# Patient Record
Sex: MALE | Race: WHITE | NOT HISPANIC OR LATINO | Employment: OTHER | URBAN - METROPOLITAN AREA
[De-identification: names, ages, dates, MRNs, and addresses within clinical notes are randomized per-mention and may not be internally consistent; named-entity substitution may affect disease eponyms.]

---

## 2017-05-01 ENCOUNTER — HOSPITAL ENCOUNTER (INPATIENT)
Facility: HOSPITAL | Age: 72
LOS: 5 days | Discharge: HOME | DRG: 193 | End: 2017-05-07
Attending: EMERGENCY MEDICINE | Admitting: INTERNAL MEDICINE
Payer: MEDICARE

## 2017-05-01 DIAGNOSIS — R09.02 HYPOXIA: ICD-10-CM

## 2017-05-01 DIAGNOSIS — J18.9 PNEUMONIA: Primary | ICD-10-CM

## 2017-05-01 DIAGNOSIS — I48.21 PERMANENT ATRIAL FIBRILLATION (HCC): ICD-10-CM

## 2017-05-01 RX ORDER — FOLIC ACID 1 MG/1
1 TABLET ORAL DAILY
COMMUNITY
End: 2021-01-01 | Stop reason: HOSPADM

## 2017-05-01 RX ORDER — DIGOXIN 0.05 MG/ML
125 SOLUTION ORAL DAILY
COMMUNITY
End: 2018-10-10 | Stop reason: ALTCHOICE

## 2017-05-01 RX ORDER — METOPROLOL TARTRATE 100 MG/1
100 TABLET ORAL DAILY
COMMUNITY
End: 2018-08-21 | Stop reason: SDUPTHER

## 2017-05-01 RX ORDER — DILTIAZEM HYDROCHLORIDE 360 MG/1
360 CAPSULE, EXTENDED RELEASE ORAL DAILY
COMMUNITY
End: 2018-08-20 | Stop reason: SDUPTHER

## 2017-05-02 ENCOUNTER — APPOINTMENT (INPATIENT)
Dept: RADIOLOGY | Facility: HOSPITAL | Age: 72
DRG: 193 | End: 2017-05-02
Payer: MEDICARE

## 2017-05-02 ENCOUNTER — APPOINTMENT (EMERGENCY)
Dept: RADIOLOGY | Facility: HOSPITAL | Age: 72
DRG: 193 | End: 2017-05-02
Payer: MEDICARE

## 2017-05-02 ENCOUNTER — GENERIC CONVERSION - ENCOUNTER (OUTPATIENT)
Dept: OTHER | Facility: OTHER | Age: 72
End: 2017-05-02

## 2017-05-02 ENCOUNTER — APPOINTMENT (INPATIENT)
Dept: NON INVASIVE DIAGNOSTICS | Facility: HOSPITAL | Age: 72
DRG: 193 | End: 2017-05-02
Payer: MEDICARE

## 2017-05-02 PROBLEM — J18.9 CAP (COMMUNITY ACQUIRED PNEUMONIA): Status: ACTIVE | Noted: 2017-05-02

## 2017-05-02 PROBLEM — A41.9 SEVERE SEPSIS (HCC): Status: ACTIVE | Noted: 2017-05-02

## 2017-05-02 PROBLEM — R65.20 SEVERE SEPSIS (HCC): Status: ACTIVE | Noted: 2017-05-02

## 2017-05-02 PROBLEM — I48.91 ATRIAL FIBRILLATION (HCC): Status: ACTIVE | Noted: 2017-05-02

## 2017-05-02 PROBLEM — J96.01 ACUTE RESPIRATORY FAILURE WITH HYPOXIA (HCC): Status: ACTIVE | Noted: 2017-05-02

## 2017-05-02 LAB
ALBUMIN SERPL BCP-MCNC: 3.6 G/DL (ref 3.5–5)
ALP SERPL-CCNC: 122 U/L (ref 46–116)
ALT SERPL W P-5'-P-CCNC: 40 U/L (ref 12–78)
ANION GAP SERPL CALCULATED.3IONS-SCNC: 10 MMOL/L (ref 4–13)
ANION GAP SERPL CALCULATED.3IONS-SCNC: 6 MMOL/L (ref 4–13)
ANION GAP SERPL CALCULATED.3IONS-SCNC: 9 MMOL/L (ref 4–13)
ANISOCYTOSIS BLD QL SMEAR: PRESENT
APTT PPP: 39 SECONDS (ref 23–35)
ARTERIAL PATENCY WRIST A: ABNORMAL
AST SERPL W P-5'-P-CCNC: 55 U/L (ref 5–45)
BASE EXCESS BLDA CALC-SCNC: -1 MMOL/L (ref -2–3)
BASOPHILS # BLD MANUAL: 0 THOUSAND/UL (ref 0–0.1)
BASOPHILS NFR MAR MANUAL: 0 % (ref 0–1)
BILIRUB SERPL-MCNC: 0.8 MG/DL (ref 0.2–1)
BUN SERPL-MCNC: 21 MG/DL (ref 5–25)
BUN SERPL-MCNC: 24 MG/DL (ref 5–25)
BUN SERPL-MCNC: 26 MG/DL (ref 5–25)
CALCIUM SERPL-MCNC: 8.1 MG/DL (ref 8.3–10.1)
CALCIUM SERPL-MCNC: 8.7 MG/DL (ref 8.3–10.1)
CALCIUM SERPL-MCNC: 8.7 MG/DL (ref 8.3–10.1)
CHLORIDE SERPL-SCNC: 100 MMOL/L (ref 100–108)
CHLORIDE SERPL-SCNC: 102 MMOL/L (ref 100–108)
CHLORIDE SERPL-SCNC: 103 MMOL/L (ref 100–108)
CO2 SERPL-SCNC: 24 MMOL/L (ref 21–32)
CO2 SERPL-SCNC: 27 MMOL/L (ref 21–32)
CO2 SERPL-SCNC: 28 MMOL/L (ref 21–32)
CREAT SERPL-MCNC: 1.33 MG/DL (ref 0.6–1.3)
CREAT SERPL-MCNC: 1.48 MG/DL (ref 0.6–1.3)
CREAT SERPL-MCNC: 1.52 MG/DL (ref 0.6–1.3)
DIGOXIN SERPL-MCNC: 1.3 NG/ML (ref 0.8–2)
DS:DELIVERY SYSTEM: ABNORMAL
EOSINOPHIL # BLD MANUAL: 0 THOUSAND/UL (ref 0–0.4)
EOSINOPHIL NFR BLD MANUAL: 0 % (ref 0–6)
ERYTHROCYTE [DISTWIDTH] IN BLOOD BY AUTOMATED COUNT: 15.2 % (ref 11.6–15.1)
ERYTHROCYTE [DISTWIDTH] IN BLOOD BY AUTOMATED COUNT: 15.5 % (ref 11.6–15.1)
FIO2 GAS DIL.REBREATH: 100 L
FLUAV AG SPEC QL: ABNORMAL
FLUBV AG SPEC QL: DETECTED
GFR SERPL CREATININE-BSD FRML MDRD: 45.7 ML/MIN/1.73SQ M
GFR SERPL CREATININE-BSD FRML MDRD: 47.1 ML/MIN/1.73SQ M
GFR SERPL CREATININE-BSD FRML MDRD: 53.3 ML/MIN/1.73SQ M
GLUCOSE SERPL-MCNC: 119 MG/DL (ref 65–140)
GLUCOSE SERPL-MCNC: 217 MG/DL (ref 65–140)
GLUCOSE SERPL-MCNC: 220 MG/DL (ref 65–140)
HCO3 BLDA-SCNC: 23.1 MMOL/L (ref 22–28)
HCT VFR BLD AUTO: 34.1 % (ref 36.5–49.3)
HCT VFR BLD AUTO: 36.6 % (ref 36.5–49.3)
HGB BLD-MCNC: 11.9 G/DL (ref 12–17)
HGB BLD-MCNC: 12.6 G/DL (ref 12–17)
INR PPP: 1.53 (ref 0.86–1.16)
L PNEUMO1 AG UR QL IA.RAPID: NEGATIVE
LACTATE SERPL-SCNC: 1.5 MMOL/L (ref 0.5–2)
LYMPHOCYTES # BLD AUTO: 0.47 THOUSAND/UL (ref 0.6–4.47)
LYMPHOCYTES # BLD AUTO: 19 % (ref 14–44)
MCH RBC QN AUTO: 31.6 PG (ref 26.8–34.3)
MCH RBC QN AUTO: 31.9 PG (ref 26.8–34.3)
MCHC RBC AUTO-ENTMCNC: 34.4 G/DL (ref 31.4–37.4)
MCHC RBC AUTO-ENTMCNC: 34.9 G/DL (ref 31.4–37.4)
MCV RBC AUTO: 91 FL (ref 82–98)
MCV RBC AUTO: 93 FL (ref 82–98)
MONOCYTES # BLD AUTO: 0.25 THOUSAND/UL (ref 0–1.22)
MONOCYTES NFR BLD: 10 % (ref 4–12)
NEUTROPHILS # BLD MANUAL: 1.72 THOUSAND/UL (ref 1.85–7.62)
NEUTS BAND NFR BLD MANUAL: 1 % (ref 0–8)
NEUTS SEG NFR BLD AUTO: 69 % (ref 43–75)
NRBC BLD AUTO-RTO: 1 /100 WBC (ref 0–2)
NT-PROBNP SERPL-MCNC: 1281 PG/ML
PCO2 BLD: 24 MMOL/L (ref 21–32)
PCO2 BLD: 36.6 MM HG (ref 36–44)
PH BLD: 7.41 [PH] (ref 7.35–7.45)
PLATELET # BLD AUTO: 41 THOUSANDS/UL (ref 149–390)
PLATELET # BLD AUTO: 59 THOUSANDS/UL (ref 149–390)
PLATELET BLD QL SMEAR: ABNORMAL
PMV BLD AUTO: 10.5 FL (ref 8.9–12.7)
PMV BLD AUTO: 12.4 FL (ref 8.9–12.7)
PO2 BLD: 438 MM HG (ref 75–129)
POLYCHROMASIA BLD QL SMEAR: PRESENT
POTASSIUM SERPL-SCNC: 3.9 MMOL/L (ref 3.5–5.3)
POTASSIUM SERPL-SCNC: 4.4 MMOL/L (ref 3.5–5.3)
POTASSIUM SERPL-SCNC: 4.5 MMOL/L (ref 3.5–5.3)
PROT SERPL-MCNC: 7.3 G/DL (ref 6.4–8.2)
PROTHROMBIN TIME: 18.2 SECONDS (ref 12.1–14.4)
RBC # BLD AUTO: 3.77 MILLION/UL (ref 3.88–5.62)
RBC # BLD AUTO: 3.95 MILLION/UL (ref 3.88–5.62)
RBC MORPH BLD: PRESENT
RSV B RNA SPEC QL NAA+PROBE: ABNORMAL
S PNEUM AG UR QL: NEGATIVE
SAMPLE SITE: ABNORMAL
SAO2 % BLD FROM PO2: 100 % (ref 95–98)
SODIUM SERPL-SCNC: 134 MMOL/L (ref 136–145)
SODIUM SERPL-SCNC: 137 MMOL/L (ref 136–145)
SODIUM SERPL-SCNC: 138 MMOL/L (ref 136–145)
SPECIMEN SOURCE: ABNORMAL
TOTAL CELLS COUNTED SPEC: 100
TROPONIN I SERPL-MCNC: 0.02 NG/ML
VARIANT LYMPHS # BLD AUTO: 1 %
VENTILATION VALUE: 126
WBC # BLD AUTO: 1.54 THOUSAND/UL (ref 4.31–10.16)
WBC # BLD AUTO: 2.45 THOUSAND/UL (ref 4.31–10.16)

## 2017-05-02 PROCEDURE — 82803 BLOOD GASES ANY COMBINATION: CPT

## 2017-05-02 PROCEDURE — 71020 HB CHEST X-RAY 2VW FRONTAL&LATL: CPT

## 2017-05-02 PROCEDURE — 96360 HYDRATION IV INFUSION INIT: CPT

## 2017-05-02 PROCEDURE — 85007 BL SMEAR W/DIFF WBC COUNT: CPT | Performed by: EMERGENCY MEDICINE

## 2017-05-02 PROCEDURE — 87798 DETECT AGENT NOS DNA AMP: CPT | Performed by: NURSE PRACTITIONER

## 2017-05-02 PROCEDURE — 94640 AIRWAY INHALATION TREATMENT: CPT

## 2017-05-02 PROCEDURE — 71010 HB CHEST X-RAY 1 VIEW FRONTAL (PORTABLE): CPT

## 2017-05-02 PROCEDURE — 85027 COMPLETE CBC AUTOMATED: CPT | Performed by: NURSE PRACTITIONER

## 2017-05-02 PROCEDURE — 94760 N-INVAS EAR/PLS OXIMETRY 1: CPT

## 2017-05-02 PROCEDURE — 87040 BLOOD CULTURE FOR BACTERIA: CPT | Performed by: EMERGENCY MEDICINE

## 2017-05-02 PROCEDURE — 94660 CPAP INITIATION&MGMT: CPT

## 2017-05-02 PROCEDURE — 83605 ASSAY OF LACTIC ACID: CPT | Performed by: EMERGENCY MEDICINE

## 2017-05-02 PROCEDURE — 83880 ASSAY OF NATRIURETIC PEPTIDE: CPT | Performed by: NURSE PRACTITIONER

## 2017-05-02 PROCEDURE — 80053 COMPREHEN METABOLIC PANEL: CPT | Performed by: EMERGENCY MEDICINE

## 2017-05-02 PROCEDURE — 85730 THROMBOPLASTIN TIME PARTIAL: CPT | Performed by: EMERGENCY MEDICINE

## 2017-05-02 PROCEDURE — 87449 NOS EACH ORGANISM AG IA: CPT | Performed by: NURSE PRACTITIONER

## 2017-05-02 PROCEDURE — 85610 PROTHROMBIN TIME: CPT | Performed by: EMERGENCY MEDICINE

## 2017-05-02 PROCEDURE — 80048 BASIC METABOLIC PNL TOTAL CA: CPT | Performed by: NURSE PRACTITIONER

## 2017-05-02 PROCEDURE — 80162 ASSAY OF DIGOXIN TOTAL: CPT | Performed by: NURSE PRACTITIONER

## 2017-05-02 PROCEDURE — 87081 CULTURE SCREEN ONLY: CPT | Performed by: NURSE PRACTITIONER

## 2017-05-02 PROCEDURE — 84484 ASSAY OF TROPONIN QUANT: CPT | Performed by: FAMILY MEDICINE

## 2017-05-02 PROCEDURE — 93005 ELECTROCARDIOGRAM TRACING: CPT

## 2017-05-02 PROCEDURE — 85027 COMPLETE CBC AUTOMATED: CPT | Performed by: EMERGENCY MEDICINE

## 2017-05-02 PROCEDURE — 84484 ASSAY OF TROPONIN QUANT: CPT | Performed by: NURSE PRACTITIONER

## 2017-05-02 PROCEDURE — 36415 COLL VENOUS BLD VENIPUNCTURE: CPT | Performed by: EMERGENCY MEDICINE

## 2017-05-02 PROCEDURE — 99285 EMERGENCY DEPT VISIT HI MDM: CPT

## 2017-05-02 PROCEDURE — 93306 TTE W/DOPPLER COMPLETE: CPT

## 2017-05-02 RX ORDER — DOCUSATE SODIUM 100 MG/1
100 CAPSULE, LIQUID FILLED ORAL 2 TIMES DAILY
Status: DISCONTINUED | OUTPATIENT
Start: 2017-05-02 | End: 2017-05-07 | Stop reason: HOSPADM

## 2017-05-02 RX ORDER — ALBUTEROL SULFATE 2.5 MG/3ML
2.5 SOLUTION RESPIRATORY (INHALATION) EVERY 4 HOURS PRN
Status: DISCONTINUED | OUTPATIENT
Start: 2017-05-02 | End: 2017-05-07 | Stop reason: HOSPADM

## 2017-05-02 RX ORDER — OSELTAMIVIR PHOSPHATE 30 MG/1
30 CAPSULE ORAL EVERY 12 HOURS SCHEDULED
Status: DISCONTINUED | OUTPATIENT
Start: 2017-05-02 | End: 2017-05-07 | Stop reason: HOSPADM

## 2017-05-02 RX ORDER — DIGOXIN 0.05 MG/ML
125 SOLUTION ORAL DAILY
Status: DISCONTINUED | OUTPATIENT
Start: 2017-05-02 | End: 2017-05-07 | Stop reason: HOSPADM

## 2017-05-02 RX ORDER — ALBUTEROL SULFATE 2.5 MG/3ML
10 SOLUTION RESPIRATORY (INHALATION) ONCE
Status: DISCONTINUED | OUTPATIENT
Start: 2017-05-02 | End: 2017-05-04

## 2017-05-02 RX ORDER — ALBUTEROL SULFATE 2.5 MG/3ML
10 SOLUTION RESPIRATORY (INHALATION) ONCE
Status: DISCONTINUED | OUTPATIENT
Start: 2017-05-02 | End: 2017-05-02

## 2017-05-02 RX ORDER — METOPROLOL TARTRATE 50 MG/1
100 TABLET, FILM COATED ORAL DAILY
Status: DISCONTINUED | OUTPATIENT
Start: 2017-05-02 | End: 2017-05-05

## 2017-05-02 RX ORDER — LEVOFLOXACIN 5 MG/ML
750 INJECTION, SOLUTION INTRAVENOUS ONCE
Status: COMPLETED | OUTPATIENT
Start: 2017-05-02 | End: 2017-05-02

## 2017-05-02 RX ORDER — IPRATROPIUM BROMIDE AND ALBUTEROL SULFATE 2.5; .5 MG/3ML; MG/3ML
3 SOLUTION RESPIRATORY (INHALATION) ONCE
Status: COMPLETED | OUTPATIENT
Start: 2017-05-02 | End: 2017-05-02

## 2017-05-02 RX ORDER — METHYLPREDNISOLONE SODIUM SUCCINATE 40 MG/ML
40 INJECTION, POWDER, LYOPHILIZED, FOR SOLUTION INTRAMUSCULAR; INTRAVENOUS EVERY 8 HOURS
Status: DISCONTINUED | OUTPATIENT
Start: 2017-05-02 | End: 2017-05-02

## 2017-05-02 RX ORDER — ACETAMINOPHEN 325 MG/1
650 TABLET ORAL EVERY 6 HOURS PRN
Status: DISCONTINUED | OUTPATIENT
Start: 2017-05-02 | End: 2017-05-07 | Stop reason: HOSPADM

## 2017-05-02 RX ORDER — METHYLPREDNISOLONE SODIUM SUCCINATE 125 MG/2ML
125 INJECTION, POWDER, LYOPHILIZED, FOR SOLUTION INTRAMUSCULAR; INTRAVENOUS ONCE
Status: COMPLETED | OUTPATIENT
Start: 2017-05-02 | End: 2017-05-02

## 2017-05-02 RX ORDER — VANCOMYCIN HYDROCHLORIDE 500 MG/10ML
INJECTION, POWDER, LYOPHILIZED, FOR SOLUTION INTRAVENOUS
Status: COMPLETED
Start: 2017-05-02 | End: 2017-05-02

## 2017-05-02 RX ORDER — FOLIC ACID 1 MG/1
1 TABLET ORAL DAILY
Status: DISCONTINUED | OUTPATIENT
Start: 2017-05-02 | End: 2017-05-07 | Stop reason: HOSPADM

## 2017-05-02 RX ORDER — SODIUM CHLORIDE 9 MG/ML
100 INJECTION, SOLUTION INTRAVENOUS CONTINUOUS
Status: DISCONTINUED | OUTPATIENT
Start: 2017-05-02 | End: 2017-05-02

## 2017-05-02 RX ORDER — MAGNESIUM HYDROXIDE/ALUMINUM HYDROXICE/SIMETHICONE 120; 1200; 1200 MG/30ML; MG/30ML; MG/30ML
30 SUSPENSION ORAL EVERY 6 HOURS PRN
Status: DISCONTINUED | OUTPATIENT
Start: 2017-05-02 | End: 2017-05-07 | Stop reason: HOSPADM

## 2017-05-02 RX ORDER — IPRATROPIUM BROMIDE AND ALBUTEROL SULFATE 2.5; .5 MG/3ML; MG/3ML
3 SOLUTION RESPIRATORY (INHALATION)
Status: DISCONTINUED | OUTPATIENT
Start: 2017-05-02 | End: 2017-05-03

## 2017-05-02 RX ORDER — ONDANSETRON 2 MG/ML
4 INJECTION INTRAMUSCULAR; INTRAVENOUS EVERY 6 HOURS PRN
Status: DISCONTINUED | OUTPATIENT
Start: 2017-05-02 | End: 2017-05-07 | Stop reason: HOSPADM

## 2017-05-02 RX ORDER — FUROSEMIDE 10 MG/ML
20 INJECTION INTRAMUSCULAR; INTRAVENOUS
Status: DISCONTINUED | OUTPATIENT
Start: 2017-05-02 | End: 2017-05-03

## 2017-05-02 RX ORDER — DILTIAZEM HYDROCHLORIDE 180 MG/1
360 CAPSULE, COATED, EXTENDED RELEASE ORAL DAILY
Status: DISCONTINUED | OUTPATIENT
Start: 2017-05-02 | End: 2017-05-07 | Stop reason: HOSPADM

## 2017-05-02 RX ADMIN — ALBUTEROL SULFATE 10 MG: 2.5 SOLUTION RESPIRATORY (INHALATION) at 03:16

## 2017-05-02 RX ADMIN — IPRATROPIUM BROMIDE AND ALBUTEROL SULFATE 3 ML: 2.5; .5 SOLUTION RESPIRATORY (INHALATION) at 08:52

## 2017-05-02 RX ADMIN — IPRATROPIUM BROMIDE AND ALBUTEROL SULFATE 3 ML: 2.5; .5 SOLUTION RESPIRATORY (INHALATION) at 13:35

## 2017-05-02 RX ADMIN — IPRATROPIUM BROMIDE AND ALBUTEROL SULFATE 3 ML: .5; 3 SOLUTION RESPIRATORY (INHALATION) at 00:45

## 2017-05-02 RX ADMIN — METOPROLOL TARTRATE 100 MG: 50 TABLET ORAL at 07:50

## 2017-05-02 RX ADMIN — SODIUM CHLORIDE 2200 ML: 0.9 INJECTION, SOLUTION INTRAVENOUS at 03:14

## 2017-05-02 RX ADMIN — LEVOFLOXACIN 750 MG: 5 INJECTION, SOLUTION INTRAVENOUS at 03:28

## 2017-05-02 RX ADMIN — METHYLPREDNISOLONE SODIUM SUCCINATE 40 MG: 40 INJECTION, POWDER, FOR SOLUTION INTRAMUSCULAR; INTRAVENOUS at 10:46

## 2017-05-02 RX ADMIN — METOPROLOL TARTRATE 5 MG: 5 INJECTION INTRAVENOUS at 21:43

## 2017-05-02 RX ADMIN — SODIUM CHLORIDE 100 ML/HR: 0.9 INJECTION, SOLUTION INTRAVENOUS at 06:43

## 2017-05-02 RX ADMIN — APIXABAN 5 MG: 5 TABLET, FILM COATED ORAL at 07:39

## 2017-05-02 RX ADMIN — IPRATROPIUM BROMIDE AND ALBUTEROL SULFATE 3 ML: 2.5; .5 SOLUTION RESPIRATORY (INHALATION) at 20:17

## 2017-05-02 RX ADMIN — OSELTAMIVIR PHOSPHATE 30 MG: 30 CAPSULE ORAL at 21:43

## 2017-05-02 RX ADMIN — CEFEPIME 2000 MG: 2 INJECTION, POWDER, FOR SOLUTION INTRAMUSCULAR; INTRAVENOUS at 13:44

## 2017-05-02 RX ADMIN — FOLIC ACID 1 MG: 1 TABLET ORAL at 07:39

## 2017-05-02 RX ADMIN — METHYLPREDNISOLONE SODIUM SUCCINATE 125 MG: 125 INJECTION, POWDER, FOR SOLUTION INTRAMUSCULAR; INTRAVENOUS at 03:18

## 2017-05-02 RX ADMIN — DOCUSATE SODIUM 100 MG: 100 CAPSULE, LIQUID FILLED ORAL at 16:42

## 2017-05-02 RX ADMIN — CEFTRIAXONE SODIUM 1000 MG: 1 INJECTION, POWDER, FOR SOLUTION INTRAMUSCULAR; INTRAVENOUS at 07:44

## 2017-05-02 RX ADMIN — DOCUSATE SODIUM 100 MG: 100 CAPSULE, LIQUID FILLED ORAL at 07:39

## 2017-05-02 RX ADMIN — APIXABAN 5 MG: 5 TABLET, FILM COATED ORAL at 16:42

## 2017-05-02 RX ADMIN — DIGOXIN 125 MCG: 0.05 SOLUTION ORAL at 07:51

## 2017-05-02 RX ADMIN — AZITHROMYCIN MONOHYDRATE 500 MG: 500 INJECTION, POWDER, LYOPHILIZED, FOR SOLUTION INTRAVENOUS at 07:46

## 2017-05-02 RX ADMIN — SODIUM CHLORIDE 1000 ML: 0.9 INJECTION, SOLUTION INTRAVENOUS at 01:30

## 2017-05-02 RX ADMIN — VANCOMYCIN HYDROCHLORIDE 1250 MG: 1 INJECTION, POWDER, LYOPHILIZED, FOR SOLUTION INTRAVENOUS at 04:26

## 2017-05-02 RX ADMIN — FUROSEMIDE 20 MG: 10 INJECTION, SOLUTION INTRAMUSCULAR; INTRAVENOUS at 16:42

## 2017-05-02 RX ADMIN — IPRATROPIUM BROMIDE 0.5 MG: 0.5 SOLUTION RESPIRATORY (INHALATION) at 03:16

## 2017-05-02 RX ADMIN — DILTIAZEM HYDROCHLORIDE 360 MG: 180 CAPSULE, EXTENDED RELEASE ORAL at 07:39

## 2017-05-03 ENCOUNTER — APPOINTMENT (INPATIENT)
Dept: ULTRASOUND IMAGING | Facility: HOSPITAL | Age: 72
DRG: 193 | End: 2017-05-03
Payer: MEDICARE

## 2017-05-03 ENCOUNTER — APPOINTMENT (INPATIENT)
Dept: RADIOLOGY | Facility: HOSPITAL | Age: 72
DRG: 193 | End: 2017-05-03
Payer: MEDICARE

## 2017-05-03 LAB
ANION GAP SERPL CALCULATED.3IONS-SCNC: 10 MMOL/L (ref 4–13)
BASOPHILS # BLD AUTO: 0.02 THOUSANDS/ΜL (ref 0–0.1)
BASOPHILS NFR BLD AUTO: 1 % (ref 0–1)
BUN SERPL-MCNC: 24 MG/DL (ref 5–25)
CALCIUM SERPL-MCNC: 9.2 MG/DL (ref 8.3–10.1)
CHLORIDE SERPL-SCNC: 103 MMOL/L (ref 100–108)
CO2 SERPL-SCNC: 27 MMOL/L (ref 21–32)
CREAT SERPL-MCNC: 1.2 MG/DL (ref 0.6–1.3)
DIGOXIN SERPL-MCNC: 1 NG/ML (ref 0.8–2)
EOSINOPHIL # BLD AUTO: 0.04 THOUSAND/ΜL (ref 0–0.61)
EOSINOPHIL NFR BLD AUTO: 1 % (ref 0–6)
ERYTHROCYTE [DISTWIDTH] IN BLOOD BY AUTOMATED COUNT: 15.1 % (ref 11.6–15.1)
GFR SERPL CREATININE-BSD FRML MDRD: 60 ML/MIN/1.73SQ M
GLUCOSE SERPL-MCNC: 173 MG/DL (ref 65–140)
HCT VFR BLD AUTO: 39.4 % (ref 36.5–49.3)
HGB BLD-MCNC: 13.7 G/DL (ref 12–17)
LYMPHOCYTES # BLD AUTO: 0.31 THOUSANDS/ΜL (ref 0.6–4.47)
LYMPHOCYTES NFR BLD AUTO: 8 % (ref 14–44)
MCH RBC QN AUTO: 30.9 PG (ref 26.8–34.3)
MCHC RBC AUTO-ENTMCNC: 34.8 G/DL (ref 31.4–37.4)
MCV RBC AUTO: 89 FL (ref 82–98)
MONOCYTES # BLD AUTO: 0.2 THOUSAND/ΜL (ref 0.17–1.22)
MONOCYTES NFR BLD AUTO: 5 % (ref 4–12)
MRSA NOSE QL CULT: NORMAL
NEUTROPHILS # BLD AUTO: 3.47 THOUSANDS/ΜL (ref 1.85–7.62)
NEUTS SEG NFR BLD AUTO: 85 % (ref 43–75)
PLATELET # BLD AUTO: 59 THOUSANDS/UL (ref 149–390)
POTASSIUM SERPL-SCNC: 4 MMOL/L (ref 3.5–5.3)
RBC # BLD AUTO: 4.43 MILLION/UL (ref 3.88–5.62)
SODIUM SERPL-SCNC: 140 MMOL/L (ref 136–145)
WBC # BLD AUTO: 4.04 THOUSAND/UL (ref 4.31–10.16)

## 2017-05-03 PROCEDURE — 94640 AIRWAY INHALATION TREATMENT: CPT

## 2017-05-03 PROCEDURE — 80048 BASIC METABOLIC PNL TOTAL CA: CPT | Performed by: FAMILY MEDICINE

## 2017-05-03 PROCEDURE — G8979 MOBILITY GOAL STATUS: HCPCS

## 2017-05-03 PROCEDURE — 94760 N-INVAS EAR/PLS OXIMETRY 1: CPT

## 2017-05-03 PROCEDURE — 80162 ASSAY OF DIGOXIN TOTAL: CPT | Performed by: FAMILY MEDICINE

## 2017-05-03 PROCEDURE — 97163 PT EVAL HIGH COMPLEX 45 MIN: CPT

## 2017-05-03 PROCEDURE — 76770 US EXAM ABDO BACK WALL COMP: CPT

## 2017-05-03 PROCEDURE — 71010 HB CHEST X-RAY 1 VIEW FRONTAL (PORTABLE): CPT

## 2017-05-03 PROCEDURE — G8978 MOBILITY CURRENT STATUS: HCPCS

## 2017-05-03 PROCEDURE — 85025 COMPLETE CBC W/AUTO DIFF WBC: CPT | Performed by: FAMILY MEDICINE

## 2017-05-03 PROCEDURE — G8980 MOBILITY D/C STATUS: HCPCS

## 2017-05-03 RX ORDER — DILTIAZEM HYDROCHLORIDE 5 MG/ML
15 INJECTION INTRAVENOUS ONCE
Status: COMPLETED | OUTPATIENT
Start: 2017-05-03 | End: 2017-05-03

## 2017-05-03 RX ORDER — METOPROLOL TARTRATE 50 MG/1
50 TABLET, FILM COATED ORAL ONCE
Status: COMPLETED | OUTPATIENT
Start: 2017-05-03 | End: 2017-05-03

## 2017-05-03 RX ORDER — DILTIAZEM HYDROCHLORIDE 5 MG/ML
INJECTION INTRAVENOUS
Status: COMPLETED
Start: 2017-05-03 | End: 2017-05-03

## 2017-05-03 RX ORDER — DILTIAZEM HYDROCHLORIDE 5 MG/ML
INJECTION INTRAVENOUS
Status: DISPENSED
Start: 2017-05-03 | End: 2017-05-03

## 2017-05-03 RX ORDER — DICYCLOMINE HCL 20 MG
20 TABLET ORAL EVERY 6 HOURS PRN
Status: DISCONTINUED | OUTPATIENT
Start: 2017-05-03 | End: 2017-05-07 | Stop reason: HOSPADM

## 2017-05-03 RX ADMIN — CEFEPIME 2000 MG: 2 INJECTION, POWDER, FOR SOLUTION INTRAMUSCULAR; INTRAVENOUS at 13:19

## 2017-05-03 RX ADMIN — DILTIAZEM HYDROCHLORIDE 15 MG: 5 INJECTION INTRAVENOUS at 00:27

## 2017-05-03 RX ADMIN — DILTIAZEM HYDROCHLORIDE 15 MG/HR: 5 INJECTION INTRAVENOUS at 08:54

## 2017-05-03 RX ADMIN — IPRATROPIUM BROMIDE AND ALBUTEROL SULFATE 3 ML: 2.5; .5 SOLUTION RESPIRATORY (INHALATION) at 07:35

## 2017-05-03 RX ADMIN — ALBUTEROL SULFATE 2.5 MG: 2.5 SOLUTION RESPIRATORY (INHALATION) at 20:18

## 2017-05-03 RX ADMIN — METOPROLOL TARTRATE 5 MG: 5 INJECTION INTRAVENOUS at 20:19

## 2017-05-03 RX ADMIN — DIGOXIN 125 MCG: 0.05 SOLUTION ORAL at 08:58

## 2017-05-03 RX ADMIN — APIXABAN 5 MG: 5 TABLET, FILM COATED ORAL at 08:08

## 2017-05-03 RX ADMIN — FUROSEMIDE 20 MG: 10 INJECTION, SOLUTION INTRAMUSCULAR; INTRAVENOUS at 08:05

## 2017-05-03 RX ADMIN — METOPROLOL TARTRATE 50 MG: 50 TABLET ORAL at 04:26

## 2017-05-03 RX ADMIN — OSELTAMIVIR PHOSPHATE 30 MG: 30 CAPSULE ORAL at 08:58

## 2017-05-03 RX ADMIN — OSELTAMIVIR PHOSPHATE 30 MG: 30 CAPSULE ORAL at 20:19

## 2017-05-03 RX ADMIN — DOCUSATE SODIUM 100 MG: 100 CAPSULE, LIQUID FILLED ORAL at 08:05

## 2017-05-03 RX ADMIN — DILTIAZEM HYDROCHLORIDE 360 MG: 180 CAPSULE, EXTENDED RELEASE ORAL at 08:05

## 2017-05-03 RX ADMIN — FOLIC ACID 1 MG: 1 TABLET ORAL at 08:08

## 2017-05-03 RX ADMIN — METOPROLOL TARTRATE 100 MG: 50 TABLET ORAL at 08:57

## 2017-05-03 RX ADMIN — VANCOMYCIN HYDROCHLORIDE 1250 MG: 1 INJECTION, POWDER, LYOPHILIZED, FOR SOLUTION INTRAVENOUS at 04:33

## 2017-05-03 RX ADMIN — DILTIAZEM HYDROCHLORIDE 5 MG/HR: 5 INJECTION INTRAVENOUS at 01:14

## 2017-05-03 RX ADMIN — DILTIAZEM HYDROCHLORIDE 9 MG/HR: 5 INJECTION INTRAVENOUS at 22:19

## 2017-05-03 RX ADMIN — APIXABAN 5 MG: 5 TABLET, FILM COATED ORAL at 17:49

## 2017-05-04 PROBLEM — D69.6 THROMBOCYTOPENIA (HCC): Status: ACTIVE | Noted: 2017-05-04

## 2017-05-04 PROBLEM — N17.9 AKI (ACUTE KIDNEY INJURY) (HCC): Status: ACTIVE | Noted: 2017-05-04

## 2017-05-04 PROBLEM — J10.1 INFLUENZA B: Status: ACTIVE | Noted: 2017-05-04

## 2017-05-04 PROBLEM — J81.1 PULMONARY EDEMA: Status: ACTIVE | Noted: 2017-05-04

## 2017-05-04 PROBLEM — G47.00 INSOMNIA: Status: ACTIVE | Noted: 2017-05-04

## 2017-05-04 LAB
ANION GAP SERPL CALCULATED.3IONS-SCNC: 9 MMOL/L (ref 4–13)
ATRIAL RATE: 131 BPM
BASOPHILS # BLD AUTO: 0.01 THOUSANDS/ΜL (ref 0–0.1)
BASOPHILS NFR BLD AUTO: 0 % (ref 0–1)
BUN SERPL-MCNC: 39 MG/DL (ref 5–25)
CALCIUM SERPL-MCNC: 8.9 MG/DL (ref 8.3–10.1)
CHLORIDE SERPL-SCNC: 102 MMOL/L (ref 100–108)
CO2 SERPL-SCNC: 27 MMOL/L (ref 21–32)
CREAT SERPL-MCNC: 1.34 MG/DL (ref 0.6–1.3)
EOSINOPHIL # BLD AUTO: 0 THOUSAND/ΜL (ref 0–0.61)
EOSINOPHIL NFR BLD AUTO: 0 % (ref 0–6)
ERYTHROCYTE [DISTWIDTH] IN BLOOD BY AUTOMATED COUNT: 15.4 % (ref 11.6–15.1)
GFR SERPL CREATININE-BSD FRML MDRD: 52.9 ML/MIN/1.73SQ M
GLUCOSE SERPL-MCNC: 130 MG/DL (ref 65–140)
HCT VFR BLD AUTO: 40.5 % (ref 36.5–49.3)
HGB BLD-MCNC: 13.9 G/DL (ref 12–17)
LYMPHOCYTES # BLD AUTO: 0.51 THOUSANDS/ΜL (ref 0.6–4.47)
LYMPHOCYTES NFR BLD AUTO: 8 % (ref 14–44)
MCH RBC QN AUTO: 30.8 PG (ref 26.8–34.3)
MCHC RBC AUTO-ENTMCNC: 34.3 G/DL (ref 31.4–37.4)
MCV RBC AUTO: 90 FL (ref 82–98)
MONOCYTES # BLD AUTO: 0.41 THOUSAND/ΜL (ref 0.17–1.22)
MONOCYTES NFR BLD AUTO: 7 % (ref 4–12)
NEUTROPHILS # BLD AUTO: 5.14 THOUSANDS/ΜL (ref 1.85–7.62)
NEUTS SEG NFR BLD AUTO: 85 % (ref 43–75)
PLATELET # BLD AUTO: 75 THOUSANDS/UL (ref 149–390)
POTASSIUM SERPL-SCNC: 4.8 MMOL/L (ref 3.5–5.3)
QRS AXIS: -34 DEGREES
QRSD INTERVAL: 76 MS
QT INTERVAL: 354 MS
QTC INTERVAL: 435 MS
RBC # BLD AUTO: 4.52 MILLION/UL (ref 3.88–5.62)
SODIUM SERPL-SCNC: 138 MMOL/L (ref 136–145)
T WAVE AXIS: 54 DEGREES
VENTRICULAR RATE: 91 BPM
WBC # BLD AUTO: 6.07 THOUSAND/UL (ref 4.31–10.16)

## 2017-05-04 PROCEDURE — 85025 COMPLETE CBC W/AUTO DIFF WBC: CPT | Performed by: FAMILY MEDICINE

## 2017-05-04 PROCEDURE — 80048 BASIC METABOLIC PNL TOTAL CA: CPT | Performed by: FAMILY MEDICINE

## 2017-05-04 RX ORDER — LORAZEPAM 0.5 MG/1
0.5 TABLET ORAL ONCE
Status: COMPLETED | OUTPATIENT
Start: 2017-05-04 | End: 2017-05-04

## 2017-05-04 RX ORDER — FUROSEMIDE 20 MG/1
20 TABLET ORAL DAILY
Status: DISCONTINUED | OUTPATIENT
Start: 2017-05-05 | End: 2017-05-07 | Stop reason: HOSPADM

## 2017-05-04 RX ORDER — DILTIAZEM HYDROCHLORIDE 5 MG/ML
INJECTION INTRAVENOUS
Status: DISPENSED
Start: 2017-05-04 | End: 2017-05-05

## 2017-05-04 RX ORDER — DILTIAZEM HYDROCHLORIDE 5 MG/ML
INJECTION INTRAVENOUS
Status: DISPENSED
Start: 2017-05-04 | End: 2017-05-04

## 2017-05-04 RX ORDER — DIAZEPAM 5 MG/1
5 TABLET ORAL
Status: DISCONTINUED | OUTPATIENT
Start: 2017-05-04 | End: 2017-05-07 | Stop reason: HOSPADM

## 2017-05-04 RX ORDER — LANOLIN ALCOHOL/MO/W.PET/CERES
3 CREAM (GRAM) TOPICAL
Status: DISCONTINUED | OUTPATIENT
Start: 2017-05-04 | End: 2017-05-05

## 2017-05-04 RX ADMIN — DILTIAZEM HYDROCHLORIDE 15 MG/HR: 5 INJECTION INTRAVENOUS at 06:01

## 2017-05-04 RX ADMIN — CEFEPIME 2000 MG: 2 INJECTION, POWDER, FOR SOLUTION INTRAMUSCULAR; INTRAVENOUS at 13:25

## 2017-05-04 RX ADMIN — APIXABAN 5 MG: 5 TABLET, FILM COATED ORAL at 08:53

## 2017-05-04 RX ADMIN — LORAZEPAM 0.5 MG: 0.5 TABLET ORAL at 03:34

## 2017-05-04 RX ADMIN — APIXABAN 5 MG: 5 TABLET, FILM COATED ORAL at 17:50

## 2017-05-04 RX ADMIN — METOPROLOL TARTRATE 100 MG: 50 TABLET ORAL at 08:55

## 2017-05-04 RX ADMIN — OSELTAMIVIR PHOSPHATE 30 MG: 30 CAPSULE ORAL at 08:56

## 2017-05-04 RX ADMIN — METOPROLOL TARTRATE 5 MG: 5 INJECTION INTRAVENOUS at 03:16

## 2017-05-04 RX ADMIN — DILTIAZEM HYDROCHLORIDE 10 MG/HR: 5 INJECTION INTRAVENOUS at 15:20

## 2017-05-04 RX ADMIN — MELATONIN TAB 3 MG 3 MG: 3 TAB at 23:15

## 2017-05-04 RX ADMIN — OSELTAMIVIR PHOSPHATE 30 MG: 30 CAPSULE ORAL at 20:59

## 2017-05-04 RX ADMIN — DOCUSATE SODIUM 100 MG: 100 CAPSULE, LIQUID FILLED ORAL at 08:54

## 2017-05-04 RX ADMIN — DIGOXIN 125 MCG: 0.05 SOLUTION ORAL at 08:55

## 2017-05-04 RX ADMIN — DIAZEPAM 5 MG: 5 TABLET ORAL at 23:15

## 2017-05-04 RX ADMIN — FOLIC ACID 1 MG: 1 TABLET ORAL at 08:54

## 2017-05-04 RX ADMIN — DILTIAZEM HYDROCHLORIDE 360 MG: 180 CAPSULE, EXTENDED RELEASE ORAL at 08:54

## 2017-05-04 RX ADMIN — METOPROLOL TARTRATE 5 MG: 5 INJECTION INTRAVENOUS at 18:46

## 2017-05-04 RX ADMIN — VANCOMYCIN HYDROCHLORIDE 1250 MG: 1 INJECTION, POWDER, LYOPHILIZED, FOR SOLUTION INTRAVENOUS at 04:36

## 2017-05-05 ENCOUNTER — APPOINTMENT (INPATIENT)
Dept: RADIOLOGY | Facility: HOSPITAL | Age: 72
DRG: 193 | End: 2017-05-05
Payer: MEDICARE

## 2017-05-05 LAB
ANION GAP SERPL CALCULATED.3IONS-SCNC: 9 MMOL/L (ref 4–13)
BUN SERPL-MCNC: 34 MG/DL (ref 5–25)
CALCIUM SERPL-MCNC: 8.4 MG/DL (ref 8.3–10.1)
CHLORIDE SERPL-SCNC: 103 MMOL/L (ref 100–108)
CO2 SERPL-SCNC: 26 MMOL/L (ref 21–32)
CREAT SERPL-MCNC: 1.39 MG/DL (ref 0.6–1.3)
ERYTHROCYTE [DISTWIDTH] IN BLOOD BY AUTOMATED COUNT: 15.4 % (ref 11.6–15.1)
GFR SERPL CREATININE-BSD FRML MDRD: 50.7 ML/MIN/1.73SQ M
GLUCOSE SERPL-MCNC: 106 MG/DL (ref 65–140)
HCT VFR BLD AUTO: 37.9 % (ref 36.5–49.3)
HGB BLD-MCNC: 13.3 G/DL (ref 12–17)
MCH RBC QN AUTO: 31.5 PG (ref 26.8–34.3)
MCHC RBC AUTO-ENTMCNC: 35.1 G/DL (ref 31.4–37.4)
MCV RBC AUTO: 90 FL (ref 82–98)
PLATELET # BLD AUTO: 79 THOUSANDS/UL (ref 149–390)
PMV BLD AUTO: 12.1 FL (ref 8.9–12.7)
POTASSIUM SERPL-SCNC: 4.1 MMOL/L (ref 3.5–5.3)
RBC # BLD AUTO: 4.22 MILLION/UL (ref 3.88–5.62)
SODIUM SERPL-SCNC: 138 MMOL/L (ref 136–145)
WBC # BLD AUTO: 5.33 THOUSAND/UL (ref 4.31–10.16)

## 2017-05-05 PROCEDURE — 71010 HB CHEST X-RAY 1 VIEW FRONTAL (PORTABLE): CPT

## 2017-05-05 PROCEDURE — 85027 COMPLETE CBC AUTOMATED: CPT | Performed by: INTERNAL MEDICINE

## 2017-05-05 PROCEDURE — 94760 N-INVAS EAR/PLS OXIMETRY 1: CPT

## 2017-05-05 PROCEDURE — 80048 BASIC METABOLIC PNL TOTAL CA: CPT | Performed by: INTERNAL MEDICINE

## 2017-05-05 RX ORDER — METOPROLOL TARTRATE 50 MG/1
50 TABLET, FILM COATED ORAL EVERY 8 HOURS
Status: DISCONTINUED | OUTPATIENT
Start: 2017-05-05 | End: 2017-05-05

## 2017-05-05 RX ORDER — METOPROLOL SUCCINATE 50 MG/1
50 TABLET, EXTENDED RELEASE ORAL 2 TIMES DAILY
Status: DISCONTINUED | OUTPATIENT
Start: 2017-05-05 | End: 2017-05-06

## 2017-05-05 RX ORDER — LANOLIN ALCOHOL/MO/W.PET/CERES
6 CREAM (GRAM) TOPICAL
Status: DISCONTINUED | OUTPATIENT
Start: 2017-05-06 | End: 2017-05-06

## 2017-05-05 RX ADMIN — METOPROLOL TARTRATE 50 MG: 50 TABLET ORAL at 13:42

## 2017-05-05 RX ADMIN — METOPROLOL TARTRATE 5 MG: 5 INJECTION INTRAVENOUS at 02:52

## 2017-05-05 RX ADMIN — DIAZEPAM 5 MG: 5 TABLET ORAL at 23:45

## 2017-05-05 RX ADMIN — OSELTAMIVIR PHOSPHATE 30 MG: 30 CAPSULE ORAL at 08:55

## 2017-05-05 RX ADMIN — APIXABAN 5 MG: 5 TABLET, FILM COATED ORAL at 17:47

## 2017-05-05 RX ADMIN — METOPROLOL TARTRATE 100 MG: 50 TABLET ORAL at 08:55

## 2017-05-05 RX ADMIN — APIXABAN 5 MG: 5 TABLET, FILM COATED ORAL at 08:55

## 2017-05-05 RX ADMIN — DIGOXIN 125 MCG: 0.05 SOLUTION ORAL at 08:56

## 2017-05-05 RX ADMIN — METOPROLOL SUCCINATE 50 MG: 50 TABLET, EXTENDED RELEASE ORAL at 18:43

## 2017-05-05 RX ADMIN — DILTIAZEM HYDROCHLORIDE 12 MG/HR: 5 INJECTION INTRAVENOUS at 01:42

## 2017-05-05 RX ADMIN — OSELTAMIVIR PHOSPHATE 30 MG: 30 CAPSULE ORAL at 20:50

## 2017-05-05 RX ADMIN — DILTIAZEM HYDROCHLORIDE 360 MG: 180 CAPSULE, EXTENDED RELEASE ORAL at 08:55

## 2017-05-05 RX ADMIN — FOLIC ACID 1 MG: 1 TABLET ORAL at 08:55

## 2017-05-06 LAB
ANION GAP SERPL CALCULATED.3IONS-SCNC: 10 MMOL/L (ref 4–13)
BUN SERPL-MCNC: 31 MG/DL (ref 5–25)
CALCIUM SERPL-MCNC: 8.6 MG/DL (ref 8.3–10.1)
CHLORIDE SERPL-SCNC: 103 MMOL/L (ref 100–108)
CO2 SERPL-SCNC: 26 MMOL/L (ref 21–32)
CREAT SERPL-MCNC: 1.41 MG/DL (ref 0.6–1.3)
ERYTHROCYTE [DISTWIDTH] IN BLOOD BY AUTOMATED COUNT: 15.4 % (ref 11.6–15.1)
GFR SERPL CREATININE-BSD FRML MDRD: 49.8 ML/MIN/1.73SQ M
GLUCOSE SERPL-MCNC: 109 MG/DL (ref 65–140)
HCT VFR BLD AUTO: 38.8 % (ref 36.5–49.3)
HGB BLD-MCNC: 13.6 G/DL (ref 12–17)
LACTATE SERPL-SCNC: 1.1 MMOL/L (ref 0.5–2)
MCH RBC QN AUTO: 31.2 PG (ref 26.8–34.3)
MCHC RBC AUTO-ENTMCNC: 35.1 G/DL (ref 31.4–37.4)
MCV RBC AUTO: 89 FL (ref 82–98)
PLATELET # BLD AUTO: 83 THOUSANDS/UL (ref 149–390)
PMV BLD AUTO: 11.9 FL (ref 8.9–12.7)
POTASSIUM SERPL-SCNC: 4.1 MMOL/L (ref 3.5–5.3)
RBC # BLD AUTO: 4.36 MILLION/UL (ref 3.88–5.62)
SODIUM SERPL-SCNC: 139 MMOL/L (ref 136–145)
WBC # BLD AUTO: 5.8 THOUSAND/UL (ref 4.31–10.16)

## 2017-05-06 PROCEDURE — 80048 BASIC METABOLIC PNL TOTAL CA: CPT | Performed by: INTERNAL MEDICINE

## 2017-05-06 PROCEDURE — 83605 ASSAY OF LACTIC ACID: CPT | Performed by: INTERNAL MEDICINE

## 2017-05-06 PROCEDURE — 85027 COMPLETE CBC AUTOMATED: CPT | Performed by: INTERNAL MEDICINE

## 2017-05-06 RX ORDER — NYSTATIN 100000 U/G
CREAM TOPICAL 3 TIMES DAILY
Status: DISCONTINUED | OUTPATIENT
Start: 2017-05-06 | End: 2017-05-07 | Stop reason: HOSPADM

## 2017-05-06 RX ORDER — LANOLIN ALCOHOL/MO/W.PET/CERES
6 CREAM (GRAM) TOPICAL
Status: DISCONTINUED | OUTPATIENT
Start: 2017-05-06 | End: 2017-05-07 | Stop reason: HOSPADM

## 2017-05-06 RX ORDER — DIAPER,BRIEF,INFANT-TODD,DISP
EACH MISCELLANEOUS 2 TIMES DAILY PRN
Status: DISCONTINUED | OUTPATIENT
Start: 2017-05-06 | End: 2017-05-07 | Stop reason: HOSPADM

## 2017-05-06 RX ORDER — METOPROLOL SUCCINATE 50 MG/1
100 TABLET, EXTENDED RELEASE ORAL 2 TIMES DAILY
Status: DISCONTINUED | OUTPATIENT
Start: 2017-05-06 | End: 2017-05-07

## 2017-05-06 RX ORDER — METOPROLOL SUCCINATE 50 MG/1
50 TABLET, EXTENDED RELEASE ORAL ONCE
Status: COMPLETED | OUTPATIENT
Start: 2017-05-06 | End: 2017-05-06

## 2017-05-06 RX ADMIN — FOLIC ACID 1 MG: 1 TABLET ORAL at 08:20

## 2017-05-06 RX ADMIN — ACETAMINOPHEN 650 MG: 325 TABLET, FILM COATED ORAL at 05:06

## 2017-05-06 RX ADMIN — METOPROLOL SUCCINATE 50 MG: 50 TABLET, EXTENDED RELEASE ORAL at 08:20

## 2017-05-06 RX ADMIN — OSELTAMIVIR PHOSPHATE 30 MG: 30 CAPSULE ORAL at 21:35

## 2017-05-06 RX ADMIN — METOPROLOL SUCCINATE 100 MG: 50 TABLET, EXTENDED RELEASE ORAL at 17:53

## 2017-05-06 RX ADMIN — DIGOXIN 125 MCG: 0.05 SOLUTION ORAL at 08:21

## 2017-05-06 RX ADMIN — HYDROCORTISONE 1 APPLICATION: 10 CREAM TOPICAL at 11:15

## 2017-05-06 RX ADMIN — APIXABAN 5 MG: 5 TABLET, FILM COATED ORAL at 17:53

## 2017-05-06 RX ADMIN — APIXABAN 5 MG: 5 TABLET, FILM COATED ORAL at 08:20

## 2017-05-06 RX ADMIN — OSELTAMIVIR PHOSPHATE 30 MG: 30 CAPSULE ORAL at 08:21

## 2017-05-06 RX ADMIN — METOPROLOL TARTRATE 5 MG: 5 INJECTION INTRAVENOUS at 04:59

## 2017-05-06 RX ADMIN — DILTIAZEM HYDROCHLORIDE 360 MG: 180 CAPSULE, EXTENDED RELEASE ORAL at 08:20

## 2017-05-06 RX ADMIN — DOCUSATE SODIUM 100 MG: 100 CAPSULE, LIQUID FILLED ORAL at 17:53

## 2017-05-06 RX ADMIN — MELATONIN TAB 3 MG 6 MG: 3 TAB at 00:06

## 2017-05-06 RX ADMIN — METOPROLOL SUCCINATE 50 MG: 50 TABLET, EXTENDED RELEASE ORAL at 15:21

## 2017-05-06 RX ADMIN — FUROSEMIDE 20 MG: 20 TABLET ORAL at 08:20

## 2017-05-07 VITALS
WEIGHT: 170.19 LBS | HEIGHT: 70 IN | RESPIRATION RATE: 18 BRPM | OXYGEN SATURATION: 96 % | TEMPERATURE: 96 F | HEART RATE: 109 BPM | SYSTOLIC BLOOD PRESSURE: 115 MMHG | DIASTOLIC BLOOD PRESSURE: 81 MMHG | BODY MASS INDEX: 24.37 KG/M2

## 2017-05-07 LAB
ANION GAP SERPL CALCULATED.3IONS-SCNC: 9 MMOL/L (ref 4–13)
BACTERIA BLD CULT: NORMAL
BACTERIA BLD CULT: NORMAL
BASOPHILS # BLD AUTO: 0.01 THOUSANDS/ΜL (ref 0–0.1)
BASOPHILS NFR BLD AUTO: 0 % (ref 0–1)
BUN SERPL-MCNC: 30 MG/DL (ref 5–25)
CALCIUM SERPL-MCNC: 8.7 MG/DL (ref 8.3–10.1)
CHLORIDE SERPL-SCNC: 104 MMOL/L (ref 100–108)
CO2 SERPL-SCNC: 26 MMOL/L (ref 21–32)
CREAT SERPL-MCNC: 1.26 MG/DL (ref 0.6–1.3)
EOSINOPHIL # BLD AUTO: 0.08 THOUSAND/ΜL (ref 0–0.61)
EOSINOPHIL NFR BLD AUTO: 1 % (ref 0–6)
ERYTHROCYTE [DISTWIDTH] IN BLOOD BY AUTOMATED COUNT: 15.3 % (ref 11.6–15.1)
GFR SERPL CREATININE-BSD FRML MDRD: 56.7 ML/MIN/1.73SQ M
GLUCOSE SERPL-MCNC: 111 MG/DL (ref 65–140)
HCT VFR BLD AUTO: 41 % (ref 36.5–49.3)
HGB BLD-MCNC: 14.3 G/DL (ref 12–17)
LYMPHOCYTES # BLD AUTO: 1.34 THOUSANDS/ΜL (ref 0.6–4.47)
LYMPHOCYTES NFR BLD AUTO: 22 % (ref 14–44)
MCH RBC QN AUTO: 31 PG (ref 26.8–34.3)
MCHC RBC AUTO-ENTMCNC: 34.9 G/DL (ref 31.4–37.4)
MCV RBC AUTO: 89 FL (ref 82–98)
MONOCYTES # BLD AUTO: 0.53 THOUSAND/ΜL (ref 0.17–1.22)
MONOCYTES NFR BLD AUTO: 9 % (ref 4–12)
NEUTROPHILS # BLD AUTO: 4.18 THOUSANDS/ΜL (ref 1.85–7.62)
NEUTS SEG NFR BLD AUTO: 68 % (ref 43–75)
NT-PROBNP SERPL-MCNC: 1147 PG/ML
PLATELET # BLD AUTO: 84 THOUSANDS/UL (ref 149–390)
POTASSIUM SERPL-SCNC: 4 MMOL/L (ref 3.5–5.3)
RBC # BLD AUTO: 4.62 MILLION/UL (ref 3.88–5.62)
SODIUM SERPL-SCNC: 139 MMOL/L (ref 136–145)
WBC # BLD AUTO: 6.14 THOUSAND/UL (ref 4.31–10.16)

## 2017-05-07 PROCEDURE — 83880 ASSAY OF NATRIURETIC PEPTIDE: CPT | Performed by: INTERNAL MEDICINE

## 2017-05-07 PROCEDURE — 80048 BASIC METABOLIC PNL TOTAL CA: CPT | Performed by: INTERNAL MEDICINE

## 2017-05-07 PROCEDURE — 85025 COMPLETE CBC W/AUTO DIFF WBC: CPT | Performed by: INTERNAL MEDICINE

## 2017-05-07 RX ORDER — FUROSEMIDE 20 MG/1
20 TABLET ORAL DAILY
Qty: 30 TABLET | Refills: 0 | Status: SHIPPED | OUTPATIENT
Start: 2017-05-07 | End: 2018-04-04 | Stop reason: SDUPTHER

## 2017-05-07 RX ORDER — METOPROLOL SUCCINATE 100 MG/1
150 TABLET, EXTENDED RELEASE ORAL 2 TIMES DAILY
Qty: 90 TABLET | Refills: 0 | Status: SHIPPED | OUTPATIENT
Start: 2017-05-07 | End: 2018-10-10 | Stop reason: ALTCHOICE

## 2017-05-07 RX ORDER — METOPROLOL SUCCINATE 100 MG/1
100 TABLET, EXTENDED RELEASE ORAL 2 TIMES DAILY
Qty: 60 TABLET | Refills: 0 | Status: SHIPPED | OUTPATIENT
Start: 2017-05-07 | End: 2017-05-07

## 2017-05-07 RX ORDER — METOPROLOL SUCCINATE 50 MG/1
50 TABLET, EXTENDED RELEASE ORAL ONCE
Status: COMPLETED | OUTPATIENT
Start: 2017-05-07 | End: 2017-05-07

## 2017-05-07 RX ORDER — METOPROLOL SUCCINATE 50 MG/1
150 TABLET, EXTENDED RELEASE ORAL 2 TIMES DAILY
Status: DISCONTINUED | OUTPATIENT
Start: 2017-05-07 | End: 2017-05-07 | Stop reason: HOSPADM

## 2017-05-07 RX ADMIN — OSELTAMIVIR PHOSPHATE 30 MG: 30 CAPSULE ORAL at 08:59

## 2017-05-07 RX ADMIN — DIAZEPAM 5 MG: 5 TABLET ORAL at 00:03

## 2017-05-07 RX ADMIN — ACETAMINOPHEN 650 MG: 325 TABLET, FILM COATED ORAL at 05:13

## 2017-05-07 RX ADMIN — DIGOXIN 125 MCG: 0.05 SOLUTION ORAL at 09:00

## 2017-05-07 RX ADMIN — METOPROLOL SUCCINATE 50 MG: 50 TABLET, EXTENDED RELEASE ORAL at 10:10

## 2017-05-07 RX ADMIN — MELATONIN TAB 3 MG 6 MG: 3 TAB at 00:03

## 2017-05-07 RX ADMIN — DOCUSATE SODIUM 100 MG: 100 CAPSULE, LIQUID FILLED ORAL at 08:56

## 2017-05-07 RX ADMIN — NYSTATIN: 100000 CREAM TOPICAL at 08:59

## 2017-05-07 RX ADMIN — METOPROLOL SUCCINATE 100 MG: 50 TABLET, EXTENDED RELEASE ORAL at 08:56

## 2017-05-07 RX ADMIN — FOLIC ACID 1 MG: 1 TABLET ORAL at 08:58

## 2017-05-07 RX ADMIN — DILTIAZEM HYDROCHLORIDE 360 MG: 180 CAPSULE, EXTENDED RELEASE ORAL at 08:57

## 2017-05-07 RX ADMIN — FUROSEMIDE 20 MG: 20 TABLET ORAL at 08:58

## 2017-05-07 RX ADMIN — APIXABAN 5 MG: 5 TABLET, FILM COATED ORAL at 08:58

## 2017-06-14 ENCOUNTER — ALLSCRIPTS OFFICE VISIT (OUTPATIENT)
Dept: OTHER | Facility: OTHER | Age: 72
End: 2017-06-14

## 2017-06-14 DIAGNOSIS — I48.91 ATRIAL FIBRILLATION (HCC): ICD-10-CM

## 2018-01-09 NOTE — PROGRESS NOTES
Assessment  Assessed    1  Chronic diastolic congestive heart failure (428 32,428 0) (I50 32)   2  Atrial fibrillation (427 31) (I48 91)   3  Chronic kidney disease, stage 3 (585 3) (N18 3)   4  Hyperlipidemia (272 4) (E78 5)   5  Hypertension (401 9) (I10)    Plan  Atrial fibrillation    · Digoxin 125 MCG Oral Tablet   Rx By: Deandre Cervantes; Dispense: 30 Days ; #:90 Tablet; Refill: 3; For: Atrial fibrillation; PIERRE = N; Sent To: [facility]   · Eliquis 5 MG Oral Tablet; Take 1 tablet twice daily   Rx By: Deandre Cervantes; Dispense: 90 Days ; #:180 Tablet; Refill: 3; For: Atrial fibrillation; PIERRE = N; Verified Transmission to [facility]; Last Updated By: System, [employer]; 8/24/2016 12:23:22 PM   · Metoprolol Succinate  MG Oral Tablet Extended Release 24 Hour; TAKE 1  TABLET ONCE DAILY   Rx By: Deandre Cervantes; Dispense: 90 Days ; #:90 Tablet Extended Release 24 Hour; Refill: 3; For: Atrial fibrillation; PIERRE = N; Verified Transmission to [facility]; Last Updated By: System, [employer]; 8/24/2016 12:24:46 PM   · Vevelyn Pa  MG Oral Capsule Extended Release 24 Hour; TAKE 1 CAPSULE  DAILY   Rx By: Deandre Cervantes; Dispense: 90 Days ; #:90 Capsule Extended Release 24 Hour; Refill: 3; For: Atrial fibrillation; PIERRE = N; Verified Transmission to [facility]; Last Updated By: System [employer]; 8/24/2016 12:24:53 PM  Atrial fibrillation, Chronic diastolic congestive heart failure    · EKG/ECG- POC; Status:Complete;   Done: 61RDE2168 11:48AM   Perform: In Office; Last Updated By:Micaela Connolly; 8/24/2016 11:46:19 AM;Ordered; For:Atrial fibrillation, Chronic diastolic congestive heart failure; Ordered By:Bret Gnozalez;   · Follow-up visit in 6 months Evaluation and Treatment  Follow-up  Status: Hold For -  Scheduling  Requested for: 32Rap3684   Ordered;  For: Atrial fibrillation, Chronic diastolic congestive heart failure; Ordered By: Deandre Cervantes Performed:  Due: 41YNX5602   · (1) CBC/ PLT (NO DIFF); Status:Active; Requested for:24Aug2016;    Perform:[facility]; VRT:[id number];HEHLTKF; For:Atrial fibrillation, Chronic diastolic congestive heart failure; Ordered By:Bret Gonzalez;   · (1) COMPREHENSIVE METABOLIC PANEL; Status:Active; Requested for:24Aug2016;    Perform:[facility]; IRR:[id number];VSANXFO; For:Atrial fibrillation, Chronic diastolic congestive heart failure; Ordered By:Bret Gonzalez;   · ECHO COMPLETE WITH CONTRAST IF INDICATED; Status:Hold For - Scheduling;  Requested for:24Aug2016;    Perform:[facility]; CSC:[id number];EPPWOCN; For:Atrial fibrillation, Chronic diastolic congestive heart failure; Ordered By:Bret Gonzalez;   · Restrict your sodium (salt) intake to 2 grams per day ; Status:Complete;   Done:  34MJQ4564   Ordered; For:Atrial fibrillation, Chronic diastolic congestive heart failure; Ordered By:Chris Gonzalez;   · Weigh yourself every day ; Status:Complete;   Done: 54EOB9243   Ordered; For:Atrial fibrillation, Chronic diastolic congestive heart failure; Ordered By:Chris Gonzalez;    Discussion/Summary  Cardiology Discussion Summary Free Text Note Form [facility]:   Diastolic CHF - Overall the patient is doing well  His weight has been stable  He also has not needed any diuretics in over a year  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, and it has come back to normal  However, he does have grade 3 diastolic dysfunction and restrictive physiology  I'm going to repeat an echocardiogram  He is also overdue for blood work which I've ordered  No changes were made to his medications  He should continue to watch his weight and sodium intake area we will have him back in 6 months  Atrial Fib - His rate is well controlled and and even a bit on the low side  I do not think he needs the digoxin I'm going to have his continue this   He will remain on both Cardizem and metoprolol, along with Eliquis for stroke prevention  We will see him back in 6 months  Counseling Documentation With Imm: The patient was counseled regarding diagnostic results, instructions for management, impressions  total time of encounter was 25 minutes  Chief Complaint  Chief Complaint Free Text Note Form: yearly ov      History of Present Illness  Cardiology HPI Free Text Note Form [facility]: Gaylan Shone is here for follow-up for his chronic diastolic CHF  He has a history of combined CHF in which his systolic function was about 35-40% at its lowest  This systolic dysfunction was likely associated with a tachycardic mediated cardiomyopathy as he was found in rapid atrial fibrillation at the time of his diagnosis  Also, his ejection fraction has improved with controlling his AFIB  HIs A-fib is chronic as he failed a rhythm control strategy  He has grade 3 diastolic dysfunction with evidence of restriction physiology  I last saw him about a year ago, and he is overdue for an appointment  He is one to not follow-up as regularly as he should  Around the time of our last appointment he had just suffered a subdural hematoma and was hospitalized at [facility]  He has recovered from this  He was temporarily off anticoagulation  He had been taking Coumadin, but when he was cleared to restart anticoagulation we started Eliquis  He has tolerated this nicely  Cardiac-wise things have not changed  He denies CP, SOB, Orthopnea or any PND  He denies palpitations, LH/DIzziness or any syncope  He denies any symptoms of angina or any anginal equivalents  He is not very active, but do his activities of daily living without any limitations or symptoms  He does soni some fatigue at times  He does have some intermittent lower extremity edema, but this has not changed and his weight has been stable  Atrial Fibrillation (Follow-Up): The patient presents with persistent atrial fibrillation  The treatment strategy for this patient is rate control  He states his atrial fibrillation has been well controlled since the last visit  He has no significant interval events  Symptoms: The patient is currently asymptomatic  Associated symptoms include no syncope and no focal neurologic deficit  Monitoring: The patient has regular PT/INR checks and anticoagulation control has been good  Risks: no increased risk for falling  Medications: the patient is adherent with his medication regimen  He denies medication side effects  Hypertension (Follow-Up): The patient states he has been doing well with his blood pressure control since the last visit  Comorbid Illnesses: nephropathy and cardiac failure  He has no comorbid illnesses  He has no significant interval events  Symptoms: The patient is currently asymptomatic  Associated symptoms include no headache and no memory loss  Home monitoring: The patient checks his blood pressure sporadically  Medications: the patient is adherent with his medication regimen  He denies medication side effects  The patient is due for a serum creatinine  Review of Systems  Cardiology Male ROS:     Cardiac: as noted in HPI  Skin: No complaints of nonhealing sores or skin rash  Genitourinary: No complaints of recurrent urinary tract infections, frequent urination at night, difficult urination, blood in urine, kidney stones, loss of bladder control, no kidney or prostate problems, no erectile dysfunction  Psychological: No complaints of feeling depressed, anxiety, panic attacks, or difficulty concentrating  General: lack of energy/fatigue  Respiratory: No complaints of shortness of breath, cough with sputum, or wheezing  and as noted in HPI  HEENT: No complaints of serious problems, hearing problems, nose problems, throat problems, or snoring  Gastrointestinal: No complaints of liver problems, nausea, vomiting, heartburn, constipation, bloody stools, diarrhea, problems swallowing, adbominal pain, or rectal bleeding  Hematologic: No complaints of bleeding disorders, anemia, blood clots, or excessive brusing  Neurological: No complaints of numbness, tingling, dizziness, weakness, seizures, headaches, syncope or fainting, AM fatigue, daytime sleepiness, no witnessed apnea episodes  Musculoskeletal: No complaints of arthritis, back pain, or painfull swelling  ROS Reviewed:   ROS reviewed  Active Problems  Problems    1  Atrial fibrillation (427 31) (I48 91)   2  Chronic diastolic congestive heart failure (428 32,428 0) (I50 32)   3  Chronic kidney disease, stage 3 (585 3) (N18 3)   4  Clavicle fracture (810 00) (S42 009A)   5  Coronary artery disease (414 00) (I25 10)   6  Hyperlipidemia (272 4) (E78 5)   7  Hypertension (401 9) (I10)   8  Primary myocardial disease (425 4) (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 8)    Past Medical History  Problems    1  History of Benign prostatic hypertrophy without urinary obstruction (600 00) (N40 0)  Active Problems And Past Medical History Reviewed: The active problems and past medical history were reviewed and updated today  Surgical History  Problems    1  History of Back Surgery   2  History of Diagnostic Cystoscopy   3  History of Shoulder Surgery  Surgical History Reviewed: The surgical history was reviewed and updated today  Family History  Mother    1  Family history of cardiac disorder (V17 49) (Z82 49)  Father    2  Family history of cardiac disorder (V17 49) (Z82 49)   3  Family history of hypertension (V17 49) (Z82 49)  Uncle    4  Family history of diabetes mellitus (V18 0) (Z83 3)  Family History    5  Family history of Coronary Artery Disease (V17 49)  Family History Reviewed: The family history was reviewed and updated today  Social History  Problems    · Denied: History of Alcohol Use (History)   · Denied: History of Drug Use   · Former smoker (V15 82) (P28 553)   · Never a smoker   · Denied: History of Tobacco Use  Social History Reviewed:  The social history was reviewed and updated today  The social history was reviewed and is unchanged  Current Meds   1  Clobetasol Propionate 0 05 % External Gel; Therapy: 28Wby4345 to Recorded   2  Digoxin 125 MCG Oral Tablet; Take 1 tablet daily; Therapy: 29TVU9971 to (Tamsen Grounds)  Requested for: 77IMJ8345; Last   Rx:08Jun2016 Ordered   3  Eliquis 5 MG Oral Tablet; Take 1 tablet twice daily; Therapy: 52XUD2220 to (Mac Onofre)  Requested for: 94Bht3381; Last   Rx:59Jzz1480 Ordered   4  Folic Acid 1 MG Oral Tablet; Therapy: (Recorded:79Eyz1627) to Recorded   5  Lycopene CAPS; as directed Recorded   6  Metoprolol Succinate  MG Oral Tablet Extended Release 24 Hour; TAKE 1 TABLET   ONCE DAILY  Requested for: 60HSZ2983; Last Rx:79Sbo8972 Ordered   7  Multi-Day Vitamins TABS; Therapy: (Recorded:68Jbm4043) to Recorded   8  Saw Fletcher CAPS; Take as directed Recorded   9  Taztia  MG Oral Capsule Extended Release 24 Hour; Therapy: 04VQX2271 to Recorded  Medication List Reviewed: The medication list was reviewed and updated today  Allergies  Medication    1  No Known Drug Allergies    Vitals  Vital Signs    Recorded: 07UCT4909 76:74WC   Systolic 474   Diastolic 82   Heart Rate 64   Respiration 17   Weight 174 lb 2 oz   BMI Calculated 24 98   BSA Calculated 1 97     Physical Exam    Constitutional   General appearance: No acute distress, well appearing and well nourished  Eyes   Conjunctiva and Sclera examination: Conjunctiva pink, sclera anicteric  Ears, Nose, Mouth, and Throat - Oropharynx: Clear, nares are clear, mucous membranes are moist    Neck   Neck and thyroid: Normal, supple, trachea midline, no thyromegaly  Pulmonary   Respiratory effort: No increased work of breathing or signs of respiratory distress  Auscultation of lungs: Clear to auscultation, no rales, no rhonchi, no wheezing, good air movement      Cardiovascular   Auscultation of heart: Abnormal   The heart rate was normal  The rhythm was irregularly irregular  Heart sounds: normal S1, normal S2 and no gallop heard  A grade 1 systolic murmur was heard at the RUSB  Carotid pulses: Normal, 2+ bilaterally  Peripheral vascular exam: Normal pulses throughout, no tenderness, erythema or swelling  Pedal pulses: Normal, 2+ bilaterally  Examination of extremities for edema and/or varicosities: Abnormal   bilateral ankle 0 5+ pitting edema and bilateral pretibial 0 5+ pitting edema  Abdomen   Abdomen: Non-tender and no distention  Liver and spleen: No hepatomegaly or splenomegaly  Musculoskeletal Gait and station: Normal gait  Digits and nails: Normal without clubbing or cyanosis  Inspection/palpation of joints, bones, and muscles: Normal, ROM normal     Skin - Skin and subcutaneous tissue: Normal without rashes or lesions  Skin is warm and well perfused, normal turgor  Neurologic - Cranial nerves: II - XII intact  Psychiatric - Orientation to person, place, and time: Normal  Mood and affect: Normal       Results/Data  ECG Report:   Rhythm and rate: atrial fibrillation   ventricular rate is 64 beats per minute        Signatures   Electronically signed by : REAGAN Gayle ; Aug 24 2016 12:28PM EST                       (Author)

## 2018-01-13 VITALS
SYSTOLIC BLOOD PRESSURE: 118 MMHG | WEIGHT: 162 LBS | BODY MASS INDEX: 23.24 KG/M2 | DIASTOLIC BLOOD PRESSURE: 80 MMHG | HEART RATE: 95 BPM

## 2018-04-04 DIAGNOSIS — I48.0 PAROXYSMAL ATRIAL FIBRILLATION (HCC): Primary | ICD-10-CM

## 2018-04-04 DIAGNOSIS — I50.9 ACUTE CONGESTIVE HEART FAILURE, UNSPECIFIED CONGESTIVE HEART FAILURE TYPE: ICD-10-CM

## 2018-04-04 RX ORDER — DIGOXIN 125 MCG
125 TABLET ORAL DAILY
Qty: 30 TABLET | Refills: 5 | Status: SHIPPED | OUTPATIENT
Start: 2018-04-04 | End: 2018-10-10

## 2018-04-04 RX ORDER — DIGOXIN 125 MCG
1 TABLET ORAL DAILY
COMMUNITY
Start: 2017-06-14 | End: 2018-04-04 | Stop reason: SDUPTHER

## 2018-04-04 RX ORDER — FUROSEMIDE 20 MG/1
20 TABLET ORAL DAILY
Qty: 30 TABLET | Refills: 5 | Status: SHIPPED | OUTPATIENT
Start: 2018-04-04 | End: 2018-10-10 | Stop reason: SDUPTHER

## 2018-04-05 ENCOUNTER — TELEPHONE (OUTPATIENT)
Dept: CARDIOLOGY CLINIC | Facility: CLINIC | Age: 73
End: 2018-04-05

## 2018-04-05 NOTE — TELEPHONE ENCOUNTER
Patient called stating he called yesterday 4/4/18 to have prescriptions Digoxin 125 mcg daily and Furosemide 20 mg daily refilled  Then he stated he called 10 mins ago and spoke to Marco Lugo and gave her the information  He stated the Digoxin was to be a 90 day supply and that the furosemide was not at the pharmacy  Patient stated that Marco uLgo told him that it was approved by the doctor and sent to the pharmacy and that she would let the doctor know but he was at the hospital today  After reviewing the chart, I noticed that the Furosemide was never sent to the pharmacy it said "print" and never actually went to the pharmacy after Dr Callie Quintanilla signed it  Prescription was called into the pharmacy  Pharmacist - Joby Edwards  4/5/2018 12:55 pm Furosemide 20 mg daily, Joby Edwards also asked if a 90 day supply could be ordered due to insurance, this was ok'd

## 2018-08-20 DIAGNOSIS — I48.91 ATRIAL FIBRILLATION, UNSPECIFIED TYPE (HCC): Primary | ICD-10-CM

## 2018-08-20 RX ORDER — DILTIAZEM HYDROCHLORIDE 360 MG/1
360 CAPSULE, EXTENDED RELEASE ORAL DAILY
Qty: 30 CAPSULE | Refills: 1 | Status: SHIPPED | OUTPATIENT
Start: 2018-08-20 | End: 2018-10-10 | Stop reason: SDUPTHER

## 2018-08-20 NOTE — TELEPHONE ENCOUNTER
Received call from pt requesting refills for Diltiazem 360mg once daily to go to [facility] in [place]  Pt requested a 90 day supply, however his last OV 06/14/2017, was to f/u in 3months - no f/u scheduled  Called pt and asked him to schedule f/u with Dr Thais Sifuentes so that we may refill his medication  Reception has made an appt with pt for 10/10/2018

## 2018-08-21 DIAGNOSIS — I10 ESSENTIAL HYPERTENSION: Primary | ICD-10-CM

## 2018-08-21 RX ORDER — METOPROLOL TARTRATE 100 MG/1
TABLET ORAL
Qty: 210 TABLET | Refills: 5 | Status: SHIPPED | OUTPATIENT
Start: 2018-08-21 | End: 2018-10-10 | Stop reason: SDUPTHER

## 2018-09-13 DIAGNOSIS — I48.91 ATRIAL FIBRILLATION, UNSPECIFIED TYPE (HCC): Primary | ICD-10-CM

## 2018-09-13 NOTE — TELEPHONE ENCOUNTER
Received call from pt requesting refills for Eliquis 5mg BID to be sent to [facility]      Last OV 06/14/2018, next OV 10/10/2018

## 2018-09-19 ENCOUNTER — TELEPHONE (OUTPATIENT)
Dept: CARDIOLOGY CLINIC | Facility: CLINIC | Age: 73
End: 2018-09-19

## 2018-09-19 NOTE — TELEPHONE ENCOUNTER
Some dentist's will do the extraction on Eliquis, but they want him to hold it, a 2 day hold is all that is sufficient    Thank you

## 2018-09-19 NOTE — TELEPHONE ENCOUNTER
Pt is taking Eliquis 5 mg BID for Afib  Pt has an upcoming consult for a tooth extraction and would like instructions on how long to hold Eliquis prior to procedure        Please advise

## 2018-10-10 ENCOUNTER — OFFICE VISIT (OUTPATIENT)
Dept: CARDIOLOGY CLINIC | Facility: CLINIC | Age: 73
End: 2018-10-10
Payer: MEDICARE

## 2018-10-10 VITALS
WEIGHT: 162 LBS | DIASTOLIC BLOOD PRESSURE: 72 MMHG | BODY MASS INDEX: 23.19 KG/M2 | HEART RATE: 83 BPM | SYSTOLIC BLOOD PRESSURE: 122 MMHG | HEIGHT: 70 IN

## 2018-10-10 DIAGNOSIS — I50.32 CHRONIC DIASTOLIC CONGESTIVE HEART FAILURE (HCC): ICD-10-CM

## 2018-10-10 DIAGNOSIS — I48.0 PAROXYSMAL ATRIAL FIBRILLATION (HCC): ICD-10-CM

## 2018-10-10 DIAGNOSIS — I10 ESSENTIAL HYPERTENSION: ICD-10-CM

## 2018-10-10 DIAGNOSIS — I48.91 ATRIAL FIBRILLATION, UNSPECIFIED TYPE (HCC): ICD-10-CM

## 2018-10-10 DIAGNOSIS — I48.20 CHRONIC ATRIAL FIBRILLATION (HCC): Primary | ICD-10-CM

## 2018-10-10 PROCEDURE — 99214 OFFICE O/P EST MOD 30 MIN: CPT | Performed by: INTERNAL MEDICINE

## 2018-10-10 PROCEDURE — 93000 ELECTROCARDIOGRAM COMPLETE: CPT | Performed by: INTERNAL MEDICINE

## 2018-10-10 RX ORDER — DILTIAZEM HYDROCHLORIDE 360 MG/1
360 CAPSULE, EXTENDED RELEASE ORAL DAILY
Qty: 90 CAPSULE | Refills: 3 | Status: SHIPPED | OUTPATIENT
Start: 2018-10-10 | End: 2018-12-07 | Stop reason: HOSPADM

## 2018-10-10 RX ORDER — METOPROLOL TARTRATE 100 MG/1
TABLET ORAL
Qty: 210 TABLET | Refills: 3 | Status: SHIPPED | OUTPATIENT
Start: 2018-10-10 | End: 2018-12-07 | Stop reason: HOSPADM

## 2018-10-10 RX ORDER — CLOBETASOL PROPIONATE 0.05 MG/G
GEL TOPICAL AS NEEDED
COMMUNITY
Start: 2016-04-26 | End: 2020-01-01 | Stop reason: ALTCHOICE

## 2018-10-10 RX ORDER — MULTIVITAMIN
TABLET ORAL
COMMUNITY
End: 2021-01-01 | Stop reason: HOSPADM

## 2018-10-10 RX ORDER — FUROSEMIDE 20 MG/1
20 TABLET ORAL DAILY
Qty: 90 TABLET | Refills: 3 | Status: SHIPPED | OUTPATIENT
Start: 2018-10-10 | End: 2018-12-07 | Stop reason: HOSPADM

## 2018-10-10 RX ORDER — TRIAMCINOLONE ACETONIDE 1 MG/G
CREAM TOPICAL
Status: ON HOLD | COMMUNITY
Start: 2018-08-21 | End: 2018-11-20 | Stop reason: ALTCHOICE

## 2018-10-10 NOTE — PATIENT INSTRUCTIONS
Low-Sodium Diet   AMBULATORY CARE:   A low-sodium diet  limits foods that are high in sodium (salt)  You will need to follow a low-sodium diet if you have high blood pressure, kidney disease, or heart failure  You may also need to follow this diet if you have a condition that is causing your body to retain (hold) extra fluid  You may need to limit the amount of sodium you eat to 1,500 mg  Ask your healthcare provider how much sodium you can have each day  How to use food labels to choose foods that are low in sodium:  Read food labels to find the amount of sodium they contain  The amount of sodium is listed in milligrams (mg)  The % Daily Value (DV) column tells you how much of your daily needs are met by 1 serving of the food for each nutrient listed  Choose foods that have less than 5% of the DV of sodium  These foods are considered low in sodium  Foods that have 20% or more of the DV of sodium are considered high in sodium  Some food labels may also list any of the following terms that tell you about the sodium content in the food:  · Sodium-free:  Less than 5 mg in each serving    · Very low sodium:  35 mg of sodium or less in each serving    · Low sodium:  140 mg of sodium or less in each serving    · Reduced sodium: At least 25% less sodium in each serving than the regular type    · Light in sodium:  50% less sodium in each serving    · Unsalted or no added salt:  No extra salt is added during processing (the food may still contain sodium)  Foods to avoid:  Salty foods are high in sodium   You should avoid the following:  · Processed foods:      ¨ Mixes for cornbread, biscuits, cake, and pudding     ¨ Instant foods, such as potatoes, cereals, noodles, and rice     ¨ Packaged foods, such as bread stuffing, rice and pasta mixes, snack dip mixes, and macaroni and cheese     ¨ Canned foods, such as canned vegetables, soups, broths, sauces, and vegetable or tomato juice    ¨ Snack foods, such as salted chips, popcorn, pretzels, pork rinds, salted crackers, and salted nuts    ¨ Frozen foods, such as dinners, entrees, vegetables with sauces, and breaded meats    ¨ Sauerkraut, pickled vegetables, and other foods prepared in brine    · Meats and cheeses:      ¨ Smoked or cured meat, such as corned beef, ruiz, ham, hot dogs, and sausage    ¨ Canned meats or spreads, such as potted meats, sardines, anchovies, and imitation seafood    ¨ Deli or lunch meats, such as bologna, ham, turkey, and roast beef    ¨ Processed cheese, such as American cheese and cheese spreads    · Condiments, sauces, and seasonings:      ¨ Salt (¼ teaspoon of salt contains 575 mg of sodium)    ¨ Seasonings made with salt, such as garlic salt, celery salt, onion salt, and seasoned salt    ¨ Regular soy sauce, barbecue sauce, teriyaki sauce, steak sauce, Worcestershire sauce, and most flavored vinegars    ¨ Canned gravy and mixes     ¨ Regular condiments, such as mustard, ketchup, and salad dressings    ¨ Pickles and olives    ¨ Meat tenderizers and monosodium glutamate (MSG)  Foods to include:  Read the food label to find the exact amount of sodium in each serving  · Bread and cereal:  Try to choose breads with less than 80 mg of sodium per serving  ¨ Bread, roll, chemo, tortilla, or unsalted crackers  ¨ Ready-to-eat cereals with less than 5% DV of sodium (examples include shredded wheat and puffed rice)    ¨ Pasta    · Vegetables and fruits:      ¨ Unsalted fresh, frozen, or canned vegetables    ¨ Fresh, frozen, or canned fruits    ¨ Fruit juice    · Dairy:  One serving has about 150 mg of sodium  ¨ Milk, all types    ¨ Yogurt    ¨ Hard cheese, such as cheddar, Swiss, [employer], or mozzarella    · Meat and other protein foods:  Some raw meats may have added sodium       ¨ Plain meats, fish, and poultry     ¨ Eggs    · Other foods:      ¨ Homemade pudding    ¨ Unsalted nuts, popcorn, or pretzels    ¨ Unsalted butter or margarine  Ways to decrease sodium:   · Add spices and herbs to foods instead of salt during cooking  Use salt-free seasonings to add flavor to foods  Examples include onion powder, garlic powder, basil, sosa powder, paprika, and parsley  Try lemon or lime juice or vinegar to give foods a tart flavor  Use hot peppers, pepper, or cayenne pepper to add a spicy flavor to foods  · Do not keep a salt shaker at your kitchen table  This may help keep you from adding salt to food at the table  It may take time to get used to enjoying the natural flavor of food instead of adding salt  Talk to your healthcare provider before you use salt substitutes  Some salt substitutes have a high amount of potassium and need to be avoided if you have kidney disease  · Choose low-sodium foods at restaurants  Meals from restaurants are often high in sodium  Some restaurants have nutrition information on the menu that tells you the amount of sodium in their foods  If possible, ask for your food to be prepared with less, or no salt  · Shop for unsalted or low-sodium foods and snacks at the grocery store  Examples include unsalted or low-sodium broths, soups, and canned vegetables  Choose fresh or frozen vegetables instead  Choose unsalted nuts or seeds or fresh fruits or vegetables as snacks  Read food labels and choose salt-free, very low-sodium, or low-sodium foods  © 2017 [employer] Information is for End User's use only and may not be sold, redistributed or otherwise used for commercial purposes  All illustrations and images included in CareNotes® are the copyrighted property of [employer]  or [employer]  The above information is an  only  It is not intended as medical advice for individual conditions or treatments  Talk to your doctor, nurse or pharmacist before following any medical regimen to see if it is safe and effective for you

## 2018-10-10 NOTE — PROGRESS NOTES
Cardiology Follow Up    Camilo Vieyra  1945  [id number]  [address] [facility] [phone]    1  Chronic atrial fibrillation (HCC)  POCT ECG    Echo complete with contrast if indicated   2  Chronic diastolic congestive heart failure (HCC)  Echo complete with contrast if indicated   3  Atrial fibrillation, unspecified type (HCC)  apixaban (ELIQUIS) 5 mg    diltiazem (TIAZAC) 360 MG 24 hr capsule   4  Essential hypertension  metoprolol tartrate (LOPRESSOR) 100 mg tablet   5  Paroxysmal atrial fibrillation (HCC)  furosemide (LASIX) 20 mg tablet       Interval History:     Albert is here for follow-up for his chronic diastolic CHF  He has a history of combined CHF in which his systolic function was about 35-40% at its lowest  This systolic dysfunction was likely associated with a tachycardic mediated cardiomyopathy as he was found in rapid atrial fibrillation at the time of his diagnosis  Also, his ejection fraction has improved with controlling his AFIB  HIs A-fib is chronic as he failed a rhythm control strategy  He has grade 3 diastolic dysfunction with evidence of restriction physiology  I last saw him about a year ago, and he is overdue for an appointment  A couple years ago he had suffered a subdural hematoma and was hospitalized at [facility]  He has recovered from this  He was temporarily off anticoagulation  He had been taking Coumadin, but when he was cleared to restart anticoagulation we started Eliquis  He has tolerated this nicely  Over the last few appointments his atrial fibrillation was well controlled  With being on 3 AV mary anne blockers, I had him stop the digoxin, but he did not do this  His heart rates remain well controlled  Last year Albert was in the hospital with influenza and respiratory failure   In the setting of this he was also volume overloaded and in rapid atrial fibrillation  He did require an IV Cardizem drip  At discharge he was restarted on his digoxin and his metoprolol was increased  He also was given IV Lasix, and was sent home on Lasix  He had an echocardiogram that showed normal LV systolic function  He did have a small to moderate pericardial effusion, likely associated with his viral illness  Albert has just about recovered from his influenza  Ibeth Rojas has felt well over the last year  He denies any worsening shortness of breath  His edema is minimal   He denies chest pain or any symptoms of angina  No orthopnea or PND  No lightheadedness or syncope  His breathing has improved overall since being in the hospital last year  He has very few and far between intermittent palpitations  Patient Active Problem List   Diagnosis    Peripheral vascular disease of lower extremity ([facility] )    Hypertension    Acute respiratory failure with hypoxia (HCC)    Severe sepsis (HCC)    Atrial fibrillation with tachycardic ventricular rate (HCC)    Pulmonary edema    Thrombocytopenia (HCC)    PALOMA (acute kidney injury) ([facility] )    Influenza B    Insomnia    [facility], [facility] 3 ([facility]    Chronic diastolic congestive heart failure ([facility] )    Coronary artery disease    Hyperlipidemia     Past Medical History:   Diagnosis Date    Atrial fibrillation (HCC)     Hypertension     Peripheral vascular disease of lower extremity (HCC)     Subdural hematoma (HCC)      Social History     Social History    Marital status: Single     Spouse name: N/A    Number of children: N/A    Years of education: N/A     Occupational History    Not on file       Social History Main Topics    Smoking status: Never Smoker    Smokeless tobacco: Not on file    Alcohol use No    Drug use: No    Sexual activity: Not on file     Other Topics Concern    Not on file     Social History Narrative    No narrative on file      Family History   Problem Relation Age of Onset    Heart disease Mother     Heart disease Father      Past Surgical History:   Procedure Laterality Date    BACK SURGERY      AUSTYN HOLE FOR SUBDURAL HEMATOMA      CLAVICLE SURGERY      HERNIA REPAIR      PROSTATE SURGERY         Current Outpatient Prescriptions:     apixaban (ELIQUIS) 5 mg, Take 1 tablet (5 mg total) by mouth 2 (two) times a day, Disp: 60 tablet, Rfl: 11    Ascorbic Acid (VITAMIN C PO), Take by mouth, Disp: , Rfl:     B Complex Vitamins (VITAMIN B COMPLEX PO), Take by mouth, Disp: , Rfl:     clobetasol (TEMOVATE) 0 05 % GEL, Apply topically, Disp: , Rfl:     diltiazem (TIAZAC) 360 MG 24 hr capsule, Take 1 capsule (360 mg total) by mouth daily, Disp: 90 capsule, Rfl: 3    Ferrous Gluconate-C-Folic Acid (IRON-C PO), Take by mouth, Disp: , Rfl:     folic acid (FOLVITE) 1 mg tablet, Take 1 mg by mouth daily, Disp: , Rfl:     furosemide (LASIX) 20 mg tablet, Take 1 tablet (20 mg total) by mouth daily for 30 days, Disp: 90 tablet, Rfl: 3    Lactobacillus (ACIDOPHILUS PO), Take by mouth, Disp: , Rfl:     Lycopene 10 MG CAPS, Take by mouth, Disp: , Rfl:     metoprolol tartrate (LOPRESSOR) 100 mg tablet, TAKE ONE TABLET BY MOUTH IN THE MORNING THEN ONE-HALF TABLET IN THE AFTERNOON AND ONE TABLET AT NIGHT, Disp: 210 tablet, Rfl: 3    Misc Natural Products (SAW PALMETTO) CAPS, Take by mouth, Disp: , Rfl:     Multiple Vitamin (MULTI-DAY VITAMINS) TABS, Take by mouth, Disp: , Rfl:     triamcinolone (KENALOG) 0 1 % cream, , Disp: , Rfl:   No Known Allergies    Labs:  Lab Results   Component Value Date     05/07/2017    K 4 0 05/07/2017     05/07/2017    CO2 26 05/07/2017    BUN 30 (H) 05/07/2017    CREATININE 1 26 05/07/2017    CALCIUM 8 7 05/07/2017     Lab Results   Component Value Date    WBC 6 14 05/07/2017    HGB 14 3 05/07/2017    HCT 41 0 05/07/2017    MCV 89 05/07/2017    PLT 84 (L) 05/07/2017       Imaging:  ECG obtained today demonstrates atrial fibrillation with controlled ventricular response, poor R-wave progression and nonspecific ST changes  ECHO (5/2017):  LEFT VENTRICLE:  Systolic function was normal by visual assessment  Ejection fraction was estimated to be 70 %  There were no regional wall motion abnormalities      RIGHT VENTRICLE:  Systolic pressure was mildly increased  Estimated peak pressure was 41 mmHg      LEFT ATRIUM:  The atrium was mildly dilated      MITRAL VALVE:  There was mild regurgitation      AORTIC VALVE:  There was mild regurgitation      TRICUSPID VALVE:  There was mild regurgitation      AORTA:  The root exhibited moderate dilation      IVC, HEPATIC VEINS:  The inferior vena cava was dilated      PERICARDIUM:  A small to moderate pericardial effusion was identified circumferential to the heart  Review of Systems:  Review of Systems   Constitutional: Negative  HENT: Negative  Eyes: Negative  Respiratory: Negative  Cardiovascular: Positive for leg swelling  Gastrointestinal: Negative  Musculoskeletal: Negative  Skin: Negative  Allergic/Immunologic: Negative  Neurological: Negative  Hematological: Negative  Psychiatric/Behavioral: Negative  All other systems reviewed and are negative  Physical Exam:  Physical Exam   Constitutional: He is oriented to person, place, and time  He appears well-developed and well-nourished  HENT:   Head: Normocephalic and atraumatic  Eyes: Pupils are equal, round, and reactive to light  EOM are normal  Right eye exhibits no discharge  Left eye exhibits no discharge  No scleral icterus  Neck: Normal range of motion  Neck supple  No JVD present  No thyromegaly present  Cardiovascular: Normal rate, S1 normal, S2 normal, normal heart sounds, intact distal pulses and normal pulses  An irregularly irregular rhythm present  No extrasystoles are present  Exam reveals no gallop and no friction rub  No murmur heard    Pulmonary/Chest: Effort normal and breath sounds normal  No respiratory distress  He has no wheezes  He has no rales  Abdominal: Soft  Bowel sounds are normal  He exhibits no distension  There is no tenderness  Musculoskeletal: Normal range of motion  He exhibits edema  He exhibits no tenderness or deformity  Neurological: He is alert and oriented to person, place, and time  No cranial nerve deficit  Skin: Skin is warm and dry  No rash noted  Psychiatric: He has a normal mood and affect  Judgment and thought content normal    Nursing note and vitals reviewed  Discussion/Summary:    1  Chronic diastolic CHF - Prachi is for the most part compensated  No changes were made to his medical therapy  We renewed his prescriptions today  We will see him back in 1 year  He did have an echocardiogram during influenza illness that did have a small to moderate pericardial effusion  We ordered an updated 1 today  He should follow a low-sodium diet  2     Atrial fibrillation -  Chronic, well controlled and asymptomatic  He will remain on the same medications with the exception of digoxin  I did tell him to stop this again  He will continue Eliquis for stroke prevention  We will see him back in 1 year  Counseling / Coordination of Care  Total floor / unit time spent today 25 minutes  Greater than 50% of total time was spent with the patient and / or family counseling and / or coordination of care

## 2018-11-14 ENCOUNTER — TELEPHONE (OUTPATIENT)
Dept: CARDIOLOGY CLINIC | Facility: CLINIC | Age: 73
End: 2018-11-14

## 2018-11-14 NOTE — TELEPHONE ENCOUNTER
Received call from pt stating he doesn't feel well and  thinks his BPs are running low  BPs running appx 100/60's-70's and HR 55-60  Pt states he feels wobbly when he's out or moving around  He also feels some palpitations  No CP, SOB etc     Pt states his metoprolol was increased at his last visit and questions if it is too much for him? Incidentally, pt also states he hasn't been eating or drinking much since his girlfriend had a fall recently  I did encourage pt to increase his fluid intake as he is only drinking appx 2 glasses of fluid per day and some small sips  Pt is taking lasix  Asked pt to call his PCP and schedule an appt for this week as he hasn't been seen "in a long time"  Encouraged pt to seek urgent emergent treatment if his symptoms are concerning or worsen

## 2018-11-14 NOTE — TELEPHONE ENCOUNTER
I do not recall changing his metoprolol  In fact, the year prior appointment I told him to stop the digoxin and he had not done that  At this recent appointment I told him to stop the digoxin again  For now if he is running low blood pressures and his heart rate is in the 60 range, he could cut back the metoprolol  I have him as taking 100 mg in the morning and 50 mg in the evening  He can go to 50 twice a day, and follow closely with his PCP  I also agree with encouraging his increased oral intake  Thank you

## 2018-11-15 NOTE — TELEPHONE ENCOUNTER
Spoke with Dr Anderson Catalan- last OV shows metoprolol to be TID, 1 am - 1/2 afternoon - 1 pm   Per Dr Anderson Catalan, please have pt decrease dose to BID - 100mg am and 50mg pm   Attempted to call pt, LM to CB

## 2018-11-15 NOTE — TELEPHONE ENCOUNTER
Called Dr Faith Alert office to see if pt has called to schedule - pt has not  Received call back from pt  Relayed message as given  Pt will decrease his metoprolol and increase his fluid intake  Pt states he feels somewhat improved after increasing fluid intake yesterday  Pt will monitor his BP and call next week with an update  Repeated medication instruction and asked pt to repeat back  Reinforced need for pt to call PCP and schedule an OV  Pt verbalized understanding  No further questions or concerns at this time

## 2018-11-20 ENCOUNTER — HOSPITAL ENCOUNTER (INPATIENT)
Facility: HOSPITAL | Age: 73
LOS: 2 days | DRG: 308 | End: 2018-11-22
Attending: EMERGENCY MEDICINE | Admitting: INTERNAL MEDICINE
Payer: MEDICARE

## 2018-11-20 ENCOUNTER — APPOINTMENT (EMERGENCY)
Dept: RADIOLOGY | Facility: HOSPITAL | Age: 73
DRG: 308 | End: 2018-11-20
Payer: MEDICARE

## 2018-11-20 DIAGNOSIS — I48.91 ATRIAL FIBRILLATION WITH RVR (HCC): Primary | ICD-10-CM

## 2018-11-20 DIAGNOSIS — N17.9 AKI (ACUTE KIDNEY INJURY) (HCC): ICD-10-CM

## 2018-11-20 DIAGNOSIS — J90 PLEURAL EFFUSION: ICD-10-CM

## 2018-11-20 DIAGNOSIS — E86.0 DEHYDRATION: ICD-10-CM

## 2018-11-20 LAB
ALBUMIN SERPL BCP-MCNC: 3.7 G/DL (ref 3.5–5)
ALP SERPL-CCNC: 111 U/L (ref 46–116)
ALT SERPL W P-5'-P-CCNC: 86 U/L (ref 12–78)
ANION GAP BLD CALC-SCNC: 13 MMOL/L (ref 4–13)
ANION GAP SERPL CALCULATED.3IONS-SCNC: 12 MMOL/L (ref 4–13)
APTT PPP: 42 SECONDS (ref 26–38)
AST SERPL W P-5'-P-CCNC: 85 U/L (ref 5–45)
ATRIAL RATE: 120 BPM
ATRIAL RATE: 141 BPM
BASOPHILS # BLD AUTO: 0.02 THOUSANDS/ΜL (ref 0–0.1)
BASOPHILS NFR BLD AUTO: 0 % (ref 0–1)
BILIRUB SERPL-MCNC: 2.9 MG/DL (ref 0.2–1)
BUN BLD-MCNC: 43 MG/DL (ref 5–25)
BUN SERPL-MCNC: 40 MG/DL (ref 5–25)
CA-I BLD-SCNC: 1 MMOL/L (ref 1.12–1.32)
CALCIUM SERPL-MCNC: 9.4 MG/DL (ref 8.3–10.1)
CHLORIDE BLD-SCNC: 100 MMOL/L (ref 100–108)
CHLORIDE SERPL-SCNC: 99 MMOL/L (ref 100–108)
CO2 SERPL-SCNC: 24 MMOL/L (ref 21–32)
CREAT BLD-MCNC: 1.9 MG/DL (ref 0.6–1.3)
CREAT SERPL-MCNC: 1.83 MG/DL (ref 0.6–1.3)
EOSINOPHIL # BLD AUTO: 0.1 THOUSAND/ΜL (ref 0–0.61)
EOSINOPHIL NFR BLD AUTO: 1 % (ref 0–6)
ERYTHROCYTE [DISTWIDTH] IN BLOOD BY AUTOMATED COUNT: 19.8 % (ref 11.6–15.1)
GFR SERPL CREATININE-BSD FRML MDRD: 35 ML/MIN/1.73SQ M
GFR SERPL CREATININE-BSD FRML MDRD: 36 ML/MIN/1.73SQ M
GLUCOSE SERPL-MCNC: 127 MG/DL (ref 65–140)
GLUCOSE SERPL-MCNC: 132 MG/DL (ref 65–140)
HCT VFR BLD AUTO: 45.3 % (ref 36.5–49.3)
HCT VFR BLD CALC: 44 % (ref 36.5–49.3)
HGB BLD-MCNC: 15 G/DL (ref 12–17)
HGB BLDA-MCNC: 15 G/DL (ref 12–17)
IMM GRANULOCYTES # BLD AUTO: 0.15 THOUSAND/UL (ref 0–0.2)
IMM GRANULOCYTES NFR BLD AUTO: 2 % (ref 0–2)
INR PPP: 2.87 (ref 0.86–1.17)
LYMPHOCYTES # BLD AUTO: 1.36 THOUSANDS/ΜL (ref 0.6–4.47)
LYMPHOCYTES NFR BLD AUTO: 19 % (ref 14–44)
MCH RBC QN AUTO: 31.5 PG (ref 26.8–34.3)
MCHC RBC AUTO-ENTMCNC: 33.1 G/DL (ref 31.4–37.4)
MCV RBC AUTO: 95 FL (ref 82–98)
MONOCYTES # BLD AUTO: 0.56 THOUSAND/ΜL (ref 0.17–1.22)
MONOCYTES NFR BLD AUTO: 8 % (ref 4–12)
NEUTROPHILS # BLD AUTO: 5.11 THOUSANDS/ΜL (ref 1.85–7.62)
NEUTS SEG NFR BLD AUTO: 70 % (ref 43–75)
NRBC BLD AUTO-RTO: 4 /100 WBCS
NT-PROBNP SERPL-MCNC: 2012 PG/ML
PCO2 BLD: 25 MMOL/L (ref 21–32)
PLATELET # BLD AUTO: 75 THOUSANDS/UL (ref 149–390)
POTASSIUM BLD-SCNC: 4.1 MMOL/L (ref 3.5–5.3)
POTASSIUM SERPL-SCNC: 4.6 MMOL/L (ref 3.5–5.3)
PROT SERPL-MCNC: 7 G/DL (ref 6.4–8.2)
PROTHROMBIN TIME: 28.5 SECONDS (ref 11.8–14.2)
QRS AXIS: -6 DEGREES
QRS AXIS: -65 DEGREES
QRSD INTERVAL: 108 MS
QRSD INTERVAL: 74 MS
QT INTERVAL: 310 MS
QT INTERVAL: 322 MS
QTC INTERVAL: 474 MS
QTC INTERVAL: 493 MS
RBC # BLD AUTO: 4.76 MILLION/UL (ref 3.88–5.62)
SODIUM BLD-SCNC: 134 MMOL/L (ref 136–145)
SODIUM SERPL-SCNC: 135 MMOL/L (ref 136–145)
SPECIMEN SOURCE: ABNORMAL
T WAVE AXIS: 84 DEGREES
T WAVE AXIS: 92 DEGREES
TROPONIN I SERPL-MCNC: <0.02 NG/ML
TSH SERPL DL<=0.05 MIU/L-ACNC: 4.47 UIU/ML (ref 0.36–3.74)
VENTRICULAR RATE: 141 BPM
VENTRICULAR RATE: 141 BPM
WBC # BLD AUTO: 7.3 THOUSAND/UL (ref 4.31–10.16)

## 2018-11-20 PROCEDURE — 83880 ASSAY OF NATRIURETIC PEPTIDE: CPT | Performed by: EMERGENCY MEDICINE

## 2018-11-20 PROCEDURE — 93005 ELECTROCARDIOGRAM TRACING: CPT

## 2018-11-20 PROCEDURE — 96361 HYDRATE IV INFUSION ADD-ON: CPT

## 2018-11-20 PROCEDURE — 99285 EMERGENCY DEPT VISIT HI MDM: CPT

## 2018-11-20 PROCEDURE — 71045 X-RAY EXAM CHEST 1 VIEW: CPT

## 2018-11-20 PROCEDURE — 80053 COMPREHEN METABOLIC PANEL: CPT | Performed by: EMERGENCY MEDICINE

## 2018-11-20 PROCEDURE — 85014 HEMATOCRIT: CPT

## 2018-11-20 PROCEDURE — 85025 COMPLETE CBC W/AUTO DIFF WBC: CPT | Performed by: EMERGENCY MEDICINE

## 2018-11-20 PROCEDURE — 85730 THROMBOPLASTIN TIME PARTIAL: CPT | Performed by: EMERGENCY MEDICINE

## 2018-11-20 PROCEDURE — 84484 ASSAY OF TROPONIN QUANT: CPT | Performed by: EMERGENCY MEDICINE

## 2018-11-20 PROCEDURE — 36415 COLL VENOUS BLD VENIPUNCTURE: CPT | Performed by: EMERGENCY MEDICINE

## 2018-11-20 PROCEDURE — 84443 ASSAY THYROID STIM HORMONE: CPT | Performed by: EMERGENCY MEDICINE

## 2018-11-20 PROCEDURE — 93010 ELECTROCARDIOGRAM REPORT: CPT | Performed by: INTERNAL MEDICINE

## 2018-11-20 PROCEDURE — 85610 PROTHROMBIN TIME: CPT | Performed by: EMERGENCY MEDICINE

## 2018-11-20 PROCEDURE — 96374 THER/PROPH/DIAG INJ IV PUSH: CPT

## 2018-11-20 PROCEDURE — 99223 1ST HOSP IP/OBS HIGH 75: CPT | Performed by: INTERNAL MEDICINE

## 2018-11-20 PROCEDURE — 80047 BASIC METABLC PNL IONIZED CA: CPT

## 2018-11-20 RX ORDER — METOPROLOL TARTRATE 50 MG/1
50 TABLET, FILM COATED ORAL
COMMUNITY
End: 2018-12-07 | Stop reason: HOSPADM

## 2018-11-20 RX ORDER — ZOLPIDEM TARTRATE 5 MG/1
5 TABLET ORAL
Status: DISCONTINUED | OUTPATIENT
Start: 2018-11-20 | End: 2018-11-22 | Stop reason: HOSPADM

## 2018-11-20 RX ORDER — DILTIAZEM HYDROCHLORIDE 5 MG/ML
15 INJECTION INTRAVENOUS ONCE
Status: COMPLETED | OUTPATIENT
Start: 2018-11-20 | End: 2018-11-20

## 2018-11-20 RX ORDER — METOPROLOL TARTRATE 50 MG/1
100 TABLET, FILM COATED ORAL DAILY
Status: DISCONTINUED | OUTPATIENT
Start: 2018-11-21 | End: 2018-11-21

## 2018-11-20 RX ORDER — DIGOXIN 0.25 MG/ML
500 INJECTION INTRAMUSCULAR; INTRAVENOUS ONCE
Status: COMPLETED | OUTPATIENT
Start: 2018-11-20 | End: 2018-11-20

## 2018-11-20 RX ORDER — METOPROLOL TARTRATE 50 MG/1
100 TABLET, FILM COATED ORAL EVERY 12 HOURS SCHEDULED
Status: DISCONTINUED | OUTPATIENT
Start: 2018-11-20 | End: 2018-11-20

## 2018-11-20 RX ORDER — ACETAMINOPHEN 325 MG/1
650 TABLET ORAL EVERY 6 HOURS PRN
Status: DISCONTINUED | OUTPATIENT
Start: 2018-11-20 | End: 2018-11-22 | Stop reason: HOSPADM

## 2018-11-20 RX ORDER — SODIUM CHLORIDE 9 MG/ML
50 INJECTION, SOLUTION INTRAVENOUS CONTINUOUS
Status: DISCONTINUED | OUTPATIENT
Start: 2018-11-20 | End: 2018-11-21

## 2018-11-20 RX ORDER — METOPROLOL TARTRATE 50 MG/1
50 TABLET, FILM COATED ORAL
Status: DISCONTINUED | OUTPATIENT
Start: 2018-11-20 | End: 2018-11-21

## 2018-11-20 RX ADMIN — SODIUM CHLORIDE 500 ML: 0.9 INJECTION, SOLUTION INTRAVENOUS at 17:20

## 2018-11-20 RX ADMIN — SODIUM CHLORIDE 500 ML: 0.9 INJECTION, SOLUTION INTRAVENOUS at 16:47

## 2018-11-20 RX ADMIN — AMIODARONE HYDROCHLORIDE 1 MG/MIN: 50 INJECTION, SOLUTION INTRAVENOUS at 19:45

## 2018-11-20 RX ADMIN — APIXABAN 5 MG: 5 TABLET, FILM COATED ORAL at 22:44

## 2018-11-20 RX ADMIN — DILTIAZEM HYDROCHLORIDE 15 MG: 5 INJECTION INTRAVENOUS at 16:54

## 2018-11-20 RX ADMIN — DIGOXIN 500 MCG: 0.25 INJECTION INTRAMUSCULAR; INTRAVENOUS at 17:43

## 2018-11-20 RX ADMIN — METOPROLOL TARTRATE 50 MG: 50 TABLET ORAL at 22:44

## 2018-11-20 RX ADMIN — SODIUM CHLORIDE 50 ML/HR: 0.9 INJECTION, SOLUTION INTRAVENOUS at 20:38

## 2018-11-20 RX ADMIN — DEXTROSE 150 MG: 50 INJECTION, SOLUTION INTRAVENOUS at 19:08

## 2018-11-20 RX ADMIN — SODIUM CHLORIDE 500 ML: 0.9 INJECTION, SOLUTION INTRAVENOUS at 18:45

## 2018-11-20 NOTE — ED NOTES
Dr Castañeda Common at patient bedside to medically evaluate patient       Kateryna Rocha RN  11/20/18 0745

## 2018-11-20 NOTE — H&P
History and Physical - Michael Calvo 72 y o  male MRN: [id number]  Unit/Bed#: ED 11 Encounter: [id number]    Assessment/Plan     Assessment:  Principal Problem:    Atrial fibrillation with tachycardic ventricular rate (HCC)  Active Problems:    Hypertension    Thrombocytopenia (HCC)    PALOMA (acute kidney injury) ([facility] )    Chronic diastolic congestive heart failure ([facility] )      Plan:  Admit monitor unit  Patient does appear to be relatively volume depleted in spite of the fact that he does have pleural effusions  Will give gentle IV hydration  He needs better heart rate control  He did not slow down 0 5 mg of digoxin  Therefore will continue his metoprolol  Will try a Cardizem drip  If this is not work  May consider amiodarone for rate control  Patient is mildly elevated TSH however will get a free T4  I would not want start Synthroid in the face of this abnormal heart rate    Patient is a full code        History of Present Illness   HPI: Michael Calvo is a 72y o  year old male who presents with shortness of breath on exertion and chronic atrial fibrillation  Patient is followed for chronic atrial fibrillation  He was recently seen by Cardiology and was on 3 AV mary anne blocking drugs specifically metoprolol Cardizem and digoxin  Digoxin was stopped  Over the last week or so the patient has noted increasing dyspnea on exertion  He has continued take his furosemide but became increasing short of breath  He did notice pulse rate was elevated  Because there was some question as to really watch his volume status is he was directed to come to the emergency room  He denies any chest pain  There is no PND orthopnea  Patient be admitted for evaluation stabilization  The patient is already on anticoagulation  Of note he does have chronic thrombocytopenia  Review of Systems   All other systems reviewed and are negative        Historical Information   Past Medical History:   Diagnosis Date    Atrial fibrillation ([facility] )     Hypertension     Peripheral vascular disease of lower extremity (HCC)     Subdural hematoma (HCC)      Past Surgical History:   Procedure Laterality Date    BACK SURGERY      AUSTYN HOLE FOR SUBDURAL HEMATOMA      CLAVICLE SURGERY      HERNIA REPAIR      PROSTATE SURGERY       Social History   History   Alcohol Use No     History   Drug Use No     History   Smoking Status    Never Smoker   Smokeless Tobacco    Never Used     Family History:   Family History   Problem Relation Age of Onset    Heart disease Mother     Heart disease Father        Meds/Allergies      Current Facility-Administered Medications   Medication Dose Route Frequency    diltiazem (CARDIZEM) 125 mg in sodium chloride 0 9 % 125 mL infusion  1-15 mg/hr Intravenous Once         (Not in a hospital admission)  No Known Allergies    Objective   Vitals: Blood pressure 99/76, pulse (!) 158, temperature (!) 97 4 °F (36 3 °C), temperature source Temporal, resp  rate (!) 28, height 5' 9 75" (1 772 m), weight 73 5 kg (162 lb 0 6 oz), SpO2 99 %  No intake or output data in the 24 hours ending 11/20/18 1800  Invasive Devices     Peripheral Intravenous Line            Peripheral IV 05/04/17 Right Forearm 565 days    Peripheral IV 11/20/18 Right Arm less than 1 day                Physical Exam   Constitutional: He is oriented to person, place, and time  He appears well-developed and well-nourished  HENT:   Head: Atraumatic  Mouth peers dry   Neck: No JVD present  Cardiovascular:   No murmur heard  AFib 140 beats per minute  Pulmonary/Chest: Effort normal and breath sounds normal    Abdominal: Soft  Bowel sounds are normal    Musculoskeletal: Edema: Plus one edema  Neurological: He is alert and oriented to person, place, and time  Skin: Skin is warm and dry  Venous stasis changes   Vitals reviewed        Lab Results:   Admission on 11/20/2018   Component Date Value    Sodium 11/20/2018 135*    Potassium 11/20/2018 4 6     Chloride 11/20/2018 99*    CO2 11/20/2018 24     ANION GAP 11/20/2018 12     BUN 11/20/2018 40*    Creatinine 11/20/2018 1 83*    Glucose 11/20/2018 132     Calcium 11/20/2018 9 4     AST 11/20/2018 85*    ALT 11/20/2018 86*    Alkaline Phosphatase 11/20/2018 111     Total Protein 11/20/2018 7 0     Albumin 11/20/2018 3 7     Total Bilirubin 11/20/2018 2 90*    eGFR 11/20/2018 36     WBC 11/20/2018 7 30     RBC 11/20/2018 4 76     Hemoglobin 11/20/2018 15 0     Hematocrit 11/20/2018 45 3     MCV 11/20/2018 95     MCH 11/20/2018 31 5     MCHC 11/20/2018 33 1     RDW 11/20/2018 19 8*    Platelets 67/59/1738 75*    nRBC 11/20/2018 4     Neutrophils Relative 11/20/2018 70     Immat GRANS % 11/20/2018 2     Lymphocytes Relative 11/20/2018 19     Monocytes Relative 11/20/2018 8     Eosinophils Relative 11/20/2018 1     Basophils Relative 11/20/2018 0     Neutrophils Absolute 11/20/2018 5 11     Immature Grans Absolute 11/20/2018 0 15     Lymphocytes Absolute 11/20/2018 1 36     Monocytes Absolute 11/20/2018 0 56     Eosinophils Absolute 11/20/2018 0 10     Basophils Absolute 11/20/2018 0 02     Troponin I 11/20/2018 <0 02     PTT 11/20/2018 42*    Protime 11/20/2018 28 5*    INR 11/20/2018 2 87*    NT-proBNP 11/20/2018 2012*    SODIUM, I-STAT 11/20/2018 134*    Potassium, i-STAT 11/20/2018 4 1     Chloride, istat 11/20/2018 100     CO2, i-STAT 11/20/2018 25     Anion Gap, i-STAT 11/20/2018 13     Calcium, Ionized i-STAT 11/20/2018 1 00*    BUN, I-STAT 11/20/2018 43*    Creatinine, i-STAT 11/20/2018 1 9*    eGFR 11/20/2018 35     Glucose, i-STAT 11/20/2018 127     Hct, i-STAT 11/20/2018 44     Hgb, i-STAT 11/20/2018 15 0     Specimen Type 11/20/2018 VENOUS     TSH 3RD GENERATON 11/20/2018 4 473*    Ventricular Rate 11/20/2018 141     Atrial Rate 11/20/2018 120     QRSD Interval 11/20/2018 108     QT Interval 11/20/2018 310     QTC Interval 11/20/2018 474     QRS Axis 11/20/2018 -6     T Wave Axis 11/20/2018 84     Ventricular Rate 11/20/2018 141     Atrial Rate 11/20/2018 141     QRSD Interval 11/20/2018 74     QT Interval 11/20/2018 322     QTC Interval 11/20/2018 493     QRS Axis 11/20/2018 -72     T Wave Axis 11/20/2018 92      Imaging Studies: I have personally reviewed pertinent reports  EKG, Pathology, and Other Studies: I have personally reviewed pertinent reports  VTE  Prophylaxis: Algorithmic determination of appropriate prophylaxis determined  This note has been constructed using a voice recognition system         Sonny Estes MD

## 2018-11-20 NOTE — ED NOTES
Physician notified of tachycardia  No improvement noted with medication administration  Awaiting further orders  Patient denies CP, SOB, n/v, diaphoresis  Will continue to monitor          Rachael Mohamud RN  11/20/18 0914

## 2018-11-21 ENCOUNTER — APPOINTMENT (INPATIENT)
Dept: NON INVASIVE DIAGNOSTICS | Facility: HOSPITAL | Age: 73
DRG: 308 | End: 2018-11-21
Payer: MEDICARE

## 2018-11-21 ENCOUNTER — APPOINTMENT (INPATIENT)
Dept: RADIOLOGY | Facility: HOSPITAL | Age: 73
DRG: 308 | End: 2018-11-21
Payer: MEDICARE

## 2018-11-21 PROBLEM — I42.0 DILATED CARDIOMYOPATHY (HCC): Status: ACTIVE | Noted: 2018-11-21

## 2018-11-21 LAB
ANION GAP SERPL CALCULATED.3IONS-SCNC: 11 MMOL/L (ref 4–13)
APPEARANCE FLD: ABNORMAL
ARTERIAL PATENCY WRIST A: ABNORMAL
ATRIAL RATE: 277 BPM
ATRIAL RATE: 300 BPM
ATRIAL RATE: 59 BPM
BASE EXCESS BLDA CALC-SCNC: -5 MMOL/L (ref -2–3)
BILIRUB UR QL STRIP: NEGATIVE
BUN SERPL-MCNC: 34 MG/DL (ref 5–25)
CALCIUM SERPL-MCNC: 8.5 MG/DL (ref 8.3–10.1)
CHLORIDE SERPL-SCNC: 103 MMOL/L (ref 100–108)
CLARITY UR: CLEAR
CO2 SERPL-SCNC: 25 MMOL/L (ref 21–32)
COLOR FLD: ABNORMAL
COLOR UR: YELLOW
CREAT SERPL-MCNC: 1.66 MG/DL (ref 0.6–1.3)
ERYTHROCYTE [DISTWIDTH] IN BLOOD BY AUTOMATED COUNT: 19.3 % (ref 11.6–15.1)
FIO2 GAS DIL.REBREATH: 60 L
GFR SERPL CREATININE-BSD FRML MDRD: 41 ML/MIN/1.73SQ M
GLUCOSE SERPL-MCNC: 102 MG/DL (ref 65–140)
GLUCOSE SERPL-MCNC: 240 MG/DL (ref 65–140)
GLUCOSE UR STRIP-MCNC: NEGATIVE MG/DL
HCO3 BLDA-SCNC: 21.4 MMOL/L (ref 22–28)
HCT VFR BLD AUTO: 39.5 % (ref 36.5–49.3)
HCT VFR BLD AUTO: 43.2 % (ref 36.5–49.3)
HGB BLD-MCNC: 13.1 G/DL (ref 12–17)
HGB BLD-MCNC: 14.1 G/DL (ref 12–17)
HGB UR QL STRIP.AUTO: NEGATIVE
HISTIOCYTES NFR FLD: 7 %
KETONES UR STRIP-MCNC: NEGATIVE MG/DL
LDH SERPL-CCNC: 642 U/L (ref 81–234)
LEUKOCYTE ESTERASE UR QL STRIP: NEGATIVE
LYMPHOCYTES NFR BLD AUTO: 90 %
MCH RBC QN AUTO: 31.3 PG (ref 26.8–34.3)
MCHC RBC AUTO-ENTMCNC: 33.2 G/DL (ref 31.4–37.4)
MCV RBC AUTO: 94 FL (ref 82–98)
MONO+MESO NFR FLD MANUAL: 1 %
MONOCYTES NFR BLD AUTO: 1 %
NEUTS SEG NFR BLD AUTO: 1 %
NITRITE UR QL STRIP: NEGATIVE
PCO2 BLD: 23 MMOL/L (ref 21–32)
PCO2 BLD: 43.3 MM HG (ref 36–44)
PH BLD: 7.3 [PH] (ref 7.35–7.45)
PH UR STRIP.AUTO: 6 [PH] (ref 4.5–8)
PLATELET # BLD AUTO: 55 THOUSANDS/UL (ref 149–390)
PO2 BLD: 146 MM HG (ref 75–129)
POTASSIUM SERPL-SCNC: 3.6 MMOL/L (ref 3.5–5.3)
PROT SERPL-MCNC: 6.1 G/DL (ref 6.4–8.2)
PROT UR STRIP-MCNC: NEGATIVE MG/DL
QRS AXIS: -25 DEGREES
QRS AXIS: -54 DEGREES
QRS AXIS: 24 DEGREES
QRSD INTERVAL: 88 MS
QRSD INTERVAL: 88 MS
QRSD INTERVAL: 96 MS
QT INTERVAL: 308 MS
QT INTERVAL: 320 MS
QT INTERVAL: 334 MS
QTC INTERVAL: 424 MS
QTC INTERVAL: 428 MS
QTC INTERVAL: 511 MS
RBC # BLD AUTO: 4.19 MILLION/UL (ref 3.88–5.62)
SAMPLE SITE: ABNORMAL
SAO2 % BLD FROM PO2: 99 % (ref 95–98)
SITE: ABNORMAL
SODIUM SERPL-SCNC: 139 MMOL/L (ref 136–145)
SP GR UR STRIP.AUTO: 1.02 (ref 1–1.03)
SPECIMEN SOURCE: ABNORMAL
T WAVE AXIS: 103 DEGREES
T WAVE AXIS: 73 DEGREES
T WAVE AXIS: 82 DEGREES
T4 FREE SERPL-MCNC: 1.36 NG/DL (ref 0.76–1.46)
TOTAL CELLS COUNTED SPEC: 100
TROPONIN I SERPL-MCNC: 0.03 NG/ML
TROPONIN I SERPL-MCNC: 0.06 NG/ML
TROPONIN I SERPL-MCNC: 0.06 NG/ML
TROPONIN I SERPL-MCNC: <0.02 NG/ML
UROBILINOGEN UR QL STRIP.AUTO: 0.2 E.U./DL
VENTRICULAR RATE: 108 BPM
VENTRICULAR RATE: 114 BPM
VENTRICULAR RATE: 141 BPM
WBC # BLD AUTO: 5.47 THOUSAND/UL (ref 4.31–10.16)
WBC # FLD MANUAL: 913 /UL

## 2018-11-21 PROCEDURE — 84484 ASSAY OF TROPONIN QUANT: CPT | Performed by: INTERNAL MEDICINE

## 2018-11-21 PROCEDURE — 85027 COMPLETE CBC AUTOMATED: CPT | Performed by: INTERNAL MEDICINE

## 2018-11-21 PROCEDURE — 83615 LACTATE (LD) (LDH) ENZYME: CPT | Performed by: INTERNAL MEDICINE

## 2018-11-21 PROCEDURE — 71045 X-RAY EXAM CHEST 1 VIEW: CPT

## 2018-11-21 PROCEDURE — 80048 BASIC METABOLIC PNL TOTAL CA: CPT | Performed by: INTERNAL MEDICINE

## 2018-11-21 PROCEDURE — 84439 ASSAY OF FREE THYROXINE: CPT | Performed by: INTERNAL MEDICINE

## 2018-11-21 PROCEDURE — 99291 CRITICAL CARE FIRST HOUR: CPT | Performed by: INTERNAL MEDICINE

## 2018-11-21 PROCEDURE — 81003 URINALYSIS AUTO W/O SCOPE: CPT | Performed by: INTERNAL MEDICINE

## 2018-11-21 PROCEDURE — 82803 BLOOD GASES ANY COMBINATION: CPT

## 2018-11-21 PROCEDURE — 99222 1ST HOSP IP/OBS MODERATE 55: CPT | Performed by: INTERNAL MEDICINE

## 2018-11-21 PROCEDURE — 36600 WITHDRAWAL OF ARTERIAL BLOOD: CPT

## 2018-11-21 PROCEDURE — 32555 ASPIRATE PLEURA W/ IMAGING: CPT | Performed by: INTERNAL MEDICINE

## 2018-11-21 PROCEDURE — 86800 THYROGLOBULIN ANTIBODY: CPT | Performed by: INTERNAL MEDICINE

## 2018-11-21 PROCEDURE — 82948 REAGENT STRIP/BLOOD GLUCOSE: CPT

## 2018-11-21 PROCEDURE — 83986 ASSAY PH BODY FLUID NOS: CPT | Performed by: INTERNAL MEDICINE

## 2018-11-21 PROCEDURE — 93010 ELECTROCARDIOGRAM REPORT: CPT | Performed by: INTERNAL MEDICINE

## 2018-11-21 PROCEDURE — 84155 ASSAY OF PROTEIN SERUM: CPT | Performed by: INTERNAL MEDICINE

## 2018-11-21 PROCEDURE — 93005 ELECTROCARDIOGRAM TRACING: CPT

## 2018-11-21 PROCEDURE — 86376 MICROSOMAL ANTIBODY EACH: CPT | Performed by: INTERNAL MEDICINE

## 2018-11-21 PROCEDURE — 85018 HEMOGLOBIN: CPT | Performed by: INTERNAL MEDICINE

## 2018-11-21 PROCEDURE — 87070 CULTURE OTHR SPECIMN AEROBIC: CPT | Performed by: INTERNAL MEDICINE

## 2018-11-21 PROCEDURE — 85014 HEMATOCRIT: CPT | Performed by: INTERNAL MEDICINE

## 2018-11-21 PROCEDURE — 87205 SMEAR GRAM STAIN: CPT | Performed by: INTERNAL MEDICINE

## 2018-11-21 PROCEDURE — 94660 CPAP INITIATION&MGMT: CPT

## 2018-11-21 PROCEDURE — 94760 N-INVAS EAR/PLS OXIMETRY 1: CPT

## 2018-11-21 PROCEDURE — 94664 DEMO&/EVAL PT USE INHALER: CPT

## 2018-11-21 PROCEDURE — 93308 TTE F-UP OR LMTD: CPT

## 2018-11-21 PROCEDURE — 0W993ZZ DRAINAGE OF RIGHT PLEURAL CAVITY, PERCUTANEOUS APPROACH: ICD-10-PCS | Performed by: INTERNAL MEDICINE

## 2018-11-21 PROCEDURE — 88112 CYTOPATH CELL ENHANCE TECH: CPT | Performed by: PATHOLOGY

## 2018-11-21 PROCEDURE — 84484 ASSAY OF TROPONIN QUANT: CPT | Performed by: NURSE PRACTITIONER

## 2018-11-21 PROCEDURE — 89051 BODY FLUID CELL COUNT: CPT | Performed by: INTERNAL MEDICINE

## 2018-11-21 PROCEDURE — 84157 ASSAY OF PROTEIN OTHER: CPT | Performed by: INTERNAL MEDICINE

## 2018-11-21 RX ORDER — FUROSEMIDE 20 MG/1
20 TABLET ORAL ONCE
Status: DISCONTINUED | OUTPATIENT
Start: 2018-11-21 | End: 2018-11-22

## 2018-11-21 RX ORDER — ONDANSETRON 2 MG/ML
4 INJECTION INTRAMUSCULAR; INTRAVENOUS EVERY 6 HOURS PRN
Status: DISCONTINUED | OUTPATIENT
Start: 2018-11-21 | End: 2018-11-22 | Stop reason: HOSPADM

## 2018-11-21 RX ORDER — LORAZEPAM 0.5 MG/1
0.5 TABLET ORAL EVERY 8 HOURS PRN
Status: DISCONTINUED | OUTPATIENT
Start: 2018-11-21 | End: 2018-11-22 | Stop reason: HOSPADM

## 2018-11-21 RX ORDER — AMIODARONE HYDROCHLORIDE 200 MG/1
200 TABLET ORAL
Status: DISCONTINUED | OUTPATIENT
Start: 2018-11-21 | End: 2018-11-22 | Stop reason: HOSPADM

## 2018-11-21 RX ORDER — METOPROLOL TARTRATE 5 MG/5ML
5 INJECTION INTRAVENOUS ONCE
Status: COMPLETED | OUTPATIENT
Start: 2018-11-21 | End: 2018-11-21

## 2018-11-21 RX ORDER — FUROSEMIDE 10 MG/ML
80 INJECTION INTRAMUSCULAR; INTRAVENOUS
Status: DISCONTINUED | OUTPATIENT
Start: 2018-11-21 | End: 2018-11-22

## 2018-11-21 RX ORDER — LIDOCAINE HYDROCHLORIDE 10 MG/ML
10 INJECTION, SOLUTION EPIDURAL; INFILTRATION; INTRACAUDAL; PERINEURAL ONCE
Status: DISCONTINUED | OUTPATIENT
Start: 2018-11-21 | End: 2018-11-22 | Stop reason: HOSPADM

## 2018-11-21 RX ORDER — METOPROLOL TARTRATE 5 MG/5ML
5 INJECTION INTRAVENOUS EVERY 6 HOURS PRN
Status: DISCONTINUED | OUTPATIENT
Start: 2018-11-21 | End: 2018-11-22 | Stop reason: HOSPADM

## 2018-11-21 RX ORDER — METOPROLOL TARTRATE 5 MG/5ML
5 INJECTION INTRAVENOUS EVERY 8 HOURS
Status: DISCONTINUED | OUTPATIENT
Start: 2018-11-21 | End: 2018-11-22

## 2018-11-21 RX ORDER — FUROSEMIDE 10 MG/ML
40 INJECTION INTRAMUSCULAR; INTRAVENOUS ONCE
Status: COMPLETED | OUTPATIENT
Start: 2018-11-21 | End: 2018-11-21

## 2018-11-21 RX ORDER — ONDANSETRON 2 MG/ML
4 INJECTION INTRAMUSCULAR; INTRAVENOUS ONCE
Status: DISCONTINUED | OUTPATIENT
Start: 2018-11-21 | End: 2018-11-21

## 2018-11-21 RX ORDER — DIGOXIN 0.25 MG/ML
250 INJECTION INTRAMUSCULAR; INTRAVENOUS DAILY
Status: DISCONTINUED | OUTPATIENT
Start: 2018-11-21 | End: 2018-11-22

## 2018-11-21 RX ADMIN — APIXABAN 5 MG: 5 TABLET, FILM COATED ORAL at 07:46

## 2018-11-21 RX ADMIN — FUROSEMIDE 40 MG: 10 INJECTION, SOLUTION INTRAVENOUS at 11:21

## 2018-11-21 RX ADMIN — METOPROLOL TARTRATE 100 MG: 50 TABLET ORAL at 07:46

## 2018-11-21 RX ADMIN — AMIODARONE HYDROCHLORIDE 0.5 MG/MIN: 50 INJECTION, SOLUTION INTRAVENOUS at 01:53

## 2018-11-21 RX ADMIN — METOPROLOL TARTRATE 5 MG: 1 INJECTION, SOLUTION INTRAVENOUS at 17:38

## 2018-11-21 RX ADMIN — DIGOXIN 250 MCG: 0.25 INJECTION INTRAMUSCULAR; INTRAVENOUS at 14:08

## 2018-11-21 RX ADMIN — DEXTROSE 150 MG: 50 INJECTION, SOLUTION INTRAVENOUS at 21:19

## 2018-11-21 RX ADMIN — AMIODARONE HYDROCHLORIDE 0.5 MG/MIN: 50 INJECTION, SOLUTION INTRAVENOUS at 20:06

## 2018-11-21 RX ADMIN — METOPROLOL TARTRATE 5 MG: 1 INJECTION, SOLUTION INTRAVENOUS at 14:08

## 2018-11-21 RX ADMIN — FUROSEMIDE 80 MG: 10 INJECTION, SOLUTION INTRAVENOUS at 14:08

## 2018-11-21 RX ADMIN — METOPROLOL TARTRATE 5 MG: 1 INJECTION, SOLUTION INTRAVENOUS at 22:38

## 2018-11-21 NOTE — PROGRESS NOTES
I had an extensive discussion with the patient about the bleeding risk for thoracentesis given Eliquis today  I explained that he is at risk for going on a ventilator overnight if we do not proceed  He understands the risks of the thoracentesis including bleeding, infection and pneumothorax  He agrees and has signed consent  This was also confirmed with his emergency contact, Kaci Morel

## 2018-11-21 NOTE — PLAN OF CARE
CARDIOVASCULAR - ADULT     Maintains optimal cardiac output and hemodynamic stability Progressing     Absence of cardiac dysrhythmias or at baseline rhythm Progressing        DISCHARGE PLANNING - CARE MANAGEMENT     Discharge to post-acute care or home with appropriate resources Progressing        Knowledge Deficit     Patient/family/caregiver demonstrates understanding of disease process, treatment plan, medications, and discharge instructions Progressing        PAIN - ADULT     Verbalizes/displays adequate comfort level or baseline comfort level Progressing        Potential for Falls     Patient will remain free of falls Progressing        SAFETY ADULT     Maintain or return to baseline ADL function Progressing     Maintain or return mobility status to optimal level Progressing

## 2018-11-21 NOTE — SOCIAL WORK
Attempted to meet with patient, he was on Bi pap with chance of being intubated  Did not disturb patient  From previous records patient reside with Osito Hui in a 2 story home  Using only the first floor  He was independent of ADL's and used no assistive device    Please order PT/OT when stable

## 2018-11-21 NOTE — PROCEDURES
Thoracentesis Procedure Note    Indications: Right Pleural Effusion    Procedure performed: Right thoracentesis with catheter with ultrasound guidance    Procedure Details     Consent: Informed consent was obtained  Risks of the procedure were discussed including: infection, bleeding, pain, pneumothorax  Timeout was observed at 5:45 pm     Pleural effusion identified with ultrasound immediately prior to procedure  Site marked  Under sterile conditions the patient was positioned  Chlorhexidine swab and sterile drapes were utilized  2% lidocaine was used to anesthetize  A diagnostic and therapeutic thoracentesis with a catheter using a standard thoracentesis kit was used  Fluid was obtained without any difficulties and minimal blood loss  A dressing was applied to the wound and wound care instructions were provided  Findings  1500 ml of damian pleural fluid was obtained  A sample was sent for analysis  Complications:  None; patient tolerated the procedure well  Condition: stable    Plan  A follow up chest x-ray was ordered        Ruthy Garcia DO

## 2018-11-21 NOTE — RESPIRATORY THERAPY NOTE
RT Protocol Note  Nicole Colon 72 y o  male MRN: [id number]  Unit/Bed#: -02 Encounter: [id number]    Assessment    Principal Problem:    Atrial fibrillation with tachycardic ventricular rate (HCC)  Active Problems:    Hypertension    Thrombocytopenia (HCC)    PALOMA (acute kidney injury) ([facility] )    Acute on chronic diastolic congestive heart failure ([facility] )    Coronary artery disease    Hyperlipidemia      Home Pulmonary Medications:  n/a       Past Medical History:   Diagnosis Date    Atrial fibrillation ([facility] )     Hypertension     Peripheral vascular disease of lower extremity (HCC)     Subdural hematoma (HCC)      Social History     Social History    Marital status: Single     Spouse name: N/A    Number of children: N/A    Years of education: N/A     Social History Main Topics    Smoking status: Never Smoker    Smokeless tobacco: Never Used    Alcohol use No    Drug use: No    Sexual activity: Not Asked     Other Topics Concern    None     Social History Narrative    None       Subjective         Objective    Physical Exam:   General Appearance: Lethargic  Respiratory Pattern: Tachypneic  Chest Assessment: Chest expansion symmetrical  Bilateral Breath Sounds: Diminished  Cough: Non-productive    Vitals:  Blood pressure 150/99, pulse (!) 147, temperature 97 6 °F (36 4 °C), resp  rate (!) 36, height 5' 9 5" (1 765 m), weight 71 2 kg (156 lb 15 5 oz), SpO2 96 %  Imaging and other studies: I have personally reviewed pertinent reports  Plan    Respiratory Plan:  Moderate/Severe Distress pathway    pt placed on bipap increased work of breathing

## 2018-11-21 NOTE — ACP (ADVANCE CARE PLANNING)
Spoke with patients significant other- has no children or other relatives according to her, Michel Ambrose  Discussed current worsening situation and she wishes him to be a full code

## 2018-11-21 NOTE — ED NOTES
Attempted to call floor for bed assignment  No answer at this time   Will call back      Daryle Records, RN  11/20/18 7329

## 2018-11-21 NOTE — CONSULTS
Consultation - Cardiology   Fco Cronin 72 y o  male MRN: [id number]  Unit/Bed#: -02 Encounter: [id number]      Assessment:  Principal Problem:    Atrial fibrillation with tachycardic ventricular rate (HCC)  Active Problems:    Hyperlipidemia    Hypertension    Thrombocytopenia (HCC)    PALOMA (acute kidney injury) ([facility] [address] )    Chronic diastolic congestive heart failure (HCC)    Coronary artery disease      Plan:  Patient appears to be in congestive heart failure  I agree with diuresis  I will give 80 mg of IV Lasix now  Will place him on BiPAP  EKG demonstrates atrial fibrillation with a moderate to rapid ventricular response with nonspecific lateral T-wave changes  His follow-up troponin this morning is negative  Prior echocardiogram done May of last year demonstrated preserved LV systolic function with no significant valvular heart disease  Will schedule a follow-up echocardiogram   Will place him on daily digoxin and check a level  He had previously been on digoxin which was discontinued  He is currently on metoprolol  His respiratory status will need to be monitored carefully  Thank you for this consultation  Addendum: Patient's Echo was reviewed and shows a significant reduction in LV systolic function with an [email or web address]  Etiology is unclear but may be tachycardia mediated  Will continue diuresis  Would consider transfer to [address] [place] [address] if ongoing issue  History of Present Illness   Physician Requesting Consult: Vincent Stephens MD  Reason for Consult / Principal Problem:  Shortness of breath  HPI: Fco Cronin is a 72y o  year old male who presents with shortness of breath  Patient is known to our practice and is followed closely by Dr Lake Black  He has a history of chronic diastolic heart failure  He has a history of chronic atrial fibrillation with difficult rate control  At the last office visit with his digoxin was discontinued    He presents now with shortness of breath and is currently in respiratory distress  We will attempt diuresis but if not successful he may need to be intubated  He is to be placed on BiPAP  EKG demonstrates atrial fibrillation with nonspecific T-wave changes  We are asked to provide advice in reference to further management  Inpatient consult to Cardiology  Consult performed by: Kostas Smith  Consult ordered by: Paula Machado          Review of Systems:  Review of Systems - Negative except shortness of breath    Historical Information   Past Medical History:   Diagnosis Date    Atrial fibrillation ([facility] [address] )     Hypertension     Peripheral vascular disease of lower extremity ([facility] )     Subdural hematoma ([facility] )      Past Surgical History:   Procedure Laterality Date    BACK SURGERY      AUSTYN HOLE FOR SUBDURAL HEMATOMA      CLAVICLE SURGERY      HERNIA REPAIR      PROSTATE SURGERY       History   Alcohol Use No     History   Drug Use No     History   Smoking Status    Never Smoker   Smokeless Tobacco    Never Used     Family History: non-contributory    Meds/Allergies   all current active meds have been reviewed  No Known Allergies    Objective   Vitals: Blood pressure 150/99, pulse (!) 134, temperature 97 6 °F (36 4 °C), resp  rate (!) 26, height 5' 9 5" (1 765 m), weight 71 2 kg (156 lb 15 5 oz), SpO2 93 %  , Body mass index is 22 85 kg/m² , Orthostatic Blood Pressures      Most Recent Value   Blood Pressure  150/99 filed at 11/21/2018 1114   Patient Position - Orthostatic VS  Lying filed at 11/20/2018 2225            Intake/Output Summary (Last 24 hours) at 11/21/18 1343  Last data filed at 11/21/18 1130   Gross per 24 hour   Intake              500 ml   Output              850 ml   Net             -350 ml       Invasive Devices     Peripheral Intravenous Line            Peripheral IV 05/04/17 Right Forearm 566 days    Peripheral IV 11/20/18 Right Arm less than 1 day          Drain            Urethral Catheter Latex 16 Fr  less than 1 day                Physical Exam   Constitutional: He is oriented to person, place, and time  He appears well-developed and well-nourished  HENT:   Head: Normocephalic and atraumatic  Eyes: Conjunctivae are normal    Neck: Normal range of motion  Neck supple  Cardiovascular: Normal rate and normal heart sounds  Pulmonary/Chest:   Decreased breath sounds   Neurological: He is alert and oriented to person, place, and time  Skin: Skin is warm and dry  Psychiatric: He has a normal mood and affect  Vitals reviewed        Lab Results:   Admission on 11/20/2018   Component Date Value    Sodium 11/20/2018 135*    Potassium 11/20/2018 4 6     Chloride 11/20/2018 99*    CO2 11/20/2018 24     ANION GAP 11/20/2018 12     BUN 11/20/2018 40*    Creatinine 11/20/2018 1 83*    Glucose 11/20/2018 132     Calcium 11/20/2018 9 4     AST 11/20/2018 85*    ALT 11/20/2018 86*    Alkaline Phosphatase 11/20/2018 111     Total Protein 11/20/2018 7 0     Albumin 11/20/2018 3 7     Total Bilirubin 11/20/2018 2 90*    eGFR 11/20/2018 36     WBC 11/20/2018 7 30     RBC 11/20/2018 4 76     Hemoglobin 11/20/2018 15 0     Hematocrit 11/20/2018 45 3     MCV 11/20/2018 95     MCH 11/20/2018 31 5     MCHC 11/20/2018 33 1     RDW 11/20/2018 19 8*    Platelets 10/29/9266 75*    nRBC 11/20/2018 4     Neutrophils Relative 11/20/2018 70     Immat GRANS % 11/20/2018 2     Lymphocytes Relative 11/20/2018 19     Monocytes Relative 11/20/2018 8     Eosinophils Relative 11/20/2018 1     Basophils Relative 11/20/2018 0     Neutrophils Absolute 11/20/2018 5 11     Immature Grans Absolute 11/20/2018 0 15     Lymphocytes Absolute 11/20/2018 1 36     Monocytes Absolute 11/20/2018 0 56     Eosinophils Absolute 11/20/2018 0 10     Basophils Absolute 11/20/2018 0 02     Troponin I 11/20/2018 <0 02     PTT 11/20/2018 42*    Protime 11/20/2018 28 5*    INR 11/20/2018 2 87*    NT-proBNP 11/20/2018 2012*    SODIUM, I-STAT 11/20/2018 134*    Potassium, i-STAT 11/20/2018 4 1     Chloride, istat 11/20/2018 100     CO2, i-STAT 11/20/2018 25     Anion Gap, i-STAT 11/20/2018 13     Calcium, Ionized i-STAT 11/20/2018 1 00*    BUN, I-STAT 11/20/2018 43*    Creatinine, i-STAT 11/20/2018 1 9*    eGFR 11/20/2018 35     Glucose, i-STAT 11/20/2018 127     Hct, i-STAT 11/20/2018 44     Hgb, i-STAT 11/20/2018 15 0     Specimen Type 11/20/2018 VENOUS     TSH 3RD GENERATON 11/20/2018 4 473*    Ventricular Rate 11/20/2018 141     Atrial Rate 11/20/2018 120     QRSD Interval 11/20/2018 108     QT Interval 11/20/2018 310     QTC Interval 11/20/2018 474     QRS Axis 11/20/2018 -6     T Wave Axis 11/20/2018 84     Ventricular Rate 11/20/2018 141     Atrial Rate 11/20/2018 141     QRSD Interval 11/20/2018 74     QT Interval 11/20/2018 322     QTC Interval 11/20/2018 493     QRS Axis 11/20/2018 -65     T Wave Axis 11/20/2018 92     Free T4 11/21/2018 1 36     Sodium 11/21/2018 139     Potassium 11/21/2018 3 6     Chloride 11/21/2018 103     CO2 11/21/2018 25     ANION GAP 11/21/2018 11     BUN 11/21/2018 34*    Creatinine 11/21/2018 1 66*    Glucose 11/21/2018 102     Calcium 11/21/2018 8 5     eGFR 11/21/2018 41     WBC 11/21/2018 5 47     RBC 11/21/2018 4 19     Hemoglobin 11/21/2018 13 1     Hematocrit 11/21/2018 39 5     MCV 11/21/2018 94     MCH 11/21/2018 31 3     MCHC 11/21/2018 33 2     RDW 11/21/2018 19 3*    Platelets 81/65/0239 55*    Ventricular Rate 11/20/2018 141     Atrial Rate 11/20/2018 59     QRSD Interval 11/20/2018 88     QT Interval 11/20/2018 334     QTC Interval 11/20/2018 511     QRS Axis 11/20/2018 -47     T Wave Axis 11/20/2018 82     Ventricular Rate 11/21/2018 114     Atrial Rate 11/21/2018 277     QRSD Interval 11/21/2018 88     QT Interval 11/21/2018 308     QTC Interval 11/21/2018 424     QRS Axis 11/21/2018 -25     T Wave Axis 11/21/2018 73     Troponin I 11/21/2018 <0 02     Color, UA 11/21/2018 Yellow     Clarity, UA 11/21/2018 Clear     Specific Gravity, UA 11/21/2018 1 020     pH, UA 11/21/2018 6 0     Leukocytes, UA 11/21/2018 Negative     Nitrite, UA 11/21/2018 Negative     Protein, UA 11/21/2018 Negative     Glucose, UA 11/21/2018 Negative     Ketones, UA 11/21/2018 Negative     Urobilinogen, UA 11/21/2018 0 2     Bilirubin, UA 11/21/2018 Negative     Blood, UA 11/21/2018 Negative     POC Glucose 11/21/2018 240*       Imaging: I have personally reviewed pertinent reports  Xr Chest Portable    Result Date: 11/21/2018  Narrative: CHEST INDICATION:   f/u bronchoscopy  COMPARISON:  11/20/2018  EXAM PERFORMED/VIEWS:  XR CHEST PORTABLE FINDINGS: Cardiomediastinal silhouette appears unremarkable  There is new, diffusely increased interstitial prominence bilaterally consistent with moderate fluid overload  Unchanged small bilateral pleural effusions, right greater than left  Partially imaged orthopedic hardware in the left clavicle  Visualized osseous structures appear otherwise unremarkable for the patient's age  Impression: Interval development of moderate vascular congestion  Stable small bilateral pleural effusions  Workstation performed: [id number]     Xr Chest Portable    Result Date: 11/20/2018  Narrative: CHEST INDICATION:   chest pain  COMPARISON:  5/5/2017 EXAM PERFORMED/VIEWS:  XR CHEST PORTABLE FINDINGS: Heart shadow is enlarged but unchanged from prior exam  Bibasilar atelectasis/infiltrate with small effusions, right greater than left  Osseous structures appear within normal limits for patient age  Plate and screw fixation hardware mid left clavicle  Impression: Bibasilar atelectasis/infiltrate with small effusions, right greater than left  Workstation performed: [id number]       EKG: Personally reviewed by Ailyn Lee MD     Counseling / Coordination of Care  Total floor / unit time spent today 30 minutes    Greater than 50% of total time was spent with the patient and / or family counseling and / or coordination of care

## 2018-11-21 NOTE — PROGRESS NOTES
Progress Note - Kimberli Holland 72 y o  male MRN: [id number]    Unit/Bed#: -02 Encounter: [id number]      Assessment:  Principal Problem:    Atrial fibrillation with tachycardic ventricular rate (HCC)  Active Problems:    PALOMA (acute kidney injury) ([facility] [address] )    Chronic diastolic congestive heart failure (HCC)    Hypertension    Thrombocytopenia ([facility] )    Coronary artery disease    Hyperlipidemia  Resolved Problems:    * No resolved hospital problems  *        Plan:  AFib with control neared increased ventricular rates-beta-blocker dose increased to 100 mg this a m -will give IV beta-blockers in addition to the IV amiodarone that he is currently on- start p  O  Amiodarone as rates are still in the 130s to 140s  Patient follows with Dr Diana Serna as an outpatient   Patient on Eliquis for anticoagulation    Acute kidney injury-baseline creatinine 1 26 and May 1 0 83 on arrival now down to 1 6-will continue to follow-Lasix was 20 mg daily at home and this is currently on hold-will cap IV  Chronic diastolic CHF-no signs of worsening situation  Hypertension by history with relative hypotension overnight-now has improved    Subjective:   Patient without complaints of chest pain  Some shortness of breath with any activity  Denies nausea headache or dizziness  ROS  Comprehensive system review negative other than noted above    Objective:     Vitals: Blood pressure 138/84, pulse (!) 148, temperature 97 6 °F (36 4 °C), resp  rate (!) 26, height 5' 9 5" (1 765 m), weight 71 2 kg (156 lb 15 5 oz), SpO2 95 %  ,Body mass index is 22 85 kg/m²    Current Facility-Administered Medications   Medication Dose Route Frequency Provider Last Rate Last Dose    acetaminophen (TYLENOL) tablet 650 mg  650 mg Oral Q6H PRN Sheri Leon MD        amiodarone (CORDARONE) 900 mg in dextrose 5 % 500 mL infusion  0 5 mg/min Intravenous Continuous BJ Zapien 16 7 mL/hr at 11/21/18 0153 0 5 mg/min at 11/21/18 0153    apixaban (ELIQUIS) tablet 5 mg  5 mg Oral BID Pearl Diaz MD   5 mg at 11/20/18 2244    metoprolol tartrate (LOPRESSOR) tablet 100 mg  100 mg Oral Daily BJ Cleary        metoprolol tartrate (LOPRESSOR) tablet 50 mg  50 mg Oral HS BJ Zapien   50 mg at 11/20/18 2244    sodium chloride 0 9 % infusion  50 mL/hr Intravenous Continuous Pearl Diaz MD 50 mL/hr at 11/20/18 2038 50 mL/hr at 11/20/18 2038    zolpidem (AMBIEN) tablet 5 mg  5 mg Oral HS PRN Pearl Diaz MD         Prescriptions Prior to Admission   Medication    metoprolol tartrate (LOPRESSOR) 50 mg tablet    apixaban (ELIQUIS) 5 mg    Ascorbic Acid (VITAMIN C PO)    B Complex Vitamins (VITAMIN B COMPLEX PO)    clobetasol (TEMOVATE) 0 05 % GEL    diltiazem (TIAZAC) 360 MG 24 hr capsule    Ferrous Gluconate-C-Folic Acid (IRON-C PO)    folic acid (FOLVITE) 1 mg tablet    furosemide (LASIX) 20 mg tablet    Lactobacillus (ACIDOPHILUS PO)    Lycopene 10 MG CAPS    metoprolol tartrate (LOPRESSOR) 100 mg tablet    Misc Natural Products (SAW PALMETTO) CAPS    Multiple Vitamin (MULTI-DAY VITAMINS) TABS         Intake/Output Summary (Last 24 hours) at 11/21/18 0718  Last data filed at 11/21/18 0041   Gross per 24 hour   Intake              500 ml   Output              150 ml   Net              350 ml       Physical Exam:  General appearance: alert and fatigued  Neck: no adenopathy, no JVD and thyroid not enlarged, symmetric, no tenderness/mass/nodules  Lungs: Mildly decreased breath sounds in the bases no wheezes  Heart: irregularly irregular rhythm with rapid rates in the 130s  Abdomen: soft, non-tender; bowel sounds normal; no masses,  no organomegaly  Extremities: extremities normal, warm and well-perfused; no cyanosis, clubbing, or edema  Skin: Skin color, texture, turgor normal  No rashes or lesions  Neurologic: Grossly normal       Lab, Imaging and other studies: I have personally reviewed pertinent reports              Results from last 7 days  Lab Units 11/21/18  0546 11/20/18  1640 11/20/18  1632   WBC Thousand/uL 5 47  --  7 30   HEMOGLOBIN g/dL 13 1  --  15 0   I STAT HEMOGLOBIN g/dl  --  15 0  --    HEMATOCRIT % 39 5  --  45 3   HEMATOCRIT, ISTAT %  --  44  --    PLATELETS Thousands/uL 55*  --  75*   NEUTROS PCT %  --   --  70   LYMPHS PCT %  --   --  19   MONOS PCT %  --   --  8   EOS PCT %  --   --  1       Results from last 7 days  Lab Units 11/21/18  0546 11/20/18  1640 11/20/18  1632   POTASSIUM mmol/L 3 6  --  4 6   CHLORIDE mmol/L 103  --  99*   CO2 mmol/L 25  --  24   CO2, I-STAT mmol/L  --  25  --    BUN mg/dL 34*  --  40*   CREATININE mg/dL 1 66*  --  1 83*   CALCIUM mg/dL 8 5  --  9 4   ALK PHOS U/L  --   --  111   ALT U/L  --   --  86*   AST U/L  --   --  85*   GLUCOSE, ISTAT mg/dl  --  127  --      Lab Results   Component Value Date    TROPONINI <0 02 11/20/2018    TROPONINI 0 02 05/02/2017    TROPONINI 0 02 05/02/2017       Results from last 7 days  Lab Units 11/20/18  1632   INR  2 87*     Lab Results   Component Value Date    BLOODCX No Growth After 5 Days  05/02/2017    BLOODCX No Growth After 5 Days  05/02/2017       Imaging:  Results for orders placed during the hospital encounter of 11/20/18   XR chest portable    Narrative CHEST     INDICATION:   chest pain  COMPARISON:  5/5/2017    EXAM PERFORMED/VIEWS:  XR CHEST PORTABLE      FINDINGS:    Heart shadow is enlarged but unchanged from prior exam     Bibasilar atelectasis/infiltrate with small effusions, right greater than left  Osseous structures appear within normal limits for patient age  Plate and screw fixation hardware mid left clavicle  Impression Bibasilar atelectasis/infiltrate with small effusions, right greater than left  Workstation performed: [id number]       Results for orders placed during the hospital encounter of 05/01/17   XR chest 2 views    Narrative CHEST - DUAL ENERGY    INDICATION:  Cough  Shortness of breath      COMPARISON: 11/13/2011    VIEWS:  PA (including soft tissue/bone algorithms) and lateral projections    IMAGES:  5    FINDINGS:         Cardiomediastinal silhouette appears enlarged  There are chronic changes present  The patient appears to be in mild heart failure  No infiltrates  Minimal blunting of the costophrenic angles  Orthopedic plate with multiple screws in the left clavicle  Old fracture of the right clavicle  Impression Cardiomegaly  Mild congestive heart failure  Small pleural effusions  ##sigslh##sigslh      Workstation performed: [id number]         PATIENT CENTERED ROUNDS: I have performed rounds with the nursing staff            Jeni Grey DO

## 2018-11-21 NOTE — UTILIZATION REVIEW
Initial Clinical Review    Admission: Date/Time/Statement: 11/20/18 @ 1752     Orders Placed This Encounter   Procedures    Inpatient Admission (expected length of stay for this patient is greater than two midnights)     Standing Status:   Standing     Number of Occurrences:   1     Order Specific Question:   Admitting Physician     Answer:   Edmund Morgan [[id number]]     Order Specific Question:   Level of Care     Answer:   Critical Care [15]     Order Specific Question:   Estimated length of stay     Answer:   More than 2 Midnights     Order Specific Question:   Certification     Answer:   I certify that inpatient services are medically necessary for this patient for a duration of greater than two midnights  See H&P and MD Progress Notes for additional information about the patient's course of treatment  ED: Date/Time/Mode of Arrival:   ED Arrival Information     Expected Arrival Acuity Means of Arrival Escorted By Service Admission Type    - 11/20/2018 16:09 Emergent Walk-In Self General Medicine Emergency    Arrival Complaint    heart problems          Chief Complaint:   Chief Complaint   Patient presents with    Shortness of Breath     pt presents to ED c/o of  SOB upon exertion  Pt states it has been goingo n for about 2 days  History of Illness: 72 yrold male presents to ed with sob  On exertion and chronic afib  Over the last week noted increasing dyspnea on exertion  --pulse rate elevated -- as to watch his volume he presented to ed    Was on metoprolol , cardizem and dig-- dig recently stopped    ED Vital Signs:   ED Triage Vitals [11/20/18 1625]   Temperature Pulse Respirations Blood Pressure SpO2   (!) 97 4 °F (36 3 °C) (!) 150 21 109/77 95 %      Temp Source Heart Rate Source Patient Position - Orthostatic VS BP Location FiO2 (%)   Temporal Monitor Lying Right arm --      Pain Score       No Pain        Wt Readings from Last 1 Encounters:   11/21/18 71 2 kg (156 lb 15 5 oz)       Vital Signs (abnormal): pulse 163-133  resp 23-39    Abnormal Labs/Diagnostic Test Results: tsh 4 473  platlets 75  Na 135--cl 99--bun  40- cr 1 83--ast 85--lwz00--w  Bili 2 90  bnp 2012--ptt 42--inr 2 87  Chest-     Bibasilar atelectasis/infiltrate with small effusions, right greater than left  ED Treatment:   Medication Administration from 11/20/2018 1609 to 11/20/2018 2020       Date/Time Order Dose Route Action Action by Comments     11/20/2018 1719 sodium chloride 0 9 % bolus 500 mL 0 mL Intravenous Stopped Al Du RN      11/20/2018 1647 sodium chloride 0 9 % bolus 500 mL 500 mL Intravenous [address] Al Du RN      11/20/2018 1654 diltiazem (CARDIZEM) injection 15 mg 15 mg Intravenous Given Al Du RN      11/20/2018 1742 sodium chloride 0 9 % bolus 500 mL 0 mL Intravenous Stopped Casi Valadez RN      11/20/2018 1720 sodium chloride 0 9 % bolus 500 mL 500 mL Intravenous [address] Al Du RN      11/20/2018 1715 diltiazem (CARDIZEM) 125 mg in sodium chloride 0 9 % 125 mL infusion 0 mg/hr Intravenous Hold Casi Valadez RN hypotension     11/20/2018 1743 digoxin (LANOXIN) injection 500 mcg 500 mcg Intravenous Given Casi Valadez RN      11/20/2018 1944 amiodarone 150 mg in dextrose 5 % 100 mL IV bolus 0 mg Intravenous Stopped Casi Valadez RN      11/20/2018 1908 amiodarone 150 mg in dextrose 5 % 100 mL IV bolus 150 mg Intravenous [place] Damari Theodore RN      11/20/2018 1945 amiodarone (CORDARONE) 900 mg in dextrose 5 % 500 mL infusion 1 mg/min Intravenous [address] Casi Valadez RN      11/20/2018 1915 amiodarone (CORDARONE) 900 mg in dextrose 5 % 500 mL infusion 0 5 mg/min Intravenous Not Given Casi Valadez RN      11/20/2018 1845 sodium chloride 0 9 % bolus 500 mL 500 mL Intravenous [place] Valadez RN           Past Medical/Surgical History:    Active Ambulatory Problems     Diagnosis Date Noted    Peripheral vascular disease of lower extremity ([facility] [address] )     Hypertension     [facility] with hypoxia ([facility]) 05/02/2017    Severe sepsis ([facility] ) 05/02/2017    Atrial fibrillation with tachycardic ventricular rate ([place] [address] ) 05/02/2017    Pulmonary edema 05/04/2017    Thrombocytopenia ([facility]) 05/04/2017    PALOMA (acute kidney injury) ([place] [address] ) 05/04/2017    Influenza B 05/04/2017    Insomnia 05/04/2017    Chronic diastolic congestive heart failure ([place] [address] ) 10/03/2013    Coronary artery disease 12/17/2012    Hyperlipidemia 10/03/2013     Resolved Ambulatory Problems     Diagnosis Date Noted    Paroxysmal atrial fibrillation ([place] [address] ) 10/10/2018     Past Medical History:   Diagnosis Date    Atrial fibrillation ([place] [address] )     Hypertension     Peripheral vascular disease of lower extremity ([facility])     Subdural hematoma ([facility])        Admitting Diagnosis: Dehydration [E86 0]  PALOMA (acute kidney injury) ([place] [address] ) [N17 9]  Atrial fibrillation with RVR ([place] [address] ) [I48 91]  Heart problem [I51 9]    Age/Sex: 72 y o  male    Assessment/Plan: afib with tachycardic vent rate- volume depleted -- pleural effusions-- gentle hydration  cardizem drip to control rate      Admission Orders:  Scheduled Meds:   Current Facility-Administered Medications:  acetaminophen 650 mg Oral Q6H PRN Kailee Del Real MD    amiodarone 0 5 mg/min Intravenous Continuous BJ Zapien Last Rate: 0 5 mg/min (11/21/18 0153)   amiodarone 200 mg Oral Daily With Breakfast Nahomy Fly, DO    apixaban 5 mg Oral BID Kailee Del Real MD    LORazepam 0 5 mg Oral Q8H PRN Nahomy Fly, DO    metoprolol tartrate 100 mg Oral Daily BJ Zapien    metoprolol tartrate 50 mg Oral HS BJ Zapien    ondansetron 4 mg Intravenous Q6H PRN Nahomy Fly, DO    zolpidem 5 mg Oral HS PRN Kailee Del Real MD      Continuous Infusions:   amiodarone 0 5 mg/min Last Rate: 0 5 mg/min (11/21/18 0153)     PRN Meds:   acetaminophen    LORazepamx1    ondansetron    zolpidem     Trop q3  Cardiac 4 gm na  Consult cardiology  ekg    Bun 34-- cr 1 66  platlets 55    cxr--Interval development of moderate vascular congestion   Stable small bilateral pleural effusions

## 2018-11-21 NOTE — PROGRESS NOTES
Progress Note - Wayne Nuno 72 y o  male MRN: [id number]    Unit/Bed#: -02 Encounter: [id number]    Critical care note-revisit with 35 minutes critical care time-  I have attempted to reach patient's listed contact and left a message on their answering machine Junious Sheer  Assessment:  Principal Problem:    Atrial fibrillation with tachycardic ventricular rate (HCC)  Active Problems:    PALOMA (acute kidney injury) ([facility] [place] [address] )    Chronic diastolic congestive heart failure (HCC)    Hypertension    Thrombocytopenia ([facility] )    Coronary artery disease    Hyperlipidemia  Resolved Problems:    * No resolved hospital problems  *        Plan:  · Worsening CHF-now tachypneic with JVD on exam-will give IV Lasix-will hold his IV fluids and do stat portable chest x-ray  · Hypoxic respiratory failure acute-O2 sats earlier were normal now down to 89% and currently on 3 L nasal cannula to maintain above 92%-  · Atrial fib-rates are improving in control 110- 115    Subjective:   Patient with complaints of increasing shortness of breath but denies chest pain  He had decreased level of alertness and now placed on O2 supplementation  ROS  Comprehensive system review negative other than noted above    Objective:     Vitals: Blood pressure 150/99, pulse (!) 134, temperature 97 6 °F (36 4 °C), resp  rate (!) 26, height 5' 9 5" (1 765 m), weight 71 2 kg (156 lb 15 5 oz), SpO2 93 %  ,Body mass index is 22 85 kg/m²    Current Facility-Administered Medications   Medication Dose Route Frequency Provider Last Rate Last Dose    acetaminophen (TYLENOL) tablet 650 mg  650 mg Oral Q6H PRN Kailee Del Real MD        amiodarone (CORDARONE) 900 mg in dextrose 5 % 500 mL infusion  0 5 mg/min Intravenous Continuous BJ Zapien 16 7 mL/hr at 11/21/18 0153 0 5 mg/min at 11/21/18 0153    amiodarone tablet 200 mg  200 mg Oral Daily With Breakfast Nahomy Linares DO   200 mg at 11/21/18 0954    apixaban (ELIQUIS) tablet 5 mg  5 mg Oral BID Barry De Santiago Rain Blanco MD   5 mg at 11/21/18 0746    furosemide (LASIX) injection 40 mg  40 mg Intravenous Once Lopez Sand, DO        LORazepam (ATIVAN) tablet 0 5 mg  0 5 mg Oral Q8H PRN Nahomy Fly, DO   0 5 mg at 11/21/18 1056    metoprolol tartrate (LOPRESSOR) tablet 100 mg  100 mg Oral Daily SushmaSRINIVASAN AnsariNP   100 mg at 11/21/18 0746    metoprolol tartrate (LOPRESSOR) tablet 50 mg  50 mg Oral HS SRINIVASAN ZapienNP   50 mg at 11/20/18 2244    ondansetron (ZOFRAN) injection 4 mg  4 mg Intravenous Q6H PRN Lopez Sand, DO        zolpidem (AMBIEN) tablet 5 mg  5 mg Oral HS PRN Armando Alfaro MD         Prescriptions Prior to Admission   Medication    metoprolol tartrate (LOPRESSOR) 50 mg tablet    apixaban (ELIQUIS) 5 mg    Ascorbic Acid (VITAMIN C PO)    B Complex Vitamins (VITAMIN B COMPLEX PO)    clobetasol (TEMOVATE) 0 05 % GEL    diltiazem (TIAZAC) 360 MG 24 hr capsule    Ferrous Gluconate-C-Folic Acid (IRON-C PO)    folic acid (FOLVITE) 1 mg tablet    furosemide (LASIX) 20 mg tablet    Lactobacillus (ACIDOPHILUS PO)    Lycopene 10 MG CAPS    metoprolol tartrate (LOPRESSOR) 100 mg tablet    Misc Natural Products (SAW PALMETTO) CAPS    Multiple Vitamin (MULTI-DAY VITAMINS) TABS         Intake/Output Summary (Last 24 hours) at 11/21/18 1120  Last data filed at 11/21/18 0041   Gross per 24 hour   Intake              500 ml   Output              150 ml   Net              350 ml       Physical Exam:  General appearance: fatigued and Diaphoretic and using accessory muscles with breathing  Neck: JVD - 2 cm above sternal notch, no adenopathy, supple, symmetrical, trachea midline and thyroid not enlarged, symmetric, no tenderness/mass/nodules  Lungs: Rales in bases  Heart: irregularly irregular rhythm increased rates 115 but improved from earlier  Abdomen: soft, non-tender; bowel sounds normal; no masses,  no organomegaly  Extremities: No edema  More lethargic    Lab, Imaging and other studies: I have personally reviewed pertinent reports  Results from last 7 days  Lab Units 11/21/18  0546 11/20/18  1640 11/20/18  1632   WBC Thousand/uL 5 47  --  7 30   HEMOGLOBIN g/dL 13 1  --  15 0   I STAT HEMOGLOBIN g/dl  --  15 0  --    HEMATOCRIT % 39 5  --  45 3   HEMATOCRIT, ISTAT %  --  44  --    PLATELETS Thousands/uL 55*  --  75*   NEUTROS PCT %  --   --  70   LYMPHS PCT %  --   --  19   MONOS PCT %  --   --  8   EOS PCT %  --   --  1       Results from last 7 days  Lab Units 11/21/18  0546 11/20/18  1640 11/20/18  1632   POTASSIUM mmol/L 3 6  --  4 6   CHLORIDE mmol/L 103  --  99*   CO2 mmol/L 25  --  24   CO2, I-STAT mmol/L  --  25  --    BUN mg/dL 34*  --  40*   CREATININE mg/dL 1 66*  --  1 83*   CALCIUM mg/dL 8 5  --  9 4   ALK PHOS U/L  --   --  111   ALT U/L  --   --  86*   AST U/L  --   --  85*   GLUCOSE, ISTAT mg/dl  --  127  --      Lab Results   Component Value Date    TROPONINI <0 02 11/20/2018    TROPONINI 0 02 05/02/2017    TROPONINI 0 02 05/02/2017       Results from last 7 days  Lab Units 11/20/18  1632   INR  2 87*     Lab Results   Component Value Date    BLOODCX No Growth After 5 Days  05/02/2017    BLOODCX No Growth After 5 Days  05/02/2017       Imaging:  Results for orders placed during the hospital encounter of 11/20/18   XR chest portable    Narrative CHEST     INDICATION:   chest pain  COMPARISON:  5/5/2017    EXAM PERFORMED/VIEWS:  XR CHEST PORTABLE      FINDINGS:    Heart shadow is enlarged but unchanged from prior exam     Bibasilar atelectasis/infiltrate with small effusions, right greater than left  Osseous structures appear within normal limits for patient age  Plate and screw fixation hardware mid left clavicle  Impression Bibasilar atelectasis/infiltrate with small effusions, right greater than left          Workstation performed: [id number]       Results for orders placed during the hospital encounter of 05/01/17   XR chest 2 views    Narrative CHEST - DUAL ENERGY    INDICATION:  Cough  Shortness of breath  COMPARISON:  11/13/2011    VIEWS:  PA (including soft tissue/bone algorithms) and lateral projections    IMAGES:  5    FINDINGS:         Cardiomediastinal silhouette appears enlarged  There are chronic changes present  The patient appears to be in mild heart failure  No infiltrates  Minimal blunting of the costophrenic angles  Orthopedic plate with multiple screws in the left clavicle  Old fracture of the right clavicle  Impression Cardiomegaly  Mild congestive heart failure  Small pleural effusions  ##sigslh##sigslh      Workstation performed: [id number]         PATIENT CENTERED ROUNDS: I have performed rounds with the nursing staff            Akil Brown DO

## 2018-11-21 NOTE — ED NOTES
Patient denies current complaints  Patient resting comfortably in bed  Will continue to monitor        Livier Haas RN  11/20/18 1946

## 2018-11-22 ENCOUNTER — APPOINTMENT (INPATIENT)
Dept: RADIOLOGY | Facility: HOSPITAL | Age: 73
DRG: 308 | End: 2018-11-22
Payer: MEDICARE

## 2018-11-22 ENCOUNTER — HOSPITAL ENCOUNTER (INPATIENT)
Facility: HOSPITAL | Age: 73
LOS: 15 days | Discharge: HOME HEALTH | DRG: 242 | End: 2018-12-07
Attending: EMERGENCY MEDICINE | Admitting: EMERGENCY MEDICINE
Payer: MEDICARE

## 2018-11-22 VITALS
DIASTOLIC BLOOD PRESSURE: 60 MMHG | RESPIRATION RATE: 21 BRPM | WEIGHT: 148.81 LBS | OXYGEN SATURATION: 92 % | SYSTOLIC BLOOD PRESSURE: 127 MMHG | BODY MASS INDEX: 21.3 KG/M2 | HEART RATE: 159 BPM | TEMPERATURE: 97.5 F | HEIGHT: 70 IN

## 2018-11-22 DIAGNOSIS — D69.6 THROMBOCYTOPENIA (HCC): Chronic | ICD-10-CM

## 2018-11-22 DIAGNOSIS — I73.9 PERIPHERAL VASCULAR DISEASE OF LOWER EXTREMITY (HCC): Chronic | ICD-10-CM

## 2018-11-22 DIAGNOSIS — I50.43 ACUTE ON CHRONIC COMBINED SYSTOLIC AND DIASTOLIC HEART FAILURE (HCC): ICD-10-CM

## 2018-11-22 DIAGNOSIS — I10 ESSENTIAL HYPERTENSION: ICD-10-CM

## 2018-11-22 DIAGNOSIS — I48.91 ATRIAL FIBRILLATION WITH TACHYCARDIC VENTRICULAR RATE (HCC): Primary | ICD-10-CM

## 2018-11-22 PROBLEM — I50.23 ACUTE ON CHRONIC SYSTOLIC HEART FAILURE (HCC): Status: ACTIVE | Noted: 2018-11-22

## 2018-11-22 PROBLEM — J81.1 PULMONARY EDEMA: Status: RESOLVED | Noted: 2017-05-04 | Resolved: 2018-11-22

## 2018-11-22 PROBLEM — J10.1 INFLUENZA B: Status: RESOLVED | Noted: 2017-05-04 | Resolved: 2018-11-22

## 2018-11-22 PROBLEM — E87.6 HYPOKALEMIA: Status: ACTIVE | Noted: 2018-11-22

## 2018-11-22 PROBLEM — G47.00 INSOMNIA: Chronic | Status: ACTIVE | Noted: 2017-05-04

## 2018-11-22 PROBLEM — I42.0 DILATED CARDIOMYOPATHY (HCC): Chronic | Status: ACTIVE | Noted: 2018-11-21

## 2018-11-22 PROBLEM — A41.9 SEVERE SEPSIS (HCC): Status: RESOLVED | Noted: 2017-05-02 | Resolved: 2018-11-22

## 2018-11-22 PROBLEM — N17.9 AKI (ACUTE KIDNEY INJURY) (HCC): Status: RESOLVED | Noted: 2017-05-04 | Resolved: 2018-11-22

## 2018-11-22 PROBLEM — R65.20 SEVERE SEPSIS (HCC): Status: RESOLVED | Noted: 2017-05-02 | Resolved: 2018-11-22

## 2018-11-22 PROBLEM — J96.01 ACUTE RESPIRATORY FAILURE WITH HYPOXIA (HCC): Status: RESOLVED | Noted: 2017-05-02 | Resolved: 2018-11-22

## 2018-11-22 LAB
ALBUMIN SERPL BCP-MCNC: 3.1 G/DL (ref 3.5–5)
ALP SERPL-CCNC: 100 U/L (ref 46–116)
ALT SERPL W P-5'-P-CCNC: 166 U/L (ref 12–78)
ANION GAP SERPL CALCULATED.3IONS-SCNC: 11 MMOL/L (ref 4–13)
ANION GAP SERPL CALCULATED.3IONS-SCNC: 13 MMOL/L (ref 4–13)
ANION GAP SERPL CALCULATED.3IONS-SCNC: 7 MMOL/L (ref 4–13)
APTT PPP: 38 SECONDS (ref 26–38)
AST SERPL W P-5'-P-CCNC: 147 U/L (ref 5–45)
BILIRUB SERPL-MCNC: 1.96 MG/DL (ref 0.2–1)
BUN SERPL-MCNC: 30 MG/DL (ref 5–25)
BUN SERPL-MCNC: 32 MG/DL (ref 5–25)
BUN SERPL-MCNC: 36 MG/DL (ref 5–25)
CA-I BLD-SCNC: 1.1 MMOL/L (ref 1.12–1.32)
CALCIUM SERPL-MCNC: 8.6 MG/DL (ref 8.3–10.1)
CALCIUM SERPL-MCNC: 8.7 MG/DL (ref 8.3–10.1)
CALCIUM SERPL-MCNC: 8.7 MG/DL (ref 8.3–10.1)
CHLORIDE SERPL-SCNC: 102 MMOL/L (ref 100–108)
CHLORIDE SERPL-SCNC: 103 MMOL/L (ref 100–108)
CHLORIDE SERPL-SCNC: 105 MMOL/L (ref 100–108)
CO2 SERPL-SCNC: 25 MMOL/L (ref 21–32)
CO2 SERPL-SCNC: 26 MMOL/L (ref 21–32)
CO2 SERPL-SCNC: 29 MMOL/L (ref 21–32)
CREAT SERPL-MCNC: 1.84 MG/DL (ref 0.6–1.3)
CREAT SERPL-MCNC: 1.93 MG/DL (ref 0.6–1.3)
CREAT SERPL-MCNC: 2.13 MG/DL (ref 0.6–1.3)
DIGOXIN SERPL-MCNC: 1.9 NG/ML (ref 0.8–2)
ERYTHROCYTE [DISTWIDTH] IN BLOOD BY AUTOMATED COUNT: 19.4 % (ref 11.6–15.1)
GFR SERPL CREATININE-BSD FRML MDRD: 30 ML/MIN/1.73SQ M
GFR SERPL CREATININE-BSD FRML MDRD: 34 ML/MIN/1.73SQ M
GFR SERPL CREATININE-BSD FRML MDRD: 36 ML/MIN/1.73SQ M
GLUCOSE SERPL-MCNC: 111 MG/DL (ref 65–140)
GLUCOSE SERPL-MCNC: 113 MG/DL (ref 65–140)
GLUCOSE SERPL-MCNC: 121 MG/DL (ref 65–140)
GLUCOSE SERPL-MCNC: 99 MG/DL (ref 65–140)
HCT VFR BLD AUTO: 41.7 % (ref 36.5–49.3)
HGB BLD-MCNC: 13.6 G/DL (ref 12–17)
INR PPP: 2.65 (ref 0.86–1.17)
LDH FLD L TO P-CCNC: 107 U/L
MAGNESIUM SERPL-MCNC: 1.8 MG/DL (ref 1.6–2.6)
MAGNESIUM SERPL-MCNC: 1.8 MG/DL (ref 1.6–2.6)
MCH RBC QN AUTO: 31.3 PG (ref 26.8–34.3)
MCHC RBC AUTO-ENTMCNC: 32.6 G/DL (ref 31.4–37.4)
MCV RBC AUTO: 96 FL (ref 82–98)
PH BODY FLUID: 7.4
PHOSPHATE SERPL-MCNC: 2.9 MG/DL (ref 2.3–4.1)
PLATELET # BLD AUTO: 37 THOUSANDS/UL (ref 149–390)
POTASSIUM SERPL-SCNC: 3.3 MMOL/L (ref 3.5–5.3)
POTASSIUM SERPL-SCNC: 3.3 MMOL/L (ref 3.5–5.3)
POTASSIUM SERPL-SCNC: 4 MMOL/L (ref 3.5–5.3)
PROT FLD-MCNC: 2.2 G/DL
PROT SERPL-MCNC: 5.9 G/DL (ref 6.4–8.2)
PROTHROMBIN TIME: 28.3 SECONDS (ref 11.8–14.2)
RBC # BLD AUTO: 4.35 MILLION/UL (ref 3.88–5.62)
SODIUM SERPL-SCNC: 139 MMOL/L (ref 136–145)
SODIUM SERPL-SCNC: 141 MMOL/L (ref 136–145)
SODIUM SERPL-SCNC: 141 MMOL/L (ref 136–145)
THYROGLOB AB SERPL-ACNC: <1 IU/ML (ref 0–0.9)
THYROPEROXIDASE AB SERPL-ACNC: 6 IU/ML (ref 0–34)
TROPONIN I SERPL-MCNC: 0.04 NG/ML
TROPONIN I SERPL-MCNC: 0.06 NG/ML
TROPONIN I SERPL-MCNC: 0.07 NG/ML
TSH SERPL DL<=0.05 MIU/L-ACNC: 1.08 UIU/ML (ref 0.36–3.74)
WBC # BLD AUTO: 6.1 THOUSAND/UL (ref 4.31–10.16)

## 2018-11-22 PROCEDURE — 93325 DOPPLER ECHO COLOR FLOW MAPG: CPT | Performed by: INTERNAL MEDICINE

## 2018-11-22 PROCEDURE — 84484 ASSAY OF TROPONIN QUANT: CPT | Performed by: PHYSICIAN ASSISTANT

## 2018-11-22 PROCEDURE — 99291 CRITICAL CARE FIRST HOUR: CPT | Performed by: EMERGENCY MEDICINE

## 2018-11-22 PROCEDURE — 83735 ASSAY OF MAGNESIUM: CPT | Performed by: PHYSICIAN ASSISTANT

## 2018-11-22 PROCEDURE — 84100 ASSAY OF PHOSPHORUS: CPT | Performed by: PHYSICIAN ASSISTANT

## 2018-11-22 PROCEDURE — 84484 ASSAY OF TROPONIN QUANT: CPT | Performed by: NURSE PRACTITIONER

## 2018-11-22 PROCEDURE — 85730 THROMBOPLASTIN TIME PARTIAL: CPT | Performed by: PHYSICIAN ASSISTANT

## 2018-11-22 PROCEDURE — 99238 HOSP IP/OBS DSCHRG MGMT 30/<: CPT | Performed by: INTERNAL MEDICINE

## 2018-11-22 PROCEDURE — 80048 BASIC METABOLIC PNL TOTAL CA: CPT | Performed by: INTERNAL MEDICINE

## 2018-11-22 PROCEDURE — 93005 ELECTROCARDIOGRAM TRACING: CPT

## 2018-11-22 PROCEDURE — 93321 DOPPLER ECHO F-UP/LMTD STD: CPT | Performed by: INTERNAL MEDICINE

## 2018-11-22 PROCEDURE — 94660 CPAP INITIATION&MGMT: CPT

## 2018-11-22 PROCEDURE — 83735 ASSAY OF MAGNESIUM: CPT | Performed by: INTERNAL MEDICINE

## 2018-11-22 PROCEDURE — 85027 COMPLETE CBC AUTOMATED: CPT | Performed by: INTERNAL MEDICINE

## 2018-11-22 PROCEDURE — 80053 COMPREHEN METABOLIC PANEL: CPT | Performed by: PHYSICIAN ASSISTANT

## 2018-11-22 PROCEDURE — 82330 ASSAY OF CALCIUM: CPT | Performed by: PHYSICIAN ASSISTANT

## 2018-11-22 PROCEDURE — 85610 PROTHROMBIN TIME: CPT | Performed by: PHYSICIAN ASSISTANT

## 2018-11-22 PROCEDURE — 84443 ASSAY THYROID STIM HORMONE: CPT | Performed by: PHYSICIAN ASSISTANT

## 2018-11-22 PROCEDURE — 93308 TTE F-UP OR LMTD: CPT | Performed by: INTERNAL MEDICINE

## 2018-11-22 PROCEDURE — 94760 N-INVAS EAR/PLS OXIMETRY 1: CPT

## 2018-11-22 PROCEDURE — 99292 CRITICAL CARE ADDL 30 MIN: CPT | Performed by: EMERGENCY MEDICINE

## 2018-11-22 PROCEDURE — 80162 ASSAY OF DIGOXIN TOTAL: CPT | Performed by: INTERNAL MEDICINE

## 2018-11-22 PROCEDURE — 99233 SBSQ HOSP IP/OBS HIGH 50: CPT | Performed by: INTERNAL MEDICINE

## 2018-11-22 PROCEDURE — 71045 X-RAY EXAM CHEST 1 VIEW: CPT

## 2018-11-22 PROCEDURE — 82948 REAGENT STRIP/BLOOD GLUCOSE: CPT

## 2018-11-22 PROCEDURE — 99232 SBSQ HOSP IP/OBS MODERATE 35: CPT | Performed by: INTERNAL MEDICINE

## 2018-11-22 RX ORDER — ONDANSETRON 2 MG/ML
4 INJECTION INTRAMUSCULAR; INTRAVENOUS EVERY 6 HOURS PRN
Status: DISCONTINUED | OUTPATIENT
Start: 2018-11-22 | End: 2018-12-07 | Stop reason: HOSPADM

## 2018-11-22 RX ORDER — POTASSIUM CHLORIDE 14.9 MG/ML
20 INJECTION INTRAVENOUS ONCE
Status: DISCONTINUED | OUTPATIENT
Start: 2018-11-22 | End: 2018-11-22

## 2018-11-22 RX ORDER — DIGOXIN 250 MCG
250 TABLET ORAL DAILY
Status: DISCONTINUED | OUTPATIENT
Start: 2018-11-22 | End: 2018-11-22 | Stop reason: HOSPADM

## 2018-11-22 RX ORDER — MAGNESIUM SULFATE HEPTAHYDRATE 40 MG/ML
2 INJECTION, SOLUTION INTRAVENOUS ONCE
Status: COMPLETED | OUTPATIENT
Start: 2018-11-22 | End: 2018-11-23

## 2018-11-22 RX ORDER — ACETAMINOPHEN 325 MG/1
650 TABLET ORAL EVERY 6 HOURS PRN
Status: DISCONTINUED | OUTPATIENT
Start: 2018-11-22 | End: 2018-12-07 | Stop reason: HOSPADM

## 2018-11-22 RX ORDER — METOPROLOL TARTRATE 50 MG/1
50 TABLET, FILM COATED ORAL
Status: DISCONTINUED | OUTPATIENT
Start: 2018-11-23 | End: 2018-11-22

## 2018-11-22 RX ORDER — ONDANSETRON 2 MG/ML
4 INJECTION INTRAMUSCULAR; INTRAVENOUS EVERY 6 HOURS PRN
Status: CANCELLED | OUTPATIENT
Start: 2018-11-22

## 2018-11-22 RX ORDER — POTASSIUM CHLORIDE 14.9 MG/ML
20 INJECTION INTRAVENOUS ONCE
Status: DISCONTINUED | OUTPATIENT
Start: 2018-11-23 | End: 2018-11-22

## 2018-11-22 RX ORDER — DIGOXIN 250 MCG
250 TABLET ORAL DAILY
Status: DISCONTINUED | OUTPATIENT
Start: 2018-11-23 | End: 2018-11-27

## 2018-11-22 RX ORDER — METOPROLOL TARTRATE 50 MG/1
50 TABLET, FILM COATED ORAL
Status: DISCONTINUED | OUTPATIENT
Start: 2018-11-22 | End: 2018-11-23

## 2018-11-22 RX ORDER — AMIODARONE HYDROCHLORIDE 50 MG/ML
INJECTION, SOLUTION INTRAVENOUS
Status: DISCONTINUED
Start: 2018-11-22 | End: 2018-11-22 | Stop reason: WASHOUT

## 2018-11-22 RX ORDER — ZOLPIDEM TARTRATE 5 MG/1
5 TABLET ORAL
Status: CANCELLED | OUTPATIENT
Start: 2018-11-22

## 2018-11-22 RX ORDER — AMIODARONE HYDROCHLORIDE 200 MG/1
200 TABLET ORAL
Status: CANCELLED | OUTPATIENT
Start: 2018-11-23

## 2018-11-22 RX ORDER — ZOLPIDEM TARTRATE 5 MG/1
5 TABLET ORAL
Status: DISCONTINUED | OUTPATIENT
Start: 2018-11-22 | End: 2018-12-07 | Stop reason: HOSPADM

## 2018-11-22 RX ORDER — METOPROLOL TARTRATE 50 MG/1
100 TABLET, FILM COATED ORAL DAILY
Status: DISCONTINUED | OUTPATIENT
Start: 2018-11-23 | End: 2018-11-23

## 2018-11-22 RX ORDER — DILTIAZEM HYDROCHLORIDE 60 MG/1
60 TABLET, FILM COATED ORAL EVERY 8 HOURS SCHEDULED
Status: DISCONTINUED | OUTPATIENT
Start: 2018-11-22 | End: 2018-11-22 | Stop reason: HOSPADM

## 2018-11-22 RX ORDER — FUROSEMIDE 80 MG
80 TABLET ORAL DAILY
Status: DISCONTINUED | OUTPATIENT
Start: 2018-11-22 | End: 2018-11-22 | Stop reason: HOSPADM

## 2018-11-22 RX ORDER — FUROSEMIDE 80 MG
80 TABLET ORAL DAILY
Status: CANCELLED | OUTPATIENT
Start: 2018-11-23

## 2018-11-22 RX ORDER — LORAZEPAM 0.5 MG/1
0.5 TABLET ORAL EVERY 8 HOURS PRN
Status: DISCONTINUED | OUTPATIENT
Start: 2018-11-22 | End: 2018-11-22

## 2018-11-22 RX ORDER — POTASSIUM CHLORIDE 29.8 MG/ML
40 INJECTION INTRAVENOUS ONCE
Status: DISCONTINUED | OUTPATIENT
Start: 2018-11-22 | End: 2018-11-22

## 2018-11-22 RX ORDER — FUROSEMIDE 10 MG/ML
40 INJECTION INTRAMUSCULAR; INTRAVENOUS
Status: DISCONTINUED | OUTPATIENT
Start: 2018-11-23 | End: 2018-11-25

## 2018-11-22 RX ORDER — METOPROLOL TARTRATE 5 MG/5ML
5 INJECTION INTRAVENOUS EVERY 6 HOURS PRN
Status: CANCELLED | OUTPATIENT
Start: 2018-11-22

## 2018-11-22 RX ORDER — DILTIAZEM HYDROCHLORIDE 60 MG/1
60 TABLET, FILM COATED ORAL EVERY 8 HOURS SCHEDULED
Status: DISCONTINUED | OUTPATIENT
Start: 2018-11-22 | End: 2018-11-23

## 2018-11-22 RX ORDER — METOPROLOL TARTRATE 5 MG/5ML
5 INJECTION INTRAVENOUS EVERY 6 HOURS PRN
Status: DISCONTINUED | OUTPATIENT
Start: 2018-11-22 | End: 2018-12-07 | Stop reason: HOSPADM

## 2018-11-22 RX ORDER — POTASSIUM CHLORIDE 20 MEQ/1
40 TABLET, EXTENDED RELEASE ORAL ONCE
Status: DISCONTINUED | OUTPATIENT
Start: 2018-11-22 | End: 2018-11-22

## 2018-11-22 RX ORDER — DIGOXIN 250 MCG
250 TABLET ORAL DAILY
Status: CANCELLED | OUTPATIENT
Start: 2018-11-23

## 2018-11-22 RX ORDER — FAMOTIDINE 20 MG/1
20 TABLET, FILM COATED ORAL DAILY
Status: DISCONTINUED | OUTPATIENT
Start: 2018-11-22 | End: 2018-12-07 | Stop reason: HOSPADM

## 2018-11-22 RX ORDER — POTASSIUM CHLORIDE 14.9 MG/ML
20 INJECTION INTRAVENOUS ONCE
Status: COMPLETED | OUTPATIENT
Start: 2018-11-22 | End: 2018-11-22

## 2018-11-22 RX ORDER — DILTIAZEM HYDROCHLORIDE 60 MG/1
60 TABLET, FILM COATED ORAL EVERY 8 HOURS SCHEDULED
Status: CANCELLED | OUTPATIENT
Start: 2018-11-22

## 2018-11-22 RX ORDER — ACETAMINOPHEN 325 MG/1
650 TABLET ORAL EVERY 6 HOURS PRN
Status: CANCELLED | OUTPATIENT
Start: 2018-11-22

## 2018-11-22 RX ORDER — AMIODARONE HYDROCHLORIDE 200 MG/1
200 TABLET ORAL
Status: DISCONTINUED | OUTPATIENT
Start: 2018-11-23 | End: 2018-11-23

## 2018-11-22 RX ORDER — FUROSEMIDE 80 MG
80 TABLET ORAL DAILY
Status: DISCONTINUED | OUTPATIENT
Start: 2018-11-23 | End: 2018-11-22

## 2018-11-22 RX ORDER — LORAZEPAM 0.5 MG/1
0.5 TABLET ORAL EVERY 8 HOURS PRN
Status: CANCELLED | OUTPATIENT
Start: 2018-11-22

## 2018-11-22 RX ORDER — FUROSEMIDE 10 MG/ML
40 INJECTION INTRAMUSCULAR; INTRAVENOUS
Status: DISCONTINUED | OUTPATIENT
Start: 2018-11-23 | End: 2018-11-22

## 2018-11-22 RX ORDER — FUROSEMIDE 10 MG/ML
40 SYRINGE (ML) INJECTION CONTINUOUS
Status: DISCONTINUED | OUTPATIENT
Start: 2018-11-22 | End: 2018-11-22

## 2018-11-22 RX ORDER — FUROSEMIDE 10 MG/ML
80 INJECTION INTRAMUSCULAR; INTRAVENOUS ONCE
Status: DISCONTINUED | OUTPATIENT
Start: 2018-11-22 | End: 2018-11-22

## 2018-11-22 RX ORDER — METOPROLOL TARTRATE 50 MG/1
100 TABLET, FILM COATED ORAL DAILY
Status: DISCONTINUED | OUTPATIENT
Start: 2018-11-23 | End: 2018-11-22

## 2018-11-22 RX ORDER — POTASSIUM CHLORIDE 20 MEQ/1
40 TABLET, EXTENDED RELEASE ORAL ONCE
Status: COMPLETED | OUTPATIENT
Start: 2018-11-22 | End: 2018-11-23

## 2018-11-22 RX ORDER — DILTIAZEM HYDROCHLORIDE 60 MG/1
60 TABLET, FILM COATED ORAL EVERY 8 HOURS SCHEDULED
Status: DISCONTINUED | OUTPATIENT
Start: 2018-11-22 | End: 2018-11-22

## 2018-11-22 RX ORDER — METOPROLOL TARTRATE 50 MG/1
50 TABLET, FILM COATED ORAL
Status: DISCONTINUED | OUTPATIENT
Start: 2018-11-22 | End: 2018-11-22

## 2018-11-22 RX ADMIN — DILTIAZEM HYDROCHLORIDE 60 MG: 60 TABLET, FILM COATED ORAL at 14:48

## 2018-11-22 RX ADMIN — METOPROLOL TARTRATE 50 MG: 50 TABLET, FILM COATED ORAL at 21:34

## 2018-11-22 RX ADMIN — APIXABAN 5 MG: 5 TABLET, FILM COATED ORAL at 21:09

## 2018-11-22 RX ADMIN — FAMOTIDINE 20 MG: 20 TABLET ORAL at 21:09

## 2018-11-22 RX ADMIN — METOPROLOL TARTRATE 5 MG: 1 INJECTION, SOLUTION INTRAVENOUS at 11:26

## 2018-11-22 RX ADMIN — DILTIAZEM HYDROCHLORIDE 60 MG: 60 TABLET, FILM COATED ORAL at 21:14

## 2018-11-22 RX ADMIN — CALCIUM CHLORIDE 1 G: 100 INJECTION, SOLUTION INTRAVENOUS; INTRAVENTRICULAR at 22:53

## 2018-11-22 RX ADMIN — FUROSEMIDE 80 MG: 80 TABLET ORAL at 09:51

## 2018-11-22 RX ADMIN — APIXABAN 5 MG: 5 TABLET, FILM COATED ORAL at 18:12

## 2018-11-22 RX ADMIN — DILTIAZEM HYDROCHLORIDE 60 MG: 60 TABLET, FILM COATED ORAL at 09:51

## 2018-11-22 RX ADMIN — METOPROLOL TARTRATE 5 MG: 1 INJECTION, SOLUTION INTRAVENOUS at 06:42

## 2018-11-22 RX ADMIN — Medication 150 MG: at 04:06

## 2018-11-22 RX ADMIN — METOPROLOL TARTRATE 12.5 MG: 25 TABLET ORAL at 09:51

## 2018-11-22 RX ADMIN — METOPROLOL TARTRATE 5 MG: 1 INJECTION, SOLUTION INTRAVENOUS at 03:03

## 2018-11-22 RX ADMIN — AMIODARONE HYDROCHLORIDE 200 MG: 200 TABLET ORAL at 09:50

## 2018-11-22 RX ADMIN — POTASSIUM CHLORIDE 20 MEQ: 200 INJECTION, SOLUTION INTRAVENOUS at 21:34

## 2018-11-22 RX ADMIN — DIGOXIN 250 MCG: 250 TABLET ORAL at 09:51

## 2018-11-22 RX ADMIN — MAGNESIUM SULFATE IN WATER 2 G: 40 INJECTION, SOLUTION INTRAVENOUS at 22:56

## 2018-11-22 RX ADMIN — DILTIAZEM HYDROCHLORIDE 2.5 MG/HR: 5 INJECTION INTRAVENOUS at 21:47

## 2018-11-22 NOTE — PROGRESS NOTES
Progress Note - Wayne Nuno 72 y o  male MRN: [id number]    Unit/Bed#: -02 Encounter: [id number]      Assessment:    Principal Problem:    Atrial fibrillation with tachycardic ventricular rate (HCC)  Active Problems:    Hypertension    Thrombocytopenia (HCC)    [facility] )    Coronary artery disease    Hyperlipidemia    Dilated cardiomyopathy ([facility] )    Acute on chronic systolic heart failure ([facility] )  Resolved Problems:    Acute on chronic diastolic congestive heart failure ([facility] )      Plan:  Continue to trying control heart rate  Patient does appear to be out of overt congestive heart failure will take him off BiPAP  Hopefully his cardiomyopathy is tachycardia mediated  Decreased diuretics  Check digoxin level tomorrow    Subjective:   Less short of breath today  Objective:     Vitals: Blood pressure 118/83, pulse (!) 135, temperature (!) 97 3 °F (36 3 °C), temperature source Axillary, resp  rate 21, height 5' 9 5" (1 765 m), weight 71 2 kg (156 lb 15 5 oz), SpO2 100 %  ,Body mass index is 22 85 kg/m²  Intake/Output Summary (Last 24 hours) at 11/22/18 0902  Last data filed at 11/22/18 7637   Gross per 24 hour   Intake           339 79 ml   Output             3525 ml   Net         -3185 21 ml       Physical Exam:    Alert and awake in no acute distress  Head normocephalic, oral membranes are moist   Neck supple, no lymphadenopathy no JVD  Lungs minimal rales at bases  Heart atrial fibrillation  Abdomen soft, active bowel sounds, non-tender, non-distended  Extremities:  +1 edema venous stasis changes  Skin: warm, dry, no discrete lesions          Invasive Devices     Peripheral Intravenous Line            Peripheral IV 11/20/18 Right Arm 1 day    Peripheral IV 11/21/18 Right Arm less than 1 day          Drain            Urethral Catheter Latex 16 Fr  less than 1 day                            Lab, Imaging and other studies: I have personally reviewed pertinent reports  Results from last 7 days  Lab Units 11/22/18 0437 11/21/18  2226 11/21/18  0546  11/20/18  1632   WBC Thousand/uL 6 10  --  5 47  --  7 30   HEMOGLOBIN g/dL 13 6 14 1 13 1  --  15 0   I STAT HEMOGLOBIN   --   --   --   < >  --    HEMATOCRIT % 41 7 43 2 39 5  --  45 3   HEMATOCRIT, ISTAT   --   --   --   < >  --    PLATELETS Thousands/uL 37*  --  55*  --  75*   NEUTROS PCT %  --   --   --   --  70   LYMPHS PCT %  --   --   --   --  19   MONOS PCT %  --   --   --   --  8   EOS PCT %  --   --   --   --  1   < > = values in this interval not displayed  Results from last 7 days  Lab Units 11/22/18 0437 11/21/18  1539 11/21/18  0546 11/20/18  1640 11/20/18  1632   POTASSIUM mmol/L 4 0  --  3 6  --  4 6   CHLORIDE mmol/L 105  --  103  --  99*   CO2 mmol/L 25  --  25  --  24   CO2, I-STAT mmol/L  --  23  --  25  --    BUN mg/dL 36*  --  34*  --  40*   CREATININE mg/dL 2 13*  --  1 66*  --  1 83*   CALCIUM mg/dL 8 7  --  8 5  --  9 4   ALK PHOS U/L  --   --   --   --  111   ALT U/L  --   --   --   --  86*   AST U/L  --   --   --   --  85*   GLUCOSE, ISTAT mg/dl  --   --   --  127  --      Lab Results   Component Value Date    TROPONINI 0 06 (H) 11/22/2018    TROPONINI 0 07 (H) 11/22/2018    TROPONINI 0 06 (H) 11/21/2018       Results from last 7 days  Lab Units 11/20/18  1632   INR  2 87*     Lab Results   Component Value Date    BLOODCX No Growth After 5 Days  05/02/2017    BLOODCX No Growth After 5 Days  05/02/2017       Imaging:  Results for orders placed during the hospital encounter of 11/20/18   XR chest portable    Narrative CHEST     INDICATION:   post R thoracentesis  COMPARISON:  11/21/2018    EXAM PERFORMED/VIEWS:  XR CHEST PORTABLE      FINDINGS:    Cardiomediastinal silhouette appears unremarkable  No congestive failure  No pneumothorax  Small basilar effusions bilaterally  The right effusion is markedly improved compared to the prior examination    Previously seen pulmonary edema is resolved  Osseous structures appear within normal limits for patient age  Impression Resolved pulmonary edema  Small basilar effusions, markedly improved on the right  Workstation performed: [id number]       Results for orders placed during the hospital encounter of 05/01/17   XR chest 2 views    Narrative CHEST - DUAL ENERGY    INDICATION:  Cough  Shortness of breath  COMPARISON:  11/13/2011    VIEWS:  PA (including soft tissue/bone algorithms) and lateral projections    IMAGES:  5    FINDINGS:         Cardiomediastinal silhouette appears enlarged  There are chronic changes present  The patient appears to be in mild heart failure  No infiltrates  Minimal blunting of the costophrenic angles  Orthopedic plate with multiple screws in the left clavicle  Old fracture of the right clavicle  Impression Cardiomegaly  Mild congestive heart failure  Small pleural effusions  ##sigslh##sigslh      Workstation performed: [id number]         PATIENT CENTERED ROUNDS: I have performed rounds with the nursing staff  This note has been constructed using a voice recognition system      Vincent Stephens MD

## 2018-11-22 NOTE — PROGRESS NOTES
Cardiology Progress Note - Wayne Nuno 72 y o  male MRN: [id number]    Unit/Bed#: -02 Encounter: [id number]        Subjective:   Patient remains dyspneic  In rapid atrial fibrillation  Review of Systems   Cardiovascular: Negative for chest pain, leg swelling and palpitations  Respiratory: Positive for shortness of breath  Objective:   Vitals: Blood pressure 112/78, pulse (!) 141, temperature (!) 97 1 °F (36 2 °C), temperature source Oral, resp  rate 22, height 5' 9 5" (1 765 m), weight 67 5 kg (148 lb 13 oz), SpO2 100 %  , Body mass index is 21 66 kg/m² , Orthostatic Blood Pressures      Most Recent Value   Blood Pressure  112/78 filed at 11/22/2018 1139   Patient Position - Orthostatic VS  Lying filed at 11/22/2018 3412         Systolic (39JXE), UPT:725 , Min:85 , FWS:800     Diastolic (59FPP), RENE:88, Min:54, Max:83      Intake/Output Summary (Last 24 hours) at 11/22/18 1312  Last data filed at 11/22/18 6161   Gross per 24 hour   Intake                0 ml   Output             2825 ml   Net            -2825 ml     Weight (last 2 days)     Date/Time   Weight    11/22/18 1139  67 5 (148 81)    11/21/18 0626  71 2 (156 97)    11/20/18 2021  71 7 (158 07)    11/20/18 1625  73 5 (162 04)                  Telemetry Review: AFib with RVR    Physical Exam   Cardiovascular: Normal heart sounds  An irregularly irregular rhythm present  Tachycardia present  Exam reveals no gallop and no friction rub  No murmur heard  Pulmonary/Chest: He has decreased breath sounds  He has no wheezes  He has no rales  Musculoskeletal: He exhibits no edema           Laboratory Results:    Results from last 7 days  Lab Units 11/22/18  0437 11/22/18  0130 11/21/18 2226   TROPONIN I ng/mL 0 06* 0 07* 0 06*       CBC with diff:   Results from last 7 days  Lab Units 11/22/18  0437 11/21/18  2226 11/21/18  0546 11/20/18  1640 11/20/18  1632   WBC Thousand/uL 6 10  --  5 47  --  7 30   HEMOGLOBIN g/dL 13 6 14 1 13 1 --  15 0   I STAT HEMOGLOBIN g/dl  --   --   --  15 0  --    HEMATOCRIT % 41 7 43 2 39 5  --  45 3   HEMATOCRIT, ISTAT %  --   --   --  44  --    MCV fL 96  --  94  --  95   PLATELETS Thousands/uL 37*  --  55*  --  75*   MCH pg 31 3  --  31 3  --  31 5   MCHC g/dL 32 6  --  33 2  --  33 1   RDW % 19 4*  --  19 3*  --  19 8*   NRBC AUTO /100 WBCs  --   --   --   --  4         CMP:  Results from last 7 days  Lab Units 11/22/18 0437 11/21/18  1539 11/21/18  0546 11/20/18  1640 11/20/18  1632   POTASSIUM mmol/L 4 0  --  3 6  --  4 6   CHLORIDE mmol/L 105  --  103  --  99*   CO2 mmol/L 25  --  25  --  24   CO2, I-STAT mmol/L  --  23  --  25  --    BUN mg/dL 36*  --  34*  --  40*   CREATININE mg/dL 2 13*  --  1 66*  --  1 83*   GLUCOSE, ISTAT mg/dl  --   --   --  127  --    CALCIUM mg/dL 8 7  --  8 5  --  9 4   AST U/L  --   --   --   --  85*   ALT U/L  --   --   --   --  86*   ALK PHOS U/L  --   --   --   --  111   EGFR ml/min/1 73sq m 30  --  41 35 36         BMP:  Results from last 7 days  Lab Units 11/22/18 0437 11/21/18 1539 11/21/18  0546 11/20/18  1640 11/20/18  1632   POTASSIUM mmol/L 4 0  --  3 6  --  4 6   CHLORIDE mmol/L 105  --  103  --  99*   CO2 mmol/L 25  --  25  --  24   CO2, I-STAT mmol/L  --  23  --  25  --    BUN mg/dL 36*  --  34*  --  40*   CREATININE mg/dL 2 13*  --  1 66*  --  1 83*   GLUCOSE, ISTAT mg/dl  --   --   --  127  --    CALCIUM mg/dL 8 7  --  8 5  --  9 4       BNP:     Magnesium:   Results from last 7 days  Lab Units 11/22/18  0130   MAGNESIUM mg/dL 1 8       Coags:   Results from last 7 days  Lab Units 11/20/18  1632   PTT seconds 42*   INR  2 87*           Cardiac testing:   Results for orders placed during the hospital encounter of 05/01/17   Echo complete with contrast if indicated    Narrative [address], [address] [facility]  [phone]    Transthoracic Echocardiogram  2D, M-mode, Doppler, and Color Doppler    Study date: 02-May-2017    Patient: Mark Dumont  MR number: [id number]  Account number: [de-identified]  : 1945  Age: 73 years  Gender: Male  Status: Inpatient  Location: Bedside  Height: 70 in  Weight: 172 lb  BP: 166/ 88 mmHg    Indications: Mumur  Diagnoses: R01 1 - Cardiac murmur, unspecified    Sonographer:  Neymar BYRNES, [facility]  Primary Physician:  Michael Sims MD  Referring Physician:  Urbano Kearns MD  Group:  [facility]  Interpreting Physician:  Fela Singh MD    SUMMARY    LEFT VENTRICLE:  Systolic function was normal by visual assessment  Ejection fraction was estimated to be 70 %  There were no regional wall motion abnormalities  RIGHT VENTRICLE:  Systolic pressure was mildly increased  Estimated peak pressure was 41 mmHg  LEFT ATRIUM:  The atrium was mildly dilated  MITRAL VALVE:  There was mild regurgitation  AORTIC VALVE:  There was mild regurgitation  TRICUSPID VALVE:  There was mild regurgitation  AORTA:  The root exhibited moderate dilation  IVC, HEPATIC VEINS:  The inferior vena cava was dilated  PERICARDIUM:  A small to moderate pericardial effusion was identified circumferential to the heart  HISTORY: PRIOR HISTORY: Atrial fibrillation,Respiratory failure, CAD,PVD,Sepsis,Hypertension  PROCEDURE: The procedure was performed at the bedside  This was a routine study  The transthoracic approach was used  The study included complete 2D imaging, M-mode, complete spectral Doppler, and color Doppler  Images were obtained from  the parasternal, apical, subcostal, and suprasternal notch acoustic windows  Echocardiographic views were limited  Image quality was adequate  LEFT VENTRICLE: Size was normal  Systolic function was normal by visual assessment  Ejection fraction was estimated to be 70 %  There were no regional wall motion abnormalities  Wall thickness was normal  DOPPLER: Transmitral flow pattern:  atrial fibrillation      RIGHT VENTRICLE: The size was normal  Systolic function was normal  DOPPLER: Systolic pressure was mildly increased  Estimated peak pressure was 41 mmHg  LEFT ATRIUM: The atrium was mildly dilated  RIGHT ATRIUM: Size was normal     MITRAL VALVE: Valve structure was normal  There was normal leaflet separation  DOPPLER: The transmitral velocity was within the normal range  There was no evidence for stenosis  There was mild regurgitation  AORTIC VALVE: The valve was trileaflet  Leaflets exhibited mildly increased thickness, mild calcification, and normal cuspal separation  DOPPLER: Transaortic velocity was within the normal range  There was no evidence for stenosis  There  was mild regurgitation  TRICUSPID VALVE: The valve structure was normal  There was normal leaflet separation  DOPPLER: The transtricuspid velocity was within the normal range  There was no evidence for stenosis  There was mild regurgitation  PULMONIC VALVE: Leaflets exhibited normal thickness, no calcification, and normal cuspal separation  DOPPLER: The transpulmonic velocity was within the normal range  There was no regurgitation  PERICARDIUM: A small to moderate pericardial effusion was identified circumferential to the heart  AORTA: The root exhibited moderate dilation  SYSTEMIC VEINS: IVC: The inferior vena cava was dilated      SYSTEM MEASUREMENT TABLES    2D  %FS: 25 69 %  Ao Diam: 4 09 cm  EDV(Teich): 98 68 ml  EF(Teich): 50 61 %  ESV(Cube): 40 65 ml  ESV(Teich): 48 74 ml  IVSd: 1 07 cm  LA Area: 27 84 cm2  LA Diam: 3 95 cm  LVEDV MOD A4C: 65 7 ml  LVEF MOD A4C: 50 24 %  LVESV MOD A4C: 32 69 ml  LVIDd: 4 63 cm  LVIDs: 3 44 cm  LVLd A4C: 7 41 cm  LVLs A4C: 6 07 cm  LVOT Diam: 2 01 cm  LVPWd: 1 13 cm  RA Area: 19 05 cm2  RV Diam : 4 23 cm  SV MOD A4C: 33 01 ml  SV(Cube): 58 41 ml  SV(Teich): 49 94 ml    CW  TR Vmax: 2 79 m/s  TR maxP 15 mmHg    MM  TAPSE: 1 98 cm    [facility] Accredited Echocardiography Laboratory    Prepared and electronically signed by    Nancyann Goodell, MD  Signed 02-May-2017 15:13:25         Meds/Allergies     Current Facility-Administered Medications:  acetaminophen 650 mg Oral Q6H PRN Pearl Diaz MD   amiodarone 200 mg Oral Daily With Breakfast [place] Horseman, DO   digoxin 250 mcg Oral Daily Pearl Diaz MD   diltiazem 60 mg Oral [facility] Pearl Diaz MD   furosemide 80 mg Oral Daily Pearl Diaz MD   lidocaine (PF) 10 mL Infiltration Once Kike Hong, DO   LORazepam 0 5 mg Oral Q8H PRN [place] Horseman, DO   metoprolol 5 mg Intravenous Q6H PRN [place] Horseman, DO   metoprolol tartrate 12 5 mg Oral Q12H [address] Pearl Diaz MD   ondansetron 4 mg Intravenous Q6H PRN [place] Horseman, DO   zolpidem 5 mg Oral HS PRN Pearl Diaz MD        Prescriptions Prior to Admission   Medication    metoprolol tartrate (LOPRESSOR) 50 mg tablet    apixaban (ELIQUIS) 5 mg    Ascorbic Acid (VITAMIN C PO)    B Complex Vitamins (VITAMIN B COMPLEX PO)    clobetasol (TEMOVATE) 0 05 % GEL    diltiazem (TIAZAC) 360 MG 24 hr capsule    Ferrous Gluconate-C-Folic Acid (IRON-C PO)    folic acid (FOLVITE) 1 mg tablet    furosemide (LASIX) 20 mg tablet    Lactobacillus (ACIDOPHILUS PO)    Lycopene 10 MG CAPS    metoprolol tartrate (LOPRESSOR) 100 mg tablet    Misc Natural Products (SAW PALMETTO) CAPS    Multiple Vitamin (MULTI-DAY VITAMINS) TABS       Assessment:  Principal Problem:    Atrial fibrillation with tachycardic ventricular rate (HCC)  Active Problems:    Hypertension    Thrombocytopenia (HCC)    PALOMA (acute kidney injury) (HCC)    Coronary artery disease    Hyperlipidemia    Dilated cardiomyopathy (HCC)    Acute on chronic systolic heart failure (HCC)      Impression:  1  Atrial fibrillation with RVR - difficult to control heart rate, and now with likely tachycardia mediated cardiomyopathy    Given that he is on amiodarone, digoxin, CCB, and b-blocker with poorly controlled heart rate, as well as long standing atrial fibrillation, he likely will need AV mary anne ablation and Biventricular pacing  Plan:  1  Continue current medications for now  2  Restart amiodarone  3  Transfer to [facility] critical care service - will need EP evaluation for AV mary anne ablation and biventricular pacing

## 2018-11-22 NOTE — PROGRESS NOTES
HR continues to run in the 150s on amiodarone drip at 0 5mg/hr  BP running low at 95/64  Spoke with Dr Jenna Thomas  New order noted for amio 150mg IV x1  If pt does not improve would consider transfer to [facility] in am per Dr Jenna Thomas

## 2018-11-22 NOTE — EMTALA/ACUTE CARE TRANSFER
Justin Casillas [facility]  UNIT  [facility] [id number]  Dept: [phone]      ACUTE CARE TRANSFER CONSENT    NAME Yamel Awad 1945                              MRN [id number]    I have been informed of my rights regarding examination, treatment, and transfer   by Dr Lorna Neal MD    Benefits:      Risks:        Transfer Request:  I acknowledge that my medical condition has been evaluated and explained to me by the treating physician or other qualified medical person and/or my attending physician who has recommended and offered to me further medical examination and treatment  I understand the Hospital's obligation with respect to the treatment and stabilization of my medical condition  I nevertheless request to be transferred  I release the Hospital, the doctor, and any other persons caring for me from all responsibility or liability for any injury or ill effects that may result from my transfer and agree to accept all responsibility for the consequences of my choice to transfer, rather than receive stabilizing treatment at the Hospital  I understand that because the transfer is my request, my insurance may not provide reimbursement for the services  The Hospital will assist and direct me and my family in how to make arrangements for transfer, but the hospital is not liable for any fees charged by the transport service  In spite of this understanding, I refuse to consent to further medical examination and treatment which has been offered to me, and request transfer to    I authorize the performance of emergency medical procedures and treatments upon me in both transit and upon arrival at the receiving facility  Additionally, I authorize the release of any and all medical records to the receiving facility and request they be transported with me, if possible      I authorize the performance of emergency medical procedures and treatments upon me in both transit and upon arrival at the receiving facility  Additionally, I authorize the release of any and all medical records to the receiving facility and request they be transported with me, if possible  I understand that the safest mode of transportation during a medical emergency is an ambulance and that the Hospital advocates the use of this mode of transport  Risks of traveling to the receiving facility by car, including absence of medical control, life sustaining equipment, such as oxygen, and medical personnel has been explained to me and I fully understand them  (EPIFANIO CORRECT BOX BELOW)  [  ]  I consent to the stated transfer and to be transported by ambulance/helicopter  [  ]  I consent to the stated transfer, but refuse transportation by ambulance and accept full responsibility for my transportation by car  I understand the risks of non-ambulance transfers and I exonerate the Hospital and its staff from any deterioration in my condition that results from this refusal     X___________________________________________    DATE  11/22/18  TIME________  Signature of patient or legally responsible individual signing on patient behalf           RELATIONSHIP TO PATIENT_________________________          Provider Certification    NAME Yesica Hussein 1945                              MRN [id number]    A medical screening exam was performed on the above named patient    Based on the examination:    Condition Necessitating Transfer poss EP procedure    Patient Condition:      Reason for Transfer:      Transfer Requirements: Facility     · Space available and qualified personnel available for treatment as acknowledged by    · Agreed to accept transfer and to provide appropriate medical treatment as acknowledged by          · Appropriate medical records of the examination and treatment of the patient are provided at the time of transfer   STAFF INITIAL WHEN COMPLETED _______  · Transfer will be performed by qualified personnel from    and appropriate transfer equipment as required, including the use of necessary and appropriate life support measures  Provider Certification: I have examined the patient and explained the following risks and benefits of being transferred/refusing transfer to the patient/family:         Based on these reasonable risks and benefits to the patient and/or the unborn child(ayden), and based upon the information available at the time of the patients examination, I certify that the medical benefits reasonably to be expected from the provision of appropriate medical treatments at another medical facility outweigh the increasing risks, if any, to the individuals medical condition, and in the case of labor to the unborn child, from effecting the transfer      X____________________________________________ DATE 11/22/18        TIME_______      ORIGINAL - SEND TO MEDICAL RECORDS   COPY - SEND WITH PATIENT DURING TRANSFER

## 2018-11-22 NOTE — CONSULTS
Consult - Critical Care   Ryne Wilburn 72 y o  male MRN: [id number]  Unit/Bed#: -02 Encounter: [id number]      Assessment:   1  Acute systolic heart failure with volume overload  2  Afib with RVR, still not well controlled  3  Large R transudative pleural effusion s/p thoracentesis  4  Acute hypoxic respiratory failure requiring BiPAP secondary to 1-3  5  Chronic diastolic heart failure  6  PALOMA on CKD  7  Acute on chronic thrombocytopenia       Plan:      Neuro: Mentation adequate  CAM ICU negative  No acute issues     CV: Rapid atrial fibrillation - still not well controlled  Would restart amiodarone drip now that fluid has been removed  Hold Eliquis for tonight  If no bleeding noted,on CXR, can restart tomorrow  Acute systolic and chronic diastolic heart failure -  Bilateral pleural effusions noted and s/p R thoracentesis  Diurese as per cardiology in AM    May even need to consider pressors and inotropic agents but heart rate is limiting factor  Pulm: transudate pleural effusion - await final studies  Acute respiratory failure requiring BiPAP - would transition to HFNC if work of breathing is reduced  Can consider BiPAP qHS and PRN  Continue diuretics  May need to even consider dialysis  Follow CXR and ABG in AM        GI: Keep NPO for now  Will attempt to wean off BiPAP and then can trial eating food later  Elevated Bili, AST and ALT likely acute phase reactant  Trend LFTs  : May need to undergo dialysis  Follow BMP and urine output in AM   Continue diuretics  Would consider pressors to help with forward flow to kidneys     ID: No active issues     Heme: Thrombocytopenia is chronic and likely worsened with consumption  Would follow CBC  Post thoracentesis would also follow H and H to ensure there is no additional bleeding  Endo: No active issues     Disposition: Continue ICU level care    Monitor closely for worsening respiratory distress    Critical care time not including procedures or family discussion was 60 minutes    History of Present Illness:  Radha Bloom is a 72 y o  male with PMHx of Afib, chronic diastolic dysfunction, HTN, HLD and CKD who presented with shrotness of breath and AFib with RVR  He was being seen by Dr Kathy Tobias and was being managed with 3 AV mary anne agents  He was also being diuresed for worsening shortness of breath and volume overload  Over the past week he noted worsening shortness of breath despite the furosemide  He was clinically worsening with palpitations and increase work of breathing so he came into the ER  He was noted to be in rapid atrial fibrillation  He developed increase work of breathing so a consultation was placed for management of respiratory failure        Past Medical History:  Past Medical History:   Diagnosis Date    Atrial fibrillation (HCC)     Congestive heart failure with left ventricular diastolic dysfunction, chronic (HCC)     Hypertension     Peripheral vascular disease of lower extremity (HCC)     Subdural hematoma (HCC)        Past Surgical History:  Past Surgical History:   Procedure Laterality Date    BACK SURGERY      AUSTYN HOLE FOR SUBDURAL HEMATOMA      CLAVICLE SURGERY      HERNIA REPAIR      PROSTATE SURGERY         Past Family History:  Family History   Problem Relation Age of Onset    Heart disease Mother     Heart disease Father        Social History:  History   Smoking Status    Never Smoker   Smokeless Tobacco    Never Used     History   Alcohol Use No     History   Drug Use No     Medications:  Current Facility-Administered Medications   Medication Dose Route Frequency    acetaminophen (TYLENOL) tablet 650 mg  650 mg Oral Q6H PRN    amiodarone (CORDARONE) 900 mg in dextrose 5 % 500 mL infusion  0 5 mg/min Intravenous Continuous    amiodarone tablet 200 mg  200 mg Oral Daily With Breakfast    apixaban (ELIQUIS) tablet 5 mg  5 mg Oral BID    digoxin (LANOXIN) injection 250 mcg  250 mcg Intravenous Daily    furosemide (LASIX) injection 80 mg  80 mg Intravenous TID (diuretic)    furosemide (LASIX) tablet 20 mg  20 mg Oral Once    lidocaine (PF) (XYLOCAINE-MPF) 1 % injection 10 mL  10 mL Infiltration Once    LORazepam (ATIVAN) tablet 0 5 mg  0 5 mg Oral Q8H PRN    metoprolol (LOPRESSOR) injection 5 mg  5 mg Intravenous Q8H    metoprolol (LOPRESSOR) injection 5 mg  5 mg Intravenous Q6H PRN    ondansetron (ZOFRAN) injection 4 mg  4 mg Intravenous Q6H PRN    zolpidem (AMBIEN) tablet 5 mg  5 mg Oral HS PRN     Home medications:  Prior to Admission medications    Medication Sig Start Date End Date Taking?  Authorizing Provider   metoprolol tartrate (LOPRESSOR) 50 mg tablet Take 50 mg by mouth daily with dinner   Yes Historical Provider, MD   apixaban (ELIQUIS) 5 mg Take 1 tablet (5 mg total) by mouth 2 (two) times a day 10/10/18   Carlos Trujillo MD   Ascorbic Acid (VITAMIN C PO) Take by mouth    Historical Provider, MD   B Complex Vitamins (VITAMIN B COMPLEX PO) Take by mouth    Historical Provider, MD   clobetasol (TEMOVATE) 0 05 % GEL Apply topically as needed   4/26/16   Historical Provider, MD   diltiazem (TIAZAC) 360 MG 24 hr capsule Take 1 capsule (360 mg total) by mouth daily 10/10/18   Carlos Trujillo MD   Ferrous Gluconate-C-Folic Acid (IRON-C PO) Take by mouth    Historical Provider, MD   folic acid (FOLVITE) 1 mg tablet Take 1 mg by mouth daily    Historical Provider, MD   furosemide (LASIX) 20 mg tablet Take 1 tablet (20 mg total) by mouth daily for 30 days 10/10/18 11/9/18  Carlos Trujillo MD   Lactobacillus (ACIDOPHILUS PO) Take by mouth    Historical Provider, MD   Lycopene 10 MG CAPS Take by mouth    Historical Provider, MD   metoprolol tartrate (LOPRESSOR) 100 mg tablet TAKE ONE TABLET BY MOUTH IN THE MORNING THEN ONE-HALF TABLET IN THE AFTERNOON AND ONE TABLET AT NIGHT  Patient taking differently: Take 100 mg by mouth daily   10/10/18   Stuart Del Toro MD   [facility] [place] (SAW PALMETTO) CAPS Take by mouth    Historical Provider, MD   Multiple Vitamin (MULTI-DAY VITAMINS) TABS Take by mouth    Historical Provider, MD     Allergies:  No Known Allergies    ROS:   Review of Systems   Unable to perform ROS: Severe respiratory distress     Vitals:  Vitals:    18 1500 18 1600 18 1900 18 2000   BP: 124/81 120/79 106/59 95/64   BP Location: Left arm  Left arm Left arm   Pulse: (!) 139 (!) 140 (!) 150 (!) 138   Resp: (!) 35 (!) 30 (!) 26 (!) 27   Temp: (!) 97 4 °F (36 3 °C)  97 5 °F (36 4 °C)    TempSrc: Oral  Oral    SpO2: 97% 96% 99% 100%   Weight:       Height:         Temperature:   Temp (24hrs), Av 8 °F (36 6 °C), Min:97 4 °F (36 3 °C), Max:98 6 °F (37 °C)    Current Temperature: 97 5 °F (36 4 °C)    Non-Invasive/Invasive Ventilation Settings: BiPAP , briefly placed on  for work of breathing  Physical Exam:  General: Awake alert and oriented x 3, conversant but on BiPAP  HEENT:  PERRL, Sclera noninjected, nonicteric OU, Nares patent,  no craniofacial abnormalities, Mucous membranes, moist, no oral lesions, normal dentition  NECK: Trachea midline, no accessory muscle use, no stridor, no cervical or supraclavicular adenopathy, JVP not elevated  CARDIAC: Severe tachycardia, irregularly irregular  PULM: Increased work of breathing    Decreased breath sounds in R lung base  ABD: Normoactive bowel sounds, soft nontender, nondistended, no rebound, no rigidity, no guarding  EXT: No cyanosis, no clubbing, no edema, normal capillary refill  NEURO: no focal neurologic deficits, AAOx3, moving all extremities appropriately    Labs:    Results from last 7 days  Lab Units 18  0546 18  1640 18  1632   WBC Thousand/uL 5 47  --  7 30   HEMOGLOBIN g/dL 13 1  --  15 0   I STAT HEMOGLOBIN g/dl  --  15 0  --    HEMATOCRIT % 39 5  --  45 3   HEMATOCRIT, ISTAT %  --  44  --    PLATELETS Thousands/uL 55*  --  75*   NEUTROS PCT %  --   --  70   MONOS PCT %  --   --  8        Results from last 7 days  Lab Units 11/21/18  1539 11/21/18  0546 11/20/18  1640 11/20/18  1632   POTASSIUM mmol/L  --  3 6  --  4 6   CHLORIDE mmol/L  --  103  --  99*   CO2 mmol/L  --  25  --  24   CO2, I-STAT mmol/L 23  --  25  --    BUN mg/dL  --  34*  --  40*   CREATININE mg/dL  --  1 66*  --  1 83*   CALCIUM mg/dL  --  8 5  --  9 4   ALK PHOS U/L  --   --   --  111   ALT U/L  --   --   --  86*   AST U/L  --   --   --  85*   GLUCOSE, ISTAT mg/dl  --   --  127  --                 Results from last 7 days  Lab Units 11/20/18  1632   INR  2 87*   PTT seconds 42*           0  Lab Value Date/Time   TROPONINI 0 06 (H) 11/21/2018 1923   TROPONINI 0 03 11/21/2018 1545   TROPONINI <0 02 11/21/2018 1147   TROPONINI <0 02 11/20/2018 1632   TROPONINI 0 02 05/02/2017 1432   TROPONINI 0 02 05/02/2017 1105   TROPONINI 0 02 05/02/2017 0648       Imaging: CXR - IMPRESSION:  Resolved pulmonary edema  Small basilar effusions, markedly improved on the right post thoracentesis    I have personally reviewed pertinent reports  Echo - markedly reduced EF according to preliminary read     Micro:  Lab Results   Component Value Date    BLOODCX No Growth After 5 Days  05/02/2017    BLOODCX No Growth After 5 Days  05/02/2017       ______________________________________________________________________    VTE Pharmacologic Prophylaxis: Reason for no pharmacologic prophylaxis on Eliquis  VTE Mechanical Prophylaxis: sequential compression device    Invasive lines and devices: Invasive Devices     Peripheral Intravenous Line            Peripheral IV 11/20/18 Right Arm 1 day          Drain            Urethral Catheter Latex 16 Fr  less than 1 day                Code Status: Level 1 - Full Code  POA:    POLST:      Given critical illness, patient length of stay will require greater than two midnights      Portions of the record may have been created with voice recognition software  Occasional wrong word or "sound a like" substitutions may have occurred due to the inherent limitations of voice recognition software  Read the chart carefully and recognize, using context, where substitutions have occurred        Bia Manzanares DO            ______________________________________________________________________

## 2018-11-22 NOTE — DISCHARGE SUMMARY
Discharge Summary  Carmela Hurt 72 y o  male MRN: [id number]  Unit/Bed#: -02 Encounter: [id number]    Admission Date:   Admission Orders     Ordered        11/20/18 1752  Inpatient Admission (expected length of stay for this patient is greater than two midnights)  Once               Discharge Date: 11/22/18    Admitting Diagnosis: Dehydration [E86 0]  PALOMA (acute kidney injury) ([facility] ) [N17 9]  Atrial fibrillation with RVR ([facility] ) [I48 91]  Heart problem [I51 9]    HPI:  See history and physical    Procedures Performed: No orders of the defined types were placed in this encounter  Hospital Course:  Patient to the hospital with rapid atrial fibrillation  Patient was initially volume depleted and was given some IV fluids  Subsequently the 2nd hospital day the patient with the pulmonary edema  Repeat echocardiogram showed significant decrease in his ejection fraction from previous low normal EF to 15%  He was seen by Cardiology as well as [address]  Was felt that 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  However in spite of AV mary anne blocking medications his heart rate remained in the 140s with blood pressures in the 100-115 range  It was the opinion of the consultant cardiologist the patient be transferred to the [facility] for evaluation by electrophysiology team for consideration of possible AV mary anne ablation and biventricular pacing  Arrangements were made for the transfer      Significant Findings, Care, Treatment and Services Provided:  None    Discharge Diagnosis: Principal Problem:    Atrial fibrillation with tachycardic ventricular rate (HCC)  Active Problems:    Hypertension    Thrombocytopenia (HCC)    PALOMA (acute kidney injury) ([facility] )    Coronary artery disease    Hyperlipidemia    Dilated cardiomyopathy ([facility] )    Acute on chronic systolic heart failure (HCC)  Resolved Problems:    Acute on chronic diastolic congestive heart failure ([facility] )        Condition at Discharge: serious     Discharge instructions/Information to patient and family:   See after visit summary for information provided to patient and family  Provisions for Follow-Up Care:  See after visit summary for information related to follow-up care and any pertinent home health orders  Disposition: [facility]    Discharge Medications:  See after visit summary for reconciled discharge medications provided to patient and family  This note has been constructed using a voice recognition system         Hollis Solares MD

## 2018-11-22 NOTE — PROGRESS NOTES
Progress Note - Pulmonary   Blondie Blocker 72 y o  male MRN: [id number]  Unit/Bed#: -02 Encounter: [id number]      Assessment:  1  Acute systolic heart failure with volume overload  2  Afib with RVR, still not well controlled  3  Large R lymphocyte predominant transudative pleural effusion s/p thoracentesis secondary to #1  4  Acute hypoxic respiratory failure requiring BiPAP secondary to 1-3  5  Chronic diastolic heart failure  6  PALOMA on CKD  7  Acute on chronic thrombocytopenia     Plan:  1  Await final studies on pleural effusion  2  Weaned off BiPAP successfully today  Would still use PRN and QHS to help with work of breathing  Given low EF, positive pressure will likely to help support the heart  3  Rate control is imperative given low EF  On amiodarone and will be starting Cardizem  Primary team discussing with cardiology  4  Continue diuresis as per primary team  5  Would consider possible inotropic support given low EF  This too may help heart rate as well  Cardiology to follow  6  Would hold Eliquis if there is consideration to inotropic support and need for central line placement  Restart as soon as possible if not pursuing that at this time  7  Renal function today is likely worse due to poor cardiac output  Would repeat later today to ensure not worsening  Would consider pressor to help renal function prior to developing ATN  8   Platelet count also worsening, would follow CBC later today as well  If drops below 15, will need platelet transfusion  Subjective:   Patient seen and examined  No new events overnight  He states that his breathing has significantly improved  He is feeling better when compared to yesterday  Objective:   Vitals: Blood pressure 112/78, pulse (!) 141, temperature (!) 97 1 °F (36 2 °C), temperature source Oral, resp  rate 22, height 5' 9 5" (1 765 m), weight 67 5 kg (148 lb 13 oz), SpO2 100 %  , 2L NC, Body mass index is 21 66 kg/m²  Intake/Output Summary (Last 24 hours) at 11/22/18 1153  Last data filed at 11/22/18 1181   Gross per 24 hour   Intake                0 ml   Output             2825 ml   Net            -2825 ml         Physical Exam  Gen: Awake, alert, oriented x 3, no acute distress  HEENT: Mucous membranes moist, no oral lesions, no thrush  NECK: No accessory muscle use, JVP not elevated  Cardiac: tachycardia, irregularly irregular  Lungs: crackles in L lung base  R lung clear  No wheezing or rhonchi  Abdomen: normoactive bowel sounds, soft nontender, nondistended, no rebound or rigidity, no guarding  Extremities: trace lower extremity edema  No rhonchi or rales    Labs: I have personally reviewed pertinent lab results  Results from last 7 days  Lab Units 11/22/18 0437 11/21/18  2226 11/21/18  0546  11/20/18  1632   WBC Thousand/uL 6 10  --  5 47  --  7 30   HEMOGLOBIN g/dL 13 6 14 1 13 1  --  15 0   I STAT HEMOGLOBIN   --   --   --   < >  --    HEMATOCRIT % 41 7 43 2 39 5  --  45 3   HEMATOCRIT, ISTAT   --   --   --   < >  --    PLATELETS Thousands/uL 37*  --  55*  --  75*   NEUTROS PCT %  --   --   --   --  70   MONOS PCT %  --   --   --   --  8   < > = values in this interval not displayed     Results from last 7 days  Lab Units 11/22/18  0437 11/21/18  1539 11/21/18  0546 11/20/18  1640 11/20/18  1632   POTASSIUM mmol/L 4 0  --  3 6  --  4 6   CHLORIDE mmol/L 105  --  103  --  99*   CO2 mmol/L 25  --  25  --  24   CO2, I-STAT mmol/L  --  23  --  25  --    BUN mg/dL 36*  --  34*  --  40*   CREATININE mg/dL 2 13*  --  1 66*  --  1 83*   CALCIUM mg/dL 8 7  --  8 5  --  9 4   ALK PHOS U/L  --   --   --   --  111   ALT U/L  --   --   --   --  86*   AST U/L  --   --   --   --  85*   GLUCOSE, ISTAT mg/dl  --   --   --  127  --        Results from last 7 days  Lab Units 11/22/18  0130   MAGNESIUM mg/dL 1 8            Results from last 7 days  Lab Units 11/20/18  1632   INR  2 87*   PTT seconds 42*           0  Lab Value Date/Time   TROPONINI 0 06 (H) 11/22/2018 0437   TROPONINI 0 07 (H) 11/22/2018 0130   TROPONINI 0 06 (H) 11/21/2018 2226   TROPONINI 0 06 (H) 11/21/2018 1923   TROPONINI 0 03 11/21/2018 1545   TROPONINI <0 02 11/21/2018 1147   TROPONINI <0 02 11/20/2018 1632   TROPONINI 0 02 05/02/2017 1432   TROPONINI 0 02 05/02/2017 1105   TROPONINI 0 02 05/02/2017 0648       Meds/Allergies   Current Facility-Administered Medications   Medication Dose Route Frequency    acetaminophen (TYLENOL) tablet 650 mg  650 mg Oral Q6H PRN    amiodarone tablet 200 mg  200 mg Oral Daily With Breakfast    digoxin (LANOXIN) tablet 250 mcg  250 mcg Oral Daily    diltiazem (CARDIZEM) tablet 60 mg  60 mg Oral Q8H [address]    furosemide (LASIX) tablet 80 mg  80 mg Oral Daily    lidocaine (PF) (XYLOCAINE-MPF) 1 % injection 10 mL  10 mL Infiltration Once    LORazepam (ATIVAN) tablet 0 5 mg  0 5 mg Oral Q8H PRN    metoprolol (LOPRESSOR) injection 5 mg  5 mg Intravenous Q6H PRN    metoprolol tartrate (LOPRESSOR) partial tablet 12 5 mg  12 5 mg Oral Q12H [address]    ondansetron (ZOFRAN) injection 4 mg  4 mg Intravenous Q6H PRN    zolpidem (AMBIEN) tablet 5 mg  5 mg Oral HS PRN     Prescriptions Prior to Admission   Medication    metoprolol tartrate (LOPRESSOR) 50 mg tablet    apixaban (ELIQUIS) 5 mg    Ascorbic Acid (VITAMIN C PO)    B Complex Vitamins (VITAMIN B COMPLEX PO)    clobetasol (TEMOVATE) 0 05 % GEL    diltiazem (TIAZAC) 360 MG 24 hr capsule    Ferrous Gluconate-C-Folic Acid (IRON-C PO)    folic acid (FOLVITE) 1 mg tablet    furosemide (LASIX) 20 mg tablet    Lactobacillus (ACIDOPHILUS PO)    Lycopene 10 MG CAPS    metoprolol tartrate (LOPRESSOR) 100 mg tablet    Misc Natural Products (SAW PALMETTO) CAPS    Multiple Vitamin (MULTI-DAY VITAMINS) TABS         Microbiology:  Lab Results   Component Value Date    BLOODCX No Growth After 5 Days  05/02/2017    BLOODCX No Growth After 5 Days   05/02/2017       Pleural Fluid:  Pleural Fluid Protein: 2 2  Serum Total Protein 6 1  Pleural LDH: 107  Serum LDH: 642  Lymphocyte predominant     Imaging and other studies: I have personally reviewed pertinent reports  CXR - significant improvement in R effusion    Very mild L effusion    DO J[facility] Nugent [facility]

## 2018-11-23 ENCOUNTER — APPOINTMENT (INPATIENT)
Dept: NON INVASIVE DIAGNOSTICS | Facility: HOSPITAL | Age: 73
DRG: 242 | End: 2018-11-23
Payer: MEDICARE

## 2018-11-23 PROBLEM — R06.89 ACUTE RESPIRATORY INSUFFICIENCY: Status: ACTIVE | Noted: 2018-11-23

## 2018-11-23 PROBLEM — E87.6 HYPOKALEMIA: Status: RESOLVED | Noted: 2018-11-22 | Resolved: 2018-11-23

## 2018-11-23 PROBLEM — I50.43 ACUTE ON CHRONIC COMBINED SYSTOLIC AND DIASTOLIC HEART FAILURE (HCC): Status: ACTIVE | Noted: 2018-11-22

## 2018-11-23 PROBLEM — E83.42 HYPOMAGNESEMIA: Status: ACTIVE | Noted: 2018-11-23

## 2018-11-23 LAB
ALBUMIN SERPL BCP-MCNC: 2.9 G/DL (ref 3.5–5)
ALP SERPL-CCNC: 90 U/L (ref 46–116)
ALT SERPL W P-5'-P-CCNC: 147 U/L (ref 12–78)
ANION GAP SERPL CALCULATED.3IONS-SCNC: 2 MMOL/L (ref 4–13)
ANION GAP SERPL CALCULATED.3IONS-SCNC: 5 MMOL/L (ref 4–13)
ANION GAP SERPL CALCULATED.3IONS-SCNC: 8 MMOL/L (ref 4–13)
APTT PPP: 37 SECONDS (ref 26–38)
AST SERPL W P-5'-P-CCNC: 102 U/L (ref 5–45)
ATRIAL RATE: 158 BPM
BILIRUB DIRECT SERPL-MCNC: 0.94 MG/DL (ref 0–0.2)
BILIRUB SERPL-MCNC: 2.05 MG/DL (ref 0.2–1)
BLD SMEAR INTERP: NORMAL
BUN SERPL-MCNC: 23 MG/DL (ref 5–25)
BUN SERPL-MCNC: 25 MG/DL (ref 5–25)
BUN SERPL-MCNC: 25 MG/DL (ref 5–25)
CALCIUM SERPL-MCNC: 9.2 MG/DL (ref 8.3–10.1)
CALCIUM SERPL-MCNC: 9.8 MG/DL (ref 8.3–10.1)
CALCIUM SERPL-MCNC: 9.9 MG/DL (ref 8.3–10.1)
CHLORIDE SERPL-SCNC: 101 MMOL/L (ref 100–108)
CHLORIDE SERPL-SCNC: 102 MMOL/L (ref 100–108)
CHLORIDE SERPL-SCNC: 105 MMOL/L (ref 100–108)
CO2 SERPL-SCNC: 27 MMOL/L (ref 21–32)
CO2 SERPL-SCNC: 28 MMOL/L (ref 21–32)
CO2 SERPL-SCNC: 33 MMOL/L (ref 21–32)
CREAT SERPL-MCNC: 1.42 MG/DL (ref 0.6–1.3)
CREAT SERPL-MCNC: 1.5 MG/DL (ref 0.6–1.3)
CREAT SERPL-MCNC: 1.77 MG/DL (ref 0.6–1.3)
DIGOXIN SERPL-MCNC: 1.9 NG/ML (ref 0.8–2)
FIBRINOGEN PPP-MCNC: 439 MG/DL (ref 227–495)
FOLATE SERPL-MCNC: >20 NG/ML (ref 3.1–17.5)
GFR SERPL CREATININE-BSD FRML MDRD: 38 ML/MIN/1.73SQ M
GFR SERPL CREATININE-BSD FRML MDRD: 46 ML/MIN/1.73SQ M
GFR SERPL CREATININE-BSD FRML MDRD: 49 ML/MIN/1.73SQ M
GLUCOSE SERPL-MCNC: 102 MG/DL (ref 65–140)
GLUCOSE SERPL-MCNC: 122 MG/DL (ref 65–140)
GLUCOSE SERPL-MCNC: 96 MG/DL (ref 65–140)
HIV 1+2 AB+HIV1 P24 AG SERPL QL IA: NORMAL
HIV1 P24 AG SER QL: NORMAL
MAGNESIUM SERPL-MCNC: 2.4 MG/DL (ref 1.6–2.6)
MAGNESIUM SERPL-MCNC: 2.4 MG/DL (ref 1.6–2.6)
POTASSIUM SERPL-SCNC: 3.4 MMOL/L (ref 3.5–5.3)
POTASSIUM SERPL-SCNC: 4.4 MMOL/L (ref 3.5–5.3)
POTASSIUM SERPL-SCNC: 6.2 MMOL/L (ref 3.5–5.3)
PROT SERPL-MCNC: 5.5 G/DL (ref 6.4–8.2)
QRS AXIS: -38 DEGREES
QRSD INTERVAL: 88 MS
QT INTERVAL: 288 MS
QTC INTERVAL: 467 MS
SODIUM SERPL-SCNC: 137 MMOL/L (ref 136–145)
T WAVE AXIS: 236 DEGREES
TROPONIN I SERPL-MCNC: 0.02 NG/ML
TROPONIN I SERPL-MCNC: 0.03 NG/ML
VENTRICULAR RATE: 158 BPM

## 2018-11-23 PROCEDURE — 82542 COL CHROMOTOGRAPHY QUAL/QUAN: CPT | Performed by: NURSE PRACTITIONER

## 2018-11-23 PROCEDURE — 99233 SBSQ HOSP IP/OBS HIGH 50: CPT | Performed by: EMERGENCY MEDICINE

## 2018-11-23 PROCEDURE — 93010 ELECTROCARDIOGRAM REPORT: CPT | Performed by: INTERNAL MEDICINE

## 2018-11-23 PROCEDURE — 80076 HEPATIC FUNCTION PANEL: CPT | Performed by: NURSE PRACTITIONER

## 2018-11-23 PROCEDURE — 83735 ASSAY OF MAGNESIUM: CPT | Performed by: NURSE PRACTITIONER

## 2018-11-23 PROCEDURE — 93321 DOPPLER ECHO F-UP/LMTD STD: CPT | Performed by: INTERNAL MEDICINE

## 2018-11-23 PROCEDURE — 83735 ASSAY OF MAGNESIUM: CPT | Performed by: EMERGENCY MEDICINE

## 2018-11-23 PROCEDURE — 93325 DOPPLER ECHO COLOR FLOW MAPG: CPT | Performed by: INTERNAL MEDICINE

## 2018-11-23 PROCEDURE — 80162 ASSAY OF DIGOXIN TOTAL: CPT | Performed by: PHYSICIAN ASSISTANT

## 2018-11-23 PROCEDURE — 99223 1ST HOSP IP/OBS HIGH 75: CPT | Performed by: INTERNAL MEDICINE

## 2018-11-23 PROCEDURE — 80048 BASIC METABOLIC PNL TOTAL CA: CPT | Performed by: EMERGENCY MEDICINE

## 2018-11-23 PROCEDURE — 84425 ASSAY OF VITAMIN B-1: CPT | Performed by: NURSE PRACTITIONER

## 2018-11-23 PROCEDURE — 99222 1ST HOSP IP/OBS MODERATE 55: CPT | Performed by: INTERNAL MEDICINE

## 2018-11-23 PROCEDURE — 93306 TTE W/DOPPLER COMPLETE: CPT

## 2018-11-23 PROCEDURE — 86022 PLATELET ANTIBODIES: CPT | Performed by: NURSE PRACTITIONER

## 2018-11-23 PROCEDURE — 82746 ASSAY OF FOLIC ACID SERUM: CPT | Performed by: NURSE PRACTITIONER

## 2018-11-23 PROCEDURE — 93308 TTE F-UP OR LMTD: CPT | Performed by: INTERNAL MEDICINE

## 2018-11-23 PROCEDURE — 85384 FIBRINOGEN ACTIVITY: CPT | Performed by: NURSE PRACTITIONER

## 2018-11-23 PROCEDURE — 87806 HIV AG W/HIV1&2 ANTB W/OPTIC: CPT | Performed by: NURSE PRACTITIONER

## 2018-11-23 PROCEDURE — 84484 ASSAY OF TROPONIN QUANT: CPT | Performed by: PHYSICIAN ASSISTANT

## 2018-11-23 PROCEDURE — 80048 BASIC METABOLIC PNL TOTAL CA: CPT | Performed by: NURSE PRACTITIONER

## 2018-11-23 PROCEDURE — 80048 BASIC METABOLIC PNL TOTAL CA: CPT | Performed by: PHYSICIAN ASSISTANT

## 2018-11-23 PROCEDURE — 85730 THROMBOPLASTIN TIME PARTIAL: CPT | Performed by: NURSE PRACTITIONER

## 2018-11-23 RX ORDER — DIGOXIN 0.25 MG/ML
250 INJECTION INTRAMUSCULAR; INTRAVENOUS ONCE
Status: COMPLETED | OUTPATIENT
Start: 2018-11-23 | End: 2018-11-23

## 2018-11-23 RX ORDER — DIGOXIN 0.25 MG/ML
500 INJECTION INTRAMUSCULAR; INTRAVENOUS ONCE
Status: DISCONTINUED | OUTPATIENT
Start: 2018-11-23 | End: 2018-11-23

## 2018-11-23 RX ORDER — METHYLPREDNISOLONE 4 MG/1
4 TABLET ORAL DAILY
Status: COMPLETED | OUTPATIENT
Start: 2018-11-29 | End: 2018-11-29

## 2018-11-23 RX ORDER — ESMOLOL HYDROCHLORIDE 10 MG/ML
25-200 INJECTION, SOLUTION INTRAVENOUS
Status: DISCONTINUED | OUTPATIENT
Start: 2018-11-23 | End: 2018-11-27

## 2018-11-23 RX ORDER — METOPROLOL SUCCINATE 50 MG/1
100 TABLET, EXTENDED RELEASE ORAL 2 TIMES DAILY
Status: DISCONTINUED | OUTPATIENT
Start: 2018-11-23 | End: 2018-11-23

## 2018-11-23 RX ORDER — METOPROLOL TARTRATE 50 MG/1
50 TABLET, FILM COATED ORAL EVERY 6 HOURS
Status: DISCONTINUED | OUTPATIENT
Start: 2018-11-23 | End: 2018-11-27

## 2018-11-23 RX ORDER — METHYLPREDNISOLONE 16 MG/1
16 TABLET ORAL DAILY
Status: COMPLETED | OUTPATIENT
Start: 2018-11-26 | End: 2018-11-26

## 2018-11-23 RX ORDER — POTASSIUM CHLORIDE 20MEQ/15ML
40 LIQUID (ML) ORAL ONCE
Status: COMPLETED | OUTPATIENT
Start: 2018-11-23 | End: 2018-11-23

## 2018-11-23 RX ORDER — MAGNESIUM SULFATE HEPTAHYDRATE 40 MG/ML
2 INJECTION, SOLUTION INTRAVENOUS ONCE
Status: COMPLETED | OUTPATIENT
Start: 2018-11-23 | End: 2018-11-23

## 2018-11-23 RX ORDER — METHYLPREDNISOLONE 4 MG/1
12 TABLET ORAL DAILY
Status: COMPLETED | OUTPATIENT
Start: 2018-11-27 | End: 2018-11-27

## 2018-11-23 RX ORDER — METOPROLOL SUCCINATE 50 MG/1
100 TABLET, EXTENDED RELEASE ORAL DAILY
Status: DISCONTINUED | OUTPATIENT
Start: 2018-11-24 | End: 2018-11-23

## 2018-11-23 RX ORDER — POTASSIUM CHLORIDE 20 MEQ/1
20 TABLET, EXTENDED RELEASE ORAL ONCE
Status: DISCONTINUED | OUTPATIENT
Start: 2018-11-23 | End: 2018-11-23

## 2018-11-23 RX ORDER — METHYLPREDNISOLONE 4 MG/1
8 TABLET ORAL DAILY
Status: COMPLETED | OUTPATIENT
Start: 2018-11-28 | End: 2018-11-28

## 2018-11-23 RX ORDER — POTASSIUM CHLORIDE 20 MEQ/1
40 TABLET, EXTENDED RELEASE ORAL ONCE
Status: COMPLETED | OUTPATIENT
Start: 2018-11-23 | End: 2018-11-23

## 2018-11-23 RX ADMIN — DILTIAZEM HYDROCHLORIDE 60 MG: 60 TABLET, FILM COATED ORAL at 05:38

## 2018-11-23 RX ADMIN — MAGNESIUM SULFATE IN WATER 2 G: 40 INJECTION, SOLUTION INTRAVENOUS at 08:56

## 2018-11-23 RX ADMIN — METOPROLOL TARTRATE 100 MG: 50 TABLET, FILM COATED ORAL at 08:56

## 2018-11-23 RX ADMIN — FUROSEMIDE 40 MG: 10 INJECTION, SOLUTION INTRAMUSCULAR; INTRAVENOUS at 16:05

## 2018-11-23 RX ADMIN — METOPROLOL TARTRATE 50 MG: 50 TABLET, FILM COATED ORAL at 16:07

## 2018-11-23 RX ADMIN — DIGOXIN 250 MCG: 250 INJECTION, SOLUTION INTRAMUSCULAR; INTRAVENOUS; PARENTERAL at 12:55

## 2018-11-23 RX ADMIN — DIGOXIN 250 MCG: 250 TABLET ORAL at 09:06

## 2018-11-23 RX ADMIN — DILTIAZEM HYDROCHLORIDE 15 MG/HR: 5 INJECTION INTRAVENOUS at 05:40

## 2018-11-23 RX ADMIN — POTASSIUM CHLORIDE 40 MEQ: 1500 TABLET, EXTENDED RELEASE ORAL at 15:24

## 2018-11-23 RX ADMIN — POTASSIUM CHLORIDE 40 MEQ: 20 SOLUTION ORAL at 16:05

## 2018-11-23 RX ADMIN — FAMOTIDINE 20 MG: 20 TABLET ORAL at 08:56

## 2018-11-23 RX ADMIN — FUROSEMIDE 40 MG: 10 INJECTION, SOLUTION INTRAMUSCULAR; INTRAVENOUS at 08:56

## 2018-11-23 RX ADMIN — AMIODARONE HYDROCHLORIDE 0.5 MG/MIN: 50 INJECTION, SOLUTION INTRAVENOUS at 12:55

## 2018-11-23 RX ADMIN — METOPROLOL TARTRATE 50 MG: 50 TABLET, FILM COATED ORAL at 21:06

## 2018-11-23 RX ADMIN — POTASSIUM CHLORIDE 40 MEQ: 1500 TABLET, EXTENDED RELEASE ORAL at 00:43

## 2018-11-23 RX ADMIN — ESMOLOL HYDROCHLORIDE 25 MCG/KG/MIN: 10 INJECTION INTRAVENOUS at 16:39

## 2018-11-23 NOTE — RESPIRATORY THERAPY NOTE
RT Protocol Note  Carmela Hurt 72 y o  male MRN: [id number]  Unit/Bed#: [facility] 518-01 Encounter: [id number]    Assessment    Principal Problem:    Atrial fibrillation ([facility] )  Active Problems:    Hypertension    Thrombocytopenia ([facility] )    PALOMA (acute kidney injury) ([place] [address] )    Coronary artery disease    Hyperlipidemia    Dilated cardiomyopathy ([place] [address] )    Acute on chronic systolic heart failure (HCC)    Hypokalemia      Home Pulmonary Medications:  None       Past Medical History:   Diagnosis Date    Atrial fibrillation (HCC)     Congestive heart failure with left ventricular diastolic dysfunction, chronic ([place] [address] )     Hypertension     Peripheral vascular disease of lower extremity (HCC)     Subdural hematoma (HCC)      Social History     Social History    Marital status: Single     Spouse name: N/A    Number of children: N/A    Years of education: N/A     Social History Main Topics    Smoking status: Never Smoker    Smokeless tobacco: Never Used    Alcohol use No    Drug use: No    Sexual activity: Not Asked     Other Topics Concern    None     Social History Narrative    None       Subjective         Objective    Physical Exam:   Assessment Type: Assess only  General Appearance: Awake, Alert  Respiratory Pattern: Normal  Chest Assessment: Chest expansion symmetrical  Bilateral Breath Sounds: Diminished  O2 Device: 2LNC    Vitals:  Blood pressure 103/68, pulse (!) 138, temperature 98 2 °F (36 8 °C), temperature source Oral, resp  rate 20, height 5' 10" (1 778 m), weight 67 7 kg (149 lb 4 oz), SpO2 99 %  Results from last 7 days  Lab Units 11/21/18  1539   WILSON TEST  Postive Wilson Test       Imaging and other studies: I have personally reviewed pertinent reports  O2 Device: 2LNC     Plan    Respiratory Plan: No distress/Pulmonary history        Resp Comments: (P) Resp protocol initiated at this time  Pt states he does not have any pulmonary history  Pt does not take any pulmonary meds at home   Pt admitted at [facility] for a-fib  Pt ended up being placed on bipap but was taken off this morning  Pt currently resting on 2LNC and satting adeqautely  Pt currently not in any distress  Will d/c protocol at this time

## 2018-11-23 NOTE — PROGRESS NOTES
Progress Note - Critical Care   Michael Calvo 72 y o  male MRN: [id number]  Unit/Bed#: [facility] 518-01 Encounter: [id number]    Assessment:   Principal Problem:    Atrial fibrillation ([facility] )  Active Problems:  A on C combined CHF suspected decompensated systolic function secondary to tachy arrhythmia/afib  Acute respiratory insufficiency, hypoxemic    Hypertension    Coronary artery disease    Dilated cardiomyopathy (HCC)    Hyperlipidemia    Thrombocytopenia (HCC)    PALOMA (acute kidney injury) ([facility] )- improved, baseline Cr 1 2-1 3    Hypokalemia  Right transient pleural effusion status [facility]  Resolved Problems:    * No resolved hospital problems  *    Plan  Neuro:   · PAD  · Delirium Precautions  · CAM ICU per protocol  · Regulate sleep/wake cycle+  · Trend neuro exam  CV:   · AFib with RVR with associated tachy induced cardiomyopathy  · Combined systolic and diastolic heart failure with acute decompensation and systolic heart failure secondary to tachyarrhythmia  · History of coronary disease  · Hemodynamic infusions: Cardizem gtt at 15mg/hr  · MAP goal > 65  · Rhythm: Atrial Fibrillation 130-160 rate  · Follow rhythm on telemetry  · Patient currently on digoxin 250 mcg daily, Cardizem 60 mg Q 8, metoprolol 100 mg q a m  and 50 mg q p m , amiodarone 200 mg q a m    · Patient anticoagulated on Eliquis 5 mg b i d   · Continue Lasix 40 mg b i d , goal even   · Echocardiogram pending  · Cardiology consult and EP consult pending  Lung:   Acute hypoxemic respiratory insufficiency  · Improved post thoracentesis  · Wean FiO2 as able  · Status post [facility]acentesis at [facility]   GI:   · Stress ulcer prophylaxis:  Pepcid PO  · Bowel regimen: none at this time  FEN:   · Goal 24 hour fluid balance: even     Fluid/Diuretic plan: Lasix 40mg BID  · Nutrition/diet plan:  NPO pending Cardiology and electrophysiology evaluation  · Replete electrolytes with goals: K >4 5, Mag >2 0, and Phos >3 0  · 2 g magnesium sulfate IV this a m   :   · Acute kidney injury, improved-  · Indwelling Abbott present: yes; discontinue Abbott today  · Trend UOP and BUN/creat  · Strict I and O  ID:   · No indications for antibiotics  · Trend temps and WBC count  Heme:   Thrombocytopenia  · Check peripheral smear  · Consider hematology consult  · Trend hgb and plts  · Transfuse as needed for goal hgb >7  Endo:   · Glycemic control plan:  Goal blood sugar 140 to 180 mg/dL  MSK/Skin:  · Mobility goal:  Out of bed to chair  · PT consult: n/a  · OT consult: n/a  · Frequent turning and pressure off-loading  Lines:  · PIV  VTE Prophylaxis:  · Pharmacologic Prophylaxis:Apixaban (Eliquis)  · Mechanical Prophylaxis: sequential compression device    Disposition: Continue ICU care      ______________________________________________________________________  Chief Complaint:  Patient states breathing is improved  Denies shortness of breath  Denies chest pain      HPI/24hr events:   Transferred from [address] [place] [address] secondary to decompensated systolic heart failure believed to be secondary to atrial fibrillation for evaluation by electrophysiology today      Review of Systems   Constitutional: Negative  Negative for chills, diaphoresis and fatigue  HENT: Negative  Eyes: Negative  Respiratory: Negative  Negative for cough, choking, chest tightness and shortness of breath  Cardiovascular: Negative  Negative for chest pain and leg swelling  Gastrointestinal: Negative  Negative for abdominal pain, diarrhea and nausea  Endocrine: Negative  Genitourinary: Negative  Musculoskeletal: Negative  Skin: Negative  Allergic/Immunologic: Negative  Neurological: Negative  Negative for dizziness and light-headedness  Hematological: Negative  Psychiatric/Behavioral: Negative      All other systems reviewed and are negative     ______________________________________________________________________  Temperature: Temp (24hrs), Av 6 °F (36 4 °C), Min:97 1 °F (36 2 °C), Max:98 2 °F (36 8 °C)    Current Temperature: 98 2 °F (36 8 °C)    Vitals:    18 0400 18 0538 18 0558 18 0600   BP: 90/68 (!) 86/62  92/74   Pulse: (!) 136   (!) 144   Resp: 21   21   Temp:       TempSrc:       SpO2: 99%   99%   Weight:   65 5 kg (144 lb 6 4 oz)    Height:                  Weights:   IBW: 73 kg    Body mass index is 20 72 kg/m²  Weight (last 2 days)     Date/Time   Weight    18 0558  65 5 (144 4)    18 1939  67 7 (149 25)            Height: 5' 10" (177 8 cm)  SpO2: SpO2: 99 % on 2 L NC  ______________________________________________________________________  Physical Exam:     Physical Exam   Constitutional: He is oriented to person, place, and time  Vital signs are normal  He appears well-developed and well-nourished  He is cooperative  No distress  Nasal cannula in place  HENT:   Head: Normocephalic and atraumatic  Mouth/Throat: Uvula is midline and mucous membranes are normal  No oropharyngeal exudate  Eyes: Pupils are equal, round, and reactive to light  Right eye exhibits no exudate  Left eye exhibits no exudate  No scleral icterus  Neck: Neck supple  No JVD present  No tracheal tenderness present  Cardiovascular: Normal heart sounds  An irregularly irregular rhythm present  Tachycardia present  Pulses:       Radial pulses are 2+ on the right side, and 2+ on the left side  Dorsalis pedis pulses are 1+ on the right side, and 1+ on the left side  Pulmonary/Chest: No accessory muscle usage  No respiratory distress  He has no wheezes  He has no rhonchi  Abdominal: Soft  Bowel sounds are normal  There is no tenderness  There is no rigidity and no guarding  Neurological: He is alert and oriented to person, place, and time  Strength equal bilaterally   Skin: Skin is warm, dry and intact  No cyanosis  Nails show no clubbing     Chronic lower extremity is peripheral vascular disease skin changes noted     ______________________________________________________________________  Intake and Outputs:  I/O       11/21 0701 - 11/22 0700 11/22 0701 - 11/23 0700 11/23 0701 - 11/24 0700    I V  (mL/kg)  99 4 (1 5)     IV Piggyback  250     Total Intake(mL/kg)  349 4 (5 3)     Urine (mL/kg/hr)  1175     Total Output   1175      Net   -825 6                 UOP: /hour   Nutrition:        Diet Orders            Start     Ordered    11/23/18 0521  Diet NPO  Diet effective now     Question Answer Comment   Diet Type NPO    RD to adjust diet per protocol? Yes        11/23/18 0521 11/22/18 2025  Room Service  Once     Question:  Type of Service  Answer:  Room Service - Appropriate with Assistance    11/22/18 2024          Labs:     Results from last 7 days  Lab Units 11/22/18  0437 11/21/18  2226 11/21/18  0546  11/20/18  1632   WBC Thousand/uL 6 10  --  5 47  --  7 30   HEMOGLOBIN g/dL 13 6 14 1 13 1  --  15 0   I STAT HEMOGLOBIN   --   --   --   < >  --    HEMATOCRIT % 41 7 43 2 39 5  --  45 3   HEMATOCRIT, ISTAT   --   --   --   < >  --    PLATELETS Thousands/uL 37*  --  55*  --  75*   NEUTROS PCT %  --   --   --   --  70   MONOS PCT %  --   --   --   --  8   < > = values in this interval not displayed  Results from last 7 days  Lab Units 11/23/18  0543 11/22/18  2043 11/22/18  1520  11/20/18  1640 11/20/18  1632   SODIUM mmol/L 137 139 141  < >  --  135*   POTASSIUM mmol/L 4 4 3 3* 3 3*  < >  --  4 6   CHLORIDE mmol/L 105 103 102  < >  --  99*   CO2 mmol/L 27 29 26  < >  --  24   CO2, I-STAT   --   --   --   < > 25  --    BUN mg/dL 25 30* 32*  < >  --  40*   CREATININE mg/dL 1 50* 1 84* 1 93*  < >  --  1 83*   CALCIUM mg/dL 9 9 8 6 8 7  < >  --  9 4   ALK PHOS U/L  --  100  --   --   --  111   ALT U/L  --  166*  --   --   --  86*   AST U/L  --  147*  --   --   --  85*   GLUCOSE, ISTAT mg/dl  --   --   --   --  127  --    < > = values in this interval not displayed      Results from last 7 days  Lab Units 11/22/18 2043 11/22/18  0130   MAGNESIUM mg/dL 1 8 1 8       Results from last 7 days  Lab Units 11/22/18  2043   PHOSPHORUS mg/dL 2 9        Results from last 7 days  Lab Units 11/22/18 2043 11/20/18  1632   INR  2 65* 2 87*   PTT seconds 38 42*           0  Lab Value Date/Time   TROPONINI 0 02 11/23/2018 0543   TROPONINI 0 03 11/23/2018 0030   TROPONINI 0 04 11/22/2018 2043   TROPONINI 0 06 (H) 11/22/2018 0437   TROPONINI 0 07 (H) 11/22/2018 0130   TROPONINI 0 06 (H) 11/21/2018 2226   TROPONINI 0 06 (H) 11/21/2018 1923   TROPONINI 0 03 11/21/2018 1545   TROPONINI <0 02 11/21/2018 1147   TROPONINI <0 02 11/20/2018 1632   TROPONINI 0 02 05/02/2017 1432   TROPONINI 0 02 05/02/2017 1105   TROPONINI 0 02 05/02/2017 0648     Imaging:  No imaging this a m , chest x-ray post thoracentesis reviewed with improved aeration of lung fields, no obvious opacification  EKG:  AFib with RVR on tele  11/21 echo:  Left ventricular systolic function was severely reduced, EF of 20%, diffuse severe hypokinesis, akinesis of the apical anterior wall, akinesis of the mid anterior wall, akinesis of the mid anterior septal and apical septal walls, mild to moderate mitral valve regurgitation, mild-to-moderate aortic regurgitation, mild-to-moderate tricuspid regurgitation, small to moderate pericardial effusion was identified, no evidence of hemodynamic compromise  Micro:  Lab Results   Component Value Date    BLOODCX No Growth After 5 Days  05/02/2017    BLOODCX No Growth After 5 Days   05/02/2017     Allergies: No Known Allergies  Medications:   Scheduled Meds:  Current Facility-Administered Medications:  acetaminophen 650 mg Oral Q6H PRN Waleska Pavy, PA-C    amiodarone 200 mg Oral Daily With Breakfast Waleska Pavlian, PA-C    apixaban 5 mg Oral BID Waleska Pavy, PA-C    digoxin 250 mcg Oral Daily Waleska Pavlian PA-C    diltiazem 1-15 mg/hr Intravenous Titrated Waleska Qiana PA-C Last Rate: 15 mg/hr (11/23/18 7522)   diltiazem 60 mg Oral [facility], GERRI    famotidine 20 mg Oral Daily [facility], GERRI    furosemide 40 mg Intravenous BID (diuretic) Kellea Outjake, GERRI    metoprolol 5 mg Intravenous Q6H PRN [facility], PA-CHELLE    metoprolol tartrate 100 mg Oral Daily Elverna Outjake, PA-CHELLE    metoprolol tartrate 50 mg Oral After [employer], GERRI    ondansetron 4 mg Intravenous Q6H PRN [facility], PA-CHELLE    zolpidem 5 mg Oral HS PRN [facility], GERRI      Continuous Infusions:  diltiazem 1-15 mg/hr Last Rate: 15 mg/hr (11/23/18 0540)     PRN Meds:    acetaminophen 650 mg Q6H PRN   metoprolol 5 mg Q6H PRN   ondansetron 4 mg Q6H PRN   zolpidem 5 mg HS PRN       Invasive lines and devices: Invasive Devices     Peripheral Intravenous Line            Peripheral IV 11/20/18 Right Arm 2 days    Peripheral IV 11/21/18 Right Arm 1 day          Drain            Urethral Catheter Latex 16 Fr  1 day                   Counseling / Coordination of Care   0 minutes of critical care time    Code Status: Level 1 - Full Code    Portions of the record may have been created with voice recognition software  Occasional wrong word or "sound a like" substitutions may have occurred due to the inherent limitations of voice recognition software  Read the chart carefully and recognize, using context, where substitutions have occurred      BJ Lujan

## 2018-11-23 NOTE — CONSULTS
Consultation - Electrophysiology-Cardiology (EP)   Angella Pearson 72 y o  male MRN: [id number]  Unit/Bed#: [facility] 513-14 Encounter: [id number]      Inpatient consult to Electrophysiology  Consult performed by: Elo Cooper  Consult ordered by: Emily Ridley          Assessment/Plan   1  Permanent nonvalvular atrial fibrillation, symptomatic    * as an outpatient decision was made to keep patient in rate controlled in atrial fibrillation with his Cardiologist Dr Anthony Wynne due to past failed rhythm control attempt albeit there is not documentation of what these attempts were in EPIC    * patient's EF has dropped from 70% in 2017 to 20% in 2018 which has occurred in the past due to uncontrolled atrial fibrillation, see below    * he is still in atrial fibrillation w/ RVR with diltiazem gtt being utilized despite his depressed EF   * at this time we recommend cessation of diltiazem, utilization of amiodarone, one time dose of digoxin, up titration of his BB to toprol XL 100mg PO BID for adequate rate control    * due to his plt count we recommend holding his eliquis and consulting heme for further evaluation, see below    * patient has been in atrial fibrillation >1 year with no sinus ECGs being seen in MUSE at all, he only has documented atrial fibrillation since 2011 with echo's showing a dilated LA, patient is best suited with a rate control strategy with at least BiV PPM if not BiV ICD placement with AVJ ablation  However due to this plt count in the 30s one day ago we will hold off on this for now     2  Chronic thrombocytopenia    * demonstrated thrombocytopenia since 2017 but there is no documentation of this being worked up in the past    * recommend heme consult for further evaluation    * once plt's stabilize we will perform procedure for his atrial fibrillation     3   Acute systolic CHF   * EF down to 20% from 70% in 2018   * on IV diuresis with lasix 40mg BID    * monitor I's/O's, daily weights and daily BMP's    * gen cards seeing, consider CHF team consult    * likely tachy induced but he has no prior ischemic w/u    * switched his BB from lopressor to toprol XL, if his BP can tolerate this will add ACEI/ARB at some point     4  NICMP - see above   5  HTN       History of Present Illness   Physician Requesting Consult: Divya Gould,   Reason for Consult / Principal Problem: atrial fibrillation     HPI: Gloria Tim is a 72y o  year old male with a history of permanent nonvalvular atrial fibrillation AC w/ eliquis, HTN,  chronic diastolic dysfunction, thrombocytopenia who is HOD#0 after being transferred to [facility] from OSH for management of his atrial fibrillation and CHF  EP was consulted for further management of his atrial fibrillation  Patient has been managed as an outpatient by Dr Karin Phillip from [facility]  Apparently he failed rhythm control in the past, but with what I am unsure  He has had a rate control strategy with 3 AVNB agents being used to control his atrial fibrillation  In the past patient did have a tachycardic induced cardiomyopathy but this resolved in 2017 with his EF being 70%  He is on [facility] w/ eliquis  On 11-20-18 patient presented to [facility] due to concerns of SOB  He was found to be in atrial fibrillation w/ RVR as well volume overloaded with pleural effusions  Cardiology team evaluated the patient on 11-21-18 placing him on BiPAP w/ a 2-d echo being ordered showing a drop in his EF down to 20% without any regional wall motion abnormalities  Despite use of amiodarone PO and IV patient's HR's remained uncontrolled with patient being placed in the critical care unit  During his stay there he did undergo thoracocentesis removing 1500mL of pleural fluid from his right lung  He was able to be stopped on his BiPAP on 11-22-18 with transfer to our facility for further control of his atrial fibrillation       Today patient still has concerns of feeling weak but otherwise is not SOB, no chest discomfort, PND, LH, dizziness or palpitations  Review of Systems  ROS as noted above, otherwise 12 point review of systems was performed and is negative         Historical Information   Past Medical History:   Diagnosis Date    Atrial fibrillation ([address] )     Congestive heart failure with left ventricular diastolic dysfunction, chronic (HCC)     Hypertension     Peripheral vascular disease of lower extremity (HCC)     Subdural hematoma (HCC)      Past Surgical History:   Procedure Laterality Date    BACK SURGERY      AUSTYN HOLE FOR SUBDURAL HEMATOMA      CLAVICLE SURGERY      HERNIA REPAIR      PROSTATE SURGERY       History   Alcohol Use No     History   Drug Use No     History   Smoking Status    Never Smoker   Smokeless Tobacco    Never Used     Family History: non-contributory    Meds/Allergies   Hospital Medications: Current Facility-Administered Medications   Medication Dose Route Frequency    acetaminophen (TYLENOL) tablet 650 mg  650 mg Oral Q6H PRN    amiodarone (CORDARONE) 900 mg in dextrose 5 % 500 mL infusion  0 5 mg/min Intravenous Continuous    digoxin (LANOXIN) tablet 250 mcg  250 mcg Oral Daily    famotidine (PEPCID) tablet 20 mg  20 mg Oral Daily    furosemide (LASIX) injection 40 mg  40 mg Intravenous BID (diuretic)    metoprolol (LOPRESSOR) injection 5 mg  5 mg Intravenous Q6H PRN    metoprolol succinate (TOPROL-XL) 24 hr tablet 100 mg  100 mg Oral BID    ondansetron (ZOFRAN) injection 4 mg  4 mg Intravenous Q6H PRN    zolpidem (AMBIEN) tablet 5 mg  5 mg Oral HS PRN     Home Medications:   Prescriptions Prior to Admission   Medication    apixaban (ELIQUIS) 5 mg    Ascorbic Acid (VITAMIN C PO)    B Complex Vitamins (VITAMIN B COMPLEX PO)    clobetasol (TEMOVATE) 0 05 % GEL    diltiazem (TIAZAC) 360 MG 24 hr capsule    Ferrous Gluconate-C-Folic Acid (IRON-C PO)    folic acid (FOLVITE) 1 mg tablet    furosemide (LASIX) 20 mg tablet    Lactobacillus (ACIDOPHILUS PO)    Lycopene 10 MG CAPS    metoprolol tartrate (LOPRESSOR) 100 mg tablet    metoprolol tartrate (LOPRESSOR) 50 mg tablet    Misc Natural Products (SAW PALMETTO) CAPS    Multiple Vitamin (MULTI-DAY VITAMINS) TABS       No Known Allergies    Objective   Vitals: Blood pressure 112/67, pulse (!) 126, temperature 98 1 °F (36 7 °C), temperature source Oral, resp  rate 21, height 5' 10" (1 778 m), weight 65 5 kg (144 lb 6 4 oz), SpO2 98 %  Orthostatic Blood Pressures      Most Recent Value   Blood Pressure  112/67 filed at 11/23/2018 1400            Intake/Output Summary (Last 24 hours) at 11/23/18 1438  Last data filed at 11/23/18 1432   Gross per 24 hour   Intake           940 17 ml   Output             2575 ml   Net         -1634 83 ml       Invasive Devices     Peripheral Intravenous Line            Peripheral IV 11/20/18 Right Arm 2 days    Peripheral IV 11/21/18 Right Arm 1 day          Drain            Urethral Catheter Latex 16 Fr  2 days                Physical Exam   Constitutional: He is oriented to person, place, and time  He appears well-developed and well-nourished  HENT:   Head: Normocephalic and atraumatic  Eyes: Pupils are equal, round, and reactive to light  EOM are normal    Neck: Normal range of motion  Neck supple  Cardiovascular: An irregularly irregular rhythm present  Tachycardia present  Pulmonary/Chest: Effort normal and breath sounds normal    Abdominal: Soft  Bowel sounds are normal    Musculoskeletal: Normal range of motion  Neurological: He is alert and oriented to person, place, and time  Skin: Skin is warm and dry  Psychiatric: He has a normal mood and affect  Lab Results: I have personally reviewed pertinent lab results        Results from last 7 days  Lab Units 11/22/18  0437 11/21/18  2226 11/21/18  0546  11/20/18  1632   WBC Thousand/uL 6 10  --  5 47  --  7 30   HEMOGLOBIN g/dL 13 6 14 1 13 1  --  15 0   I STAT HEMOGLOBIN   --   --   --   < > --    HEMATOCRIT % 41 7 43 2 39 5  --  45 3   HEMATOCRIT, ISTAT   --   --   --   < >  --    PLATELETS Thousands/uL 37*  --  55*  --  75*   < > = values in this interval not displayed  Results from last 7 days  Lab Units 18  0543 18  1520  18  1640   POTASSIUM mmol/L 4 4 3 3* 3 3*  < >  --    CHLORIDE mmol/L 105 103 102  < >  --    CO2 mmol/L 27 29 26  < >  --    CO2, I-STAT   --   --   --   < > 25   BUN mg/dL 25 30* 32*  < >  --    CREATININE mg/dL 1 50* 1 84* 1 93*  < >  --    GLUCOSE, ISTAT mg/dl  --   --   --   --  127   CALCIUM mg/dL 9 9 8 6 8 7  < >  --    < > = values in this interval not displayed  Results from last 7 days  Lab Units 18  1632   INR  2 65* 2 87*   PTT seconds 38 42*       Results from last 7 days  Lab Units 18  0130   MAGNESIUM mg/dL 1 8 1 8       Imaging: I have personally reviewed pertinent reports  ECHO:   Results for orders placed during the hospital encounter of 18   Echo complete with contrast if indicated    Narrative [address]  [address]  [phone]    Transthoracic Echocardiogram  Limited 2D, M-mode, Doppler, and Color Doppler    Study date:  2018    Patient: Carlos Haines  MR number: [id number]  Account number: [de-identified]  : 1945  Age: 72 years  Gender: Male  Status: Inpatient  Location: Bedside  Height: 70 in  Weight: 148 7 lb  BP: 92/ 74 mmHg    Indications: AFIB Assess left ventricular function  Diagnoses: I48 0 - Atrial fibrillation    Sonographer:  SEGUNDO Tatum  Primary Physician:  Coleen Rodríguez MD  Referring Physician:  Joyce Conte MD  Group:  [facility]  Cardiology Fellow:  Thom Beatty MD  Interpreting Physician:  Barrera Wiley MD    SUMMARY    LEFT VENTRICLE:  Systolic function was markedly reduced  Ejection fraction was estimated to be 20 %    There was severe diffuse hypokinesis with regional variations- akinesis of the anteroseptal, anterior and possibly the inferior walls  The patient was tachycardic throughout the study due to which ejection fraction and regional wall motion was not accurately assessed  Wall thickness was mildly increased  The changes were consistent with concentric remodeling (increased wall thickness with normal wall mass)  RIGHT VENTRICLE:  The size was normal   Systolic function was reduced  LEFT ATRIUM:  The atrium was dilated  RIGHT ATRIUM:  The atrium was dilated  MITRAL VALVE:  There was moderate regurgitation  AORTIC VALVE:  There was mild to moderate regurgitation  TRICUSPID VALVE:  There was moderate regurgitation  PERICARDIUM:  A small, free-flowing pericardial effusion was identified anterior and posterior to the heart  The fluid had no internal echoes  There was no evidence of hemodynamic compromise  There was a moderate-sized left pleural effusion  COMPARISONS:  There has been no significant interval change  Comparison was made with the previous study of 21-Nov-2018  HISTORY: PRIOR HISTORY: HTN,CHF,CAD,AFIB,HLD,Dilated CM,Thrombocytopenia    PROCEDURE: The procedure was performed at the bedside  This was a routine study  The transthoracic approach was used  The study included limited 2D imaging, M-mode, limited spectral Doppler, and color Doppler  Image quality was good  LEFT VENTRICLE: Size was normal  Systolic function was markedly reduced  Ejection fraction was estimated to be 20 %  There was severe diffuse hypokinesis with regional variations- akinesis of the anteroseptal, anterior and possibly the  inferior walls  The patient was tachycardic throughout the study due to which ejection fraction and regional wall motion was not accurately assessed  Wall thickness was mildly increased  The changes were consistent with concentric  remodeling (increased wall thickness with normal wall mass)  DOPPLER: Transmitral flow pattern: atrial fibrillation  The study was not technically sufficient to allow evaluation of LV diastolic function  RIGHT VENTRICLE: The size was normal  Systolic function was reduced  LEFT ATRIUM: The atrium was dilated  RIGHT ATRIUM: The atrium was dilated  MITRAL VALVE: DOPPLER: There was moderate regurgitation  The regurgitant jet was centrally directed  AORTIC VALVE: The valve was trileaflet  Leaflets exhibited normal cuspal separation and sclerosis  DOPPLER: There was mild to moderate regurgitation  TRICUSPID VALVE: DOPPLER: There was moderate regurgitation  PULMONIC VALVE: DOPPLER: There was no significant regurgitation  PERICARDIUM: A small, free-flowing pericardial effusion was identified anterior and posterior to the heart  The fluid had no internal echoes  There was no evidence of hemodynamic compromise  There was a moderate-sized left pleural  effusion      SYSTEM MEASUREMENT TABLES    2D  %FS: 9 65 %  Ao Diam: 3 57 cm  EDV(Teich): 96 19 ml  EF(Teich): 21 27 %  ESV(Teich): 75 73 ml  IVSd: 1 08 cm  LVIDd: 4 58 cm  LVIDs: 4 13 cm  LVPWd: 0 97 cm  SV(Teich): 20 46 ml    [employer] Accredited Echocardiography Laboratory    Prepared and electronically signed by    Joby Mendoza MD  Signed [id number] 10:44:37         CATH/STRESS TEST:  None     EKG:

## 2018-11-23 NOTE — UTILIZATION REVIEW
Initial Clinical Review    Admission: Date/Time/Statement: 11/22/18 @ 1939     Orders Placed This Encounter   Procedures    Inpatient Admission     Standing Status:   Standing     Number of Occurrences:   1     Order Specific Question:   Admitting Physician     Answer:   Sarah Teran [[id number]]     Order Specific Question:   Level of Care     Answer:   Critical Care [15]     Order Specific Question:   Estimated length of stay     Answer:   More than 2 Midnights     Order Specific Question:   Certification     Answer:   I certify that inpatient services are medically necessary for this patient for a duration of greater than two midnights  See H&P and MD Progress Notes for additional information about the patient's course of treatment  Chief Complaint: No chief complaint on file  History of Illness: Dmitry Conley is a 72 y o  male with past medical history of dilated cardiomyopathy, chronic atrial fibrillation, chronic diastolic heart failure, HTN, hyperlipidemia, chronic thrombocytopenia, and PVD who presents to [facility] from [facility] secondary to atrial fibrillation with RVR and acute systolic heart failure secondary to tachycardic induced cardiomyopathy  Patient presented to [facility] ER on 11/20/2018 secondary to worsening fatigue and dyspnea on exertion  He states "that his inability to walk up a flight of stairs without becoming extremely fatigued and short of breathe is the reason I came to the hospital   Patient follows with his PCP and [facility] with the known history of chronic A  Fib  He had recently stopped taking digoxin at the discretion of his cardiologist but has continued to take metoprolol and Cardizem  Patient takes Eliquis for anticoagulation      At [facility] 50 was  Admitted and treated for A  Fib with RVR  Heart rate control was attempted with metoprolol, Cardizem, amiodarone, and digoxin without success  Cardiology was consulted  During his hopsital course he developed worsening shortness of breathe and acute hypoxic respiratory failure secondary to likely volume overload in the setting of tachycardic cardiomyopathy  TTE 11/21 revealed moderate pericardial effusion without hemodynamic compromise; LVEF 20% with severe diffuse hypokinesis; normal RV function  Patient transiently was treated with BiPAP for NIPPV which was weaned to off this morning per report  He was diuresed with Lasix  Secondary to persistent atrial tachycardia, after multiple attempts at rate control with AV node blocking agents, patient has been transferred to [facility] for EP Cardiology to evaluate      At this time, the patient reports he "feels better"  He denies SOB or dyspnea while in bed and reports that the NIPPV mask that he had been using was discontinued this morning  He denies chest pain, palpitations, back pain, or dizziness  He reports that "I become very short of breathe when I attempt to walk any amount of distance"  Patient reports "being hungry" and denies abdominal pain, nausea, or vomiting  ED Vital Signs:   ED Triage Vitals   Temperature Pulse Respirations Blood Pressure SpO2   11/22/18 1939 11/22/18 2000 11/22/18 2000 11/22/18 2000 11/22/18 2000   97 6 °F (36 4 °C) (!) 154 20 91/68 99 %      Temp Source Heart Rate Source Patient Position - Orthostatic VS BP Location FiO2 (%)   11/22/18 1939 -- -- -- --   Oral          Pain Score       11/22/18 2000       No Pain        Wt Readings from Last 1 Encounters:   11/23/18 65 5 kg (144 lb 6 4 oz)       Vital Signs (abnormal):   above    Abnormal Labs/Diagnostic Test Results:   CXR:     No significant change in small bilateral pleural effusions  Left retrocardiac opacity may represent atelectasis or infiltrate    Albumin   3 1  Total  Bili  1 96  AST    147  ALT   166  K  3 3  BUN/Creat   30/1 84  PT  28 3       INR   2 65  Troponin   0 06      0 07           Past Medical/Surgical History: Active Ambulatory Problems     Diagnosis Date Noted    Peripheral vascular disease of lower extremity ([place] [address] )     Hypertension     Atrial fibrillation ([place] [address] ) 05/02/2017    Thrombocytopenia ([place] [address] ) 05/04/2017    PALOMA (acute kidney injury) ([place] [address] ) 05/04/2017    Insomnia 05/04/2017    Coronary artery disease 12/17/2012    Hyperlipidemia 10/03/2013    Dilated cardiomyopathy ([place] [address] ) 11/21/2018    Acute on chronic combined systolic and diastolic heart failure ([place] [address] ) 11/22/2018     Resolved Ambulatory Problems     Diagnosis Date Noted    Acute respiratory failure with hypoxia ([place] [address] ) 05/02/2017    Severe sepsis ([place] [address] ) 05/02/2017    Pulmonary edema 05/04/2017    Influenza B 05/04/2017    Acute on chronic diastolic congestive heart failure ([place] [address] ) 10/03/2013    Paroxysmal atrial fibrillation ([place] [address] ) 10/10/2018    Primary myocardial disease ([place] [address] ) 10/04/2013     Past Medical History:   Diagnosis Date    Atrial fibrillation (HCC)     Congestive heart failure with left ventricular diastolic dysfunction, chronic (HCC)     Hypertension     Peripheral vascular disease of lower extremity (HCC)     Subdural hematoma (HCC)        Admitting Diagnosis: Atrial fibrillation ([place] [address] ) [I48 91]    Age/Sex: 72 y o  male    Assessment/Plan: Atrial Fibrillation with RVR and associated tachycardia induced cardiomyopathy              Patient currently asymptomatic with SBP in 100-120s MAPs 60-70s              Cardiology (EP) consult              Restart PO Cardizem, metoprolol, and digoxin                          Digoxin level pending in am              PO Amiodarone              Cardizem IV/gtt in efforts to improve heart rate control              Keep Mg > 2, K > 4              If no improvement with HR, to add Amio gtt to see if this improves HR control                If patient worsens clinically, low threshold to attempt cardioversion              Continue PO Eliquis at this time      2 ) Likely tachycardia induced cardiomyopathy & associated acute systolic heart failure              Attempt to improve HR control              Lasix 40 mg IV BID              No ACEI for now secondary to kidney function              Continue home metoprolol  Hx   of CAD              Trend cardiac enzymes              Repeat EKG in am     4 ) Acute Hypoxic Respiratory Failure secondary to acute systolic heart failure secondary to tachycardia induced cardiomyopathy              NIPPV weaned to off              F/U CXR with pulmonary edema and vascular congestion resolving     5 ) Transaminitis secondary to likely congestion versus low CO secondary to tachycardia induced acute systolic heart failure              Repeat LFTS in am     6 ) PALOMA secondary to likely cardiorenal syndrome versus low CO state              Need to optimize heart rate              Strict I's and O's  7 ) Hypokalemia & Hypomagnesemia- replete     8 ) Chronic Thrombocytopenia- recheck CBC in am  Given critical illness, patient length of stay will require greater than two midnights        Admission Orders:    KUSUM  11/22 @  2014  Scheduled Meds:   Current Facility-Administered Medications:  acetaminophen 650 mg Oral Q6H PRN Damon Schilder, PA-C    amiodarone 0 5 mg/min Intravenous Continuous Jay Maldonado PA-C Last Rate: 0 5 mg/min (11/23/18 1255)   digoxin 250 mcg Oral Daily Damon Schilder, PA-C    famotidine 20 mg Oral Daily Conner Morrell PA-C    furosemide 40 mg Intravenous BID (diuretic) Damon Schilder, PA-C    metoprolol 5 mg Intravenous Q6H PRN Damon Schilder, PA-C    [START ON 11/24/2018] metoprolol succinate 100 mg Oral Daily Jay Maldonado PA-C    ondansetron 4 mg Intravenous Q6H PRN Damon Schilder, PA-C    zolpidem 5 mg Oral HS PRN Damon Schilder, PA-C      Continuous Infusions:   amiodarone 0 5 mg/min Last Rate: 0 5 mg/min (11/23/18 1255)     PRN Meds:   acetaminophen    metoprolol    ondansetron    zolpidem     cardiac  Diet  Cons   Cardiology  Serial troponin  2 DE  Cons pulmonary  Dysphagia eval  Fall precautions  Neuro  Checks  Q 4 hrs  O2  2L  Cons  EPS  Cons hematology

## 2018-11-23 NOTE — SOCIAL WORK
Attempted to meet with the patient who requests CM return another time since he is not feeling will

## 2018-11-23 NOTE — CONSULTS
Oncology Consult Note  Neelima Mckay 72 y o  male MRN: [id number]  Unit/Bed#: [facility] 543-95 Encounter: [id number]      Presenting Complaint: Thrombocytopenia    History of Presenting Illness:      He is 72-year-old  male with atrial fibrillation, dilated cardiomyopathy, diastolic congestive heart failure, chronic thrombocytopenia with platelet counts in the range between 50,,000 for many years, dyslipidemia, he was admitted from quicker count secondary to atrial fibrillation with rapid ventricular response and acute congestive heart failure, he had a history of chronic atrial fibrillation, he did not take digoxin but he continued to be on metoprolol and Cardizem  Amiodarone was added this time for rate control  Also he had pericardial effusion moderate, ejection fraction of 20%,  On 11/20/2018 hemoglobin 15, platelets 35263, 12% neutrophils, 19% lymphocytes, WBC 7 3  01/05/2017 platelets 64857    The patient had been on Coumadin for long time and later on he was switched to apixaban 5 mg p o  B i d  He had easy bruisability as expected from apixaban    He was found to have persistent elevation of PT INR throughout his admission to the hospital    Denies any weight loss nausea vomiting diarrhea constipation dysuria hematuria melena hematochezia he reported dyspnea    He does not smoke or drink, no HIV risk factors he lives with his girlfriend    No family history of blood disorders          Review of Systems - As stated in the HPI otherwise the fourteen point review of systems was negative      Past Medical History:   Diagnosis Date    Atrial fibrillation ([address] )     Congestive heart failure with left ventricular diastolic dysfunction, chronic (HCC)     Hypertension     Peripheral vascular disease of lower extremity (HCC)     Subdural hematoma (HCC)        Social History     Social History    Marital status: Single     Spouse name: N/A    Number of children: N/A    Years of education: N/A Social History Main Topics    Smoking status: Never Smoker    Smokeless tobacco: Never Used    Alcohol use No    Drug use: No    Sexual activity: Not Asked     Other Topics Concern    None     Social History Narrative    None       Family History   Problem Relation Age of Onset    Heart disease Mother     Heart disease Father        No Known Allergies      Current Facility-Administered Medications:     acetaminophen (TYLENOL) tablet 650 mg, 650 mg, Oral, Q6H PRN, Khushboo Diaz PA-C    amiodarone (CORDARONE) 900 mg in dextrose 5 % 500 mL infusion, 0 5 mg/min, Intravenous, Continuous, Jay Maldonado PA-C, Last Rate: 16 7 mL/hr at 11/23/18 1255, 0 5 mg/min at 11/23/18 1255    digoxin (LANOXIN) tablet 250 mcg, 250 mcg, Oral, Daily, Khushboo Diaz PA-C, 250 mcg at 11/23/18 0906    esmolol (BREVIBLOC) 2500 mg/250 mL IV infusion (premix),  mcg/kg/min, Intravenous, Titrated, BJ Burden    famotidine (PEPCID) tablet 20 mg, 20 mg, Oral, Daily, Khushboo Diaz PA-C, 20 mg at 11/23/18 0856    furosemide (LASIX) injection 40 mg, 40 mg, Intravenous, BID (diuretic), Khushboo Diaz PA-C, 40 mg at 11/23/18 1605    metoprolol (LOPRESSOR) injection 5 mg, 5 mg, Intravenous, Q6H PRN, Khushboo Diaz PA-C    metoprolol tartrate (LOPRESSOR) tablet 50 mg, 50 mg, Oral, Q6H, BJ Burden, 50 mg at 11/23/18 1607    ondansetron (ZOFRAN) injection 4 mg, 4 mg, Intravenous, Q6H PRN, Khushboo Diaz PA-C    zolpidem (AMBIEN) tablet 5 mg, 5 mg, Oral, HS PRN, Conner Morrell PA-C      BP 98/65   Pulse (!) 136   Temp 98 2 °F (36 8 °C) (Oral)   Resp 21   Ht 5' 10" (1 778 m)   Wt 65 5 kg (144 lb 6 4 oz)   SpO2 98%   BMI 20 72 kg/m²       General Appearance:    Alert, oriented        Eyes:    PERRL   Ears:    Normal external ear canals, both ears   Nose:   Nares normal, septum midline   Throat:   Mucosa moist  Pharynx without injection      Neck:   Supple       Lungs:     Clear to auscultation bilaterally   Chest Wall:    No tenderness or deformity    Heart:     irregular rate and rhythm       Abdomen:     Soft, non-tender, bowel sounds +, no organomegaly           Extremities:   Extremities no cyanosis or edema, ecchymosis of the upper extremity       Skin:   no rash or icterus      Lymph nodes:   Cervical, supraclavicular, and axillary nodes normal   Neurologic:   CNII-XII intact, normal strength, sensation and reflexes     Throughout               Recent Results (from the past 48 hour(s))   Body fluid culture (Pleural Fluid Culture) and Gram stain    Collection Time: 11/21/18  7:12 PM   Result Value Ref Range    Body Fluid Culture, Sterile No growth     Gram Stain Result 2+ Mononuclear Cells     Gram Stain Result No Polys or Bacteria seen    Lactate dehydrogenase, body fluid    Collection Time: 11/21/18  7:12 PM   Result Value Ref Range    LD, Fluid 107 U/L   Protein, body fluid    Collection Time: 11/21/18  7:12 PM   Result Value Ref Range    Protein, Fluid 2 2 g/dL   Troponin I    Collection Time: 11/21/18  7:23 PM   Result Value Ref Range    Troponin I 0 06 (H) <=0 04 ng/mL   LD,Blood    Collection Time: 11/21/18  7:23 PM   Result Value Ref Range     (H) 81 - 234 U/L   Protein, total    Collection Time: 11/21/18  7:23 PM   Result Value Ref Range    Total Protein 6 1 (L) 6 4 - 8 2 g/dL   pH, body fluid    Collection Time: 11/21/18  7:31 PM   Result Value Ref Range    PH BODY FLUID 7 4    Body fluid white cell count with differential    Collection Time: 11/21/18  7:32 PM   Result Value Ref Range    Site Pleural, Right     Color, Fluid Dk Yellow Clear, Colorless,Yellow    Clarity, Fluid Cloudy (A) Clear    WBC, Fluid 913 /ul   Body Fluid Diff    Collection Time: 11/21/18  7:32 PM   Result Value Ref Range    Total Counted 100     Neutrophils % (Fluid) 1 %    Lymphs % (Fluid) 90 %    Mesothelial % (Fluid) 1 %    Histiocyte % (Fluid) 7 %    Monocytes % (Fluid) 1 %   Hemoglobin and hematocrit, blood    Collection Time: 11/21/18 10:26 PM   Result Value Ref Range    Hemoglobin 14 1 12 0 - 17 0 g/dL    Hematocrit 43 2 36 5 - 49 3 %   Troponin I    Collection Time: 11/21/18 10:26 PM   Result Value Ref Range    Troponin I 0 06 (H) <=0 04 ng/mL   Troponin I    Collection Time: 11/22/18  1:30 AM   Result Value Ref Range    Troponin I 0 07 (H) <=0 04 ng/mL   Magnesium    Collection Time: 11/22/18  1:30 AM   Result Value Ref Range    Magnesium 1 8 1 6 - 2 6 mg/dL   Troponin I    Collection Time: 11/22/18  4:37 AM   Result Value Ref Range    Troponin I 0 06 (H) <=0 04 ng/mL   Basic metabolic panel    Collection Time: 11/22/18  4:37 AM   Result Value Ref Range    Sodium 141 136 - 145 mmol/L    Potassium 4 0 3 5 - 5 3 mmol/L    Chloride 105 100 - 108 mmol/L    CO2 25 21 - 32 mmol/L    ANION GAP 11 4 - 13 mmol/L    BUN 36 (H) 5 - 25 mg/dL    Creatinine 2 13 (H) 0 60 - 1 30 mg/dL    Glucose 121 65 - 140 mg/dL    Calcium 8 7 8 3 - 10 1 mg/dL    eGFR 30 ml/min/1 73sq m   CBC    Collection Time: 11/22/18  4:37 AM   Result Value Ref Range    WBC 6 10 4 31 - 10 16 Thousand/uL    RBC 4 35 3 88 - 5 62 Million/uL    Hemoglobin 13 6 12 0 - 17 0 g/dL    Hematocrit 41 7 36 5 - 49 3 %    MCV 96 82 - 98 fL    MCH 31 3 26 8 - 34 3 pg    MCHC 32 6 31 4 - 37 4 g/dL    RDW 19 4 (H) 11 6 - 15 1 %    Platelets 37 (LL) 193 - 390 Thousands/uL   Digoxin level    Collection Time: 11/22/18  4:37 AM   Result Value Ref Range    Digoxin Lvl 1 9 0 8 - 2 0 ng/mL   Fingerstick Glucose (POCT)    Collection Time: 11/22/18  6:23 AM   Result Value Ref Range    POC Glucose 111 65 - 140 mg/dl   Basic metabolic panel    Collection Time: 11/22/18  3:20 PM   Result Value Ref Range    Sodium 141 136 - 145 mmol/L    Potassium 3 3 (L) 3 5 - 5 3 mmol/L    Chloride 102 100 - 108 mmol/L    CO2 26 21 - 32 mmol/L    ANION GAP 13 4 - 13 mmol/L    BUN 32 (H) 5 - 25 mg/dL    Creatinine 1 93 (H) 0 60 - 1 30 mg/dL    Glucose 113 65 - 140 mg/dL    Calcium 8 7 8 3 - 10 1 mg/dL    eGFR 34 ml/min/1 73sq m   ECG 12 lead    Collection Time: 11/22/18  7:24 PM   Result Value Ref Range    Ventricular Rate 158 BPM    Atrial Rate 158 BPM    DC Interval  ms    QRSD Interval 88 ms    QT Interval 288 ms    QTC Interval 467 ms    P Axis  degrees    QRS Axis -38 degrees    T Wave Axis 236 degrees   Comprehensive metabolic panel    Collection Time: 11/22/18  8:43 PM   Result Value Ref Range    Sodium 139 136 - 145 mmol/L    Potassium 3 3 (L) 3 5 - 5 3 mmol/L    Chloride 103 100 - 108 mmol/L    CO2 29 21 - 32 mmol/L    ANION GAP 7 4 - 13 mmol/L    BUN 30 (H) 5 - 25 mg/dL    Creatinine 1 84 (H) 0 60 - 1 30 mg/dL    Glucose 99 65 - 140 mg/dL    Calcium 8 6 8 3 - 10 1 mg/dL     (H) 5 - 45 U/L     (H) 12 - 78 U/L    Alkaline Phosphatase 100 46 - 116 U/L    Total Protein 5 9 (L) 6 4 - 8 2 g/dL    Albumin 3 1 (L) 3 5 - 5 0 g/dL    Total Bilirubin 1 96 (H) 0 20 - 1 00 mg/dL    eGFR 36 ml/min/1 73sq m   Magnesium    Collection Time: 11/22/18  8:43 PM   Result Value Ref Range    Magnesium 1 8 1 6 - 2 6 mg/dL   Phosphorus    Collection Time: 11/22/18  8:43 PM   Result Value Ref Range    Phosphorus 2 9 2 3 - 4 1 mg/dL   Calcium, ionized    Collection Time: 11/22/18  8:43 PM   Result Value Ref Range    Calcium, Ionized 1 10 (L) 1 12 - 1 32 mmol/L   APTT    Collection Time: 11/22/18  8:43 PM   Result Value Ref Range    PTT 38 26 - 38 seconds   Protime-INR    Collection Time: 11/22/18  8:43 PM   Result Value Ref Range    Protime 28 3 (H) 11 8 - 14 2 seconds    INR 2 65 (H) 0 86 - 1 17   TSH WITH REFLEX TO FREE T4    Collection Time: 11/22/18  8:43 PM   Result Value Ref Range    TSH 3RD GENERATON 1 080 0 358 - 3 740 uIU/mL   Troponin I    Collection Time: 11/22/18  8:43 PM   Result Value Ref Range    Troponin I 0 04 <=0 04 ng/mL   Troponin I    Collection Time: 11/23/18 12:30 AM   Result Value Ref Range    Troponin I 0 03 <=0 04 ng/mL   Troponin I    Collection Time: 11/23/18  5:43 AM   Result Value Ref Range Troponin I 0 02 <=0 04 ng/mL   Digoxin level    Collection Time: 11/23/18  5:43 AM   Result Value Ref Range    Digoxin Lvl 1 9 0 8 - 2 0 ng/mL   Basic metabolic panel    Collection Time: 11/23/18  5:43 AM   Result Value Ref Range    Sodium 137 136 - 145 mmol/L    Potassium 4 4 3 5 - 5 3 mmol/L    Chloride 105 100 - 108 mmol/L    CO2 27 21 - 32 mmol/L    ANION GAP 5 4 - 13 mmol/L    BUN 25 5 - 25 mg/dL    Creatinine 1 50 (H) 0 60 - 1 30 mg/dL    Glucose 96 65 - 140 mg/dL    Calcium 9 9 8 3 - 10 1 mg/dL    eGFR 46 ml/min/1 73sq m   Folate    Collection Time: 11/23/18  9:10 AM   Result Value Ref Range    Folate >20 0 (H) 3 1 - 17 5 ng/mL   Hemolysis Smear    Collection Time: 11/23/18  9:10 AM   Result Value Ref Range    Hemolysis Smear Schistocytes noted  Helmet Cells noted    Hepatic function panel    Collection Time: 11/23/18  9:10 AM   Result Value Ref Range    Total Bilirubin 2 05 (H) 0 20 - 1 00 mg/dL    Bilirubin, Direct 0 94 (H) 0 00 - 0 20 mg/dL    Alkaline Phosphatase 90 46 - 116 U/L     (H) 5 - 45 U/L     (H) 12 - 78 U/L    Total Protein 5 5 (L) 6 4 - 8 2 g/dL    Albumin 2 9 (L) 3 5 - 5 0 g/dL   Fibrinogen    Collection Time: 11/23/18  2:38 PM   Result Value Ref Range    Fibrinogen 439 227 - 495 mg/dL   Rapid HIV 1/2 AB-AG Combo    Collection Time: 11/23/18  2:38 PM   Result Value Ref Range    Rapid HIV 1 AND 2 Non-Reactive Non-Reactive    HIV-1 P24 Ag Screen Non-Reactive Non-Reactive   APTT    Collection Time: 11/23/18  2:38 PM   Result Value Ref Range    PTT 37 26 - 38 seconds   Basic metabolic panel    Collection Time: 11/23/18  2:38 PM   Result Value Ref Range    Sodium 137 136 - 145 mmol/L    Potassium 3 4 (L) 3 5 - 5 3 mmol/L    Chloride 101 100 - 108 mmol/L    CO2 28 21 - 32 mmol/L    ANION GAP 8 4 - 13 mmol/L    BUN 23 5 - 25 mg/dL    Creatinine 1 42 (H) 0 60 - 1 30 mg/dL    Glucose 122 65 - 140 mg/dL    Calcium 9 8 8 3 - 10 1 mg/dL    eGFR 49 ml/min/1 73sq m   Magnesium    Collection Time: 11/23/18  2:38 PM   Result Value Ref Range    Magnesium 2 4 1 6 - 2 6 mg/dL         Xr Chest Portable    Result Date: 11/22/2018  Narrative: CHEST INDICATION: Follow-up pleural effusion  COMPARISON:  11/21/2018, 5/3/2017 EXAM PERFORMED/VIEWS:  XR CHEST PORTABLE FINDINGS:  Monitoring leads and clips project over the chest  Cardiomediastinal silhouette appears unremarkable  Small bilateral pleural effusions again identified  Left retrocardiac opacity may represent atelectasis or infiltrate  Normal pulmonary vasculature  No pneumothorax  Chronic right clavicle fracture better visualized on previous studies  Fixation hardware seen along the left clavicle as before  Impression: No significant change in small bilateral pleural effusions  Left retrocardiac opacity may represent atelectasis or infiltrate  Workstation performed: [id number]     Xr Chest Portable    Result Date: 11/21/2018  Narrative: CHEST INDICATION:   post R thoracentesis  COMPARISON:  11/21/2018 EXAM PERFORMED/VIEWS:  XR CHEST PORTABLE FINDINGS: Cardiomediastinal silhouette appears unremarkable  No congestive failure  No pneumothorax  Small basilar effusions bilaterally  The right effusion is markedly improved compared to the prior examination  Previously seen pulmonary edema is resolved  Osseous structures appear within normal limits for patient age  Impression: Resolved pulmonary edema  Small basilar effusions, markedly improved on the right  Workstation performed: [id number]     Xr Chest Portable    Result Date: 11/21/2018  Narrative: CHEST INDICATION:   f/u bronchoscopy  COMPARISON:  11/20/2018  EXAM PERFORMED/VIEWS:  XR CHEST PORTABLE FINDINGS: Cardiomediastinal silhouette appears unremarkable  There is new, diffusely increased interstitial prominence bilaterally consistent with moderate fluid overload  Unchanged small bilateral pleural effusions, right greater than left  Partially imaged orthopedic hardware in the left clavicle  Visualized osseous structures appear otherwise unremarkable for the patient's age  Impression: Interval development of moderate vascular congestion  Stable small bilateral pleural effusions  Workstation performed: [id number]     Xr Chest Portable    Result Date: 11/20/2018  Narrative: CHEST INDICATION:   chest pain  COMPARISON:  5/5/2017 EXAM PERFORMED/VIEWS:  XR CHEST PORTABLE FINDINGS: Heart shadow is enlarged but unchanged from prior exam  Bibasilar atelectasis/infiltrate with small effusions, right greater than left  Osseous structures appear within normal limits for patient age  Plate and screw fixation hardware mid left clavicle  Impression: Bibasilar atelectasis/infiltrate with small effusions, right greater than left  Workstation performed: [id number]       Assessment and plan:  1  Persistent thrombocytopenia since at least 2017, chronic, 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

## 2018-11-23 NOTE — NUTRITION
11/23/18 1131   Recommendations/Interventions   Nutrition Recommendations Initiate diet  (when medically appropriate to advance diet suggest 2g Na diet with ensure compact BID)

## 2018-11-23 NOTE — PLAN OF CARE

## 2018-11-23 NOTE — CONSULTS
Consultation - Cardiology   Payam Nance 72 y o  male MRN: [id number]  Unit/Bed#: [facility] 928-07 Encounter: [id number]    Assessment/Plan     Assessment/plan:    1  Atrial fibrillation rapid ventricular response  2  Dilated cardiomyopathy  3  Hypertension  4  Coronary artery disease  5  Acute on chronic systolic heart failure  6  Thrombocytopenia    -echocardiogram from 11/21/2018 read as left ventricular systolic function severely reduced estimated LVEF 20% with severe diffuse hypokinesis mild-to-moderate mitral regurgitation mild to moderate aortic regurgitation and a small to moderate pericardial effusion with no evidence of hemodynamic compromise   -will follow up repeat limited study performed today to assess LV function as patient significantly diuresed while at previous hospital  -continue current medical therapy with amiodarone 200 mg daily, Eliquis 5 mg twice daily, digoxin 250 mcg daily, diltiazem 60 mg every 8 hours, furosemide 40 mg IV twice daily, metoprolol 100 mg in the morning and 50 mg in the evening  -can also continue Cardizem infusion at this time for assistance with rate control  -continue IV diuresis at this time as patient's creatinine appears to be improving however will monitor this as well as electrolytes closely with goal potassium 4 0, magnesium 2 0 with eventual plan to transition patient back to oral diuretic therapy  -will follow up electrophysiology recommendations for timing of AV mary anne ablation and ICD placement        History of Present Illness   Physician Requesting Consult: Jason Shultz DO  Reason for Consult / Principal Problem:  Atrial fibrillation rapid ventricular response  HPI: Payam Nance is a 72y o  year old male past medical history significant for hypertension, hyperlipidemia, atrial fibrillation who was transferred from [facility] in quick count for further evaluation after being found to have shortness of breath as well as atrial fibrillation with rapid ventricular response and a likely tachy mediated cardiomyopathy  Patient underwent rate control with amiodarone, digoxin, calcium channel blocker, and beta blocker with little improvement in his rates  Patient also underwent at that time diuresis  It was recommended that patient be transferred to [facility] for electrophysiology consultation for potential AV node ablation in the setting of a significant likely tachy induced cardiomyopathy  Today patient notes feeling better with significant improvement in his shortness of breath at rest however notes still dyspneic on exertion  He denies any palpitations or chest pain, denies any lightheadedness or dizziness, nausea/vomiting/diarrhea or abdominal pain  He notes significant improvement in his lower extremity edema  Consults    Review of Systems   Constitutional: Negative for chills, diaphoresis and fever  HENT: Negative for trouble swallowing and voice change  Eyes: Negative for pain and itching  Respiratory: Positive for shortness of breath  Negative for wheezing  Cardiovascular: Negative for chest pain, palpitations and leg swelling  Gastrointestinal: Negative for abdominal pain, constipation, diarrhea, nausea and vomiting  Genitourinary: Negative for dysuria  Musculoskeletal: Negative for neck pain and neck stiffness  Skin: Negative for rash  Neurological: Negative for dizziness, seizures, syncope and light-headedness  Psychiatric/Behavioral: Negative for agitation and confusion  All other systems reviewed and are negative        Historical Information   Past Medical History:   Diagnosis Date    Atrial fibrillation ([address] )     Congestive heart failure with left ventricular diastolic dysfunction, chronic (HCC)     Hypertension     Peripheral vascular disease of lower extremity (HCC)     Subdural hematoma (HCC)      Past Surgical History:   Procedure Laterality Date    BACK SURGERY      AUSTYN HOLE FOR SUBDURAL HEMATOMA      CLAVICLE SURGERY      HERNIA REPAIR      PROSTATE SURGERY       History   Alcohol Use No     History   Drug Use No     History   Smoking Status    Never Smoker   Smokeless Tobacco    Never Used     Family History:   Family History   Problem Relation Age of Onset    Heart disease Mother     Heart disease Father        Meds/Allergies   all current active meds have been reviewed  No Known Allergies    Objective   Vitals: Blood pressure 92/74, pulse (!) 144, temperature (!) 97 4 °F (36 3 °C), temperature source Oral, resp  rate 21, height 5' 10" (1 778 m), weight 65 5 kg (144 lb 6 4 oz), SpO2 99 %  Orthostatic Blood Pressures      Most Recent Value   Blood Pressure  92/74 filed at 11/23/2018 0600            Intake/Output Summary (Last 24 hours) at 11/23/18 0849  Last data filed at 11/23/18 0600   Gross per 24 hour   Intake           349 42 ml   Output             1175 ml   Net          -825 58 ml       Invasive Devices     Peripheral Intravenous Line            Peripheral IV 11/20/18 Right Arm 2 days    Peripheral IV 11/21/18 Right Arm 1 day          Drain            Urethral Catheter Latex 16 Fr  1 day                Physical Exam   Constitutional: No distress  HENT:   Head: Normocephalic and atraumatic  Nasal cannula oxygen in place   Eyes: Pupils are equal, round, and reactive to light  No scleral icterus  Neck: No JVD present  No tracheal deviation present  Cardiovascular:   Tachycardic, irregularly irregular rhythm   Pulmonary/Chest: No respiratory distress  Decreased breath sounds bilaterally however no wheezing or rhonchi appreciated   Abdominal: Soft  Bowel sounds are normal  There is no tenderness  Musculoskeletal: He exhibits no edema or tenderness  Neurological:   Awake, alert, able answer questions appropriately   Skin: Skin is warm and dry  He is not diaphoretic  Psychiatric: He has a normal mood and affect         Lab Results: I have personally reviewed pertinent lab results  Imaging: I have personally reviewed pertinent reports      EKG:  Atrial fibrillation with with rapid ventricular response      Code Status: Level 1 - Full Code  Advance Directive and Living Will:      Power of :    POLST:

## 2018-11-23 NOTE — H&P
History and Physical - Critical Care   Jose Cadet 72 y o  male MRN: [id number]  Unit/Bed#: [facility] 518-01 Encounter: [id number]    Reason for Admission / Chief Complaint:     History of Present Illness:  Jose Cadet is a 72 y o  male with past medical history of dilated cardiomyopathy, chronic atrial fibrillation, chronic diastolic heart failure, HTN, hyperlipidemia, chronic thrombocytopenia, and PVD who presents to [facility] (formerly [facility]3] 5 from [facility] secondary to atrial fibrillation with RVR and acute systolic heart failure secondary to tachycardic induced cardiomyopathy  Patient presented to [facility] ER on 11/20/2018 secondary to worsening fatigue and dyspnea on exertion  He states "that his inability to walk up a flight of stairs without becoming extremely fatigued and short of breathe is the reason I came to the hospital   Patient follows with his PCP and [facility] with the known history of chronic A  Fib  He had recently stopped taking digoxin at the discretion of his cardiologist but has continued to take metoprolol and Cardizem  Patient takes Eliquis for anticoagulation  At [facility] 50 was  Admitted and treated for A  Fib with RVR  Heart rate control was attempted with metoprolol, Cardizem, amiodarone, and digoxin without success  Cardiology was consulted  During his hopsital course he developed worsening shortness of breathe and acute hypoxic respiratory failure secondary to likely volume overload in the setting of tachycardic cardiomyopathy  TTE 11/21 revealed moderate pericardial effusion without hemodynamic compromise; LVEF 20% with severe diffuse hypokinesis; normal RV function  Patient transiently was treated with BiPAP for NIPPV which was weaned to off this morning per report  He was diuresed with Lasix    Secondary to persistent atrial tachycardia, after multiple attempts at rate control with AV node blocking agents, patient has been transferred to [facility] for EP Cardiology to evaluate  At this time, the patient reports he "feels better"  He denies SOB or dyspnea while in bed and reports that the NIPPV mask that he had been using was discontinued this morning  He denies chest pain, palpitations, back pain, or dizziness  He reports that "I become very short of breathe when I attempt to walk any amount of distance"  Patient reports "being hungry" and denies abdominal pain, nausea, or vomiting  History obtained from chart review and the patient      Past Medical History:  Past Medical History:   Diagnosis Date    Atrial fibrillation (HCC)     Congestive heart failure with left ventricular diastolic dysfunction, chronic (HCC)     Hypertension     Peripheral vascular disease of lower extremity (HCC)     Subdural hematoma (HCC)      Past Surgical History:  Past Surgical History:   Procedure Laterality Date    BACK SURGERY      AUSTYN HOLE FOR SUBDURAL HEMATOMA      CLAVICLE SURGERY      HERNIA REPAIR      PROSTATE SURGERY       Past Family History:  Family History   Problem Relation Age of Onset    Heart disease Mother     Heart disease Father      Social History:  History   Smoking Status    Never Smoker   Smokeless Tobacco    Never Used     History   Alcohol Use No     History   Drug Use No     Marital Status: Single    Medications:  Current Facility-Administered Medications   Medication Dose Route Frequency    acetaminophen (TYLENOL) tablet 650 mg  650 mg Oral Q6H PRN    [START ON 11/23/2018] amiodarone tablet 200 mg  200 mg Oral Daily With Breakfast    apixaban (ELIQUIS) tablet 5 mg  5 mg Oral BID    [START ON 11/23/2018] digoxin (LANOXIN) tablet 250 mcg  250 mcg Oral Daily    diltiazem (CARDIZEM) 125 mg in sodium chloride 0 9 % 125 mL infusion  1-15 mg/hr Intravenous Titrated    diltiazem (CARDIZEM) tablet 60 mg  60 mg Oral Q8H [address]    famotidine (PEPCID) tablet 20 mg  20 mg Oral Daily    [START ON 11/23/2018] furosemide (LASIX) injection 40 mg 40 mg Intravenous BID (diuretic)    metoprolol (LOPRESSOR) injection 5 mg  5 mg Intravenous Q6H PRN    [START ON 11/23/2018] metoprolol tartrate (LOPRESSOR) tablet 100 mg  100 mg Oral Daily    metoprolol tartrate (LOPRESSOR) tablet 50 mg  50 mg Oral After Dinner    ondansetron (ZOFRAN) injection 4 mg  4 mg Intravenous Q6H PRN    potassium chloride 20 mEq IVPB (premix)  20 mEq Intravenous Once    Followed by   Cora Hodgson ON 11/23/2018] potassium chloride 20 mEq IVPB (premix)  20 mEq Intravenous Once    zolpidem (AMBIEN) tablet 5 mg  5 mg Oral HS PRN     Home medications:  Prior to Admission medications    Medication Sig Start Date End Date Taking?  Authorizing Provider   apixaban (ELIQUIS) 5 mg Take 1 tablet (5 mg total) by mouth 2 (two) times a day 10/10/18   Rosangela Lucio MD   Ascorbic Acid (VITAMIN C PO) Take by mouth    Historical Provider, MD   B Complex Vitamins (VITAMIN B COMPLEX PO) Take by mouth    Historical Provider, MD   clobetasol (TEMOVATE) 0 05 % GEL Apply topically as needed   4/26/16   Historical Provider, MD   diltiazem (TIAZAC) 360 MG 24 hr capsule Take 1 capsule (360 mg total) by mouth daily 10/10/18   Rosangela Lucio MD   Ferrous Gluconate-C-Folic Acid (IRON-C PO) Take by mouth    Historical Provider, MD   folic acid (FOLVITE) 1 mg tablet Take 1 mg by mouth daily    Historical Provider, MD   furosemide (LASIX) 20 mg tablet Take 1 tablet (20 mg total) by mouth daily for 30 days 10/10/18 11/9/18  Rosangela Lucio MD   Lactobacillus (ACIDOPHILUS PO) Take by mouth    Historical Provider, MD   Lycopene 10 MG CAPS Take by mouth    Historical Provider, MD   metoprolol tartrate (LOPRESSOR) 100 mg tablet TAKE ONE TABLET BY MOUTH IN THE MORNING THEN ONE-HALF TABLET IN THE AFTERNOON AND ONE TABLET AT NIGHT  Patient taking differently: Take 100 mg by mouth daily   10/10/18   Rosangela Lucio MD   metoprolol tartrate (LOPRESSOR) 50 mg tablet Take 50 mg by mouth daily with dinner    Historical Provider, MD Misc Natural Products (SAW PALMETTO) CAPS Take by mouth    Historical Provider, MD   Multiple Vitamin (MULTI-DAY VITAMINS) TABS Take by mouth    Historical Provider, MD     Allergies:  No Known Allergies    ROS:   Review of Systems   Constitutional: Positive for activity change and fatigue  Negative for appetite change, chills, diaphoresis, fever and unexpected weight change  HENT: Negative  Eyes: Negative  Respiratory: Negative  Cardiovascular: Negative  Gastrointestinal: Negative  Endocrine: Negative  Genitourinary: Negative  Musculoskeletal: Negative  Skin: Negative  Allergic/Immunologic: Negative  Neurological: Negative  Hematological: Negative  Psychiatric/Behavioral: Negative  Vitals:  Vitals:    18 1939 18   BP:  106/75 106/75   Pulse:   (!) 158   Temp: 97 6 °F (36 4 °C)     TempSrc: Oral     Weight: 67 7 kg (149 lb 4 oz)     Height: 5' 10" (1 778 m)       Temperature:   Temp (24hrs), Av 4 °F (36 3 °C), Min:97 °F (36 1 °C), Max:97 8 °F (36 6 °C)    Current Temperature: 97 6 °F (36 4 °C)    Weights:   IBW: 73 kg  Body mass index is 21 42 kg/m²  Non-Invasive/Invasive Ventilation Settings:    2L NC, pulse oximetry 95%     Physical Exam:  Physical Exam   Constitutional: He is oriented to person, place, and time  He appears well-developed and well-nourished  No distress  HENT:   Head: Normocephalic and atraumatic  Right Ear: External ear normal    Left Ear: External ear normal    Nose: Nose normal    Mouth/Throat: Oropharynx is clear and moist  No oropharyngeal exudate  Eyes: Pupils are equal, round, and reactive to light  Conjunctivae and EOM are normal  Right eye exhibits no discharge  Left eye exhibits no discharge  No scleral icterus  Neck: Normal range of motion  Neck supple  No JVD present  No tracheal deviation present  Cardiovascular: Intact distal pulses  Exam reveals no friction rub  Murmur heard    IRRR with -150s   Pulmonary/Chest: Effort normal  No stridor  No respiratory distress  He has no wheezes  He has no rales  He exhibits no tenderness  BS diminished at bases   Abdominal: Soft  He exhibits no distension  There is no tenderness  There is no rebound and no guarding  Genitourinary: Penis normal  No penile tenderness  Musculoskeletal: Normal range of motion  He exhibits no edema, tenderness or deformity  Neurological: He is alert and oriented to person, place, and time  He has normal reflexes  No cranial nerve deficit  He exhibits normal muscle tone  Skin: Skin is warm and dry  No rash noted  He is not diaphoretic  No erythema  No pallor  Psychiatric: He has a normal mood and affect   His behavior is normal      Labs:    Results from last 7 days  Lab Units 11/22/18  0437 11/21/18  2226 11/21/18  0546 11/20/18  1640 11/20/18  1632   WBC Thousand/uL 6 10  --  5 47  --  7 30   HEMOGLOBIN g/dL 13 6 14 1 13 1  --  15 0   I STAT HEMOGLOBIN g/dl  --   --   --  15 0  --    HEMATOCRIT % 41 7 43 2 39 5  --  45 3   HEMATOCRIT, ISTAT %  --   --   --  44  --    PLATELETS Thousands/uL 37*  --  55*  --  75*   NEUTROS PCT %  --   --   --   --  70   MONOS PCT %  --   --   --   --  8       Results from last 7 days  Lab Units 11/22/18  2043 11/22/18  1520 11/22/18  0437 11/21/18  1539 11/21/18  0546 11/20/18  1640 11/20/18  1632   SODIUM mmol/L 139 141 141  --  139  --  135*   POTASSIUM mmol/L 3 3* 3 3* 4 0  --  3 6  --  4 6   CHLORIDE mmol/L 103 102 105  --  103  --  99*   CO2 mmol/L 29 26 25  --  25  --  24   CO2, I-STAT mmol/L  --   --   --  23  --  25  --    AGAP mmol/L  --   --   --   --   --  13  --    ANION GAP mmol/L 7 13 11  --  11  --  12   BUN mg/dL 30* 32* 36*  --  34*  --  40*   CREATININE mg/dL 1 84* 1 93* 2 13*  --  1 66*  --  1 83*   CALCIUM mg/dL 8 6 8 7 8 7  --  8 5  --  9 4   ALT U/L 166*  --   --   --   --   --  86*   AST U/L 147*  --   --   --   --   --  85*   ALK PHOS U/L 100  --   --   --   -- --  111   ALBUMIN g/dL 3 1*  --   --   --   --   --  3 7   TOTAL BILIRUBIN mg/dL 1 96*  --   --   --   --   --  2 90*       Results from last 7 days  Lab Units 18  2043 11/22/18  0130   MAGNESIUM mg/dL 1 8 1 8   PHOSPHORUS mg/dL 2 9  --         Results from last 7 days  Lab Units 18  2043 18  1632   INR  2 65* 2 87*   PTT seconds 38 42*       Results from last 7 days  Lab Units 18  2043 18  0437 18  0130 18  2226 18  1923 18  1545 18  1147   TROPONIN I ng/mL 0 04 0 06* 0 07* 0 06* 0 06* 0 03 <0 02     Imagin/22 CXR:  small bilateral pleural effusions  Left retrocardiac opacity    TTE:  small to moderate pericardial effusion was identified  There was no evidence of hemodynamic compromise  LVEF 65% Systolic function was severely reduced; severe diffuse hypokinesis  R ventricular systolic function was normal    I have personally reviewed pertinent reports  and I have personally reviewed pertinent films in PACS  EKG: A  Fib with RVR This was personally reviewed by myself     Micro:    Results from last 7 days  Lab Units 18  191   GRAM STAIN RESULT  2+ Mononuclear Cells  No Polys or Bacteria seen     ______________________________________________________________________    Assessment:    Patient Active Problem List    Diagnosis Date Noted    Atrial fibrillation ([place] [address] ) 2017     Priority: A    Acute on chronic systolic heart failure ([place] [address] ) 2018     Priority: B    Hypertension      Priority: C    Coronary artery disease 2012     Priority: D    Dilated cardiomyopathy ([place] [address] ) 2018     Priority: E    Hyperlipidemia 10/03/2013     Priority: F    Thrombocytopenia ([place] [address] ) 2017     Priority: G    PALOMA (acute kidney injury) ([place] [address] ) 2017     Priority: H    Hypokalemia 2018     Priority: I    Insomnia 2017    Peripheral vascular disease of lower extremity (HCC)        Plan:    1 ) Atrial Fibrillation with RVR and associated tachycardia induced cardiomyopathy   Patient currently asymptomatic with SBP in 100-120s MAPs 60-70s   Cardiology (EP) consult   Restart PO Cardizem, metoprolol, and digoxin    Digoxin level pending in am   PO Amiodarone   Cardizem IV/gtt in efforts to improve heart rate control   Keep Mg > 2, K > 4   If no improvement with HR, to add Amio gtt to see if this improves HR control  If patient worsens clinically, low threshold to attempt cardioversion   Continue PO Eliquis at this time     2 ) Likely tachycardia induced cardiomyopathy & associated acute systolic heart failure   Attempt to improve HR control   Lasix 40 mg IV BID   No ACEI for now secondary to kidney function   Continue home metoprolol    3 ) Hx  of CAD   Trend cardiac enzymes   Repeat EKG in am    4 ) Acute Hypoxic Respiratory Failure secondary to acute systolic heart failure secondary to tachycardia induced cardiomyopathy   NIPPV weaned to off   F/U CXR with pulmonary edema and vascular congestion resolving    5 ) Transaminitis secondary to likely congestion versus low CO secondary to tachycardia induced acute systolic heart failure   Repeat LFTS in am    6 ) PALOMA secondary to likely cardiorenal syndrome versus low CO state   Need to optimize heart rate   Strict I's and O's   With improved heart rate control, to consider inotrope support  7 ) Hypokalemia & Hypomagnesemia- replete    8 ) Chronic Thrombocytopenia- recheck CBC in am     Msk/Skin: PT/OT/OOB     Disposition: critical care    Counseling / Coordination of Care  Total Critical Care time spent 60 minutes excluding procedures, teaching and family updates  ______________________________________________________________________    VTE Pharmacologic Prophylaxis: Apixaban (Eliquis)  VTE Mechanical Prophylaxis: sequential compression device    Invasive lines and devices:   Invasive Devices     Peripheral Intravenous Line            Peripheral IV 11/20/18 Right Arm 2 days Peripheral IV 11/21/18 Right Arm 1 day          Drain            Urethral Catheter Latex 16 Fr  1 day              Code Status: Level 1 - Full Code    Given critical illness, patient length of stay will require greater than two midnights      Marcelle Meek PA-C

## 2018-11-23 NOTE — PLAN OF CARE
CARDIOVASCULAR - ADULT     Maintains optimal cardiac output and hemodynamic stability Progressing     Absence of cardiac dysrhythmias or at baseline rhythm Progressing        GENITOURINARY - ADULT     Urinary catheter remains patent Progressing        METABOLIC, FLUID AND ELECTROLYTES - ADULT     Electrolytes maintained within normal limits Progressing     Fluid balance maintained Progressing        Prexisting or High Potential for Compromised Skin Integrity     Skin integrity is maintained or improved Progressing        RESPIRATORY - ADULT     Achieves optimal ventilation and oxygenation Progressing        SKIN/TISSUE INTEGRITY - ADULT     Skin integrity remains intact Progressing

## 2018-11-24 LAB
ALBUMIN SERPL BCP-MCNC: 3 G/DL (ref 3.5–5)
ALP SERPL-CCNC: 97 U/L (ref 46–116)
ALT SERPL W P-5'-P-CCNC: 117 U/L (ref 12–78)
ANION GAP SERPL CALCULATED.3IONS-SCNC: 6 MMOL/L (ref 4–13)
ANION GAP SERPL CALCULATED.3IONS-SCNC: 8 MMOL/L (ref 4–13)
APTT PPP: 34 SECONDS (ref 26–38)
AST SERPL W P-5'-P-CCNC: 56 U/L (ref 5–45)
BASOPHILS # BLD AUTO: 0.02 THOUSANDS/ΜL (ref 0–0.1)
BASOPHILS NFR BLD AUTO: 0 % (ref 0–1)
BILIRUB SERPL-MCNC: 2.18 MG/DL (ref 0.2–1)
BUN SERPL-MCNC: 25 MG/DL (ref 5–25)
BUN SERPL-MCNC: 32 MG/DL (ref 5–25)
CALCIUM SERPL-MCNC: 9.6 MG/DL (ref 8.3–10.1)
CALCIUM SERPL-MCNC: 9.6 MG/DL (ref 8.3–10.1)
CHLORIDE SERPL-SCNC: 102 MMOL/L (ref 100–108)
CHLORIDE SERPL-SCNC: 98 MMOL/L (ref 100–108)
CO2 SERPL-SCNC: 28 MMOL/L (ref 21–32)
CO2 SERPL-SCNC: 28 MMOL/L (ref 21–32)
CREAT SERPL-MCNC: 1.56 MG/DL (ref 0.6–1.3)
CREAT SERPL-MCNC: 1.93 MG/DL (ref 0.6–1.3)
EOSINOPHIL # BLD AUTO: 0.02 THOUSAND/ΜL (ref 0–0.61)
EOSINOPHIL NFR BLD AUTO: 0 % (ref 0–6)
ERYTHROCYTE [DISTWIDTH] IN BLOOD BY AUTOMATED COUNT: 18.6 % (ref 11.6–15.1)
GFR SERPL CREATININE-BSD FRML MDRD: 34 ML/MIN/1.73SQ M
GFR SERPL CREATININE-BSD FRML MDRD: 44 ML/MIN/1.73SQ M
GLUCOSE SERPL-MCNC: 102 MG/DL (ref 65–140)
GLUCOSE SERPL-MCNC: 188 MG/DL (ref 65–140)
HCT VFR BLD AUTO: 42.4 % (ref 36.5–49.3)
HGB BLD-MCNC: 13.9 G/DL (ref 12–17)
IMM GRANULOCYTES # BLD AUTO: 0.09 THOUSAND/UL (ref 0–0.2)
IMM GRANULOCYTES NFR BLD AUTO: 2 % (ref 0–2)
INR PPP: 1.73 (ref 0.86–1.17)
LYMPHOCYTES # BLD AUTO: 0.97 THOUSANDS/ΜL (ref 0.6–4.47)
LYMPHOCYTES NFR BLD AUTO: 20 % (ref 14–44)
MAGNESIUM SERPL-MCNC: 2.1 MG/DL (ref 1.6–2.6)
MAGNESIUM SERPL-MCNC: 2.1 MG/DL (ref 1.6–2.6)
MCH RBC QN AUTO: 31.2 PG (ref 26.8–34.3)
MCHC RBC AUTO-ENTMCNC: 32.8 G/DL (ref 31.4–37.4)
MCV RBC AUTO: 95 FL (ref 82–98)
MONOCYTES # BLD AUTO: 0.34 THOUSAND/ΜL (ref 0.17–1.22)
MONOCYTES NFR BLD AUTO: 7 % (ref 4–12)
NEUTROPHILS # BLD AUTO: 3.51 THOUSANDS/ΜL (ref 1.85–7.62)
NEUTS SEG NFR BLD AUTO: 71 % (ref 43–75)
NRBC BLD AUTO-RTO: 1 /100 WBCS
PHOSPHATE SERPL-MCNC: 2.9 MG/DL (ref 2.3–4.1)
PHOSPHATE SERPL-MCNC: 4.4 MG/DL (ref 2.3–4.1)
PLATELET # BLD AUTO: 33 THOUSANDS/UL (ref 149–390)
POTASSIUM SERPL-SCNC: 4 MMOL/L (ref 3.5–5.3)
POTASSIUM SERPL-SCNC: 4.1 MMOL/L (ref 3.5–5.3)
PROT SERPL-MCNC: 6 G/DL (ref 6.4–8.2)
PROTHROMBIN TIME: 20.3 SECONDS (ref 11.8–14.2)
RBC # BLD AUTO: 4.46 MILLION/UL (ref 3.88–5.62)
SODIUM SERPL-SCNC: 134 MMOL/L (ref 136–145)
SODIUM SERPL-SCNC: 136 MMOL/L (ref 136–145)
WBC # BLD AUTO: 4.95 THOUSAND/UL (ref 4.31–10.16)

## 2018-11-24 PROCEDURE — 83735 ASSAY OF MAGNESIUM: CPT | Performed by: PHYSICIAN ASSISTANT

## 2018-11-24 PROCEDURE — 80048 BASIC METABOLIC PNL TOTAL CA: CPT | Performed by: PHYSICIAN ASSISTANT

## 2018-11-24 PROCEDURE — 99233 SBSQ HOSP IP/OBS HIGH 50: CPT | Performed by: EMERGENCY MEDICINE

## 2018-11-24 PROCEDURE — 99231 SBSQ HOSP IP/OBS SF/LOW 25: CPT | Performed by: INTERNAL MEDICINE

## 2018-11-24 PROCEDURE — 99232 SBSQ HOSP IP/OBS MODERATE 35: CPT | Performed by: INTERNAL MEDICINE

## 2018-11-24 PROCEDURE — 84100 ASSAY OF PHOSPHORUS: CPT | Performed by: NURSE PRACTITIONER

## 2018-11-24 PROCEDURE — 85730 THROMBOPLASTIN TIME PARTIAL: CPT | Performed by: NURSE PRACTITIONER

## 2018-11-24 PROCEDURE — 85025 COMPLETE CBC W/AUTO DIFF WBC: CPT | Performed by: NURSE PRACTITIONER

## 2018-11-24 PROCEDURE — 80053 COMPREHEN METABOLIC PANEL: CPT | Performed by: NURSE PRACTITIONER

## 2018-11-24 PROCEDURE — 83735 ASSAY OF MAGNESIUM: CPT | Performed by: NURSE PRACTITIONER

## 2018-11-24 PROCEDURE — 84100 ASSAY OF PHOSPHORUS: CPT | Performed by: PHYSICIAN ASSISTANT

## 2018-11-24 PROCEDURE — 85610 PROTHROMBIN TIME: CPT | Performed by: NURSE PRACTITIONER

## 2018-11-24 RX ADMIN — DIBASIC SODIUM PHOSPHATE, MONOBASIC POTASSIUM PHOSPHATE AND MONOBASIC SODIUM PHOSPHATE 2 TABLET: 852; 155; 130 TABLET ORAL at 17:58

## 2018-11-24 RX ADMIN — METOPROLOL TARTRATE 5 MG: 1 INJECTION, SOLUTION INTRAVENOUS at 18:11

## 2018-11-24 RX ADMIN — METHYLPREDNISOLONE 24 MG: 16 TABLET ORAL at 08:12

## 2018-11-24 RX ADMIN — FUROSEMIDE 40 MG: 10 INJECTION, SOLUTION INTRAMUSCULAR; INTRAVENOUS at 07:21

## 2018-11-24 RX ADMIN — ESMOLOL HYDROCHLORIDE 50 MCG/KG/MIN: 10 INJECTION INTRAVENOUS at 07:13

## 2018-11-24 RX ADMIN — ONDANSETRON 4 MG: 2 INJECTION INTRAMUSCULAR; INTRAVENOUS at 09:46

## 2018-11-24 RX ADMIN — FAMOTIDINE 20 MG: 20 TABLET ORAL at 08:12

## 2018-11-24 RX ADMIN — METOPROLOL TARTRATE 50 MG: 50 TABLET, FILM COATED ORAL at 15:32

## 2018-11-24 RX ADMIN — AMIODARONE HYDROCHLORIDE 0.5 MG/MIN: 50 INJECTION, SOLUTION INTRAVENOUS at 20:59

## 2018-11-24 RX ADMIN — FUROSEMIDE 40 MG: 10 INJECTION, SOLUTION INTRAMUSCULAR; INTRAVENOUS at 15:23

## 2018-11-24 RX ADMIN — METOPROLOL TARTRATE 50 MG: 50 TABLET, FILM COATED ORAL at 04:52

## 2018-11-24 RX ADMIN — ESMOLOL HYDROCHLORIDE 100 MCG/KG/MIN: 10 INJECTION INTRAVENOUS at 15:35

## 2018-11-24 RX ADMIN — METOPROLOL TARTRATE 50 MG: 50 TABLET, FILM COATED ORAL at 09:46

## 2018-11-24 RX ADMIN — DIGOXIN 250 MCG: 250 TABLET ORAL at 08:12

## 2018-11-24 RX ADMIN — ESMOLOL HYDROCHLORIDE 150 MCG/KG/MIN: 10 INJECTION INTRAVENOUS at 21:33

## 2018-11-24 RX ADMIN — DIBASIC SODIUM PHOSPHATE, MONOBASIC POTASSIUM PHOSPHATE AND MONOBASIC SODIUM PHOSPHATE 2 TABLET: 852; 155; 130 TABLET ORAL at 08:11

## 2018-11-24 RX ADMIN — METOPROLOL TARTRATE 50 MG: 50 TABLET, FILM COATED ORAL at 22:02

## 2018-11-24 NOTE — PROGRESS NOTES
Cardiology Progress Note - Berenice Finley 72 y o  male MRN: [id number]    Unit/Bed#: [facility] 518-01 Encounter: [id number]        Subjective:    No significant events overnight  No chest pain  HR low 100s  ROS    Objective:   Vitals: Blood pressure 99/71, pulse (!) 106, temperature 97 5 °F (36 4 °C), temperature source Oral, resp  rate (!) 26, height 5' 10" (1 778 m), weight 66 8 kg (147 lb 4 3 oz), SpO2 97 %  , Body mass index is 21 13 kg/m² , Orthostatic Blood Pressures      Most Recent Value   Blood Pressure  99/71 filed at 11/24/2018 2644         Systolic (31HHH), YHF:694 , Min:77 , JONO:764     Diastolic (46BCA), ZPQ:61, Min:61, Max:98      Intake/Output Summary (Last 24 hours) at 11/24/18 0907  Last data filed at 11/24/18 0601   Gross per 24 hour   Intake          1349 68 ml   Output             3129 ml   Net         -1779 32 ml     Weight (last 2 days)     Date/Time   Weight    11/24/18 0600  66 8 (147 27)    11/23/18 0558  65 5 (144 4)    11/22/18 1939  67 7 (149 25)                Telemetry Review: No significant arrhythmias seen on telemetry review  afib      Physical Exam   Constitutional: He is oriented to person, place, and time  No distress  HENT:   Mouth/Throat: No oropharyngeal exudate  Eyes: No scleral icterus  Neck: No JVD present  Cardiovascular: Normal rate  An irregularly irregular rhythm present  Pulmonary/Chest: Effort normal and breath sounds normal  No respiratory distress  He has no wheezes  He has no rales  Abdominal: Soft  Bowel sounds are normal  He exhibits no distension  There is no tenderness  There is no rebound  Musculoskeletal: He exhibits no edema  Neurological: He is oriented to person, place, and time  Skin: Skin is warm and dry  He is not diaphoretic           Laboratory Results:    Results from last 7 days  Lab Units 11/23/18  0543 11/23/18  0030 11/22/18  2043   TROPONIN I ng/mL 0 02 0 03 0 04       CBC with diff:   Results from last 7 days  Lab Units 11/24/18  0458 11/22/18  0437 11/21/18  2226 11/21/18  0546 11/20/18  1640 11/20/18  1632   WBC Thousand/uL 4 95 6 10  --  5 47  --  7 30   HEMOGLOBIN g/dL 13 9 13 6 14 1 13 1  --  15 0   I STAT HEMOGLOBIN g/dl  --   --   --   --  15 0  --    HEMATOCRIT % 42 4 41 7 43 2 39 5  --  45 3   HEMATOCRIT, ISTAT %  --   --   --   --  44  --    MCV fL 95 96  --  94  --  95   PLATELETS Thousands/uL 33* 37*  --  55*  --  75*   MCH pg 31 2 31 3  --  31 3  --  31 5   MCHC g/dL 32 8 32 6  --  33 2  --  33 1   RDW % 18 6* 19 4*  --  19 3*  --  19 8*   NRBC AUTO /100 WBCs 1  --   --   --   --  4         CMP:  Results from last 7 days  Lab Units 11/24/18  0458 11/23/18  2148 11/23/18  1438 11/23/18  0910 11/23/18  0543 11/22/18  2043 11/22/18  1520 11/22/18  0437  11/20/18  1640 11/20/18  1632   POTASSIUM mmol/L 4 0 6 2* 3 4*  --  4 4 3 3* 3 3* 4 0  < >  --  4 6   CHLORIDE mmol/L 102 102 101  --  105 103 102 105  < >  --  99*   CO2 mmol/L 28 33* 28  --  27 29 26 25  < >  --  24   CO2, I-STAT   --   --   --   --   --   --   --   --   < > 25  --    BUN mg/dL 25 25 23  --  25 30* 32* 36*  < >  --  40*   CREATININE mg/dL 1 56* 1 77* 1 42*  --  1 50* 1 84* 1 93* 2 13*  < >  --  1 83*   GLUCOSE, ISTAT mg/dl  --   --   --   --   --   --   --   --   --  127  --    CALCIUM mg/dL 9 6 9 2 9 8  --  9 9 8 6 8 7 8 7  < >  --  9 4   AST U/L 56*  --   --  102*  --  147*  --   --   --   --  85*   ALT U/L 117*  --   --  147*  --  166*  --   --   --   --  86*   ALK PHOS U/L 97  --   --  90  --  100  --   --   --   --  111   EGFR ml/min/1 73sq m 44 38 49  --  46 36 34 30  < > 35 36   < > = values in this interval not displayed        BMP:  Results from last 7 days  Lab Units 11/24/18  0458 11/23/18  2148 11/23/18  1438 11/23/18  0543 11/22/18  2043 11/22/18  1520 11/22/18  0437  11/20/18  1640   POTASSIUM mmol/L 4 0 6 2* 3 4* 4 4 3 3* 3 3* 4 0  < >  --    CHLORIDE mmol/L 102 102 101 105 103 102 105  < >  --    CO2 mmol/L 28 33* 28 27 29 26 25  < >  -- CO2, I-STAT   --   --   --   --   --   --   --   < > 25   BUN mg/dL 25 25 23 25 30* 32* 36*  < >  --    CREATININE mg/dL 1 56* 1 77* 1 42* 1 50* 1 84* 1 93* 2 13*  < >  --    GLUCOSE, ISTAT mg/dl  --   --   --   --   --   --   --   --  127   CALCIUM mg/dL 9 6 9 2 9 8 9 9 8 6 8 7 8 7  < >  --    < > = values in this interval not displayed  BNP: No results for input(s): BNP in the last 72 hours  Magnesium:   Results from last 7 days  Lab Units 18  0458 18  2148 18  1438 18  0130   MAGNESIUM mg/dL 2 1 2 4 2 4 1 8 1 8       Coags:   Results from last 7 days  Lab Units 188 18  1438 18  1632   PTT seconds 34 37 38 42*   INR  1 73*  --  2 65* 2 87*       TSH: No results found for: TSH    Hemoglobin A1C       Lipid Profile:       Cardiac testing:   Results for orders placed during the hospital encounter of 18   Echo complete with contrast if indicated    [facility] [address]  [facility] [address] [facility]  [phone]    Transthoracic Echocardiogram  Limited 2D, M-mode, Doppler, and Color Doppler    Study date:  2018    Patient: Yunior Lafleur  MR number: [id number]  Account number: [de-identified]  : 1945  Age: 72 years  Gender: Male  Status: Inpatient  Location: Bedside  Height: 70 in  Weight: 148 7 lb  BP: 92/ 74 mmHg    Indications: AFIB Assess left ventricular function  Diagnoses: I48 0 - Atrial fibrillation    Sonographer:  SEGUNDO Post  Primary Physician:  Efraín Willoughby MD  Referring Physician:  Jeanen Gardner MD  Group:  [facility]  Cardiology Fellow:  Mable Morin MD  Interpreting Physician:  Shadia Cartwright MD    SUMMARY    LEFT VENTRICLE:  Systolic function was markedly reduced  Ejection fraction was estimated to be 20 %    There was severe diffuse hypokinesis with regional variations- akinesis of the anteroseptal, anterior and possibly the inferior walls  The patient was tachycardic throughout the study due to which ejection fraction and regional wall motion was not accurately assessed  Wall thickness was mildly increased  The changes were consistent with concentric remodeling (increased wall thickness with normal wall mass)  RIGHT VENTRICLE:  The size was normal   Systolic function was reduced  LEFT ATRIUM:  The atrium was dilated  RIGHT ATRIUM:  The atrium was dilated  MITRAL VALVE:  There was moderate regurgitation  AORTIC VALVE:  There was mild to moderate regurgitation  TRICUSPID VALVE:  There was moderate regurgitation  PERICARDIUM:  A small, free-flowing pericardial effusion was identified anterior and posterior to the heart  The fluid had no internal echoes  There was no evidence of hemodynamic compromise  There was a moderate-sized left pleural effusion  COMPARISONS:  There has been no significant interval change  Comparison was made with the previous study of 21-Nov-2018  HISTORY: PRIOR HISTORY: HTN,CHF,CAD,AFIB,HLD,Dilated CM,Thrombocytopenia    PROCEDURE: The procedure was performed at the bedside  This was a routine study  The transthoracic approach was used  The study included limited 2D imaging, M-mode, limited spectral Doppler, and color Doppler  Image quality was good  LEFT VENTRICLE: Size was normal  Systolic function was markedly reduced  Ejection fraction was estimated to be 20 %  There was severe diffuse hypokinesis with regional variations- akinesis of the anteroseptal, anterior and possibly the  inferior walls  The patient was tachycardic throughout the study due to which ejection fraction and regional wall motion was not accurately assessed  Wall thickness was mildly increased  The changes were consistent with concentric  remodeling (increased wall thickness with normal wall mass)  DOPPLER: Transmitral flow pattern: atrial fibrillation   The study was not technically sufficient to allow evaluation of LV diastolic function  RIGHT VENTRICLE: The size was normal  Systolic function was reduced  LEFT ATRIUM: The atrium was dilated  RIGHT ATRIUM: The atrium was dilated  MITRAL VALVE: DOPPLER: There was moderate regurgitation  The regurgitant jet was centrally directed  AORTIC VALVE: The valve was trileaflet  Leaflets exhibited normal cuspal separation and sclerosis  DOPPLER: There was mild to moderate regurgitation  TRICUSPID VALVE: DOPPLER: There was moderate regurgitation  PULMONIC VALVE: DOPPLER: There was no significant regurgitation  PERICARDIUM: A small, free-flowing pericardial effusion was identified anterior and posterior to the heart  The fluid had no internal echoes  There was no evidence of hemodynamic compromise  There was a moderate-sized left pleural  effusion  SYSTEM MEASUREMENT TABLES    2D  %FS: 9 65 %  Ao Diam: 3 57 cm  EDV(Teich): 96 19 ml  EF(Teich): 21 27 %  ESV(Teich): 75 73 ml  IVSd: 1 08 cm  LVIDd: 4 58 cm  LVIDs: 4 13 cm  LVPWd: 0 97 cm  SV(Teich): 20 46 ml    [facility]    Prepared and electronically signed by    Amor Leon MD  Signed  10:44:37       No results found for this or any previous visit  No results found for this or any previous visit  No results found for this or any previous visit      Meds/Allergies   current meds:   Current Facility-Administered Medications   Medication Dose Route Frequency    acetaminophen (TYLENOL) tablet 650 mg  650 mg Oral Q6H PRN    amiodarone (CORDARONE) 900 mg in dextrose 5 % 500 mL infusion  0 5 mg/min Intravenous Continuous    digoxin (LANOXIN) tablet 250 mcg  250 mcg Oral Daily    esmolol (BREVIBLOC) 2500 mg/250 mL IV infusion (premix)   mcg/kg/min Intravenous Titrated    famotidine (PEPCID) tablet 20 mg  20 mg Oral Daily    furosemide (LASIX) injection 40 mg  40 mg Intravenous BID (diuretic)    [START ON 11/25/2018] methylprednisolone (MEDROL) tablet 20 mg  20 mg Oral Daily    Followed by   Laverta Rolls ON 11/26/2018] methylPREDNISolone (MEDROL) tablet 16 mg  16 mg Oral Daily    Followed by   Laverta Rolls ON 11/27/2018] methylprednisolone (MEDROL) tablet 12 mg  12 mg Oral Daily    Followed by   Laverta Rolls ON 11/28/2018] methylprednisolone (MEDROL) tablet 8 mg  8 mg Oral Daily    Followed by   Laverta Rolls ON 11/29/2018] methylprednisolone (MEDROL) tablet 4 mg  4 mg Oral Daily    metoprolol (LOPRESSOR) injection 5 mg  5 mg Intravenous Q6H PRN    metoprolol tartrate (LOPRESSOR) tablet 50 mg  50 mg Oral Q6H    ondansetron (ZOFRAN) injection 4 mg  4 mg Intravenous Q6H PRN    potassium-sodium phosphateS (K-PHOS,PHOSPHA 250) -250 mg tablet 2 tablet  2 tablet Oral BID With Meals    zolpidem (AMBIEN) tablet 5 mg  5 mg Oral HS PRN     Prescriptions Prior to Admission   Medication    apixaban (ELIQUIS) 5 mg    Ascorbic Acid (VITAMIN C PO)    B Complex Vitamins (VITAMIN B COMPLEX PO)    clobetasol (TEMOVATE) 0 05 % GEL    diltiazem (TIAZAC) 360 MG 24 hr capsule    Ferrous Gluconate-C-Folic Acid (IRON-C PO)    folic acid (FOLVITE) 1 mg tablet    furosemide (LASIX) 20 mg tablet    Lactobacillus (ACIDOPHILUS PO)    Lycopene 10 MG CAPS    metoprolol tartrate (LOPRESSOR) 100 mg tablet    metoprolol tartrate (LOPRESSOR) 50 mg tablet    Misc Natural Products (SAW PALMETTO) CAPS    Multiple Vitamin (MULTI-DAY VITAMINS) TABS         amiodarone 0 5 mg/min Last Rate: 0 5 mg/min (11/23/18 1255)   esmolol  mcg/kg/min Last Rate: 75 mcg/kg/min (11/24/18 0823)     Assessment:  Principal Problem:    Atrial fibrillation (HCC)  Active Problems:    Hypertension    Thrombocytopenia (HCC)    PALOMA (acute kidney injury) ([facility] [address] )    Coronary artery disease    Hyperlipidemia    Dilated cardiomyopathy (HCC)    Acute on chronic combined systolic and diastolic heart failure (HCC)    Hypomagnesemia    Acute respiratory insufficiency      Plan:    Continue current rate control and eventually will undergo AVN ablation and BI V  Continue to diurese as tolerated  Counseling / Coordination of Care  Total floor / unit time spent today 25 minutes  Greater than 50% of total time was spent with the patient and / or family counseling and / or coordination of care  A description of the counseling / coordination of care

## 2018-11-24 NOTE — ED PROVIDER NOTES
History  Chief Complaint   Patient presents with    Shortness of Breath     pt presents to ED c/o of  SOB upon exertion  Pt states it has been goingo n for about 2 days  History provided by:  Patient   used: No    Shortness of Breath   Associated symptoms: no abdominal pain, no chest pain, no cough, no diaphoresis, no fever, no headaches, no neck pain, no rash, no sore throat, no vomiting and no wheezing      Patient is a 79-year-old male presenting to emergency depart with weakness and shortness of breath  Present for last 10 days  Getting worse  No chest pain  No fevers  No cough  History of AFib  No thyroid disease in the past   Patient has a history of heart failure  On Lasix  States last multiple days has peeing less  with it  MDM AFib RVR, will try to rate control, patient looks dehydrated, will give fluids, re-evaluate    Did not respond to Cardizem while, pressure is soft, discussed with medicine attending  Recommends digoxin  Will admit to ICU  Prior to Admission Medications   Prescriptions Last Dose Informant Patient Reported? Taking?    Ascorbic Acid (VITAMIN C PO)  Self Yes No   Sig: Take by mouth   B Complex Vitamins (VITAMIN B COMPLEX PO)  Self Yes No   Sig: Take by mouth   Ferrous Gluconate-C-Folic Acid (IRON-C PO)  Self Yes No   Sig: Take by mouth   Lactobacillus (ACIDOPHILUS PO)  Self Yes No   Sig: Take by mouth   Lycopene 10 MG CAPS  Self Yes No   Sig: Take by mouth   Misc Natural Products (SAW PALMETTO) CAPS  Self Yes No   Sig: Take by mouth   Multiple Vitamin (MULTI-DAY VITAMINS) TABS  Self Yes No   Sig: Take by mouth   apixaban (ELIQUIS) 5 mg   No No   Sig: Take 1 tablet (5 mg total) by mouth 2 (two) times a day   clobetasol (TEMOVATE) 0 05 % GEL  Self Yes No   Sig: Apply topically as needed     diltiazem (TIAZAC) 360 MG 24 hr capsule   No No   Sig: Take 1 capsule (360 mg total) by mouth daily   folic acid (FOLVITE) 1 mg tablet  Self Yes No   Sig: Take 1 mg by mouth daily   furosemide (LASIX) 20 mg tablet   No No   Sig: Take 1 tablet (20 mg total) by mouth daily for 30 days   metoprolol tartrate (LOPRESSOR) 100 mg tablet  Self No No   Sig: TAKE ONE TABLET BY MOUTH IN THE MORNING THEN ONE-HALF TABLET IN THE AFTERNOON AND ONE TABLET AT NIGHT   Patient taking differently: Take 100 mg by mouth daily     metoprolol tartrate (LOPRESSOR) 50 mg tablet  Self Yes Yes   Sig: Take 50 mg by mouth daily with dinner      Facility-Administered Medications: None       Past Medical History:   Diagnosis Date    Atrial fibrillation (HCC)     Congestive heart failure with left ventricular diastolic dysfunction, chronic (HCC)     Hypertension     Peripheral vascular disease of lower extremity (HCC)     Subdural hematoma (HCC)        Past Surgical History:   Procedure Laterality Date    BACK SURGERY      AUSTYN HOLE FOR SUBDURAL HEMATOMA      CLAVICLE SURGERY      HERNIA REPAIR      PROSTATE SURGERY         Family History   Problem Relation Age of Onset    Heart disease Mother     Heart disease Father      I have reviewed and agree with the history as documented  Social History   Substance Use Topics    Smoking status: Never Smoker    Smokeless tobacco: Never Used    Alcohol use No        Review of Systems   Constitutional: Negative for chills, diaphoresis and fever  HENT: Negative for congestion and sore throat  Respiratory: Positive for shortness of breath  Negative for cough, wheezing and stridor  Cardiovascular: Negative for chest pain, palpitations and leg swelling  Gastrointestinal: Negative for abdominal pain, blood in stool, diarrhea, nausea and vomiting  Genitourinary: Negative for dysuria, frequency and urgency  Musculoskeletal: Negative for neck pain and neck stiffness  Skin: Negative for pallor and rash  Neurological: Negative for dizziness, syncope, weakness, light-headedness and headaches     All other systems reviewed and are negative  Physical Exam  Physical Exam   Constitutional: He is oriented to person, place, and time  He appears well-developed and well-nourished  HENT:   Head: Normocephalic and atraumatic  Eyes: EOM are normal    Neck: Neck supple  Cardiovascular:   Tachycardic and iorregular   Pulmonary/Chest: Effort normal  No respiratory distress  He has no wheezes  Musculoskeletal: Normal range of motion  He exhibits no edema, tenderness or deformity  Neurological: He is alert and oriented to person, place, and time  Skin: Skin is warm  Capillary refill takes less than 2 seconds  No rash noted  He is not diaphoretic  No erythema  No pallor         Vital Signs  ED Triage Vitals [11/20/18 1625]   Temperature Pulse Respirations Blood Pressure SpO2   (!) 97 4 °F (36 3 °C) (!) 150 21 109/77 95 %      Temp Source Heart Rate Source Patient Position - Orthostatic VS BP Location FiO2 (%)   Temporal Monitor Lying Right arm --      Pain Score       No Pain           Vitals:    11/22/18 1300 11/22/18 1400 11/22/18 1500 11/22/18 1530   BP: 117/66 112/74 102/70 127/60   Pulse: (!) 151 (!) 152 (!) 156 (!) 159   Patient Position - Orthostatic VS:    Lying       Visual Acuity  Visual Acuity      Most Recent Value   L Pupil Size (mm)  3   R Pupil Size (mm)  3   L Pupil Shape  Round   R Pupil Shape  Round          ED Medications  Medications   amiodarone (CORDARONE) 900 mg in dextrose 5 % 500 mL infusion (0 mg/min Intravenous Stopped 11/21/18 1000)   sodium chloride 0 9 % bolus 500 mL (0 mL Intravenous Stopped 11/20/18 1719)   diltiazem (CARDIZEM) injection 15 mg (15 mg Intravenous Given 11/20/18 1654)   sodium chloride 0 9 % bolus 500 mL (0 mL Intravenous Stopped 11/20/18 1742)   digoxin (LANOXIN) injection 500 mcg (500 mcg Intravenous Given 11/20/18 1743)   amiodarone 150 mg in dextrose 5 % 100 mL IV bolus (0 mg Intravenous Stopped 11/20/18 1944)   sodium chloride 0 9 % bolus 500 mL (0 mL Intravenous Stopped 11/20/18 2245) furosemide (LASIX) injection 40 mg (40 mg Intravenous Given 11/21/18 1121)   metoprolol (LOPRESSOR) injection 5 mg (5 mg Intravenous Given 11/21/18 1738)   amiodarone 150 mg in dextrose 5 % 100 mL IV bolus (0 mg Intravenous Stopped 11/21/18 2129)   amiodarone 150 mg in dextrose 5 % 100 mL IV bolus (0 mg Intravenous Stopped 11/22/18 0430)       Diagnostic Studies  Results Reviewed     Procedure Component Value Units Date/Time    TSH, 3rd generation [[id number]]  (Abnormal) Collected:  11/20/18 1632    Lab Status:  Final result Specimen:  Blood from Arm, Right Updated:  11/20/18 1740     TSH 3RD GENERATON 4 473 (H) uIU/mL     Narrative:         Patients undergoing fluorescein dye angiography may retain small amounts of fluorescein in the body for 48-72 hours post procedure  Samples containing fluorescein can produce falsely depressed TSH values  If the patient had this procedure,a specimen should be resubmitted post fluorescein clearance      CBC and differential [[id number]]  (Abnormal) Collected:  11/20/18 1632    Lab Status:  Final result Specimen:  Blood from Arm, Right Updated:  11/20/18 1717     WBC 7 30 Thousand/uL      RBC 4 76 Million/uL      Hemoglobin 15 0 g/dL      Hematocrit 45 3 %      MCV 95 fL      MCH 31 5 pg      MCHC 33 1 g/dL      RDW 19 8 (H) %      Platelets 75 (L) Thousands/uL      nRBC 4 /100 WBCs      Neutrophils Relative 70 %      Immat GRANS % 2 %      Lymphocytes Relative 19 %      Monocytes Relative 8 %      Eosinophils Relative 1 %      Basophils Relative 0 %      Neutrophils Absolute 5 11 Thousands/µL      Immature Grans Absolute 0 15 Thousand/uL      Lymphocytes Absolute 1 36 Thousands/µL      Monocytes Absolute 0 56 Thousand/µL      Eosinophils Absolute 0 10 Thousand/µL      Basophils Absolute 0 02 Thousands/µL     Comprehensive metabolic panel [[id number]]  (Abnormal) Collected:  11/20/18 1632    Lab Status:  Final result Specimen:  Blood from Arm, Right Updated:  11/20/18 1714 Sodium 135 (L) mmol/L      Potassium 4 6 mmol/L      Chloride 99 (L) mmol/L      CO2 24 mmol/L      ANION GAP 12 mmol/L      BUN 40 (H) mg/dL      Creatinine 1 83 (H) mg/dL      Glucose 132 mg/dL      Calcium 9 4 mg/dL      AST 85 (H) U/L      ALT 86 (H) U/L      Alkaline Phosphatase 111 U/L      Total Protein 7 0 g/dL      Albumin 3 7 g/dL      Total Bilirubin 2 90 (H) mg/dL      eGFR 36 ml/min/1 73sq m     Narrative:         National Kidney Disease Education Program recommendations are as follows:  GFR calculation is accurate only with a steady state creatinine  Chronic Kidney disease less than 60 ml/min/1 73 sq  meters  Kidney failure less than 15 ml/min/1 73 sq  meters      B-type natriuretic peptide [[id number]]  (Abnormal) Collected:  11/20/18 1632    Lab Status:  Final result Specimen:  Blood from Arm, Right Updated:  11/20/18 1709     NT-proBNP 2,012 (H) pg/mL     Troponin I [[id number]]  (Normal) Collected:  11/20/18 1632    Lab Status:  Final result Specimen:  Blood from Arm, Right Updated:  11/20/18 1706     Troponin I <0 02 ng/mL     APTT [[id number]]  (Abnormal) Collected:  11/20/18 1632    Lab Status:  Final result Specimen:  Blood from Arm, Right Updated:  11/20/18 1702     PTT 42 (H) seconds     Protime-INR [[id number]]  (Abnormal) Collected:  11/20/18 1632    Lab Status:  Final result Specimen:  Blood from Arm, Right Updated:  11/20/18 1702     Protime 28 5 (H) seconds      INR 2 87 (H)    POCT Chem 8+ [[id number]]  (Abnormal) Collected:  11/20/18 1640    Lab Status:  Final result Updated:  11/20/18 1645     SODIUM, I-STAT 134 (L) mmol/l      Potassium, i-STAT 4 1 mmol/L      Chloride, istat 100 mmol/L      CO2, i-STAT 25 mmol/L      Anion Gap, i-STAT 13 mmol/L      Calcium, Ionized i-STAT 1 00 (L) mmol/L      BUN, I-STAT 43 (H) mg/dl      Creatinine, i-STAT 1 9 (H) mg/dl      eGFR 35 ml/min/1 73sq m      Glucose, i-STAT 127 mg/dl      Hct, i-STAT 44 %      Hgb, i-STAT 15 0 g/dl      Specimen Type VENOUS XR chest portable   Final Result by Dale Hudson DO (11/22 1122)      No significant change in small bilateral pleural effusions  Left retrocardiac opacity may represent atelectasis or infiltrate  Workstation performed: [id number]         XR chest portable   Final Result by Lou Castro DO (11/21 2057)      Resolved pulmonary edema  Small basilar effusions, markedly improved on the right  Workstation performed: [id number]         XR chest portable   Final Result by Sebastian Little MD (11/21 1147)      Interval development of moderate vascular congestion  Stable small bilateral pleural effusions  Workstation performed: [id number]         XR chest portable   Final Result by Brandon Reed MD (11/20 2015)      Bibasilar atelectasis/infiltrate with small effusions, right greater than left              Workstation performed: [id number]                    Procedures  Procedures       Phone Contacts  ED Phone Contact    ED Course  ED Course as of Nov 23 2043   Tue Nov 20, 2018   1731 Spoke with hospitalist, recommends 0 5 digoxin                                MDM  CritCare Time    55 minutes of critical care time excluding procedures    Disposition  Final diagnoses:   Atrial fibrillation with RVR ([facility] [address] )   PALOMA (acute kidney injury) ([facility] [address] )   Dehydration     Time reflects when diagnosis was documented in both MDM as applicable and the Disposition within this note     Time User Action Codes Description Comment    11/20/2018  5:51 PM Tadevosyan, Cortney Add [I48 91] Atrial fibrillation with RVR ([facility] [address] )     11/20/2018  5:51 PM Tadevosyan Cortney Add [N17 9] PALOMA (acute kidney injury) ([facility] [address] )     11/20/2018  5:51 PM Tadevosyan Cortney Add [E86 [id number]] Dehydration     11/21/2018  6:45 PM Jenise Linares Add [J90] Pleural effusion       ED Disposition     ED Disposition Condition Comment    Admit  Case was discussed with medicine and the patient's admission status was agreed to be Admission Status: inpatient status to the service of Dr Kemi Vides   Follow-up Information    None         Discharge Medication List as of 11/22/2018  6:54 PM      CONTINUE these medications which have NOT CHANGED    Details   !! metoprolol tartrate (LOPRESSOR) 50 mg tablet Take 50 mg by mouth daily with dinner, Historical Med      apixaban (ELIQUIS) 5 mg Take 1 tablet (5 mg total) by mouth 2 (two) times a day, Starting Wed 10/10/2018, Normal      Ascorbic Acid (VITAMIN C PO) Take by mouth, Historical Med      B Complex Vitamins (VITAMIN B COMPLEX PO) Take by mouth, Historical Med      clobetasol (TEMOVATE) 0 05 % GEL Apply topically as needed  , Starting Tue 4/26/2016, Historical Med      diltiazem (TIAZAC) 360 MG 24 hr capsule Take 1 capsule (360 mg total) by mouth daily, Starting Wed 10/10/2018, Normal      Ferrous Gluconate-C-Folic Acid (IRON-C PO) Take by mouth, Historical Med      folic acid (FOLVITE) 1 mg tablet Take 1 mg by mouth daily, Historical Med      furosemide (LASIX) 20 mg tablet Take 1 tablet (20 mg total) by mouth daily for 30 days, Starting Wed 10/10/2018, Until Fri 11/9/2018, Print      Lactobacillus (ACIDOPHILUS PO) Take by mouth, Historical Med      Lycopene 10 MG CAPS Take by mouth, Historical Med      !! metoprolol tartrate (LOPRESSOR) 100 mg tablet TAKE ONE TABLET BY MOUTH IN THE MORNING THEN ONE-HALF TABLET IN THE AFTERNOON AND ONE TABLET AT NIGHT, Normal      Misc Natural Products (SAW PALMETTO) CAPS Take by mouth, Historical Med      Multiple Vitamin (MULTI-DAY VITAMINS) TABS Take by mouth, Historical Med       !! - Potential duplicate medications found  Please discuss with provider  No discharge procedures on file      ED Provider  Electronically Signed by           Ana Laura Tolbert MD  11/23/18 2044

## 2018-11-24 NOTE — PLAN OF CARE
CARDIOVASCULAR - ADULT     Maintains optimal cardiac output and hemodynamic stability Progressing     Absence of cardiac dysrhythmias or at baseline rhythm Progressing        GENITOURINARY - ADULT     Urinary catheter remains patent Progressing        METABOLIC, FLUID AND ELECTROLYTES - ADULT     Electrolytes maintained within normal limits Progressing     Fluid balance maintained Progressing        Nutrition/Hydration-ADULT     Nutrient/Hydration intake appropriate for improving, restoring or maintaining nutritional needs Progressing        Potential for Falls     Patient will remain free of falls Progressing        Prexisting or High Potential for Compromised Skin Integrity     Skin integrity is maintained or improved Progressing        RESPIRATORY - ADULT     Achieves optimal ventilation and oxygenation Progressing        SKIN/TISSUE INTEGRITY - ADULT     Skin integrity remains intact Progressing

## 2018-11-24 NOTE — PROGRESS NOTES
Progress Note - Critical Care   Neelima Mckay 72 y o  male MRN: [id number]  Unit/Bed#: [facility] 326-10 Encounter: [id number]    Attending Physician: Reyes Roberson, DO    _____________________________________________________________________  ______________________________________________________________________    Chief Complaint: Shortness of breath    24 Hour Events: No acute events overnight  The patient has Afib with RVR with combined diastolic and systolic heart failure who was transferred from [address] [place] [address] for evaluation by EP  Review of Systems   Constitutional: Negative  Negative for chills and fever  HENT: Negative  Eyes: Negative  Respiratory: Negative  Negative for chest tightness and shortness of breath  Cardiovascular: Negative for chest pain, palpitations and leg swelling  Gastrointestinal: Positive for nausea (following potassium pill)  Negative for abdominal pain and vomiting  Genitourinary: Negative  Musculoskeletal: Negative  Skin: Positive for color change (hemosiderosis on lower extremities)  Negative for wound  Allergic/Immunologic: Negative  Neurological: Negative  Negative for light-headedness and headaches  Hematological: Negative  Psychiatric/Behavioral: Negative       ______________________________________________________________________  Vitals:    18 0500 18 0600 18 0613 18 0700   BP: 102/68  99/71    Pulse: (!) 114  (!) 108    Resp: 22  (!) 26    Temp:    97 5 °F (36 4 °C)   TempSrc:    Oral   SpO2: 99%  97%    Weight:  66 8 kg (147 lb 4 3 oz)     Height:           Temperature:   Temp (24hrs), Av 8 °F (36 6 °C), Min:97 4 °F (36 3 °C), Max:98 4 °F (36 9 °C)    Current Temperature: 97 5 °F (36 4 °C)  Weights:   IBW: 73 kg    Body mass index is 21 13 kg/m²    Weight (last 2 days)     Date/Time   Weight    18 0600  66 8 (147 27)    18 0558  65 5 (144 4)    18 1939  67 7 (149 25) Physical Exam:   Physical Exam   Constitutional: He is oriented to person, place, and time  He appears well-developed and well-nourished  No distress  HENT:   Head: Normocephalic and atraumatic  Eyes: Conjunctivae are normal  No scleral icterus  Cardiovascular: Normal heart sounds  Exam reveals no gallop and no friction rub  No murmur heard  Tachycardic irregular   Pulmonary/Chest: Effort normal  No respiratory distress  He has no wheezes  He has no rales  Breathing comfortably on nasal cannula  Faint crackles in the lower lobes bilaterally  Abdominal: Soft  He exhibits no distension and no mass  There is no tenderness  There is no rebound and no guarding  Musculoskeletal: He exhibits no edema, tenderness or deformity  Neurological: He is alert and oriented to person, place, and time  Skin: Skin is warm and dry  He is not diaphoretic  No erythema  Hemosiderosis in lower extremities b/l   Psychiatric: He has a normal mood and affect  His behavior is normal  Thought content normal        ______________________________________________________________________  Hemodynamic Monitoring:  PAP:  , PAP mean:   , CVP:  , PCWP:   , SvO2:   , ScvO2:  , CO:  , CI:  , SVR:  , SVRI:  , PVR:  , SV:  , SVI:  , ICP Mean:  , CPP:       Non-Invasive/Invasive Ventilation Settings:  Respiratory    Lab Data (Last 4 hours)    None         O2/Vent Data (Last 4 hours)    None              No results found for: PHART, CEK7FUA, PO2ART, USB7EUG, Z2IPHJCO, BEART, SOURCE  SpO2: SpO2: 97 %, SpO2 Activity: SpO2 Activity: At Rest, SpO2 Device: O2 Device: Nasal cannula, Capnography:    Intake and Outputs:  I/O       11/22 0701 - 11/23 0700 11/23 0701 - 11/24 0700 11/24 0701 - 11/25 0700    P  O   680     I V  (mL/kg) 99 4 (1 5) 599 7 (9)     IV Piggyback 250 100     Total Intake(mL/kg) 349 4 (5 3) 1379 7 (20 7)     Urine (mL/kg/hr) 1175 3254 (2)     Total Output 1175 3257      Net -823 1 -9484 3                 UOP: 135 ml/hr   Nutrition:        Diet Orders            Start     Ordered    11/23/18 1351  Diet Cardiac; Sodium 2 GM; Fluid Restriction 1800 ML  Diet effective now     Question Answer Comment   Diet Type Cardiac    Cardiac Sodium 2 GM    Other Restriction(s): Fluid Restriction 1800 ML    RD to adjust diet per protocol?  Yes        11/23/18 1350    11/22/18 2025  Room Service  Once     Question:  Type of Service  Answer:  Room Service - Appropriate with Assistance    11/22/18 2024          Labs:     Results from last 7 days  Lab Units 11/22/18  0437 11/21/18  2226 11/21/18  0546 11/20/18  1640 11/20/18  1632   WBC Thousand/uL 6 10  --  5 47  --  7 30   HEMOGLOBIN g/dL 13 6 14 1 13 1  --  15 0   I STAT HEMOGLOBIN g/dl  --   --   --  15 0  --    HEMATOCRIT % 41 7 43 2 39 5  --  45 3   HEMATOCRIT, ISTAT %  --   --   --  44  --    PLATELETS Thousands/uL 37*  --  55*  --  75*   NEUTROS PCT %  --   --   --   --  70   MONOS PCT %  --   --   --   --  8       Results from last 7 days  Lab Units 11/24/18  0458 11/23/18  2148 11/23/18  1438 11/23/18  0910 11/23/18  0543 11/22/18  2043 11/22/18  1520 11/22/18  0437  11/20/18  1632   SODIUM mmol/L 136 137 137  --  137 139 141 141  < > 135*   POTASSIUM mmol/L 4 0 6 2* 3 4*  --  4 4 3 3* 3 3* 4 0  < > 4 6   CHLORIDE mmol/L 102 102 101  --  105 103 102 105  < > 99*   CO2 mmol/L 28 33* 28  --  27 29 26 25  < > 24   CO2, I-STAT   --   --   --   --   --   --   --   --   < >  --    AGAP   --   --   --   --   --   --   --   --   < >  --    ANION GAP mmol/L 6 2* 8  --  5 7 13 11  < > 12   BUN mg/dL 25 25 23  --  25 30* 32* 36*  < > 40*   CREATININE mg/dL 1 56* 1 77* 1 42*  --  1 50* 1 84* 1 93* 2 13*  < > 1 83*   CALCIUM mg/dL 9 6 9 2 9 8  --  9 9 8 6 8 7 8 7  < > 9 4   ALT U/L 117*  --   --  147*  --  166*  --   --   --  86*   AST U/L 56*  --   --  102*  --  147*  --   --   --  85*   ALK PHOS U/L 97  --   --  90  --  100  --   --   --  111   ALBUMIN g/dL 3 0*  --   --  2 9*  --  3 1*  -- --   --  3 7   TOTAL BILIRUBIN mg/dL 2 18*  --   --  2 05*  --  1 96*  --   --   --  2 90*   < > = values in this interval not displayed  Results from last 7 days  Lab Units 11/24/18  0458 11/23/18  2148 11/23/18  1438 11/22/18  2043 11/22/18  0130   MAGNESIUM mg/dL 2 1 2 4 2 4 1 8 1 8   PHOSPHORUS mg/dL 2 9  --   --  2 9  --         Results from last 7 days  Lab Units 11/24/18  0458 11/23/18  1438 11/22/18  2043 11/20/18  1632   INR  1 73*  --  2 65* 2 87*   PTT seconds 34 37 38 42*       Results from last 7 days  Lab Units 11/23/18  0543 11/23/18  0030 11/22/18  2043 11/22/18  0437 11/22/18  0130 11/21/18  2226 11/21/18  1923   TROPONIN I ng/mL 0 02 0 03 0 04 0 06* 0 07* 0 06* 0 06*         ABG:No results found for: PHART, OOG1MFN, PO2ART, FIK2PHX, U4VQNPSO, BEART, SOURCE  VBG:        No results found for: VANCOTROUGH   Imaging:  I have personally reviewed pertinent reports  No new imaging this AM  Chest x-ray post thoracentesis demonstrated improved aeration of the lung fields  EKG: afib w/ nonspecific st and t wave inversions  11/21 echo: LV dysfunction with EF of 20%, diffuse severe hypokinesis, akinesis of the apical anterior wall, akinessis of the mid anteriror wall, akinesis of the mid anteriror septal and apical septal walls, mild to moderate mitral valve regurgitation, mild to moderate aortic regurgitation, mild to moderate tricuspid regurgitation, small to moderate pericardial effusion was identified without evidence of hemodynamic compromise     Micro:    Results from last 7 days  Lab Units 11/21/18  1912   GRAM STAIN RESULT  2+ Mononuclear Cells  No Polys or Bacteria seen   BODY FLUID CULTURE, STERILE  No growth     Allergies: No Known Allergies  Medications:   Scheduled Meds:  Current Facility-Administered Medications:  acetaminophen 650 mg Oral Q6H PRN Emmie Earl PA-C    amiodarone 0 5 mg/min Intravenous Continuous Jay Maldonado PA-C Last Rate: 0 5 mg/min (11/23/18 1255)   digoxin 250 mcg Oral Daily Skyla Ortez PA-C    esmolol  mcg/kg/min Intravenous Titrated BJ Lujan Last Rate: 50 mcg/kg/min (11/24/18 0713)   famotidine 20 mg Oral Daily Skyla Ortez PA-C    furosemide 40 mg Intravenous BID (diuretic) Skyla Ortez PA-C    methylPREDNISolone 24 mg Oral Daily Guero Andrea MD    Followed by        Jessi Jaime ON 11/25/2018] methylPREDNISolone 20 mg Oral Daily Guero Andrea MD    Followed by        Jessi Jaime ON 11/26/2018] methylPREDNISolone 16 mg Oral Daily Guero Andrea MD    Followed by        Jessi Jaime ON 11/27/2018] methylPREDNISolone 12 mg Oral Daily Guero Andrea MD    Followed by        Jessi Jaime ON 11/28/2018] methylPREDNISolone 8 mg Oral Daily Guero Andrea MD    Followed by        Jessi Jaime ON 11/29/2018] methylPREDNISolone 4 mg Oral Daily Guero Andrea MD    metoprolol 5 mg Intravenous Q6H PRN Skyla Ortez PA-C    metoprolol tartrate 50 mg Oral Q6H BJ Lujan    ondansetron 4 mg Intravenous Q6H PRN Skyla Ortez PA-C    zolpidem 5 mg Oral HS PRN Skyla Ortze PA-C      Continuous Infusions:  amiodarone 0 5 mg/min Last Rate: 0 5 mg/min (11/23/18 1255)   esmolol  mcg/kg/min Last Rate: 50 mcg/kg/min (11/24/18 0713)     PRN Meds:    acetaminophen 650 mg Q6H PRN   metoprolol 5 mg Q6H PRN   ondansetron 4 mg Q6H PRN   zolpidem 5 mg HS PRN     VTE Pharmacologic Prophylaxis: Holding due to thrombocytopenia  VTE Mechanical Prophylaxis: sequential compression device    Invasive lines and devices:   Invasive Devices     Peripheral Intravenous Line            Peripheral IV 11/21/18 Right Arm 2 days    Peripheral IV 11/23/18 Left Forearm less than 1 day                   Assessment and Plan:   Principal Problem:    Atrial fibrillation ([facility] )  Active Problems:    Hypertension    Thrombocytopenia (HCC)    PALOMA (acute kidney injury) ([facility] )    Coronary artery disease    Hyperlipidemia    Dilated cardiomyopathy (HCC)    Acute on chronic combined systolic and diastolic heart failure ([facility] ) Hypomagnesemia    Acute respiratory insufficiency  Resolved Problems:    Hypokalemia        Neuro: CAM ICU  -trend neuro exam  -tylenol 650 mg q6 prn  -ambien 5 mg qhs prn    CV:   Afib with RVR with tachy induced cardiomyopathy  -combined systolic and diastolic heart failure  -EP cardiology consulted for possible ablation and pacemaker on Monday  -continue digoxin  -metoprolol tartrate 50 mg q6, metroprolol injection 5 mg q6 hrs prn afib with HR >120  -esmolol infusion heart rate goal   -amiodarone infusion 0 5mg/min continuous   -lasix 40 mg BID  -Eliquis was held due to acute Cr increase, consider restarting as Cr is now 1 56    NSTEMI II  -Pt has troponin 0 03-->0 06 -->0 07 -->0 06  -likely 2/2 tachycardia and subsequent demand ischemia    -no longer trending    Pulm: Acute hypoxemic respiratory insufficiency  -improved post thoracentesis at [address]    GI: Pepcid PO    Transaminitis  -,  downtrending  -likely 2/2 congestion vs low CO secondary to tachycardia induced systolic heart failure    Hyperbilirubinemia   -patient with elevated total bilirubin of 2 18  -unclear etiology  -Hg stable, so hemolysis unlikely  -possibly 2/2 gilberts in the setting of acute stressor  -continue to trend    : PALOMA improved   -Cr 1 66 --> 2 13 --> 1 93 --> 1 84 --> 1 50 --> 1 77 --> 1 56  -strict I&O  -continue to trend Cr  -consider restarting eliquis    F/E/N:   Fluid balance goal: even  -lasix 40 mg BID    Diet  Electrolyte goals K >4 5, Mg >2, Phos >3  Repleted      ID: No active issues    Heme:   Thrombocytopenia  -acute on chronic thrombocytopenia since 2017  Plt 75 --> 55 --> 37 --> 33  -HIV negative  -B12, fibrinogen, HIT, SSRA and antibody pending  -abd ultrasound to evaluate spleen pending  -hematology consulted, recs appreciated  -peripheral smear demonstrating schistocytes with helmet cells  -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

## 2018-11-24 NOTE — PROGRESS NOTES
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

## 2018-11-25 ENCOUNTER — APPOINTMENT (INPATIENT)
Dept: RADIOLOGY | Facility: HOSPITAL | Age: 73
DRG: 242 | End: 2018-11-25
Payer: MEDICARE

## 2018-11-25 PROBLEM — R06.89 ACUTE RESPIRATORY INSUFFICIENCY: Status: RESOLVED | Noted: 2018-11-23 | Resolved: 2018-11-25

## 2018-11-25 PROBLEM — E83.42 HYPOMAGNESEMIA: Status: RESOLVED | Noted: 2018-11-23 | Resolved: 2018-11-25

## 2018-11-25 PROBLEM — R33.9 URINARY RETENTION: Status: ACTIVE | Noted: 2018-11-25

## 2018-11-25 LAB
ALBUMIN SERPL BCP-MCNC: 2.9 G/DL (ref 3.5–5)
ALP SERPL-CCNC: 91 U/L (ref 46–116)
ALT SERPL W P-5'-P-CCNC: 84 U/L (ref 12–78)
ANION GAP SERPL CALCULATED.3IONS-SCNC: 6 MMOL/L (ref 4–13)
AST SERPL W P-5'-P-CCNC: 35 U/L (ref 5–45)
BACTERIA SPEC BFLD CULT: NO GROWTH
BILIRUB SERPL-MCNC: 2.19 MG/DL (ref 0.2–1)
BUN SERPL-MCNC: 32 MG/DL (ref 5–25)
CALCIUM SERPL-MCNC: 9.4 MG/DL (ref 8.3–10.1)
CHLORIDE SERPL-SCNC: 100 MMOL/L (ref 100–108)
CO2 SERPL-SCNC: 30 MMOL/L (ref 21–32)
CREAT SERPL-MCNC: 1.71 MG/DL (ref 0.6–1.3)
ERYTHROCYTE [DISTWIDTH] IN BLOOD BY AUTOMATED COUNT: 18.6 % (ref 11.6–15.1)
GFR SERPL CREATININE-BSD FRML MDRD: 39 ML/MIN/1.73SQ M
GLUCOSE SERPL-MCNC: 127 MG/DL (ref 65–140)
GRAM STN SPEC: NORMAL
GRAM STN SPEC: NORMAL
HCT VFR BLD AUTO: 40.4 % (ref 36.5–49.3)
HGB BLD-MCNC: 13.1 G/DL (ref 12–17)
MAGNESIUM SERPL-MCNC: 2.1 MG/DL (ref 1.6–2.6)
MCH RBC QN AUTO: 31 PG (ref 26.8–34.3)
MCHC RBC AUTO-ENTMCNC: 32.4 G/DL (ref 31.4–37.4)
MCV RBC AUTO: 96 FL (ref 82–98)
PLATELET # BLD AUTO: 33 THOUSANDS/UL (ref 149–390)
POTASSIUM SERPL-SCNC: 3.9 MMOL/L (ref 3.5–5.3)
PROT SERPL-MCNC: 5.9 G/DL (ref 6.4–8.2)
RBC # BLD AUTO: 4.22 MILLION/UL (ref 3.88–5.62)
SODIUM SERPL-SCNC: 136 MMOL/L (ref 136–145)
WBC # BLD AUTO: 4.73 THOUSAND/UL (ref 4.31–10.16)

## 2018-11-25 PROCEDURE — 80053 COMPREHEN METABOLIC PANEL: CPT | Performed by: INTERNAL MEDICINE

## 2018-11-25 PROCEDURE — 76700 US EXAM ABDOM COMPLETE: CPT

## 2018-11-25 PROCEDURE — 99232 SBSQ HOSP IP/OBS MODERATE 35: CPT | Performed by: EMERGENCY MEDICINE

## 2018-11-25 PROCEDURE — 85027 COMPLETE CBC AUTOMATED: CPT | Performed by: INTERNAL MEDICINE

## 2018-11-25 PROCEDURE — 99232 SBSQ HOSP IP/OBS MODERATE 35: CPT | Performed by: INTERNAL MEDICINE

## 2018-11-25 PROCEDURE — 83735 ASSAY OF MAGNESIUM: CPT | Performed by: INTERNAL MEDICINE

## 2018-11-25 RX ORDER — TAMSULOSIN HYDROCHLORIDE 0.4 MG/1
0.4 CAPSULE ORAL
Status: DISCONTINUED | OUTPATIENT
Start: 2018-11-25 | End: 2018-12-07 | Stop reason: HOSPADM

## 2018-11-25 RX ORDER — FUROSEMIDE 10 MG/ML
20 INJECTION INTRAMUSCULAR; INTRAVENOUS ONCE
Status: COMPLETED | OUTPATIENT
Start: 2018-11-25 | End: 2018-11-25

## 2018-11-25 RX ORDER — FUROSEMIDE 10 MG/ML
40 INJECTION INTRAMUSCULAR; INTRAVENOUS
Status: DISCONTINUED | OUTPATIENT
Start: 2018-11-25 | End: 2018-11-27

## 2018-11-25 RX ADMIN — ESMOLOL HYDROCHLORIDE 150 MCG/KG/MIN: 10 INJECTION INTRAVENOUS at 14:23

## 2018-11-25 RX ADMIN — DIGOXIN 250 MCG: 250 TABLET ORAL at 08:11

## 2018-11-25 RX ADMIN — ESMOLOL HYDROCHLORIDE 150 MCG/KG/MIN: 10 INJECTION INTRAVENOUS at 01:30

## 2018-11-25 RX ADMIN — FUROSEMIDE 40 MG: 10 INJECTION, SOLUTION INTRAMUSCULAR; INTRAVENOUS at 15:53

## 2018-11-25 RX ADMIN — ESMOLOL HYDROCHLORIDE 150 MCG/KG/MIN: 10 INJECTION INTRAVENOUS at 23:06

## 2018-11-25 RX ADMIN — ZOLPIDEM TARTRATE 5 MG: 5 TABLET ORAL at 23:06

## 2018-11-25 RX ADMIN — ESMOLOL HYDROCHLORIDE 150 MCG/KG/MIN: 10 INJECTION INTRAVENOUS at 10:06

## 2018-11-25 RX ADMIN — TAMSULOSIN HYDROCHLORIDE 0.4 MG: 0.4 CAPSULE ORAL at 15:53

## 2018-11-25 RX ADMIN — ESMOLOL HYDROCHLORIDE 150 MCG/KG/MIN: 10 INJECTION INTRAVENOUS at 05:47

## 2018-11-25 RX ADMIN — ESMOLOL HYDROCHLORIDE 150 MCG/KG/MIN: 10 INJECTION INTRAVENOUS at 18:45

## 2018-11-25 RX ADMIN — FUROSEMIDE 20 MG: 10 INJECTION, SOLUTION INTRAMUSCULAR; INTRAVENOUS at 02:31

## 2018-11-25 RX ADMIN — METHYLPREDNISOLONE 20 MG: 16 TABLET ORAL at 10:06

## 2018-11-25 RX ADMIN — FAMOTIDINE 20 MG: 20 TABLET ORAL at 08:11

## 2018-11-25 NOTE — PROGRESS NOTES
Progress Note - Critical Care   Dearl Juan 72 y o  male MRN: [id number]  Unit/Bed#: [place] 518-01 Encounter: [id number]    Impression:  Principal Problem:    Atrial fibrillation ([facility] )  Active Problems:    Acute on chronic combined systolic and diastolic heart failure (HCC)    Hypertension    Coronary artery disease    Dilated cardiomyopathy (HCC)    Hyperlipidemia    Thrombocytopenia (HCC)    PALOMA (acute kidney injury) ([facility] )    Urinary retention      Plan:    Neuro:   · Pain controlled with: tylenol PRN  · Regulate sleep/wake cycle  · Ambien PRN  · Delirium precautions  · CAM-ICU daily  · Trend neuro exam  CV:   · Cardiac infusions: Esmolol and amiodarone  · Digoxin and BB per cardiology  · MAP goal > 65  · Rhythm: Atrial Fibrillation  · Follow rhythm on telemetry  · Plan for ablation with EP Monday 11/26  · [facility] plan to cardiology  Lung:   · Wean NC as able  · SpO2 goal >92%  · Pulmonary toileting with IS and deep breathing exercises  GI:   · Stress ulcer prophylaxis: Pepcid PO  · Bowel regimen: PRN  · Zofran PRN for nausea  FEN:   · Fluid/Diuretic plan: Lasix BID  · Goal 24 hour fluid balance: -500  · Nutrition/diet plan: Oral cardiac diet  · Replete electrolytes with goals: K >4 0, Mag >2 0, and Phos >3 0  :   · Indwelling Abbott present: yes   · Urinary catheter still needed for Failed voiding trial    · Start flomax and voiding trial after ablation 11/26  · Trend UOP and BUN/creat  · Strict I and O  ID:   · Trend temps and WBC count  · Maintain normothermia  Heme:   · Trend hgb and plts  · Transfuse as needed for goal hgb >7 0  · Steroid wean per hematology for ITP  Endo:   · Glycemic control plan: n/a  MSK/Skin:  · Mobility goal: OOB ambulating as able  · PT consult: no  · OT consult: no  · Frequent turning and pressure off-loading  · Local wound care as needed  VTE Prophylaxis:  · Pharmacologic Prophylaxis: Sequential compression device (Venodyne)   · Mechanical Prophylaxis: sequential compression device    Disposition: Continue ICU care    Treatment Team/Consultants: cardiology and hematology    Date of admission: 2018    Reason for Admission: Admitted with AF RVR, new CM EF 20%, and CHF    HPI/24hr events: Having more ectopy  Maintained on esmolol and amio infusions  Physical Exam:    General Appearance:  Adult male in NAD in bed  HENT: Atraumtic  Neck: Supple  No CVC  Eyes: No icterus  Cardiac: Rapid rate and irregular rhythm, no murmur, no rub  Pulmonary: Clear to auscultation bilaterally, no secretions  Gastrointestinal: Soft, no distention  : Abbott present: yes   Musculoskeletal: Trace edema bilaterally  Neuro:  Alert and oriented x 3  Non-focal     Psych: Mood and affect seem appropriate  Skin: Warm  Vitals:   Vitals:    18 0545 18 0615 18 0630 18 0700   BP: (!) 84/62 92/67 (!) 85/56 90/64   Pulse: (!) 118 (!) 110 (!) 106 (!) 106   Resp:    Temp:       TempSrc:       SpO2: 99% 99% 98% 98%   Weight:       Height:         Temperature: Temp (24hrs), Av 8 °F (36 6 °C), Min:97 5 °F (36 4 °C), Max:98 1 °F (36 7 °C)  Current: Temperature: 97 7 °F (36 5 °C)    Weights: IBW: 73 kg  Body mass index is 21 42 kg/m²  Respiratory:  SpO2: SpO2: 98 %, SpO2 Activity: SpO2 Activity: At Rest, SpO2 Device: O2 Device: Nasal cannula  O2 Flow Rate (L/min): 2 L/min    Intake and Outputs:  Intake/Output Summary (Last 24 hours) at 18 0750  Last data filed at 18 0600   Gross per 24 hour   Intake          1714 29 ml   Output             2279 ml   Net          -564 71 ml     I/O last 24 hours: In: 1714 3 [P O :240; I V :1474 3]  Out: 2279 [Urine:2279]    UOP: /hour     Nutrition:        Diet Orders            Start     Ordered    18 1351  Diet Cardiac; Sodium 2 GM;  Fluid Restriction 1800 ML  Diet effective now     Question Answer Comment   Diet Type Cardiac    Cardiac Sodium 2 GM    Other Restriction(s): Fluid Restriction 1800 ML    RD to adjust diet per protocol? Yes        11/23/18 1350    11/22/18 2025  Room Service  Once     Question:  Type of Service  Answer:  Room Service - Appropriate with Assistance    11/22/18 2024        Labs:     Results from last 7 days  Lab Units 11/24/18  0458 11/22/18  0437 11/21/18  2226 11/21/18  0546  11/20/18  1632   WBC Thousand/uL 4 95 6 10  --  5 47  --  7 30   HEMOGLOBIN g/dL 13 9 13 6 14 1 13 1  --  15 0   I STAT HEMOGLOBIN   --   --   --   --   < >  --    HEMATOCRIT % 42 4 41 7 43 2 39 5  --  45 3   HEMATOCRIT, ISTAT   --   --   --   --   < >  --    PLATELETS Thousands/uL 33* 37*  --  55*  --  75*   NEUTROS PCT % 71  --   --   --   --  70   MONOS PCT % 7  --   --   --   --  8   < > = values in this interval not displayed  Baseline creat 1 4-1 5? Results from last 7 days  Lab Units 11/25/18 0534 11/24/18 1955 11/24/18 0458 11/23/18  0910  11/20/18  1640   POTASSIUM mmol/L 3 9 4 1 4 0  < >  --   < >  --    CHLORIDE mmol/L 100 98* 102  < >  --   < >  --    CO2 mmol/L 30 28 28  < >  --   < >  --    CO2, I-STAT   --   --   --   --   --   < > 25   BUN mg/dL 32* 32* 25  < >  --   < >  --    CREATININE mg/dL 1 71* 1 93* 1 56*  < >  --   < >  --    CALCIUM mg/dL 9 4 9 6 9 6  < >  --   < >  --    ALK PHOS U/L 91  --  97  --  90  < >  --    ALT U/L 84*  --  117*  --  147*  < >  --    AST U/L 35  --  56*  --  102*  < >  --    GLUCOSE, ISTAT mg/dl  --   --   --   --   --   --  127   < > = values in this interval not displayed      Results from last 7 days  Lab Units 11/25/18  0534 11/24/18  1955 11/24/18  0458   MAGNESIUM mg/dL 2 1 2 1 2 1       Results from last 7 days  Lab Units 11/24/18  1955 11/24/18  0458 11/22/18  2043   PHOSPHORUS mg/dL 4 4* 2 9 2 9        Results from last 7 days  Lab Units 11/24/18  0458 11/23/18  1438 11/22/18 2043 11/20/18  1632   INR  1 73*  --  2 65* 2 87*   PTT seconds 34 37 38 42*           Results from last 7 days  Lab Units 11/23/18  0543 11/23/18  0030 11/22/18 2043 11/22/18  0437 11/22/18  0130   TROPONIN I ng/mL 0 02 0 03 0 04 0 06* 0 07*               Results from last 7 days  Lab Units 11/22/18 2043 11/21/18  0546   TSH 3RD GENERATON uIU/mL 1 080  --    FREE T4 ng/dL  --  1 36       Imaging:   No new imaging    Micro:   Blood Culture:   Lab Results   Component Value Date    BLOODCX No Growth After 5 Days  05/02/2017    BLOODCX No Growth After 5 Days   05/02/2017     Urine Culture: No results found for: URINECX  Sputum Culture: No components found for: SPUTUMCX  Wound Culure: No results found for: WOUNDCULT    Results from last 7 days  Lab Units 11/21/18 1912   GRAM STAIN RESULT  2+ Mononuclear Cells  No Polys or Bacteria seen   BODY FLUID CULTURE, STERILE  No growth       Allergies: No Known Allergies    Medications:   Scheduled Meds:  Current Facility-Administered Medications:  acetaminophen 650 mg Oral Q6H PRN Mey Workman PA-C    amiodarone 0 5 mg/min Intravenous Continuous Jay Maldonado PA-C Last Rate: 0 5 mg/min (11/24/18 2059)   digoxin 250 mcg Oral Daily Mey Workman PA-C    esmolol  mcg/kg/min Intravenous Titrated BJ Villatoro Last Rate: 150 mcg/kg/min (11/25/18 0547)   famotidine 20 mg Oral Daily Mey Workman PA-C    furosemide 40 mg Intravenous BID (diuretic) Mey Workman PA-C    methylPREDNISolone 20 mg Oral Daily Era Jones MD    Followed by        Lennox Burris ON 11/26/2018] methylPREDNISolone 16 mg Oral Daily Era Jones MD    Followed by        Lennox Burris ON 11/27/2018] methylPREDNISolone 12 mg Oral Daily Era Jones MD    Followed by        Lennox Burris ON 11/28/2018] methylPREDNISolone 8 mg Oral Daily Era Jones MD    Followed by        Lennox Burris ON 11/29/2018] methylPREDNISolone 4 mg Oral Daily Era Jones MD    metoprolol 5 mg Intravenous Q6H PRN Mey Workman PA-C    metoprolol tartrate 50 mg Oral Q6H BJ Villatoro    ondansetron 4 mg Intravenous Q6H PRN Mey Workman PA-C    zolpidem 5 mg Oral HS PRN Batsheva Whitaker PA-C      Continuous Infusions:  amiodarone 0 5 mg/min Last Rate: 0 5 mg/min (11/24/18 2059)   esmolol  mcg/kg/min Last Rate: 150 mcg/kg/min (11/25/18 8444)     PRN Meds:    acetaminophen 650 mg Q6H PRN   metoprolol 5 mg Q6H PRN   ondansetron 4 mg Q6H PRN   zolpidem 5 mg HS PRN       Invasive lines and devices: Invasive Devices     Peripheral Intravenous Line            Peripheral IV 11/21/18 Right Arm 3 days    Peripheral IV 11/23/18 Left Forearm 1 day    Peripheral IV 11/24/18 Left Wrist less than 1 day          Drain            Urethral Catheter Latex 16 Fr  less than 1 day                Code Status: Level 1 - Full Code    Counseling / Coordination of Care  Total Critical Care time spent 0 minutes excluding procedures, teaching and family updates        SIGNATURE: Donald Ruff PA-C  DATE: November 25, 2018  TIME: 7:50 AM

## 2018-11-25 NOTE — PLAN OF CARE
Problem: Prexisting or High Potential for Compromised Skin Integrity  Goal: Skin integrity is maintained or improved  INTERVENTIONS:  - Identify patients at risk for skin breakdown  - Assess and monitor skin integrity  - Assess and monitor nutrition and hydration status  - Monitor labs (i e  albumin)  - Assess for incontinence   - Turn and reposition patient  - Assist with mobility/ambulation  - Relieve pressure over bony prominences  - Avoid friction and shearing  - Provide appropriate hygiene as needed including keeping skin clean and dry  - Evaluate need for skin moisturizer/barrier cream  - Collaborate with interdisciplinary team (i e  Nutrition, Rehabilitation, etc )   - Patient/family teaching   Outcome: Progressing      Problem: CARDIOVASCULAR - ADULT  Goal: Maintains optimal cardiac output and hemodynamic stability  INTERVENTIONS:  - Monitor I/O, vital signs and rhythm  - Monitor for S/S and trends of decreased cardiac output i e  bleeding, hypotension  - Administer and titrate ordered vasoactive medications to optimize hemodynamic stability  - Assess quality of pulses, skin color and temperature  - Assess for signs of decreased coronary artery perfusion - ex   Angina  - Instruct patient to report change in severity of symptoms   Outcome: Progressing    Goal: Absence of cardiac dysrhythmias or at baseline rhythm  INTERVENTIONS:  - Continuous cardiac monitoring, monitor vital signs, obtain 12 lead EKG if indicated  - Administer antiarrhythmic and heart rate control medications as ordered  - Monitor electrolytes and administer replacement therapy as ordered   Outcome: Not Progressing      Problem: RESPIRATORY - ADULT  Goal: Achieves optimal ventilation and oxygenation  INTERVENTIONS:  - Assess for changes in respiratory status  - Assess for changes in mentation and behavior  - Position to facilitate oxygenation and minimize respiratory effort  - Oxygen administration by appropriate delivery method based on oxygen saturation (per order) or ABGs  - Initiate smoking cessation education as indicated  - Encourage broncho-pulmonary hygiene including cough, deep breathe, Incentive Spirometry  - Assess the need for suctioning and aspirate as needed  - Assess and instruct to report SOB or any respiratory difficulty  - Respiratory Therapy support as indicated   Outcome: Progressing      Problem: GENITOURINARY - ADULT  Goal: Urinary catheter remains patent  INTERVENTIONS:  - Assess patency of urinary catheter  - If patient has a chronic garrison, consider changing catheter if non-functioning  - Follow guidelines for intermittent irrigation of non-functioning urinary catheter   Outcome: Progressing      Problem: METABOLIC, FLUID AND ELECTROLYTES - ADULT  Goal: Electrolytes maintained within normal limits  INTERVENTIONS:  - Monitor labs and assess patient for signs and symptoms of electrolyte imbalances  - Administer electrolyte replacement as ordered  - Monitor response to electrolyte replacements, including repeat lab results as appropriate  - Instruct patient on fluid and nutrition as appropriate   Outcome: Progressing    Goal: Fluid balance maintained  INTERVENTIONS:  - Monitor labs and assess for signs and symptoms of volume excess or deficit  - Monitor I/O and WT  - Instruct patient on fluid and nutrition as appropriate   Outcome: Progressing      Problem: SKIN/TISSUE INTEGRITY - ADULT  Goal: Skin integrity remains intact  INTERVENTIONS  - Identify patients at risk for skin breakdown  - Assess and monitor skin integrity  - Assess and monitor nutrition and hydration status  - Monitor labs (i e  albumin)  - Assess for incontinence   - Turn and reposition patient  - Assist with mobility/ambulation  - Relieve pressure over bony prominences  - Avoid friction and shearing  - Provide appropriate hygiene as needed including keeping skin clean and dry  - Evaluate need for skin moisturizer/barrier cream  - Collaborate with interdisciplinary team (i e  Nutrition, Rehabilitation, etc )   - Patient/family teaching   Outcome: Progressing      Problem: Nutrition/Hydration-ADULT  Goal: Nutrient/Hydration intake appropriate for improving, restoring or maintaining nutritional needs  Monitor and assess patient's nutrition/hydration status for malnutrition (ex- brittle hair, bruises, dry skin, pale skin and conjunctiva, muscle wasting, smooth red tongue, and disorientation)  Collaborate with interdisciplinary team and initiate plan and interventions as ordered  Monitor patient's weight and dietary intake as ordered or per policy  Utilize nutrition screening tool and intervene per policy  Determine patient's food preferences and provide high-protein, high-caloric foods as appropriate  INTERVENTIONS:  - Monitor oral intake, urinary output, labs, and treatment plans  - Assess nutrition and hydration status and recommend course of action  - Evaluate amount of meals eaten  - Assist patient with eating if necessary   - Allow adequate time for meals  - Recommend/ encourage appropriate diets, oral nutritional supplements, and vitamin/mineral supplements  - Order, calculate, and assess calorie counts as needed  - Recommend, monitor, and adjust tube feedings and TPN/PPN based on assessed needs  - Assess need for intravenous fluids  - Provide specific nutrition/hydration education as appropriate  - Include patient/family/caregiver in decisions related to nutrition   Outcome: Progressing      Problem: Potential for Falls  Goal: Patient will remain free of falls  INTERVENTIONS:  - Assess patient frequently for physical needs  -  Identify cognitive and physical deficits and behaviors that affect risk of falls    -  [place] fall precautions as indicated by assessment   - Educate patient/family on patient safety including physical limitations  - Instruct patient to call for assistance with activity based on assessment  - Modify environment to reduce risk of injury  - Consider OT/PT consult to assist with strengthening/mobility   Outcome: Progressing

## 2018-11-25 NOTE — PROGRESS NOTES
Cardiology Progress Note - Jose Cadet 72 y o  male MRN: [id number]    Unit/Bed#: [facility] 518-01 Encounter: [id number]        Subjective:    No significant events overnight  No chest pain or dyspnea at rest      ROS    Objective:   Vitals: Blood pressure (!) 77/66, pulse (!) 116, temperature 98 4 °F (36 9 °C), temperature source Oral, resp  rate 17, height 5' 10" (1 778 m), weight 67 7 kg (149 lb 4 oz), SpO2 99 %  , Body mass index is 21 42 kg/m² , Orthostatic Blood Pressures      Most Recent Value   Blood Pressure   77/66 filed at 11/25/2018 1721         Systolic (17LWK), BRS:84 , Min:72 , SRK:653     Diastolic (84BCQ), GVL:54, Min:48, Max:85      Intake/Output Summary (Last 24 hours) at 11/25/18 0850  Last data filed at 11/25/18 0800   Gross per 24 hour   Intake          1793 83 ml   Output             2454 ml   Net          -660 17 ml     Weight (last 2 days)     Date/Time   Weight    11/25/18 0540  67 7 (149 25)    11/24/18 0600  66 8 (147 27)    11/23/18 0558  65 5 (144 4)                Telemetry Review: No significant arrhythmias seen on telemetry review  afib      Physical Exam   Constitutional: He is oriented to person, place, and time  No distress  HENT:   Mouth/Throat: No oropharyngeal exudate  Eyes: No scleral icterus  Neck: No JVD present  Pulmonary/Chest: Effort normal and breath sounds normal  No respiratory distress  He has no wheezes  He has no rales  Abdominal: Soft  Bowel sounds are normal  He exhibits no distension  There is no tenderness  There is no rebound  Musculoskeletal: He exhibits no edema  Neurological: He is alert and oriented to person, place, and time  Skin: Skin is warm and dry  He is not diaphoretic  Psychiatric: He has a normal mood and affect   His behavior is normal          Laboratory Results:    Results from last 7 days  Lab Units 11/23/18  0543 11/23/18  0030 11/22/18  2043   TROPONIN I ng/mL 0 02 0 03 0 04       CBC with diff:   Results from last 7 days  Lab Units 11/25/18  0534 11/24/18  0458 11/22/18  0437 11/21/18  2226 11/21/18  0546 11/20/18  1640 11/20/18  1632   WBC Thousand/uL 4 73 4 95 6 10  --  5 47  --  7 30   HEMOGLOBIN g/dL 13 1 13 9 13 6 14 1 13 1  --  15 0   I STAT HEMOGLOBIN g/dl  --   --   --   --   --  15 0  --    HEMATOCRIT % 40 4 42 4 41 7 43 2 39 5  --  45 3   HEMATOCRIT, ISTAT %  --   --   --   --   --  44  --    MCV fL 96 95 96  --  94  --  95   PLATELETS Thousands/uL 33* 33* 37*  --  55*  --  75*   MCH pg 31 0 31 2 31 3  --  31 3  --  31 5   MCHC g/dL 32 4 32 8 32 6  --  33 2  --  33 1   RDW % 18 6* 18 6* 19 4*  --  19 3*  --  19 8*   NRBC AUTO /100 WBCs  --  1  --   --   --   --  4         CMP:  Results from last 7 days  Lab Units 11/25/18  0534 11/24/18  1955 11/24/18 0458 11/23/18  2148 11/23/18  1438 11/23/18  0910 11/23/18  0543 11/22/18  2043  11/20/18  1640 11/20/18  1632   POTASSIUM mmol/L 3 9 4 1 4 0 6 2* 3 4*  --  4 4 3 3*  < >  --  4 6   CHLORIDE mmol/L 100 98* 102 102 101  --  105 103  < >  --  99*   CO2 mmol/L 30 28 28 33* 28  --  27 29  < >  --  24   CO2, I-STAT   --   --   --   --   --   --   --   --   < > 25  --    BUN mg/dL 32* 32* 25 25 23  --  25 30*  < >  --  40*   CREATININE mg/dL 1 71* 1 93* 1 56* 1 77* 1 42*  --  1 50* 1 84*  < >  --  1 83*   GLUCOSE, ISTAT mg/dl  --   --   --   --   --   --   --   --   --  127  --    CALCIUM mg/dL 9 4 9 6 9 6 9 2 9 8  --  9 9 8 6  < >  --  9 4   AST U/L 35  --  56*  --   --  102*  --  147*  --   --  85*   ALT U/L 84*  --  117*  --   --  147*  --  166*  --   --  86*   ALK PHOS U/L 91  --  97  --   --  90  --  100  --   --  111   EGFR ml/min/1 73sq m 39 34 44 38 49  --  46 36  < > 35 36   < > = values in this interval not displayed        BMP:  Results from last 7 days  Lab Units 11/25/18  0534 11/24/18  1955 11/24/18  0458 11/23/18  2148 11/23/18  1438 11/23/18  0543 11/22/18  2043  11/20/18  1640   POTASSIUM mmol/L 3 9 4 1 4 0 6 2* 3 4* 4 4 3 3*  < >  --    CHLORIDE mmol/L 100 98* 102 102 101 105 103  < >  --    CO2 mmol/L 30 28 28 33* 28 27 29  < >  --    CO2, I-STAT   --   --   --   --   --   --   --   < > 25   BUN mg/dL 32* 32* 25 25 23 25 30*  < >  --    CREATININE mg/dL 1 71* 1 93* 1 56* 1 77* 1 42* 1 50* 1 84*  < >  --    GLUCOSE, ISTAT mg/dl  --   --   --   --   --   --   --   --  127   CALCIUM mg/dL 9 4 9 6 9 6 9 2 9 8 9 9 8 6  < >  --    < > = values in this interval not displayed  BNP: No results for input(s): BNP in the last 72 hours  Magnesium:   Results from last 7 days  Lab Units 18  0534 18  1955 18  0458 18  2148 18  1438 18  2043 18  0130   MAGNESIUM mg/dL 2 1 2 1 2 1 2 4 2 4 1 8 1 8       Coags:   Results from last 7 days  Lab Units 18  0458 18  1438 18  2043 18  1632   PTT seconds 34 37 38 42*   INR  1 73*  --  2 65* 2 87*       TSH: No results found for: TSH    Hemoglobin A1C       Lipid Profile:       Cardiac testing:   Results for orders placed during the hospital encounter of 18   Echo complete with contrast if indicated    [address]  [phone]    Transthoracic Echocardiogram  Limited 2D, M-mode, Doppler, and Color Doppler    Study date:  2018    Patient: Brian Serna  MR number: [id number]  Account number: [de-identified]  : 1945  Age: 72 years  Gender: Male  Status: Inpatient  Location: Bedside  Height: 70 in  Weight: 148 7 lb  BP: 92/ 74 mmHg    Indications: AFIB Assess left ventricular function  Diagnoses: I48 0 - Atrial fibrillation    Sonographer:  SEGUNDO Saeed  Primary Physician:  Keara Suggs MD  Referring Physician:  Tarun Stevens MD  Group:  [facility]  Cardiology Fellow:  Chio Mansfield MD  Interpreting Physician:  Levon Salmon MD    SUMMARY    LEFT VENTRICLE:  Systolic function was markedly reduced   Ejection fraction was estimated to be 20 %  There was severe diffuse hypokinesis with regional variations- akinesis of the anteroseptal, anterior and possibly the inferior walls  The patient was tachycardic throughout the study due to which ejection fraction and regional wall motion was not accurately assessed  Wall thickness was mildly increased  The changes were consistent with concentric remodeling (increased wall thickness with normal wall mass)  RIGHT VENTRICLE:  The size was normal   Systolic function was reduced  LEFT ATRIUM:  The atrium was dilated  RIGHT ATRIUM:  The atrium was dilated  MITRAL VALVE:  There was moderate regurgitation  AORTIC VALVE:  There was mild to moderate regurgitation  TRICUSPID VALVE:  There was moderate regurgitation  PERICARDIUM:  A small, free-flowing pericardial effusion was identified anterior and posterior to the heart  The fluid had no internal echoes  There was no evidence of hemodynamic compromise  There was a moderate-sized left pleural effusion  COMPARISONS:  There has been no significant interval change  Comparison was made with the previous study of 21-Nov-2018  HISTORY: PRIOR HISTORY: HTN,CHF,CAD,AFIB,HLD,Dilated CM,Thrombocytopenia    PROCEDURE: The procedure was performed at the bedside  This was a routine study  The transthoracic approach was used  The study included limited 2D imaging, M-mode, limited spectral Doppler, and color Doppler  Image quality was good  LEFT VENTRICLE: Size was normal  Systolic function was markedly reduced  Ejection fraction was estimated to be 20 %  There was severe diffuse hypokinesis with regional variations- akinesis of the anteroseptal, anterior and possibly the  inferior walls  The patient was tachycardic throughout the study due to which ejection fraction and regional wall motion was not accurately assessed  Wall thickness was mildly increased   The changes were consistent with concentric  remodeling (increased wall thickness with normal wall mass)  DOPPLER: Transmitral flow pattern: atrial fibrillation  The study was not technically sufficient to allow evaluation of LV diastolic function  RIGHT VENTRICLE: The size was normal  Systolic function was reduced  LEFT ATRIUM: The atrium was dilated  RIGHT ATRIUM: The atrium was dilated  MITRAL VALVE: DOPPLER: There was moderate regurgitation  The regurgitant jet was centrally directed  AORTIC VALVE: The valve was trileaflet  Leaflets exhibited normal cuspal separation and sclerosis  DOPPLER: There was mild to moderate regurgitation  TRICUSPID VALVE: DOPPLER: There was moderate regurgitation  PULMONIC VALVE: DOPPLER: There was no significant regurgitation  PERICARDIUM: A small, free-flowing pericardial effusion was identified anterior and posterior to the heart  The fluid had no internal echoes  There was no evidence of hemodynamic compromise  There was a moderate-sized left pleural  effusion  SYSTEM MEASUREMENT TABLES    2D  %FS: 9 65 %  Ao Diam: 3 57 cm  EDV(Teich): 96 19 ml  EF(Teich): 21 27 %  ESV(Teich): 75 73 ml  IVSd: 1 08 cm  LVIDd: 4 58 cm  LVIDs: 4 13 cm  LVPWd: 0 97 cm  SV(Teich): 20 46 ml    [facility] Accredited Echocardiography Laboratory    Prepared and electronically signed by    Tish Valdes MD  Signed [id number] 10:44:37       No results found for this or any previous visit  No results found for this or any previous visit  No results found for this or any previous visit      Meds/Allergies   current meds:   Current Facility-Administered Medications   Medication Dose Route Frequency    acetaminophen (TYLENOL) tablet 650 mg  650 mg Oral Q6H PRN    amiodarone (CORDARONE) 900 mg in dextrose 5 % 500 mL infusion  0 5 mg/min Intravenous Continuous    digoxin (LANOXIN) tablet 250 mcg  250 mcg Oral Daily    esmolol (BREVIBLOC) 2500 mg/250 mL IV infusion (premix)   mcg/kg/min Intravenous Titrated    famotidine (PEPCID) tablet 20 mg 20 mg Oral Daily    furosemide (LASIX) injection 40 mg  40 mg Intravenous BID (diuretic)    methylprednisolone (MEDROL) tablet 20 mg  20 mg Oral Daily    Followed by   Judson Huggins ON 11/26/2018] methylPREDNISolone (MEDROL) tablet 16 mg  16 mg Oral Daily    Followed by   Judson Huggins ON 11/27/2018] methylprednisolone (MEDROL) tablet 12 mg  12 mg Oral Daily    Followed by   Judson Huggins ON 11/28/2018] methylprednisolone (MEDROL) tablet 8 mg  8 mg Oral Daily    Followed by   Judson Huggins ON 11/29/2018] methylprednisolone (MEDROL) tablet 4 mg  4 mg Oral Daily    metoprolol (LOPRESSOR) injection 5 mg  5 mg Intravenous Q6H PRN    metoprolol tartrate (LOPRESSOR) tablet 50 mg  50 mg Oral Q6H    ondansetron (ZOFRAN) injection 4 mg  4 mg Intravenous Q6H PRN    tamsulosin (FLOMAX) capsule 0 4 mg  0 4 mg Oral Daily With Dinner    zolpidem (AMBIEN) tablet 5 mg  5 mg Oral HS PRN     Prescriptions Prior to Admission   Medication    apixaban (ELIQUIS) 5 mg    Ascorbic Acid (VITAMIN C PO)    B Complex Vitamins (VITAMIN B COMPLEX PO)    clobetasol (TEMOVATE) 0 05 % GEL    diltiazem (TIAZAC) 360 MG 24 hr capsule    Ferrous Gluconate-C-Folic Acid (IRON-C PO)    folic acid (FOLVITE) 1 mg tablet    furosemide (LASIX) 20 mg tablet    Lactobacillus (ACIDOPHILUS PO)    Lycopene 10 MG CAPS    metoprolol tartrate (LOPRESSOR) 100 mg tablet    metoprolol tartrate (LOPRESSOR) 50 mg tablet    Misc Natural Products (SAW PALMETTO) CAPS    Multiple Vitamin (MULTI-DAY VITAMINS) TABS         amiodarone 0 5 mg/min Last Rate: 0 5 mg/min (11/24/18 2059)   esmolol  mcg/kg/min Last Rate: 150 mcg/kg/min (11/25/18 0547)     Assessment:  Principal Problem:    Atrial fibrillation (HCC)  Active Problems:    Hypertension    Thrombocytopenia (HCC)    PALOMA (acute kidney injury) ([facility] [address] )    Coronary artery disease    Hyperlipidemia    Dilated cardiomyopathy (HCC)    Acute on chronic combined systolic and diastolic heart failure (HCC)    Urinary retention    Plan:    Continue rate control  Eventual AVN ablation and BIV    Currently on steroids for ITP  EP to see again tomorrow  BP running on lower side but he is relatively asymptomatic  OMT limited by hypotension/low output  Cr  Remains stable with diuresis  Counseling / Coordination of Care  Total floor / unit time spent today 25 minutes  Greater than 50% of total time was spent with the patient and / or family counseling and / or coordination of care  A description of the counseling / coordination of care

## 2018-11-26 LAB
ALBUMIN SERPL BCP-MCNC: 2.8 G/DL (ref 3.5–5)
ANION GAP SERPL CALCULATED.3IONS-SCNC: 6 MMOL/L (ref 4–13)
BUN SERPL-MCNC: 32 MG/DL (ref 5–25)
CALCIUM SERPL-MCNC: 8.5 MG/DL (ref 8.3–10.1)
CHLORIDE SERPL-SCNC: 100 MMOL/L (ref 100–108)
CO2 SERPL-SCNC: 30 MMOL/L (ref 21–32)
CREAT SERPL-MCNC: 1.59 MG/DL (ref 0.6–1.3)
ERYTHROCYTE [DISTWIDTH] IN BLOOD BY AUTOMATED COUNT: 17.7 % (ref 11.6–15.1)
GFR SERPL CREATININE-BSD FRML MDRD: 43 ML/MIN/1.73SQ M
GLUCOSE SERPL-MCNC: 115 MG/DL (ref 65–140)
HCT VFR BLD AUTO: 37 % (ref 36.5–49.3)
HGB BLD-MCNC: 12.1 G/DL (ref 12–17)
INR PPP: 1.37 (ref 0.86–1.17)
MAGNESIUM SERPL-MCNC: 2 MG/DL (ref 1.6–2.6)
MCH RBC QN AUTO: 30.8 PG (ref 26.8–34.3)
MCHC RBC AUTO-ENTMCNC: 32.7 G/DL (ref 31.4–37.4)
MCV RBC AUTO: 94 FL (ref 82–98)
PF4 HEPARIN CMPLX AB SER-ACNC: 0.32 OD (ref 0–0.4)
PHOSPHATE SERPL-MCNC: 4 MG/DL (ref 2.3–4.1)
PLATELET # BLD AUTO: 29 THOUSANDS/UL (ref 149–390)
POTASSIUM SERPL-SCNC: 4 MMOL/L (ref 3.5–5.3)
PROTHROMBIN TIME: 17 SECONDS (ref 11.8–14.2)
RBC # BLD AUTO: 3.93 MILLION/UL (ref 3.88–5.62)
SODIUM SERPL-SCNC: 136 MMOL/L (ref 136–145)
WBC # BLD AUTO: 3.89 THOUSAND/UL (ref 4.31–10.16)

## 2018-11-26 PROCEDURE — 99223 1ST HOSP IP/OBS HIGH 75: CPT | Performed by: INTERNAL MEDICINE

## 2018-11-26 PROCEDURE — 83735 ASSAY OF MAGNESIUM: CPT | Performed by: PHYSICIAN ASSISTANT

## 2018-11-26 PROCEDURE — 85610 PROTHROMBIN TIME: CPT | Performed by: PHYSICIAN ASSISTANT

## 2018-11-26 PROCEDURE — 80069 RENAL FUNCTION PANEL: CPT | Performed by: PHYSICIAN ASSISTANT

## 2018-11-26 PROCEDURE — 85027 COMPLETE CBC AUTOMATED: CPT | Performed by: PHYSICIAN ASSISTANT

## 2018-11-26 PROCEDURE — 99232 SBSQ HOSP IP/OBS MODERATE 35: CPT | Performed by: EMERGENCY MEDICINE

## 2018-11-26 RX ORDER — LORAZEPAM 2 MG/ML
0.25 INJECTION INTRAMUSCULAR ONCE
Status: COMPLETED | OUTPATIENT
Start: 2018-11-26 | End: 2018-11-26

## 2018-11-26 RX ADMIN — ACETAMINOPHEN 650 MG: 325 TABLET, FILM COATED ORAL at 20:18

## 2018-11-26 RX ADMIN — TAMSULOSIN HYDROCHLORIDE 0.4 MG: 0.4 CAPSULE ORAL at 16:17

## 2018-11-26 RX ADMIN — ZOLPIDEM TARTRATE 2.5 MG: 5 TABLET ORAL at 22:50

## 2018-11-26 RX ADMIN — METHYLPREDNISOLONE 16 MG: 16 TABLET ORAL at 08:37

## 2018-11-26 RX ADMIN — AMIODARONE HYDROCHLORIDE 0.5 MG/MIN: 50 INJECTION, SOLUTION INTRAVENOUS at 05:24

## 2018-11-26 RX ADMIN — ESMOLOL HYDROCHLORIDE 100 MCG/KG/MIN: 10 INJECTION INTRAVENOUS at 17:34

## 2018-11-26 RX ADMIN — ESMOLOL HYDROCHLORIDE 150 MCG/KG/MIN: 10 INJECTION INTRAVENOUS at 03:43

## 2018-11-26 RX ADMIN — DIGOXIN 250 MCG: 250 TABLET ORAL at 08:37

## 2018-11-26 RX ADMIN — ESMOLOL HYDROCHLORIDE 150 MCG/KG/MIN: 10 INJECTION INTRAVENOUS at 12:32

## 2018-11-26 RX ADMIN — FAMOTIDINE 20 MG: 20 TABLET ORAL at 08:37

## 2018-11-26 RX ADMIN — FUROSEMIDE 40 MG: 10 INJECTION, SOLUTION INTRAMUSCULAR; INTRAVENOUS at 08:37

## 2018-11-26 RX ADMIN — ESMOLOL HYDROCHLORIDE 150 MCG/KG/MIN: 10 INJECTION INTRAVENOUS at 07:57

## 2018-11-26 RX ADMIN — FUROSEMIDE 40 MG: 10 INJECTION, SOLUTION INTRAMUSCULAR; INTRAVENOUS at 16:17

## 2018-11-26 RX ADMIN — LORAZEPAM 0.25 MG: 2 INJECTION INTRAMUSCULAR; INTRAVENOUS at 08:37

## 2018-11-26 RX ADMIN — METOPROLOL TARTRATE 50 MG: 50 TABLET, FILM COATED ORAL at 09:47

## 2018-11-26 RX ADMIN — METOPROLOL TARTRATE 50 MG: 50 TABLET, FILM COATED ORAL at 16:17

## 2018-11-26 NOTE — CONSULTS
Heart Failure Consult Note - Mile Guillen 72 y o  male MRN: [id number]    Unit/Bed#: [facility] 518-01 Encounter: [id number]      Assessment:    Principal Problem:    Atrial fibrillation ([facility] [place] [facility] )  Active Problems:    Hypertension    Thrombocytopenia ([facility] [place] [facility] )    PALOMA (acute kidney injury) ([place] [address] )    Coronary artery disease    Hyperlipidemia    Dilated cardiomyopathy ([place] [address] )    Acute on chronic combined systolic and diastolic heart failure ([place] [address] )    Urinary retention      Plan: 1  Acute on chronic systolic congestive heart failure, LVEF 20%, NYHA Class II/III, ACC/AHA Stage C, likely tachy-mediated in origin however cannot definitively rule out an ischemic cause  Volume overloaded on admit, now improving with diuresis  Will continue to monitor response to current dose  Creatinine improving daily  Less SOB  HR adequately controlled at this time  Patient hemodynamically stable, warm , well perfused, not markedly volume overloaded  Net positive fluid balance today, continue to monitor daily weight, strict I's and O's, BMP  Plan for AVN ablation with BiV AICD implantation  Continue diuresis and rate control for now  2  Permanent atrial fibrillation  Currently on PO Dig, Metoprolol and Amiodarone and Esmolol gtts with controlled rate  No AC at this time due to chronic coagulopathy  Hgb stable, platelets 09,368  Continue to monitor CBC/platelets daily  AVN ablation on hold for now  3 Chronic thrombocytopenia  Likely immune mediated  Defer to hem onc  4 HTN  Controlled  HPI:  This is a 29-year-old male with a past medical history of chronic diastolic congestive heart failure, systolic dysfunction associated with tachy mediated cardiomyopathy, chronic nonvalvular atrial fibrillation, hypertension, presented on 11/20/2018 with complaints of shortness of breath for about a week  States recently when trying to walk up the stairs has noticed severe shortness of breath which is very unusual for him    Also tells me his atrial fib has been difficult to control over the years but that he had been doing well up until this admission  He did have a drop in his systolic function in the past with an LVEF of 35-40% felt to be tachy mediated  However his last echo in May of 2017 demonstrated an EF of 70%  He does see Dr Lesia Raymond as an outpatient and was last seen by him in October of this year and had been doing well on Diltiazem and Metoprolol  His Digoxin had been stopped prior to that  On presentation to [address] [place] [address]  patient was found to be in atrial fibrillation with RVR and volume overloaded with pleural effusions  Cardiology team evaluated the patient on 11/21/18, repeated a TTE and noted a significant drop in his EF to 20%  Was initiated on PO and IV Amiodarone but remained uncontrolled  Because of this and his compromised respiratory status in the setting of volume overload, was place in the ICU and started on IV diuresis  Also underwent right thoracentesis with removal of 1500 cc of pleural fluid  Patient was then transferred to the [facility] for further evaluation  Currently, He is in atrial fibrillation with a controlled ventricular rate on Esmolol and  Amiodarone infusions and PO Digoxin and Metoprolol  He was seen by our EP team on 11/23/18 to evaluate for possible atrial fib ablation  However due to patient's chronic thrombocytopenia was unable to immediately undergo the procedure  He was seen by hematology oncology who believed patient's coagulopathy to be 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  He was started on steroid therapy and being monitored for improvement  Plan at this time is to continue with rate and rhythm control and diuresis and monitor for improvements in his coagulopathy  Ultimately the plan is to  undergo AV mary anne ablation followed by biventricular pacer/AICD implantation  Currently, patient reports improvement in his shortness of breath but still has some with ambulation   He is in atrial fib with a controlled ventricular response but with some NSVT  He appears hemodynamically stable, warm and well perfused  Hgb is stable at 13 1, platelets 28,627  Creatinine 1 7 with a baseline of 1 5-1 7  He is receiving Lasix 40 mg BID  His weight in the cardiology office in October of this year was 162 lbs and today 149 lbs  Does not appear markedly volume overloaded on physical exam  however is net positive 1 2 liter fluid balance  No respiratory distress  Past Medical History:   Diagnosis Date    Atrial fibrillation ([address] )     Congestive heart failure with left ventricular diastolic dysfunction, chronic (HCC)     Hypertension     Peripheral vascular disease of lower extremity (HCC)     Subdural hematoma (HCC)        12 point ROS negative other than that stated in HPI    No Known Allergies        Current Facility-Administered Medications:     acetaminophen (TYLENOL) tablet 650 mg, 650 mg, Oral, Q6H PRN, Antonino Carpenter PA-C    amiodarone (CORDARONE) 900 mg in dextrose 5 % 500 mL infusion, 0 5 mg/min, Intravenous, Continuous, Jay Maldonado PA-C, Last Rate: 16 7 mL/hr at 11/26/18 0524, 0 5 mg/min at 11/26/18 0524    digoxin (LANOXIN) tablet 250 mcg, 250 mcg, Oral, Daily, Antonino Carpenter PA-C, 250 mcg at 11/26/18 0837    esmolol (BREVIBLOC) 2500 mg/250 mL IV infusion (premix),  mcg/kg/min, Intravenous, Titrated, BJ Bryant, Last Rate: 59 mL/hr at 11/26/18 0757, 150 mcg/kg/min at 11/26/18 0757    famotidine (PEPCID) tablet 20 mg, 20 mg, Oral, Daily, Antonino Carpenter PA-C, 20 mg at 11/26/18 6640    furosemide (LASIX) injection 40 mg, 40 mg, Intravenous, BID (diuretic), Kalli Winn PA-C, 40 mg at 11/26/18 0837    [COMPLETED] methylprednisolone (MEDROL) tablet 24 mg, 24 mg, Oral, Daily, 24 mg at 11/24/18 0812 **FOLLOWED BY** [COMPLETED] methylprednisolone (MEDROL) tablet 20 mg, 20 mg, Oral, Daily, 20 mg at 11/25/18 1006 **FOLLOWED BY** [COMPLETED] methylPREDNISolone (MEDROL) tablet 16 mg, 16 mg, Oral, Daily, 16 mg at 11/26/18 0837 **FOLLOWED BY** [START ON 11/27/2018] methylprednisolone (MEDROL) tablet 12 mg, 12 mg, Oral, Daily **FOLLOWED BY** [START ON 11/28/2018] methylprednisolone (MEDROL) tablet 8 mg, 8 mg, Oral, Daily **FOLLOWED BY** [START ON 11/29/2018] methylprednisolone (MEDROL) tablet 4 mg, 4 mg, Oral, Daily, Stefany Lugo MD    metoprolol (LOPRESSOR) injection 5 mg, 5 mg, Intravenous, Q6H PRN, FRANCHESCA Harper-C, 5 mg at 11/24/18 1811    metoprolol tartrate (LOPRESSOR) tablet 50 mg, 50 mg, Oral, Q6H, BJ Martinez, 50 mg at 11/26/18 0947    ondansetron (ZOFRAN) injection 4 mg, 4 mg, Intravenous, Q6H PRN, RAE HarperC, 4 mg at 11/24/18 0946    tamsulosin (FLOMAX) capsule 0 4 mg, 0 4 mg, Oral, Daily With Dinner, Kalli Winn PA-C, 0 4 mg at 11/25/18 1553    zolpidem (AMBIEN) tablet 5 mg, 5 mg, Oral, HS PRN, FRANCHESCA Harper-C, 5 mg at 11/25/18 2306    Social History     Social History    Marital status: Single     Spouse name: N/A    Number of children: N/A    Years of education: N/A     Occupational History    Not on file  Social History Main Topics    Smoking status: Never Smoker    Smokeless tobacco: Never Used    Alcohol use No    Drug use: No    Sexual activity: Not on file     Other Topics Concern    Not on file     Social History Narrative    No narrative on file       Family History   Problem Relation Age of Onset    Heart disease Mother     Heart disease Father        Physical Exam:  Central Line (day, reason): Abbott catheter (day, reason):    Vitals: Blood pressure 102/67, pulse (!) 108, temperature 97 8 °F (36 6 °C), temperature source Oral, resp  rate 16, height 5' 10" (1 778 m), weight 67 7 kg (149 lb 4 oz), SpO2 97 %  , Body mass index is 21 42 kg/m² , I/O last 3 completed shifts: In: 3323 2 [P O :760;  I V :2563 2]  Out: 2040 [Urine:2040]  I/O this shift:  In: 302 8 [I V :302 8]  Out: 200 [Urine:200]  Wt Readings from Last 3 Encounters:   11/25/18 67 7 kg (149 lb 4 oz)   11/22/18 67 5 kg (148 lb 13 oz)   10/10/18 73 5 kg (162 lb)       Intake/Output Summary (Last 24 hours) at 11/26/18 1137  Last data filed at 11/26/18 0800   Gross per 24 hour   Intake           2305 4 ml   Output             1185 ml   Net           1120 4 ml     I/O last 3 completed shifts: In: 3323 2 [P O :760;  I V :2563 2]  Out: 2040 [Urine:2040]    Atrial fib, occ NSVT    Vitals:    11/26/18 0700 11/26/18 0800 11/26/18 0900 11/26/18 0947   BP: 99/66 94/68 101/64 102/67   Pulse: 100 100 (!) 108    Resp: 19 19 16    Temp: 97 8 °F (36 6 °C)      TempSrc: Oral      SpO2: 99% 98% 97%    Weight:       Height:           GEN: Prachi Thompson appears well, alert and oriented x 3, pleasant and cooperative   HEENT: pupils equal, round, and reactive to light; extraocular muscles intact  NECK: supple, no carotid bruits   HEART: regular rhythm, normal S1 and S2, no murmurs, clicks, gallops or rubs, mild JVD    LUNGS: clear to auscultation bilaterally; no wheezes, rales, or rhonchi   ABDOMEN: normal bowel sounds, soft, no tenderness, no distention  EXTREMITIES: peripheral pulses normal; no clubbing, cyanosis, or edema  NEURO: no focal findings   SKIN: normal without suspicious lesions on exposed skin    Labs & Results:      Results from last 7 days  Lab Units 11/23/18  0543 11/23/18  0030 11/22/18 2043   TROPONIN I ng/mL 0 02 0 03 0 04     Results from last 7 days  Lab Units 11/26/18  0438 11/25/18  0534 11/24/18  0458   WBC Thousand/uL 3 89* 4 73 4 95   HEMOGLOBIN g/dL 12 1 13 1 13 9   HEMATOCRIT % 37 0 40 4 42 4   PLATELETS Thousands/uL 29* 33* 33*           Results from last 7 days  Lab Units 11/26/18  0438 11/25/18  0534 11/24/18  1955 11/24/18  0458  11/23/18  0910  11/20/18  1640   POTASSIUM mmol/L 4 0 3 9 4 1 4 0  < >  --   < >  --    CHLORIDE mmol/L 100 100 98* 102  < >  --   < >  --    CO2 mmol/L 30 30 28 28  < >  --   < >  --    CO2, I-STAT   --   --   --   -- --   --   < > 25   BUN mg/dL 32* 32* 32* 25  < >  --   < >  --    CREATININE mg/dL 1 59* 1 71* 1 93* 1 56*  < >  --   < >  --    CALCIUM mg/dL 8 5 9 4 9 6 9 6  < >  --   < >  --    ALK PHOS U/L  --  91  --  97  --  90  < >  --    ALT U/L  --  84*  --  117*  --  147*  < >  --    AST U/L  --  35  --  56*  --  102*  < >  --    GLUCOSE, ISTAT mg/dl  --   --   --   --   --   --   --  127   < > = values in this interval not displayed  Results from last 7 days  Lab Units 11/26/18  0438 11/24/18  0458 11/22/18  2043   INR  1 37* 1 73* 2 65*     TTE 11/23/18  LVEF: 20%  LVIDd:4 58 cm  RV:normal size and systolic functin  MR:moderate  PASP:  RVOT:   Other:Small free flowing pericardial effusion with moderate left pleural effusion  EKG personally reviewed by BJ Song  Counseling / Coordination of Care  Total floor / unit time spent today 20 minutes  Greater than 50% of total time was spent with the patient and / or family counseling and / or coordination of care  A description of the counseling / coordination of care: 20  Thank you for the opportunity to participate in the care of this patient

## 2018-11-26 NOTE — PROGRESS NOTES
11/26/18 1400   Clinical Encounter Type   Visited With Patient   Routine Visit Introduction   Continue Visiting Yes

## 2018-11-26 NOTE — PROGRESS NOTES
Evaluated patient today, reviewed notes from heme and critical care  Patient remains in rate controlled a fib on amiodarone and esmolol gtt  No further intervention to be done on his plt count from heme standpoint  Our plan is to transfuse multiple units Wednesday PM and plan for BiV ICD placement and AVJ ablation on Thursday AM  Patient was agreeable to this plan

## 2018-11-26 NOTE — PROGRESS NOTES
Progress Note - Critical Care   Chyna Shields 72 y o  male MRN: [id number]  Unit/Bed#: [facility] 518-01 Encounter: [id number]    Impression:  Principal Problem:    Atrial fibrillation ([facility] )  Active Problems:    Acute on chronic combined systolic and diastolic heart failure (HCC)    Hypertension    Coronary artery disease    Dilated cardiomyopathy (HCC)    Hyperlipidemia    Thrombocytopenia (HCC)    PALOMA (acute kidney injury) ([facility] )    Urinary retention      Plan:    Neuro:   · Pain controlled with: tylenol PRN  · Regulate sleep/wake cycle  · Ambien PRN  · Delirium precautions  · CAM-ICU daily  · Trend neuro exam  CV:   · Cardiac infusions: Esmolol and amiodarone  · Digoxin and BB per cardiology  · MAP goal > 65  · Rhythm: Atrial Fibrillation  · Follow rhythm on telemetry  · Plan for ablation with EP   · Defer AC plan to cardiology  Lung:   · SpO2 goal >92%  · Pulmonary toileting with IS and deep breathing exercises  GI:   · Stress ulcer prophylaxis: Pepcid PO  · Bowel regimen: PRN  · Zofran PRN for nausea  FEN:   · Fluid/Diuretic plan: Lasix BID  · Goal 24 hour fluid balance: -500  · Nutrition/diet plan: Oral cardiac diet  · Replete electrolytes with goals: K >4 0, Mag >2 0, and Phos >3 0  :   · Indwelling Abbott present: yes   · Urinary catheter still needed for Failed voiding trial    · Flomax and voiding trial after ablation  · Trend UOP and BUN/creat  · Strict I and O  ID:   · Trend temps and WBC count  · Maintain normothermia  Heme:   · Trend hgb and plts  · Transfuse as needed for goal hgb >7 0  · Steroid wean per hematology for ITP  · Platelets not improving despite steroids since Friday  Spoke with hematology  Continue steroids and transfuse plantlets as needed  No further recommendations    Endo:   · Glycemic control plan: n/a  MSK/Skin:  · Mobility goal: OOB ambulating as able  · PT consult: no  · OT consult: no  · Frequent turning and pressure off-loading  · Local wound care as needed  VTE Prophylaxis:  · Pharmacologic Prophylaxis: Sequential compression device (Venodyne)   · Mechanical Prophylaxis: sequential compression device    Disposition: Continue ICU care    Treatment Team/Consultants: cardiology and hematology    Date of admission: 2018    Reason for Admission: Admitted with AF RVR, new CM EF 20%, and CHF    HPI/24hr events: Maintained on esmolol and amio infusions  BP a bit on the low side so PO BB held  Physical Exam:    General Appearance:  Adult male in NAD in bed  HENT: Atraumtic  Neck: Supple  No CVC  Eyes: No icterus  Cardiac: Rapid rate and regular rhythm, no murmur, no rub  Pulmonary: Clear to auscultation bilaterally, no secretions  Gastrointestinal: Soft, no distention  : Abbott present: yes   Musculoskeletal: Trace edema bilaterally  Neuro:  Alert and oriented x3  Grossly non-focal     Psych: Mood and affect seem frustrated and upset  Skin: Warm  Vitals:   Vitals:    18 0400 18 0600 18 0700 18 0800   BP: 96/67 92/64 99/66 94/68   Pulse: (!) 106 102 100 100   Resp: 13 15 19 19   Temp:   97 8 °F (36 6 °C)    TempSrc:   Oral    SpO2: 98% 97% 99% 98%   Weight:       Height:         Temperature: Temp (24hrs), Av 9 °F (36 6 °C), Min:97 7 °F (36 5 °C), Max:98 1 °F (36 7 °C)  Current: Temperature: 97 8 °F (36 6 °C)    Weights: IBW: 73 kg  Body mass index is 21 42 kg/m²  Respiratory:  SpO2: SpO2: 98 %, SpO2 Activity: SpO2 Activity: At Rest, SpO2 Device: O2 Device: None (Room air)    Intake and Outputs:    Intake/Output Summary (Last 24 hours) at 18 0845  Last data filed at 18 0800   Gross per 24 hour   Intake           2576 8 ml   Output             1260 ml   Net           1316 8 ml     I/O last 24 hours: In: 2728 2 [P O :760;  I V :1968 2]  Out: 1435 [Urine:1435]    Nutrition:        Diet Orders            Start     Ordered    18 0001  Diet NPO  Diet effective midnight     Question Answer Comment   Diet Type NPO    RD to adjust diet per protocol? Yes        11/25/18 0757    11/22/18 2025  Room Service  Once     Question:  Type of Service  Answer:  Room Service - Appropriate with Assistance    11/22/18 2024        Labs:     Results from last 7 days  Lab Units 11/26/18 0438 11/25/18 0534 11/24/18 0458 11/20/18  1632   WBC Thousand/uL 3 89* 4 73 4 95  < > 7 30   HEMOGLOBIN g/dL 12 1 13 1 13 9  < > 15 0   I STAT HEMOGLOBIN   --   --   --   < >  --    HEMATOCRIT % 37 0 40 4 42 4  < > 45 3   HEMATOCRIT, ISTAT   --   --   --   < >  --    PLATELETS Thousands/uL 29* 33* 33*  < > 75*   NEUTROS PCT %  --   --  71  --  70   MONOS PCT %  --   --  7  --  8   < > = values in this interval not displayed  Baseline creat 1 4-1 5? Results from last 7 days  Lab Units 11/26/18 0438 11/25/18 0534 11/24/18 1955 11/24/18 0458 11/23/18  0910  11/20/18  1640   POTASSIUM mmol/L 4 0 3 9 4 1 4 0  < >  --   < >  --    CHLORIDE mmol/L 100 100 98* 102  < >  --   < >  --    CO2 mmol/L 30 30 28 28  < >  --   < >  --    CO2, I-STAT   --   --   --   --   --   --   < > 25   BUN mg/dL 32* 32* 32* 25  < >  --   < >  --    CREATININE mg/dL 1 59* 1 71* 1 93* 1 56*  < >  --   < >  --    CALCIUM mg/dL 8 5 9 4 9 6 9 6  < >  --   < >  --    ALK PHOS U/L  --  91  --  97  --  90  < >  --    ALT U/L  --  84*  --  117*  --  147*  < >  --    AST U/L  --  35  --  56*  --  102*  < >  --    GLUCOSE, ISTAT mg/dl  --   --   --   --   --   --   --  127   < > = values in this interval not displayed      Results from last 7 days  Lab Units 11/26/18 0438 11/25/18 0534 11/24/18  1955   MAGNESIUM mg/dL 2 0 2 1 2 1       Results from last 7 days  Lab Units 11/26/18  0438 11/24/18 1955 11/24/18 0458   PHOSPHORUS mg/dL 4 0 4 4* 2 9        Results from last 7 days  Lab Units 11/26/18  0438 11/24/18  0458 11/23/18  1438 11/22/18  2043   INR  1 37* 1 73*  --  2 65*   PTT seconds  --  34 37 38           Results from last 7 days  Lab Units 11/23/18  0543 11/23/18  0030 11/22/18 2043 11/22/18  0437 11/22/18  0130   TROPONIN I ng/mL 0 02 0 03 0 04 0 06* 0 07*               Results from last 7 days  Lab Units 11/22/18 2043 11/21/18  0546   TSH 3RD GENERATON uIU/mL 1 080  --    FREE T4 ng/dL  --  1 36     Imaging:   No new imaging    Micro:   Blood Culture:   Lab Results   Component Value Date    BLOODCX No Growth After 5 Days  05/02/2017    BLOODCX No Growth After 5 Days   05/02/2017     Urine Culture: No results found for: URINECX  Sputum Culture: No components found for: SPUTUMCX  Wound Culure: No results found for: WOUNDCULT    Results from last 7 days  Lab Units 11/21/18 1912   GRAM STAIN RESULT  2+ Mononuclear Cells  No Polys or Bacteria seen   BODY FLUID CULTURE, STERILE  No growth       Allergies: No Known Allergies    Medications:   Scheduled Meds:    Current Facility-Administered Medications:  acetaminophen 650 mg Oral Q6H PRN Fleta Pilling, GERRI    amiodarone 0 5 mg/min Intravenous Continuous Jay GERRI Maldonado Last Rate: 0 5 mg/min (11/26/18 0524)   digoxin 250 mcg Oral Daily Fleta Pilling, GERRI    esmolol  mcg/kg/min Intravenous Titrated BJ Schaefer Last Rate: 150 mcg/kg/min (11/26/18 0757)   famotidine 20 mg Oral Daily Fleta Pilling, PA-CHELLE    furosemide 40 mg Intravenous BID (diuretic) Brandon Grier PA-C    [START ON 11/27/2018] methylPREDNISolone 12 mg Oral Daily Hodan Smith MD    Followed by        Ruth Monroy ON 11/28/2018] methylPREDNISolone 8 mg Oral Daily Hodan Smith MD    Followed by        Ruth Monroy ON 11/29/2018] methylPREDNISolone 4 mg Oral Daily Hodan Smith MD    metoprolol 5 mg Intravenous Q6H PRN Fleta Pilling, GERRI    metoprolol tartrate 50 mg Oral Q6H BJ Schaefer    ondansetron 4 mg Intravenous Q6H PRN Fleta Pilling, GERRI    tamsulosin 0 4 mg Oral Daily With [facility], GERRI    zolpidem 5 mg Oral HS PRN Fleta Pilling, PA-C      Continuous Infusions:    amiodarone 0 5 mg/min Last Rate: 0 5 mg/min (11/26/18 0524)   esmolol  mcg/kg/min Last Rate: 150 mcg/kg/min (11/26/18 0757)     PRN Meds:    acetaminophen 650 mg Q6H PRN   metoprolol 5 mg Q6H PRN   ondansetron 4 mg Q6H PRN   zolpidem 5 mg HS PRN       Invasive lines and devices: Invasive Devices     Peripheral Intravenous Line            Peripheral IV 11/23/18 Left Forearm 2 days    Peripheral IV 11/24/18 Left Wrist 1 day          Drain            Urethral Catheter Latex 16 Fr  1 day                Code Status: Level 1 - Full Code    Counseling / Coordination of Care  Total Critical Care time spent 0 minutes excluding procedures, teaching and family updates        SIGNATURE: Charlie Garduno PA-C  DATE: November 26, 2018  TIME: 8:45 AM

## 2018-11-26 NOTE — PROGRESS NOTES
11/26/18 1400   Spiritual Beliefs/Perceptions   Support Systems Spouse/significant other   Stress Factors   Patient Stress Factors Other (Comment)   Coping Responses   Patient Coping Other (Comment)   Plan of Care   Comments Pt  visited very tired today      Assessment Completed by: Unit visit

## 2018-11-26 NOTE — SOCIAL WORK
CM met with patient at bedside to explain CM role and discuss d/c plan  Pt resides with his SO Thong [phone] in a 2-story home with no MARTY  Pt is independent with ADLs  No DME  Previous [facility] 5+ years ago unsure of agency  Pt drives and is retired  Preferred Rx:  [facility] in [facility]  No POA  No history of STR  No history of MH or D/A treatment  CM reviewed d/c planning process including the following: identifying help at home, patient preference for d/c planning needs, Discharge Lounge, [facility] Meds to Bed program, availability of treatment team to discuss questions or concerns patient and/or family may have regarding understanding medications and recognizing signs and symptoms once discharged  CM also encouraged patient to follow up with all recommended appointments after discharge  Patient advised of importance for patient and family to participate in managing patients medical well being

## 2018-11-27 LAB
ALBUMIN SERPL BCP-MCNC: 2.9 G/DL (ref 3.5–5)
ALP SERPL-CCNC: 77 U/L (ref 46–116)
ALT SERPL W P-5'-P-CCNC: 60 U/L (ref 12–78)
ANION GAP SERPL CALCULATED.3IONS-SCNC: 9 MMOL/L (ref 4–13)
AST SERPL W P-5'-P-CCNC: 28 U/L (ref 5–45)
BILIRUB SERPL-MCNC: 1.98 MG/DL (ref 0.2–1)
BUN SERPL-MCNC: 34 MG/DL (ref 5–25)
CALCIUM SERPL-MCNC: 8.5 MG/DL (ref 8.3–10.1)
CHLORIDE SERPL-SCNC: 99 MMOL/L (ref 100–108)
CO2 SERPL-SCNC: 28 MMOL/L (ref 21–32)
CREAT SERPL-MCNC: 1.46 MG/DL (ref 0.6–1.3)
ERYTHROCYTE [DISTWIDTH] IN BLOOD BY AUTOMATED COUNT: 17.5 % (ref 11.6–15.1)
GFR SERPL CREATININE-BSD FRML MDRD: 48 ML/MIN/1.73SQ M
GLUCOSE SERPL-MCNC: 100 MG/DL (ref 65–140)
HCT VFR BLD AUTO: 37.7 % (ref 36.5–49.3)
HGB BLD-MCNC: 12.4 G/DL (ref 12–17)
MAGNESIUM SERPL-MCNC: 2 MG/DL (ref 1.6–2.6)
MCH RBC QN AUTO: 30.8 PG (ref 26.8–34.3)
MCHC RBC AUTO-ENTMCNC: 32.9 G/DL (ref 31.4–37.4)
MCV RBC AUTO: 94 FL (ref 82–98)
PHOSPHATE SERPL-MCNC: 3.6 MG/DL (ref 2.3–4.1)
PLATELET # BLD AUTO: 32 THOUSANDS/UL (ref 149–390)
POTASSIUM SERPL-SCNC: 3.6 MMOL/L (ref 3.5–5.3)
PROT SERPL-MCNC: 5.6 G/DL (ref 6.4–8.2)
RBC # BLD AUTO: 4.02 MILLION/UL (ref 3.88–5.62)
SODIUM SERPL-SCNC: 136 MMOL/L (ref 136–145)
SRA .2 IU/ML UFH SER-ACNC: <1 % (ref 0–20)
SRA 100IU/ML UFH SER-ACNC: <1 % (ref 0–20)
SRA UFH SER-IMP: NORMAL
VIT B1 BLD-SCNC: 122.9 NMOL/L (ref 66.5–200)
WBC # BLD AUTO: 4.96 THOUSAND/UL (ref 4.31–10.16)

## 2018-11-27 PROCEDURE — 85027 COMPLETE CBC AUTOMATED: CPT | Performed by: PHYSICIAN ASSISTANT

## 2018-11-27 PROCEDURE — 84100 ASSAY OF PHOSPHORUS: CPT | Performed by: PHYSICIAN ASSISTANT

## 2018-11-27 PROCEDURE — 80053 COMPREHEN METABOLIC PANEL: CPT | Performed by: PHYSICIAN ASSISTANT

## 2018-11-27 PROCEDURE — 99233 SBSQ HOSP IP/OBS HIGH 50: CPT | Performed by: EMERGENCY MEDICINE

## 2018-11-27 PROCEDURE — 99232 SBSQ HOSP IP/OBS MODERATE 35: CPT | Performed by: INTERNAL MEDICINE

## 2018-11-27 PROCEDURE — 83735 ASSAY OF MAGNESIUM: CPT | Performed by: PHYSICIAN ASSISTANT

## 2018-11-27 RX ORDER — DIGOXIN 125 MCG
125 TABLET ORAL DAILY
Status: DISCONTINUED | OUTPATIENT
Start: 2018-11-28 | End: 2018-12-05

## 2018-11-27 RX ORDER — BISACODYL 10 MG
10 SUPPOSITORY, RECTAL RECTAL DAILY PRN
Status: DISCONTINUED | OUTPATIENT
Start: 2018-11-27 | End: 2018-12-01

## 2018-11-27 RX ORDER — FUROSEMIDE 40 MG/1
40 TABLET ORAL
Status: DISCONTINUED | OUTPATIENT
Start: 2018-11-27 | End: 2018-11-27

## 2018-11-27 RX ORDER — POTASSIUM CHLORIDE 20 MEQ/1
40 TABLET, EXTENDED RELEASE ORAL ONCE
Status: COMPLETED | OUTPATIENT
Start: 2018-11-27 | End: 2018-11-27

## 2018-11-27 RX ORDER — DIAZEPAM 5 MG/1
5 TABLET ORAL EVERY 8 HOURS PRN
Status: DISCONTINUED | OUTPATIENT
Start: 2018-11-27 | End: 2018-12-07 | Stop reason: HOSPADM

## 2018-11-27 RX ORDER — POLYETHYLENE GLYCOL 3350 17 G/17G
17 POWDER, FOR SOLUTION ORAL DAILY
Status: DISCONTINUED | OUTPATIENT
Start: 2018-11-27 | End: 2018-11-29

## 2018-11-27 RX ORDER — METOPROLOL TARTRATE 50 MG/1
50 TABLET, FILM COATED ORAL EVERY 6 HOURS
Status: DISCONTINUED | OUTPATIENT
Start: 2018-11-27 | End: 2018-12-01

## 2018-11-27 RX ORDER — DOCUSATE SODIUM 100 MG/1
100 CAPSULE, LIQUID FILLED ORAL 2 TIMES DAILY
Status: DISCONTINUED | OUTPATIENT
Start: 2018-11-27 | End: 2018-12-07 | Stop reason: HOSPADM

## 2018-11-27 RX ORDER — POLYETHYLENE GLYCOL 3350 17 G/17G
17 POWDER, FOR SOLUTION ORAL DAILY PRN
Status: DISCONTINUED | OUTPATIENT
Start: 2018-11-27 | End: 2018-12-07 | Stop reason: HOSPADM

## 2018-11-27 RX ORDER — CALCIUM CARBONATE 200(500)MG
500 TABLET,CHEWABLE ORAL DAILY PRN
Status: DISCONTINUED | OUTPATIENT
Start: 2018-11-27 | End: 2018-12-07 | Stop reason: HOSPADM

## 2018-11-27 RX ORDER — FUROSEMIDE 40 MG/1
40 TABLET ORAL
Status: DISCONTINUED | OUTPATIENT
Start: 2018-11-27 | End: 2018-12-02

## 2018-11-27 RX ORDER — SENNOSIDES 8.6 MG
1 TABLET ORAL
Status: DISCONTINUED | OUTPATIENT
Start: 2018-11-27 | End: 2018-12-07 | Stop reason: HOSPADM

## 2018-11-27 RX ADMIN — DIAZEPAM 5 MG: 5 TABLET ORAL at 12:20

## 2018-11-27 RX ADMIN — DIAZEPAM 5 MG: 5 TABLET ORAL at 20:29

## 2018-11-27 RX ADMIN — DOCUSATE SODIUM 100 MG: 100 CAPSULE, LIQUID FILLED ORAL at 08:34

## 2018-11-27 RX ADMIN — METOPROLOL TARTRATE 50 MG: 50 TABLET, FILM COATED ORAL at 10:08

## 2018-11-27 RX ADMIN — TAMSULOSIN HYDROCHLORIDE 0.4 MG: 0.4 CAPSULE ORAL at 16:49

## 2018-11-27 RX ADMIN — AMIODARONE HYDROCHLORIDE 0.5 MG/MIN: 50 INJECTION, SOLUTION INTRAVENOUS at 10:14

## 2018-11-27 RX ADMIN — SENNOSIDES 8.6 MG: 8.6 TABLET, FILM COATED ORAL at 21:05

## 2018-11-27 RX ADMIN — FAMOTIDINE 20 MG: 20 TABLET ORAL at 08:34

## 2018-11-27 RX ADMIN — METHYLPREDNISOLONE 12 MG: 4 TABLET ORAL at 08:36

## 2018-11-27 RX ADMIN — POTASSIUM CHLORIDE 40 MEQ: 1500 TABLET, EXTENDED RELEASE ORAL at 12:20

## 2018-11-27 RX ADMIN — ONDANSETRON 4 MG: 2 INJECTION INTRAMUSCULAR; INTRAVENOUS at 12:37

## 2018-11-27 RX ADMIN — DIGOXIN 250 MCG: 250 TABLET ORAL at 08:34

## 2018-11-27 RX ADMIN — POLYETHYLENE GLYCOL 3350 17 G: 17 POWDER, FOR SOLUTION ORAL at 05:15

## 2018-11-27 RX ADMIN — FUROSEMIDE 40 MG: 40 TABLET ORAL at 16:49

## 2018-11-27 RX ADMIN — METOPROLOL TARTRATE 50 MG: 50 TABLET, FILM COATED ORAL at 16:49

## 2018-11-27 RX ADMIN — ESMOLOL HYDROCHLORIDE 25 MCG/KG/MIN: 10 INJECTION INTRAVENOUS at 10:20

## 2018-11-27 RX ADMIN — METOPROLOL TARTRATE 50 MG: 50 TABLET, FILM COATED ORAL at 21:05

## 2018-11-27 RX ADMIN — SENNOSIDES 8.6 MG: 8.6 TABLET, FILM COATED ORAL at 03:53

## 2018-11-27 RX ADMIN — ESMOLOL HYDROCHLORIDE 75 MCG/KG/MIN: 10 INJECTION INTRAVENOUS at 01:11

## 2018-11-27 RX ADMIN — FUROSEMIDE 40 MG: 40 TABLET ORAL at 08:34

## 2018-11-27 RX ADMIN — POLYETHYLENE GLYCOL 3350 17 G: 17 POWDER, FOR SOLUTION ORAL at 16:49

## 2018-11-27 RX ADMIN — DOCUSATE SODIUM 100 MG: 100 CAPSULE, LIQUID FILLED ORAL at 16:50

## 2018-11-27 NOTE — PLAN OF CARE
Problem: Prexisting or High Potential for Compromised Skin Integrity  Goal: Skin integrity is maintained or improved  INTERVENTIONS:  - Identify patients at risk for skin breakdown  - Assess and monitor skin integrity  - Assess and monitor nutrition and hydration status  - Monitor labs (i e  albumin)  - Assess for incontinence   - Turn and reposition patient  - Assist with mobility/ambulation  - Relieve pressure over bony prominences  - Avoid friction and shearing  - Provide appropriate hygiene as needed including keeping skin clean and dry  - Evaluate need for skin moisturizer/barrier cream  - Collaborate with interdisciplinary team (i e  Nutrition, Rehabilitation, etc )   - Patient/family teaching   Outcome: Progressing      Problem: CARDIOVASCULAR - ADULT  Goal: Maintains optimal cardiac output and hemodynamic stability  INTERVENTIONS:  - Monitor I/O, vital signs and rhythm  - Monitor for S/S and trends of decreased cardiac output i e  bleeding, hypotension  - Administer and titrate ordered vasoactive medications to optimize hemodynamic stability  - Assess quality of pulses, skin color and temperature  - Assess for signs of decreased coronary artery perfusion - ex   Angina  - Instruct patient to report change in severity of symptoms   Outcome: Progressing    Goal: Absence of cardiac dysrhythmias or at baseline rhythm  INTERVENTIONS:  - Continuous cardiac monitoring, monitor vital signs, obtain 12 lead EKG if indicated  - Administer antiarrhythmic and heart rate control medications as ordered  - Monitor electrolytes and administer replacement therapy as ordered   Outcome: Progressing      Problem: RESPIRATORY - ADULT  Goal: Achieves optimal ventilation and oxygenation  INTERVENTIONS:  - Assess for changes in respiratory status  - Assess for changes in mentation and behavior  - Position to facilitate oxygenation and minimize respiratory effort  - Oxygen administration by appropriate delivery method based on oxygen saturation (per order) or ABGs  - Initiate smoking cessation education as indicated  - Encourage broncho-pulmonary hygiene including cough, deep breathe, Incentive Spirometry  - Assess the need for suctioning and aspirate as needed  - Assess and instruct to report SOB or any respiratory difficulty  - Respiratory Therapy support as indicated   Outcome: Progressing      Problem: GENITOURINARY - ADULT  Goal: Urinary catheter remains patent  INTERVENTIONS:  - Assess patency of urinary catheter  - If patient has a chronic garrison, consider changing catheter if non-functioning  - Follow guidelines for intermittent irrigation of non-functioning urinary catheter   Outcome: Progressing      Problem: METABOLIC, FLUID AND ELECTROLYTES - ADULT  Goal: Electrolytes maintained within normal limits  INTERVENTIONS:  - Monitor labs and assess patient for signs and symptoms of electrolyte imbalances  - Administer electrolyte replacement as ordered  - Monitor response to electrolyte replacements, including repeat lab results as appropriate  - Instruct patient on fluid and nutrition as appropriate   Outcome: Progressing    Goal: Fluid balance maintained  INTERVENTIONS:  - Monitor labs and assess for signs and symptoms of volume excess or deficit  - Monitor I/O and WT  - Instruct patient on fluid and nutrition as appropriate   Outcome: Progressing      Problem: SKIN/TISSUE INTEGRITY - ADULT  Goal: Skin integrity remains intact  INTERVENTIONS  - Identify patients at risk for skin breakdown  - Assess and monitor skin integrity  - Assess and monitor nutrition and hydration status  - Monitor labs (i e  albumin)  - Assess for incontinence   - Turn and reposition patient  - Assist with mobility/ambulation  - Relieve pressure over bony prominences  - Avoid friction and shearing  - Provide appropriate hygiene as needed including keeping skin clean and dry  - Evaluate need for skin moisturizer/barrier cream  - Collaborate with interdisciplinary team (i e  Nutrition, Rehabilitation, etc )   - Patient/family teaching   Outcome: Progressing      Problem: Nutrition/Hydration-ADULT  Goal: Nutrient/Hydration intake appropriate for improving, restoring or maintaining nutritional needs  Monitor and assess patient's nutrition/hydration status for malnutrition (ex- brittle hair, bruises, dry skin, pale skin and conjunctiva, muscle wasting, smooth red tongue, and disorientation)  Collaborate with interdisciplinary team and initiate plan and interventions as ordered  Monitor patient's weight and dietary intake as ordered or per policy  Utilize nutrition screening tool and intervene per policy  Determine patient's food preferences and provide high-protein, high-caloric foods as appropriate  INTERVENTIONS:  - Monitor oral intake, urinary output, labs, and treatment plans  - Assess nutrition and hydration status and recommend course of action  - Evaluate amount of meals eaten  - Assist patient with eating if necessary   - Allow adequate time for meals  - Recommend/ encourage appropriate diets, oral nutritional supplements, and vitamin/mineral supplements  - Order, calculate, and assess calorie counts as needed  - Recommend, monitor, and adjust tube feedings and TPN/PPN based on assessed needs  - Assess need for intravenous fluids  - Provide specific nutrition/hydration education as appropriate  - Include patient/family/caregiver in decisions related to nutrition   Outcome: Progressing      Problem: Potential for Falls  Goal: Patient will remain free of falls  INTERVENTIONS:  - Assess patient frequently for physical needs  -  Identify cognitive and physical deficits and behaviors that affect risk of falls    -  [place] fall precautions as indicated by assessment   - Educate patient/family on patient safety including physical limitations  - Instruct patient to call for assistance with activity based on assessment  - Modify environment to reduce risk of injury  - Consider OT/PT consult to assist with strengthening/mobility   Outcome: Progressing      Problem: DISCHARGE PLANNING - CARE MANAGEMENT  Goal: Discharge to post-acute care or home with appropriate resources  INTERVENTIONS:  - Conduct assessment to determine patient/family and health care team treatment goals, and need for post-acute services based on payer coverage, community resources, and patient preferences, and barriers to discharge  - Address psychosocial, clinical, and financial barriers to discharge as identified in assessment in conjunction with the patient/family and health care team  - Arrange appropriate level of post-acute services according to patient's   needs and preference and payer coverage in collaboration with the physician and health care team  - Communicate with and update the patient/family, physician, and health care team regarding progress on the discharge plan  - Arrange appropriate transportation to post-acute venues   Outcome: Progressing

## 2018-11-27 NOTE — PROGRESS NOTES
Progress Note - Critical Care   Shanita Peterson 72 y o  male MRN: [id number]  Unit/Bed#: [facility] 962-06 Encounter: [id number]    Attending Physician: Jean Yeboah DO    _____________________________________________________________________  ______________________________________________________________________    Chief Complaint: CHF    24 Hour Events: No significant overnight events  Pt denies acute complaints  Review of Systems   Constitutional: Negative for chills and fever  HENT: Negative  Eyes: Negative  Respiratory: Negative  Cardiovascular: Negative  Negative for chest pain  Gastrointestinal: Positive for constipation  Negative for abdominal distention and abdominal pain  Genitourinary: Negative  Musculoskeletal: Negative  Psychiatric/Behavioral: Negative  Negative for agitation  All other systems reviewed and are negative     ______________________________________________________________________  Vitals:    18 0700 18 0716 18 0800 18 0821   BP: 97/62  103/65    Pulse: 84  88 86   Resp: 14  17 19   Temp:  (!) 97 2 °F (36 2 °C)     TempSrc:  Oral     SpO2: 97%  96% 96%   Weight:       Height:           Temperature:   Temp (24hrs), Av 6 °F (36 4 °C), Min:97 °F (36 1 °C), Max:98 3 °F (36 8 °C)    Current Temperature: (!) 97 2 °F (36 2 °C)  Weights:   IBW: 73 kg    Body mass index is 21 23 kg/m²    Weight (last 2 days)     Date/Time   Weight    18 0542  67 1 (147 93)    18 0540  67 7 (149 25)              Physical Exam:   General: no acute distress, comfortable   HENT: normocephalic, MMM  Eyes: normal conjunctivae, PERRL  Neck: supple, no JVD  Lungs: clear bilat without wheeze or rhonchi, no respiratory distress  Heart: regular rate with ectopic beats, no murmur   Abdomen: soft, nontender, nondistended   Extremities: no cyanosis, warm to touch, no edema   Neuro: awake and alert with appropriate mentation, no new focal deficit  Skin: warm, dry, intact, not diaphoretic      _________________________________________________________No results found for: PHART, IMC7DEJ, PO2ART, YSL7LRX, F8PBHFSB, BEART, SOURCE  SpO2: SpO2: 96 %  Intake and Outputs:  I/O       11/25 0701 - 11/26 0700 11/26 0701 - 11/27 0700 11/27 0701 - 11/28 0700    P  O  760 300     I V  (mL/kg) 1665 4 (24 6) 1630 4 (24 3) 81 1 (1 2)    Total Intake(mL/kg) 2425 4 (35 8) 1930 4 (28 8) 81 1 (1 2)    Urine (mL/kg/hr) 1235 (0 8) 3140 (1 9) 100 (0 8)    Total Output 1235 3140 100    Net +1190 4 -1209 7 -18 9               UOP:  1 9 ml/hr   Nutrition:        Diet Orders            Start     Ordered    11/26/18 0852  Diet Cardiac; Sodium 2 GM; Fluid Restriction 1800 ML  Diet effective now     Question Answer Comment   Diet Type Cardiac    Cardiac Sodium 2 GM    Other Restriction(s): Fluid Restriction 1800 ML    RD to adjust diet per protocol? Yes        11/26/18 0851    11/22/18 2025  Room Service  Once     Question:  Type of Service  Answer:  Room Service - Appropriate with Assistance    11/22/18 2024          Labs:     Results from last 7 days  Lab Units 11/27/18  0427 11/26/18  0438 11/25/18  0534 11/24/18  0458 11/22/18  0437 11/21/18  2226 11/21/18  0546  11/20/18  1632   WBC Thousand/uL 4 96 3 89* 4 73 4 95 6 10  --  5 47  --  7 30   HEMOGLOBIN g/dL 12 4 12 1 13 1 13 9 13 6 14 1 13 1  --  15 0   I STAT HEMOGLOBIN   --   --   --   --   --   --   --   < >  --    HEMATOCRIT % 37 7 37 0 40 4 42 4 41 7 43 2 39 5  --  45 3   HEMATOCRIT, ISTAT   --   --   --   --   --   --   --   < >  --    PLATELETS Thousands/uL 32* 29* 33* 33* 37*  --  55*  --  75*   NEUTROS PCT %  --   --   --  71  --   --   --   --  70   MONOS PCT %  --   --   --  7  --   --   --   --  8   < > = values in this interval not displayed      Results from last 7 days  Lab Units 11/27/18  0427 11/26/18  9018 11/25/18  0534 11/24/18  1955 11/24/18  0458 11/23/18  2148 11/23/18  1438 11/23/18  0910  11/22/18  2043  11/20/18  1632 SODIUM mmol/L 136 136 136 134* 136 137 137  --   < > 139  < > 135*   POTASSIUM mmol/L 3 6 4 0 3 9 4 1 4 0 6 2* 3 4*  --   < > 3 3*  < > 4 6   CHLORIDE mmol/L 99* 100 100 98* 102 102 101  --   < > 103  < > 99*   CO2 mmol/L 28 30 30 28 28 33* 28  --   < > 29  < > 24   CO2, I-STAT   --   --   --   --   --   --   --   --   --   --   < >  --    AGAP   --   --   --   --   --   --   --   --   --   --   < >  --    ANION GAP mmol/L 9 6 6 8 6 2* 8  --   < > 7  < > 12   BUN mg/dL 34* 32* 32* 32* 25 25 23  --   < > 30*  < > 40*   CREATININE mg/dL 1 46* 1 59* 1 71* 1 93* 1 56* 1 77* 1 42*  --   < > 1 84*  < > 1 83*   CALCIUM mg/dL 8 5 8 5 9 4 9 6 9 6 9 2 9 8  --   < > 8 6  < > 9 4   ALT U/L 60  --  84*  --  117*  --   --  147*  --  166*  --  86*   AST U/L 28  --  35  --  56*  --   --  102*  --  147*  --  85*   ALK PHOS U/L 77  --  91  --  97  --   --  90  --  100  --  111   ALBUMIN g/dL 2 9* 2 8* 2 9*  --  3 0*  --   --  2 9*  --  3 1*  --  3 7   TOTAL BILIRUBIN mg/dL 1 98*  --  2 19*  --  2 18*  --   --  2 05*  --  1 96*  --  2 90*   < > = values in this interval not displayed      Results from last 7 days  Lab Units 11/27/18 0427 11/26/18  0438 11/25/18  0534 11/24/18 1955 11/24/18 0458 11/23/18  2148 11/23/18  1438 11/22/18  2043   MAGNESIUM mg/dL 2 0 2 0 2 1 2 1 2 1 2 4 2 4 1 8   PHOSPHORUS mg/dL 3 6 4 0  --  4 4* 2 9  --   --  2 9        Results from last 7 days  Lab Units 11/26/18  0438 11/24/18  0458 11/23/18  1438 11/22/18  2043 11/20/18  1632   INR  1 37* 1 73*  --  2 65* 2 87*   PTT seconds  --  34 37 38 42*       Results from last 7 days  Lab Units 11/23/18  0543 11/23/18  0030 11/22/18 2043 11/22/18  0437 11/22/18  0130 11/21/18  2226 11/21/18  1923   TROPONIN I ng/mL 0 02 0 03 0 04 0 06* 0 07* 0 06* 0 06*         ABG:No results found for: PHART, FNT4IFU, PO2ART, GGX9ZVJ, S0EZKQRM, BEART, SOURCE  VBG:        No results found for: [facility]   Imaging: Xr Chest Portable    Result Date: 11/22/2018  Impression: No significant change in small bilateral pleural effusions  Left retrocardiac opacity may represent atelectasis or infiltrate  Workstation performed: [id number]     Us Abdomen Complete    Result Date: 11/25/2018  Impression: Unremarkable pancreas  Top normal size liver with small subcentimeter hemangioma stable from 2011  Cholelithiasis without sonographic evidence of cholecystitis  The common bile duct is prominent at 8 mm  Consider MRCP if clinically warranted  Right side simple renal cysts  Dilated left side extrarenal pelvis  Splenomegaly with small splenule  Workstation performed: [id number]    I have personally reviewed pertinent reports        Micro:    Results from last 7 days  Lab Units 11/21/18 1912   GRAM STAIN RESULT  2+ Mononuclear Cells  No Polys or Bacteria seen   BODY FLUID CULTURE, STERILE  No growth     Allergies: No Known Allergies  Medications:   Scheduled Meds:  Current Facility-Administered Medications:  acetaminophen 650 mg Oral Q6H PRN Frutoso FRANCHESCA Woods-CHELLE    amiodarone 0 5 mg/min Intravenous Continuous Jay Maldonado PA-C Last Rate: 0 5 mg/min (11/26/18 8420)   bisacodyl 10 mg Rectal Daily PRN Ajay Wiley PA-C    digoxin 250 mcg Oral Daily Frutoso GERRI Woods    docusate sodium 100 mg Oral BID Alyx Frausto PA-C    esmolol  mcg/kg/min Intravenous Titrated Everlena Gilford, CRNP Last Rate: Stopped (11/27/18 8293)   famotidine 20 mg Oral Daily Frutoso Hoedward, GERRI    furosemide 40 mg Oral BID (diuretic) Adriana De Paz DO    [START ON 11/28/2018] methylPREDNISolone 8 mg Oral Daily Ke Villalpando MD    Followed by        Rachelle Garcia ON 11/29/2018] methylPREDNISolone 4 mg Oral Daily Ke Villalpando MD    metoprolol 5 mg Intravenous Q6H PRN Frutoso GERRI Woods    metoprolol tartrate 50 mg Oral Q6H Everlena Gilford, CRNP    ondansetron 4 mg Intravenous Q6H PRN Frutoso FRANCHESCA Woods-CHELLE    polyethylene glycol 17 g Oral Daily PRN Ajay Wiley PA-C    senna 1 tablet Oral HS Ajay Wiley PA-C tamsulosin 0 4 mg Oral Daily With [facility] Travelers[facility]    zolpidem 5 mg Oral HS PRN Achilles HisGERRI salvador      Continuous Infusions:  amiodarone 0 5 mg/min Last Rate: 0 5 mg/min (11/26/18 0524)   esmolol  mcg/kg/min Last Rate: Stopped (11/27/18 0821)     PRN Meds:    acetaminophen 650 mg Q6H PRN   bisacodyl 10 mg Daily PRN   metoprolol 5 mg Q6H PRN   ondansetron 4 mg Q6H PRN   polyethylene glycol 17 g Daily PRN   zolpidem 5 mg HS PRN     VTE Pharmacologic Prophylaxis: Pharmacologic VTE Prophylaxis contraindicated due to thombocytopenia  VTE Mechanical Prophylaxis: sequential compression device  Invasive lines and devices: Invasive Devices     Peripheral Intravenous Line            Peripheral IV 11/24/18 Left Wrist 2 days          Drain            Urethral Catheter Latex 16 Fr  2 days                   Assessment and Plan:   Principal Problem:    Atrial fibrillation ([facility] )  Active Problems:    Hypertension    Thrombocytopenia ([facility] )    [facility]    Coronary artery disease    Hyperlipidemia    Dilated cardiomyopathy (HCC)    Acute on chronic combined systolic and diastolic heart failure (HCC)    Urinary retention  Resolved Problems:    Hypokalemia    Hypomagnesemia    Acute respiratory insufficiency        Neuro: Pain management with tylenol   Maintain sleep wake cycle, ambien PRN   Delerium precautions     CV:  Afib w/ RVR  Dilated CM   NSTEMI type 2 POA     CV infusions: esmolol and amio  HF team managing, on dig and metoprolol 50mg q 6hrs   Lasix 40mg bid     Pulm: Acute respiratory insufficiency sp thoracentesis     Incentive spirometry   resp protocol    GI: Bowel regimen prn w/ senna  Cardiac diet     : monitor I/O per unit routine   UOP adequate at 1 9ml/kg/hr  Remove garrison when able     F/E/N: No IVF  Electrolytes replete prn, goal K 4 5, Mag >2 0 and phos 4  Diet: Cardiac sodium <2g    ID: No fever or leukocytosis     Body fluid cx without growth    Heme: Thrombocytopenia: plts without improvement   Cont medrol dose pack   Aggressive plt transfusion prior to EPS procedure Thursday   CBC in AM    Endo: no acute issues      Msk/Skin: PT/OT and OOB as tolerated     Disposition: cont care in CCU       Code Status: Level 1 - Full Code        Portions of the record may have been created with voice recognition software  Occasional wrong word or "sound a like" substitutions may have occurred due to the inherent limitations of voice recognition software  Read the chart carefully and recognize, using context, where substitutions have occurred  MAK Sinha       Internal Medicine   PGY-2  11/27/2018 8:49 AM    Bonnie Gould DO

## 2018-11-27 NOTE — PROGRESS NOTES
Transfer Note - ICU/Stepdown Transfer to [facility]/MS maryuri Mcarthur 72 y o  male MRN: [id number]  [address] [facility]   Unit/Bed#: [facility] 313-59 Encounter: [id number]    Code Status: Level 1 - Full Code    Reason for ICU/Stepdown admission: Acute on chronic systolic heart failure/tachycardia induced cardiomyopathy     Active problems: Principal Problem:    Atrial fibrillation ([facility] )  Active Problems:    Acute on chronic combined systolic and diastolic heart failure ([facility] )    Hypertension    Coronary artery disease    Dilated cardiomyopathy (HCC)    Hyperlipidemia    [facility]rombocytopenia [facility]    PALOMA (acute kidney injury) ([facility] )    Urinary retention  Resolved Problems:    Hypokalemia    Hypomagnesemia    Acute respiratory insufficiency      Consultants:   · HF, Heme/Once, EP, Cardiology     History of Present Illness/Summary of clinical course: From H&P 11/22 2101 by Mey Workman PA-C: "72 y o  male with past medical history of dilated cardiomyopathy, chronic atrial fibrillation, chronic diastolic heart failure, HTN, hyperlipidemia, chronic thrombocytopenia, and PVD who presents to [facility] [address] from [address] [place] [address] secondary to atrial fibrillation with RVR and acute systolic heart failure secondary to tachycardic induced cardiomyopathy  Patient presented to [address] [place] [address] ER on 11/20/2018 secondary to worsening fatigue and dyspnea on exertion  He states "that his inability to walk up a flight of stairs without becoming extremely fatigued and short of breathe is the reason I came to the hospital   Patient follows with his PCP and [facility] Cardiology with the known history of chronic A  Fib  He had recently stopped taking digoxin at the discretion of his cardiologist but has continued to take metoprolol and Cardizem  Patient takes Eliquis for anticoagulation      At [facility] was  Admitted and treated for A  Fib with RVR    Heart rate control was attempted with metoprolol, Cardizem, amiodarone, and digoxin without success  Cardiology was consulted  During his hopsital course he developed worsening shortness of breathe and acute hypoxic respiratory failure secondary to likely volume overload in the setting of tachycardic cardiomyopathy  TTE 11/21 revealed moderate pericardial effusion without hemodynamic compromise; LVEF 20% with severe diffuse hypokinesis; normal RV function  Patient transiently was treated with BiPAP for NIPPV which was weaned to off this morning per report  He was diuresed with Lasix  Secondary to persistent atrial tachycardia, after multiple attempts at rate control with AV node blocking agents, patient has been transferred to [facility] for EP Cardiology to evaluate      At this time, the patient reports he "feels better"  He denies SOB or dyspnea while in bed and reports that the NIPPV mask that he had been using was discontinued this morning  He denies chest pain, palpitations, back pain, or dizziness  He reports that "I become very short of breathe when I attempt to walk any amount of distance"  Patient reports "being hungry" and denies abdominal pain, nausea, or vomiting "    Please refer to today's progress note for further clinical details  Recent or scheduled procedures: EP following for possible ablation     Outstanding/pending diagnostics:   · Medrol dose pack for thrombocytopenia, 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

## 2018-11-27 NOTE — PROGRESS NOTES
Heart Failure Service Progress Note - Desiree Parks 72 y o  male MRN: [id number]    Unit/Bed#: [facility] 518-01 Encounter: [id number]      Assessment:    Principal Problem:    Atrial fibrillation ([place] [address] )  Active Problems:    Hypertension    Thrombocytopenia ([place] [address] )    PALOMA (acute kidney injury) ([place] [address] )    Coronary artery disease    Hyperlipidemia    Dilated cardiomyopathy ([place] [address] )    Acute on chronic combined systolic and diastolic heart failure ([place] [address] )    Urinary retention     As stated 73 yo admitted with decompensated HF due to afib with RVR with decompensation in EF likely due to rate  May 2017 echo EF: 70%  EF on admit here is 20%  He last saw Dr Fern Curry on 10/10 and noted his EF was down to 35-40% at one point  EF did improve with rhythm and rate control  Taking Eliquis as outpt (hx of subdural hematoma)  His afib rate was controlled on this office appt  Was on Cardizem  mg daily and metoprolol tartrate 100 mg in AM, 50 mg in afternoon and 50 mg at night  Hr on admit was 140 bpm- afib with RVR    Subjective:   Patient seen and examined  No significant events overnight  Objective: Intake/ Output: 1930/3140: -1209 with lasix 40 mg IV BID  Weight: 147 lbs  Tele:     # Acute on chronic systolic CHF, Stage C  Etiology: tachy mediated    Echocardiogram 11/123/18  LVEF: 20%  LVIDd: 4 6 cm  RV:  MR: moderate MR  PASP:  RVOT:   Other: mild to moderate AI, moderate TR  Pericardial effusion    Neurohormonal Blockade:  --Beta-Blocker: metoprolol tartrate 50 mg Q6  --ACEi, ARB or ARNi:    (or SVR reduction)  --Aldosterone Receptor Blocker:  --Diuretic: lasix 40 mg IV BID    Sudden Cardiac Death Risk Reduction:  --ICD:   Interrogation:     Electrical Resynchronization:  --  Interrogation:     Advanced Therapies (If appropriate):   --Inotrope:  --LVAD/Transplant Candidacy:    # Persistent afib- digoxin 250 mcg daily, metoprolol tartrate 50 mg Q6, esmolol gtt, heparin  EP planning AV node ablation with BiV  # Thrombocytopenia- receiving steroids  plts need to be over 50,000 for procedure      Plan:  plt count remains low- will need platelet transfusion  Change IV diuretic to oral    Addendum: I considered just doing cardioversion however this would require at least one month of systemic AC post CV for CVA prophylaxis  Given that his platelet count is 30, the risk of major bleeding is around 15%  If his plts come up to > 50,000 then will reconsider invasive procedure or CV however the safest approach would be rate control of his chronic afib  [employer] (day, reason): Abbott catheter (day, reason):    Vitals: Blood pressure 97/62, pulse 88, temperature (!) 97 2 °F (36 2 °C), temperature source Oral, resp  rate 20, height 5' 10" (1 778 m), weight 67 1 kg (147 lb 14 9 oz), SpO2 98 %  , Body mass index is 21 23 kg/m² , I/O last 3 completed shifts: In: 2687 4 [P O :300; I V :2387 4]  Out: 2546 [Urine:3665]  No intake/output data recorded  Wt Readings from Last 3 Encounters:   11/27/18 67 1 kg (147 lb 14 9 oz)   11/22/18 67 5 kg (148 lb 13 oz)   10/10/18 73 5 kg (162 lb)       Intake/Output Summary (Last 24 hours) at 11/27/18 0721  Last data filed at 11/27/18 0600   Gross per 24 hour   Intake          1930 35 ml   Output             3140 ml   Net         -1209 65 ml     I/O last 3 completed shifts: In: 2687 4 [P O :300; I V :2387 4]  Out: 2680 [Urine:3665]    No significant arrhythmias seen on telemetry review         Physical Exam:  Vitals:    11/27/18 0500 11/27/18 0542 11/27/18 0600 11/27/18 0716   BP: 104/62  97/62    Pulse: 88  88    Resp: 18  20    Temp:    (!) 97 2 °F (36 2 °C)   TempSrc:    Oral   SpO2: 98%  98%    Weight:  67 1 kg (147 lb 14 9 oz)     Height:           GEN: Prachi Thompson appears well, alert and oriented x 3, pleasant and cooperative   HEENT: pupils equal, round, and reactive to light; extraocular muscles intact  NECK: supple, no carotid bruits   HEART: irregular rhythm, normal S1 and S2, no murmurs, clicks, gallops or rubs, JVP is down    LUNGS: clear to auscultation bilaterally; no wheezes, rales, or rhonchi   ABDOMEN: normal bowel sounds, soft, no tenderness, no distention  EXTREMITIES: peripheral pulses normal; no clubbing, cyanosis, or edema  NEURO: no focal findings   SKIN: normal without suspicious lesions on exposed skin      Current Facility-Administered Medications:     acetaminophen (TYLENOL) tablet 650 mg, 650 mg, Oral, Q6H PRN, Batsheva Whitaker PA-C, 650 mg at 11/26/18 2018    amiodarone (CORDARONE) 900 mg in dextrose 5 % 500 mL infusion, 0 5 mg/min, Intravenous, Continuous, Jay Maldonado PA-C, Last Rate: 16 7 mL/hr at 11/26/18 0524, 0 5 mg/min at 11/26/18 0524    bisacodyl (DULCOLAX) rectal suppository 10 mg, 10 mg, Rectal, Daily PRN, Rachael Frausto PA-C    digoxin (LANOXIN) tablet 250 mcg, 250 mcg, Oral, Daily, Batsheva Whitaker PA-C, 250 mcg at 11/26/18 9381    docusate sodium (COLACE) capsule 100 mg, 100 mg, Oral, BID, Callie Frausto PA-C    esmolol (BREVIBLOC) 2500 mg/250 mL IV infusion (premix),  mcg/kg/min, Intravenous, Titrated, BJ Rendon, Last Rate: 19 7 mL/hr at 11/27/18 0716, 50 mcg/kg/min at 11/27/18 0716    famotidine (PEPCID) tablet 20 mg, 20 mg, Oral, Daily, Batsheva Whitaker PA-C, 20 mg at 11/26/18 6341    furosemide (LASIX) injection 40 mg, 40 mg, Intravenous, BID (diuretic), Kalli Winn PA-C, 40 mg at 11/26/18 1617    [COMPLETED] methylprednisolone (MEDROL) tablet 24 mg, 24 mg, Oral, Daily, 24 mg at 11/24/18 0812 **FOLLOWED BY** [COMPLETED] methylprednisolone (MEDROL) tablet 20 mg, 20 mg, Oral, Daily, 20 mg at 11/25/18 1006 **FOLLOWED BY** [COMPLETED] methylPREDNISolone (MEDROL) tablet 16 mg, 16 mg, Oral, Daily, 16 mg at 11/26/18 0837 **FOLLOWED BY** methylprednisolone (MEDROL) tablet 12 mg, 12 mg, Oral, Daily **FOLLOWED BY** [START ON 11/28/2018] methylprednisolone (MEDROL) tablet 8 mg, 8 mg, Oral, Daily **FOLLOWED BY** [START ON 11/29/2018] methylprednisolone (MEDROL) tablet 4 mg, 4 mg, Oral, Daily, Santana Jordan MD    metoprolol (LOPRESSOR) injection 5 mg, 5 mg, Intravenous, Q6H PRN, Boogie Boston PA-C, 5 mg at 11/24/18 1811    metoprolol tartrate (LOPRESSOR) tablet 50 mg, 50 mg, Oral, Q6H, SRINIVASAN CarrionNP, 50 mg at 11/26/18 1617    ondansetron (ZOFRAN) injection 4 mg, 4 mg, Intravenous, Q6H PRN, RAE AyalaC, 4 mg at 11/24/18 0946    polyethylene glycol (MIRALAX) packet 17 g, 17 g, Oral, Daily PRN, Jennifer Rafael GERRI Frausto, 17 g at 11/27/18 0515    senna (SENOKOT) tablet 8 6 mg, 1 tablet, Oral, HS, Callie R FRANCHESCA Frausto-C, 8 6 mg at 11/27/18 0353    tamsulosin (FLOMAX) capsule 0 4 mg, 0 4 mg, Oral, Daily With Dinner, Rufino Lugo PA-C, 0 4 mg at 11/26/18 1617    zolpidem (AMBIEN) tablet 5 mg, 5 mg, Oral, HS PRN, Boogie Boston PA-C, 2 5 mg at 11/26/18 2250      Labs & Results:      Results from last 7 days  Lab Units 11/23/18  0543 11/23/18  0030 11/22/18  2043   TROPONIN I ng/mL 0 02 0 03 0 04     Results from last 7 days  Lab Units 11/27/18  0427 11/26/18  0438 11/25/18  0534   WBC Thousand/uL 4 96 3 89* 4 73   HEMOGLOBIN g/dL 12 4 12 1 13 1   HEMATOCRIT % 37 7 37 0 40 4   PLATELETS Thousands/uL 32* 29* 33*           Results from last 7 days  Lab Units 11/27/18  0427 11/26/18  0438 11/25/18  0534  11/24/18  0458  11/20/18  1640   POTASSIUM mmol/L 3 6 4 0 3 9  < > 4 0  < >  --    CHLORIDE mmol/L 99* 100 100  < > 102  < >  --    CO2 mmol/L 28 30 30  < > 28  < >  --    CO2, I-STAT   --   --   --   --   --   < > 25   BUN mg/dL 34* 32* 32*  < > 25  < >  --    CREATININE mg/dL 1 46* 1 59* 1 71*  < > 1 56*  < >  --    CALCIUM mg/dL 8 5 8 5 9 4  < > 9 6  < >  --    ALK PHOS U/L 77  --  91  --  97  < >  --    ALT U/L 60  --  84*  --  117*  < >  --    AST U/L 28  --  35  --  56*  < >  --    GLUCOSE, ISTAT mg/dl  --   --   --   --   --   --  127   < > = values in this interval not displayed    Results from last 7 days  Lab Units 11/26/18  0438 11/24/18  0458 11/22/18  2043   INR  1 37* 1 73* 2 65*       Counseling / Coordination of Care  Total floor / unit time spent today 25 minutes  Greater than 50% of total time was spent with the patient and / or family counseling and / or coordination of care  A description of the counseling / coordination of care: 15  Thank you for the opportunity to participate in the care of this patient  Michael LARSON    Director Heart Failure/ Medical Director [address] [place] [address]

## 2018-11-28 LAB
ABO GROUP BLD: NORMAL
ALBUMIN SERPL BCP-MCNC: 3.1 G/DL (ref 3.5–5)
ALP SERPL-CCNC: 91 U/L (ref 46–116)
ALT SERPL W P-5'-P-CCNC: 73 U/L (ref 12–78)
ANION GAP SERPL CALCULATED.3IONS-SCNC: 5 MMOL/L (ref 4–13)
AST SERPL W P-5'-P-CCNC: 34 U/L (ref 5–45)
BILIRUB SERPL-MCNC: 1.99 MG/DL (ref 0.2–1)
BLD GP AB SCN SERPL QL: NEGATIVE
BUN SERPL-MCNC: 35 MG/DL (ref 5–25)
CALCIUM SERPL-MCNC: 9.7 MG/DL (ref 8.3–10.1)
CHLORIDE SERPL-SCNC: 97 MMOL/L (ref 100–108)
CO2 SERPL-SCNC: 31 MMOL/L (ref 21–32)
CREAT SERPL-MCNC: 1.51 MG/DL (ref 0.6–1.3)
ERYTHROCYTE [DISTWIDTH] IN BLOOD BY AUTOMATED COUNT: 17.3 % (ref 11.6–15.1)
ERYTHROCYTE [DISTWIDTH] IN BLOOD BY AUTOMATED COUNT: 17.3 % (ref 11.6–15.1)
GFR SERPL CREATININE-BSD FRML MDRD: 46 ML/MIN/1.73SQ M
GLUCOSE SERPL-MCNC: 102 MG/DL (ref 65–140)
HCT VFR BLD AUTO: 38.8 % (ref 36.5–49.3)
HCT VFR BLD AUTO: 40.8 % (ref 36.5–49.3)
HGB BLD-MCNC: 13 G/DL (ref 12–17)
HGB BLD-MCNC: 13.6 G/DL (ref 12–17)
MAGNESIUM SERPL-MCNC: 2 MG/DL (ref 1.6–2.6)
MCH RBC QN AUTO: 31.3 PG (ref 26.8–34.3)
MCH RBC QN AUTO: 31.5 PG (ref 26.8–34.3)
MCHC RBC AUTO-ENTMCNC: 33.3 G/DL (ref 31.4–37.4)
MCHC RBC AUTO-ENTMCNC: 33.5 G/DL (ref 31.4–37.4)
MCV RBC AUTO: 94 FL (ref 82–98)
MCV RBC AUTO: 94 FL (ref 82–98)
PHOSPHATE SERPL-MCNC: 2.7 MG/DL (ref 2.3–4.1)
PLATELET # BLD AUTO: 36 THOUSANDS/UL (ref 149–390)
PLATELET # BLD AUTO: 87 THOUSANDS/UL (ref 149–390)
PMV BLD AUTO: 13 FL (ref 8.9–12.7)
POTASSIUM SERPL-SCNC: 3.6 MMOL/L (ref 3.5–5.3)
PROT SERPL-MCNC: 5.8 G/DL (ref 6.4–8.2)
RBC # BLD AUTO: 4.13 MILLION/UL (ref 3.88–5.62)
RBC # BLD AUTO: 4.35 MILLION/UL (ref 3.88–5.62)
RH BLD: NEGATIVE
SODIUM SERPL-SCNC: 133 MMOL/L (ref 136–145)
SPECIMEN EXPIRATION DATE: NORMAL
WBC # BLD AUTO: 5.06 THOUSAND/UL (ref 4.31–10.16)
WBC # BLD AUTO: 5.72 THOUSAND/UL (ref 4.31–10.16)

## 2018-11-28 PROCEDURE — 99232 SBSQ HOSP IP/OBS MODERATE 35: CPT | Performed by: INTERNAL MEDICINE

## 2018-11-28 PROCEDURE — P9100 PATHOGEN TEST FOR PLATELETS: HCPCS

## 2018-11-28 PROCEDURE — 83735 ASSAY OF MAGNESIUM: CPT | Performed by: NURSE PRACTITIONER

## 2018-11-28 PROCEDURE — 85027 COMPLETE CBC AUTOMATED: CPT | Performed by: NURSE PRACTITIONER

## 2018-11-28 PROCEDURE — 80053 COMPREHEN METABOLIC PANEL: CPT | Performed by: NURSE PRACTITIONER

## 2018-11-28 PROCEDURE — 86880 COOMBS TEST DIRECT: CPT | Performed by: PHYSICIAN ASSISTANT

## 2018-11-28 PROCEDURE — 86900 BLOOD TYPING SEROLOGIC ABO: CPT | Performed by: PHYSICIAN ASSISTANT

## 2018-11-28 PROCEDURE — 86901 BLOOD TYPING SEROLOGIC RH(D): CPT | Performed by: PHYSICIAN ASSISTANT

## 2018-11-28 PROCEDURE — 85027 COMPLETE CBC AUTOMATED: CPT | Performed by: PHYSICIAN ASSISTANT

## 2018-11-28 PROCEDURE — 86850 RBC ANTIBODY SCREEN: CPT | Performed by: PHYSICIAN ASSISTANT

## 2018-11-28 PROCEDURE — P9037 PLATE PHERES LEUKOREDU IRRAD: HCPCS

## 2018-11-28 PROCEDURE — 0T9B70Z DRAINAGE OF BLADDER WITH DRAINAGE DEVICE, VIA NATURAL OR ARTIFICIAL OPENING: ICD-10-PCS | Performed by: INTERNAL MEDICINE

## 2018-11-28 PROCEDURE — 84100 ASSAY OF PHOSPHORUS: CPT | Performed by: NURSE PRACTITIONER

## 2018-11-28 RX ORDER — DIPHENHYDRAMINE HYDROCHLORIDE 50 MG/ML
25 INJECTION INTRAMUSCULAR; INTRAVENOUS ONCE
Status: DISCONTINUED | OUTPATIENT
Start: 2018-11-28 | End: 2018-11-28

## 2018-11-28 RX ORDER — VANCOMYCIN HYDROCHLORIDE 1 G/200ML
15 INJECTION, SOLUTION INTRAVENOUS ONCE
Status: COMPLETED | OUTPATIENT
Start: 2018-11-29 | End: 2018-11-30

## 2018-11-28 RX ORDER — DIPHENHYDRAMINE HYDROCHLORIDE 50 MG/ML
12.5 INJECTION INTRAMUSCULAR; INTRAVENOUS ONCE
Status: COMPLETED | OUTPATIENT
Start: 2018-11-28 | End: 2018-11-28

## 2018-11-28 RX ORDER — DIAZEPAM 5 MG/1
5 TABLET ORAL ONCE
Status: COMPLETED | OUTPATIENT
Start: 2018-11-28 | End: 2018-11-28

## 2018-11-28 RX ORDER — FUROSEMIDE 10 MG/ML
20 INJECTION INTRAMUSCULAR; INTRAVENOUS ONCE
Status: COMPLETED | OUTPATIENT
Start: 2018-11-28 | End: 2018-11-28

## 2018-11-28 RX ADMIN — DIPHENHYDRAMINE HYDROCHLORIDE 12.5 MG: 50 INJECTION, SOLUTION INTRAMUSCULAR; INTRAVENOUS at 11:43

## 2018-11-28 RX ADMIN — FUROSEMIDE 20 MG: 10 INJECTION, SOLUTION INTRAMUSCULAR; INTRAVENOUS at 20:16

## 2018-11-28 RX ADMIN — AMIODARONE HYDROCHLORIDE 0.5 MG/MIN: 50 INJECTION, SOLUTION INTRAVENOUS at 17:21

## 2018-11-28 RX ADMIN — METOPROLOL TARTRATE 50 MG: 50 TABLET, FILM COATED ORAL at 04:30

## 2018-11-28 RX ADMIN — TAMSULOSIN HYDROCHLORIDE 0.4 MG: 0.4 CAPSULE ORAL at 15:44

## 2018-11-28 RX ADMIN — DIGOXIN 125 MCG: 125 TABLET ORAL at 08:17

## 2018-11-28 RX ADMIN — METOPROLOL TARTRATE 50 MG: 50 TABLET, FILM COATED ORAL at 21:19

## 2018-11-28 RX ADMIN — METHYLPREDNISOLONE 8 MG: 4 TABLET ORAL at 10:20

## 2018-11-28 RX ADMIN — DOCUSATE SODIUM 100 MG: 100 CAPSULE, LIQUID FILLED ORAL at 08:17

## 2018-11-28 RX ADMIN — DIAZEPAM 5 MG: 5 TABLET ORAL at 11:43

## 2018-11-28 RX ADMIN — DIAZEPAM 5 MG: 5 TABLET ORAL at 06:44

## 2018-11-28 RX ADMIN — SENNOSIDES 8.6 MG: 8.6 TABLET, FILM COATED ORAL at 21:19

## 2018-11-28 RX ADMIN — POLYETHYLENE GLYCOL 3350 17 G: 17 POWDER, FOR SOLUTION ORAL at 08:24

## 2018-11-28 RX ADMIN — METOPROLOL TARTRATE 50 MG: 50 TABLET, FILM COATED ORAL at 15:44

## 2018-11-28 RX ADMIN — DOCUSATE SODIUM 100 MG: 100 CAPSULE, LIQUID FILLED ORAL at 17:23

## 2018-11-28 RX ADMIN — FUROSEMIDE 40 MG: 40 TABLET ORAL at 08:18

## 2018-11-28 RX ADMIN — METOPROLOL TARTRATE 50 MG: 50 TABLET, FILM COATED ORAL at 10:01

## 2018-11-28 RX ADMIN — ACETAMINOPHEN 650 MG: 325 TABLET, FILM COATED ORAL at 11:52

## 2018-11-28 RX ADMIN — FAMOTIDINE 20 MG: 20 TABLET ORAL at 08:17

## 2018-11-28 RX ADMIN — DIAZEPAM 5 MG: 5 TABLET ORAL at 23:10

## 2018-11-28 NOTE — PROGRESS NOTES
Progress Note - Electrophysiology-Cardiology (EP)   Katlin Mcarthur 72 y o  male MRN: [id number]  Unit/Bed#: [facility] 531-01 Encounter: [id number]      Assessment/Plan:   1  Permanent nonvalvular atrial fibrillation, symptomatic    * as an outpatient decision was made to keep patient in rate controlled in atrial fibrillation with his Cardiologist Dr Patricia Matta due to past failed rhythm control attempt albeit there is not documentation of what these attempts were in EPIC               * patient's EF has dropped from 70% in 2017 to 20% in 2018 which has occurred in the past due to uncontrolled atrial fibrillation, see below               * he is still in atrial fibrillation w/ CVR on amiodarone gtt, esmolol has been discontinued    * patient has been in atrial fibrillation >7 years with no sinus ECGs being seen in [facility] at all, he only has documented atrial fibrillation since 2011 with echo's showing a dilated LA, I do not feel a rhythm control strategy would hold or benefit him at this time due to his duration of atrial fibrillation as well as with his low plt count, we would then have to commit him to [facility] x 1 month  On top of that he has a hx of SDH  His bleeding risk is extremely high  Although also high risk, our plan is to transfuse platelets to keep his plt count >50K, ideally >80K w/ BiV ICD implant tomorrow w/ AVJ at a later date  Transfused plt's should last at least 5 days and help us get him through the periop bleeding risk period which is about 1 week   I discussed the seriousness of the patient's clinical situation with him this AM and he is willing to proceed with the above    * plan to transfuse 2 units today and monitor CBC tonight and in the AM   * keep NPO at midnight   * pre op vanc   * Cr is 1 51, he is right handed with no contrast dye allergies    * given his ITP and bleeding risk he is a candidate for a watchman device, will perform LAURA prior to procedure tomorrow AM and have him f/u with Dr Ashanti Stearns as an outpatient to discuss this further  2  Acute on chronic ITP   * plt's transfusing now   * on steroids but these have not worked yet    * continue CBC monitoring as above     3  Acute systolic CHF              * EF down to 20% from 70% in 2018              * on PO lasix 40mg PO BID, will give lasix 20mg IV with his 2nd plt infusion              * monitor I's/O's, daily weights and daily BMP's               * gen cards seeing as well as CHF team              * likely tachy induced but he has no prior ischemic w/u               * BB's on hold right now as well as ACEI, hopeful addition after procedure tomorrow       4  NICMP - see above   5  HTN        Subjective/Objective     Subjective:   permanent nonvalvular atrial fibrillation AC w/ eliquis, HTN,  chronic diastolic dysfunction, thrombocytopenia who is HOD#6 after being transferred to [facility] from [facility] for management of his atrial fibrillation and CHF  11-28-18:   Since last seen by EP decision has been made to transfuse plt's and perform BiV ICD implant w/ AVJ on 11-29-18  Patient offers no concerns or complaints about his current clinical situation  No LH, dizziness, chest discomfort or SOB       Vitals: /74   Pulse 81   Temp (!) 97 4 °F (36 3 °C) (Oral)   Resp 18   Ht 5' 10" (1 778 m)   Wt 67 1 kg (147 lb 14 9 oz)   SpO2 98%   BMI 21 23 kg/m²   Vitals:    11/25/18 0540 11/27/18 0542   Weight: 67 7 kg (149 lb 4 oz) 67 1 kg (147 lb 14 9 oz)     Orthostatic Blood Pressures      Most Recent Value   Blood Pressure  112/74 filed at 11/28/2018 1339   Patient Position - Orthostatic VS  Sitting filed at 11/28/2018 0749            Intake/Output Summary (Last 24 hours) at 11/28/18 1520  Last data filed at 11/28/18 1339   Gross per 24 hour   Intake           852 19 ml   Output             2610 ml   Net         -1757 81 ml       Invasive Devices     Peripheral Intravenous Line            Peripheral IV 11/28/18 Left Forearm less than 1 day    Peripheral IV 11/28/18 Right;Upper Arm less than 1 day          Drain            Urethral Catheter Latex 16 Fr  3 days                            Scheduled Meds:  Current Facility-Administered Medications:  acetaminophen 650 mg Oral Q6H PRN Lb Serrano PA-C    amiodarone 0 5 mg/min Intravenous Continuous Jay Maldonado PA-C Last Rate: 0 5 mg/min (11/27/18 1014)   bisacodyl 10 mg Rectal Daily PRN Heber Frausto PA-C    calcium carbonate 500 mg Oral Daily PRN Patty Ahuja, DO    diazepam 5 mg Oral Q8H PRN Patty Ahuja, DO    digoxin 125 mcg Oral Daily Dalila Ventura,     docusate sodium 100 mg Oral BID Heber Frausto PA-C    famotidine 20 mg Oral Daily Conner Morrell PA-C    furosemide 20 mg Intravenous Once Jay Maldonado PA-C    furosemide 40 mg Oral BID (diuretic) Leonel Dinh DO    [START ON 11/29/2018] methylPREDNISolone 4 mg Oral Daily Harmony Chiang MD    metoprolol 5 mg Intravenous Q6H PRN Lb Serrano PA-C    metoprolol tartrate 50 mg Oral Q6H Leonel Dinh DO    ondansetron 4 mg Intravenous Q6H PRN Lb Serrano PA-C    polyethylene glycol 17 g Oral Daily PRN Geraldine Tracy PA-C    polyethylene glycol 17 g Oral Daily BJ Vargas    senna 1 tablet Oral HS Geraldine Tracy PA-C    tamsulosin 0 4 mg Oral Daily With [facility] Travelers[facility]RI    zolpidem 5 mg Oral HS PRN Lb Serrano PA-C      Continuous Infusions:  amiodarone 0 5 mg/min Last Rate: 0 5 mg/min (11/27/18 1014)     PRN Meds:   acetaminophen    bisacodyl    calcium carbonate    diazepam    metoprolol    ondansetron    polyethylene glycol    zolpidem        Physical Exam:   GEN: NAD, alert and oriented, well appearing  SKIN: dry without significant lesions or rashes  HEENT: NCAT, PERRL, EOMs intact  NECK: No JVD or carotid bruits appreciated  CARDIOVASCULAR: irregularly irregular rhythm regular rate , normal S1, S2 without murmurs, rubs, or gallops appreciated  LUNGS: Clear to auscultation bilaterally without wheezes, rhonchi, or rales  ABDOMEN: Soft, nontender, nondistended  EXTREMITIES/VASCULAR: perfused without clubbing, cyanosis, or edema b/l  PSYCH: Normal mood and affect  NEURO: CN ll-Xll grossly intact                Lab Results: I have personally reviewed pertinent lab results  Results from last 7 days  Lab Units 18  0511 18  0427 18  0438   WBC Thousand/uL 5 06 4 96 3 89*   HEMOGLOBIN g/dL 13 0 12 4 12 1   HEMATOCRIT % 38 8 37 7 37 0   PLATELETS Thousands/uL 36* 32* 29*       Results from last 7 days  Lab Units 18  0511 18  0427 18  0438   POTASSIUM mmol/L 3 6 3 6 4 0   CHLORIDE mmol/L 97* 99* 100   CO2 mmol/L 31 28 30   BUN mg/dL 35* 34* 32*   CREATININE mg/dL 1 51* 1 46* 1 59*   CALCIUM mg/dL 9 7 8 5 8 5       Results from last 7 days  Lab Units 18  0438 18  0458 18  1438 18  2043   INR  1 37* 1 73*  --  2 65*   PTT seconds  --  34 37 38       Results from last 7 days  Lab Units 18  0511 18  0427 18  0438   MAGNESIUM mg/dL 2 0 2 0 2 0         Imaging: I have personally reviewed pertinent reports  Results for orders placed during the hospital encounter of 18   Echo complete with contrast if indicated    Narrative [address]  [phone]    Transthoracic Echocardiogram  Limited 2D, M-mode, Doppler, and Color Doppler    Study date:  2018    Patient: Naomi Siddiqi  MR number: [id number]  Account number: [de-identified]  : 1945  Age: 72 years  Gender: Male  Status: Inpatient  Location: Bedside  Height: 70 in  Weight: 148 7 lb  BP: 92/ 74 mmHg    Indications: AFIB Assess left ventricular function      Diagnoses: I48 0 - Atrial fibrillation    Sonographer:  SEGUNDO Bhandari  Primary Physician:  Semaj Ozuna MD  Referring Physician:  James Galicia MD  Group:  [facility]  Cardiology Fellow:  Nia Call MD  Interpreting Physician:  Courtenay Gowers, MD    SUMMARY    LEFT VENTRICLE:  Systolic function was markedly reduced  Ejection fraction was estimated to be 20 %  There was severe diffuse hypokinesis with regional variations- akinesis of the anteroseptal, anterior and possibly the inferior walls  The patient was tachycardic throughout the study due to which ejection fraction and regional wall motion was not accurately assessed  Wall thickness was mildly increased  The changes were consistent with concentric remodeling (increased wall thickness with normal wall mass)  RIGHT VENTRICLE:  The size was normal   Systolic function was reduced  LEFT ATRIUM:  The atrium was dilated  RIGHT ATRIUM:  The atrium was dilated  MITRAL VALVE:  There was moderate regurgitation  AORTIC VALVE:  There was mild to moderate regurgitation  TRICUSPID VALVE:  There was moderate regurgitation  PERICARDIUM:  A small, free-flowing pericardial effusion was identified anterior and posterior to the heart  The fluid had no internal echoes  There was no evidence of hemodynamic compromise  There was a moderate-sized left pleural effusion  COMPARISONS:  There has been no significant interval change  Comparison was made with the previous study of 21-Nov-2018  HISTORY: PRIOR HISTORY: HTN,CHF,CAD,AFIB,HLD,Dilated CM,Thrombocytopenia    PROCEDURE: The procedure was performed at the bedside  This was a routine study  The transthoracic approach was used  The study included limited 2D imaging, M-mode, limited spectral Doppler, and color Doppler  Image quality was good  LEFT VENTRICLE: Size was normal  Systolic function was markedly reduced  Ejection fraction was estimated to be 20 %  There was severe diffuse hypokinesis with regional variations- akinesis of the anteroseptal, anterior and possibly the  inferior walls   The patient was tachycardic throughout the study due to which ejection fraction and regional wall motion was not accurately assessed  Wall thickness was mildly increased  The changes were consistent with concentric  remodeling (increased wall thickness with normal wall mass)  DOPPLER: Transmitral flow pattern: atrial fibrillation  The study was not technically sufficient to allow evaluation of LV diastolic function  RIGHT VENTRICLE: The size was normal  Systolic function was reduced  LEFT ATRIUM: The atrium was dilated  RIGHT ATRIUM: The atrium was dilated  MITRAL VALVE: DOPPLER: There was moderate regurgitation  The regurgitant jet was centrally directed  AORTIC VALVE: The valve was trileaflet  Leaflets exhibited normal cuspal separation and sclerosis  DOPPLER: There was mild to moderate regurgitation  TRICUSPID VALVE: DOPPLER: There was moderate regurgitation  PULMONIC VALVE: DOPPLER: There was no significant regurgitation  PERICARDIUM: A small, free-flowing pericardial effusion was identified anterior and posterior to the heart  The fluid had no internal echoes  There was no evidence of hemodynamic compromise  There was a moderate-sized left pleural  effusion      SYSTEM MEASUREMENT TABLES    2D  %FS: 9 65 %  Ao Diam: 3 57 cm  EDV(Teich): 96 19 ml  EF(Teich): 21 27 %  ESV(Teich): 75 73 ml  IVSd: 1 08 cm  LVIDd: 4 58 cm  LVIDs: 4 13 cm  LVPWd: 0 97 cm  SV(Teich): 20 46 ml    [employer] Accredited Echocardiography Laboratory    Prepared and electronically signed by    Shadia Cartwright MD  Signed [id number] 10:44:37         EKG:

## 2018-11-28 NOTE — PROGRESS NOTES
Heart Failure Service Progress Note - Odette Quinteros 72 y o  male MRN: [id number]    Unit/Bed#: [facility] 531-01 Encounter: [id number]      Assessment:    Principal Problem:    Atrial fibrillation ([facility] )  Active Problems:    Hypertension    Thrombocytopenia ([facility] )    PALOMA (acute kidney injury) ([facility] )    Coronary artery disease    Hyperlipidemia    Dilated cardiomyopathy ([facility] )    Acute on chronic combined systolic and diastolic heart failure ([place] [address] )    Urinary retention      Subjective:   Patient seen and examined  No significant events overnight  Atrial fib with controlled ventricular rate on tele  Was moved out of ICU to floor last PM  Feels well  Appears euvolemic to slilghtly dry  Hemodynamically stable  WWP  Planned for platelet tranfusion today followed by Biventricular pacer/AICD implantation and AVN ablation tomorrow  Objective: Intake/ Output:817/2135  Weight: 147 lbs, 149 on admit  Tele: Atrial fib  HR 70-80s    TTE 11/23/18  LVEF: 20%  LVIDd:4 58 cm  RV:normal size and systolic functin  MR:moderate  PASP:  RVOT:   Other:Small free flowing pericardial effusion with moderate left pleural effusion  Neurohormonal Blockade:  --Beta-Blocker: Metoprolol 50 mg Q6 hr   --ACEi, ARB or ARNi: PALOMA precludes    (or SVR reduction)  --Aldosterone Receptor Blocker: PALOMA precludes  --Diuretic:Lasix 40 mg PO BID    Sudden Cardiac Death Risk Reduction:  --ICD: EF 20%, for BiV AICD implant today  Interrogation:     Electrical Resynchronization:  --BiV implant today  Interrogation:     Advanced Therapies (If appropriate): --Inotrope:  --LVAD/Transplant Candidacy:    Plan: 1  Acute on chronic systolic congestive heart failure, LVEF 20%, NYHA Class II/III, ACC/AHA Stage C, likely tachy-mediated in origin however cannot definitively rule out an ischemic cause  Volume overloaded on admit, now improved with diuresis  Euvolemic to dry today  Creatinine improved overall, small bump up today   May be able to change to once daily but will hold until after transfusions   No SOB, HR controlled  Patient hemodynamically stable, warm , well perfused  Net negative 1 3 liters/24 hr, Continue to monitor daily weight, strict I's and O's, BMP  Will receive 6 units of platelets today followed by BiV pacer/ICD implant, AVN ablation tomorrow       2  Permanent atrial fibrillation  Currently on PO Dig, Metoprolol and Amiodarone with controlled rate  No AC at this time due to chronic coagulopathy  Does not appear to be responding to steroid taper as platelets still low at 36,000  Hgb stable  Continue to monitor CBC/platelets daily  Careful monitoring of fluid status post transfusion       3  Chronic thrombocytopenia  Possibly immune mediated  On steroid taper with mild  improvement  Defer to hem onc       4  HTN  Controlled  [employer] (day, reason): Abbott catheter (day, reason):    Vitals: Blood pressure 110/64, pulse 74, temperature 98 5 °F (36 9 °C), resp  rate 18, height 5' 10" (1 778 m), weight 67 1 kg (147 lb 14 9 oz), SpO2 98 %  , Body mass index is 21 23 kg/m² , I/O last 3 completed shifts: In: 1410 9 [P O :360; I V :1050 9]  Out: 6799 [Urine:2875]  No intake/output data recorded  Wt Readings from Last 3 Encounters:   11/27/18 67 1 kg (147 lb 14 9 oz)   11/22/18 67 5 kg (148 lb 13 oz)   10/10/18 73 5 kg (162 lb)       Intake/Output Summary (Last 24 hours) at 11/28/18 0938  Last data filed at 11/28/18 0520   Gross per 24 hour   Intake           736 57 ml   Output             2035 ml   Net         -1298 43 ml     I/O last 3 completed shifts: In: 1410 9 [P O :360; I V :1050 9]  Out: 3809 [Urine:2875]    Atrial fib, controlled ventricular rate         Physical Exam:  Vitals:    11/28/18 0300 11/28/18 0721 11/28/18 0749 11/28/18 0817   BP: 104/72 90/63 95/60 110/64   BP Location: Left arm  Left arm    Pulse: 86 73 65 74   Resp: 18 18     Temp: 97 5 °F (36 4 °C) 98 5 °F (36 9 °C)     TempSrc: Oral      SpO2: 98% 98%     Weight:       Height: GEN: Savannah Thompson appears well, alert and oriented x 3, pleasant and cooperative   HEENT: pupils equal, round, and reactive to light; extraocular muscles intact  NECK: supple, no carotid bruits   HEART: regular rhythm, normal S1 and S2, no murmurs, clicks, gallops or rubs, no JVD     LUNGS: clear to auscultation bilaterally; no wheezes, rales, or rhonchi   ABDOMEN: normal bowel sounds, soft, no tenderness, no distention  EXTREMITIES: peripheral pulses normal; no clubbing, cyanosis, or edema  NEURO: no focal findings   SKIN: normal without suspicious lesions on exposed skin      Current Facility-Administered Medications:     acetaminophen (TYLENOL) tablet 650 mg, 650 mg, Oral, Q6H PRN, Aylin Marlow PA-C, 650 mg at 11/26/18 2018    amiodarone (CORDARONE) 900 mg in dextrose 5 % 500 mL infusion, 0 5 mg/min, Intravenous, Continuous, Jay Maldonado PA-C, Last Rate: 16 7 mL/hr at 11/27/18 1014, 0 5 mg/min at 11/27/18 1014    bisacodyl (DULCOLAX) rectal suppository 10 mg, 10 mg, Rectal, Daily PRN, Isreal Calvert PA-C    calcium carbonate (TUMS) chewable tablet 500 mg, 500 mg, Oral, Daily PRN, Gloria Turcios DO    diazepam (VALIUM) tablet 5 mg, 5 mg, Oral, Q8H PRN, Gloria Turcios DO, 5 mg at 11/28/18 0644    digoxin (LANOXIN) tablet 125 mcg, 125 mcg, Oral, Daily, Anitha Ventura DO, 125 mcg at 11/28/18 0817    docusate sodium (COLACE) capsule 100 mg, 100 mg, Oral, BID, Judd Frausto PA-C, 100 mg at 11/28/18 0817    famotidine (PEPCID) tablet 20 mg, 20 mg, Oral, Daily, Aylin Marlow PA-C, 20 mg at 11/28/18 6311    furosemide (LASIX) tablet 40 mg, 40 mg, Oral, BID (diuretic), Delgado Jeronimo DO, 40 mg at 11/28/18 0818    [COMPLETED] methylprednisolone (MEDROL) tablet 24 mg, 24 mg, Oral, Daily, 24 mg at 11/24/18 0812 **FOLLOWED BY** [COMPLETED] methylprednisolone (MEDROL) tablet 20 mg, 20 mg, Oral, Daily, 20 mg at 11/25/18 1006 **FOLLOWED BY** [COMPLETED] methylPREDNISolone (MEDROL) tablet 16 mg, 16 mg, Oral, Daily, 16 mg at 11/26/18 0837 **FOLLOWED BY** [COMPLETED] methylprednisolone (MEDROL) tablet 12 mg, 12 mg, Oral, Daily, 12 mg at 11/27/18 0836 **FOLLOWED BY** methylprednisolone (MEDROL) tablet 8 mg, 8 mg, Oral, Daily **FOLLOWED BY** [START ON 11/29/2018] methylprednisolone (MEDROL) tablet 4 mg, 4 mg, Oral, Daily, Qi Cox MD    metoprolol (LOPRESSOR) injection 5 mg, 5 mg, Intravenous, Q6H PRN, Rachana Miller PA-C, 5 mg at 11/24/18 1811    metoprolol tartrate (LOPRESSOR) tablet 50 mg, 50 mg, Oral, Q6H, Whit Rubio DO, 50 mg at 11/28/18 0430    ondansetron [facility]) injection 4 mg, 4 mg, Intravenous, Q6H PRN, Rachana Miller PA-C, 4 mg at 11/27/18 1237    polyethylene glycol (MIRALAX) packet 17 g, 17 g, Oral, Daily PRN, Laura Frausto PA-C, 17 g at 11/27/18 0515    polyethylene glycol (MIRALAX) packet 17 g, 17 g, Oral, Daily, BJ Vargas, 17 g at 11/28/18 6317    senna (SENOKOT) tablet 8 6 mg, 1 tablet, Oral, HS, Callie Frausto PA-C, 8 6 mg at 11/27/18 2105    tamsulosin (FLOMAX) capsule 0 4 mg, 0 4 mg, Oral, Daily With Dinner, Kalli Winn PA-C, 0 4 mg at 11/27/18 1649    zolpidem (AMBIEN) tablet 5 mg, 5 mg, Oral, HS PRN, Rachana Miller PA-C, 2 5 mg at 11/26/18 2250      Labs & Results:      Results from last 7 days  Lab Units 11/23/18  0543 11/23/18  0030 11/22/18  2043   TROPONIN I ng/mL 0 02 0 03 0 04     Results from last 7 days  Lab Units 11/28/18  0511 11/27/18 0427 11/26/18  0438   WBC Thousand/uL 5 06 4 96 3 89*   HEMOGLOBIN g/dL 13 0 12 4 12 1   HEMATOCRIT % 38 8 37 7 37 0   PLATELETS Thousands/uL 36* 32* 29*           Results from last 7 days  Lab Units 11/28/18  0511 11/27/18 0427 11/26/18  0438 11/25/18  0534   POTASSIUM mmol/L 3 6 3 6 4 0 3 9   CHLORIDE mmol/L 97* 99* 100 100   CO2 mmol/L 31 28 30 30   BUN mg/dL 35* 34* 32* 32*   CREATININE mg/dL 1 51* 1 46* 1 59* 1 71*   CALCIUM mg/dL 9 7 8 5 8 5 9 4   ALK PHOS U/L 91 77  --  91   ALT U/L 73 60  --  84* AST U/L 34 28  --  35     Results from last 7 days  Lab Units 11/26/18  0438 11/24/18  0458 11/22/18  2043   INR  1 37* 1 73* 2 65*           Counseling / Coordination of Care  Total floor / unit time spent today 20 minutes  Greater than 50% of total time was spent with the patient and / or family counseling and / or coordination of care  A description of the counseling / coordination of care: 20  Thank you for the opportunity to participate in the care of this patient  Ted LARSON    Director Heart Failure/ Medical Director [address] [place] [address]

## 2018-11-28 NOTE — UTILIZATION REVIEW
Continued Stay Review    Date: 18 ICU LEVEL OF CARE    Vital Signs: /76   Pulse 92   Temp 97 7 °F (36 5 °C) (Oral)   Resp 12   Ht 5' 10" (1 778 m)   Wt 67 1 kg (147 lb 14 9 oz)   SpO2 99%   BMI 21 23 kg/m²         18 0400 18 0600 18 0700 18 0800   BP: 96/67 92/64 99/66 94/68   Pulse: (!) 106 102 100 100   Resp: 13 15 19 19   Temp:     97 8 °F (36 6 °C)     TempSrc:     Oral     SpO2: 98% 97% 99% 98%   Weight:           Height:                 Temperature: Temp (24hrs), Av 9 °F (36 6 °C), Min:97 7 °F (36 5 °C), Max:98 1 °F (36 7 °C)  Current: Temperature: 97 8 °F (36 6 °C)     Weights: IBW: 73 kg  Body mass index is 21 42 kg/m²  Respiratory:  SpO2: SpO2: 98 %, SpO2 Activity: SpO2 Activity: At Rest, SpO2 Device: O2 Device: None (Room air)     Intake and Outputs:     Intake/Output Summary (Last 24 hours) at 18 0845  Last data filed at 18 0800    Gross per 24 hour   Intake           2576 8 ml   Output             1260 ml   Net           1316 8 ml      I/O last 24 hours: In: 2728 2 [P O :760; I V :1968 2]  Out: 1435 [Urine:1435]      Diet Cardiac; Sodium 2 GM;  Fluid Restriction 1800 ML     Continuous IV Infusions:   IV esmolol (BREVIBLOC) 2500 mg/250 mL IV infusion (premix) TITRATE FOR HR 90 - 110    amiodarone 0 5 mg/min        Medications:   Scheduled Meds:   Current Facility-Administered Medications:   acetaminophen 650 mg Oral Q6H PRN     amiodarone 0 5 mg/min Intravenous Continuous     bisacodyl 10 mg Rectal Daily PRN     calcium carbonate 500 mg Oral Daily PRN     diazepam 5 mg Oral Q8H PRN     [START ON 2018] digoxin 125 mcg Oral Daily     docusate sodium 100 mg Oral BID     famotidine 20 mg Oral Daily     furosemide 40 mg Oral BID (diuretic)     [START ON 2018] methylPREDNISolone 8 mg Oral Daily     Followed by        Dariusz Ramirez ON 2018] methylPREDNISolone 4 mg Oral Daily     metoprolol 5 mg Intravenous Q6H PRN     metoprolol tartrate 50 mg Oral Q6H     ondansetron 4 mg Intravenous Q6H PRN     polyethylene glycol 17 g Oral Daily PRN     polyethylene glycol 17 g Oral Daily     senna 1 tablet Oral HS     tamsulosin 0 4 mg Oral Daily With Dinner     zolpidem 5 mg Oral HS PRN         PRN Meds:     Acetaminophen 650 mg q6hrs prn given x 1/ 24 hrs    bisacodyl    calcium carbonate    diazepam    metoprolol    ondansetron    polyethylene glycol    Zolpidem 5 mg qDay prn BT given x 1/ 24 hrs      LABS/Diagnostic Results:  CBC  Results from last 7 days  Lab Units 11/26/18  0438 11/25/18  0534 11/24/18 0458   11/20/18  1632   WBC Thousand/uL 3 89* 4 73 4 95  < > 7 30   HEMOGLOBIN g/dL 12 1 13 1 13 9  < > 15 0   I STAT HEMOGLOBIN    --   --   --   < >  --    HEMATOCRIT % 37 0 40 4 42 4  < > 45 3   HEMATOCRIT, ISTAT    --   --   --   < >  --    PLATELETS Thousands/uL 29* 33* 33*  < > 75*   NEUTROS PCT %  --   --  71  --  70   MONOS PCT %  --   --  7  --  8      Results from last 7 days  Lab Units 11/26/18  0438 11/25/18  0534 11/24/18 1955 11/24/18 0458 11/23/18  0910   11/20/18  1640   POTASSIUM mmol/L 4 0 3 9 4 1 4 0  < >  --   < >  --    CHLORIDE mmol/L 100 100 98* 102  < >  --   < >  --    CO2 mmol/L 30 30 28 28  < >  --   < >  --    CO2, I-STAT    --   --   --   --   --   --   < > 25   BUN mg/dL 32* 32* 32* 25  < >  --   < >  --    CREATININE mg/dL 1 59* 1 71* 1 93* 1 56*  < >  --   < >  --    CALCIUM mg/dL 8 5 9 4 9 6 9 6  < >  --   < >  --    ALK PHOS U/L  --  91  --  97  --  90  < >  --    ALT U/L  --  84*  --  117*  --  147*  < >  --    AST U/L  --  35  --  56*  --  102*  < >  --    GLUCOSE, ISTAT mg/dl  --   --   --   --   --   --   --  127      Results from last 7 days  Lab Units 11/26/18 0438 11/25/18 0534 11/24/18 1955   MAGNESIUM mg/dL 2 0 2 1 2 1       Results from last 7 days  Lab Units 11/26/18 0438 11/24/18 1955 11/24/18 0458   PHOSPHORUS mg/dL 4 0 4 4* 2 9       Results from last 7 days  Lab Units 11/26/18 0438 11/24/18  0458 11/23/18  1438 11/22/18  2043   INR   1 37* 1 73*  --  2 65*   PTT seconds  --  34 37 38       Results from last 7 days  Lab Units 11/23/18  0543 11/23/18  0030 11/22/18  2043 11/22/18  0437 11/22/18  0130   TROPONIN I ng/mL 0 02 0 03 0 04 0 06* 0 07*     Micro  Results from last 7 days  Lab Units 11/21/18  1912   GRAM STAIN RESULT   2+ Mononuclear Cells  No Polys or Bacteria seen   BODY FLUID CULTURE, STERILE   No growth       Age/Sex: 72 y o  male     Assessment/Plan:    Impression:  Principal Problem:    Atrial fibrillation (HCC)  Active Problems:    Acute on chronic combined systolic and diastolic heart failure (HCC)    Hypertension    Coronary artery disease    Dilated cardiomyopathy (HCC)    Hyperlipidemia    Thrombocytopenia (HCC)    PALOMA (acute kidney injury) ([facility] )    Urinary retention      Plan:   Neuro:   · Pain controlled with: tylenol PRN  · Regulate sleep/wake cycle  ? Ambien PRN  · Delirium precautions  ? CAM-ICU daily  · Trend neuro exam  CV:   · Cardiac infusions: Esmolol and amiodarone  ? Digoxin and BB per cardiology  ? MAP goal > 65  · Rhythm: Atrial Fibrillation  ? Follow rhythm on telemetry  · Plan for ablation with EP   · Defer [facility] plan to cardiology  Lung:   · SpO2 goal >92%  · Pulmonary toileting with IS and deep breathing exercises  GI:   · Stress ulcer prophylaxis: Pepcid PO  · Bowel regimen: PRN  · Zofran PRN for nausea  FEN:   · Fluid/Diuretic plan: Lasix BID  ? Goal 24 hour fluid balance: -500  · Nutrition/diet plan: Oral cardiac diet  · Replete electrolytes with goals: K >4 0, Mag >2 0, and Phos >3 0  :   · Indwelling Abbott present: yes   ? Urinary catheter still needed for Failed voiding trial    ? Flomax and voiding trial after ablation  · Trend UOP and BUN/creat  · Strict I and O  ID:   · Trend temps and WBC count  · Maintain normothermia  Heme:   · Trend hgb and plts  ? Transfuse as needed for goal hgb >7 0  · Steroid wean per hematology for ITP  ?  Platelets not improving despite steroids since Friday  Spoke with hematology  Continue steroids and transfuse plantlets as needed  No further recommendations  Endo:   · Glycemic control plan: n/a  MSK/Skin:  · Mobility goal: OOB ambulating as able  ? PT consult: no  ? OT consult: no  · Frequent turning and pressure off-loading  · Local wound care as needed  VTE Prophylaxis:  · Pharmacologic Prophylaxis: Sequential compression device (Venodyne)   · Mechanical Prophylaxis: sequential compression device     Disposition: Continue ICU care     Date of admission: 11/22/2018     Reason for Admission: Admitted with AF RVR, new CM EF 20%, and CHF     HPI/24hr events: Maintained on esmolol and amio infusions  BP a bit on the low side so PO BB held             Discharge Plan:   [address] [place] [address] MEDICALLY CLEARED    CASE MANAGEMENT FOLLOWING CLOSELY FOR ALL DISCHARGE NEEDS

## 2018-11-29 LAB
ANION GAP SERPL CALCULATED.3IONS-SCNC: 4 MMOL/L (ref 4–13)
BASOPHILS # BLD AUTO: 0.02 THOUSANDS/ΜL (ref 0–0.1)
BASOPHILS NFR BLD AUTO: 0 % (ref 0–1)
BUN SERPL-MCNC: 37 MG/DL (ref 5–25)
CALCIUM SERPL-MCNC: 9.5 MG/DL (ref 8.3–10.1)
CHLORIDE SERPL-SCNC: 98 MMOL/L (ref 100–108)
CO2 SERPL-SCNC: 33 MMOL/L (ref 21–32)
CREAT SERPL-MCNC: 1.55 MG/DL (ref 0.6–1.3)
EOSINOPHIL # BLD AUTO: 0.01 THOUSAND/ΜL (ref 0–0.61)
EOSINOPHIL NFR BLD AUTO: 0 % (ref 0–6)
ERYTHROCYTE [DISTWIDTH] IN BLOOD BY AUTOMATED COUNT: 17.4 % (ref 11.6–15.1)
GFR SERPL CREATININE-BSD FRML MDRD: 44 ML/MIN/1.73SQ M
GLUCOSE SERPL-MCNC: 93 MG/DL (ref 65–140)
HCT VFR BLD AUTO: 38.2 % (ref 36.5–49.3)
HGB BLD-MCNC: 12.5 G/DL (ref 12–17)
IMM GRANULOCYTES # BLD AUTO: 0.1 THOUSAND/UL (ref 0–0.2)
IMM GRANULOCYTES NFR BLD AUTO: 2 % (ref 0–2)
LYMPHOCYTES # BLD AUTO: 0.99 THOUSANDS/ΜL (ref 0.6–4.47)
LYMPHOCYTES NFR BLD AUTO: 22 % (ref 14–44)
MCH RBC QN AUTO: 31.1 PG (ref 26.8–34.3)
MCHC RBC AUTO-ENTMCNC: 32.7 G/DL (ref 31.4–37.4)
MCV RBC AUTO: 95 FL (ref 82–98)
MONOCYTES # BLD AUTO: 0.38 THOUSAND/ΜL (ref 0.17–1.22)
MONOCYTES NFR BLD AUTO: 9 % (ref 4–12)
NEUTROPHILS # BLD AUTO: 2.95 THOUSANDS/ΜL (ref 1.85–7.62)
NEUTS SEG NFR BLD AUTO: 67 % (ref 43–75)
NRBC BLD AUTO-RTO: 0 /100 WBCS
PLATELET # BLD AUTO: 61 THOUSANDS/UL (ref 149–390)
PMV BLD AUTO: 11.5 FL (ref 8.9–12.7)
POTASSIUM SERPL-SCNC: 3.5 MMOL/L (ref 3.5–5.3)
RBC # BLD AUTO: 4.02 MILLION/UL (ref 3.88–5.62)
SODIUM SERPL-SCNC: 135 MMOL/L (ref 136–145)
WBC # BLD AUTO: 4.45 THOUSAND/UL (ref 4.31–10.16)

## 2018-11-29 PROCEDURE — 99232 SBSQ HOSP IP/OBS MODERATE 35: CPT | Performed by: INTERNAL MEDICINE

## 2018-11-29 PROCEDURE — G8978 MOBILITY CURRENT STATUS: HCPCS

## 2018-11-29 PROCEDURE — P9035 PLATELET PHERES LEUKOREDUCED: HCPCS

## 2018-11-29 PROCEDURE — 97163 PT EVAL HIGH COMPLEX 45 MIN: CPT

## 2018-11-29 PROCEDURE — P9100 PATHOGEN TEST FOR PLATELETS: HCPCS

## 2018-11-29 PROCEDURE — 85025 COMPLETE CBC W/AUTO DIFF WBC: CPT | Performed by: INTERNAL MEDICINE

## 2018-11-29 PROCEDURE — 80048 BASIC METABOLIC PNL TOTAL CA: CPT | Performed by: INTERNAL MEDICINE

## 2018-11-29 PROCEDURE — P9037 PLATE PHERES LEUKOREDU IRRAD: HCPCS

## 2018-11-29 PROCEDURE — G8979 MOBILITY GOAL STATUS: HCPCS

## 2018-11-29 RX ORDER — POLYETHYLENE GLYCOL 3350 17 G/17G
17 POWDER, FOR SOLUTION ORAL 2 TIMES DAILY
Status: DISCONTINUED | OUTPATIENT
Start: 2018-11-29 | End: 2018-12-07 | Stop reason: HOSPADM

## 2018-11-29 RX ORDER — DIPHENHYDRAMINE HCL 25 MG
50 TABLET ORAL ONCE
Status: COMPLETED | OUTPATIENT
Start: 2018-11-29 | End: 2018-11-29

## 2018-11-29 RX ORDER — DIAPER,BRIEF,INFANT-TODD,DISP
EACH MISCELLANEOUS 4 TIMES DAILY PRN
Status: DISCONTINUED | OUTPATIENT
Start: 2018-11-29 | End: 2018-12-07 | Stop reason: HOSPADM

## 2018-11-29 RX ORDER — DIPHENHYDRAMINE HCL 25 MG
50 TABLET ORAL EVERY 6 HOURS PRN
Status: DISCONTINUED | OUTPATIENT
Start: 2018-11-29 | End: 2018-12-07 | Stop reason: HOSPADM

## 2018-11-29 RX ADMIN — DIPHENHYDRAMINE HCL 50 MG: 25 TABLET ORAL at 23:14

## 2018-11-29 RX ADMIN — TAMSULOSIN HYDROCHLORIDE 0.4 MG: 0.4 CAPSULE ORAL at 17:58

## 2018-11-29 RX ADMIN — DOCUSATE SODIUM 100 MG: 100 CAPSULE, LIQUID FILLED ORAL at 17:59

## 2018-11-29 RX ADMIN — METOPROLOL TARTRATE 50 MG: 50 TABLET, FILM COATED ORAL at 03:04

## 2018-11-29 RX ADMIN — DIGOXIN 125 MCG: 125 TABLET ORAL at 08:16

## 2018-11-29 RX ADMIN — DIAZEPAM 5 MG: 5 TABLET ORAL at 08:19

## 2018-11-29 RX ADMIN — DIPHENHYDRAMINE HCL 50 MG: 25 TABLET ORAL at 15:29

## 2018-11-29 RX ADMIN — FUROSEMIDE 40 MG: 40 TABLET ORAL at 08:16

## 2018-11-29 RX ADMIN — FUROSEMIDE 40 MG: 40 TABLET ORAL at 17:59

## 2018-11-29 RX ADMIN — METHYLPREDNISOLONE 4 MG: 4 TABLET ORAL at 08:20

## 2018-11-29 RX ADMIN — AMIODARONE HYDROCHLORIDE 0.5 MG/MIN: 50 INJECTION, SOLUTION INTRAVENOUS at 21:24

## 2018-11-29 RX ADMIN — FAMOTIDINE 20 MG: 20 TABLET ORAL at 08:16

## 2018-11-29 RX ADMIN — DIPHENHYDRAMINE HCL 50 MG: 25 TABLET ORAL at 08:56

## 2018-11-29 RX ADMIN — POLYETHYLENE GLYCOL 3350 17 G: 17 POWDER, FOR SOLUTION ORAL at 18:02

## 2018-11-29 RX ADMIN — METOPROLOL TARTRATE 50 MG: 50 TABLET, FILM COATED ORAL at 17:58

## 2018-11-29 NOTE — PROGRESS NOTES
Heart Failure Service Progress Note - Jose Luis Shoulder 72 y o  male MRN: [id number]    Unit/Bed#: [facility] 531-01 Encounter: [id number]      Assessment:    Principal Problem:    Atrial fibrillation ([place] [address] )  Active Problems:    Hypertension    Thrombocytopenia ([place] [address] )    PALOMA (acute kidney injury) ([place] [address] )    Coronary artery disease    Hyperlipidemia    Dilated cardiomyopathy ([place] [address] )    Acute on chronic combined systolic and diastolic heart failure ([place] [address] )    Urinary retention      Subjective:   Patient seen and examined  No significant events overnight  Atrial fib with controlled ventricular rate on tele  Feels well  Stable fluid status  Hemodynamically stable  WWP  Currently on third unit of platelets  Procedure on hold until likely tomorrow  Goal for platelet count of ,278    Objective: Intake/ Output:943/3025  Weight: 148 lbs, 149 on admit  Tele: Atrial fib  HR 70-80s    TTE 11/23/18  LVEF: 20%  LVIDd:4 58 cm  RV:normal size and systolic functin  MR:moderate  PASP:  RVOT:   Other:Small free flowing pericardial effusion with moderate left pleural effusion  Neurohormonal Blockade:  --Beta-Blocker: Metoprolol 50 mg Q6 hr   --ACEi, ARB or ARNi: PALOMA precludes    (or SVR reduction)  --Aldosterone Receptor Blocker: PALOMA precludes  --Diuretic:Lasix 40 mg PO BID    Sudden Cardiac Death Risk Reduction:  --ICD: EF 20%, for BiV AICD implant today  Interrogation:     Electrical Resynchronization:  --BiV implant today  Interrogation:     Advanced Therapies (If appropriate): --Inotrope:  --LVAD/Transplant Candidacy:    Plan: 1  Acute on chronic systolic congestive heart failure, LVEF 20%, NYHA Class II/III, ACC/AHA Stage C, likely tachy-mediated in origin however cannot definitively rule out an ischemic cause  Volume overloaded on admit, now improved with diuresis  Euvolemic on exam  Creatinine improved overall, small bump up today  May be able to change to once daily but will hold until after transfusions   No SOB, HR controlled  Patient hemodynamically stable, warm , well perfused  Net negative 2 liters/24 hr, Continue to monitor daily weight, strict I's and O's, BMP  On third unit of platelets now  Recommend additional 20 mg IV Lasix if second unit given  BiV pacer/ICD implant, AVN ablation tomorrow if platelets above ,461        2  Permanent atrial fibrillation  Currently on PO Dig, Metoprolol and Amiodarone with controlled rate  No AC at this time due to chronic coagulopathy  Does not appear to be responding to steroid taper as platelets still low at 36,000  Hgb stable  Continue to monitor CBC/platelets daily  Careful monitoring of fluid status post transfusion       3  Chronic thrombocytopenia  Possibly immune mediated  On steroid taper with mild  improvement  Defer to hem onc       4  HTN  Controlled  [employer] (day, reason): Abbott catheter (day, reason):    Vitals: Blood pressure 142/77, pulse 73, temperature (!) 96 8 °F (36 °C), resp  rate 20, height 5' 10" (1 778 m), weight 67 4 kg (148 lb 9 4 oz), SpO2 98 %  , Body mass index is 21 32 kg/m² , I/O last 3 completed shifts: In: 1132 3 [P O :325; I V :570 3; Blood:237]  Out: 3750 [Urine:3750]  No intake/output data recorded  Wt Readings from Last 3 Encounters:   11/29/18 67 4 kg (148 lb 9 4 oz)   11/22/18 67 5 kg (148 lb 13 oz)   10/10/18 73 5 kg (162 lb)       Intake/Output Summary (Last 24 hours) at 11/29/18 0952  Last data filed at 11/29/18 0852   Gross per 24 hour   Intake           868 45 ml   Output             2700 ml   Net         -1831 55 ml     I/O last 3 completed shifts: In: 1132 3 [P O :325; I V :570 3; Blood:237]  Out: 3750 [Urine:3750]    Atrial fib, controlled ventricular rate         Physical Exam:  Vitals:    11/29/18 0304 11/29/18 0600 11/29/18 0704 11/29/18 0926   BP: 112/73  114/62 142/77   BP Location: Left arm      Pulse: 82  65 73   Resp: 18  20 20   Temp: 97 7 °F (36 5 °C)  97 5 °F (36 4 °C) (!) 96 8 °F (36 °C)   TempSrc: Oral      SpO2: 100%  98%    Weight:  67 4 kg (148 lb 9 4 oz)     Height:           GEN: Prachi Thompson appears well, alert and oriented x 3, pleasant and cooperative   HEENT: pupils equal, round, and reactive to light; extraocular muscles intact  NECK: supple, no carotid bruits   HEART: regular rhythm, normal S1 and S2, no murmurs, clicks, gallops or rubs, no JVD     LUNGS: clear to auscultation bilaterally; no wheezes, rales, or rhonchi   ABDOMEN: normal bowel sounds, soft, no tenderness, no distention  EXTREMITIES: peripheral pulses normal; no clubbing, cyanosis, or edema  NEURO: no focal findings   SKIN: normal without suspicious lesions on exposed skin      Current Facility-Administered Medications:     acetaminophen (TYLENOL) tablet 650 mg, 650 mg, Oral, Q6H PRN, Zelda Morales PA-C, 650 mg at 11/28/18 1152    amiodarone (CORDARONE) 900 mg in dextrose 5 % 500 mL infusion, 0 5 mg/min, Intravenous, Continuous, Jay Maldonado PA-C, Last Rate: 16 7 mL/hr at 11/28/18 1721, 0 5 mg/min at 11/28/18 1721    bisacodyl (DULCOLAX) rectal suppository 10 mg, 10 mg, Rectal, Daily PRN, Kieran Orta PA-C    calcium carbonate (TUMS) chewable tablet 500 mg, 500 mg, Oral, Daily PRN, Julian Coe, DO    diazepam (VALIUM) tablet 5 mg, 5 mg, Oral, Q8H PRN, Jordanesther Coe DO, 5 mg at 11/29/18 5150    digoxin (LANOXIN) tablet 125 mcg, 125 mcg, Oral, Daily, Cheryle Ventura, DO, 125 mcg at 11/29/18 0816    docusate sodium (COLACE) capsule 100 mg, 100 mg, Oral, BID, Kristen Frausto PA-C, 100 mg at 11/28/18 1723    famotidine (PEPCID) tablet 20 mg, 20 mg, Oral, Daily, Zelda Morales PA-C, 20 mg at 11/29/18 0816    furosemide (LASIX) tablet 40 mg, 40 mg, Oral, BID (diuretic), Mark Bah DO, 40 mg at 11/29/18 0816    metoprolol (LOPRESSOR) injection 5 mg, 5 mg, Intravenous, Q6H PRN, Zelda Morales PA-C, 5 mg at 11/24/18 1811    metoprolol tartrate (LOPRESSOR) tablet 50 mg, 50 mg, Oral, Q6H, Mark Bah DO, 50 mg at 11/29/18 0304   ondansetron (ZOFRAN) injection 4 mg, 4 mg, Intravenous, Q6H PRN, Ana Brown PA-C, 4 mg at 11/27/18 1237    polyethylene glycol (MIRALAX) packet 17 g, 17 g, Oral, Daily PRN, Yemi Frausto PA-C, 17 g at 11/27/18 0515    polyethylene glycol (MIRALAX) packet 17 g, 17 g, Oral, Daily, BJ Vargas, 17 g at 11/28/18 8465    senna (SENOKOT) tablet 8 6 mg, 1 tablet, Oral, HS, Yemi Frausto PA-C, 8 6 mg at 11/28/18 2119    tamsulosin (FLOMAX) capsule 0 4 mg, 0 4 mg, Oral, Daily With Dinner, Kalli Winn PA-C, 0 4 mg at 11/28/18 1544    vancomycin (VANCOCIN) IVPB (premix) 1,000 mg, 15 mg/kg, Intravenous, Once, Link Archer PA-C, Stopped at 11/29/18 0810    zolpidem (AMBIEN) tablet 5 mg, 5 mg, Oral, HS PRN, Ana Brown PA-C, 2 5 mg at 11/26/18 2250      Labs & Results:      Results from last 7 days  Lab Units 11/23/18  0543 11/23/18  0030 11/22/18  2043   TROPONIN I ng/mL 0 02 0 03 0 04       Results from last 7 days  Lab Units 11/29/18  0551 11/28/18  2046 11/28/18  0511   WBC Thousand/uL 4 45 5 72 5 06   HEMOGLOBIN g/dL 12 5 13 6 13 0   HEMATOCRIT % 38 2 40 8 38 8   PLATELETS Thousands/uL 61* 87* 36*           Results from last 7 days  Lab Units 11/29/18  0551 11/28/18  0511 11/27/18  0427  11/25/18  0534   POTASSIUM mmol/L 3 5 3 6 3 6  < > 3 9   CHLORIDE mmol/L 98* 97* 99*  < > 100   CO2 mmol/L 33* 31 28  < > 30   BUN mg/dL 37* 35* 34*  < > 32*   CREATININE mg/dL 1 55* 1 51* 1 46*  < > 1 71*   CALCIUM mg/dL 9 5 9 7 8 5  < > 9 4   ALK PHOS U/L  --  91 77  --  91   ALT U/L  --  73 60  --  84*   AST U/L  --  34 28  --  35   < > = values in this interval not displayed  Results from last 7 days  Lab Units 11/26/18  0438 11/24/18  0458 11/22/18  2043   INR  1 37* 1 73* 2 65*           Counseling / Coordination of Care  Total floor / unit time spent today 20 minutes  Greater than 50% of total time was spent with the patient and / or family counseling and / or coordination of care    A description of the counseling / coordination of care: 20  Thank you for the opportunity to participate in the care of this patient  Mary LARSON    Director Heart Failure/ Medical Director [address] [place] [address]

## 2018-11-29 NOTE — ASSESSMENT & PLAN NOTE
Noted ejection fraction of 20%  Continue beta-blocker and digoxin along with Lasix  [address] Cardiology input  Management for AFib as above

## 2018-11-29 NOTE — PROGRESS NOTES
Progress Note - Loyda Harding 1945, 72 y o  male MRN: [id number]    Unit/Bed#: [facility] 531-01 Encounter: [id number]    Primary Care Provider: Stephanie Landry MD   Date and time admitted to hospital: 11/22/2018  7:39 PM        * Atrial fibrillation [facility])   Assessment & Plan    Presented with atrial fibrillation with rapid ventricular rate  Rate better controlled now  Continue with amiodarone drip  Continue current doses of digoxin and beta-blocker  [address] Cardiology input  LAURA and possible ablation on hold for today given thrombocytopenia  Possible procedure by EP tomorrow pending platelet transfusion  Goal platelets above 75,980 prior to procedure  Not on AC due to significant thrombocytopenia  Acute on chronic combined systolic and diastolic heart failure (HCC)   Assessment & Plan    Noted ejection fraction of 20%  Continue beta-blocker and digoxin along with Lasix  [address] Cardiology input  Management for AFib as above  PALOMA (acute kidney injury) ([facility] [address] )   Assessment & Plan    Acute on chronic renal failure  Improving  Baseline creatinine between 1 3-1 6  Seems at baseline today  Continue to monitor closely on diuresis  Strict I&Os  Thrombocytopenia ([facility] [address] )   Assessment & Plan    History of ITP  No active signs of bleeding and platelets are around 30,000>> increased to 80,000 after platelet transfusion yesterday but dropped down to 61 this morning  Goal platelets more than 83,553 for procedure  Patient is getting platelet transfusion today  Appreciate Heme-Onc input and continue Medrol Dosepak according to them  Had a reaction to platelet transfusion with rash on the back and neck without any respiratory compromise on 11/28  Transfusion was held and regimen with Benadryl, Valium and Tylenol was given  Patient is already on steroids for ITP  As per patient Valium helps with his allergic reactions  Hypertension   Assessment & Plan    Continue BB, lasix   VTE Pharmacologic Prophylaxis:   Pharmacologic: not on any due to profound thrpmbocytopenia    Mechanical VTE Prophylaxis in Place: Yes    Patient Centered Rounds: I have performed bedside rounds with nursing staff today  Discussions with Specialists or Other Care Team Provider: EP    Education and Discussions with Family / Patient: plan of care, patient    Time Spent for Care: 30 minutes  More than 50% of total time spent on counseling and coordination of care as described above  Current Length of Stay: 7 day(s)    Current Patient Status: Inpatient   Certification Statement: The patient will continue to require additional inpatient hospital stay due to not medically stable    Discharge Plan: when medically stable    Code Status: Level 1 - Full Code      Subjective:   Pt is aware about delay in his procedure due to a lower platelet count this morning  Only complaint he has is constipation  Denies any chest pain, palpitations, shortness of breath, cough  He reports that rash has been improving since yesterday  Objective:     Vitals:   Temp (24hrs), Av 5 °F (36 4 °C), Min:96 8 °F (36 °C), Max:97 9 °F (36 6 °C)    Temp:  [96 8 °F (36 °C)-97 9 °F (36 6 °C)] 97 4 °F (36 3 °C)  HR:  [65-89] 68  Resp:  [8-22] 17  BP: (102-142)/(60-83) 102/64  SpO2:  [96 %-100 %] 99 %  Body mass index is 21 32 kg/m²  Input and Output Summary (last 24 hours): Intake/Output Summary (Last 24 hours) at 18 1159  Last data filed at 18 0852   Gross per 24 hour   Intake           868 45 ml   Output             2700 ml   Net         -1831 55 ml       Physical Exam:     Physical Exam   Constitutional: He is oriented to person, place, and time  No distress  Eyes: Pupils are equal, round, and reactive to light  Cardiovascular: Normal rate, normal heart sounds and intact distal pulses  No murmur heard  irregular   Pulmonary/Chest: Breath sounds normal  No respiratory distress  He has no wheezes   He has no rales  Abdominal: Soft  Bowel sounds are normal  He exhibits no distension  There is no tenderness  Musculoskeletal: He exhibits no edema  Neurological: He is alert and oriented to person, place, and time  No focal motor deficits   Skin: Skin is warm  Additional Data:     Labs:      Results from last 7 days  Lab Units 11/29/18  0551   WBC Thousand/uL 4 45   HEMOGLOBIN g/dL 12 5   HEMATOCRIT % 38 2   PLATELETS Thousands/uL 61*   NEUTROS PCT % 67   LYMPHS PCT % 22   MONOS PCT % 9   EOS PCT % 0       Results from last 7 days  Lab Units 11/29/18  0551 11/28/18  0511   SODIUM mmol/L 135* 133*   POTASSIUM mmol/L 3 5 3 6   CHLORIDE mmol/L 98* 97*   CO2 mmol/L 33* 31   BUN mg/dL 37* 35*   CREATININE mg/dL 1 55* 1 51*   ANION GAP mmol/L 4 5   CALCIUM mg/dL 9 5 9 7   ALBUMIN g/dL  --  3 1*   TOTAL BILIRUBIN mg/dL  --  1 99*   ALK PHOS U/L  --  91   ALT U/L  --  73   AST U/L  --  34   GLUCOSE RANDOM mg/dL 93 102       Results from last 7 days  Lab Units 11/26/18  0438   INR  1 37*                       * I Have Reviewed All Lab Data Listed Above  * Additional Pertinent Lab Tests Reviewed: All Labs Within Last 24 Hours Reviewed    Imaging:    US abdomen complete   Final Result by Negar Gloria DO (11/25 2151)   Unremarkable pancreas  Top normal size liver with small subcentimeter hemangioma stable from 2011  Cholelithiasis without sonographic evidence of cholecystitis  The common bile duct is prominent at 8 mm  Consider MRCP if clinically warranted  Right side simple renal cysts  Dilated left side extrarenal pelvis  Splenomegaly with small splenule                    Workstation performed: [id number]           Recent Cultures (last 7 days):           Last 24 Hours Medication List:     Current Facility-Administered Medications:  acetaminophen 650 mg Oral Q6H PRN Rachana Miller PA-C    amiodarone 0 5 mg/min Intravenous Continuous Jay Maldonado PA-C Last Rate: 0 5 mg/min (11/28/18 1721)   bisacodyl 10 mg Rectal Daily PRN Norma Frausto PA-C    calcium carbonate 500 mg Oral Daily PRN [place] Willie, DO    diazepam 5 mg Oral Q8H PRN [place] Willie, DO    digoxin 125 mcg Oral Daily Hartwick Chetan Ventura, DO    docusate sodium 100 mg Oral BID Norma Frausto PA-C    famotidine 20 mg Oral Daily Octavio Ballard PA-C    furosemide 40 mg Oral BID (diuretic) Иван Sarmiento, DO    metoprolol 5 mg Intravenous Q6H PRN Octavio Ballard PA-C    metoprolol tartrate 50 mg Oral Q6H Иван Sarmiento, DO    ondansetron 4 mg Intravenous Q6H PRN Octavio Ballard PA-C    polyethylene glycol 17 g Oral Daily PRN Norma Frausto PA-C    polyethylene glycol 17 g Oral Daily Padmini Spironello VBJ    senna 1 tablet Oral HS Norma Frausto PA-C    tamsulosin 0 4 mg [facility]    vancomycin 15 mg/kg Intravenous Once [place], [place] Last Rate: Stopped (11/29/18 0810)   zolpidem 5 mg Oral HS PRN Octavio Ballard PA-C         Today, Patient Was Seen By: Panchito Gamboa MD    ** Please Note: Dictation voice to text software may have been used in the creation of this document   **

## 2018-11-29 NOTE — ASSESSMENT & PLAN NOTE
History of ITP  No active signs of bleeding and platelets are around 30,000>> increased to 80,000 after platelet transfusion yesterday but dropped down to 61 this morning  Goal platelets more than 99,856 for procedure  Patient is getting platelet transfusion today  Appreciate Heme-Onc input and continue Medrol Dosepak according to them  Had a reaction to platelet transfusion with rash on the back and neck without any respiratory compromise on 11/28  Transfusion was held and regimen with Benadryl, Valium and Tylenol was given  Patient is already on steroids for ITP  As per patient Valium helps with his allergic reactions

## 2018-11-29 NOTE — PHYSICAL THERAPY NOTE
Physical Therapy Initial Evaluation   Daniel Cain, PT   11/29/18 7795   Note Type   Note type Eval only   Pain Assessment   Pain Assessment No/denies pain   Pain Score No Pain   Home Living   Type of Home House   Home Layout Two level; Other (Comment)  (no steps entry through the back  )   Home Equipment Other (Comment)  (none needed/used PTA  )   Prior Function   Level of [place] Independent with ADLs and functional mobility   Lives With Significant other   Receives Help From Other (Comment)  (has not needed help)   ADL Assistance Independent   IADLs Independent   Falls in the last 6 months 0   Vocational Retired   Comments retired   Restrictions/Precautions   Weight Bearing Precautions Per Order No   Braces or Orthoses Other (Comment)  (none)   Other Precautions Multiple lines;Telemetry  (" I'm a little loopy because they gave me valium " as per pt)   General   Additional Pertinent History dx: adm with a-fib, dilated cardiomyopathy, acute on chronic CHF  PMH: PVD, HTN, thrombocytopenia, PALOMA, urinary retention  Family/Caregiver Present No   Cognition   Overall Cognitive Status (Seemed mostly intact but pt with delayed responses to ?'s  )   Arousal/Participation Alert   Orientation Level Oriented X4   Memory Within functional limits   Following Commands Follows all commands and directions without difficulty   Comments pleasant, cooperative , frustrated by his body  ( Unable to get LAURA today due to low plateletts  )    RLE Assessment   RLE Assessment WFL  (4/5)   LLE Assessment   LLE Assessment WFL  (4/5)   Coordination   Movements are Fluid and Coordinated 1   Sensation (bilat foot tingling/numbness  Pt stated since back injury  )   Transfers   Sit to Stand 5  Supervision   Additional items Assist x 1; Increased time required;Verbal cues  (Lots of lines  )   Stand to Sit 5  Supervision   Additional items Assist x 1; Increased time required   Additional Comments Done with RW then no device  Did well without AD  Ambulation/Elevation   Gait pattern Excessively slow; Step to;Short stride  (Many lines to manage  )   Gait Assistance 5  Supervision   Additional items Assist x 1   Assistive Device None   Distance 20' in room     (" I'm too loopy to go far " per pt  )   Stair Management Assistance Not tested   Balance   Static Sitting Fair +   Dynamic Sitting Fair   Static Standing Fair   Dynamic Standing Fair   Ambulatory Fair   Endurance Deficit   Endurance Deficit No  (Not for the limited ambulation done in room of 20'   )   Activity Tolerance   Activity Tolerance Patient tolerated treatment well   Medical Staff Made Aware RN consented to allow pt to participate in PT eval      Nurse Made Aware yes   Assessment   Prognosis Good   Problem List Decreased strength;Decreased endurance; Impaired balance;Decreased mobility   Assessment Pt is 72 y o  male seen for PT evaluation s/p admit to [facility] on 11/22/2018 w/ Atrial fibrillation ([facility] [address] )  PT consulted to assess pt's functional mobility and d/c needs  Order placed for PT eval and tx  Comorbidities affecting pt's physical performance at time of assessment include: Pt admitted with dilated  Cardiomyopathy and acute on chronic CHF  PMH inculdes: PVD, HTN, thrombocytopenia, PALOMA, urinary retention, back injury, and head injury ( as per pt report ) PTA, pt was independent w/ all functional mobility w/ no need for an assistive device for ambulation PTA, ambulates community distances and elevations, lives w/ his significant other in 1 level home without entry steps, retired and stated he had been experiencing severe SOB and fatigue while ambulating up inclines/steps PTA  Personal factors affecting pt at time of IE include: inability to navigate community distances, inability to perform IADLs and is functioning at a below baseline level of mobility at this time   Please find objective findings from PT assessment regarding body systems outlined above with impairments and limitations including impaired balance, decreased endurance, decreased activity tolerance and decreased functional mobility tolerance  The following objective measures performed on IE also reveal limitations: Barthel Index: 50/100  Pt's clinical presentation is currently unstable/unpredictable seen in pt's presentation of needing additional medical testing as per chart information ( LAURA ), pt with the need for telemetry, pt with pending and abnormal lab values, pt with below baseline level of mobility at this time  Pt to benefit from continued PT tx to address deficits as defined above and maximize level of functional independent mobility and consistency  From PT/mobility standpoint, recommendation at time of d/c would be likely home with home PT v/s no services pending progress in order to facilitate return to PLOF  Pt did not want to walk outside his room ( nor did PT want pt to walk far) due to pt feeling and behaving slightly "loopy" per pt's description from med's  Further assessment of mobility skills will be needed once pt is better able to walk longer distances outside his room  Goals   Patient Goals Pt stated he wants to get the LAURA over then be able to be more ambulatory  STG Expiration Date 12/09/18   Short Term Goal #1 Pt completes bed  mobility activities independently  Pt completes transfers independently without the need for an assistive device with good safety awareness  Pt ambulates 150' independently without an assistive device with good safety noted  LE strength improves to 4+/5 for all groups  Treatment Day 0   Plan   Treatment/Interventions Functional transfer training;LE strengthening/ROM; Patient/family training;Equipment eval/education; Bed mobility;Gait training;Spoke to nursing   PT Frequency Other (Comment)  (3-5x/wk)   Recommendation   Recommendation Home with family support  (possible need for home PT     Will continue to assess  )   Equipment Recommended (none v/s RW or SPC ??  still assessing  )   PT - OK to Discharge No   Barthel Index   Feeding 10   Bathing 0   Grooming Score 5   Dressing Score 10   Bladder Score 0   Bowels Score 10   Toilet Use Score 5   Transfers (Bed/Chair) Score 10   Mobility (Level Surface) Score 0   Stairs Score 0   Barthel Index Score 50

## 2018-11-29 NOTE — ASSESSMENT & PLAN NOTE
Noted ejection fraction of 20%  Continue beta-blocker and digoxin along with Lasix  [address] Cardiology input  Management for AFib as above

## 2018-11-29 NOTE — PLAN OF CARE
Problem: PHYSICAL THERAPY ADULT  Goal: Performs mobility at highest level of function for planned discharge setting  See evaluation for individualized goals  Treatment/Interventions: Functional transfer training, LE strengthening/ROM, Patient/family training, Equipment eval/education, Bed mobility, Gait training, Spoke to nursing  Equipment Recommended:  (none v/s RW or SPC ?? still assessing  )       See flowsheet documentation for full assessment, interventions and recommendations  Prognosis: Good  Problem List: Decreased strength, Decreased endurance, Impaired balance, Decreased mobility  Assessment: Pt is 72 y o  male seen for PT evaluation s/p admit to [facility] on 11/22/2018 w/ Atrial fibrillation ([facility] [address] )  PT consulted to assess pt's functional mobility and d/c needs  Order placed for PT eval and tx  Comorbidities affecting pt's physical performance at time of assessment include: Pt admitted with dilated  Cardiomyopathy and acute on chronic CHF  PMH inculdes: PVD, HTN, thrombocytopenia, PALOMA, urinary retention, back injury, and head injury ( as per pt report ) PTA, pt was independent w/ all functional mobility w/ no need for an assistive device for ambulation PTA, ambulates community distances and elevations, lives w/ his significant other in 1 level home without entry steps, retired and stated he had been experiencing severe SOB and fatigue while ambulating up inclines/steps PTA  Personal factors affecting pt at time of IE include: inability to navigate community distances, inability to perform IADLs and is functioning at a below baseline level of mobility at this time  Please find objective findings from PT assessment regarding body systems outlined above with impairments and limitations including impaired balance, decreased endurance, decreased activity tolerance and decreased functional mobility tolerance   The following objective measures performed on IE also reveal limitations: Barthel Index: 50/100  Pt's clinical presentation is currently unstable/unpredictable seen in pt's presentation of needing additional medical testing as per chart information ( LAURA ), pt with the need for telemetry, pt with pending and abnormal lab values, pt with below baseline level of mobility at this time  Pt to benefit from continued PT tx to address deficits as defined above and maximize level of functional independent mobility and consistency  From PT/mobility standpoint, recommendation at time of d/c would be likely home with home PT v/s no services pending progress in order to facilitate return to PLOF  Pt did not want to walk outside his room ( nor did PT want pt to walk far) due to pt feeling and behaving slightly "loopy" per pt's description from med's  Further assessment of mobility skills will be needed once pt is better able to walk longer distances outside his room  Recommendation: Home with family support (possible need for home PT  Will continue to assess  )     PT - OK to Discharge: No    See flowsheet documentation for full assessment

## 2018-11-29 NOTE — ASSESSMENT & PLAN NOTE
Presented with atrial fibrillation with rapid ventricular rate  Rate better controlled now  Continue with amiodarone drip  Continue current doses of digoxin and beta-blocker  [address] Cardiology input  For LAURA  tomorrow  EP planning for possible Watchman device pending LAURA results tomorrow  Goal platelets above 55,912 prior to procedure  Not on AC due to significant thrombocytopenia

## 2018-11-29 NOTE — ASSESSMENT & PLAN NOTE
Presented with atrial fibrillation with rapid ventricular rate  Rate better controlled now  Continue with amiodarone drip  Continue current doses of digoxin and beta-blocker  [address] Cardiology input  LAURA and possible ablation on hold for today given thrombocytopenia  Possible procedure by EP tomorrow pending platelet transfusion  Goal platelets above 24,101 prior to procedure  Not on AC due to significant thrombocytopenia

## 2018-11-29 NOTE — ASSESSMENT & PLAN NOTE
History of ITP  No active signs of bleeding and platelets are around 30,000  Goal platelets more than 52,295 for procedure  Appreciate Heme-Onc input and continue Medrol Dosepak according to them  Transfusing platelets today, had a reaction to platelet transfusion with rash on the back and neck without any respiratory compromise  Transfusion was held and regimen with Benadryl, Valium and Tylenol was given  Patient is already on steroids for ITP  As per patient Valium helps with his allergic reactions  Monitor closely during transfusion and trend CBC later today

## 2018-11-29 NOTE — RESTORATIVE TECHNICIAN NOTE
Restorative Specialist Mobility Note       Activity: Bedrest, Dangle, Stand at bedside, Chair     Assistive Device: None     Ambulation Response:  Tolerated fairly well  Repositioned: Sitting, Up in chair, Other (Comment) (chair alarm on)

## 2018-11-29 NOTE — ASSESSMENT & PLAN NOTE
Acute on chronic renal failure  Improving  Baseline creatinine between 1 3-1 6  Seems at baseline today  Continue to monitor closely on diuresis  Strict I&Os

## 2018-11-29 NOTE — PROGRESS NOTES
Progress Note - Griselda Mendoza 1945, 72 y o  male MRN: [id number]    Unit/Bed#: [facility] 531-01 Encounter: [id number]    Primary Care Provider: Chao Leong MD   Date and time admitted to hospital: 11/22/2018  7:39 PM        * Atrial fibrillation [facility])   Assessment & Plan    Presented with atrial fibrillation with rapid ventricular rate  Rate better controlled now  Continue with amiodarone drip  Continue current doses of digoxin and beta-blocker  [address] Cardiology input  For LAURA  tomorrow  EP planning for possible Watchman device pending LAURA results tomorrow  Goal platelets above 34,589 prior to procedure  Not on AC due to significant thrombocytopenia  Acute on chronic combined systolic and diastolic heart failure (HCC)   Assessment & Plan    Noted ejection fraction of 20%  Continue beta-blocker and digoxin along with Lasix  [address] Cardiology input  Management for AFib as above  PALOMA (acute kidney injury) ([facility] )   Assessment & Plan    Acute on chronic renal failure  Improving  Baseline creatinine between 1 3-1 6  Seems at baseline today  Continue to monitor closely on diuresis  Strict I&Os  Thrombocytopenia ([facility] )   Assessment & Plan    History of ITP  No active signs of bleeding and platelets are around 30,000  Goal platelets more than 12,272 for procedure  Appreciate Heme-Onc input and continue Medrol Dosepak according to them  Transfusing platelets today, had a reaction to platelet transfusion with rash on the back and neck without any respiratory compromise  Transfusion was held and regimen with Benadryl, Valium and Tylenol was given  Patient is already on steroids for ITP  As per patient Valium helps with his allergic reactions  Monitor closely during transfusion and trend CBC later today  Hypertension   Assessment & Plan    Continue BB, lasix            VTE Pharmacologic Prophylaxis:   Pharmacologic: Enoxaparin (Lovenox)  Mechanical VTE Prophylaxis in Place: Yes    Patient Centered Rounds: I have performed bedside rounds with nursing staff today  Discussions with Specialists or Other Care Team Provider: DAISY    Education and Discussions with Family / Patient: plan of care, patient    Time Spent for Care: 30 minutes  More than 50% of total time spent on counseling and coordination of care as described above  Current Length of Stay: 6 day(s)    Current Patient Status: Inpatient   Certification Statement: The patient will continue to require additional inpatient hospital stay due to For procedure tomorrow    Discharge Plan:  Not Medically stable    Code Status: Level 1 - Full Code      Subjective:   I saw the patient when he was getting platelet transfusion and he had allergic reaction to that  He had itching and rash over his back and neck 0 5 hour after starting later transfusion  Denies any respiratory compromise, shortness of breath, wheezing, cough, chest pain, palpitations, dizziness  Reports mild headache  Objective:     Vitals:   Temp (24hrs), Av 7 °F (36 5 °C), Min:97 4 °F (36 3 °C), Max:98 5 °F (36 9 °C)    Temp:  [97 4 °F (36 3 °C)-98 5 °F (36 9 °C)] 97 8 °F (36 6 °C)  HR:  [65-92] 68  Resp:  [14-22] 20  BP: ()/(60-79) 106/60  SpO2:  [96 %-99 %] 98 %  Body mass index is 21 23 kg/m²  Input and Output Summary (last 24 hours): Intake/Output Summary (Last 24 hours) at 18 1918  Last data filed at 18 1750   Gross per 24 hour   Intake           665 11 ml   Output             2500 ml   Net         -1834 89 ml       Physical Exam:     Physical Exam   Constitutional: He is oriented to person, place, and time  No distress  HENT:   No pharyngeal erythema or elvia noted   Eyes: Pupils are equal, round, and reactive to light  Cardiovascular: Normal rate and normal heart sounds  No murmur heard  Irregular   Pulmonary/Chest: Breath sounds normal  No respiratory distress  He has no wheezes  He has no rales     Abdominal: Soft  Bowel sounds are normal  He exhibits no distension  There is no tenderness  Musculoskeletal: He exhibits no edema  Neurological: He is alert and oriented to person, place, and time  No focal motor deficits   Skin: Skin is warm  Noted spots of raised papular rash over back and neck with surrounding erythema  Additional Data:     Labs:      Results from last 7 days  Lab Units 11/28/18  0511  11/24/18  0458   WBC Thousand/uL 5 06  < > 4 95   HEMOGLOBIN g/dL 13 0  < > 13 9   HEMATOCRIT % 38 8  < > 42 4   PLATELETS Thousands/uL 36*  < > 33*   NEUTROS PCT %  --   --  71   LYMPHS PCT %  --   --  20   MONOS PCT %  --   --  7   EOS PCT %  --   --  0   < > = values in this interval not displayed  Results from last 7 days  Lab Units 11/28/18  0511   SODIUM mmol/L 133*   POTASSIUM mmol/L 3 6   CHLORIDE mmol/L 97*   CO2 mmol/L 31   BUN mg/dL 35*   CREATININE mg/dL 1 51*   ANION GAP mmol/L 5   CALCIUM mg/dL 9 7   ALBUMIN g/dL 3 1*   TOTAL BILIRUBIN mg/dL 1 99*   ALK PHOS U/L 91   ALT U/L 73   AST U/L 34   GLUCOSE RANDOM mg/dL 102       Results from last 7 days  Lab Units 11/26/18  0438   INR  1 37*       Results from last 7 days  Lab Units 11/22/18  0623   POC GLUCOSE mg/dl 111                   * I Have Reviewed All Lab Data Listed Above  * Additional Pertinent Lab Tests Reviewed: All Labs Within Last 24 Hours Reviewed    Imaging:    US abdomen complete   Final Result by Con Teague DO (11/25 2151)   Unremarkable pancreas  Top normal size liver with small subcentimeter hemangioma stable from 2011  Cholelithiasis without sonographic evidence of cholecystitis  The common bile duct is prominent at 8 mm  Consider MRCP if clinically warranted  Right side simple renal cysts  Dilated left side extrarenal pelvis  Splenomegaly with small splenule                    Workstation performed: [id number]             Recent Cultures (last 7 days):           Last 24 Hours Medication List:     Current Facility-Administered Medications:  acetaminophen 650 mg Oral Q6H PRN Dolcruz Serrano PA-C    amiodarone 0 5 mg/min Intravenous Continuous Jay Maldonado PA-C Last Rate: 0 5 mg/min (11/28/18 1721)   bisacodyl 10 mg Rectal Daily PRN Heber Frausto PA-C    calcium carbonate 500 mg Oral Daily PRN Coletta Haring, DO    diazepam 5 mg Oral Q8H PRN Coletta Robining, DO    digoxin 125 mcg Oral Daily Dalila Ventura,     docusate sodium 100 mg Oral BID Heber Frausto PA-C    famotidine 20 mg Oral Daily Conner Morrell PA-C    furosemide 20 mg Intravenous Once Jay Maldonado PA-C    furosemide 40 mg Oral BID (diuretic) Leonel Dinh DO    [START ON 11/29/2018] methylPREDNISolone 4 mg Oral Daily Harmony Chiang MD    metoprolol 5 mg Intravenous Q6H PRN Dolcruz Serrano PA-C    metoprolol tartrate 50 mg Oral Q6H Leonel Dinh DO    ondansetron 4 mg Intravenous Q6H PRN Dolcruz Serrano PA-C    polyethylene glycol 17 g Oral Daily PRN Heber Frausto PA-C    polyethylene glycol 17 g Oral Daily BJ Vargas    senna 1 tablet Oral HS Heber Frausto PA-C    tamsulosin 0 4 mg Oral Daily With [facility]    [START ON 11/29/2018] vancomycin 15 mg/kg Intravenous Once Jay Maldonado PA-C    zolpidem 5 mg Oral HS PRN Lb Serrano PA-C         Today, Patient Was Seen By: Chau Romero MD    ** Please Note: Dictation voice to text software may have been used in the creation of this document   **

## 2018-11-29 NOTE — PLAN OF CARE
CARDIOVASCULAR - ADULT     Maintains optimal cardiac output and hemodynamic stability Progressing     Absence of cardiac dysrhythmias or at baseline rhythm Progressing        CHANGE IN BODY IMAGE     Pt/Family communicate acceptance of loss or change in body image and expresses psychological comfort and peace Progressing        COPING     Pt/Family able to verbalize concerns and demonstrate effective coping strategies Progressing     Will report anxiety at manageable levels Progressing        DEATH & DYING     Pt/Family communicate acceptance of impending death and expresses psychological comfort and peace Progressing        DECISION MAKING     Pt/Family able to effectively weigh alternatives and participate in decision making related to treatment and care [facility] Discharge to post-acute care or home with appropriate resources Progressing        GENITOURINARY - ADULT     Urinary catheter remains patent Progressing        METABOLIC, FLUID AND ELECTROLYTES - ADULT     Electrolytes maintained within normal limits Progressing     Fluid balance maintained Progressing        Nutrition/Hydration-ADULT     Nutrient/Hydration intake appropriate for improving, restoring or maintaining nutritional needs Progressing        Potential for Falls     Patient will remain free of falls Progressing        Prexisting or High Potential for Compromised Skin Integrity     Skin integrity is maintained or improved Progressing        RESPIRATORY - ADULT     Achieves optimal ventilation and oxygenation Progressing        SKIN/TISSUE INTEGRITY - ADULT     Skin integrity remains intact Progressing        SPIRITUAL CARE     Pt/Family able to move forward in process of forgiving self, others and/or higher power Progressing     Patient feels balance and connection with others and/or higher power that empowers the self during times of loss, guilt and fear Progressing

## 2018-11-29 NOTE — NURSING NOTE
Pt c/o of itching on back  [address] [place] erythema patches noted  Placed platelets on hold  Made FRANCHESCA Olguin aware  Orders given to hold platelets for 1 hour, benadryl  Pt states, " This isn't bad"  Will continue to monitor pt

## 2018-11-30 ENCOUNTER — APPOINTMENT (INPATIENT)
Dept: NON INVASIVE DIAGNOSTICS | Facility: HOSPITAL | Age: 73
DRG: 242 | End: 2018-11-30
Payer: MEDICARE

## 2018-11-30 ENCOUNTER — APPOINTMENT (OUTPATIENT)
Dept: NON INVASIVE DIAGNOSTICS | Facility: HOSPITAL | Age: 73
DRG: 242 | End: 2018-11-30
Attending: INTERNAL MEDICINE
Payer: MEDICARE

## 2018-11-30 ENCOUNTER — APPOINTMENT (INPATIENT)
Dept: RADIOLOGY | Facility: HOSPITAL | Age: 73
DRG: 242 | End: 2018-11-30
Payer: MEDICARE

## 2018-11-30 PROBLEM — I21.4 NSTEMI (NON-ST ELEVATED MYOCARDIAL INFARCTION) (HCC): Status: ACTIVE | Noted: 2018-11-30

## 2018-11-30 LAB
ABO GROUP BLD BPU: NORMAL
ANION GAP SERPL CALCULATED.3IONS-SCNC: 4 MMOL/L (ref 4–13)
ANISOCYTOSIS BLD QL SMEAR: PRESENT
BASOPHILS # BLD MANUAL: 0 THOUSAND/UL (ref 0–0.1)
BASOPHILS NFR MAR MANUAL: 0 % (ref 0–1)
BPU ID: NORMAL
BUN SERPL-MCNC: 34 MG/DL (ref 5–25)
CALCIUM SERPL-MCNC: 8.7 MG/DL (ref 8.3–10.1)
CHLORIDE SERPL-SCNC: 96 MMOL/L (ref 100–108)
CO2 SERPL-SCNC: 35 MMOL/L (ref 21–32)
CREAT SERPL-MCNC: 1.58 MG/DL (ref 0.6–1.3)
DACRYOCYTES BLD QL SMEAR: PRESENT
EOSINOPHIL # BLD MANUAL: 0 THOUSAND/UL (ref 0–0.4)
EOSINOPHIL NFR BLD MANUAL: 0 % (ref 0–6)
ERYTHROCYTE [DISTWIDTH] IN BLOOD BY AUTOMATED COUNT: 17.4 % (ref 11.6–15.1)
GFR SERPL CREATININE-BSD FRML MDRD: 43 ML/MIN/1.73SQ M
GLUCOSE SERPL-MCNC: 90 MG/DL (ref 65–140)
HCT VFR BLD AUTO: 36 % (ref 36.5–49.3)
HGB BLD-MCNC: 11.7 G/DL (ref 12–17)
HYPERCHROMIA BLD QL SMEAR: PRESENT
INR PPP: 1.27 (ref 0.86–1.17)
LYMPHOCYTES # BLD AUTO: 0.98 THOUSAND/UL (ref 0.6–4.47)
LYMPHOCYTES # BLD AUTO: 24 % (ref 14–44)
MCH RBC QN AUTO: 30.9 PG (ref 26.8–34.3)
MCHC RBC AUTO-ENTMCNC: 32.5 G/DL (ref 31.4–37.4)
MCV RBC AUTO: 95 FL (ref 82–98)
MONOCYTES # BLD AUTO: 0.08 THOUSAND/UL (ref 0–1.22)
MONOCYTES NFR BLD: 2 % (ref 4–12)
NEUTROPHILS # BLD MANUAL: 3.03 THOUSAND/UL (ref 1.85–7.62)
NEUTS SEG NFR BLD AUTO: 74 % (ref 43–75)
NRBC BLD AUTO-RTO: 1 /100 WBCS
NRBC BLD AUTO-RTO: 4 /100 WBC (ref 0–2)
OVALOCYTES BLD QL SMEAR: PRESENT
PLATELET # BLD AUTO: 114 THOUSANDS/UL (ref 149–390)
PLATELET BLD QL SMEAR: ABNORMAL
PMV BLD AUTO: 11.2 FL (ref 8.9–12.7)
POIKILOCYTOSIS BLD QL SMEAR: PRESENT
POTASSIUM SERPL-SCNC: 3.2 MMOL/L (ref 3.5–5.3)
PROTHROMBIN TIME: 16 SECONDS (ref 11.8–14.2)
RBC # BLD AUTO: 3.79 MILLION/UL (ref 3.88–5.62)
RBC MORPH BLD: PRESENT
SODIUM SERPL-SCNC: 135 MMOL/L (ref 136–145)
UNIT DISPENSE STATUS: NORMAL
UNIT PRODUCT CODE: NORMAL
UNIT RH: NORMAL
WBC # BLD AUTO: 4.09 THOUSAND/UL (ref 4.31–10.16)

## 2018-11-30 PROCEDURE — 80048 BASIC METABOLIC PNL TOTAL CA: CPT | Performed by: INTERNAL MEDICINE

## 2018-11-30 PROCEDURE — C1898 LEAD, PMKR, OTHER THAN TRANS: HCPCS

## 2018-11-30 PROCEDURE — 85007 BL SMEAR W/DIFF WBC COUNT: CPT | Performed by: INTERNAL MEDICINE

## 2018-11-30 PROCEDURE — C1900 LEAD, CORONARY VENOUS: HCPCS

## 2018-11-30 PROCEDURE — C1892 INTRO/SHEATH,FIXED,PEEL-AWAY: HCPCS | Performed by: INTERNAL MEDICINE

## 2018-11-30 PROCEDURE — 33225 L VENTRIC PACING LEAD ADD-ON: CPT | Performed by: INTERNAL MEDICINE

## 2018-11-30 PROCEDURE — 02K83ZZ MAP CONDUCTION MECHANISM, PERCUTANEOUS APPROACH: ICD-10-PCS | Performed by: INTERNAL MEDICINE

## 2018-11-30 PROCEDURE — P9037 PLATE PHERES LEUKOREDU IRRAD: HCPCS

## 2018-11-30 PROCEDURE — 71045 X-RAY EXAM CHEST 1 VIEW: CPT

## 2018-11-30 PROCEDURE — 93005 ELECTROCARDIOGRAM TRACING: CPT

## 2018-11-30 PROCEDURE — 02HL3JZ INSERTION OF PACEMAKER LEAD INTO LEFT VENTRICLE, PERCUTANEOUS APPROACH: ICD-10-PCS | Performed by: INTERNAL MEDICINE

## 2018-11-30 PROCEDURE — 85027 COMPLETE CBC AUTOMATED: CPT | Performed by: INTERNAL MEDICINE

## 2018-11-30 PROCEDURE — 33207 INSERT HEART PM VENTRICULAR: CPT | Performed by: INTERNAL MEDICINE

## 2018-11-30 PROCEDURE — C1769 GUIDE WIRE: HCPCS | Performed by: INTERNAL MEDICINE

## 2018-11-30 PROCEDURE — 02H43JZ INSERTION OF PACEMAKER LEAD INTO CORONARY VEIN, PERCUTANEOUS APPROACH: ICD-10-PCS | Performed by: INTERNAL MEDICINE

## 2018-11-30 PROCEDURE — C1887 CATHETER, GUIDING: HCPCS | Performed by: INTERNAL MEDICINE

## 2018-11-30 PROCEDURE — B5191ZA FLUOROSCOPY OF INFERIOR VENA CAVA USING LOW OSMOLAR CONTRAST, GUIDANCE: ICD-10-PCS | Performed by: INTERNAL MEDICINE

## 2018-11-30 PROCEDURE — 99232 SBSQ HOSP IP/OBS MODERATE 35: CPT | Performed by: INTERNAL MEDICINE

## 2018-11-30 PROCEDURE — C2621 PMKR, OTHER THAN SING/DUAL: HCPCS

## 2018-11-30 PROCEDURE — 93312 ECHO TRANSESOPHAGEAL: CPT

## 2018-11-30 PROCEDURE — C1730 CATH, EP, 19 OR FEW ELECT: HCPCS | Performed by: INTERNAL MEDICINE

## 2018-11-30 PROCEDURE — 02HK3JZ INSERTION OF PACEMAKER LEAD INTO RIGHT VENTRICLE, PERCUTANEOUS APPROACH: ICD-10-PCS | Performed by: INTERNAL MEDICINE

## 2018-11-30 PROCEDURE — 0JH607Z INSERTION OF CARDIAC RESYNCHRONIZATION PACEMAKER PULSE GENERATOR INTO CHEST SUBCUTANEOUS TISSUE AND FASCIA, OPEN APPROACH: ICD-10-PCS | Performed by: INTERNAL MEDICINE

## 2018-11-30 PROCEDURE — 85610 PROTHROMBIN TIME: CPT | Performed by: PHYSICIAN ASSISTANT

## 2018-11-30 RX ORDER — CLINDAMYCIN PHOSPHATE 150 MG/ML
INJECTION, SOLUTION INTRAVENOUS AS NEEDED
Status: DISCONTINUED | OUTPATIENT
Start: 2018-11-30 | End: 2018-11-30 | Stop reason: SURG

## 2018-11-30 RX ORDER — SODIUM CHLORIDE 9 MG/ML
INJECTION, SOLUTION INTRAVENOUS CONTINUOUS PRN
Status: DISCONTINUED | OUTPATIENT
Start: 2018-11-30 | End: 2018-11-30 | Stop reason: SURG

## 2018-11-30 RX ORDER — FENTANYL CITRATE 50 UG/ML
INJECTION, SOLUTION INTRAMUSCULAR; INTRAVENOUS AS NEEDED
Status: DISCONTINUED | OUTPATIENT
Start: 2018-11-30 | End: 2018-11-30 | Stop reason: SURG

## 2018-11-30 RX ORDER — POTASSIUM CHLORIDE 14.9 MG/ML
20 INJECTION INTRAVENOUS
Status: ACTIVE | OUTPATIENT
Start: 2018-11-30 | End: 2018-11-30

## 2018-11-30 RX ORDER — PROPOFOL 10 MG/ML
INJECTION, EMULSION INTRAVENOUS CONTINUOUS PRN
Status: DISCONTINUED | OUTPATIENT
Start: 2018-11-30 | End: 2018-11-30 | Stop reason: SURG

## 2018-11-30 RX ORDER — FUROSEMIDE 10 MG/ML
INJECTION INTRAMUSCULAR; INTRAVENOUS AS NEEDED
Status: DISCONTINUED | OUTPATIENT
Start: 2018-11-30 | End: 2018-11-30

## 2018-11-30 RX ORDER — GENTAMICIN SULFATE 40 MG/ML
INJECTION, SOLUTION INTRAMUSCULAR; INTRAVENOUS CODE/TRAUMA/SEDATION MEDICATION
Status: COMPLETED | OUTPATIENT
Start: 2018-11-30 | End: 2018-11-30

## 2018-11-30 RX ORDER — DIPHENHYDRAMINE HYDROCHLORIDE 50 MG/ML
INJECTION INTRAMUSCULAR; INTRAVENOUS AS NEEDED
Status: DISCONTINUED | OUTPATIENT
Start: 2018-11-30 | End: 2018-11-30 | Stop reason: SURG

## 2018-11-30 RX ORDER — PROPOFOL 10 MG/ML
INJECTION, EMULSION INTRAVENOUS AS NEEDED
Status: DISCONTINUED | OUTPATIENT
Start: 2018-11-30 | End: 2018-11-30 | Stop reason: SURG

## 2018-11-30 RX ORDER — LIDOCAINE HYDROCHLORIDE 10 MG/ML
INJECTION, SOLUTION INFILTRATION; PERINEURAL CODE/TRAUMA/SEDATION MEDICATION
Status: COMPLETED | OUTPATIENT
Start: 2018-11-30 | End: 2018-11-30

## 2018-11-30 RX ADMIN — FENTANYL CITRATE 25 MCG: 50 INJECTION, SOLUTION INTRAMUSCULAR; INTRAVENOUS at 12:00

## 2018-11-30 RX ADMIN — FENTANYL CITRATE 25 MCG: 50 INJECTION, SOLUTION INTRAMUSCULAR; INTRAVENOUS at 10:58

## 2018-11-30 RX ADMIN — METOPROLOL TARTRATE 50 MG: 50 TABLET, FILM COATED ORAL at 15:41

## 2018-11-30 RX ADMIN — SODIUM CHLORIDE: 0.9 INJECTION, SOLUTION INTRAVENOUS at 10:37

## 2018-11-30 RX ADMIN — DIPHENHYDRAMINE HYDROCHLORIDE 25 MG: 50 INJECTION, SOLUTION INTRAMUSCULAR; INTRAVENOUS at 10:39

## 2018-11-30 RX ADMIN — SENNOSIDES 8.6 MG: 8.6 TABLET, FILM COATED ORAL at 21:13

## 2018-11-30 RX ADMIN — METOPROLOL TARTRATE 50 MG: 50 TABLET, FILM COATED ORAL at 21:13

## 2018-11-30 RX ADMIN — GENTAMICIN SULFATE 80 MG: 40 INJECTION, SOLUTION INTRAMUSCULAR; INTRAVENOUS at 13:30

## 2018-11-30 RX ADMIN — POTASSIUM CHLORIDE 20 MEQ: 200 INJECTION, SOLUTION INTRAVENOUS at 10:18

## 2018-11-30 RX ADMIN — METOPROLOL TARTRATE 50 MG: 50 TABLET, FILM COATED ORAL at 04:39

## 2018-11-30 RX ADMIN — TAMSULOSIN HYDROCHLORIDE 0.4 MG: 0.4 CAPSULE ORAL at 15:41

## 2018-11-30 RX ADMIN — IOHEXOL 10 ML: 350 INJECTION, SOLUTION INTRAVENOUS at 12:53

## 2018-11-30 RX ADMIN — DIAZEPAM 5 MG: 5 TABLET ORAL at 07:24

## 2018-11-30 RX ADMIN — FUROSEMIDE 40 MG: 40 TABLET ORAL at 08:10

## 2018-11-30 RX ADMIN — IOHEXOL 10 ML: 350 INJECTION, SOLUTION INTRAVENOUS at 12:22

## 2018-11-30 RX ADMIN — FUROSEMIDE 40 MG: 40 TABLET ORAL at 15:41

## 2018-11-30 RX ADMIN — CLINDAMYCIN PHOSPHATE 600 MG: 150 INJECTION, SOLUTION INTRAMUSCULAR; INTRAVENOUS at 10:45

## 2018-11-30 RX ADMIN — FENTANYL CITRATE 25 MCG: 50 INJECTION, SOLUTION INTRAMUSCULAR; INTRAVENOUS at 11:30

## 2018-11-30 RX ADMIN — PROPOFOL 50 MG: 10 INJECTION, EMULSION INTRAVENOUS at 11:01

## 2018-11-30 RX ADMIN — SODIUM CHLORIDE: 0.9 INJECTION, SOLUTION INTRAVENOUS at 10:18

## 2018-11-30 RX ADMIN — HYDROCORTISONE SODIUM SUCCINATE 100 MG: 100 INJECTION, POWDER, FOR SOLUTION INTRAMUSCULAR; INTRAVENOUS at 10:39

## 2018-11-30 RX ADMIN — FENTANYL CITRATE 25 MCG: 50 INJECTION, SOLUTION INTRAMUSCULAR; INTRAVENOUS at 11:40

## 2018-11-30 RX ADMIN — PROPOFOL 50 MCG/KG/MIN: 10 INJECTION, EMULSION INTRAVENOUS at 11:01

## 2018-11-30 RX ADMIN — FAMOTIDINE 20 MG: 10 INJECTION, SOLUTION INTRAVENOUS at 10:51

## 2018-11-30 RX ADMIN — VANCOMYCIN HYDROCHLORIDE 1000 MG: 1 INJECTION, SOLUTION INTRAVENOUS at 10:10

## 2018-11-30 RX ADMIN — LIDOCAINE HYDROCHLORIDE 20 ML: 10 INJECTION, SOLUTION INFILTRATION; PERINEURAL at 12:15

## 2018-11-30 RX ADMIN — FAMOTIDINE 20 MG: 20 TABLET ORAL at 08:10

## 2018-11-30 RX ADMIN — PHENYLEPHRINE HYDROCHLORIDE 50 MCG/MIN: 10 INJECTION INTRAVENOUS at 11:01

## 2018-11-30 RX ADMIN — DIGOXIN 125 MCG: 125 TABLET ORAL at 08:10

## 2018-11-30 NOTE — PROGRESS NOTES
Heart Failure Service Progress Note - Neelima Mckay 72 y o  male MRN: [id number]    Unit/Bed#: [facility] 531-01 Encounter: [id number]      Assessment:    Principal Problem:    Atrial fibrillation ([facility] )  Active Problems:    Hypertension    Thrombocyt[facility] ([facility] )    PALOMA (acute kidney injury) ([place] [address] )    Coronary artery disease    Hyperlipidemia    Dilated cardiomyopathy ([place] [address] )    Acute on chronic combined systolic and diastolic heart failure ([place] [address] )    Urinary retention    Subjective:   Patient seen and examined  No significant events overnight  Platelets 894 today, proceeded with plan for BiV implantation by EP team  Continued on amiodarone gtt  Pt feels confused after his procedure, knows where he is but the time from when they sedated him for his procedure until he was brought back to his room is a blur  No c/o chest pain or SOB, he offers no questions at this time  Objective: Intake/ Output: 2,196/3,300 (-1,130mL)  Weight: 68 7 kg  Tele: Paced    TTE 11/23/18  LVEF: 20%  LVIDd:4 58 cm  RV:normal size and systolic functin  MR:moderate  PASP:  RVOT:   Other:Small free flowing pericardial effusion with moderate left pleural effusion  Neurohormonal Blockade:  --Beta-Blocker: metoprolol 50mg PO Q6 hours  --ACEi, ARB or ARNi: PALOMA precludes    (or SVR reduction)  --Aldosterone Receptor Blocker: PALOMA precludes  --Diuretic: furosemide 40mg PO BID    Sudden Cardiac Death Risk Reduction:  --ICD: s/p BiV AICD implant today  Interrogation:     Electrical Resynchronization:  --  Interrogation:     Advanced Therapies (If appropriate): --Inotrope:  --LVAD/Transplant Candidacy:    Plan:  1  Acute on chronic systolic CHF, LVEFm 24%, NYHA Class II/III, ACC/AHA Stage C- likely tachy-mediated with presentation in Afib with RVR, cannot definitely rule out ischemic cause  Volume status improved with diuresis, currently on furosemide 40mg PO BID   Creatinine unchanged, patient appears euvolemic on exam  Weight stable, net negative 1L/24hr, continue to monitor daily weight and I/Os, BMP      2  Permanent atrial fibrillation- Previously Rate-controlled on PO digoxin, metoprolol, and amiodarone gtt; HR 70s-80s  No AC due to chronic thrombocytopenia  Platelets 491,691 today after he was given multiple units in preparation for BiV AICD implantation today- now paced rhythm on telemetry  3  Chronic thrombocytopenia- possibly immune-related but minimal improvement on steroids  Platelet count 902,157 today after multiple units yesterday  No signs of active bleeding  4  HTN- stable on current regimen    Vitals: Blood pressure 132/76, pulse 77, temperature 98 1 °F (36 7 °C), temperature source Oral, resp  rate 18, height 5' 10" (1 778 m), weight 68 7 kg (151 lb 7 3 oz), SpO2 98 %  , Body mass index is 21 73 kg/m² , I/O last 3 completed shifts: In: 2636 5 [P O :1180; I V :682 5; Blood:774]  Out: 4697 [QBEUR:7355]  I/O this shift: In: 49 3 [I V :49 3]  Out: 230 [Urine:230]  Wt Readings from Last 3 Encounters:   11/30/18 68 7 kg (151 lb 7 3 oz)   11/22/18 67 5 kg (148 lb 13 oz)   10/10/18 73 5 kg (162 lb)       Intake/Output Summary (Last 24 hours) at 11/30/18 0954  Last data filed at 11/30/18 0800   Gross per 24 hour   Intake          2218 55 ml   Output             3530 ml   Net         -1311 45 ml     I/O last 3 completed shifts: In: 2636 5 [P O :1180; I V :682 5; Blood:774]  Out: 3315 [Urine:4550]    No significant arrhythmias seen on telemetry review       Physical Exam:  Vitals:    11/30/18 0317 11/30/18 0439 11/30/18 0456 11/30/18 0745   BP: 109/61 118/65 109/66 132/76   Pulse:  74 86 77   Resp:   18 18   Temp:   97 7 °F (36 5 °C) 98 1 °F (36 7 °C)   TempSrc:    Oral   SpO2:    98%   Weight:   68 7 kg (151 lb 7 3 oz)    Height:         GEN: Prachi Thompson appears well, alert and oriented x 3, mildly confused per his report but pleasant and cooperative   HEENT: pupils equal, round, and reactive to light; extraocular muscles intact  NECK: supple, no carotid bruits   HEART: regular rhythm, normal S1 and S2, no murmurs, clicks, gallops or rubs, JVP is flat    LUNGS: clear to auscultation anteriorly- unable to assess posterior lung fields s/p AICD placement today; no wheezes, rales, or rhonchi noted  ABDOMEN: normal bowel sounds, soft, no tenderness, no distention  EXTREMITIES: peripheral pulses normal; no clubbing, cyanosis, or edema  NEURO: no focal findings   SKIN: LCW AICD insertion site- dressing c/d/i, PVD discoloration b/l LE    Current Facility-Administered Medications:     acetaminophen (TYLENOL) tablet 650 mg, 650 mg, Oral, Q6H PRN, Christy Otero PA-C, 650 mg at 11/28/18 1152    amiodarone (CORDARONE) 900 mg in dextrose 5 % 500 mL infusion, 0 5 mg/min, Intravenous, Continuous, Jay Maldonado PA-C, Last Rate: 16 7 mL/hr at 11/29/18 2124, 0 5 mg/min at 11/29/18 2124    bisacodyl (DULCOLAX) rectal suppository 10 mg, 10 mg, Rectal, Daily PRN, Michael Lao PA-C    calcium carbonate (TUMS) chewable tablet 500 mg, 500 mg, Oral, Daily PRN, Megan Pike DO    diazepam (VALIUM) tablet 5 mg, 5 mg, Oral, Q8H PRN, Megan Pike DO, 5 mg at 11/30/18 8411    digoxin (LANOXIN) tablet 125 mcg, 125 mcg, Oral, Daily, Jese Ventura DO, 125 mcg at 11/30/18 0810    diphenhydrAMINE (BENADRYL) tablet 50 mg, 50 mg, Oral, Q6H PRN, Jay Maldonado PA-C, 50 mg at 11/29/18 2314    docusate sodium (COLACE) capsule 100 mg, 100 mg, Oral, BID, Quinton Frausto PA-C, 100 mg at 11/29/18 1759    famotidine (PEPCID) tablet 20 mg, 20 mg, Oral, Daily, Christy Otero PA-C, 20 mg at 11/30/18 0810    furosemide (LASIX) tablet 40 mg, 40 mg, Oral, BID (diuretic), Evert Kessler DO, 40 mg at 11/30/18 0810    hydrocortisone 1 % cream, , Topical, 4x Daily PRN, Jay Maldonado PA-C    metoprolol (LOPRESSOR) injection 5 mg, 5 mg, Intravenous, Q6H PRN, Christy Otero PA-C, 5 mg at 11/24/18 1811    metoprolol tartrate (LOPRESSOR) tablet 50 mg, 50 mg, Oral, Q6H, Evert Kessler, DO, 50 mg at 11/30/18 0439    ondansetron (ZOFRAN) injection 4 mg, 4 mg, Intravenous, Q6H PRN, Zelda Morales PA-C, 4 mg at 11/27/18 1237    polyethylene glycol (MIRALAX) packet 17 g, 17 g, Oral, Daily PRN, Kristen Bell Frausto PA-C, 17 g at 11/27/18 0515    polyethylene glycol (MIRALAX) packet 17 g, 17 g, Oral, BID, Haley Jensen MD, Stopped at 11/30/18 0806    potassium chloride 20 mEq IVPB (premix), 20 mEq, Intravenous, Q2H, Haley Jensen MD    [facility]) tablet 8 6 mg, 1 tablet, Oral, HS, Kristen Frausto PA-C, 8 6 mg at 11/28/18 2119    tamsulosin (FLOMAX) capsule 0 4 mg, 0 4 mg, Oral, Daily With Dinner, Kalli Winn PA-C, 0 4 mg at 11/29/18 1758    vancomycin (VANCOCIN) IVPB (premix) 1,000 mg, 15 mg/kg, Intravenous, Once, Matt Laboy PA-C, Stopped at 11/29/18 0810    zolpidem (AMBIEN) tablet 5 mg, 5 mg, Oral, HS PRN, Zelda Morales PA-C, 2 5 mg at 11/26/18 2250    Labs & Results:      Results from last 7 days  Lab Units 11/30/18  0610 11/29/18  0551 11/28/18  2046   WBC Thousand/uL 4 09* 4 45 5 72   HEMOGLOBIN g/dL 11 7* 12 5 13 6   HEMATOCRIT % 36 0* 38 2 40 8   PLATELETS Thousands/uL 114* 61* 87*           Results from last 7 days  Lab Units 11/30/18  0610 11/29/18  0551 11/28/18  0511 11/27/18  0427  11/25/18  0534   POTASSIUM mmol/L 3 2* 3 5 3 6 3 6  < > 3 9   CHLORIDE mmol/L 96* 98* 97* 99*  < > 100   CO2 mmol/L 35* 33* 31 28  < > 30   BUN mg/dL 34* 37* 35* 34*  < > 32*   CREATININE mg/dL 1 58* 1 55* 1 51* 1 46*  < > 1 71*   CALCIUM mg/dL 8 7 9 5 9 7 8 5  < > 9 4   ALK PHOS U/L  --   --  91 77  --  91   ALT U/L  --   --  73 60  --  84*   AST U/L  --   --  34 28  --  35   < > = values in this interval not displayed  Results from last 7 days  Lab Units 11/30/18  0610 11/26/18  0438 11/24/18  0458   INR  1 27* 1 37* 1 73*     Counseling / Coordination of Care  Total floor / unit time spent today 20 minutes    Greater than 50% of total time was spent with the patient and / or family counseling and / or coordination of care  Thank you for the opportunity to participate in the care of this patient  Cherylene Cleaver D O    Director Heart Failure/ Medical Director [address] [place] [address]

## 2018-11-30 NOTE — ANESTHESIA POSTPROCEDURE EVALUATION
Post-Op Assessment Note      CV Status:  Stable    Mental Status:  Lethargic    Hydration Status:  Stable and euvolemic    PONV Controlled:  None    Airway Patency:  Patent    Post Op Vitals Reviewed: Yes          Staff: CRNA       Comments: vss           BP   130/68   Temp      Pulse  85   Resp   18   SpO2   98

## 2018-11-30 NOTE — ANESTHESIA PREPROCEDURE EVALUATION
Review of Systems/Medical History          Cardiovascular  Hyperlipidemia, Hypertension , CAD , Dysrhythmias , atrial fibrillation, CHF , PVD,   Comment: LEFT VENTRICLE: Size was normal  Systolic function was markedly reduced  Ejection fraction was estimated to be 20 %  There was severe diffuse hypokinesis with regional variations- akinesis of the anteroseptal, anterior and possibly the  inferior walls  The patient was tachycardic throughout the study due to which ejection fraction and regional wall motion was not accurately assessed  Wall thickness was mildly increased  The changes were consistent with concentric  remodeling (increased wall thickness with normal wall mass)  DOPPLER: Transmitral flow pattern: atrial fibrillation  The study was not technically sufficient to allow evaluation of LV diastolic function      RIGHT VENTRICLE: The size was normal  Systolic function was reduced      LEFT ATRIUM: The atrium was dilated      RIGHT ATRIUM: The atrium was dilated      MITRAL VALVE: DOPPLER: There was moderate regurgitation  The regurgitant jet was centrally directed      AORTIC VALVE: The valve was trileaflet  Leaflets exhibited normal cuspal separation and sclerosis  DOPPLER: There was mild to moderate regurgitation      TRICUSPID VALVE: DOPPLER: There was moderate regurgitation      PULMONIC VALVE: DOPPLER: There was no significant regurgitation      PERICARDIUM: A small, free-flowing pericardial effusion was identified anterior and posterior to the heart  The fluid had no internal echoes  There was no evidence of hemodynamic compromise  There was a moderate-sized left pleural  effusion  ,  Pulmonary       GI/Hepatic            Endo/Other     GYN       Hematology   Musculoskeletal       Neurology   Psychology           Physical Exam    Airway    Mallampati score: IV  TM Distance: >3 FB  Neck ROM: limited     Dental   No notable dental hx     Cardiovascular      Pulmonary      Other Findings        Anesthesia Plan  ASA Score- 4     Anesthesia Type- IV sedation with anesthesia with ASA Monitors  Additional Monitors:   Airway Plan:     Comment: I have seen the patient and reviewed the history  Patient to receive IV sedation with full ASA monitors  Risks discussed with the patient, consent signed        Plan Factors-    Induction- intravenous  Postoperative Plan-     Informed Consent- Anesthetic plan and risks discussed with patient  I personally reviewed this patient with the CRNA  Discussed and agreed on the Anesthesia Plan with the CRNA  Eric Ortez

## 2018-11-30 NOTE — ASSESSMENT & PLAN NOTE
Noted ejection fraction of 20%  Continue beta-blocker and digoxin along with Lasix  [address] Cardiology input  Management for AFib as above

## 2018-11-30 NOTE — DISCHARGE INSTRUCTIONS
Please refer to post device implantation discharge instructions and restrictions below and your device booklet/temporary card  Keep incision dry for one week  Do not use lotions/powders/creams on incision  Remove outer bandage 48 hours after implantation  Leave underlying steri-strips in place, they will either fall off on their own or will be removed at 2 week follow up appointment  No overhead reaching/pushing/pulling/lifting greater than 5-10lbs with left arm for one month  Please call the office if you notice redness, swelling, bleeding, or drainage from incision or if you develop fevers    Cardiac Resynchronization Therapy (CRT)    If you have any questions, please call [phone] to speak with a nurse (8:30am-4pm, or [phone] after hours)  For appointments, please call [phone]  WHAT YOU SHOULD KNOW:    Your device is a biventricular pacemaker, which delivers resynchronization therapy  Cardiac resynchronization therapy (CRT) is a procedure used to treat problems with how your heart contracts  CRT is also called biventricular pacing  Your heart has 2 upper chambers, called atria, and 2 lower chambers, called ventricles  Your heartbeat is synchronized when all areas of your heart contract together properly  When the areas of your heart do not contract as they should, your heart cannot pump enough blood and oxygen to your body  You may have trouble breathing, tire easily, and have swelling in your legs and feet  With a biventricular device, there is one wire in the right atria (in most cases), one wire in the right ventricle, and a third wire that goes through a vein and wraps around to your left ventricle  The two ventricular wires work together to help your heart contract more synchronously and efficiently                AFTER YOU LEAVE:      Medicines:   · Heart medicine may be given to help your heart beat strongly and regularly   Ask your healthcare provider for more information about heart medicines  · Take your medicine as directed  Contact your healthcare provider if you think your medicine is not helping or if you have side effects  Tell him if you are allergic to any medicine  Keep a list of the medicines, vitamins, and herbs you take  Include the amounts, and when and why you take them  Bring the list or the pill bottles to follow-up visits  Carry your medicine list with you in case of an emergency  Follow up with your healthcare provider as directed: You will need to return to have your pacemaker checked  You may also need an EKG, echocardiogram, or chest x-ray to check the leads in your heart, and your doctor will order these tests if necessary  Write down your questions so you remember to ask them during your visits  Device safety: Some electrical devices may interfere with how your pacemaker works  Some examples are cell phones, security systems, power cables, and electric monitors  Ask your healthcare provider how long you can be near these devices, and which devices to avoid  Tell caregivers you have a pacemaker before you have a procedure or surgery  Wound care: Care for your wound as directed  Keep incision dry for one week  Do not use lotions/powders/creams on incision  Remove outer bandage 48 hours after implantation  Leave underlying steri-strips in place, they will either fall off on their own or will be removed at 2 week follow up appointment  No overhead reaching/pushing/pulling/lifting greater than 5-10lbs with left arm for one month  Please call the office if you notice redness, swelling, bleeding, or drainage from incision or if you develop fevers      Contact your healthcare provider if:   · You have new or increased swelling in your legs or feet  · You feel more tired than usual    · You have trouble breathing during activity  · Your wound is red, warm, swollen, or draining pus  · You have a fever     · You have questions or concerns about your condition or care     Seek care immediately or call 911 if:   · You feel like your heart is fluttering or jumping  · You have any of the following signs of a heart attack:    ¨ Squeezing, pressure, fullness, or pain in your chest that lasts longer than a few minutes or returns   ¨ Discomfort or pain in your back, neck, jaw, stomach, or arm   ¨ Shortness of breath or breathing problems   ¨ A sudden cold sweat, lightheadedness, dizziness, or nausea, especially with chest pain or trouble breathing        © 2014 [employer] is for End User's use only and may not be sold, redistributed or otherwise used for commercial purposes  All illustrations and images included in CareNotes® are the copyrighted property of [employer] , [employer]  or [employer]  The above information is an  only  It is not intended as medical advice for individual conditions or treatments  Talk to your doctor, nurse or pharmacist before following any medical regimen to see if it is safe and effective for you

## 2018-11-30 NOTE — PROCEDURES
BiV device - LV lead + His lead in left posterior fascicular region    History and physical were reviewed  Patient was examined and history was reviewed  No change in patient's condition Since history and physical has been completed        The pre- operative diagnosis:  Non - ischemic cardiomyopathy - systolic heart failure - LVEF -40%  NYHA -II - III  Getting AVN ablation - Complete Heart block  Will be 100% V paced  On optimal medical therapy - metoprolol      Chronic atrial fibrillation ( reviewed EKG's from 2013,2014, 2015, 2017, 2848)   Recent systolic HF due to  RVR - on amiodarone currently - plan for AVN ablation as long term plan - will be fully paced  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) - LVEF 20% initially at admission, improved to 40% on LAURA today before procedure  NYHA -II -III   Chronic ITP - with platelet count 74,977 - need to avoid anticoagulation, received platelets and >167,577 today for procedure  CKD         Postoperative diagnosis:  Non - ischemic cardiomyopathy - systolic heart failure - LVEF -40%  NYHA -II - III  Getting AVN ablation - Complete Heart block  Will be 100% V paced  On optimal medical therapy - metoprolol    Chronic atrial fibrillation ( reviewed EKG's from 2013,2014, 2015, 2017, 2809)   Recent systolic HF due to  RVR - on amiodarone currently - plan for AVN ablation as long term plan - will be fully paced  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) - LVEF 20% initially at admission, improved to 40% on LAURA today before procedure  NYHA -II -III   Chronic ITP - with platelet count 65,923 - need to avoid anticoagulation, received platelets and >181,772 today for procedure  CKD      Procedure:   BiV pacemaker   His lead - left posterior fascicular region   LV lead   ( BLOCK HF trial - LVEF <50%, and will need >40% pacing, indication for BiV pacing)      Surgeon: [id number] -none    Specimens - none    Estimated blood loss- 20 ml    Findings-none    Complications none    Anesthesia-  Anesthesia by anaesthesiologist , local lidocaine by myself      Details of the device            Description of procedure: The patient was seen before the procedure  The details of the procedure was explained and patient agreed to the same  Appropriate consent was signed  The patient was brought to the electrophysiologic laboratory  Proper time out was done  Sterile dressing and draping was done  Local lidocaine was infiltrated, In the infraclavicular region at the site of device implant  Initial incision was made by a sharp number 10 blade  Thereafter electrocautery and blunt dissection was used to form a prepectoral pocket  Appropriate hemostasis was taken care of        10 mL of radiocontrast, followed by 20 mL of saline, was injected in a peripheral vein on the side of the implant  Under direct visualization, the axillary vein was  Punctured,extra-thoracic  A single guidewire was inserted, and taking all the way to the inferior vena cava to confirm that it is on the right side  We also confirmed by the left anterior oblique view that the wire is in the right side  Thereafter a second access was obtained using the fluoroscopic image of the venogram as a roadmap  Wire was once again taken to the inferior vena cava, and position checked in Vietnamese views To confirm the appropriate position  A 7F sheath was passed over the first wire   A His sheath and lead was passed through the sheath  Mapping of His bundle done  Then went distally and inferiorly and left posterior fascicle (LPF) was mapped   Position of the lead was confirmed in both ALVARADO and Vietnamese views Appropriate sensing and thresholds were noted  Lead was screwed in LPF position with non selective capture  The sheath was slit and removed  Once again the lead was tested for appropriate sensing, impedance and threshold  The lead was tied down to the prepectoral fascia and muscle    The patient was paced from the LPF  for second part of procedure as needed       A 9F sheath was passed over the second wire  An Attain straight, Attain select II and super CS was passed through the 9F,  and used to access the CS  Thereafter sequentially we used - venography of CS, Select II  to cannulate a vein in the CS  The lead was passed and positioned in chosen vein  Testing done to confirm that there was adequate LV capture without phrenic capture         The pocket was washed with large amounts of antibiotic saline  Appropriate hemostasis was taken care of  The lead was connected to the generator  The generator and leads were placed inside the pocket  The generator was tied down  Thereafter the device was interrogated and proper sensing and thresholds through the device were confirmed  The pocket was closed in 3 separate layers with intermittent sutures  Dressing was done with Steri-Strips, Telfa and Tegaderm  A modified pressure dressing was applied         Summary of the procedure: The patient came in to the laboratory for bi-ventricular  device placement   The device has been placed and patient tolerated the procedure well

## 2018-11-30 NOTE — ASSESSMENT & PLAN NOTE
Presented with atrial fibrillation with rapid ventricular rate  Rate better controlled now  Continue with amiodarone drip  Continue current doses of digoxin and beta-blocker  [address] Cardiology input  Status post biv pacer on 11/30/2018  Not on AC due to significant thrombocytopenia  Appreciate DP input

## 2018-11-30 NOTE — PROGRESS NOTES
Progress Note - Kamala Villalpando 1945, 72 y o  male MRN: [id number]    Unit/Bed#: [facility] 531-01 Encounter: [id number]    Primary Care Provider: Dayanna Davenport MD   Date and time admitted to hospital: 11/22/2018  7:39 PM        * Atrial fibrillation [facility])   Assessment & Plan    Presented with atrial fibrillation with rapid ventricular rate  Rate better controlled now  Continue with amiodarone drip  Continue current doses of digoxin and beta-blocker  [address] Cardiology input  Status post biv pacer on 11/30/2018  Not on AC due to significant thrombocytopenia  Appreciate DP input  NSTEMI (non-ST elevated myocardial infarction) [facility])   Assessment & Plan    NSTEMI, type 2, POA, due to demand from atrial fibrillation with RVR with associated tachy induced cardiomyopathy" Troponins range from 0 03 to 0 07  Continue current medical management  Acute on chronic combined systolic and diastolic heart failure (HCC)   Assessment & Plan    Noted ejection fraction of 20%  Continue beta-blocker and digoxin along with Lasix  [address] Cardiology input  Management for AFib as above  [facility]   Assessment & Plan    Acute on chronic renal failure  Improving  Baseline creatinine between 1 3-1 6  Slowly worsening 1 46>> 1 51>> 1 55>> 1 58  Continue to monitor closely on diuresis  Strict I&Os  Thrombocytopenia ([facility] [address] )   Assessment & Plan    History of ITP  Negative signs of bleeding with baseline platelets around 68,807  Platelet count 057,598 this morning after transfusion yesterday  Appreciate Heme-Onc input and continue Medrol Dosepak according to them  Had a reaction to platelet transfusion with rash on the back and neck without any respiratory compromise on 11/28  Transfusion was held and regimen with Benadryl, Valium and Tylenol was given  Patient is already on steroids for ITP  As per patient Valium helps with his allergic reactions         Hypertension Assessment & Plan    Continue BB, lasix   VTE Pharmacologic Prophylaxis:   Pharmacologic: Not on any due to thrombocytopenia  Mechanical VTE Prophylaxis in Place: Yes    Patient Centered Rounds: I have performed bedside rounds with nursing staff today  Discussions with Specialists or Other Care Team Provider:        Time Spent for Care: 30 minutes  More than 50% of total time spent on counseling and coordination of care as described above  Current Length of Stay: 8 day(s)    Current Patient Status: Inpatient   Certification Statement: The patient will continue to require additional inpatient hospital stay due to Not medically stable    Discharge Plan:  When medically stable    Code Status: Level 1 - Full Code      Subjective:   Patient was seen immediately after his pacemaker placement  He was confused about the procedure  Denies any acute discomfort or pain  Able to communicate properly but seemed a little confused  Objective:     Vitals:   Temp (24hrs), Av 8 °F (36 6 °C), Min:97 2 °F (36 2 °C), Max:98 1 °F (36 7 °C)    Temp:  [97 2 °F (36 2 °C)-98 1 °F (36 7 °C)] 97 4 °F (36 3 °C)  HR:  [58-92] 92  Resp:  [14-20] 14  BP: (100-133)/(54-80) 111/73  SpO2:  [94 %-99 %] 94 %  Body mass index is 21 73 kg/m²  Input and Output Summary (last 24 hours): Intake/Output Summary (Last 24 hours) at 18 1522  Last data filed at 18 1450   Gross per 24 hour   Intake          2284 88 ml   Output             3900 ml   Net         -1615 12 ml       Physical Exam:     Physical Exam  Constitutional: He is oriented to person, place, and time  No distress  Eyes: Pupils are equal, round, and reactive to light  Cardiovascular: Normal rate, normal heart sounds and intact distal pulses  No murmur heard  irregular   Pulmonary/Chest: Breath sounds normal  No respiratory distress  He has no wheezes  He has no rales  Abdominal: Soft   Bowel sounds are normal  He exhibits no distension  There is no tenderness  Musculoskeletal: He exhibits no edema  Neurological: He is alert and oriented to person, place, and time  No focal motor deficits   Skin: Skin is warm    Additional Data:     Labs:      Results from last 7 days  Lab Units 11/30/18  0610 11/29/18  0551   WBC Thousand/uL 4 09* 4 45   HEMOGLOBIN g/dL 11 7* 12 5   HEMATOCRIT % 36 0* 38 2   PLATELETS Thousands/uL 114* 61*   NEUTROS PCT %  --  67   LYMPHS PCT %  --  22   LYMPHO PCT % 24  --    MONOS PCT %  --  9   MONO PCT % 2*  --    EOS PCT % 0 0       Results from last 7 days  Lab Units 11/30/18  0610  11/28/18  0511   SODIUM mmol/L 135*  < > 133*   POTASSIUM mmol/L 3 2*  < > 3 6   CHLORIDE mmol/L 96*  < > 97*   CO2 mmol/L 35*  < > 31   BUN mg/dL 34*  < > 35*   CREATININE mg/dL 1 58*  < > 1 51*   ANION GAP mmol/L 4  < > 5   CALCIUM mg/dL 8 7  < > 9 7   ALBUMIN g/dL  --   --  3 1*   TOTAL BILIRUBIN mg/dL  --   --  1 99*   ALK PHOS U/L  --   --  91   ALT U/L  --   --  73   AST U/L  --   --  34   GLUCOSE RANDOM mg/dL 90  < > 102   < > = values in this interval not displayed  Results from last 7 days  Lab Units 11/30/18  0610   INR  1 27*                       * I Have Reviewed All Lab Data Listed Above  * Additional Pertinent Lab Tests Reviewed: All Labs Within Last 24 Hours Reviewed    Imaging:    US abdomen complete   Final Result by Uri Conrad DO (11/25 2151)   Unremarkable pancreas  Top normal size liver with small subcentimeter hemangioma stable from 2011  Cholelithiasis without sonographic evidence of cholecystitis  The common bile duct is prominent at 8 mm  Consider MRCP if clinically warranted  Right side simple renal cysts  Dilated left side extrarenal pelvis  Splenomegaly with small splenule                    Workstation performed: [id number]         XR chest portable    (Results Pending)   XR chest 2 views    (Results Pending)     Recent Cultures (last 7 days):           Last 24 Hours Medication List:     Current Facility-Administered Medications:  acetaminophen 650 mg Oral Q6H PRN Khushboo Diaz PA-C    amiodarone 0 5 mg/min Intravenous Continuous Jay Maldonado PA-C Last Rate: 0 5 mg/min (11/30/18 1023)   bisacodyl 10 mg Rectal Daily PRN Terra Frausto PA-C    calcium carbonate 500 mg Oral Daily PRN Jacinda Bernheim, DO    diazepam 5 mg Oral Q8H PRN Jacinda Bernheim, DO    digoxin 125 mcg Oral Daily Priscila Cinthiakrystyna Ventura, DO    diphenhydrAMINE 50 mg Oral Q6H PRN Jay Maldonado PA-C    docusate sodium 100 mg Oral BID Terra Frausto PA-C    famotidine 20 mg Oral Daily Khushboo Diaz PA-C    furosemide 40 mg Oral BID (diuretic) Shultz Hard, DO    hydrocortisone  Topical 4x Daily PRN Jl Maldonado PA-C    metoprolol 5 mg Intravenous Q6H PRN Khushboo Diaz PA-C    metoprolol tartrate 50 mg Oral Q6H Shultz Hard, DO    ondansetron 4 mg Intravenous Q6H PRN Khushboo Diaz PA-C    polyethylene glycol 17 g Oral Daily PRN Terra Frausto PA-C    polyethylene glycol 17 g Oral BID Abdirashid Delatorre MD    senna 1 tablet Oral HS Terra Frausto PA-C    tamsulosin 0 4 mg Oral Daily With Dinner Anthony Patterson PA-C    zolpidem 5 mg Oral HS PRN Khushboo Diaz PA-C         Today, Patient Was Seen By: Abdirashid Delatorre MD    ** Please Note: Dictation voice to text software may have been used in the creation of this document   **

## 2018-11-30 NOTE — ASSESSMENT & PLAN NOTE
Acute on chronic renal failure  Improving  Baseline creatinine between 1 3-1 6  Slowly worsening 1 46>> 1 51>> 1 55>> 1 58  Continue to monitor closely on diuresis  Strict I&Os

## 2018-11-30 NOTE — ASSESSMENT & PLAN NOTE
Presented with atrial fibrillation with rapid ventricular rate  Rate better controlled now  Continue with amiodarone drip  Continue current dose of digoxin  Admitted Urology show 100 mg b i d  Per Cardiology recommendations  [address] Cardiology input  Status post biv pacer on 11/30/2018  Not on AC due to significant thrombocytopenia, start on Eliquis from tomorrow as per Cardiology  [address] Cardiology input

## 2018-11-30 NOTE — ASSESSMENT & PLAN NOTE
Continue with metoprolol, dose changed to 100 b i d  Per Cardiology recommendations  Continue with Lasix 40 mg b i d

## 2018-11-30 NOTE — ASSESSMENT & PLAN NOTE
NSTEMI, type 2, POA, due to demand from atrial fibrillation with RVR with associated tachy induced cardiomyopathy" Troponins range from 0 03 to 0 07  Continue current medical management

## 2018-11-30 NOTE — ASSESSMENT & PLAN NOTE
History of ITP  Negative signs of bleeding with baseline platelets around 12,962  Platelet count around 112,000 this morning  Appreciate Heme-Onc input and continue Medrol Dosepak according to them  Had a reaction to platelet transfusion with rash on the back and neck without any respiratory compromise on 11/28  Transfusion was held and regimen with Benadryl, Valium and Tylenol was given  Patient is already on steroids for ITP  As per patient Valium helps with his allergic reactions

## 2018-11-30 NOTE — ASSESSMENT & PLAN NOTE
History of ITP  Negative signs of bleeding with baseline platelets around 14,470  Platelet count 151,526 this morning after transfusion yesterday  Appreciate Heme-Onc input and continue Medrol Dosepak according to them  Had a reaction to platelet transfusion with rash on the back and neck without any respiratory compromise on 11/28  Transfusion was held and regimen with Benadryl, Valium and Tylenol was given  Patient is already on steroids for ITP  As per patient Valium helps with his allergic reactions

## 2018-11-30 NOTE — UTILIZATION REVIEW
Continued Stay Review    Date:   18 LEVEL 2 STEPDOWN     Vital Signs: /66   Pulse 88   Temp 97 5 °F (36 4 °C)   Resp 15   Ht 5' 10" (1 778 m)   Wt 68 7 kg (151 lb 7 3 oz)   SpO2 94%   BMI 21 73 kg/m²      Vitals:   Temp (24hrs), Av 8 °F (36 6 °C), Min:97 2 °F (36 2 °C), Max:98 1 °F (36 7 °C)   Temp:  [97 2 °F (36 2 °C)-98 1 °F (36 7 °C)] 97 4 °F (36 3 °C)  HR:  [58-92] 92  Resp:  [14-20] 14  BP: (100-133)/(54-80) 111/73  SpO2:  [94 %-99 %] 94 %  Body mass index is 21 73 kg/m²         Input and Output Summary (last 24 hours):   Last data filed at 18 1450    Gross per 24 hour   Intake          2284 88 ml   Output             3900 ml   Net         -1615 12 ml       Diet Cardiac      Continuous IV Infusions:   amiodarone 0 5 mg/min Last Rate: 0 5 mg/min (18 1023)       Medications:   Scheduled Meds:   Current Facility-Administered Medications:  acetaminophen 650 mg Oral Q6H PRN Jannie Michael PA-C    amiodarone 0 5 mg/min Intravenous Continuous Jay Maldonado PA-C Last Rate: 0 5 mg/min (18 1023)   bisacodyl 10 mg Rectal Daily PRN Kajal Hocharly Frausto PA-C    calcium carbonate 500 mg Oral Daily PRN Jinnie Saucer, DO    diazepam 5 mg Oral Q8H PRN Jinnie Saucer, DO    digoxin 125 mcg Oral Daily Kaylene Ventura, DO    diphenhydrAMINE 50 mg Oral Q6H PRN Jay Maldonado PA-C    docusate sodium 100 mg Oral BID Kajal Hoop RasmusGERRI fisher    famotidine 20 mg Oral Daily Jannie Michael PA-C    furosemide 40 mg Oral BID (diuretic) Oxana Yap, DO    hydrocortisone  Topical 4x Daily PRN Mauri Maldonado PA-C    metoprolol 5 mg Intravenous Q6H PRN Jannie Michael PA-C    metoprolol tartrate 50 mg Oral Q6H Oxana Yap DO    ondansetron 4 mg Intravenous Q6H PRN Jannie Michael PA-C    polyethylene glycol 17 g Oral Daily PRN Kajal Frausto PA-C    polyethylene glycol 17 g Oral BID Marii Burnham MD    senna 1 tablet Oral HS Kajal Frausto PA-C    tamsulosin 0 4 mg Oral Daily With [employer] GERRI Winn    zolpidem 5 mg Oral HS PRN Liliya Dominguez PA-C        PRN Meds:     acetaminophen    bisacodyl    calcium carbonate    Diazepam 5 mg po q8hrs prn given x 1/ 24 hrs    diphenhydrAMINE    hydrocortisone    metoprolol    ondansetron    polyethylene glycol    zolpidem      LABS/Diagnostic Results:   Results from last 7 days  Lab Units 11/30/18  0610 11/29/18  0551   WBC Thousand/uL 4 09* 4 45   HEMOGLOBIN g/dL 11 7* 12 5   HEMATOCRIT % 36 0* 38 2   PLATELETS Thousands/uL 114* 61*   NEUTROS PCT %  --  67   LYMPHS PCT %  --  22   LYMPHO PCT % 24  --    MONOS PCT %  --  9   MONO PCT % 2*  --    EOS PCT % 0 0       Results from last 7 days  Lab Units 11/30/18  0610   11/28/18  0511   SODIUM mmol/L 135*  < > 133*   POTASSIUM mmol/L 3 2*  < > 3 6   CHLORIDE mmol/L 96*  < > 97*   CO2 mmol/L 35*  < > 31   BUN mg/dL 34*  < > 35*   CREATININE mg/dL 1 58*  < > 1 51*   ANION GAP mmol/L 4  < > 5   CALCIUM mg/dL 8 7  < > 9 7   ALBUMIN g/dL  --   --  3 1*   TOTAL BILIRUBIN mg/dL  --   --  1 99*   ALK PHOS U/L  --   --  91   ALT U/L  --   --  73   AST U/L  --   --  34   GLUCOSE RANDOM mg/dL 90  < > 102      Results from last 7 days  Lab Units 11/30/18  0610   INR   1 27*       Age/Sex: 72 y o  male     Assessment/Plan:  Atrial fibrillation [facility])   Assessment & Plan     Presented with atrial fibrillation with rapid ventricular rate  Rate better controlled now  Continue with amiodarone drip  Continue current doses of digoxin and beta-blocker  [address] Cardiology input  Status post biv pacer on 11/30/2018  Not on AC due to significant thrombocytopenia  Appreciate DP input       NSTEMI (non-ST elevated myocardial infarction) [facility])   Assessment & Plan     NSTEMI, type 2, POA, due to demand from atrial fibrillation with RVR with associated tachy induced cardiomyopathy" Troponins range from 0 03 to 0 07     Continue current medical management          Acute on chronic combined systolic and diastolic heart failure [facility])   Assessment & Plan     Noted ejection fraction of 20%  Continue beta-blocker and digoxin along with Lasix  [address] Cardiology input  Management for AFib as above       PALOMA (acute kidney injury) ([facility] )   Assessment & Plan     Acute on chronic renal failure  Improving  Baseline creatinine between 1 3-1 6  Slowly worsening 1 46>> 1 51>> 1 55>> 1 58  Continue to monitor closely on diuresis  Strict I&Os      Thrombocytopenia (HCC)   Assessment & Plan     History of ITP  Negative signs of bleeding with baseline platelets around 23,507  Platelet count 323,213 this morning after transfusion yesterday  Appreciate Heme-Onc input and continue Medrol Dosepak according to them  Had a reaction to platelet transfusion with rash on the back and neck without any respiratory compromise on 11/28  Transfusion was held and regimen with Benadryl, Valium and Tylenol was given  Patient is already on steroids for ITP  As per patient Valium helps with his allergic reactions          Hypertension   Assessment & Plan     Continue BB, lasix           VTE Pharmacologic Prophylaxis:   Pharmacologic: Not on any due to thrombocytopenia  Mechanical VTE Prophylaxis in Place:  Yes     Current Length of Stay: 8 day(s)     Current Patient Status: Inpatient   Certification Statement: The patient will continue to require additional inpatient hospital stay due to not medically stable          Discharge Plan:   [address] [facility] CLEARED    CASE MANAGEMENT FOLLOWING CLOSELY FOR ALL DISCHARGE NEEDS

## 2018-11-30 NOTE — ASSESSMENT & PLAN NOTE
Noted ejection fraction of 20%  Continue beta-blocker and digoxin along with Lasix  [address] Cardiology input  Management for AFib as above

## 2018-12-01 ENCOUNTER — APPOINTMENT (INPATIENT)
Dept: RADIOLOGY | Facility: HOSPITAL | Age: 73
DRG: 242 | End: 2018-12-01
Payer: MEDICARE

## 2018-12-01 LAB
ABO GROUP BLD BPU: NORMAL
ABO GROUP BLD BPU: NORMAL
ANION GAP SERPL CALCULATED.3IONS-SCNC: 6 MMOL/L (ref 4–13)
ATRIAL RATE: 117 BPM
BASOPHILS # BLD AUTO: 0.02 THOUSANDS/ΜL (ref 0–0.1)
BASOPHILS NFR BLD AUTO: 0 % (ref 0–1)
BPU ID: NORMAL
BPU ID: NORMAL
BUN SERPL-MCNC: 27 MG/DL (ref 5–25)
CALCIUM SERPL-MCNC: 9.4 MG/DL (ref 8.3–10.1)
CHLORIDE SERPL-SCNC: 95 MMOL/L (ref 100–108)
CO2 SERPL-SCNC: 33 MMOL/L (ref 21–32)
CREAT SERPL-MCNC: 1.45 MG/DL (ref 0.6–1.3)
EOSINOPHIL # BLD AUTO: 1.25 THOUSAND/ΜL (ref 0–0.61)
EOSINOPHIL NFR BLD AUTO: 26 % (ref 0–6)
ERYTHROCYTE [DISTWIDTH] IN BLOOD BY AUTOMATED COUNT: 17.3 % (ref 11.6–15.1)
GFR SERPL CREATININE-BSD FRML MDRD: 48 ML/MIN/1.73SQ M
GLUCOSE SERPL-MCNC: 96 MG/DL (ref 65–140)
HCT VFR BLD AUTO: 35.3 % (ref 36.5–49.3)
HGB BLD-MCNC: 11.6 G/DL (ref 12–17)
IMM GRANULOCYTES # BLD AUTO: 0.1 THOUSAND/UL (ref 0–0.2)
IMM GRANULOCYTES NFR BLD AUTO: 2 % (ref 0–2)
LYMPHOCYTES # BLD AUTO: 0.93 THOUSANDS/ΜL (ref 0.6–4.47)
LYMPHOCYTES NFR BLD AUTO: 19 % (ref 14–44)
MAGNESIUM SERPL-MCNC: 1.9 MG/DL (ref 1.6–2.6)
MCH RBC QN AUTO: 30.9 PG (ref 26.8–34.3)
MCHC RBC AUTO-ENTMCNC: 32.9 G/DL (ref 31.4–37.4)
MCV RBC AUTO: 94 FL (ref 82–98)
MONOCYTES # BLD AUTO: 0.43 THOUSAND/ΜL (ref 0.17–1.22)
MONOCYTES NFR BLD AUTO: 9 % (ref 4–12)
NEUTROPHILS # BLD AUTO: 2.11 THOUSANDS/ΜL (ref 1.85–7.62)
NEUTS SEG NFR BLD AUTO: 44 % (ref 43–75)
NRBC BLD AUTO-RTO: 0 /100 WBCS
PLATELET # BLD AUTO: 112 THOUSANDS/UL (ref 149–390)
PMV BLD AUTO: 12.1 FL (ref 8.9–12.7)
POTASSIUM SERPL-SCNC: 2.9 MMOL/L (ref 3.5–5.3)
POTASSIUM SERPL-SCNC: 4.9 MMOL/L (ref 3.5–5.3)
QRS AXIS: -33 DEGREES
QRSD INTERVAL: 138 MS
QT INTERVAL: 426 MS
QTC INTERVAL: 523 MS
RBC # BLD AUTO: 3.75 MILLION/UL (ref 3.88–5.62)
SODIUM SERPL-SCNC: 134 MMOL/L (ref 136–145)
T WAVE AXIS: 128 DEGREES
UNIT DISPENSE STATUS: NORMAL
UNIT DISPENSE STATUS: NORMAL
UNIT PRODUCT CODE: NORMAL
UNIT PRODUCT CODE: NORMAL
UNIT RH: NORMAL
UNIT RH: NORMAL
VENTRICULAR RATE: 91 BPM
WBC # BLD AUTO: 4.84 THOUSAND/UL (ref 4.31–10.16)

## 2018-12-01 PROCEDURE — 84132 ASSAY OF SERUM POTASSIUM: CPT | Performed by: PHYSICIAN ASSISTANT

## 2018-12-01 PROCEDURE — 80048 BASIC METABOLIC PNL TOTAL CA: CPT | Performed by: PHYSICIAN ASSISTANT

## 2018-12-01 PROCEDURE — 97167 OT EVAL HIGH COMPLEX 60 MIN: CPT

## 2018-12-01 PROCEDURE — 93010 ELECTROCARDIOGRAM REPORT: CPT | Performed by: INTERNAL MEDICINE

## 2018-12-01 PROCEDURE — 85025 COMPLETE CBC W/AUTO DIFF WBC: CPT | Performed by: INTERNAL MEDICINE

## 2018-12-01 PROCEDURE — 99024 POSTOP FOLLOW-UP VISIT: CPT | Performed by: INTERNAL MEDICINE

## 2018-12-01 PROCEDURE — 83735 ASSAY OF MAGNESIUM: CPT | Performed by: PHYSICIAN ASSISTANT

## 2018-12-01 PROCEDURE — G8989 SELF CARE D/C STATUS: HCPCS

## 2018-12-01 PROCEDURE — G8988 SELF CARE GOAL STATUS: HCPCS

## 2018-12-01 PROCEDURE — 71046 X-RAY EXAM CHEST 2 VIEWS: CPT

## 2018-12-01 PROCEDURE — 99232 SBSQ HOSP IP/OBS MODERATE 35: CPT | Performed by: INTERNAL MEDICINE

## 2018-12-01 PROCEDURE — G8987 SELF CARE CURRENT STATUS: HCPCS

## 2018-12-01 RX ORDER — POTASSIUM CHLORIDE 20 MEQ/1
40 TABLET, EXTENDED RELEASE ORAL ONCE
Status: COMPLETED | OUTPATIENT
Start: 2018-12-01 | End: 2018-12-01

## 2018-12-01 RX ORDER — ALBUTEROL SULFATE 2.5 MG/3ML
2.5 SOLUTION RESPIRATORY (INHALATION) ONCE AS NEEDED
Status: DISCONTINUED | OUTPATIENT
Start: 2018-12-01 | End: 2018-12-01

## 2018-12-01 RX ORDER — POTASSIUM CHLORIDE 14.9 MG/ML
20 INJECTION INTRAVENOUS ONCE
Status: COMPLETED | OUTPATIENT
Start: 2018-12-01 | End: 2018-12-01

## 2018-12-01 RX ORDER — ONDANSETRON 2 MG/ML
4 INJECTION INTRAMUSCULAR; INTRAVENOUS ONCE AS NEEDED
Status: DISCONTINUED | OUTPATIENT
Start: 2018-12-01 | End: 2018-12-01

## 2018-12-01 RX ORDER — MEPERIDINE HYDROCHLORIDE 25 MG/ML
12.5 INJECTION INTRAMUSCULAR; INTRAVENOUS; SUBCUTANEOUS AS NEEDED
Status: DISCONTINUED | OUTPATIENT
Start: 2018-12-01 | End: 2018-12-07 | Stop reason: HOSPADM

## 2018-12-01 RX ORDER — BISACODYL 10 MG
10 SUPPOSITORY, RECTAL RECTAL DAILY PRN
Status: DISCONTINUED | OUTPATIENT
Start: 2018-12-01 | End: 2018-12-07 | Stop reason: HOSPADM

## 2018-12-01 RX ORDER — METOPROLOL TARTRATE 50 MG/1
100 TABLET, FILM COATED ORAL EVERY 12 HOURS SCHEDULED
Status: DISCONTINUED | OUTPATIENT
Start: 2018-12-01 | End: 2018-12-02

## 2018-12-01 RX ADMIN — ONDANSETRON 4 MG: 2 INJECTION INTRAMUSCULAR; INTRAVENOUS at 08:31

## 2018-12-01 RX ADMIN — TAMSULOSIN HYDROCHLORIDE 0.4 MG: 0.4 CAPSULE ORAL at 17:20

## 2018-12-01 RX ADMIN — METOPROLOL TARTRATE 50 MG: 50 TABLET, FILM COATED ORAL at 09:24

## 2018-12-01 RX ADMIN — METOPROLOL TARTRATE 50 MG: 50 TABLET, FILM COATED ORAL at 03:06

## 2018-12-01 RX ADMIN — SENNOSIDES 8.6 MG: 8.6 TABLET, FILM COATED ORAL at 21:11

## 2018-12-01 RX ADMIN — POTASSIUM CHLORIDE 20 MEQ: 200 INJECTION, SOLUTION INTRAVENOUS at 09:12

## 2018-12-01 RX ADMIN — POTASSIUM CHLORIDE 40 MEQ: 1500 TABLET, EXTENDED RELEASE ORAL at 08:24

## 2018-12-01 RX ADMIN — DOCUSATE SODIUM 100 MG: 100 CAPSULE, LIQUID FILLED ORAL at 17:21

## 2018-12-01 RX ADMIN — FUROSEMIDE 40 MG: 40 TABLET ORAL at 08:24

## 2018-12-01 RX ADMIN — ACETAMINOPHEN 650 MG: 325 TABLET, FILM COATED ORAL at 21:11

## 2018-12-01 RX ADMIN — ACETAMINOPHEN 650 MG: 325 TABLET, FILM COATED ORAL at 09:24

## 2018-12-01 RX ADMIN — AMIODARONE HYDROCHLORIDE 0.5 MG/MIN: 50 INJECTION, SOLUTION INTRAVENOUS at 05:24

## 2018-12-01 RX ADMIN — POTASSIUM CHLORIDE 40 MEQ: 1500 TABLET, EXTENDED RELEASE ORAL at 08:23

## 2018-12-01 RX ADMIN — FAMOTIDINE 20 MG: 20 TABLET ORAL at 08:25

## 2018-12-01 RX ADMIN — DIAZEPAM 5 MG: 5 TABLET ORAL at 03:06

## 2018-12-01 RX ADMIN — DIGOXIN 125 MCG: 125 TABLET ORAL at 08:24

## 2018-12-01 RX ADMIN — FUROSEMIDE 40 MG: 40 TABLET ORAL at 17:20

## 2018-12-01 NOTE — PROGRESS NOTES
Progress Note - Odette Quinteros 1945, 72 y o  male MRN: [id number]    Unit/Bed#: [facility] 531-01 Encounter: [id number]    Primary Care Provider: Trinh eLbron MD   Date and time admitted to hospital: 11/22/2018  7:39 PM        * Atrial fibrillation [facility])   Assessment & Plan    Presented with atrial fibrillation with rapid ventricular rate  Rate better controlled now  Continue with amiodarone drip  Continue current dose of digoxin  Admitted Urology show 100 mg b i d  Per Cardiology recommendations  [address] Cardiology input  Status post biv pacer on 11/30/2018  Not on AC due to significant thrombocytopenia, start on Eliquis from tomorrow as per Cardiology  [address] Cardiology input  NSTEMI (non-ST elevated myocardial infarction) [facility])   Assessment & Plan    NSTEMI, type 2, POA, due to demand from atrial fibrillation with RVR with associated tachy induced cardiomyopathy" Troponins range from 0 03 to 0 07  Continue current medical management  Acute on chronic combined systolic and diastolic heart failure (HCC)   Assessment & Plan    Noted ejection fraction of 20%  Continue beta-blocker and digoxin along with Lasix  [address] Cardiology input  Management for AFib as above  PALOMA (acute kidney injury) ([facility] )   Assessment & Plan    Acute on chronic renal failure  Improving  Baseline creatinine between 1 3-1 6  Slowly worsening 1 46>> 1 51>> 1 55>> 1 58  Continue to monitor closely on diuresis  Strict I&Os  Thrombocytopenia ([facility] )   Assessment & Plan    History of ITP  Negative signs of bleeding with baseline platelets around 92,682  Platelet count around 112,000 this morning  Appreciate Heme-Onc input and continue Medrol Dosepak according to them  Had a reaction to platelet transfusion with rash on the back and neck without any respiratory compromise on 11/28  Transfusion was held and regimen with Benadryl, Valium and Tylenol was given    Patient is already on steroids for ITP   As per patient Valium helps with his allergic reactions  Hypertension   Assessment & Plan    Continue with metoprolol, dose changed to 100 b i d  Per Cardiology recommendations  Continue with Lasix 40 mg b i d  VTE Pharmacologic Prophylaxis:   Pharmacologic: not on any due to thrombocytopenia  Mechanical VTE Prophylaxis in Place: Yes    Patient Centered Rounds: I have performed bedside rounds with nursing staff today  Discussions with Specialists or Other Care Team Provider:      Education and Discussions with Family / Patient:  Discussed plan of care with the patient    Time Spent for Care: 30 minutes  More than 50% of total time spent on counseling and coordination of care as described above  Current Length of Stay: 9 day(s)    Current Patient Status: Inpatient   Certification Statement: The patient will continue to require additional inpatient hospital stay due to Not medically stable    Discharge Plan:  When medically stable    Code Status: Level 1 - Full Code      Subjective:   Patient reports left upper extremity pain related to potassium supplementation  Denies any chest pain, cough, shortness of breath, palpitations, lightheadedness  Objective:     Vitals:   Temp (24hrs), Av 7 °F (36 5 °C), Min:97 4 °F (36 3 °C), Max:98 5 °F (36 9 °C)    Temp:  [97 4 °F (36 3 °C)-98 5 °F (36 9 °C)] 98 5 °F (36 9 °C)  HR:  [72-90] 77  Resp:  [16-20] 18  BP: ()/(54-72) 105/70  SpO2:  [97 %-99 %] 97 %  Body mass index is 20 06 kg/m²  Input and Output Summary (last 24 hours): Intake/Output Summary (Last 24 hours) at 18 1636  Last data filed at 18 1421   Gross per 24 hour   Intake           1809 4 ml   Output             2175 ml   Net           -365 6 ml       Physical Exam:     Physical Exam  Constitutional: He is oriented to person, place, and time  No distress  Eyes: Pupils are equal, round, and reactive to light     Cardiovascular: Normal rate, normal heart sounds and intact distal pulses     No murmur heard  irregular   Pulmonary/Chest: Breath sounds normal  No respiratory distress  He has no wheezes  He has no rales  Abdominal: Soft  Bowel sounds are normal  He exhibits no distension  There is no tenderness  Musculoskeletal: He exhibits no edema  Neurological: He is alert and oriented to person, place, and time  No focal motor deficits   Skin: Skin is warm    Additional Data:     Labs:      Results from last 7 days  Lab Units 12/01/18  0523   WBC Thousand/uL 4 84   HEMOGLOBIN g/dL 11 6*   HEMATOCRIT % 35 3*   PLATELETS Thousands/uL 112*   NEUTROS PCT % 44   LYMPHS PCT % 19   MONOS PCT % 9   EOS PCT % 26*       Results from last 7 days  Lab Units 12/01/18  1422 12/01/18  0523  11/28/18  0511   SODIUM mmol/L  --  134*  < > 133*   POTASSIUM mmol/L 4 9 2 9*  < > 3 6   CHLORIDE mmol/L  --  95*  < > 97*   CO2 mmol/L  --  33*  < > 31   BUN mg/dL  --  27*  < > 35*   CREATININE mg/dL  --  1 45*  < > 1 51*   ANION GAP mmol/L  --  6  < > 5   CALCIUM mg/dL  --  9 4  < > 9 7   ALBUMIN g/dL  --   --   --  3 1*   TOTAL BILIRUBIN mg/dL  --   --   --  1 99*   ALK PHOS U/L  --   --   --  91   ALT U/L  --   --   --  73   AST U/L  --   --   --  34   GLUCOSE RANDOM mg/dL  --  96  < > 102   < > = values in this interval not displayed  Results from last 7 days  Lab Units 11/30/18  0610   INR  1 27*                       * I Have Reviewed All Lab Data Listed Above  * Additional Pertinent Lab Tests Reviewed: All Labs Within Last 24 Hours Reviewed    Imaging:    XR chest 2 views   Final Result by Haseeb Johansen MD (12/01 1541)      No pneumothorax  Stable small bilateral pleural effusions  Workstation performed: [id number]         XR chest portable   Final Result by Marcia Corea MD (11/30 1553)      1  Status post pacemaker insertion  No pneumothorax  2   Small bilateral pleural effusions              Workstation performed: [id number]         [address] [facility] abdomen complete   Final Result by Kev Hankins DO (11/25 2151)   Unremarkable pancreas  Top normal size liver with small subcentimeter hemangioma stable from 2011  Cholelithiasis without sonographic evidence of cholecystitis  The common bile duct is prominent at 8 mm  Consider MRCP if clinically warranted  Right side simple renal cysts  Dilated left side extrarenal pelvis  Splenomegaly with small splenule                    Workstation performed: [id number]             Recent Cultures (last 7 days):           Last 24 Hours Medication List:     Current Facility-Administered Medications:  acetaminophen 650 mg Oral Q6H PRN Mey Workman PA-C    amiodarone 0 5 mg/min Intravenous Continuous Jay Maldonado PA-C Last Rate: 0 5 mg/min (12/01/18 0524)   bisacodyl 10 mg Rectal Daily PRN Shayna Frausto PA-C    calcium carbonate 500 mg Oral Daily PRN Dario Barrow, DO    diazepam 5 mg Oral Q8H PRN Dario Barrow, DO    digoxin 125 mcg Oral Daily Gissel Ventura,     diphenhydrAMINE 50 mg Oral Q6H PRN Jay Maldonado PA-C    docusate sodium 100 mg Oral BID Denece Oksana Frausto PA-C    famotidine 20 mg Oral Daily Mey Workman PA-C    furosemide 40 mg Oral BID (diuretic) Soni Rivas,     hydrocortisone  Topical 4x Daily PRN Emily Maldonado PA-C    meperidine 12 5 mg Intravenous PRN Andrew Easton MD    metoprolol 5 mg Intravenous Q6H PRN Mey Workman PA-C    metoprolol tartrate 100 mg Oral Q12H [facility] Yane Gaston MD    ondansetron 4 mg Intravenous Q6H PRN Mey Workman PA-C    polyethylene glycol 17 g Oral Daily PRN Shayna Frausto PA-C    polyethylene glycol 17 g Oral BID Yane Gaston MD    senna 1 tablet Oral HS Denjosemanuel Frausto PA-C    tamsulosin 0 4 mg Oral Daily With Dinner Urbano Gonzales PA-C    zolpidem 5 mg Oral HS PRN Mey Workman PA-C         Today, Patient Was Seen By: Yane Gaston MD    ** Please Note: Dictation voice to text software may have been used in the creation of this document   **

## 2018-12-01 NOTE — OCCUPATIONAL THERAPY NOTE
Occupational Therapy Evaluation      Radha Bloom    12/1/2018    Patient Active Problem List   Diagnosis    [facility]    Hypertension    Atrial fibrillation (HCC)    Thrombocytopenia (HCC)    PALOMA [facility]acute [facility]) [facility]    Insomnia    Coronary artery disease    Hyperlipidemia    Dilated cardiomyopathy [facility]    Acute on chronic combined systolic and diastolic heart failure ([facility] )    Urinary retention    NSTEMI (non-ST elevated myocardial infarction) ([facility] )       Past Medical History:   Diagnosis Date    Atrial fibrillation (HCC)     Congestive heart failure with left ventricular diastolic dysfunction, chronic ([facility] )     Hypertension     Peripheral vascular disease of lower extremity (HCC)     Subdural hematoma ([facility] )        Past Surgical History:   Procedure Laterality Date    BACK SURGERY      AUSTYN HOLE FOR SUBDURAL HEMATOMA      CLAVICLE SURGERY      HERNIA REPAIR      PROSTATE SURGERY        12/01/18 1500   Note Type   Note type Eval only   Restrictions/Precautions   Weight Bearing Precautions Per Order No   Other Precautions Chair Alarm;Multiple lines;Telemetry; Fall Risk;Pain  (Pacemaker)   Pain Assessment   Pain Assessment No/denies pain   Pain Score No Pain   Home Living   Type of [address] [place] [address] Two level;1/2 bath on main level;Bed/bath upstairs; Able to live on main level with bedroom/bathroom;Stairs to enter with rails  (0STE from back)   Bathroom Shower/Tub Tub/shower unit   Bathroom Toilet Standard   Bathroom Equipment Grab bars in shower; Shower chair;Commode   Bathroom Accessibility Accessible   Home Equipment Walker;Cane   Additional Comments Pt denies utilizing DME PTA   Prior Function   Level of [place] Independent with ADLs and functional mobility   Lives With Significant other   Receives Help From Family   ADL Assistance Independent   IADLs Independent   Falls in the last 6 months 0   Vocational Other (Comment)  ("Mostly retired")   Lifestyle   Autonomy Pt reports being fully I w/ all ADLS/IADLS and (+) drives PTA   Reciprocal Relationships Pt reports living at home w/ SO who is also "mostly retired" and is able to A if needed   Service to Others Pt is "mostly retired"   Intrinsic Gratification Pt reports enjoying being t home   Psychosocial   Psychosocial (WDL) WDL   ADL   Where Assessed Chair   Eating Assistance 7  [facility] 5  [address] [facility] 5  Supervision/Setup   [address] 5  Supervision/Setup   [address] [facility] 5  Supervision/Setup    [facility] 5  [address]  5  Supervision/Setup   Bed Mobility   Additional Comments Pt found sitting OOB in chair w/ chair alarm on upon OT arrival  Pt left in chair w/ chair alarm on and all needs within reach s/p OT session   Transfers   Sit to Stand 5  Supervision   Additional items Armrests   Stand to Sit 5  Supervision   Additional items Armrests   Stand pivot 5  Supervision   Additional items (No AD)   Functional Mobility   Functional Mobility 5  Supervision   Additional Comments ~10 ft   Additional items (No AD)   Balance   Static Sitting Good   Dynamic Sitting Fair +   Static Standing Fair   Dynamic Standing Fair   Ambulatory Fair   Activity Tolerance   Activity Tolerance Patient limited by fatigue   Nurse Made Aware GAGE Gibbs cleared Pt for OT eval   RUE Assessment   RUE Assessment WFL   LUE Assessment   LUE Assessment (did not test 2' recent pacemaker )   Sensation   Light Touch No apparent deficits   Cognition   Overall Cognitive Status WFL   Arousal/Participation Alert; Cooperative   Attention Within functional limits   Orientation Level Oriented X4   Memory Within functional limits   Following Commands Follows all commands and directions without difficulty   Assessment   Limitation Decreased endurance   Prognosis Fair   Assessment Pt is a 73 yo male seen for OT eval s/p adm to [facility] w/ SOBOE dx'd w/ A-fib  S/p BiV pacemaker on 11/30/18  Comorbidities include a h/o NSTEMI, acute on chronic combined systolic and diastolic heart failure, PALOMA, thrombocytopenia, and HTN  Pt with active OT orders and up and OOB as tolerated orders  Pt reports living w/ SO in [facility]  Pt and SO are "mostly retired " Pt was I w/  ADLS and IADLS, drove, & required no use of DME PTA  Pt has RW, SPC, SC, BSC at home, however reports not utilizing DME PTA  Pt's SO reports able to be home during the day to A if needed  Pt is currently demonstrating the following occupational deficits: Supervision all ADLS and transfers/mobility w/o AD  These deficits that are impacting pt's baseline areas of occupation are a result of the following impairments: endurance  Pt scored overall 45/100 on the Barthel Index  Based on the aforementioned OT evaluation, functional performance deficits, and assessments, pt has been identified as a high complexity evaluation  Recommend home with family support upon D/C  No further skilled acute care OT needs at this time      Goals   Patient Goals To go home   Recommendation   OT Discharge Recommendation Home with family support   OT - OK to Discharge Yes  (When medically cleared)   Barthel Index   Feeding 10   Bathing 0   Grooming Score 5   Dressing Score 5   Bladder Score 0   Bowels Score 10   Toilet Use Score 5   Transfers (Bed/Chair) Score 10   Mobility (Level Surface) Score 0   Stairs Score 0   Barthel Index Score 45   Modified [place] Scale   Modified [place] Scale 3     Bel Jara MS, OTR/L

## 2018-12-01 NOTE — PROGRESS NOTES
Progress Note - Cardiology   Dearl Juan 72 y o  male MRN: [id number]  Unit/Bed#: [facility] 531-01 Encounter: [id number]  12/01/18  12:45 PM          Impression and Plan:    74-year-old with history of chronic atrial fibrillation, acute on chronic diastolic congestive heart failure, decompensation due to rapid atrial fibrillation  Prior history of tachycardia mediated cardiomyopathy with improvement in ejection fraction  Also with chronic ITP and thrombocytopenia  Underwent biventricular pacemaker implantation-postoperative day 1  AV mary anne ablation was not performed  Plan:    Acute on chronic systolic and diastolic CHF:  Currently on Lasix 40 oral b i d   On 20 daily at home  Continue the same    Status post biventricular pacemaker and eventual AV mary anne ablation:  Continue metoprolol - change to 100 b i d     Was on 100- at home  Start Eliquis tomorrow  Potassium being repleted      Chronic kidney disease:  Baseline creatinine around 1 4-1 5 and stable here  Atrial fibrillation:  Platelets are at 099  Start Eliquis tomorrow, has been on Eliquis at baseline with chronic thrombocytopenia, continue IV amiodarone for now-not sure about its utility in the setting of permanent atrial fibrillation      ===================================================================    Chief Complaint: No chief complaint on file  Subjective/Objective     Subjective:   In pain from potassium infusion, otherwise no other complaints    Objective: Comfortable , no distress at the time of exam      Patient Active Problem List   Diagnosis    Peripheral vascular disease of lower extremity ([facility] [address] [facility]    Hypertension    Atrial fibrillation (HCC)    Thrombocytopenia (HCC)    PALOMA (acute kidney injury) ([facility] [address] [facility]    Insomnia    Coronary artery disease    Hyperlipidemia    Dilated cardiomyopathy ([facility] [address] [facility]    Acute on chronic combined systolic and diastolic heart failure ([facility] [address] [facility]    Urinary retention    NSTEMI (non-ST elevated myocardial infarction) ([facility] [address] )       Vitals: /65   Pulse 77   Temp 98 3 °F (36 8 °C) (Oral)   Resp 18   Ht 5' 10" (1 778 m)   Wt 63 4 kg (139 lb 12 4 oz)   SpO2 98%   BMI 20 06 kg/m²     I/O this shift:  In: 130 [I V :30; IV Piggyback:100]  Out: -   Wt Readings from Last 3 Encounters:   12/01/18 63 4 kg (139 lb 12 4 oz)   11/22/18 67 5 kg (148 lb 13 oz)   10/10/18 73 5 kg (162 lb)       Intake/Output Summary (Last 24 hours) at 12/01/18 1245  Last data filed at 12/01/18 1228   Gross per 24 hour   Intake          1547 21 ml   Output             2870 ml   Net         -1322 79 ml     I/O last 3 completed shifts: In: 2664 1 [P O :680; I V :1100 7;  Blood:850; IV Piggyback:33 3]  Out: 5250 [Urine:5230; Blood:20]    Invasive Devices     Peripheral Intravenous Line            Peripheral IV 11/30/18 Left Antecubital 1 day    Peripheral IV 11/30/18 Left Wrist 1 day    Peripheral IV 11/30/18 Right Hand 1 day          Drain            Urethral Catheter Latex 16 Fr  6 days                  Physical Exam:  GEN: Shantelle Rankin appears ill, alert and oriented x 3, pleasant and cooperative   HEENT: pupils equal, round, and reactive to light; extraocular muscles intact  NECK: supple, no carotid bruits or JVD  HEART: regular rhythm, normal S1 and S2, no murmur, no clicks, gallops or rubs   LUNGS: clear to auscultation bilaterally; no wheezes or rhonchi, no rales  ABDOMEN/GI: normal bowel sounds, soft, no tenderness, no distention  EXTREMITIES/Musculoskeltal: peripheral pulses normal; no clubbing, cyanosis, no edema  NEURO: no focal motor findings   SKIN: normal without suspicious lesions on exposed skin              Lab Results:       Results from last 7 days  Lab Units 12/01/18  0523 11/30/18  0610 11/29/18  0551   WBC Thousand/uL 4 84 4 09* 4 45   HEMOGLOBIN g/dL 11 6* 11 7* 12 5   HEMATOCRIT % 35 3* 36 0* 38 2   PLATELETS Thousands/uL 112* 114* 61*           Results from last 7 days  Lab Units 12/01/18  0523 11/30/18  0610 11/29/18  0551 11/28/18  0511 11/27/18  0427  11/25/18  0534   POTASSIUM mmol/L 2 9* 3 2* 3 5 3 6 3 6  < > 3 9   CHLORIDE mmol/L 95* 96* 98* 97* 99*  < > 100   CO2 mmol/L 33* 35* 33* 31 28  < > 30   BUN mg/dL 27* 34* 37* 35* 34*  < > 32*   CREATININE mg/dL 1 45* 1 58* 1 55* 1 51* 1 46*  < > 1 71*   CALCIUM mg/dL 9 4 8 7 9 5 9 7 8 5  < > 9 4   ALK PHOS U/L  --   --   --  91 77  --  91   ALT U/L  --   --   --  73 60  --  84*   AST U/L  --   --   --  34 28  --  35   < > = values in this interval not displayed  Results from last 7 days  Lab Units 11/30/18  0610 11/26/18  0438   INR  1 27* 1 37*       Imaging: I have personally reviewed pertinent reports      EKG/Telemtry: Paced rhythm on telemetry  Scheduled Meds:    Current Facility-Administered Medications:  acetaminophen 650 mg Oral Q6H PRN Troy Mantilla PA-C    amiodarone 0 5 mg/min Intravenous Continuous Jay Maldonado PA-C Last Rate: 0 5 mg/min (12/01/18 0524)   bisacodyl 10 mg Rectal Daily PRN Alicia Frausto PA-C    calcium carbonate 500 mg Oral Daily PRN Kandi Bhatt, DO    diazepam 5 mg Oral Q8H PRN Kandi Bhatt, DO    digoxin 125 mcg Oral Daily Priscilla Ventura, DO    diphenhydrAMINE 50 mg Oral Q6H PRN Jay Maldonado PA-C    docusate sodium 100 mg Oral BID Alicia Frausto PA-C    famotidine 20 mg Oral Daily Troy Mantilla PA-C    furosemide 40 mg Oral BID (diuretic) Yeny Iqbal DO    hydrocortisone  Topical 4x Daily PRN Young Maldonado PA-C    meperidine 12 5 mg Intravenous PRN Jay Marin MD    metoprolol 5 mg Intravenous Q6H PRN Troy Mantilla PA-C    metoprolol tartrate 50 mg Oral Q6H Yeny Iqbal DO    ondansetron 4 mg Intravenous Q6H PRN Troy Mantilla PA-C    polyethylene glycol 17 g Oral Daily PRN Alicia Frausto PA-C    polyethylene glycol 17 g Oral BID MD suleman Burnett 1 tablet Oral HS Alicia Frausto PA-C    tamsulosin 0 4 mg Oral Daily With [facility] [employer], GERRI    zolpidem 5 mg Oral HS PRN Troy Mantilla, GERRI      Continuous Infusions:    amiodarone 0 5 mg/min Last Rate: 0 5 mg/min (12/01/18 0524)       Counseling / Coordination of Care  Total time spent 20 minutes including teaching and family updates  More than 50% was spent counseling pt and family

## 2018-12-01 NOTE — PLAN OF CARE
Problem: Prexisting or High Potential for Compromised Skin Integrity  Goal: Skin integrity is maintained or improved  INTERVENTIONS:  - Identify patients at risk for skin breakdown  - Assess and monitor skin integrity  - Assess and monitor nutrition and hydration status  - Monitor labs (i e  albumin)  - Assess for incontinence   - Turn and reposition patient  - Assist with mobility/ambulation  - Relieve pressure over bony prominences  - Avoid friction and shearing  - Provide appropriate hygiene as needed including keeping skin clean and dry  - Evaluate need for skin moisturizer/barrier cream  - Collaborate with interdisciplinary team (i e  Nutrition, Rehabilitation, etc )   - Patient/family teaching   Outcome: Progressing      Problem: CARDIOVASCULAR - ADULT  Goal: Maintains optimal cardiac output and hemodynamic stability  INTERVENTIONS:  - Monitor I/O, vital signs and rhythm  - Monitor for S/S and trends of decreased cardiac output i e  bleeding, hypotension  - Administer and titrate ordered vasoactive medications to optimize hemodynamic stability  - Assess quality of pulses, skin color and temperature  - Assess for signs of decreased coronary artery perfusion - ex   Angina  - Instruct patient to report change in severity of symptoms   Outcome: Progressing    Goal: Absence of cardiac dysrhythmias or at baseline rhythm  INTERVENTIONS:  - Continuous cardiac monitoring, monitor vital signs, obtain 12 lead EKG if indicated  - Administer antiarrhythmic and heart rate control medications as ordered  - Monitor electrolytes and administer replacement therapy as ordered   Outcome: Progressing      Problem: RESPIRATORY - ADULT  Goal: Achieves optimal ventilation and oxygenation  INTERVENTIONS:  - Assess for changes in respiratory status  - Assess for changes in mentation and behavior  - Position to facilitate oxygenation and minimize respiratory effort  - Oxygen administration by appropriate delivery method based on oxygen saturation (per order) or ABGs  - Initiate smoking cessation education as indicated  - Encourage broncho-pulmonary hygiene including cough, deep breathe, Incentive Spirometry  - Assess the need for suctioning and aspirate as needed  - Assess and instruct to report SOB or any respiratory difficulty  - Respiratory Therapy support as indicated   Outcome: Progressing      Problem: GENITOURINARY - ADULT  Goal: Urinary catheter remains patent  INTERVENTIONS:  - Assess patency of urinary catheter  - If patient has a chronic garrison, consider changing catheter if non-functioning  - Follow guidelines for intermittent irrigation of non-functioning urinary catheter   Outcome: Progressing      Problem: METABOLIC, FLUID AND ELECTROLYTES - ADULT  Goal: Electrolytes maintained within normal limits  INTERVENTIONS:  - Monitor labs and assess patient for signs and symptoms of electrolyte imbalances  - Administer electrolyte replacement as ordered  - Monitor response to electrolyte replacements, including repeat lab results as appropriate  - Instruct patient on fluid and nutrition as appropriate   Outcome: Progressing    Goal: Fluid balance maintained  INTERVENTIONS:  - Monitor labs and assess for signs and symptoms of volume excess or deficit  - Monitor I/O and WT  - Instruct patient on fluid and nutrition as appropriate   Outcome: Progressing      Problem: SKIN/TISSUE INTEGRITY - ADULT  Goal: Skin integrity remains intact  INTERVENTIONS  - Identify patients at risk for skin breakdown  - Assess and monitor skin integrity  - Assess and monitor nutrition and hydration status  - Monitor labs (i e  albumin)  - Assess for incontinence   - Turn and reposition patient  - Assist with mobility/ambulation  - Relieve pressure over bony prominences  - Avoid friction and shearing  - Provide appropriate hygiene as needed including keeping skin clean and dry  - Evaluate need for skin moisturizer/barrier cream  - Collaborate with interdisciplinary team (i e  Nutrition, Rehabilitation, etc )   - Patient/family teaching   Outcome: Progressing      Problem: Nutrition/Hydration-ADULT  Goal: Nutrient/Hydration intake appropriate for improving, restoring or maintaining nutritional needs  Monitor and assess patient's nutrition/hydration status for malnutrition (ex- brittle hair, bruises, dry skin, pale skin and conjunctiva, muscle wasting, smooth red tongue, and disorientation)  Collaborate with interdisciplinary team and initiate plan and interventions as ordered  Monitor patient's weight and dietary intake as ordered or per policy  Utilize nutrition screening tool and intervene per policy  Determine patient's food preferences and provide high-protein, high-caloric foods as appropriate  INTERVENTIONS:  - Monitor oral intake, urinary output, labs, and treatment plans  - Assess nutrition and hydration status and recommend course of action  - Evaluate amount of meals eaten  - Assist patient with eating if necessary   - Allow adequate time for meals  - Recommend/ encourage appropriate diets, oral nutritional supplements, and vitamin/mineral supplements  - Order, calculate, and assess calorie counts as needed  - Recommend, monitor, and adjust tube feedings and TPN/PPN based on assessed needs  - Assess need for intravenous fluids  - Provide specific nutrition/hydration education as appropriate  - Include patient/family/caregiver in decisions related to nutrition   Outcome: Progressing      Problem: Potential for Falls  Goal: Patient will remain free of falls  INTERVENTIONS:  - Assess patient frequently for physical needs  -  Identify cognitive and physical deficits and behaviors that affect risk of falls    -  [place] fall precautions as indicated by assessment   - Educate patient/family on patient safety including physical limitations  - Instruct patient to call for assistance with activity based on assessment  - Modify environment to reduce risk of injury  - Consider OT/PT consult to assist with strengthening/mobility   Outcome: Progressing      Problem: DISCHARGE PLANNING - CARE MANAGEMENT  Goal: Discharge to post-acute care or home with appropriate resources  INTERVENTIONS:  - Conduct assessment to determine patient/family and health care team treatment goals, and need for post-acute services based on payer coverage, community resources, and patient preferences, and barriers to discharge  - Address psychosocial, clinical, and financial barriers to discharge as identified in assessment in conjunction with the patient/family and health care team  - Arrange appropriate level of post-acute services according to patient's   needs and preference and payer coverage in collaboration with the physician and health care team  - Communicate with and update the patient/family, physician, and health care team regarding progress on the discharge plan  - Arrange appropriate transportation to post-acute venues   Outcome: Progressing      Problem: COPING  Goal: Pt/Family able to verbalize concerns and demonstrate effective coping strategies  INTERVENTIONS:  - Assist patient/family to identify coping skills, available support systems and cultural and spiritual values  - Provide emotional support, including active listening and acknowledgement of concerns of patient and caregivers  - Reduce environmental stimuli, as able  - Provide patient education  - Assess for spiritual pain/suffering and initiate spiritual care, including notification of Pastoral Care or meh[place] based community as needed  - Assess effectiveness of coping strategies   Outcome: Progressing    Goal: Will report anxiety at manageable levels  INTERVENTIONS:  - Administer medication as ordered  - Teach and encourage coping skills  - Provide emotional support  - Assess patient/family for anxiety and ability to cope   Outcome: Progressing      Problem: DEATH & DYING  Goal: Pt/Family communicate acceptance of impending death and expresses psychological comfort and peace  INTERVENTIONS:  - Assess patient/family anxiety and grief process related to end of life issues  - Provide emotional, spiritual and psychosocial support  - Provide information about the patients health status with consideration of family and cultural values  - Communicate willingness to discuss death and facilitate grief process  with patient/family as appropriate  - Emphasize sustaining relationships within family system and community, or [place]/spiritual traditions  - Initiate Spiritual Care, Pastoral care or other ancillary consults as needed  - Refer to community support groups as appropriate   Outcome: Progressing      Problem: CHANGE IN BODY IMAGE  Goal: Pt/Family communicate acceptance of loss or change in body image and expresses psychological comfort and peace  INTERVENTIONS:  - Assess patient/family anxiety and grief process related to change in body image, loss of functional status and loss of sense of self  - Assess patient/family's coping skills and provide emotional, spiritual and psychosocial support  - Provide information about the patient's health status with consideration of family and cultural values  - Communicate willingness to discuss loss and facilitate grief process with patient/family as appropriate  - Emphasize sustaining relationships within family system and community, or [place]/spiritual traditions  - Refer to community support groups as appropriate  - Initiate Spiritual Care, Pastoral care or other ancillary consults as needed   Outcome: Progressing      Problem: DECISION MAKING  Goal: Pt/Family able to effectively weigh alternatives and participate in decision making related to treatment and care  INTERVENTIONS:  - Identify decision maker  - Determine when there are differences among patient's view, family's view, and healthcare provider's view of patient condition and care goals  - Facilitate patient/family articulation of goals for care  - Help patient/family identify pros/cons of alternative solutions  - Provide information as requested by patient/family  - Respect patient/family rights related to privacy and sharing information   - Serve as a liaison between patient, family and health care team  - Initiate consults as appropriate (Ethics Team, Palliative Care, Family Care Conference, etc )   Outcome: Progressing      Problem: SPIRITUAL CARE  Goal: Pt/Family able to move forward in process of forgiving self, others and/or higher power  INTERVENTIONS:  - Assist patient with any spiritual needs/requests such as communion, confession, anointing, etc  - Explore guilt and help patient/family identify possible spiritual/cultural beliefs and values  - Explore possibilities of making amends & reconciliation with self, others, and/or a greater power  - Guide patient/family in identifying painful feelings  - Help patient explore and identify spiritual beliefs, cultural understandings or values that may help or hinder letting go of issue  - Help patient explore feelings of anger, bitterness, resentment, anxiety   Help patient/family identify and examine the situation in which these feelings are experienced  - Help patient/family identify destructive displacement of feelings onto other individuals  - Refer patient to formal counseling and/or to meh[place] community for further support as needed or per request   Outcome: Progressing    Goal: Patient feels balance and connection with others and/or higher power that empowers the self during times of loss, guilt and fear  INTERVENTIONS:  - Create safety for patient through empathic presence and non-judgmental listening  - Encourage patient to explore his/her values, beliefs and/or spiritual images and practices  - Encourage use of breath work, imagery, meditation, relaxation, reiki to ease distress and provide healing  - Encourage use of cultural and spiritual celebrations and rituals  - Facilitate discussion that helps patient sort out spiritual concerns  - Help patient identify where meaning/hope/comfort & strength are in his/her life  - Refer patient to [place] community for assistance, as appropriate  - Respond to patient/family need for prayer/ritual/sacrament/ceremony   Outcome: Progressing

## 2018-12-02 LAB
ANION GAP SERPL CALCULATED.3IONS-SCNC: 4 MMOL/L (ref 4–13)
BASOPHILS # BLD AUTO: 0.02 THOUSANDS/ΜL (ref 0–0.1)
BASOPHILS NFR BLD AUTO: 0 % (ref 0–1)
BUN SERPL-MCNC: 33 MG/DL (ref 5–25)
CALCIUM SERPL-MCNC: 9.7 MG/DL (ref 8.3–10.1)
CHLORIDE SERPL-SCNC: 96 MMOL/L (ref 100–108)
CO2 SERPL-SCNC: 33 MMOL/L (ref 21–32)
CREAT SERPL-MCNC: 1.59 MG/DL (ref 0.6–1.3)
EOSINOPHIL # BLD AUTO: 0.02 THOUSAND/ΜL (ref 0–0.61)
EOSINOPHIL NFR BLD AUTO: 0 % (ref 0–6)
ERYTHROCYTE [DISTWIDTH] IN BLOOD BY AUTOMATED COUNT: 17.6 % (ref 11.6–15.1)
GFR SERPL CREATININE-BSD FRML MDRD: 43 ML/MIN/1.73SQ M
GLUCOSE SERPL-MCNC: 111 MG/DL (ref 65–140)
HCT VFR BLD AUTO: 36.2 % (ref 36.5–49.3)
HGB BLD-MCNC: 12 G/DL (ref 12–17)
IMM GRANULOCYTES # BLD AUTO: 0.08 THOUSAND/UL (ref 0–0.2)
IMM GRANULOCYTES NFR BLD AUTO: 2 % (ref 0–2)
LYMPHOCYTES # BLD AUTO: 0.75 THOUSANDS/ΜL (ref 0.6–4.47)
LYMPHOCYTES NFR BLD AUTO: 15 % (ref 14–44)
MAGNESIUM SERPL-MCNC: 2.1 MG/DL (ref 1.6–2.6)
MCH RBC QN AUTO: 31.6 PG (ref 26.8–34.3)
MCHC RBC AUTO-ENTMCNC: 33.1 G/DL (ref 31.4–37.4)
MCV RBC AUTO: 95 FL (ref 82–98)
MONOCYTES # BLD AUTO: 0.48 THOUSAND/ΜL (ref 0.17–1.22)
MONOCYTES NFR BLD AUTO: 10 % (ref 4–12)
NEUTROPHILS # BLD AUTO: 3.64 THOUSANDS/ΜL (ref 1.85–7.62)
NEUTS SEG NFR BLD AUTO: 73 % (ref 43–75)
NRBC BLD AUTO-RTO: 0 /100 WBCS
PLATELET # BLD AUTO: 100 THOUSANDS/UL (ref 149–390)
PMV BLD AUTO: 12.5 FL (ref 8.9–12.7)
POTASSIUM SERPL-SCNC: 3.4 MMOL/L (ref 3.5–5.3)
RBC # BLD AUTO: 3.8 MILLION/UL (ref 3.88–5.62)
SODIUM SERPL-SCNC: 133 MMOL/L (ref 136–145)
WBC # BLD AUTO: 4.99 THOUSAND/UL (ref 4.31–10.16)

## 2018-12-02 PROCEDURE — 83735 ASSAY OF MAGNESIUM: CPT | Performed by: INTERNAL MEDICINE

## 2018-12-02 PROCEDURE — 80048 BASIC METABOLIC PNL TOTAL CA: CPT | Performed by: INTERNAL MEDICINE

## 2018-12-02 PROCEDURE — 99024 POSTOP FOLLOW-UP VISIT: CPT | Performed by: INTERNAL MEDICINE

## 2018-12-02 PROCEDURE — 99232 SBSQ HOSP IP/OBS MODERATE 35: CPT | Performed by: INTERNAL MEDICINE

## 2018-12-02 PROCEDURE — 85025 COMPLETE CBC W/AUTO DIFF WBC: CPT | Performed by: INTERNAL MEDICINE

## 2018-12-02 RX ORDER — HEPARIN SODIUM 5000 [USP'U]/ML
5000 INJECTION, SOLUTION INTRAVENOUS; SUBCUTANEOUS EVERY 8 HOURS SCHEDULED
Status: DISCONTINUED | OUTPATIENT
Start: 2018-12-02 | End: 2018-12-07 | Stop reason: HOSPADM

## 2018-12-02 RX ORDER — FUROSEMIDE 40 MG/1
40 TABLET ORAL DAILY
Status: DISCONTINUED | OUTPATIENT
Start: 2018-12-03 | End: 2018-12-07 | Stop reason: HOSPADM

## 2018-12-02 RX ORDER — METOPROLOL TARTRATE 50 MG/1
100 TABLET, FILM COATED ORAL 3 TIMES DAILY
Status: DISCONTINUED | OUTPATIENT
Start: 2018-12-02 | End: 2018-12-05

## 2018-12-02 RX ORDER — POTASSIUM CHLORIDE 20 MEQ/1
40 TABLET, EXTENDED RELEASE ORAL ONCE
Status: COMPLETED | OUTPATIENT
Start: 2018-12-02 | End: 2018-12-02

## 2018-12-02 RX ADMIN — HEPARIN SODIUM 5000 UNITS: 5000 INJECTION INTRAVENOUS; SUBCUTANEOUS at 14:41

## 2018-12-02 RX ADMIN — DIGOXIN 125 MCG: 125 TABLET ORAL at 08:20

## 2018-12-02 RX ADMIN — FAMOTIDINE 20 MG: 20 TABLET ORAL at 08:20

## 2018-12-02 RX ADMIN — DIAZEPAM 5 MG: 5 TABLET ORAL at 03:34

## 2018-12-02 RX ADMIN — AMIODARONE HYDROCHLORIDE 0.5 MG/MIN: 50 INJECTION, SOLUTION INTRAVENOUS at 08:23

## 2018-12-02 RX ADMIN — POTASSIUM CHLORIDE 40 MEQ: 1500 TABLET, EXTENDED RELEASE ORAL at 11:11

## 2018-12-02 RX ADMIN — DIAZEPAM 5 MG: 5 TABLET ORAL at 23:56

## 2018-12-02 RX ADMIN — TAMSULOSIN HYDROCHLORIDE 0.4 MG: 0.4 CAPSULE ORAL at 16:30

## 2018-12-02 RX ADMIN — HEPARIN SODIUM 5000 UNITS: 5000 INJECTION INTRAVENOUS; SUBCUTANEOUS at 22:27

## 2018-12-02 RX ADMIN — FUROSEMIDE 40 MG: 40 TABLET ORAL at 08:22

## 2018-12-02 RX ADMIN — METOPROLOL TARTRATE 100 MG: 50 TABLET, FILM COATED ORAL at 16:30

## 2018-12-02 RX ADMIN — METOPROLOL TARTRATE 100 MG: 50 TABLET, FILM COATED ORAL at 08:20

## 2018-12-02 RX ADMIN — METOPROLOL TARTRATE 100 MG: 50 TABLET, FILM COATED ORAL at 22:27

## 2018-12-02 NOTE — ASSESSMENT & PLAN NOTE
Presented with atrial fibrillation with rapid ventricular rate  Rate better controlled now  Off amiodarone drip from this morning as per Cardiology  Continue current dose of digoxin  Continue metoprolol, increased to 100 mg t i d  Per Cardiology  [address] Cardiology input  Status post biv pacer on 11/30/2018  Continue to hold anticoagulation for now pending discussion regarding AV node ablation next week  [address] Cardiology input

## 2018-12-02 NOTE — ASSESSMENT & PLAN NOTE
Noted ejection fraction of 20%  Continue beta-blocker and digoxin along with Lasix  [address] Cardiology input  Management for AFib as above

## 2018-12-02 NOTE — PROGRESS NOTES
Progress Note - Cardiology   Griselda Mendoza 72 y o  male MRN: [id number]  [facility] Encounter: [id number]  12/02/18  12:04 PM          Impression and Plan:    74-year-old with history of chronic atrial fibrillation, acute on chronic diastolic congestive heart failure, decompensation due to rapid atrial fibrillation  Prior history of tachycardia mediated cardiomyopathy with improvement in ejection fraction  Also with chronic ITP and thrombocytopenia  Underwent biventricular pacemaker implantation-postoperative day 2  AV mary anne ablation was not performed  Plan:    Acute on chronic systolic and diastolic CHF:  Currently on Lasix 40 oral b i d-decrease to 40 daily     On 20 daily at home  Status post biventricular pacemaker and eventual AV mary anne ablation:  Continue metoprolol - change to 100 t i d   Was on 100- at home  Hold Eliquis for potential AVN ablation next week  Chronic kidney disease:  Baseline creatinine around 1 4-1 5 and stable here  Atrial fibrillation:  Platelets are at 072  Had been on Eliquis at baseline with chronic thrombocytopenia, IV amiodarone has infiltrated and causing a lot of discomfort and pain  Blood pressure has improved  Will increase metoprolol dose to 100 3 times daily  Hold off on IV amiodarone for now  Continue digoxin    ===================================================================    Chief Complaint: No chief complaint on file  Subjective/Objective     Subjective:   In pain from potassium infusion, otherwise no other complaints    Objective: Comfortable , no distress at the time of exam      Patient Active Problem List   Diagnosis    Peripheral vascular disease of lower extremity ([facility] )    Hypertension    Atrial fibrillation (HCC)    Thrombocytopenia (HCC)    PALOMA (acute kidney injury) ([facility] )    Insomnia    Coronary artery disease    Hyperlipidemia    Dilated cardiomyopathy ([facility] )    Acute on chronic combined systolic and diastolic heart failure (HCC)    Urinary retention    NSTEMI (non-ST elevated myocardial infarction) ([facility])       Vitals: /70   Pulse (!) 111   Temp 98 1 °F (36 7 °C)   Resp 17   Ht 5' 10" (1 778 m)   Wt 63 4 kg (139 lb 12 4 oz)   SpO2 97%   BMI 20 06 kg/m²     I/O this shift:  In: 386 8 [P O :320; I V :66 8]  Out: 350 [Urine:350]  Wt Readings from Last 3 Encounters:   12/01/18 63 4 kg (139 lb 12 4 oz)   11/22/18 67 5 kg (148 lb 13 oz)   10/10/18 73 5 kg (162 lb)       Intake/Output Summary (Last 24 hours) at 12/02/18 1204  Last data filed at 12/02/18 1106   Gross per 24 hour   Intake          1094 13 ml   Output             1325 ml   Net          -230 87 ml     I/O last 3 completed shifts: In: 1968 4 [P O :920;  I V :598 4; Blood:350; IV Piggyback:100]  Out: 2825 [Urine:2825]    Invasive Devices     Peripheral Intravenous Line            Peripheral IV 11/30/18 Left Antecubital 2 days    Peripheral IV 11/30/18 Left Wrist 2 days    Peripheral IV 11/30/18 Right Hand 2 days          Drain            Urethral Catheter Latex 16 Fr  7 days                  Physical Exam:  GEN: Milagro Brian appears ill, alert and oriented x 3, pleasant and cooperative   HEENT: pupils equal, round, and reactive to light; extraocular muscles intact  NECK: supple, no carotid bruits or JVD  HEART: regular rhythm, normal S1 and S2, no murmur, no clicks, gallops or rubs   LUNGS: clear to auscultation bilaterally; no wheezes or rhonchi, no rales  ABDOMEN/GI: normal bowel sounds, soft, no tenderness, no distention  EXTREMITIES/Musculoskeltal: peripheral pulses normal; no clubbing, cyanosis, no edema  NEURO: no focal motor findings   SKIN: normal without suspicious lesions on exposed skin              Lab Results:         Results from last 7 days  Lab Units 12/02/18  0450 12/01/18  0523 11/30/18  0610   WBC Thousand/uL 4 99 4 84 4 09*   HEMOGLOBIN g/dL 12 0 11 6* 11 7*   HEMATOCRIT % 36 2* 35 3* 36 0*   PLATELETS Thousands/uL 100* 112* 114*           Results from last 7 days  Lab Units 12/02/18  0450 12/01/18  1422 12/01/18  0523 11/30/18  0610  11/28/18  0511 11/27/18  0427   POTASSIUM mmol/L 3 4* 4 9 2 9* 3 2*  < > 3 6 3 6   CHLORIDE mmol/L 96*  --  95* 96*  < > 97* 99*   CO2 mmol/L 33*  --  33* 35*  < > 31 28   BUN mg/dL 33*  --  27* 34*  < > 35* 34*   CREATININE mg/dL 1 59*  --  1 45* 1 58*  < > 1 51* 1 46*   CALCIUM mg/dL 9 7  --  9 4 8 7  < > 9 7 8 5   ALK PHOS U/L  --   --   --   --   --  91 77   ALT U/L  --   --   --   --   --  73 60   AST U/L  --   --   --   --   --  34 28   < > = values in this interval not displayed  Results from last 7 days  Lab Units 11/30/18  0610 11/26/18  0438   INR  1 27* 1 37*       Imaging: I have personally reviewed pertinent reports      EKG/Telemtry: Paced rhythm on telemetry  Scheduled Meds:    Current Facility-Administered Medications:  acetaminophen 650 mg Oral Q6H PRN Rachana Miller PA-C   bisacodyl 10 mg Rectal Daily PRN Ford Sheriff PA-C   calcium carbonate 500 mg Oral Daily PRN Geena Mishra, DO   diazepam 5 mg Oral Q8H PRN Geena Mishra, DO   digoxin 125 mcg Oral Daily Leonel Ventura, DO   diphenhydrAMINE 50 mg Oral Q6H PRN Jay Maldonado PA-C   docusate sodium 100 mg Oral BID Laura Frausto PA-C   famotidine 20 mg Oral Daily Rachana Miller PACELIO   furosemide 40 mg Oral BID (diuretic) Whit Rubio DO   hydrocortisone  Topical 4x Daily PRN Silvano Maldonado PA-C   meperidine 12 5 mg Intravenous PRN Shari Gilford, MD   metoprolol 5 mg Intravenous Q6H PRN Rachana Miller, PAJanaC   metoprolol tartrate 100 mg Oral TID Ana Martino MD   ondansetron 4 mg Intravenous Q6H PRN Rachana Miller, GERRI   polyethylene glycol 17 g Oral Daily PRN Laura Frausto PA-C   polyethylene glycol 17 g Oral BID Markel Roebrtson MD   senna 1 tablet Oral HS Laura Frausto PA-C   tamsulosin 0 4 mg Oral Daily With [facility] [employer], GERRI   zolpidem 5 mg Oral HS PRN Rachana Miller, GERRI     Continuous Infusions: Counseling / Coordination of Care  Total time spent 20 minutes including teaching and family updates  More than 50% was spent counseling pt and family

## 2018-12-02 NOTE — PROGRESS NOTES
Progress Note - Blondie Blocker 1945, 72 y o  male MRN: [id number]    Unit/Bed#: [facility] 531-01 Encounter: [id number]    Primary Care Provider: Jose Cruz Escamilla MD   Date and time admitted to hospital: 11/22/2018  7:39 PM        * Atrial fibrillation [facility])   Assessment & Plan    Presented with atrial fibrillation with rapid ventricular rate  Rate better controlled now  Off amiodarone drip from this morning as per Cardiology  Continue current dose of digoxin  Continue metoprolol, increased to 100 mg t i d  Per Cardiology  [address] Cardiology input  Status post biv pacer on 11/30/2018  Continue to hold anticoagulation for now pending discussion regarding AV node ablation next week  [address] Cardiology input  NSTEMI (non-ST elevated myocardial infarction) [facility])   Assessment & Plan    NSTEMI, type 2, POA, due to demand from atrial fibrillation with RVR with associated tachy induced cardiomyopathy" Troponins range from 0 03 to 0 07  Continue current medical management  Acute on chronic combined systolic and diastolic heart failure (HCC)   Assessment & Plan    Noted ejection fraction of 20%  Continue beta-blocker and digoxin along with Lasix  [address] Cardiology input  Management for AFib as above  PALOMA (acute kidney injury) ([facility] )   Assessment & Plan    Acute on chronic renal failure  Improving  Baseline creatinine between 1 3-1 6  Slowly worsening 1 46>> 1 51>> 1 55>> 1 58>> 1 46>> 1 59  Lasix dose decreased to daily  Strict I&Os  Thrombocytopenia ([facility] )   Assessment & Plan    History of ITP  Negative signs of bleeding with baseline platelets around 47,520  Platelet count around 100,000 this morning  Appreciate Heme-Onc input and continue Medrol Dosepak according to them  Had a reaction to platelet transfusion with rash on the back and neck without any respiratory compromise on 11/28  Transfusion was held and regimen with Benadryl, Valium and Tylenol was given    Patient is already on steroids for ITP  As per patient Valium helps with his allergic reactions  Hypertension   Assessment & Plan    Continue with metoprolol, dose changed to 100 b i d  Per Cardiology recommendations  Continue with Lasix 40 mg, decrease to daily as per Cardiology  VTE Pharmacologic Prophylaxis:   Pharmacologic: Not on any due to thrombocytopenia  Mechanical VTE Prophylaxis in Place: Yes    Patient Centered Rounds: I have performed bedside rounds with nursing staff today  Discussions with Specialists or Other Care Team Provider:  Cardiology    Education and Discussions with Family / Patient:  Discussed plan of care the patient    Time Spent for Care: 30 minutes  More than 50% of total time spent on counseling and coordination of care as described above  Current Length of Stay: 10 day(s)    Current Patient Status: Inpatient   Certification Statement: The patient will continue to require additional inpatient hospital stay due to Not medically stable    Discharge Plan:  When medically stable    Code Status: Level 1 - Full Code      Subjective:   Patient reports pain in bilateral forearm related to infiltration with IV site  No chest pain, dyspnea, cough, nausea, vomiting,    Objective:     Vitals:   Temp (24hrs), Av 6 °F (36 4 °C), Min:97 4 °F (36 3 °C), Max:98 1 °F (36 7 °C)    Temp:  [97 4 °F (36 3 °C)-98 1 °F (36 7 °C)] 97 5 °F (36 4 °C)  HR:  [] 82  Resp:  [17-18] 18  BP: ()/(62-72) 102/64  SpO2:  [97 %-100 %] 97 %  Body mass index is 20 06 kg/m²  Input and Output Summary (last 24 hours): Intake/Output Summary (Last 24 hours) at 18 1525  Last data filed at 18 1106   Gross per 24 hour   Intake           906 25 ml   Output             1000 ml   Net           -93 75 ml       Physical Exam:     Physical Exam  Constitutional: He is oriented to person, place, and time  No distress  Eyes: Pupils are equal, round, and reactive to light  Cardiovascular: Normal rate, normal heart sounds and intact distal pulses     No murmur heard  regular   Pulmonary/Chest: Breath sounds normal  No respiratory distress  He has no wheezes  He has no rales  Abdominal: Soft  Bowel sounds are normal  He exhibits no distension  There is no tenderness  Musculoskeletal: He exhibits no edema  Neurological: He is alert and oriented to person, place, and time  No focal motor deficits   Skin: Skin is warm    Additional Data:     Labs:      Results from last 7 days  Lab Units 12/02/18  0450   WBC Thousand/uL 4 99   HEMOGLOBIN g/dL 12 0   HEMATOCRIT % 36 2*   PLATELETS Thousands/uL 100*   NEUTROS PCT % 73   LYMPHS PCT % 15   MONOS PCT % 10   EOS PCT % 0       Results from last 7 days  Lab Units 12/02/18  0450  11/28/18  0511   SODIUM mmol/L 133*  < > 133*   POTASSIUM mmol/L 3 4*  < > 3 6   CHLORIDE mmol/L 96*  < > 97*   CO2 mmol/L 33*  < > 31   BUN mg/dL 33*  < > 35*   CREATININE mg/dL 1 59*  < > 1 51*   ANION GAP mmol/L 4  < > 5   CALCIUM mg/dL 9 7  < > 9 7   ALBUMIN g/dL  --   --  3 1*   TOTAL BILIRUBIN mg/dL  --   --  1 99*   ALK PHOS U/L  --   --  91   ALT U/L  --   --  73   AST U/L  --   --  34   GLUCOSE RANDOM mg/dL 111  < > 102   < > = values in this interval not displayed  Results from last 7 days  Lab Units 11/30/18  0610   INR  1 27*                       * I Have Reviewed All Lab Data Listed Above  * Additional Pertinent Lab Tests Reviewed: All Labs Within Last 24 Hours Reviewed    Imaging:    XR chest 2 views   Final Result by Evi Garcia MD (12/01 1541)      No pneumothorax  Stable small bilateral pleural effusions  Workstation performed: [id number]         XR chest portable   Final Result by Isak Zamorano MD (11/30 1553)      1  Status post pacemaker insertion  No pneumothorax  2   Small bilateral pleural effusions              Workstation performed: [id number]         US abdomen complete   Final Result by Stu Frey DO (11/25 2151)   Unremarkable pancreas  Top normal size liver with small subcentimeter hemangioma stable from 2011  Cholelithiasis without sonographic evidence of cholecystitis  The common bile duct is prominent at 8 mm  Consider MRCP if clinically warranted  Right side simple renal cysts  Dilated left side extrarenal pelvis  Splenomegaly with small splenule  Workstation performed: [id number]             Recent Cultures (last 7 days):           Last 24 Hours Medication List:     Current Facility-Administered Medications:  acetaminophen 650 mg Oral Q6H PRN Burnvlad Boston, PA-CHELLE   bisacodyl 10 mg Rectal Daily PRN FRANCHESCA Burgos-C   calcium carbonate 500 mg Oral Daily PRN Margaret Hwang, DO   diazepam 5 mg Oral Q8H PRN Margaret Hwang, DO   digoxin 125 mcg Oral Daily Nanci Ventura, DO   diphenhydrAMINE 50 mg Oral Q6H PRN Jay Maldonado PA-C   docusate sodium 100 mg Oral BID Jennifer Rafael Rasmuson, PA-C   famotidine 20 mg Oral Daily Burnard Leila Boston   [START ON 12/3/2018] furosemide 40 mg Oral Daily Vivien Portillo MD   heparin (porcine) 5,000 Units Subcutaneous [facility] Kat Pritchard MD   hydrocortisone  Topical 4x Daily PRN Josselin Maldonado PA-C   meperidine 12 5 mg Intravenous PRN Conner Miller MD   metoprolol 5 mg Intravenous Q6H PRN Burnard Ludy, GERRI   metoprolol tartrate 100 mg Oral TID Kat Pritchard MD   ondansetron 4 mg Intravenous Q6H PRN Boogie Boston, PA-CHELLE   polyethylene glycol 17 g Oral Daily PRN Jennifer Rafael Rasmuson, PA-C   polyethylene glycol 17 g Oral BID Vivien Portillo MD   senna 1 tablet Oral HS Jennifer Rafael RasmusonGERRI   tamsulosin 0 4 mg Oral Daily With [employer] Rufino ParishGERRI   zolpidem 5 mg Oral HS PRN Boogie Boston PA-C        Today, Patient Was Seen By: Vivien Protillo MD    ** Please Note: Dictation voice to text software may have been used in the creation of this document   **

## 2018-12-02 NOTE — ASSESSMENT & PLAN NOTE
Continue with metoprolol, dose changed to 100 b i d  Per Cardiology recommendations  Continue with Lasix 40 mg, decrease to daily as per Cardiology

## 2018-12-02 NOTE — ASSESSMENT & PLAN NOTE
Acute on chronic renal failure  Improving  Baseline creatinine between 1 3-1 6  Slowly worsening 1 46>> 1 51>> 1 55>> 1 58>> 1 46>> 1 59  Lasix dose decreased to daily  Strict I&Os

## 2018-12-02 NOTE — ASSESSMENT & PLAN NOTE
History of ITP  Negative signs of bleeding with baseline platelets around 74,517  Platelet count around 100,000 this morning  Appreciate Heme-Onc input and continue Medrol Dosepak according to them  Had a reaction to platelet transfusion with rash on the back and neck without any respiratory compromise on 11/28  Transfusion was held and regimen with Benadryl, Valium and Tylenol was given  Patient is already on steroids for ITP  As per patient Valium helps with his allergic reactions

## 2018-12-02 NOTE — PLAN OF CARE
CARDIOVASCULAR - ADULT     Maintains optimal cardiac output and hemodynamic stability Progressing     Absence of cardiac dysrhythmias or at baseline rhythm Progressing        CHANGE IN BODY IMAGE     Pt/Family communicate acceptance of loss or change in body image and expresses psychological comfort and peace Progressing        COPING     Pt/Family able to verbalize concerns and demonstrate effective coping strategies Progressing     Will report anxiety at manageable levels Progressing        DEATH & DYING     Pt/Family communicate acceptance of impending death and expresses psychological comfort and peace Progressing        DECISION MAKING     Pt/Family able to effectively weigh alternatives and participate in decision making related to treatment and care [address] Discharge to post-acute care or home with appropriate resources Progressing        GENITOURINARY - ADULT     Urinary catheter remains patent Progressing        METABOLIC, FLUID AND ELECTROLYTES - ADULT     Electrolytes maintained within normal limits Progressing     Fluid balance maintained Progressing        Nutrition/Hydration-ADULT     Nutrient/Hydration intake appropriate for improving, restoring or maintaining nutritional needs Progressing        Potential for Falls     Patient will remain free of falls Progressing        Prexisting or High Potential for Compromised Skin Integrity     Skin integrity is maintained or improved Progressing        RESPIRATORY - ADULT     Achieves optimal ventilation and oxygenation Progressing        SKIN/TISSUE INTEGRITY - ADULT     Skin integrity remains intact Progressing        SPIRITUAL CARE     Pt/Family able to move forward in process of forgiving self, others and/or higher power Progressing     Patient feels balance and connection with others and/or higher power that empowers the self during times of loss, guilt and fear Progressing

## 2018-12-03 PROBLEM — I42.0 DILATED CARDIOMYOPATHY (HCC): Chronic | Status: RESOLVED | Noted: 2018-11-21 | Resolved: 2018-12-03

## 2018-12-03 LAB
ANION GAP SERPL CALCULATED.3IONS-SCNC: 7 MMOL/L (ref 4–13)
BASOPHILS # BLD AUTO: 0.02 THOUSANDS/ΜL (ref 0–0.1)
BASOPHILS NFR BLD AUTO: 0 % (ref 0–1)
BUN SERPL-MCNC: 33 MG/DL (ref 5–25)
CALCIUM SERPL-MCNC: 8.9 MG/DL (ref 8.3–10.1)
CHLORIDE SERPL-SCNC: 100 MMOL/L (ref 100–108)
CO2 SERPL-SCNC: 29 MMOL/L (ref 21–32)
CREAT SERPL-MCNC: 1.44 MG/DL (ref 0.6–1.3)
EOSINOPHIL # BLD AUTO: 0.01 THOUSAND/ΜL (ref 0–0.61)
EOSINOPHIL NFR BLD AUTO: 0 % (ref 0–6)
ERYTHROCYTE [DISTWIDTH] IN BLOOD BY AUTOMATED COUNT: 17.8 % (ref 11.6–15.1)
GFR SERPL CREATININE-BSD FRML MDRD: 49 ML/MIN/1.73SQ M
GLUCOSE SERPL-MCNC: 94 MG/DL (ref 65–140)
HCT VFR BLD AUTO: 36.2 % (ref 36.5–49.3)
HGB BLD-MCNC: 11.7 G/DL (ref 12–17)
IMM GRANULOCYTES # BLD AUTO: 0.07 THOUSAND/UL (ref 0–0.2)
IMM GRANULOCYTES NFR BLD AUTO: 2 % (ref 0–2)
LYMPHOCYTES # BLD AUTO: 0.93 THOUSANDS/ΜL (ref 0.6–4.47)
LYMPHOCYTES NFR BLD AUTO: 20 % (ref 14–44)
MAGNESIUM SERPL-MCNC: 2.2 MG/DL (ref 1.6–2.6)
MCH RBC QN AUTO: 30.9 PG (ref 26.8–34.3)
MCHC RBC AUTO-ENTMCNC: 32.3 G/DL (ref 31.4–37.4)
MCV RBC AUTO: 96 FL (ref 82–98)
MONOCYTES # BLD AUTO: 0.47 THOUSAND/ΜL (ref 0.17–1.22)
MONOCYTES NFR BLD AUTO: 10 % (ref 4–12)
NEUTROPHILS # BLD AUTO: 3.07 THOUSANDS/ΜL (ref 1.85–7.62)
NEUTS SEG NFR BLD AUTO: 68 % (ref 43–75)
NRBC BLD AUTO-RTO: 0 /100 WBCS
PLATELET # BLD AUTO: 82 THOUSANDS/UL (ref 149–390)
POTASSIUM SERPL-SCNC: 4.2 MMOL/L (ref 3.5–5.3)
RBC # BLD AUTO: 3.79 MILLION/UL (ref 3.88–5.62)
SODIUM SERPL-SCNC: 136 MMOL/L (ref 136–145)
WBC # BLD AUTO: 4.57 THOUSAND/UL (ref 4.31–10.16)

## 2018-12-03 PROCEDURE — 93312 ECHO TRANSESOPHAGEAL: CPT | Performed by: INTERNAL MEDICINE

## 2018-12-03 PROCEDURE — 93320 DOPPLER ECHO COMPLETE: CPT | Performed by: INTERNAL MEDICINE

## 2018-12-03 PROCEDURE — 80048 BASIC METABOLIC PNL TOTAL CA: CPT | Performed by: INTERNAL MEDICINE

## 2018-12-03 PROCEDURE — 93325 DOPPLER ECHO COLOR FLOW MAPG: CPT | Performed by: INTERNAL MEDICINE

## 2018-12-03 PROCEDURE — 99231 SBSQ HOSP IP/OBS SF/LOW 25: CPT | Performed by: NURSE PRACTITIONER

## 2018-12-03 PROCEDURE — 97530 THERAPEUTIC ACTIVITIES: CPT

## 2018-12-03 PROCEDURE — 85025 COMPLETE CBC W/AUTO DIFF WBC: CPT | Performed by: INTERNAL MEDICINE

## 2018-12-03 PROCEDURE — 97116 GAIT TRAINING THERAPY: CPT

## 2018-12-03 PROCEDURE — 83735 ASSAY OF MAGNESIUM: CPT | Performed by: INTERNAL MEDICINE

## 2018-12-03 PROCEDURE — 99232 SBSQ HOSP IP/OBS MODERATE 35: CPT | Performed by: HOSPITALIST

## 2018-12-03 RX ADMIN — DOCUSATE SODIUM 100 MG: 100 CAPSULE, LIQUID FILLED ORAL at 09:03

## 2018-12-03 RX ADMIN — METOPROLOL TARTRATE 100 MG: 50 TABLET, FILM COATED ORAL at 09:03

## 2018-12-03 RX ADMIN — ACETAMINOPHEN 650 MG: 325 TABLET, FILM COATED ORAL at 18:35

## 2018-12-03 RX ADMIN — ONDANSETRON 4 MG: 2 INJECTION INTRAMUSCULAR; INTRAVENOUS at 17:26

## 2018-12-03 RX ADMIN — FUROSEMIDE 40 MG: 40 TABLET ORAL at 09:03

## 2018-12-03 RX ADMIN — HEPARIN SODIUM 5000 UNITS: 5000 INJECTION INTRAVENOUS; SUBCUTANEOUS at 21:18

## 2018-12-03 RX ADMIN — TAMSULOSIN HYDROCHLORIDE 0.4 MG: 0.4 CAPSULE ORAL at 17:13

## 2018-12-03 RX ADMIN — HEPARIN SODIUM 5000 UNITS: 5000 INJECTION INTRAVENOUS; SUBCUTANEOUS at 15:03

## 2018-12-03 RX ADMIN — FAMOTIDINE 20 MG: 20 TABLET ORAL at 09:03

## 2018-12-03 RX ADMIN — HEPARIN SODIUM 5000 UNITS: 5000 INJECTION INTRAVENOUS; SUBCUTANEOUS at 06:20

## 2018-12-03 RX ADMIN — METOPROLOL TARTRATE 100 MG: 50 TABLET, FILM COATED ORAL at 17:13

## 2018-12-03 RX ADMIN — DIGOXIN 125 MCG: 125 TABLET ORAL at 09:03

## 2018-12-03 RX ADMIN — METOPROLOL TARTRATE 100 MG: 50 TABLET, FILM COATED ORAL at 21:17

## 2018-12-03 NOTE — PROGRESS NOTES
Progress Note - Electrophysiology-Cardiology (EP)   Trisha Fernandez 72 y o  male MRN: [id number]  Unit/Bed#: [facility] 531-01 Encounter: [id number]      Assessment/Plan:   1  Permanent nonvalvular atrial fibrillation, symptomatic    * as an outpatient decision was made to keep patient in rate controlled in atrial fibrillation with his Cardiologist Dr Russ Osorio due to past failed rhythm control attempt albeit there is not documentation of what these attempts were in EPIC               * patient's EF has dropped from 70% in 2017 to 20% in 2018 which has occurred in the past due to uncontrolled atrial fibrillation, see below               * he is still in atrial fibrillation w/ CVR on amiodarone gtt, esmolol has been discontinued    * patient has been in atrial fibrillation >7 years with no sinus ECGs being seen in MUSE at all, he only has documented atrial fibrillation since 2011 with echo's showing a dilated LA, I do not feel a rhythm control strategy would hold or benefit him at this time due to his duration of atrial fibrillation as well as with his low plt count, we would then have to commit him to [facility] x 1 month  On top of that he has a hx of SDH  His bleeding risk is extremely high  * he is now s/p medtronic BiV PPM due to a LAURA shwoing an EF of 40%, site is healing very well  * he is currently in a fib w/ V pacing but not BiV pacing, QRS is 138ms on 11-30-18   * plan for AVJ ablation this week, hopefully tomorrow but possibly on Wednesday    * given his ITP and bleeding risk he is a candidate for a watchman device, LAURA already performed, will have him follow up with Dr Lavon Mensah as an outpatient to discuss this further     2  Acute on chronic ITP   * plt's down to 82, no bleeding from site    * continue CBC monitoring    3  Acute systolic CHF              * EF down to 20% from 70% in 2018              * on PO lasix 40mg PO BID   * CHF team following      4  NICMP - see above   5   HTN        Subjective/Objective Subjective:   permanent nonvalvular atrial fibrillation AC w/ eliquis, HTN,  chronic diastolic dysfunction, thrombocytopenia who is HOD#6 after being transferred to [facility] from OSH for management of his atrial fibrillation and CHF  11-28-18:   Since last seen by EP decision has been made to transfuse plt's and perform BiV ICD implant w/ AVJ on 11-29-18  Patient offers no concerns or complaints about his current clinical situation  No LH, dizziness, chest discomfort or SOB  12-3-18:   Since last seen by EP patient underwent BiV PPM implant by Dr Milagro Soria on 11-30-18  Post procedure patient has been V paced but remains in atrial fibrillation on amiodarone gtt  He feels extremely exhausted today  Otherwise no concerns or complaints       Vitals: BP 94/61   Pulse 84   Temp 97 8 °F (36 6 °C) (Oral)   Resp 18   Ht 5' 10" (1 778 m)   Wt 63 1 kg (139 lb 1 8 oz)   SpO2 98%   BMI 19 96 kg/m²   Vitals:    12/01/18 0600 12/03/18 0600   Weight: 63 4 kg (139 lb 12 4 oz) 63 1 kg (139 lb 1 8 oz)     Orthostatic Blood Pressures      Most Recent Value   Blood Pressure  94/61 filed at 12/03/2018 1100   Patient Position - Orthostatic VS  Sitting filed at 12/02/2018 1222            Intake/Output Summary (Last 24 hours) at 12/03/18 1206  Last data filed at 12/03/18 7631   Gross per 24 hour   Intake              320 ml   Output              975 ml   Net             -655 ml       Invasive Devices     Peripheral Intravenous Line            Peripheral IV 11/30/18 Left Forearm 3 days    Peripheral IV 11/30/18 Left Hand 3 days    Peripheral IV 11/30/18 Right Hand 3 days          Drain            Urethral Catheter Latex 16 Fr  8 days                            Scheduled Meds:    Current Facility-Administered Medications:  acetaminophen 650 mg Oral Q6H PRN Emmie Earl PA-C   bisacodyl 10 mg Rectal Daily PRN Enrrique Frausto PA-C   calcium carbonate 500 mg Oral Daily PRN Patrisha Saint, DO   diazepam 5 mg Oral Q8H PRN Hermes Cook Yary Keller, DO   digoxin 125 mcg Oral Daily Levobernice Ventura, DO   diphenhydrAMINE 50 mg Oral Q6H PRN Francine Mccain Friend, GERRI   docusate sodium 100 mg Oral BID Ishaan Frausto PA-C   famotidine 20 mg Oral Daily Steven Crockett PA-C   furosemide 40 mg Oral Daily Cody Ryan MD   heparin (porcine) 5,000 Units Subcutaneous [facility] Mary Rock MD   hydrocortisone  Topical 4x Daily PRN Francine Maldonado PA-C   meperidine 12 5 mg Intravenous PRN Serafin Garcia MD   metoprolol 5 mg Intravenous Q6H PRN Steven Crockett PA-C   metoprolol tartrate 100 mg Oral TID Mary Rock MD   ondansetron 4 mg Intravenous Q6H PRN Steven Crockett PA-C   polyethylene glycol 17 g Oral Daily PRN Berhane Nguyen PA-C   polyethylene glycol 17 g Oral BID Cody Ryan MD   senna 1 tablet Oral HS Ishaan Frausto PA-C   tamsulosin 0 4 mg Oral Daily With [facility] [employer], GERRI   zolpidem 5 mg Oral HS PRN Steven Crockett PA-C     Continuous Infusions:   PRN Meds:   acetaminophen    bisacodyl    calcium carbonate    diazepam    diphenhydrAMINE    hydrocortisone    meperidine    metoprolol    ondansetron    polyethylene glycol    zolpidem        Physical Exam:   GEN: NAD, alert and oriented, well appearing  SKIN: dry without significant lesions or rashes  HEENT: NCAT, PERRL, EOMs intact  NECK: No JVD or carotid bruits appreciated  CARDIOVASCULAR: irregularly irregular rhythm regular rate , normal S1, S2 without murmurs, rubs, or gallops appreciated  LUNGS: Clear to auscultation bilaterally without wheezes, rhonchi, or rales  ABDOMEN: Soft, nontender, nondistended  EXTREMITIES/VASCULAR: perfused without clubbing, cyanosis, or edema b/l  PSYCH: Normal mood and affect  NEURO: CN ll-Xll grossly intact                Lab Results: I have personally reviewed pertinent lab results        Results from last 7 days  Lab Units 12/03/18  0524 12/02/18  0450 12/01/18  0523   WBC Thousand/uL 4 57 4 99 4 84   HEMOGLOBIN g/dL 11 7* 12 0 11 6*   HEMATOCRIT % 36 2* 36 2* 35 3*   PLATELETS Thousands/uL 82* 100* 112*       Results from last 7 days  Lab Units 18  0524 18  0450 18  1422 18  0523   POTASSIUM mmol/L 4 2 3 4* 4 9 2 9*   CHLORIDE mmol/L 100 96*  --  95*   CO2 mmol/L 29 33*  --  33*   BUN mg/dL 33* 33*  --  27*   CREATININE mg/dL 1 44* 1 59*  --  1 45*   CALCIUM mg/dL 8 9 9 7  --  9 4       Results from last 7 days  Lab Units 18  0610   INR  1 27*       Results from last 7 days  Lab Units 18  0524 18  0450 18  0523   MAGNESIUM mg/dL 2 2 2 1 1 9         Imaging: I have personally reviewed pertinent reports  Results for orders placed during the hospital encounter of 18   Echo complete with contrast if indicated    Narrative [facility] [address]  [facility] [place] [address] [facility]  [phone]    Transthoracic Echocardiogram  Limited 2D, M-mode, Doppler, and Color Doppler    Study date:  2018    Patient: Caty Solis  MR number: [id number]  Account number: [de-identified]  : 1945  Age: 72 years  Gender: Male  Status: Inpatient  Location: Bedside  Height: 70 in  Weight: 148 7 lb  BP: 92/ 74 mmHg    Indications: AFIB Assess left ventricular function  Diagnoses: I48 0 - Atrial fibrillation    Sonographer:  SEGUNDO Guerrero  Primary Physician:  Delgado Menendez MD  Referring Physician:  Jessy Vivar MD  Group:  [facility]  Cardiology Fellow:  Latonya Chauhan MD  Interpreting Physician:  Geovanni Williamson MD    SUMMARY    LEFT VENTRICLE:  Systolic function was markedly reduced  Ejection fraction was estimated to be 20 %  There was severe diffuse hypokinesis with regional variations- akinesis of the anteroseptal, anterior and possibly the inferior walls  The patient was tachycardic throughout the study due to which ejection fraction and regional wall motion was not accurately assessed  Wall thickness was mildly increased    The changes were consistent with concentric remodeling (increased wall thickness with normal wall mass)  RIGHT VENTRICLE:  The size was normal   Systolic function was reduced  LEFT ATRIUM:  The atrium was dilated  RIGHT ATRIUM:  The atrium was dilated  MITRAL VALVE:  There was moderate regurgitation  AORTIC VALVE:  There was mild to moderate regurgitation  TRICUSPID VALVE:  There was moderate regurgitation  PERICARDIUM:  A small, free-flowing pericardial effusion was identified anterior and posterior to the heart  The fluid had no internal echoes  There was no evidence of hemodynamic compromise  There was a moderate-sized left pleural effusion  COMPARISONS:  There has been no significant interval change  Comparison was made with the previous study of 21-Nov-2018  HISTORY: PRIOR HISTORY: HTN,CHF,CAD,AFIB,HLD,Dilated CM,Thrombocytopenia    PROCEDURE: The procedure was performed at the bedside  This was a routine study  The transthoracic approach was used  The study included limited 2D imaging, M-mode, limited spectral Doppler, and color Doppler  Image quality was good  LEFT VENTRICLE: Size was normal  Systolic function was markedly reduced  Ejection fraction was estimated to be 20 %  There was severe diffuse hypokinesis with regional variations- akinesis of the anteroseptal, anterior and possibly the  inferior walls  The patient was tachycardic throughout the study due to which ejection fraction and regional wall motion was not accurately assessed  Wall thickness was mildly increased  The changes were consistent with concentric  remodeling (increased wall thickness with normal wall mass)  DOPPLER: Transmitral flow pattern: atrial fibrillation  The study was not technically sufficient to allow evaluation of LV diastolic function  RIGHT VENTRICLE: The size was normal  Systolic function was reduced  LEFT ATRIUM: The atrium was dilated  RIGHT ATRIUM: The atrium was dilated      MITRAL VALVE: DOPPLER: There was moderate regurgitation  The regurgitant jet was centrally directed  AORTIC VALVE: The valve was trileaflet  Leaflets exhibited normal cuspal separation and sclerosis  DOPPLER: There was mild to moderate regurgitation  TRICUSPID VALVE: DOPPLER: There was moderate regurgitation  PULMONIC VALVE: DOPPLER: There was no significant regurgitation  PERICARDIUM: A small, free-flowing pericardial effusion was identified anterior and posterior to the heart  The fluid had no internal echoes  There was no evidence of hemodynamic compromise  There was a moderate-sized left pleural  effusion      SYSTEM MEASUREMENT TABLES    2D  %FS: 9 65 %  Ao Diam: 3 57 cm  EDV(Teich): 96 19 ml  EF(Teich): 21 27 %  ESV(Teich): 75 73 ml  IVSd: 1 08 cm  LVIDd: 4 58 cm  LVIDs: 4 13 cm  LVPWd: 0 97 cm  SV(Teich): 20 46 ml    [employer] Accredited Echocardiography Laboratory    Prepared and electronically signed by    Geovanni Williamson MD  Signed [id number] 10:44:37       EKG ( post BiV):       EKG:

## 2018-12-03 NOTE — PLAN OF CARE
Problem: PHYSICAL THERAPY ADULT  Goal: Performs mobility at highest level of function for planned discharge setting  See evaluation for individualized goals  Treatment/Interventions: Functional transfer training, LE strengthening/ROM, Patient/family training, Equipment eval/education, Bed mobility, Gait training, Spoke to nursing  Equipment Recommended:  (none v/s RW or SPC ?? still assessing  )       See flowsheet documentation for full assessment, interventions and recommendations  Outcome: Progressing  Prognosis: Good  Problem List: Impaired balance, Decreased mobility, Pain, Decreased skin integrity  Assessment: Pt  noted with no fatigue at the end of session and reported no fatihue either, Pt  reported pain on both forearms due to the IV sites  Pt  provided with ice packs for both arms  Noted gait to be steady and no LOB occured t/o session and no instability noted  Pt  is awaiting procedure tomorrow  pt, able to perform all activities with S  Needed encouragement to participate in mobility  Will continue to foolow during the stay and recommend D/c home with family support pending progress  Recommendation: Home with family support (vs Home PT pending progress post tomorrow's procedure)     PT - OK to Discharge: No    See flowsheet documentation for full assessment

## 2018-12-03 NOTE — PHYSICAL THERAPY NOTE
Physical Therapy Cancellation Note      PT session cancelled for now as patient refused reporting he need to rest  Educated patient on importance of mobility  Agreed he will attempt later  Will continue to follow as able

## 2018-12-03 NOTE — RESTORATIVE TECHNICIAN NOTE
Restorative Specialist Mobility Note       Activity: Bedrest, Dangle, Stand at bedside, Ambulate in room, Ambulate in galvan        Ambulation Response: Tolerated fairly well  Repositioned: Sitting, Up in chair                        Assisted therapy during session  For all/additional information please see therapy note(s)          Miladys Alvarez Restorative Technician, BS

## 2018-12-03 NOTE — PHYSICAL THERAPY NOTE
Physical Therapy Progress Note         12/03/18 1252   Pain Assessment   Pain Assessment No/denies pain   Restrictions/Precautions   Weight Bearing Precautions Per Order No   Other Precautions Fall Risk; Chair Alarm;Telemetry  (Pacemaker)   General   Chart Reviewed Yes   Family/Caregiver Present No   Cognition   Overall Cognitive Status WFL   Subjective   Subjective Pt  called reporting he wants o work with PT  Pt in bed supine upon entry  Bed Mobility   Supine to Sit 5  Supervision   Additional items Assist x 1;Verbal cues   Transfers   Sit to Stand 5  Supervision   Additional items Assist x 1   Stand to Sit 6  Modified independent   Additional items Assist x 1   Stand pivot 5  Supervision   Ambulation/Elevation   Gait pattern Excessively slow; Short stride   Gait Assistance 5  Supervision   Additional items Assist x 1   Assistive Device None   Distance 180ft, 150ft x 2   Endurance Deficit   Endurance Deficit No   Activity Tolerance   Activity Tolerance Patient tolerated treatment well   Nurse Made Aware Yes   Assessment   Prognosis Good   Problem List Impaired balance;Decreased mobility;Pain;Decreased skin integrity   Assessment Pt  noted with no fatigue at the end of session and reported no fatihue either, Pt  reported pain on both forearms due to the IV sites  Pt  provided with ice packs for both arms  Noted gait to be steady and no LOB occured t/o session and no instability noted  Pt  is awaiting procedure tomorrow  pt, able to perform all activities with S  Needed encouragement to participate in mobility  Will continue to foolow during the stay and recommend D/c home with family support pending progress  Goals   Patient Goals None reported   STG Expiration Date 12/09/18   Treatment Day 1   Plan   Treatment/Interventions Functional transfer training; Endurance training;Patient/family training;Equipment eval/education; Bed mobility;Gait training;Spoke to nursing   Progress Progressing toward goals   PT Frequency Other (Comment)  (3-5x/week)   Recommendation   Recommendation Home with family support  (vs Home PT pending progress post tomorrow's procedure)     Prisca Arrington, PTA

## 2018-12-03 NOTE — PROGRESS NOTES
Heart Failure Service Progress Note - Kamala Villalpando 72 y o  male MRN: [id number]    Unit/Bed#: [facility] 531-01 Encounter: [id number]      Assessment:    Principal Problem:    Atrial fibrillation ([place] [address] )  Active Problems:    Hypertension    Thrombocytopenia ([place] [address] )    PALOMA (acute kidney injury) ([place] [address] )    Coronary artery disease    Hyperlipidemia    Dilated cardiomyopathy ([place] [address] )    Acute on chronic combined systolic and diastolic heart failure ([place] [address] )    Urinary retention    NSTEMI (non-ST elevated myocardial infarction) ([place] [address] )      Subjective:   Patient seen and examined  No significant events overnight  S/P Biventricular Pacer/AICD implant  V paced on telemetry, underlying atrial fib with occ NSVT  Feels well  Stable fluid status  Hemodynamically stable  WWP  Platelets 82,519 down from 100,000  May go for AVN ablation this week  Objective: Intake/ Output:638/1325  Weight: 139 lbs, 149 on admit  Tele: V paced, underlying Atrial fib, occ NSVT  TTE 11/23/18  LVEF: 20%  LVIDd:4 58 cm  RV:normal size and systolic functin  MR:moderate  PASP:  RVOT:   Other:Small free flowing pericardial effusion with moderate left pleural effusion  Neurohormonal Blockade:  --Beta-Blocker: Metoprolol 100 mg TID  --ACEi, ARB or ARNi: PALOMA precludes    (or SVR reduction)  --Aldosterone Receptor Blocker: PALOMA precludes  --Diuretic:Lasix 40 mg PO daily    Sudden Cardiac Death Risk Reduction:  --ICD: EF 20%, S/P Biventricular AICD implant this admission  Interrogation:     Electrical Resynchronization:  --BiV implant   Interrogation:     Advanced Therapies (If appropriate): --Inotrope:  --LVAD/Transplant Candidacy:    Plan: 1  Acute on chronic systolic congestive heart failure, LVEF 20%, NYHA Class II/III, ACC/AHA Stage C, likely tachy-mediated in origin however cannot definitively rule out an ischemic cause  Volume overloaded on admit, now improved with diuresis  Euvolemic on exam  Creatinine improved overall  Continue Lasix 40 mg daily  Patient hemodynamically stable, warm , well perfused  Continue to monitor daily weight, strict I's and O's, BMP  BiV pacer/ICD implant, AVN ablation this week in discussion,  if platelets above ,453        2  Permanent atrial fibrillation  V paced on tele with underlying AF, occ NSVT> Currently on PO Dig,Metoprolol  Sherry [place] No AC at this time due to chronic coagulopathy, possible AF ablation this week    Continue to monitor CBC/platelets daily  Careful monitoring of fluid status post transfusions       3  Chronic idiopathic thrombocytopenia  Unresponsive to steroid taper, now d/c  Defer to hem onc  Platelets 46,839 today down from 100,000        4  HTN  Controlled  [employer] (day, reason): Abbott catheter (day, reason):    Vitals: Blood pressure 116/64, pulse 77, temperature 98 °F (36 7 °C), temperature source Oral, resp  rate 18, height 5' 10" (1 778 m), weight 63 1 kg (139 lb 1 8 oz), SpO2 98 %  , Body mass index is 19 96 kg/m² , I/O last 3 completed shifts: In: 788 6 [P O :520; I V :268 6]  Out: 1875 [[id number]]  I/O this shift:  In: 120 [P O :120]  Out: -   Wt Readings from Last 3 Encounters:   12/03/18 63 1 kg (139 lb 1 8 oz)   11/22/18 67 5 kg (148 lb 13 oz)   10/10/18 73 5 kg (162 lb)       Intake/Output Summary (Last 24 hours) at 12/03/18 1000  Last data filed at 12/03/18 0817   Gross per 24 hour   Intake           571 49 ml   Output             1325 ml   Net          -753 51 ml     I/O last 3 completed shifts: In: 788 6 [P O :520;  I V :268 6]  Out: 1875 [Urine:1875]    V paced underlying Atrial fib, occ NSVT>      Physical Exam:  Vitals:    12/02/18 2343 12/03/18 0220 12/03/18 0600 12/03/18 0719   BP: 112/73 106/68  116/64   BP Location:       Pulse: 98 80  77   Resp: 18 18  18   Temp: 98 °F (36 7 °C) 98 °F (36 7 °C)  98 °F (36 7 °C)   TempSrc:    Oral   SpO2: 96% 100%  98%   Weight:   63 1 kg (139 lb 1 8 oz)    Height:           GEN: Prachi Thompson appears well, alert and oriented x 3, pleasant and cooperative   HEENT: pupils equal, round, and reactive to light; extraocular muscles intact  NECK: supple, no carotid bruits   HEART: regular rhythm, normal S1 and S2, no murmurs, clicks, gallops or rubs, no JVD     LUNGS: clear to auscultation bilaterally; no wheezes, rales, or rhonchi   ABDOMEN: normal bowel sounds, soft, no tenderness, no distention  EXTREMITIES: peripheral pulses normal; no clubbing, cyanosis, or edema  NEURO: no focal findings   SKIN: normal without suspicious lesions on exposed skin      Current Facility-Administered Medications:     acetaminophen (TYLENOL) tablet 650 mg, 650 mg, Oral, Q6H PRN, Alex Dominguez PA-C, 650 mg at 12/01/18 2111    bisacodyl (DULCOLAX) rectal suppository 10 mg, 10 mg, Rectal, Daily PRN, [facility]    calcium carbonate (TUMS) chewable tablet 500 mg, 500 mg, Oral, Daily PRN, Hetal Riuz DO    diazepam (VALIUM) tablet 5 mg, 5 mg, Oral, Q8H PRN, Hetal Ruiz DO, 5 mg at 12/02/18 2356    digoxin (LANOXIN) tablet 125 mcg, 125 mcg, Oral, Daily, Mile Ventura DO, 125 mcg at 12/03/18 0903    diphenhydrAMINE (BENADRYL) tablet 50 mg, 50 mg, Oral, Q6H PRN, Jay Maldonado PA-C, 50 mg at 11/29/18 2314    docusate sodium (COLACE) capsule 100 mg, 100 mg, Oral, BID, Allison Frausto PA-C, 100 mg at 12/03/18 7602    famotidine (PEPCID) tablet 20 mg, 20 mg, Oral, Daily, Alex Dominguez PA-C, 20 mg at 12/03/18 4862    furosemide (LASIX) tablet 40 mg, 40 mg, Oral, Daily, Asad Bae MD, 40 mg at 12/03/18 0903    heparin (porcine) subcutaneous injection 5,000 Units, 5,000 Units, Subcutaneous, Q8H [facility], Julio Frye MD, 5,000 Units at 12/03/18 0620    hydrocortisone 1 % cream, , Topical, 4x Daily PRN, Jay Maldonado PA-C    meperidine (DEMEROL) injection 12 5 mg, 12 5 mg, Intravenous, PRN, Tennille Morin MD    metoprolol (LOPRESSOR) injection 5 mg, 5 mg, Intravenous, Q6H PRN, Alex Dominguez PA-C, 5 mg at 11/24/18 1811    metoprolol tartrate (LOPRESSOR) tablet 100 mg, 100 mg, Oral, TID, Ignacio Jeronimo MD, 100 mg at 12/03/18 0903    ondansetron [facility]) injection 4 mg, 4 mg, Intravenous, Q6H PRN, Suzy Arrington PA-C, 4 mg at 12/01/18 0831    polyethylene glycol (MIRALAX) packet 17 g, 17 g, Oral, Daily PRN, Carlitos Frausto PA-C, 17 g at 11/27/18 0515    polyethylene glycol (MIRALAX) packet 17 g, 17 g, Oral, BID, Evi Gillis MD, Stopped at 11/30/18 9558    senna (SENOKOT) tablet 8 6 mg, 1 tablet, Oral, HS, Carlitos Frausto PA-C, 8 6 mg at 12/01/18 2111    tamsulosin (FLOMAX) capsule 0 4 mg, 0 4 mg, Oral, Daily With Dinner, Kalli Winn PA-C, 0 4 mg at 12/02/18 1630    zolpidem (AMBIEN) tablet 5 mg, 5 mg, Oral, HS PRN, Suzy Arrington PA-C, 2 5 mg at 11/26/18 2250      Labs & Results:          Results from last 7 days  Lab Units 12/03/18  0524 12/02/18  0450 12/01/18  0523   WBC Thousand/uL 4 57 4 99 4 84   HEMOGLOBIN g/dL 11 7* 12 0 11 6*   HEMATOCRIT % 36 2* 36 2* 35 3*   PLATELETS Thousands/uL 82* 100* 112*           Results from last 7 days  Lab Units 12/03/18  0524 12/02/18  0450 12/01/18  1422 12/01/18  0523  11/28/18  0511 11/27/18  0427   POTASSIUM mmol/L 4 2 3 4* 4 9 2 9*  < > 3 6 3 6   CHLORIDE mmol/L 100 96*  --  95*  < > 97* 99*   CO2 mmol/L 29 33*  --  33*  < > 31 28   BUN mg/dL 33* 33*  --  27*  < > 35* 34*   CREATININE mg/dL 1 44* 1 59*  --  1 45*  < > 1 51* 1 46*   CALCIUM mg/dL 8 9 9 7  --  9 4  < > 9 7 8 5   ALK PHOS U/L  --   --   --   --   --  91 77   ALT U/L  --   --   --   --   --  73 60   AST U/L  --   --   --   --   --  34 28   < > = values in this interval not displayed  Results from last 7 days  Lab Units 11/30/18  0610   INR  1 27*           Counseling / Coordination of Care  Total floor / unit time spent today 20 minutes  Greater than 50% of total time was spent with the patient and / or family counseling and / or coordination of care  A description of the counseling / coordination of care: 20

## 2018-12-04 ENCOUNTER — APPOINTMENT (INPATIENT)
Dept: NON INVASIVE DIAGNOSTICS | Facility: HOSPITAL | Age: 73
DRG: 242 | End: 2018-12-04
Payer: MEDICARE

## 2018-12-04 LAB
ALBUMIN SERPL BCP-MCNC: 3.1 G/DL (ref 3.5–5)
ALP SERPL-CCNC: 103 U/L (ref 46–116)
ALT SERPL W P-5'-P-CCNC: 40 U/L (ref 12–78)
ANION GAP SERPL CALCULATED.3IONS-SCNC: 6 MMOL/L (ref 4–13)
AST SERPL W P-5'-P-CCNC: 25 U/L (ref 5–45)
BASOPHILS # BLD AUTO: 0.01 THOUSANDS/ΜL (ref 0–0.1)
BASOPHILS NFR BLD AUTO: 0 % (ref 0–1)
BILIRUB SERPL-MCNC: 1.24 MG/DL (ref 0.2–1)
BUN SERPL-MCNC: 36 MG/DL (ref 5–25)
CALCIUM SERPL-MCNC: 9.6 MG/DL (ref 8.3–10.1)
CHLORIDE SERPL-SCNC: 97 MMOL/L (ref 100–108)
CO2 SERPL-SCNC: 29 MMOL/L (ref 21–32)
CREAT SERPL-MCNC: 1.66 MG/DL (ref 0.6–1.3)
EOSINOPHIL # BLD AUTO: 1.03 THOUSAND/ΜL (ref 0–0.61)
EOSINOPHIL NFR BLD AUTO: 25 % (ref 0–6)
ERYTHROCYTE [DISTWIDTH] IN BLOOD BY AUTOMATED COUNT: 17.5 % (ref 11.6–15.1)
GFR SERPL CREATININE-BSD FRML MDRD: 41 ML/MIN/1.73SQ M
GLUCOSE SERPL-MCNC: 91 MG/DL (ref 65–140)
HCT VFR BLD AUTO: 35.3 % (ref 36.5–49.3)
HGB BLD-MCNC: 11.6 G/DL (ref 12–17)
IMM GRANULOCYTES # BLD AUTO: 0.07 THOUSAND/UL (ref 0–0.2)
IMM GRANULOCYTES NFR BLD AUTO: 2 % (ref 0–2)
LYMPHOCYTES # BLD AUTO: 0.88 THOUSANDS/ΜL (ref 0.6–4.47)
LYMPHOCYTES NFR BLD AUTO: 21 % (ref 14–44)
MCH RBC QN AUTO: 31.1 PG (ref 26.8–34.3)
MCHC RBC AUTO-ENTMCNC: 32.9 G/DL (ref 31.4–37.4)
MCV RBC AUTO: 95 FL (ref 82–98)
MONOCYTES # BLD AUTO: 0.34 THOUSAND/ΜL (ref 0.17–1.22)
MONOCYTES NFR BLD AUTO: 8 % (ref 4–12)
NEUTROPHILS # BLD AUTO: 1.79 THOUSANDS/ΜL (ref 1.85–7.62)
NEUTS SEG NFR BLD AUTO: 44 % (ref 43–75)
NRBC BLD AUTO-RTO: 1 /100 WBCS
PLATELET # BLD AUTO: 75 THOUSANDS/UL (ref 149–390)
POTASSIUM SERPL-SCNC: 3.8 MMOL/L (ref 3.5–5.3)
PROT SERPL-MCNC: 6.3 G/DL (ref 6.4–8.2)
RBC # BLD AUTO: 3.73 MILLION/UL (ref 3.88–5.62)
SODIUM SERPL-SCNC: 132 MMOL/L (ref 136–145)
WBC # BLD AUTO: 4.12 THOUSAND/UL (ref 4.31–10.16)

## 2018-12-04 PROCEDURE — 93286 PERI-PX EVAL PM/LDLS PM IP: CPT | Performed by: INTERNAL MEDICINE

## 2018-12-04 PROCEDURE — 85025 COMPLETE CBC W/AUTO DIFF WBC: CPT | Performed by: HOSPITALIST

## 2018-12-04 PROCEDURE — C1733 CATH, EP, OTHR THAN COOL-TIP: HCPCS | Performed by: PHYSICIAN ASSISTANT

## 2018-12-04 PROCEDURE — 93005 ELECTROCARDIOGRAM TRACING: CPT

## 2018-12-04 PROCEDURE — C1894 INTRO/SHEATH, NON-LASER: HCPCS | Performed by: PHYSICIAN ASSISTANT

## 2018-12-04 PROCEDURE — 99153 MOD SED SAME PHYS/QHP EA: CPT | Performed by: PHYSICIAN ASSISTANT

## 2018-12-04 PROCEDURE — 97116 GAIT TRAINING THERAPY: CPT

## 2018-12-04 PROCEDURE — 93650 ICAR CATH ABLTJ AV NODE FUNC: CPT | Performed by: PHYSICIAN ASSISTANT

## 2018-12-04 PROCEDURE — 99232 SBSQ HOSP IP/OBS MODERATE 35: CPT | Performed by: HOSPITALIST

## 2018-12-04 PROCEDURE — 99231 SBSQ HOSP IP/OBS SF/LOW 25: CPT | Performed by: NURSE PRACTITIONER

## 2018-12-04 PROCEDURE — 80053 COMPREHEN METABOLIC PANEL: CPT | Performed by: HOSPITALIST

## 2018-12-04 PROCEDURE — 93286 PERI-PX EVAL PM/LDLS PM IP: CPT | Performed by: PHYSICIAN ASSISTANT

## 2018-12-04 PROCEDURE — 97110 THERAPEUTIC EXERCISES: CPT

## 2018-12-04 PROCEDURE — 93650 ICAR CATH ABLTJ AV NODE FUNC: CPT | Performed by: INTERNAL MEDICINE

## 2018-12-04 PROCEDURE — C1893 INTRO/SHEATH, FIXED,NON-PEEL: HCPCS | Performed by: PHYSICIAN ASSISTANT

## 2018-12-04 RX ORDER — SODIUM CHLORIDE 9 MG/ML
INJECTION, SOLUTION INTRAVENOUS
Status: COMPLETED | OUTPATIENT
Start: 2018-12-04 | End: 2018-12-04

## 2018-12-04 RX ORDER — MIDAZOLAM HYDROCHLORIDE 1 MG/ML
INJECTION INTRAMUSCULAR; INTRAVENOUS CODE/TRAUMA/SEDATION MEDICATION
Status: COMPLETED | OUTPATIENT
Start: 2018-12-04 | End: 2018-12-04

## 2018-12-04 RX ORDER — LIDOCAINE HYDROCHLORIDE 10 MG/ML
INJECTION, SOLUTION INFILTRATION; PERINEURAL CODE/TRAUMA/SEDATION MEDICATION
Status: COMPLETED | OUTPATIENT
Start: 2018-12-04 | End: 2018-12-04

## 2018-12-04 RX ORDER — FENTANYL CITRATE 50 UG/ML
INJECTION, SOLUTION INTRAMUSCULAR; INTRAVENOUS CODE/TRAUMA/SEDATION MEDICATION
Status: COMPLETED | OUTPATIENT
Start: 2018-12-04 | End: 2018-12-04

## 2018-12-04 RX ADMIN — HEPARIN SODIUM 5000 UNITS: 5000 INJECTION INTRAVENOUS; SUBCUTANEOUS at 06:06

## 2018-12-04 RX ADMIN — FAMOTIDINE 20 MG: 20 TABLET ORAL at 09:26

## 2018-12-04 RX ADMIN — SODIUM CHLORIDE 300 ML/HR: 9 INJECTION, SOLUTION INTRAVENOUS at 17:57

## 2018-12-04 RX ADMIN — METOPROLOL TARTRATE 100 MG: 50 TABLET, FILM COATED ORAL at 09:26

## 2018-12-04 RX ADMIN — TAMSULOSIN HYDROCHLORIDE 0.4 MG: 0.4 CAPSULE ORAL at 21:26

## 2018-12-04 RX ADMIN — FENTANYL CITRATE 50 MCG: 50 INJECTION, SOLUTION INTRAMUSCULAR; INTRAVENOUS at 16:37

## 2018-12-04 RX ADMIN — FENTANYL CITRATE 50 MCG: 50 INJECTION, SOLUTION INTRAMUSCULAR; INTRAVENOUS at 16:44

## 2018-12-04 RX ADMIN — LIDOCAINE HYDROCHLORIDE 10 ML: 10 INJECTION, SOLUTION INFILTRATION; PERINEURAL at 17:45

## 2018-12-04 RX ADMIN — DIGOXIN 125 MCG: 125 TABLET ORAL at 09:26

## 2018-12-04 RX ADMIN — LIDOCAINE HYDROCHLORIDE 10 ML: 10 INJECTION, SOLUTION INFILTRATION; PERINEURAL at 16:48

## 2018-12-04 RX ADMIN — METOPROLOL TARTRATE 100 MG: 50 TABLET, FILM COATED ORAL at 21:26

## 2018-12-04 RX ADMIN — MIDAZOLAM 1 MG: 1 INJECTION INTRAMUSCULAR; INTRAVENOUS at 16:44

## 2018-12-04 RX ADMIN — FUROSEMIDE 40 MG: 40 TABLET ORAL at 09:26

## 2018-12-04 RX ADMIN — MIDAZOLAM 1 MG: 1 INJECTION INTRAMUSCULAR; INTRAVENOUS at 16:38

## 2018-12-04 RX ADMIN — DIAZEPAM 5 MG: 5 TABLET ORAL at 01:06

## 2018-12-04 NOTE — PROGRESS NOTES
Heart Failure Service Progress Note - Michael Calvo 72 y o  male MRN: [id number]    Unit/Bed#: [facility] 531-01 Encounter: [id number]      Assessment:    Principal Problem:    Atrial fibrillation ([facility] 75 )  Active Problems:    Hypertension    Thrombocytopenia ([facility] )    PALOMA (acute kidney injury) ([place] [address] )    Acute on chronic combined systolic and diastolic heart failure ([place] [address] )    Urinary retention    NSTEMI (non-ST elevated myocardial infarction) ([place] [address] )      Subjective:   Patient seen and examined  No significant events overnight  S/P Biventricular Pacer/AICD implant  V paced on telemetry, underlying atrial fib with occ NSVT  Feels well  Stable fluid status, small creat bump today  Hemodynamically stable  WWP  Platelets 50,035 down from 82,000 yesterday  Plan for  AVN ablation this afternoon      Objective: Intake/ Output:340/1300  Weight: 136 lbs, 149 on admit  Tele: V paced, underlying Atrial fib, occ NSVT  TTE 11/23/18  LVEF: 20%  LVIDd:4 58 cm  RV:normal size and systolic functin  MR:moderate  PASP:  RVOT:   Other:Small free flowing pericardial effusion with moderate left pleural effusion  Neurohormonal Blockade:  --Beta-Blocker: Metoprolol 100 mg TID  --ACEi, ARB or ARNi: PALOMA precludes    (or SVR reduction)  --Aldosterone Receptor Blocker: PALOMA precludes  --Diuretic:Lasix 40 mg PO daily    Sudden Cardiac Death Risk Reduction:  --ICD: EF 20%, S/P Biventricular AICD implant this admission  Interrogation:     Electrical Resynchronization:  --BiV implant   Interrogation:     Advanced Therapies (If appropriate): --Inotrope:  --LVAD/Transplant Candidacy:    Plan: 1  Acute on chronic systolic congestive heart failure, LVEF 20%, NYHA Class II/III, ACC/AHA Stage C, likely tachy-mediated in origin however cannot definitively rule out an ischemic cause  Volume overloaded on admit, now improved with diuresis  Euvolemic on exam  Creatinine improved overall, small bump today  Continue Lasix 40 mg daily    Patient hemodynamically stable, warm , well perfused  Continue to monitor daily weight, strict I's and O's, BMP  BiV pacer/ICD implant  Plan for AVN ablation this afternoon      2  Permanent atrial fibrillation  V paced on tele with underlying AF, occ NSVT> Currently on PO Dig,Metoprolol  Batsheva Nissen No AC at this time due to chronic coagulopathy  Plan for AVN ablation this afternoon  Continue to monitor CBC/platelets daily  Careful monitoring of fluid status post transfusions       3  Chronic idiopathic thrombocytopenia  Unresponsive to steroid taper, now d/c  Defer to hem onc  Platelets 21,266 today down from 82,000 yesterday        4  HTN  Controlled  [employer] (day, reason): Abbott catheter (day, reason):    Vitals: Blood pressure 112/67, pulse 80, temperature 98 5 °F (36 9 °C), resp  rate 18, height 5' 10" (1 778 m), weight 61 9 kg (136 lb 8 oz), SpO2 98 %  , Body mass index is 19 59 kg/m² , I/O last 3 completed shifts: In: 440 [P O :440]  Out: 1725 [Urine:1725]  No intake/output data recorded  Wt Readings from Last 3 Encounters:   12/04/18 61 9 kg (136 lb 8 oz)   11/22/18 67 5 kg (148 lb 13 oz)   10/10/18 73 5 kg (162 lb)       Intake/Output Summary (Last 24 hours) at 12/04/18 0849  Last data filed at 12/04/18 0101   Gross per 24 hour   Intake              220 ml   Output             1300 ml   Net            -1080 ml     I/O last 3 completed shifts:   In: 440 [P O :440]  Out: 1725 [Urine:1725]    V paced underlying Atrial fib, occ NSVT>      Physical Exam:  Vitals:    12/03/18 2300 12/04/18 0354 12/04/18 0600 12/04/18 0718   BP: 101/64 102/68  112/67   BP Location: Left arm Left arm     Pulse: 85 84  80   Resp: 18 18 18   Temp: 97 7 °F (36 5 °C) 98 1 °F (36 7 °C)  98 5 °F (36 9 °C)   TempSrc: Oral Oral     SpO2: 98% 98%  98%   Weight:   61 9 kg (136 lb 8 oz)    Height:           GEN: Prachi Thompson appears well, alert and oriented x 3, pleasant and cooperative   HEENT: pupils equal, round, and reactive to light; extraocular muscles intact  NECK: supple, no carotid bruits   HEART: regular rhythm, normal S1 and S2, no murmurs, clicks, gallops or rubs, no JVD     LUNGS: clear to auscultation bilaterally; no wheezes, rales, or rhonchi   ABDOMEN: normal bowel sounds, soft, no tenderness, no distention  EXTREMITIES: peripheral pulses normal; no clubbing, cyanosis, or edema  NEURO: no focal findings   SKIN: normal without suspicious lesions on exposed skin      Current Facility-Administered Medications:     acetaminophen (TYLENOL) tablet 650 mg, 650 mg, Oral, Q6H PRN, Octavio Ballard PA-C, 650 mg at 12/03/18 1835    bisacodyl (DULCOLAX) rectal suppository 10 mg, 10 mg, Rectal, Daily PRN, Dana Landry PA-C    calcium carbonate (TUMS) chewable tablet 500 mg, 500 mg, Oral, Daily PRN, Perry Willie, DO    diazepam (VALIUM) tablet 5 mg, 5 mg, Oral, Q8H PRN, Bryn Tapia DO, 5 mg at 12/04/18 0106    digoxin (LANOXIN) tablet 125 mcg, 125 mcg, Oral, Daily, Milind Benton Trager, DO, 125 mcg at 12/03/18 0903    diphenhydrAMINE (BENADRYL) tablet 50 mg, 50 mg, Oral, Q6H PRN, Jay Maldonado PA-C, 50 mg at 11/29/18 2314    docusate sodium (COLACE) capsule 100 mg, 100 mg, Oral, BID, Norma Frausto PA-C, 100 mg at 12/03/18 8791    famotidine (PEPCID) tablet 20 mg, 20 mg, Oral, Daily, Octavio Ballard PA-C, 20 mg at 12/03/18 6383    furosemide (LASIX) tablet 40 mg, 40 mg, Oral, Daily, Panchito Gamboa MD, 40 mg at 12/03/18 0903    heparin (porcine) subcutaneous injection 5,000 Units, 5,000 Units, Subcutaneous, Q8H [facility], Shelley Diaz MD, 5,000 Units at 12/04/18 0606    hydrocortisone 1 % cream, , Topical, 4x Daily PRN, Jay Maldonado PA-C    meperidine (DEMEROL) injection 12 5 mg, 12 5 mg, Intravenous, PRN, Wendy Antoine MD    metoprolol (LOPRESSOR) injection 5 mg, 5 mg, Intravenous, Q6H PRN, Octavio Ballard PA-C, 5 mg at 11/24/18 1811    metoprolol tartrate (LOPRESSOR) tablet 100 mg, 100 mg, Oral, TID, Shelley Diaz MD, 100 mg at 12/03/18 2117    ondansetron (ZOFRAN) injection 4 mg, 4 mg, Intravenous, Q6H PRN, Skyla Ortez PA-C, 4 mg at 12/03/18 1726    polyethylene glycol (MIRALAX) packet 17 g, 17 g, Oral, Daily PRN, Nadeem Frausto PA-C, 17 g at 11/27/18 0515    polyethylene glycol (MIRALAX) packet 17 g, 17 g, Oral, BID, Eli Montoya MD, Stopped at 11/30/18 8660    senna (SENOKOT) tablet 8 6 mg, 1 tablet, Oral, HS, Nadeem Frausto PA-C, 8 6 mg at 12/01/18 2111    tamsulosin (FLOMAX) capsule 0 4 mg, 0 4 mg, Oral, Daily With Dinner, Kalli Winn PA-C, 0 4 mg at 12/03/18 1713    zolpidem (AMBIEN) tablet 5 mg, 5 mg, Oral, HS PRN, Skyla Ortez PA-C, 2 5 mg at 11/26/18 2250      Labs & Results:          Results from last 7 days  Lab Units 12/04/18  0658 12/03/18  0524 12/02/18  0450   WBC Thousand/uL 4 12* 4 57 4 99   HEMOGLOBIN g/dL 11 6* 11 7* 12 0   HEMATOCRIT % 35 3* 36 2* 36 2*   PLATELETS Thousands/uL 75* 82* 100*           Results from last 7 days  Lab Units 12/04/18  0658 12/03/18  0524 12/02/18  0450  11/28/18  0511   POTASSIUM mmol/L 3 8 4 2 3 4*  < > 3 6   CHLORIDE mmol/L 97* 100 96*  < > 97*   CO2 mmol/L 29 29 33*  < > 31   BUN mg/dL 36* 33* 33*  < > 35*   CREATININE mg/dL 1 66* 1 44* 1 59*  < > 1 51*   CALCIUM mg/dL 9 6 8 9 9 7  < > 9 7   ALK PHOS U/L 103  --   --   --  91   ALT U/L 40  --   --   --  73   AST U/L 25  --   --   --  34   < > = values in this interval not displayed  Results from last 7 days  Lab Units 11/30/18  0610   INR  1 27*           Counseling / Coordination of Care  Total floor / unit time spent today 20 minutes  Greater than 50% of total time was spent with the patient and / or family counseling and / or coordination of care  A description of the counseling / coordination of care: 20

## 2018-12-04 NOTE — PHYSICIAN ADVISOR
Current patient class: Inpatient  The patient is currently on Hospital Day: 13      The patient was admitted to the hospital at [address] on 11/22/18 for the following diagnosis:  Atrial fibrillation ([address] ) [I48 [id number]]     CMS OUTLIER STAY REVIEW    After review of the relevant documentation, labs, vital signs and test results, the patient is appropriate for CONTINUED INPATIENT ADMISSION  The patient continues to remain hospitalized receiving acute medical care  The patient has surpassed the expected duration of stay, however given the clinical condition, need for further acute care management, the patient is appropriate to remain in an inpatient status  The patient still being actively managed, and does have unresolved medical issues requiring further hospitalization  This review is conducted at 12 day intervals, to help satisfy the requirements for significant outlier stay review as per CMS  Given the current condition of this patient, the patient satisfies this review was determination for continued inpatient stay  Rationale is as follows: The patient is a 22-year-old male who has been hospitalized since November 22, 2018  He has acute on chronic biventricular heart failure with reduced ejection fraction and atrial fibrillation with rapid ventricular response status post BiV PPM   AVN ablation is planned for today  Worsened thrombocytopenia has been an issue along with occasional an SVT and worsened creatinine from baseline (PALOMA)  The patient continues to require acute hospital management and remains appropriate for inpatient level care      The patients vitals on arrival were ED Triage Vitals   Temperature Pulse Respirations Blood Pressure SpO2   11/22/18 1939 11/22/18 2000 11/22/18 2000 11/22/18 2000 11/22/18 2000   97 6 °F (36 4 °C) (!) 154 20 91/68 99 %      Temp Source Heart Rate Source Patient Position - Orthostatic VS BP Location FiO2 (%)   11/22/18 1939 11/23/18 2025 11/27/18 1854 11/27/18 5469 --   Oral Monitor Sitting Left arm       Pain Score       11/22/18 2000       No Pain           Past Medical History:   Diagnosis Date    [facility] )     Congestive heart failure with left ventricular diastolic dysfunction, chronic (HCC)     Hypertension     Peripheral vascular disease of lower extremity (HCC)     Subdural hematoma (HCC)      Past Surgical History:   Procedure Laterality Date    BACK SURGERY      AUSTYN HOLE FOR SUBDURAL HEMATOMA      CLAVICLE SURGERY      HERNIA REPAIR      PROSTATE SURGERY             Consults have been placed to:   IP CONSULT TO CASE MANAGEMENT  IP CONSULT TO NUTRITION SERVICES  IP CONSULT TO CARDIOLOGY  IP CONSULT TO ELECTROPHYSIOLOGY  IP CONSULT TO HEMATOLOGY  IP CONSULT TO WOUND CARE    Vitals:    12/04/18 0600 12/04/18 0718 12/04/18 1100 12/04/18 1500   BP:  112/67 131/84 100/60   BP Location:       Pulse:  80 81 83   Resp:  18 20 18   Temp:  98 5 °F (36 9 °C) 98 °F (36 7 °C) 97 7 °F (36 5 °C)   TempSrc:   Oral Oral   SpO2:  98% 100% 98%   Weight: 61 9 kg (136 lb 8 oz)      Height:           Most recent labs:    Recent Labs      12/04/18   0658   WBC  4 12*   HGB  11 6*   HCT  35 3*   PLT  75*   K  3 8   CALCIUM  9 6   BUN  36*   CREATININE  1 66*   AST  25   ALT  40   ALKPHOS  103       Scheduled Meds:  Current Facility-Administered Medications:  acetaminophen 650 mg Oral Q6H PRN Jannie Michael PA-C   bisacodyl 10 mg Rectal Daily PRN Kajal Frausto PA-C   calcium carbonate 500 mg Oral Daily PRN Jinnie Saucer, DO   diazepam 5 mg Oral Q8H PRN Jevonnie Saucer, DO   digoxin 125 mcg Oral Daily Kaylene Ventura, DO   diphenhydrAMINE 50 mg Oral Q6H PRN Jay Maldonado PA-C   docusate sodium 100 mg Oral BID Kajal Frausto PA-C   famotidine 20 mg Oral Daily Jannie Michael PA-C   fentanyl citrate (PF)  Intravenous Code/Trauma/Sedation Med Ashlyn De La Vega MD   furosemide 40 mg Oral Daily Marii Burnham MD   heparin (porcine) 5,000 Units Subcutaneous [facility] [address] Jaqueline Bowen MD   hydrocortisone  Topical 4x Daily PRN Mary Card PA-C   meperidine 12 5 mg Intravenous PRN Mk Mccall MD   metoprolol 5 mg Intravenous Q6H PRN Ana Mix PA-C   metoprolol tartrate 100 mg Oral TID Jaqueline Bowen MD   midazolam   Code/Trauma/Sedation Med Beryle Mares, MD   ondansetron 4 mg Intravenous Q6H PRN Ana Mix PA-C   polyethylene glycol 17 g Oral Daily PRN Sukhwinder Tiwari PA-C   polyethylene glycol 17 g Oral BID Sachin Sawyer MD   senna 1 tablet Oral HS Antonette Fawad Frausto PA-C   tamsulosin 0 4 mg Oral Daily [facility]   zolpidem 5 mg Oral HS PRN Ana Mix PA-C     Continuous Infusions:   PRN Meds:   acetaminophen    bisacodyl    calcium carbonate    diazepam    diphenhydrAMINE    fentanyl citrate (PF)    hydrocortisone    meperidine    metoprolol    midazolam    ondansetron    polyethylene glycol    zolpidem    Surgical procedures (if appropriate):

## 2018-12-04 NOTE — PLAN OF CARE
Problem: PHYSICAL THERAPY ADULT  Goal: Performs mobility at highest level of function for planned discharge setting  See evaluation for individualized goals  Treatment/Interventions: Functional transfer training, LE strengthening/ROM, Patient/family training, Equipment eval/education, Bed mobility, Gait training, Spoke to nursing  Equipment Recommended:  (none v/s RW or SPC ?? still assessing  )       See flowsheet documentation for full assessment, interventions and recommendations  Outcome: Progressing  Prognosis: Good  Problem List: Decreased strength, Decreased endurance, Impaired balance, Decreased mobility, Decreased skin integrity  Assessment: Pt  able to ambulate longer distance this session and reported no fatigue  Though patient was reluctant to participate in session eventually patient able to participate well  Pt  reported pain continue to be present in B/L forearms  All transfers performed with Mod I  Increased easd with supine to sit and back transfer noted  Will continue to follow during the stay to maximize functional independence  Pt  is scheduled for ablation procedure today  Recommendation: Home with family support, Other (Comment) (vs Home PT pending progress)     PT - OK to Discharge: No    See flowsheet documentation for full assessment

## 2018-12-04 NOTE — ASSESSMENT & PLAN NOTE
History of ITP  Negative signs of bleeding with baseline platelets around 22,691  Platelet count 841/45/61  Appreciate Heme-Onc input and continue Medrol Dosepak according to them  Had a reaction to platelet transfusion with rash on the back and neck without any respiratory compromise on 11/28  Transfusion was held and regimen with Benadryl, Valium and Tylenol was given  Patient is already on steroids for ITP  As per patient Valium helps with his allergic reactions

## 2018-12-04 NOTE — UTILIZATION REVIEW
[address]  Utilization Review Department  Phone: [phone]; Fax [phone]  [email or web address]  ATTENTION: Please call with any questions or concerns to [phone]  and carefully listen to the prompts so that you are directed to the right person  Send all requests for admission clinical reviews, approved or denied determinations and any other requests to fax [phone]  All voicemails are confidential   Continued Stay Review    Date: 12/04/2018    Vital Signs: /60   Pulse 83   Temp 97 7 °F (36 5 °C) (Oral)   Resp 18   Ht 5' 10" (1 778 m)   Wt 61 9 kg (136 lb 8 oz)   SpO2 98%   BMI 19 59 kg/m²     Medications:   Scheduled Meds:   Current Facility-Administered Medications:  acetaminophen 650 mg Oral Q6H PRN   bisacodyl 10 mg Rectal Daily PRN   calcium carbonate 500 mg Oral Daily PRN   diazepam 5 mg Oral Q8H PRN   digoxin 125 mcg Oral Daily   diphenhydrAMINE 50 mg Oral Q6H PRN   docusate sodium 100 mg Oral BID   famotidine 20 mg Oral Daily   furosemide 40 mg Oral Daily   heparin (porcine) 5,000 Units Subcutaneous Q8H [address]   hydrocortisone  Topical 4x Daily PRN   meperidine 12 5 mg Intravenous PRN   metoprolol 5 mg Intravenous Q6H PRN   metoprolol tartrate 100 mg Oral TID   ondansetron 4 mg Intravenous Q6H PRN   polyethylene glycol 17 g Oral Daily PRN   polyethylene glycol 17 g Oral BID   senna 1 tablet Oral HS   tamsulosin 0 4 mg Oral Daily With Dinner   zolpidem 5 mg Oral HS PRN     Continuous Infusions:    PRN Meds:     Abnormal Labs/Diagnostic Results:   Lab Units 12/04/18  0658   WBC Thousand/uL 4 12*   HEMOGLOBIN g/dL 11 6*   HEMATOCRIT % 35 3*   PLATELETS Thousands/uL 75*     Lab Units 12/04/18  0658   POTASSIUM mmol/L 3 8   CHLORIDE mmol/L 97*   CO2 mmol/L 29   BUN mg/dL 36*   CREATININE mg/dL 1 66*     Age/Sex: 72 y o  male     Assessment/Plan: 12/04/2018 attending note:  Plan: 1  Acute on chronic systolic congestive heart failure, LVEF 20%, NYHA Class II/III, ACC/AHA Stage C, likely tachy-mediated in origin however cannot definitively rule out an ischemic cause  Volume overloaded on admit, now improved with diuresis  Euvolemic on exam  Creatinine improved overall, small bump today  Continue Lasix 40 mg daily  Patient hemodynamically stable, warm , well perfused  Continue to monitor daily weight, strict I's and O's, BMP  BiV pacer/ICD implant  Plan for AVN ablation this afternoon    2  Permanent atrial fibrillation  V paced on tele with underlying AF, occ NSVT> Currently on PO Dig,Metoprolol  Shira Cal No AC at this time due to chronic coagulopathy  Plan for AVN ablation this afternoon  Continue to monitor CBC/platelets daily  Careful monitoring of fluid status post transfusions  3 Chronic idiopathic thrombocytopenia  Unresponsive to steroid taper, now d/c  Defer to hem onc  Platelets 48,441 today down from 82,000 yesterday  4 HTN  Controlled  12/04/2018 WOUND NOTE:   Assessment:  Wound care consulted for assessment of R arm wound  Patient seen in bed, is cooperative and agreeable with assessment  Continent of bowel, garrison for urine  Assist of one with turning  Will need to encourage patient to turn and reposition, using wedges  Nutrition is following along      Intact dry emosiderin staining noted to b/l lower extremities  B/l heels are intact with no redness or wounds noted  Recommend offloading and hydraguard cream       R buttock with blanchable redness/hyperpigmentation, and area of old scarring  Patient reports he had a wound to this area previously  No open aspects, redness or maceration to the mid sacrum and L buttock  Recommend offloading and hydraguard cream      B/l upper extremities with scattered ecchymosis from multiple blood sticks / IV sites      R wrist (posterior / lateral) aspect with intact fragile tender blood filled blister  Wound bed is intact with no drainage   Patient reports this was the site of an IV - no IV in place at time of assessment to this location  Donna-wound is slightly swollen with pink skin - this is locally surrounding the blister - does not extend up the arm distally or proximally  No induration noted  Patient reports it is getting better after elevation  Nursing applying cool packs  Per nursing, patient had infusion of amiodarone  Recommend to keep the blister intact using skin prep/3m no sting - and if blister opens call wound care to reassess the wound      Educated patient on the importance of frequent offloading of pressure via turning, repositioning and weight-shifting  Verbalized understanding of plan of care      No induration, fluctuance, redness, or purulence noted to the above noted wounds  Patient tolerated well  Primary nurse aware of plan of care  See flow sheets for more detailed assessment findings  Will follow along      Plan:   1  Apply hydraguard to b/l heels, buttocks and sacrum BID and PRN for prevention and protection  2  Apply skin nourishing cream the entire skin daily for moisture  3  Turn and reposition patient every  2 hours   4  Elevate heels off of bed with pillows to offload pressure   5  Soft care cushion to chair when OOB, if able  6  Apply 3m no sting to R wrist blood bulla daily  If blister becomes open/broken, please contact wound care to change treatment     7  Wound care will follow along with patient weekly, please call with questions and concerns     Discharge Plan: eliane

## 2018-12-04 NOTE — PROGRESS NOTES
Progress Note - Mile Guillen 1945, 72 y o  male MRN: [id number]    Unit/Bed#: [facility] 531-01 Encounter: [id number]    Primary Care Provider: Theodore Ross MD   Date and time admitted to hospital: 11/22/2018  7:39 PM        * Atrial fibrillation [facility])   Assessment & Plan    Presented with atrial fibrillation with rapid ventricular rate  Rate better controlled now  Off amiodarone drip   Continue current dose of digoxin 125 mcg daily  Continue metoprolol 100 mg t i d  Per Cardiology  [address] Cardiology input  Status post biv pacer on 11/30/2018  Continue to hold anticoagulation for now pending discussion regarding AV node ablation 12/05/2008  Appreciate Cardiology input  NSTEMI (non-ST elevated myocardial infarction) [facility])   Assessment & Plan    NSTEMI, type 2, POA, due to demand from atrial fibrillation with RVR with associated tachy induced cardiomyopathy" Troponins range from 0 03 to 0 07  Continue current medical management  Urinary retention   Assessment & Plan    Continue Flomax  Monitor urine output     Acute on chronic combined systolic and diastolic heart failure (HCC)   Assessment & Plan    Noted ejection fraction of 20%  Continue beta-blocker and digoxin along with Lasix  [address] Cardiology input  Management for AFib as above  [facility]   Assessment & Plan    Acute on chronic renal failure  Improving  Baseline creatinine between 1 3-1 6  Slowly worsening 1 46>> 1 51>> 1 55>> 1 58>> 1 46>> 1 59>1 44>1 66  Continue diuresis Lasix 40 mg daily  Strict I&Os  Thrombocytopenia ([facility] )   Assessment & Plan    History of ITP  Negative signs of bleeding with baseline platelets around 08,625  Platelet count 848/85/96  Appreciate Heme-Onc input and continue Medrol Dosepak according to them  Had a reaction to platelet transfusion with rash on the back and neck without any respiratory compromise on 11/28    Transfusion was held and regimen with Benadryl, Valium and Tylenol was given  Patient is already on steroids for ITP  As per patient Valium helps with his allergic reactions  Hypertension   Assessment & Plan    Continue with metoprolol, dose changed to 100 t i d  Per Cardiology recommendations  Continue with Lasix 40 mg, decrease to daily as per Cardiology  BP controlled 101/65             VTE Pharmacologic Prophylaxis:   Pharmacologic: Pharmacologic VTE Prophylaxis contraindicated due to thrombocytopenia and cardiac ablation  Mechanical VTE Prophylaxis in Place: Yes    Patient Centered Rounds: I have performed bedside rounds with nursing staff today  Discussions with Specialists or Other Care Team Provider: yes    Education and Discussions with Family / Patient: yes    Time Spent for Care: 45 minutes  More than 50% of total time spent on counseling and coordination of care as described above  Current Length of Stay: 12 day(s)    Current Patient Status: Inpatient   Certification Statement: The patient will continue to require additional inpatient hospital stay due to med mgt of arrythmia    Discharge Plan: home when medically stable    Code Status: Level 1 - Full Code      Subjective:   Patient seen and examined at bedside in    Objective:     Vitals:   Temp (24hrs), Av °F (36 7 °C), Min:97 5 °F (36 4 °C), Max:98 5 °F (36 9 °C)    Temp:  [97 5 °F (36 4 °C)-98 5 °F (36 9 °C)] 97 5 °F (36 4 °C)  HR:  [80-85] 81  Resp:  [18-20] 18  BP: (100-131)/(60-84) 101/65  SpO2:  [98 %-100 %] 98 %  Body mass index is 19 59 kg/m²  Input and Output Summary (last 24 hours): Intake/Output Summary (Last 24 hours) at 18 1830  Last data filed at 18 1500   Gross per 24 hour   Intake              220 ml   Output             1750 ml   Net            -1530 ml       Physical Exam:     Physical Exam   Constitutional: He is oriented to person, place, and time  No distress     Chronically ill-looking man resting comfortably in bed   HENT: Head: Normocephalic and atraumatic  Mouth/Throat: Oropharynx is clear and moist    Eyes: Pupils are equal, round, and reactive to light  EOM are normal    Pale conjunctival   Neck: Normal range of motion  Neck supple  No JVD present  Cardiovascular: Normal rate  Exam reveals no gallop and no friction rub  No murmur heard  Pulmonary/Chest: Effort normal and breath sounds normal  No respiratory distress  He has no wheezes  Abdominal: Soft  Bowel sounds are normal  He exhibits no distension  There is no tenderness  Musculoskeletal: He exhibits edema  He exhibits no tenderness or deformity  Bilateral upper extremity edematous   Neurological: He is alert and oriented to person, place, and time  Skin: Skin is warm  Psychiatric: He has a normal mood and affect  Additional Data:     Labs:      Results from last 7 days  Lab Units 12/04/18  0658   WBC Thousand/uL 4 12*   HEMOGLOBIN g/dL 11 6*   HEMATOCRIT % 35 3*   PLATELETS Thousands/uL 75*   NEUTROS PCT % 44   LYMPHS PCT % 21   MONOS PCT % 8   EOS PCT % 25*       Results from last 7 days  Lab Units 12/04/18  0658   SODIUM mmol/L 132*   POTASSIUM mmol/L 3 8   CHLORIDE mmol/L 97*   CO2 mmol/L 29   BUN mg/dL 36*   CREATININE mg/dL 1 66*   ANION GAP mmol/L 6   CALCIUM mg/dL 9 6   ALBUMIN g/dL 3 1*   TOTAL BILIRUBIN mg/dL 1 24*   ALK PHOS U/L 103   ALT U/L 40   AST U/L 25   GLUCOSE RANDOM mg/dL 91       Results from last 7 days  Lab Units 11/30/18  0610   INR  1 27*                       * I Have Reviewed All Lab Data Listed Above  * Additional Pertinent Lab Tests Reviewed:  [facility]  Imaging:    Imaging Reports Reviewed Today   Recent Cultures (last 7 days):           Last 24 Hours Medication List:     Current Facility-Administered Medications:  acetaminophen 650 mg Oral Q6H PRN Aylin Marlow PA-C   bisacodyl 10 mg Rectal Daily PRN Judd Frausto PA-C   calcium carbonate 500 mg Oral Daily PRN Sarah Smart Dyana Robledo, DO   diazepam 5 mg Oral Q8H PRN Annarachel Theodore, DO   digoxin 125 mcg Oral Daily Jean Pierre Eliot Husseinger, DO   diphenhydrAMINE 50 mg Oral Q6H PRN Katieanicedward Check FriendGERRI   docusate sodium 100 mg Oral BID Kari Frausto PA-C   famotidine 20 mg Oral Daily Serafin Piper PA-C   furosemide 40 mg Oral Daily Ermelinda Joshi MD   heparin (porcine) 5,000 Units Subcutaneous [facility] Antolin Hernandez MD   hydrocortisone  Topical 4x Daily PRN Violet Check FriendGERRI   meperidine 12 5 mg Intravenous PRN Elliott Hernandez MD   metoprolol 5 mg Intravenous Q6H PRN Serafin Piper PA-C   metoprolol tartrate 100 mg Oral TID Antolin Hernandez MD   ondansetron 4 mg Intravenous Q6H PRN Serafin Piper PA-C   polyethylene glycol 17 g Oral Daily PRN Kari Frausto PA-C   polyethylene glycol 17 g Oral BID Ermelinda Joshi MD   senna 1 tablet Oral HS Kari Frausto PA-C   tamsulosin 0 4 mg Oral Daily With [facility], GERRI   zolpidem 5 mg Oral HS PRN Serafin Piper PA-C        Today, Patient Was Seen By: Brad Payne MD    ** Please Note: Dictation voice to text software may have been used in the creation of this document   **

## 2018-12-04 NOTE — ASSESSMENT & PLAN NOTE
Acute on chronic renal failure  Improving  Baseline creatinine between 1 3-1 6  Slowly worsening 1 46>> 1 51>> 1 55>> 1 58>> 1 46>> 1 59>1 44  Continue diuresis Lasix 40 mg daily  Strict I&Os

## 2018-12-04 NOTE — ASSESSMENT & PLAN NOTE
Noted ejection fraction of 20%  Continue beta-blocker and digoxin along with Lasix  [address] Cardiology input  Management for AFib as above

## 2018-12-04 NOTE — ASSESSMENT & PLAN NOTE
Presented with atrial fibrillation with rapid ventricular rate  Rate better controlled now  Off amiodarone drip   Continue current dose of digoxin 125 mcg daily  Continue metoprolol 100 mg t i d  Per Cardiology  [address] Cardiology input  Status post biv pacer on 11/30/2018  Continue to hold anticoagulation for now pending discussion regarding AV node ablation 12/05/2008  Appreciate Cardiology input

## 2018-12-04 NOTE — ASSESSMENT & PLAN NOTE
Presented with atrial fibrillation with rapid ventricular rate  Rate better controlled now  Off amiodarone drip   Continue current dose of digoxin 125 mcg daily  Continue metoprolol 100 mg t i d  Per Cardiology  [address] Cardiology input  Status post biv pacer on 11/30/2018  Continue to hold anticoagulation for now pending discussion regarding AV node ablation 12/05/2008  Appreciate Cardiology input

## 2018-12-04 NOTE — PROGRESS NOTES
Progress Note - Meenakshi Edwadrs 1945, 72 y o  male MRN: [id number]    Unit/Bed#: [facility] 531-01 Encounter: [id number]    Primary Care Provider: Mika Aldrich MD   Date and time admitted to hospital: 11/22/2018  7:39 PM        * Atrial fibrillation [facility])   Assessment & Plan    Presented with atrial fibrillation with rapid ventricular rate  Rate better controlled now  Off amiodarone drip   Continue current dose of digoxin 125 mcg daily  Continue metoprolol 100 mg t i d  Per Cardiology  [address] [employer] Cardiology input  Status post biv pacer on 11/30/2018  Continue to hold anticoagulation for now pending discussion regarding AV node ablation 12/05/2008  Appreciate Cardiology input  NSTEMI (non-ST elevated myocardial infarction) [facility])   Assessment & Plan    NSTEMI, type 2, POA, due to demand from atrial fibrillation with RVR with associated tachy induced cardiomyopathy" Troponins range from 0 03 to 0 07  Continue current medical management  Urinary retention   Assessment & Plan    Continue Flomax  Monitor urine output     Acute on chronic combined systolic and diastolic heart failure (HCC)   Assessment & Plan    Noted ejection fraction of 20%  Continue beta-blocker and digoxin along with Lasix  [address] [employer] Cardiology input  Management for AFib as above  PALOMA (acute kidney injury) ([facility] [address] )   Assessment & Plan    Acute on chronic renal failure  Improving  Baseline creatinine between 1 3-1 6  Slowly worsening 1 46>> 1 51>> 1 55>> 1 58>> 1 46>> 1 59>1 44  Continue diuresis Lasix 40 mg daily  Strict I&Os  Thrombocytopenia ([facility] [address] )   Assessment & Plan    History of ITP  Negative signs of bleeding with baseline platelets around 53,656  Platelet count 922/41  Appreciate Heme-Onc input and continue Medrol Dosepak according to them  Had a reaction to platelet transfusion with rash on the back and neck without any respiratory compromise on 11/28    Transfusion was held and regimen with Benadryl, Valium and Tylenol was given  Patient is already on steroids for ITP  As per patient Valium helps with his allergic reactions  Hypertension   Assessment & Plan    Continue with metoprolol, dose changed to 100 t i d  Per Cardiology recommendations  Continue with Lasix 40 mg, decrease to daily as per Cardiology  BP controlled 103/68           VTE Pharmacologic Prophylaxis:   Pharmacologic: Heparin  Mechanical VTE Prophylaxis in Place: Yes    Patient Centered Rounds: I have performed bedside rounds with nursing staff today  Discussions with Specialists or Other Care Team Provider: yes    Education and Discussions with Family / Patient:  Yes    Time Spent for Care: 45 minutes  More than 50% of total time spent on counseling and coordination of care as described above  Current Length of Stay: 11 day(s)    Current Patient Status: Inpatient   Certification Statement: The patient will continue to require additional inpatient hospital stay due to Cardiac ablation Wednesday    Discharge Plan:  Home when medically stable    Code Status: Level 1 - Full Code      Subjective:   Denied any chest pain, palpitation, changes in bowel urinary habits, fever, cough  Review of systems negative otherwise    Objective:     Vitals:   Temp (24hrs), Av 9 °F (36 6 °C), Min:97 5 °F (36 4 °C), Max:98 2 °F (36 8 °C)    Temp:  [97 5 °F (36 4 °C)-98 2 °F (36 8 °C)] 98 2 °F (36 8 °C)  HR:  [77-98] 83  Resp:  [18] 18  BP: ()/(61-73) 103/68  SpO2:  [96 %-100 %] 98 %  Body mass index is 19 96 kg/m²  Input and Output Summary (last 24 hours): Intake/Output Summary (Last 24 hours) at 18  Last data filed at 18 1835   Gross per 24 hour   Intake              220 ml   Output             1525 ml   Net            -1305 ml       Physical Exam:     Physical Exam   Constitutional: He is oriented to person, place, and time  No distress  HENT:   Head: Normocephalic and atraumatic     Mouth/Throat: Oropharynx is clear and moist    Eyes: Pupils are equal, round, and reactive to light  EOM are normal    Conjunctiva pallor   Neck: Normal range of motion  Neck supple  No JVD present  Cardiovascular: Normal rate and normal heart sounds  Exam reveals no gallop and no friction rub  No murmur heard  Pulmonary/Chest: Effort normal and breath sounds normal  No respiratory distress  He has no wheezes  Pacemaker  left upper chest wall   Abdominal: Soft  Bowel sounds are normal  He exhibits no distension  There is no tenderness  Musculoskeletal: Normal range of motion  He exhibits no edema  Neurological: He is alert and oriented to person, place, and time  No cranial nerve deficit  Skin: Skin is warm  No erythema  Psychiatric: He has a normal mood and affect  Additional Data:     Labs:      Results from last 7 days  Lab Units 12/03/18  0524   WBC Thousand/uL 4 57   HEMOGLOBIN g/dL 11 7*   HEMATOCRIT % 36 2*   PLATELETS Thousands/uL 82*   NEUTROS PCT % 68   LYMPHS PCT % 20   MONOS PCT % 10   EOS PCT % 0       Results from last 7 days  Lab Units 12/03/18  0524  11/28/18  0511   SODIUM mmol/L 136  < > 133*   POTASSIUM mmol/L 4 2  < > 3 6   CHLORIDE mmol/L 100  < > 97*   CO2 mmol/L 29  < > 31   BUN mg/dL 33*  < > 35*   CREATININE mg/dL 1 44*  < > 1 51*   ANION GAP mmol/L 7  < > 5   CALCIUM mg/dL 8 9  < > 9 7   ALBUMIN g/dL  --   --  3 1*   TOTAL BILIRUBIN mg/dL  --   --  1 99*   ALK PHOS U/L  --   --  91   ALT U/L  --   --  73   AST U/L  --   --  34   GLUCOSE RANDOM mg/dL 94  < > 102   < > = values in this interval not displayed  Results from last 7 days  Lab Units 11/30/18  0610   INR  1 27*                       * I Have Reviewed All Lab Data Listed Above  * Additional Pertinent Lab Tests Reviewed:  [address] Admission Reviewed    Imaging:    Imaging Reports Reviewed TodayRecent Cultures (last 7 days):           Last 24 Hours Medication List:     Current Facility-Administered Medications:  acetaminophen 650 mg Oral Q6H PRN Gwyn Escobar PA-C   bisacodyl 10 mg Rectal Daily PRN Marlena Senate, GERRI   calcium carbonate 500 mg Oral Daily PRN Jonne Chayo, DO   diazepam 5 mg Oral Q8H PRN Josimrane Chayo, DO   digoxin 125 mcg Oral Daily Philliplenin Husseinger, DO   diphenhydrAMINE 50 mg Oral Q6H PRN Jay Maldonado PA-C   docusate sodium 100 mg Oral BID Becka Frausto PA-C   famotidine 20 mg Oral Daily Gwyn Escobar PA-C   furosemide 40 mg Oral Daily Milagro Salter MD   heparin (porcine) 5,000 Units Subcutaneous [facility] Yo Dukes MD   hydrocortisone  Topical 4x Daily PRN Elenita Maldonado PA-C   meperidine 12 5 mg Intravenous PRN Julio Roach MD   metoprolol 5 mg Intravenous Q6H PRN Gwyn Escobar PA-C   metoprolol tartrate 100 mg Oral TID Yo Dukes MD   ondansetron 4 mg Intravenous Q6H PRN Gwyn Escobar PA-C   polyethylene glycol 17 g Oral Daily PRN Becka Frausto PA-C   polyethylene glycol 17 g Oral BID Milagro Salter MD   senna 1 tablet Oral HS Becka Frausto PA-C   tamsulosin 0 4 mg Oral Daily With [facility] TravelersGERRI   zolpidem 5 mg Oral HS PRN Gwyn Escobar PA-C        Today, Patient Was Seen By: Aimee Tello MD    ** Please Note: Dictation voice to text software may have been used in the creation of this document   **

## 2018-12-04 NOTE — ASSESSMENT & PLAN NOTE
Noted ejection fraction of 20%  Continue beta-blocker and digoxin along with Lasix  [address] Cardiology input  Management for AFib as above

## 2018-12-04 NOTE — CONSULTS
Progress Note - Wound   Nikko Sims 72 y o  male MRN: [id number]  Unit/Bed#: [facility] 531-01 Encounter: [id number]      Assessment:  Wound care consulted for assessment of R arm wound  Patient seen in bed, is cooperative and agreeable with assessment  Continent of bowel, garrison for urine  Assist of one with turning  Will need to encourage patient to turn and reposition, using wedges  Nutrition is following along  Intact dry emosiderin staining noted to b/l lower extremities  B/l heels are intact with no redness or wounds noted  Recommend offloading and hydraguard cream      R buttock with blanchable redness/hyperpigmentation, and area of old scarring  Patient reports he had a wound to this area previously  No open aspects, redness or maceration to the mid sacrum and L buttock  Recommend offloading and hydraguard cream     B/l upper extremities with scattered ecchymosis from multiple blood sticks / IV sites  R wrist (posterior / lateral) aspect with intact fragile tender blood filled blister  Wound bed is intact with no drainage  Patient reports this was the site of an IV - no IV in place at time of assessment to this location  Donna-wound is slightly swollen with pink skin - this is locally surrounding the blister - does not extend up the arm distally or proximally  No induration noted  Patient reports it is getting better after elevation  Nursing applying cool packs  Per nursing, patient had infusion of amiodarone  Recommend to keep the blister intact using skin prep/3m no sting - and if blister opens call wound care to reassess the wound  Educated patient on the importance of frequent offloading of pressure via turning, repositioning and weight-shifting  Verbalized understanding of plan of care  No induration, fluctuance, redness, or purulence noted to the above noted wounds  Patient tolerated well  Primary nurse aware of plan of care  See flow sheets for more detailed assessment findings   Will follow along     Plan:   1  Apply hydraguard to b/l heels, buttocks and sacrum BID and PRN for prevention and protection  2  Apply skin nourishing cream the entire skin daily for moisture  3  Turn and reposition patient every  2 hours   4  Elevate heels off of bed with pillows to offload pressure   5  Soft care cushion to chair when OOB, if able  6  Apply 3m no sting to R wrist blood bulla daily  If blister becomes open/broken, please contact wound care to change treatment  7  Wound care will follow along with patient weekly, please call with questions and concerns     Objective:    Vitals: Blood pressure 131/84, pulse 81, temperature 98 °F (36 7 °C), temperature source Oral, resp  rate 20, height 5' 10" (1 778 m), weight 61 9 kg (136 lb 8 oz), SpO2 100 %  ,Body mass index is 19 59 kg/m²  Wound 12/04/18 Other (Comment) Arm Lower;Right (Active)   Wound Description Clean;Dry; Intact 12/4/2018  1:30 PM   Donna-wound Assessment Dry; Intact;Fragile;Pink 12/4/2018  1:30 PM   Shape round  12/4/2018  1:30 PM   Wound Length (cm) 0 5 cm 12/4/2018  1:30 PM   Wound Width (cm) 0 8 cm 12/4/2018  1:30 PM   Wound Depth (cm) 0 12/4/2018  1:30 PM   Calculated Wound Volume (cm^3) 0 cm^3 12/4/2018  1:30 PM   Tunneling 0 cm 12/4/2018  1:30 PM   Undermining 0 12/4/2018  1:30 PM   Closure None 12/4/2018  1:30 PM   Drainage Amount None 12/4/2018  1:30 PM   Non-staged Wound Description Not applicable 41/9/5331  4:97 PM   Treatments Cleansed;Site care;Elevated 12/4/2018  1:30 PM   Dressing Open to air 12/4/2018  1:30 PM   Wound packed? No 12/4/2018  1:30 PM   Packing- # removed 0 12/4/2018  1:30 PM   Packing- # inserted 0 12/4/2018  1:30 PM   Patient Tolerance Tolerated well 12/4/2018  1:30 PM   Dressing Status Open to air 12/4/2018  1:30 PM       Recommendations written as orders     Mynor Elizabeth RN BSN CWON

## 2018-12-04 NOTE — PHYSICAL THERAPY NOTE
Physical Therapy Progress Note     12/04/18 1111   Pain Assessment   Pain Assessment No/denies pain   Restrictions/Precautions   Weight Bearing Precautions Per Order No   Other Precautions Fall Risk  (pacemaker)   General   Chart Reviewed Yes   Family/Caregiver Present No   Cognition   Overall Cognitive Status WFL   Subjective   Subjective Agreeable to PT reluctantly  In bed supine upon entry   Bed Mobility   Supine to Sit 6  Modified independent   Sit to Supine 6  Modified independent   Transfers   Sit to Stand 6  Modified independent   Stand to Sit 6  Modified independent   Stand pivot 6  Modified independent   Ambulation/Elevation   Gait pattern Short stride; Excessively slow; Inconsistent darleen;Decreased foot clearance; Wide MADHU   Gait Assistance 5  Supervision   Additional items Assist x 1   Assistive Device None   Distance 200ft, 180ft   Endurance Deficit   Endurance Deficit No   Activity Tolerance   Activity Tolerance Patient tolerated treatment well   Nurse Made Aware Yes   Exercises   Quad Sets Supine;Bilateral;AROM;20 reps   Heelslides Supine;Bilateral;AROM;20 reps   Glute Sets Supine;Bilateral;AROM;20 reps   Hip Flexion Bilateral;AROM;20 reps; Supine   Hip Abduction Bilateral;AROM;20 reps; Supine   Knee AROM Short Arc Quad Bilateral;AROM;20 reps; Supine   Knee AROM Long Arc Quad Sitting;20 reps;Bilateral;AROM   Ankle Pumps Bilateral;AROM;20 reps; Supine   Bridging Supine;20 reps;AROM; Bilateral   Assessment   Prognosis Good   Problem List Decreased strength;Decreased endurance; Impaired balance;Decreased mobility; Decreased skin integrity   Assessment Pt  able to ambulate longer distance this session and reported no fatigue  Though patient was reluctant to participate in session eventually patient able to participate well  Pt  reported pain continue to be present in B/L forearms  All transfers performed with Mod I  Increased easd with supine to sit and back transfer noted   Will continue to follow during the stay to maximize functional independence  Pt  is scheduled for ablation procedure today  Goals   Patient Goals None reported   STG Expiration Date 12/09/18   Treatment Day 2   Plan   Treatment/Interventions Functional transfer training;LE strengthening/ROM; Therapeutic exercise;Patient/family training;Bed mobility;Gait training;Spoke to nursing   Progress Progressing toward goals   PT Frequency Other (Comment)  (3-5x/week)   Recommendation   Recommendation Home with family support; Other (Comment)  (vs Home PT pending progress)         Murtis Ochoa, PTA

## 2018-12-04 NOTE — ASSESSMENT & PLAN NOTE
History of ITP  Negative signs of bleeding with baseline platelets around 46,660  Platelet count 389/74  Appreciate Heme-Onc input and continue Medrol Dosepak according to them  Had a reaction to platelet transfusion with rash on the back and neck without any respiratory compromise on 11/28  Transfusion was held and regimen with Benadryl, Valium and Tylenol was given  Patient is already on steroids for ITP  As per patient Valium helps with his allergic reactions

## 2018-12-04 NOTE — ASSESSMENT & PLAN NOTE
Continue with metoprolol, dose changed to 100 t i d  Per Cardiology recommendations  Continue with Lasix 40 mg, decrease to daily as per Cardiology    BP controlled 103/68

## 2018-12-04 NOTE — PROCEDURES
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) - LVEF 20% initially at admission, improved to 40% on LAURA   NYHA -II -III   Chronic ITP - with platelet count 82,318 - need to avoid anticoagulation, received platelets and >03,526 today for procedure  CKD  Post Biv PPM         Postoperative diagnosis:  Chronic AFib (reviewed EKG's from 2013,2014, 2015, 2017, 7291)   Recent systolic HF due to  RVR - on amiodarone currently - plan for AVN ablation as long term plan - will be fully paced  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) - LVEF 20% initially at admission, improved to 40% on LAURA   NYHA -II -III   Chronic ITP - with platelet count 41,918 - need to avoid anticoagulation, received platelets and >53,020 today for procedure  CKD  Post Biv PPM        Procedure:  AVN ablation  Device re-programming before and after ablation  Recording V, His and A and then proceeding with ablation      Surgeon: [id number] -none    Specimens - none    Estimated blood loss-5 mL    Findings-none    Complications none    Anesthesia- conscious sedation and local lidocaine by myself         Details of the procedure    Sheaths used  All  access was obtained under ultrasound guidance  Right femoral vein- 8F, changed to SRO      Catheters used  Ablation catheter - EPT 8mm      Imaging systems used  1   Fluoroscopy   Right anterior oblique views were used whenever we needed to determine between anterior and posterior  Left anterior oblique views were used whenever we needed to determine between right and left      Anticoagulation:  On heparin sc      Details of the ablation  Patient seen as inpatient  and AVN ablation set up  Proper consent signed  Brought to EP lab, proper time out done, sterile dressing and draping done  Sheaths and catheter placed as described    Medtronic BiV PPM - changed to VOO 40/min, BP-88/54 mmHg  EPT catheter taken through SRO sheath,   His identified and basic measurements as below  Came back 2 cm and came down about same  Ablation - 70 still, 55 degree centigrade  Patient went into paced rhythm at 40 /min  Ablation continued for 90 sec  Consolidation done by another proximal lesion for 120 sec      Waited 30 min  All paced beats  Lead aVR followed closely, no antegrade conduction      After 30 min catheter and sheath removed       Device reprogrammed  VVIR -80/min  Come down to 70/min after 1 month        Details of the electrophysiologic study  Pre-procedure - Baseline  HV- 45  QRS-120   QT--400  Basal CL- A fib at rates /min, avg HR around 100/min , avg BCL about 530 ms        Post procedure  QRS -120 ms   ms          Device reprogramming post procedure                          Summary of Procedure :  A fib with RVR, Biv PPM in place  Cannot control VR with medications, HR around  and inadequate BiV pacing  AVN ablation done  Device reprogrammed pre and post -procedure

## 2018-12-04 NOTE — ASSESSMENT & PLAN NOTE
Acute on chronic renal failure  Improving  Baseline creatinine between 1 3-1 6  Slowly worsening 1 46>> 1 51>> 1 55>> 1 58>> 1 46>> 1 59>1 44>1 66  Continue diuresis Lasix 40 mg daily  Strict I&Os

## 2018-12-05 LAB
ALBUMIN SERPL BCP-MCNC: 3.3 G/DL (ref 3.5–5)
ALP SERPL-CCNC: 108 U/L (ref 46–116)
ALT SERPL W P-5'-P-CCNC: 41 U/L (ref 12–78)
ANION GAP SERPL CALCULATED.3IONS-SCNC: 9 MMOL/L (ref 4–13)
AST SERPL W P-5'-P-CCNC: 32 U/L (ref 5–45)
ATRIAL RATE: 416 BPM
BASOPHILS # BLD AUTO: 0.01 THOUSANDS/ΜL (ref 0–0.1)
BASOPHILS NFR BLD AUTO: 0 % (ref 0–1)
BILIRUB SERPL-MCNC: 1.38 MG/DL (ref 0.2–1)
BUN SERPL-MCNC: 33 MG/DL (ref 5–25)
CALCIUM SERPL-MCNC: 9.2 MG/DL (ref 8.3–10.1)
CHLORIDE SERPL-SCNC: 99 MMOL/L (ref 100–108)
CO2 SERPL-SCNC: 30 MMOL/L (ref 21–32)
CREAT SERPL-MCNC: 1.57 MG/DL (ref 0.6–1.3)
EOSINOPHIL # BLD AUTO: 0.01 THOUSAND/ΜL (ref 0–0.61)
EOSINOPHIL NFR BLD AUTO: 0 % (ref 0–6)
ERYTHROCYTE [DISTWIDTH] IN BLOOD BY AUTOMATED COUNT: 17.4 % (ref 11.6–15.1)
GFR SERPL CREATININE-BSD FRML MDRD: 44 ML/MIN/1.73SQ M
GLUCOSE SERPL-MCNC: 106 MG/DL (ref 65–140)
HCT VFR BLD AUTO: 38 % (ref 36.5–49.3)
HGB BLD-MCNC: 12.5 G/DL (ref 12–17)
IMM GRANULOCYTES # BLD AUTO: 0.07 THOUSAND/UL (ref 0–0.2)
IMM GRANULOCYTES NFR BLD AUTO: 2 % (ref 0–2)
LYMPHOCYTES # BLD AUTO: 0.63 THOUSANDS/ΜL (ref 0.6–4.47)
LYMPHOCYTES NFR BLD AUTO: 19 % (ref 14–44)
MCH RBC QN AUTO: 31.1 PG (ref 26.8–34.3)
MCHC RBC AUTO-ENTMCNC: 32.9 G/DL (ref 31.4–37.4)
MCV RBC AUTO: 95 FL (ref 82–98)
MONOCYTES # BLD AUTO: 0.25 THOUSAND/ΜL (ref 0.17–1.22)
MONOCYTES NFR BLD AUTO: 7 % (ref 4–12)
NEUTROPHILS # BLD AUTO: 2.4 THOUSANDS/ΜL (ref 1.85–7.62)
NEUTS SEG NFR BLD AUTO: 72 % (ref 43–75)
NRBC BLD AUTO-RTO: 1 /100 WBCS
PLATELET # BLD AUTO: 75 THOUSANDS/UL (ref 149–390)
PMV BLD AUTO: 13.8 FL (ref 8.9–12.7)
POTASSIUM SERPL-SCNC: 3.7 MMOL/L (ref 3.5–5.3)
PROT SERPL-MCNC: 6.9 G/DL (ref 6.4–8.2)
QRS AXIS: -28 DEGREES
QRSD INTERVAL: 128 MS
QT INTERVAL: 408 MS
QTC INTERVAL: 470 MS
RBC # BLD AUTO: 4.02 MILLION/UL (ref 3.88–5.62)
SODIUM SERPL-SCNC: 138 MMOL/L (ref 136–145)
T WAVE AXIS: 135 DEGREES
VENTRICULAR RATE: 80 BPM
WBC # BLD AUTO: 3.37 THOUSAND/UL (ref 4.31–10.16)

## 2018-12-05 PROCEDURE — 93010 ELECTROCARDIOGRAM REPORT: CPT | Performed by: INTERNAL MEDICINE

## 2018-12-05 PROCEDURE — 85025 COMPLETE CBC W/AUTO DIFF WBC: CPT | Performed by: HOSPITALIST

## 2018-12-05 PROCEDURE — 80053 COMPREHEN METABOLIC PANEL: CPT | Performed by: HOSPITALIST

## 2018-12-05 PROCEDURE — 30233R1 TRANSFUSION OF NONAUTOLOGOUS PLATELETS INTO PERIPHERAL VEIN, PERCUTANEOUS APPROACH: ICD-10-PCS | Performed by: INTERNAL MEDICINE

## 2018-12-05 PROCEDURE — 99232 SBSQ HOSP IP/OBS MODERATE 35: CPT | Performed by: HOSPITALIST

## 2018-12-05 PROCEDURE — 02583ZZ DESTRUCTION OF CONDUCTION MECHANISM, PERCUTANEOUS APPROACH: ICD-10-PCS | Performed by: INTERNAL MEDICINE

## 2018-12-05 PROCEDURE — 99231 SBSQ HOSP IP/OBS SF/LOW 25: CPT | Performed by: NURSE PRACTITIONER

## 2018-12-05 RX ORDER — METOPROLOL SUCCINATE 100 MG/1
100 TABLET, EXTENDED RELEASE ORAL 2 TIMES DAILY
Status: DISCONTINUED | OUTPATIENT
Start: 2018-12-05 | End: 2018-12-07 | Stop reason: HOSPADM

## 2018-12-05 RX ADMIN — FAMOTIDINE 20 MG: 20 TABLET ORAL at 08:18

## 2018-12-05 RX ADMIN — DIAZEPAM 5 MG: 5 TABLET ORAL at 14:07

## 2018-12-05 RX ADMIN — HEPARIN SODIUM 5000 UNITS: 5000 INJECTION INTRAVENOUS; SUBCUTANEOUS at 13:55

## 2018-12-05 RX ADMIN — HEPARIN SODIUM 5000 UNITS: 5000 INJECTION INTRAVENOUS; SUBCUTANEOUS at 08:18

## 2018-12-05 RX ADMIN — METOPROLOL TARTRATE 100 MG: 50 TABLET, FILM COATED ORAL at 08:17

## 2018-12-05 RX ADMIN — HEPARIN SODIUM 5000 UNITS: 5000 INJECTION INTRAVENOUS; SUBCUTANEOUS at 22:25

## 2018-12-05 RX ADMIN — FUROSEMIDE 40 MG: 40 TABLET ORAL at 08:18

## 2018-12-05 RX ADMIN — DIAZEPAM 5 MG: 5 TABLET ORAL at 00:04

## 2018-12-05 RX ADMIN — DIGOXIN 125 MCG: 125 TABLET ORAL at 08:17

## 2018-12-05 RX ADMIN — TAMSULOSIN HYDROCHLORIDE 0.4 MG: 0.4 CAPSULE ORAL at 17:04

## 2018-12-05 NOTE — PROGRESS NOTES
Firmness at groin site has not expanded  Patient still denies any pain and site does not appear swollen  Confirmed with second RN  Patient again instructed to inform nursing if he feels pain or any changes at the site  Will continue to monitor

## 2018-12-05 NOTE — ASSESSMENT & PLAN NOTE
Presented with atrial fibrillation with rapid ventricular rate  Rate better controlled now  Off amiodarone drip   Continue current dose of digoxin 125 mcg daily  Continue metoprolol 100 mg t i d  Per Cardiology  [address] Cardiology input  Status post biv pacer on 11/30/2018   ablation 12/05/2008  Appreciate Cardiology input

## 2018-12-05 NOTE — PROGRESS NOTES
Upon checking patient's right groin site, small area towards patient's groin feels firm  Patient denies any pain, and area does not appear swollen, just firm to the touch  Demarcated area, and instructed patient to call if he experiences any pain or notices a change  RN to recheck site in 30 minutes

## 2018-12-05 NOTE — PROGRESS NOTES
Progress Note - Electrophysiology-Cardiology (EP)   Nancy Monge 72 y o  male MRN: [id number]  Unit/Bed#: [facility] 531-01 Encounter: [id number]      Assessment/Plan:   1  Permanent nonvalvular atrial fibrillation, symptomatic    * as an outpatient decision was made to keep patient in rate controlled in atrial fibrillation with his Cardiologist Dr Jan Karimi due to past failed rhythm control attempt albeit there is not documentation of what these attempts were in EPIC               * patient's EF has dropped from 70% in 2017 to 20% in 2018 which has occurred in the past due to uncontrolled atrial fibrillation, but this improved to 45% on pre BiV LAURA   * he is now s/p medtronic BiV PPM and s/p AVJ ablation by Dr Suhail Mayorga  * he is currently in a fib w/ V pacing but not BiV pacing, QRS is now down to 128ms! * given his ITP and bleeding risk he is a candidate for a watchman device, LAURA already performed, will have him follow up with Dr Rasheed Lael as an outpatient to discuss this further  For now he should not be discharged on [facility] as his bleeding risk is very high  * patient also needs to f/u with Hematology as an outpatient, if he is going to receive a watchman he is going to need single and dual antiplt therapy  * restart toprol XL 100mg PO BID for his EF, stop amiodarone and stop digoxin     2  Acute on chronic ITP   * plt's down to 75, no bleeding from site    * continue CBC monitoring    3  Acute systolic CHF - resolved              * on PO lasix 40mg PO BID   * CHF team following    * starting toprol XL for EF purposes not for rate control    * not on ACEI/ARB    * EF 45% on LAURA      4  NICMP - see above   5  HTN   6  SDH        At this time patient's EP issues are stable  All follow ups with EP, general cardiology and CHF have been arranged and are in follow up tab in EPIC  Please arrange f/u for hematology as an outpatient  All discharge instructions regarding his BiV PPM and ablation are also in EPIC  Subjective/Objective     Subjective:   permanent nonvalvular atrial fibrillation AC w/ eliquis, HTN,  chronic diastolic dysfunction, thrombocytopenia who is HOD#13 after being transferred to [facility]"" from OSH for management of his atrial fibrillation and CHF  11-28-18:   Since last seen by EP decision has been made to transfuse plt's and perform BiV ICD implant w/ AVJ on 11-29-18  Patient offers no concerns or complaints about his current clinical situation  No LH, dizziness, chest discomfort or SOB  12-3-18:   Since last seen by EP patient underwent BiV PPM implant by Dr Parag Vega on 11-30-18  Post procedure patient has been V paced but remains in atrial fibrillation on amiodarone gtt  He feels extremely exhausted today  Otherwise no concerns or complaints  12-5-18:   Since last seen by EP patient underwent AVJ ablation by Dr Parag Vega on 12-4-18  This AM he has no concerns or complaints and is V pacing on tele with underlying rhythm of atrial fibrillation       Vitals: /70   Pulse 80   Temp 98 5 °F (36 9 °C)   Resp 18   Ht 5' 10" (1 778 m)   Wt 61 5 kg (135 lb 8 oz)   SpO2 99%   BMI 19 44 kg/m²   Vitals:    12/04/18 0600 12/05/18 0427   Weight: 61 9 kg (136 lb 8 oz) 61 5 kg (135 lb 8 oz)     Orthostatic Blood Pressures      Most Recent Value   Blood Pressure  111/70 filed at 12/05/2018 1035   Patient Position - Orthostatic VS  Sitting filed at 12/05/2018 0420            Intake/Output Summary (Last 24 hours) at 12/05/18 1411  Last data filed at 12/05/18 1402   Gross per 24 hour   Intake              780 ml   Output             1625 ml   Net             -845 ml       Invasive Devices     Peripheral Intravenous Line            Peripheral IV 12/04/18 Left Forearm less than 1 day          Drain            Urethral Catheter Latex 16 Fr  10 days                            Scheduled Meds:    Current Facility-Administered Medications:  acetaminophen 650 mg Oral Q6H PRN Zelda Morales PA-C   bisacodyl 10 mg Rectal Daily PRN Nilton Anguiano PA-C   calcium carbonate 500 mg Oral Daily PRN Daphene Failing, DO   diazepam 5 mg Oral Q8H PRN Daphene Failing, DO   diphenhydrAMINE 50 mg Oral Q6H PRN Haze Buerger Friend, PA-C   docusate sodium 100 mg Oral BID Carlitos Frausto PA-C   famotidine 20 mg Oral Daily Suzy Arrington PA-C   furosemide 40 mg Oral Daily Evi Gillis MD   heparin (porcine) 5,000 Units Subcutaneous [facility] Ignacio Jeronimo MD   hydrocortisone  Topical 4x Daily PRN Haze Buerger Friend, PA-C   meperidine 12 5 mg Intravenous PRN Sara Ramos MD   metoprolol 5 mg Intravenous Q6H PRN Xiomaraolm GERRI Arrington   metoprolol succinate 100 mg Oral BID Jay Maldonado PA-C   ondansetron 4 mg Intravenous Q6H PRN Suzy Arrington PA-C   polyethylene glycol 17 g Oral Daily PRN Nilton Anguiano PA-C   polyethylene glycol 17 g Oral BID Evi Gillis MD   senna 1 tablet Oral HS Carlitos Primas GERRI Frausto   tamsulosin 0 4 mg Oral Daily With [facility] [employer]RI   zolpidem 5 mg Oral HS PRN Suzy Arrington PA-C     Continuous Infusions:   PRN Meds:   acetaminophen    bisacodyl    calcium carbonate    diazepam    diphenhydrAMINE    hydrocortisone    meperidine    metoprolol    ondansetron    polyethylene glycol    zolpidem        Physical Exam:   GEN: NAD, alert and oriented, well appearing  SKIN: dry without significant lesions or rashes  HEENT: NCAT, PERRL, EOMs intact  NECK: No JVD or carotid bruits appreciated  CARDIOVASCULAR: RRR , normal S1, S2 without murmurs, rubs, or gallops appreciated  LUNGS: Clear to auscultation bilaterally without wheezes, rhonchi, or rales  ABDOMEN: Soft, nontender, nondistended  EXTREMITIES/VASCULAR: perfused without clubbing, cyanosis, or edema b/l  PSYCH: Normal mood and affect  NEURO: CN ll-Xll grossly intact                Lab Results: I have personally reviewed pertinent lab results        Results from last 7 days  Lab Units 12/05/18  0509 12/04/18  0658 12/03/18  0524   WBC Thousand/uL 3 37* 4 12* 4  57   HEMOGLOBIN g/dL 12 5 11 6* 11 7*   HEMATOCRIT % 38 0 35 3* 36 2*   PLATELETS Thousands/uL 75* 75* 82*       Results from last 7 days  Lab Units 18  0509 18  0658 18  0524   POTASSIUM mmol/L 3 7 3 8 4 2   CHLORIDE mmol/L 99* 97* 100   CO2 mmol/L 30 29 29   BUN mg/dL 33* 36* 33*   CREATININE mg/dL 1 57* 1 66* 1 44*   CALCIUM mg/dL 9 2 9 6 8 9       Results from last 7 days  Lab Units 18  0610   INR  1 27*       Results from last 7 days  Lab Units 18  0524 18  0450 18  0523   MAGNESIUM mg/dL 2 2 2 1 1 9         Imaging: I have personally reviewed pertinent reports  Results for orders placed during the hospital encounter of 18   Echo complete with contrast if indicated    Narrative [facility]   [facility], [address] [place] [address]  [phone]    Transthoracic Echocardiogram  Limited 2D, M-mode, Doppler, and Color Doppler    Study date:  2018    Patient: Yeny Dutta  MR number: [id number]  Account number: [de-identified]  : 1945  Age: 72 years  Gender: Male  Status: Inpatient  Location: Bedside  Height: 70 in  Weight: 148 7 lb  BP: 92/ 74 mmHg    Indications: AFIB Assess left ventricular function  Diagnoses: I48 0 - Atrial fibrillation    Sonographer:  SEGUNDO Todd  Primary Physician:  Germania Soliz MD  Referring Physician:  Iva Fernando MD  Group:  [facility]  Cardiology Fellow:  Lethea Shone, MD  Interpreting Physician:  Lynette Perez MD    SUMMARY    LEFT VENTRICLE:  Systolic function was markedly reduced  Ejection fraction was estimated to be 20 %  There was severe diffuse hypokinesis with regional variations- akinesis of the anteroseptal, anterior and possibly the inferior walls  The patient was tachycardic throughout the study due to which ejection fraction and regional wall motion was not accurately assessed  Wall thickness was mildly increased    The changes were consistent with concentric remodeling (increased wall thickness with normal wall mass)  RIGHT VENTRICLE:  The size was normal   Systolic function was reduced  LEFT ATRIUM:  The atrium was dilated  RIGHT ATRIUM:  The atrium was dilated  MITRAL VALVE:  There was moderate regurgitation  AORTIC VALVE:  There was mild to moderate regurgitation  TRICUSPID VALVE:  There was moderate regurgitation  PERICARDIUM:  A small, free-flowing pericardial effusion was identified anterior and posterior to the heart  The fluid had no internal echoes  There was no evidence of hemodynamic compromise  There was a moderate-sized left pleural effusion  COMPARISONS:  There has been no significant interval change  Comparison was made with the previous study of 21-Nov-2018  HISTORY: PRIOR HISTORY: HTN,CHF,CAD,AFIB,HLD,Dilated CM,Thrombocytopenia    PROCEDURE: The procedure was performed at the bedside  This was a routine study  The transthoracic approach was used  The study included limited 2D imaging, M-mode, limited spectral Doppler, and color Doppler  Image quality was good  LEFT VENTRICLE: Size was normal  Systolic function was markedly reduced  Ejection fraction was estimated to be 20 %  There was severe diffuse hypokinesis with regional variations- akinesis of the anteroseptal, anterior and possibly the  inferior walls  The patient was tachycardic throughout the study due to which ejection fraction and regional wall motion was not accurately assessed  Wall thickness was mildly increased  The changes were consistent with concentric  remodeling (increased wall thickness with normal wall mass)  DOPPLER: Transmitral flow pattern: atrial fibrillation  The study was not technically sufficient to allow evaluation of LV diastolic function  RIGHT VENTRICLE: The size was normal  Systolic function was reduced  LEFT ATRIUM: The atrium was dilated  RIGHT ATRIUM: The atrium was dilated      MITRAL VALVE: DOPPLER: There was moderate regurgitation  The regurgitant jet was centrally directed  AORTIC VALVE: The valve was trileaflet  Leaflets exhibited normal cuspal separation and sclerosis  DOPPLER: There was mild to moderate regurgitation  TRICUSPID VALVE: DOPPLER: There was moderate regurgitation  PULMONIC VALVE: DOPPLER: There was no significant regurgitation  PERICARDIUM: A small, free-flowing pericardial effusion was identified anterior and posterior to the heart  The fluid had no internal echoes  There was no evidence of hemodynamic compromise  There was a moderate-sized left pleural  effusion      SYSTEM MEASUREMENT TABLES    2D  %FS: 9 65 %  Ao Diam: 3 57 cm  EDV(Teich): 96 19 ml  EF(Teich): 21 27 %  ESV(Teich): 75 73 ml  IVSd: 1 08 cm  LVIDd: 4 58 cm  LVIDs: 4 13 cm  LVPWd: 0 97 cm  SV(Teich): 20 46 ml    [facility] Accredited Echocardiography Laboratory    Prepared and electronically signed by    Emilia Titus MD  Signed [id number] 10:44:37         EKG (post BiV and AVN ablation):           EKG ( post BiV):       EKG:

## 2018-12-05 NOTE — RESTORATIVE TECHNICIAN NOTE
Restorative Specialist Mobility Note       Pt refused ambulation at this time  Will continue to follow up daily  RN kitty Aguiar Restorative Technician, BS

## 2018-12-05 NOTE — PROGRESS NOTES
Progress Note - Nikko Sims 1945, 72 y o  male MRN: [id number]    Unit/Bed#: [facility] 531-01 Encounter: [id number]    Primary Care Provider: Jose De Jesus Marc MD   Date and time admitted to hospital: 11/22/2018  7:39 PM        * Atrial fibrillation [facility])   Assessment & Plan    Presented with atrial fibrillation with rapid ventricular rate  Rate better controlled now  Off amiodarone drip   Continue current dose of digoxin 125 mcg daily  Continue metoprolol 100 mg t i d  Per Cardiology  [address] Cardiology input  Status post biv pacer on 11/30/2018   ablation 12/05/2008  Appreciate EP- Cardiology input- outpatient  High risk -of bleeding due to anticoagulation, will hold for now     NSTEMI (non-ST elevated myocardial infarction) [facility])   Assessment & Plan    NSTEMI, type 2, POA, due to demand from atrial fibrillation with RVR with associated tachy induced cardiomyopathy" Troponins range from 0 03 to 0 07  Continue current medical management  Urinary retention   Assessment & Plan    Continue Flomax  Monitor urine output     Acute on chronic combined systolic and diastolic heart failure (HCC)   Assessment & Plan    Noted ejection fraction of 20%  [address] Cardiology input  Management for AFib as above  PALMOA (acute kidney injury) ([facility] [address] )   Assessment & Plan    Acute on chronic renal failure  Improving  Baseline creatinine between 1 3-1 6  At baseline  Continue diuresis Lasix 40 mg daily  Strict I&Os  VTE Pharmacologic Prophylaxis:   Pharmacologic: Heparin  Mechanical VTE Prophylaxis in Place: Yes    Patient Centered Rounds: I have performed bedside rounds with nursing staff today  Discussions with Specialists or Other Care Team Provider: yes    Education and Discussions with Family / Patient: yes    Time Spent for Care: 45 minutes  More than 50% of total time spent on counseling and coordination of care as described above      Current Length of Stay: 13 day(s)    Current Patient Status: Inpatient   Certification Statement: The patient will continue to require additional inpatient hospital stay due to post ablation  AF RVR    Discharge Plan: home when medically stable    Code Status: Level 1 - Full Code      Subjective:   Patient was seen and examined today during morning rounds in no apparent clinical distress resting comfortably in chair sitting up  He denied any palpitations, chest pain, fever  Patient appeared brighter than yesterday  Objective:     Vitals:   Temp (24hrs), Av 8 °F (36 6 °C), Min:97 5 °F (36 4 °C), Max:98 5 °F (36 9 °C)    Temp:  [97 5 °F (36 4 °C)-98 5 °F (36 9 °C)] 98 5 °F (36 9 °C)  HR:  [80-83] 80  Resp:  [17-18] 18  BP: ()/(54-81) 111/70  SpO2:  [98 %-100 %] 99 %  Body mass index is 19 44 kg/m²  Input and Output Summary (last 24 hours): Intake/Output Summary (Last 24 hours) at 18 1448  Last data filed at 18 1402   Gross per 24 hour   Intake              780 ml   Output             1625 ml   Net             -845 ml       Physical Exam:     Physical Exam   Constitutional: No distress  Ill-looking   HENT:   Head: Normocephalic and atraumatic  Mouth/Throat: Oropharynx is clear and moist    Eyes: Pupils are equal, round, and reactive to light  EOM are normal    Conjunctiva pallor   Neck: Normal range of motion  Neck supple  No JVD present  Cardiovascular: Normal rate and normal heart sounds  Exam reveals no gallop and no friction rub  No murmur heard  Pulmonary/Chest: Effort normal and breath sounds normal  No respiratory distress  He has no wheezes  Abdominal: Soft  Bowel sounds are normal  He exhibits no distension  There is no tenderness           Additional Data:     Labs:      Results from last 7 days  Lab Units 18  0509   WBC Thousand/uL 3 37*   HEMOGLOBIN g/dL 12 5   HEMATOCRIT % 38 0   PLATELETS Thousands/uL 75*   NEUTROS PCT % 72   LYMPHS PCT % 19   MONOS PCT % 7   EOS PCT % 0       Results from last 7 days  Lab Units 12/05/18  0509   SODIUM mmol/L 138   POTASSIUM mmol/L 3 7   CHLORIDE mmol/L 99*   CO2 mmol/L 30   BUN mg/dL 33*   CREATININE mg/dL 1 57*   ANION GAP mmol/L 9   CALCIUM mg/dL 9 2   ALBUMIN g/dL 3 3*   TOTAL BILIRUBIN mg/dL 1 38*   ALK PHOS U/L 108   ALT U/L 41   AST U/L 32   GLUCOSE RANDOM mg/dL 106       Results from last 7 days  Lab Units 11/30/18  0610   INR  1 27*                       * I Have Reviewed All Lab Data Listed Above  * Additional Pertinent Lab Tests Reviewed: [address] Admission Reviewed    Imaging:    Imaging Reports Reviewed Today    Recent Cultures (last 7 days):           Last 24 Hours Medication List:     Current Facility-Administered Medications:  acetaminophen 650 mg Oral Q6H PRN Marcelle Meek PA-C   bisacodyl 10 mg Rectal Daily PRN Rj Flaherty PA-C   calcium carbonate 500 mg Oral Daily PRN Theresa Nidhi, DO   diazepam 5 mg Oral Q8H PRN Theresa Nidhi, DO   diphenhydrAMINE 50 mg Oral Q6H PRN Jay Maldonado PA-C   docusate sodium 100 mg Oral BID Stefany Frausto PA-C   famotidine 20 mg Oral Daily Marcelle Meek PA-C   furosemide 40 mg Oral Daily Emma Reeves MD   heparin (porcine) 5,000 Units Subcutaneous [facility] Meryle Peacemaker, MD   hydrocortisone  Topical 4x Daily PRN Griselda Maldonado PA-C   meperidine 12 5 mg Intravenous PRN Keshia Luque MD   metoprolol 5 mg Intravenous Q6H PRN Marcelle Meek PA-C   metoprolol succinate 100 mg Oral BID Jay Maldonado PA-C   ondansetron 4 mg Intravenous Q6H PRN Marcelle Meek PA-C   polyethylene glycol 17 g Oral Daily PRN Stefany Frausto PA-C   polyethylene glycol 17 g Oral BID Emma Reeves MD   senna 1 tablet Oral HS Stefany Frausto PA-C   tamsulosin 0 4 mg Oral Daily [facility]   zolpidem 5 mg Oral HS PRN Marcelle Meek PA-C        Today, Patient Was Seen By: Jo Ann Patel MD    ** Please Note: Dictation voice to text software may have been used in the creation of this document  **

## 2018-12-05 NOTE — PROGRESS NOTES
Heart Failure Service Progress Note - Katlin Mcarthur 72 y o  male MRN: [id number]    Unit/Bed#: [facility] 531-01 Encounter: [id number]      Assessment:    Principal Problem:    Atrial fibrillation ([place] [address] )  Active Problems:    Hypertension    Thrombocytopenia ([place] [address] )    PALOMA (acute kidney injury) ([place] [address] )    Acute on chronic combined systolic and diastolic heart failure ([place] [address] )    Urinary retention    NSTEMI (non-ST elevated myocardial infarction) ([place] [address] )      Subjective:   Patient seen and examined  No significant events overnight  S/P Biventricular Pacer/AICD implant  100% V paced on telemetry, S/P successful AVN ablation yesterday  Feels well  Stable fluid status     Hemodynamically stable  WWP  Platelets 47,075 and stable  Objective: Intake/ Output:480/1600  Weight: 135 lbs, 149 on admit  Tele: 100 % V paced    TTE 11/23/18  LVEF: 20%  LVIDd:4 58 cm  RV:normal size and systolic functin  MR:moderate  PASP:  RVOT:   Other:Small free flowing pericardial effusion with moderate left pleural effusion  Neurohormonal Blockade:  --Beta-Blocker: Metoprolol 100 mg TID, d/c S/P AVN ablation  --ACEi, ARB or ARNi: PALOMA precludes    (or SVR reduction)  --Aldosterone Receptor Blocker: PALOMA precludes  --Diuretic:Lasix 40 mg PO daily    Sudden Cardiac Death Risk Reduction:  --ICD: EF 20%, S/P Biventricular AICD implant this admission  Interrogation:     Electrical Resynchronization:  --BiV implant   Interrogation:     Advanced Therapies (If appropriate): --Inotrope:  --LVAD/Transplant Candidacy:    Plan: 1  Acute on chronic systolic congestive heart failure, LVEF 20%, NYHA Class II/III, ACC/AHA Stage C, likely tachy-mediated in origin however cannot definitively rule out an ischemic cause  Volume overloaded on admit, now improved with diuresis  Euvolemic on exam  Creatinine improved overall   Continue Lasix 40 mg daily  Patient hemodynamically stable, warm , well perfused  Continue to monitor daily weight, strict I's and O's, BMP  S/P BiV pacer/ICD implant and AVN ablation yesterday  Tolerated well  Would recheck TTE in 3 months to reassess for improvement in LV function   2  Permanent atrial fibrillation  S/P AVN ablation yesterday  Currently 100 % V paced on tele          3  Chronic idiopathic thrombocytopenia  Unresponsive to steroid taper, now d/c  Defer to hem onc  Platelets 01,384 today and stable  Cont  To monitor CBC        4  HTN  Controlled  [employer] (day, reason): Abbott catheter (day, reason):    Vitals: Blood pressure 105/64, pulse 80, temperature 97 7 °F (36 5 °C), resp  rate 17, height 5' 10" (1 778 m), weight 61 5 kg (135 lb 8 oz), SpO2 99 %  , Body mass index is 19 44 kg/m² , I/O last 3 completed shifts: In: 700 [P O :700]  Out: 1800 [Urine:1800]  I/O this shift:  In: 300 [P O :300]  Out: 75 [Urine:75]  Wt Readings from Last 3 Encounters:   12/05/18 61 5 kg (135 lb 8 oz)   11/22/18 67 5 kg (148 lb 13 oz)   10/10/18 73 5 kg (162 lb)       Intake/Output Summary (Last 24 hours) at 12/05/18 1002  Last data filed at 12/05/18 0755   Gross per 24 hour   Intake              780 ml   Output             1375 ml   Net             -595 ml     I/O last 3 completed shifts: In: 700 [P O :700]  Out: 1800 [Urine:1800]    100 % V paced  Physical Exam:  Vitals:    12/04/18 2300 12/05/18 0420 12/05/18 0427 12/05/18 0709   BP: 99/54 114/81  105/64   BP Location:  Left arm     Pulse: 80 81  80   Resp:  18  17   Temp:  97 6 °F (36 4 °C)  97 7 °F (36 5 °C)   TempSrc:  Axillary     SpO2:  100%  99%   Weight:   61 5 kg (135 lb 8 oz)    Height:           GEN: Prachi Thompson appears well, alert and oriented x 3, pleasant and cooperative   HEENT: pupils equal, round, and reactive to light; extraocular muscles intact  NECK: supple, no carotid bruits   HEART: regular rhythm, normal S1 and S2, no murmurs, clicks, gallops or rubs, no JVD     LUNGS: clear to auscultation bilaterally; no wheezes, rales, or rhonchi   ABDOMEN: normal bowel sounds, soft, no tenderness, no distention  EXTREMITIES: peripheral pulses normal; no clubbing, cyanosis, or edema  NEURO: no focal findings   SKIN: normal without suspicious lesions on exposed skin      Current Facility-Administered Medications:     acetaminophen (TYLENOL) tablet 650 mg, 650 mg, Oral, Q6H PRN, Clair Barry PA-C, 650 mg at 12/03/18 1835    bisacodyl (DULCOLAX) rectal suppository 10 mg, 10 mg, Rectal, Daily PRN, Leslye German PA-C    calcium carbonate (TUMS) chewable tablet 500 mg, 500 mg, Oral, Daily PRN, Nimco Stephen, DO    diazepam (VALIUM) tablet 5 mg, 5 mg, Oral, Q8H PRN, Nimco Mitchell DO, 5 mg at 12/05/18 0004    diphenhydrAMINE (BENADRYL) tablet 50 mg, 50 mg, Oral, Q6H PRN, Jay Maldonado PA-C, 50 mg at 11/29/18 2314    docusate sodium (COLACE) capsule 100 mg, 100 mg, Oral, BID, Christy Frausto PA-C, 100 mg at 12/03/18 2830    famotidine (PEPCID) tablet 20 mg, 20 mg, Oral, Daily, Clair Barry PA-C, 20 mg at 12/05/18 0818    furosemide (LASIX) tablet 40 mg, 40 mg, Oral, Daily, Elam Fleischer, MD, 40 mg at 12/05/18 0818    heparin (porcine) subcutaneous injection 5,000 Units, 5,000 Units, Subcutaneous, Q8H [address], Nathalie Prince MD, 5,000 Units at 12/05/18 0818    hydrocortisone 1 % cream, , Topical, 4x Daily PRN, Jya Maldonado PA-C    meperidine (DEMEROL) injection 12 5 mg, 12 5 mg, Intravenous, PRN, Veronica Lisa MD    metoprolol (LOPRESSOR) injection 5 mg, 5 mg, Intravenous, Q6H PRN, Clair Barry PA-C, 5 mg at 11/24/18 1811    ondansetron [facility]) injection 4 mg, 4 mg, Intravenous, Q6H PRN, Clair Barry PA-C, 4 mg at 12/03/18 1726    polyethylene glycol (MIRALAX) packet 17 g, 17 g, Oral, Daily PRN, Christy Frausto PA-C, 17 g at 11/27/18 0515    polyethylene glycol (MIRALAX) packet 17 g, 17 g, Oral, BID, Elam Fleischer, MD, Stopped at 11/30/18 0806    senna (SENOKOT) tablet 8 6 mg, 1 tablet, Oral, HS, Christy Frausto PA-C, 8 6 mg at 12/01/18 2111    tamsulosin [facility]) capsule 0 4 mg, 0 4 mg, Oral, Daily With Dinner, Amor Wilkins PA-C, 0 4 mg at 12/04/18 2126    zolpidem (AMBIEN) tablet 5 mg, 5 mg, Oral, HS PRN, Missy Milan PA-C, 2 5 mg at 11/26/18 2250      Labs & Results:          Results from last 7 days  Lab Units 12/05/18  0509 12/04/18  0658 12/03/18  0524   WBC Thousand/uL 3 37* 4 12* 4 57   HEMOGLOBIN g/dL 12 5 11 6* 11 7*   HEMATOCRIT % 38 0 35 3* 36 2*   PLATELETS Thousands/uL 75* 75* 82*           Results from last 7 days  Lab Units 12/05/18  0509 12/04/18  0658 12/03/18  0524   POTASSIUM mmol/L 3 7 3 8 4 2   CHLORIDE mmol/L 99* 97* 100   CO2 mmol/L 30 29 29   BUN mg/dL 33* 36* 33*   CREATININE mg/dL 1 57* 1 66* 1 44*   CALCIUM mg/dL 9 2 9 6 8 9   ALK PHOS U/L 108 103  --    ALT U/L 41 40  --    AST U/L 32 25  --        Results from last 7 days  Lab Units 11/30/18  0610   INR  1 27*           Counseling / Coordination of Care  Total floor / unit time spent today 20 minutes  Greater than 50% of total time was spent with the patient and / or family counseling and / or coordination of care  A description of the counseling / coordination of care: 20

## 2018-12-05 NOTE — ASSESSMENT & PLAN NOTE
Acute on chronic renal failure  Improving  Baseline creatinine between 1 3-1 6  At baseline  Continue diuresis Lasix 40 mg daily  Strict I&Os

## 2018-12-06 PROCEDURE — 99232 SBSQ HOSP IP/OBS MODERATE 35: CPT | Performed by: HOSPITALIST

## 2018-12-06 PROCEDURE — 99231 SBSQ HOSP IP/OBS SF/LOW 25: CPT | Performed by: NURSE PRACTITIONER

## 2018-12-06 RX ORDER — SUCRALFATE ORAL 1 G/10ML
1000 SUSPENSION ORAL EVERY 6 HOURS SCHEDULED
Status: DISCONTINUED | OUTPATIENT
Start: 2018-12-06 | End: 2018-12-07 | Stop reason: HOSPADM

## 2018-12-06 RX ORDER — LISINOPRIL 2.5 MG/1
2.5 TABLET ORAL DAILY
Status: DISCONTINUED | OUTPATIENT
Start: 2018-12-06 | End: 2018-12-07

## 2018-12-06 RX ADMIN — HEPARIN SODIUM 5000 UNITS: 5000 INJECTION INTRAVENOUS; SUBCUTANEOUS at 13:46

## 2018-12-06 RX ADMIN — HEPARIN SODIUM 5000 UNITS: 5000 INJECTION INTRAVENOUS; SUBCUTANEOUS at 06:25

## 2018-12-06 RX ADMIN — TAMSULOSIN HYDROCHLORIDE 0.4 MG: 0.4 CAPSULE ORAL at 18:03

## 2018-12-06 RX ADMIN — HEPARIN SODIUM 5000 UNITS: 5000 INJECTION INTRAVENOUS; SUBCUTANEOUS at 21:17

## 2018-12-06 RX ADMIN — CALCIUM CARBONATE (ANTACID) CHEW TAB 500 MG 500 MG: 500 CHEW TAB at 00:47

## 2018-12-06 RX ADMIN — FUROSEMIDE 40 MG: 40 TABLET ORAL at 08:39

## 2018-12-06 RX ADMIN — ONDANSETRON 4 MG: 2 INJECTION INTRAMUSCULAR; INTRAVENOUS at 02:44

## 2018-12-06 RX ADMIN — LISINOPRIL 2.5 MG: 2.5 TABLET ORAL at 12:06

## 2018-12-06 RX ADMIN — METOPROLOL SUCCINATE 100 MG: 100 TABLET, FILM COATED, EXTENDED RELEASE ORAL at 08:39

## 2018-12-06 RX ADMIN — FAMOTIDINE 20 MG: 20 TABLET ORAL at 08:39

## 2018-12-06 NOTE — MALNUTRITION/BMI
This medical record reflects one or more clinical indicators suggestive of malnutrition and/or morbid obesity  Malnutrition Findings:   Malnutrition type: Acute illness (malnutrition related to poor appetite as evidenced by <75% energy intake for >7 days and mild depletion of orbital body fat to be treated with nutrition supplements )  Degree of Malnutrition: Malnutrition of moderate degree  Malnutrition Characteristics: Fat loss, Inadequate energy      See Nutrition note dated 12/6/18 for additional details  Completed nutrition assessment is viewable in the nutrition documentation

## 2018-12-06 NOTE — PROGRESS NOTES
Progress Note - Ramiro Azar 1945, 72 y o  male MRN: [id number]    Unit/Bed#: [facility] 531-01 Encounter: [id number]    Primary Care Provider: Roberta De La Cruz MD   Date and time admitted to hospital: 11/22/2018  7:39 PM        * Atrial fibrillation [facility])   Assessment & Plan    Presented with atrial fibrillation with rapid ventricular rate  Rate better controlled now  Off amiodarone drip   Continue metoprolol  mg b i d  Per Cardiology  [address] Cardiology input  Status post biv pacer on 11/30/2018   ablation 12/05/2008  Appreciate Cardiology/EP input  As per EP due to his chronic ITP- watchman device to be consider as possible technique to reduce risks of thrombosis without systemic AC     NSTEMI (non-ST elevated myocardial infarction) [facility])   Assessment & Plan    NSTEMI, type 2, POA, due to demand from atrial fibrillation with RVR with associated tachy induced cardiomyopathy" Troponins range from 0 03 to 0 07  Continue current medical management  Urinary retention   Assessment & Plan    Continue Flomax  Monitor urine output     Acute on chronic combined systolic and diastolic heart failure (HCC)   Assessment & Plan    Noted ejection fraction of 20%  [address] Cardiology input  Management for AFib as above   2 g sodium diet  2L fluid restriction  As per heart failure Cardiology recommendation lisinopril 2 5 mg p o  Daily added (if GFR is greater than 30)  GFR 44     PALOMA (acute kidney injury) ([facility] [address] )   Assessment & Plan    Acute on chronic renal failure  Improving  Baseline creatinine between 1 3-1 6  At baseline       Thrombocytopenia [facility])   Assessment & Plan    History of ITP  Negative signs of bleeding with baseline platelets around 06,747  Platelet count 129/62/53/08  Appreciate Heme-Onc input and continue Medrol Dosepak according to them  Had a reaction to platelet transfusion with rash on the back and neck without any respiratory compromise on 11/28    Transfusion was held and regimen with Benadryl, Valium and Tylenol was given  Patient is already on steroids for ITP  As per patient Valium helps with his allergic reactions  Hypertension   Assessment & Plan    BP controlled 125/65  Continue meds, monitoring             VTE Pharmacologic Prophylaxis:   Pharmacologic: Heparin  Mechanical VTE Prophylaxis in Place: Yes    Patient Centered Rounds: I have performed bedside rounds with nursing staff today  Discussions with Specialists or Other Care Team Provider: yes    Education and Discussions with Family / Patient: yes    Time Spent for Care: 45 minutes  More than 50% of total time spent on counseling and coordination of care as described above  Current Length of Stay: 14 day(s)    Current Patient Status: Inpatient   Certification Statement: The patient will continue to require additional inpatient hospital stay due to med mgt of arrythmia    Discharge Plan: rehab    Code Status: Level 1 - Full Code      Subjective:   Overnight event of vomiting- high suspicion of peptic ulcer disease-unable to use PPI-   Review of system otherwise negative      Objective:     Vitals:   Temp (24hrs), Av 8 °F (36 6 °C), Min:97 3 °F (36 3 °C), Max:98 3 °F (36 8 °C)    Temp:  [97 3 °F (36 3 °C)-98 3 °F (36 8 °C)] 97 9 °F (36 6 °C)  HR:  [77-88] 79  Resp:  [17-18] 18  BP: ()/(58-71) 125/65  SpO2:  [97 %-100 %] 98 %  Body mass index is 19 44 kg/m²  Input and Output Summary (last 24 hours): Intake/Output Summary (Last 24 hours) at 18 1145  Last data filed at 18 1101   Gross per 24 hour   Intake              380 ml   Output             1175 ml   Net             -795 ml       Physical Exam:     Physical Exam   Constitutional: He is oriented to person, place, and time  No distress  Chronically ill-looking man   HENT:   Head: Normocephalic and atraumatic  Mouth/Throat: Oropharynx is clear and moist    Eyes: Pupils are equal, round, and reactive to light   EOM are normal    Conjunctiva pallor   Neck: Normal range of motion  Neck supple  No JVD present  Cardiovascular: Normal rate and normal heart sounds  Exam reveals no gallop and no friction rub  No murmur heard  Pulmonary/Chest: Effort normal and breath sounds normal  No respiratory distress  He has no wheezes  Pacemaker dressings clean and intact in left upper chest wall   Abdominal: Soft  Bowel sounds are normal  He exhibits no distension  There is no tenderness  Musculoskeletal: Normal range of motion  He exhibits no edema  Neurological: He is alert and oriented to person, place, and time  No cranial nerve deficit  Skin: Skin is warm  No erythema  Psychiatric: He has a normal mood and affect  Additional Data:     Labs:      Results from last 7 days  Lab Units 12/05/18  0509   WBC Thousand/uL 3 37*   HEMOGLOBIN g/dL 12 5   HEMATOCRIT % 38 0   PLATELETS Thousands/uL 75*   NEUTROS PCT % 72   LYMPHS PCT % 19   MONOS PCT % 7   EOS PCT % 0       Results from last 7 days  Lab Units 12/05/18  0509   SODIUM mmol/L 138   POTASSIUM mmol/L 3 7   CHLORIDE mmol/L 99*   CO2 mmol/L 30   BUN mg/dL 33*   CREATININE mg/dL 1 57*   ANION GAP mmol/L 9   CALCIUM mg/dL 9 2   ALBUMIN g/dL 3 3*   TOTAL BILIRUBIN mg/dL 1 38*   ALK PHOS U/L 108   ALT U/L 41   AST U/L 32   GLUCOSE RANDOM mg/dL 106       Results from last 7 days  Lab Units 11/30/18  0610   INR  1 27*                       * I Have Reviewed All Lab Data Listed Above  * Additional Pertinent Lab Tests Reviewed:  [address] Admission Reviewed    Imaging:    Imaging Reports Reviewed Today   Recent Cultures (last 7 days):           Last 24 Hours Medication List:     Current Facility-Administered Medications:  acetaminophen 650 mg Oral Q6H PRN Aylin Marlow PA-C   bisacodyl 10 mg Rectal Daily PRN Isreal Calvert PA-C   calcium carbonate 500 mg Oral Daily PRN Gloria Turcios DO   diazepam 5 mg Oral Q8H PRN Gloria Turcios,    diphenhydrAMINE 50 mg Oral Q6H PRN [facility] Juani Maldonado PA-C   docusate sodium 100 mg Oral BID Dee Frausto PA-C   famotidine 20 mg Oral Daily Blake Chaudhary, GERRI   furosemide 40 mg Oral Daily Don Rojas MD   heparin (porcine) 5,000 Units Subcutaneous [facility] Grisel Vásquez MD   hydrocortisone  Topical 4x Daily PRN Nanette Maldonado PA-C   lisinopril 2 5 mg Oral Daily Gaston Murillo MD   meperidine 12 5 mg Intravenous PRN Jose Manuel Wells MD   metoprolol 5 mg Intravenous Q6H PRN Blake Chaudhary, GERRI   metoprolol succinate 100 mg Oral BID Jay Maldonado PA-C   ondansetron 4 mg Intravenous Q6H PRN Blake Chaudhary PA-C   polyethylene glycol 17 g Oral Daily PRN Dee Frausto PA-C   polyethylene glycol 17 g Oral BID Don Rojas MD   senna 1 tablet Oral HS Callie R GERRI Frausto   sucralfate 1,000 mg Oral Q6H [address] Gaston Murillo MD   tamsulosin 0 4 mg Oral Daily With Dinner Orpha Boy, GERRI   zolpidem 5 mg Oral HS PRN Blake Chaudhary PA-C        Today, Patient Was Seen By: Gaston Murillo MD    ** Please Note: Dictation voice to text software may have been used in the creation of this document   **

## 2018-12-06 NOTE — ASSESSMENT & PLAN NOTE
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

## 2018-12-06 NOTE — ASSESSMENT & PLAN NOTE
Noted ejection fraction of 20%  [address] Cardiology input  Management for AFib as above   2 g sodium diet  2L fluid restriction  As per heart failure Cardiology recommendation lisinopril 2 5 mg p o   Daily added (if GFR is greater than 30)  GFR 44

## 2018-12-06 NOTE — PROGRESS NOTES
Heart Failure Service Progress Note - Wayne Nuno 72 y o  male MRN: [id number]    Unit/Bed#: [facility] 531-01 Encounter: [id number]      Assessment:    Principal Problem:    Atrial fibrillation ([place] [address] )  Active Problems:    Hypertension    Thrombocytopenia ([facility] )    PALOMA (acute kidney injury) ([place] [address] )    Acute on chronic combined systolic and diastolic heart failure ([place] [address] )    Urinary retention    NSTEMI (non-ST elevated myocardial infarction) ([place] [address] )      Subjective:   Patient seen and examined  No significant events overnight  S/P Biventricular Pacer/AICD implant  100% V paced on telemetry, S/P successful AVN ablation  Feels well  Stable fluid status and hemodynamics  WWP  Platelets 02,303 and stable  Objective: Intake/ Output:680/900  Weight: 135 lbs, 149 on admit  Tele: 100 % V paced    TTE 11/23/18  LVEF: 20%  LVIDd:4 58 cm  RV:normal size and systolic functin  MR:moderate  PASP:  RVOT:   Other:Small free flowing pericardial effusion with moderate left pleural effusion  Neurohormonal Blockade:  --Beta-Blocker: Metoprolol 100 mg BID  --ACEi, ARB or ARNi: PALOMA precludes    (or SVR reduction)  --Aldosterone Receptor Blocker: CKD precludes  --Diuretic:Lasix 40 mg PO daily    Sudden Cardiac Death Risk Reduction:  --ICD: EF 20%, S/P Biventricular AICD implant this admission  Interrogation:     Electrical Resynchronization:  --BiV implant   Interrogation:     Advanced Therapies (If appropriate): --Inotrope:  --LVAD/Transplant Candidacy:    Plan: 1  Acute on chronic systolic congestive heart failure, LVEF 20%, NYHA Class II/III, ACC/AHA Stage C, likely tachy-mediated in origin however cannot definitively rule out an ischemic cause  Volume overloaded on admit, now improved with diuresis  Euvolemic on exam  Creatinine improved overall   Continue Lasix 40 mg daily  Patient hemodynamically stable, warm , well perfused  Continue to monitor daily weight, strict I's and O's, BMP   S/P BiV pacer/ICD implant and AVN ablation  Tolerated well  Would recheck TTE in 3 months to reassess for improvement in LV function   2  Permanent atrial fibrillation  S/P AVN ablation  Currently 100 % V paced on tele  On Metoprolol 100 mg BID        3  Chronic idiopathic thrombocytopenia  Unresponsive to steroid taper, now d/c  Defer to hem onc  Platelets 41,900 today and stable  Cont  To monitor CBC        4  HTN  Controlled  [employer] (day, reason): Abbott catheter (day, reason):    Vitals: Blood pressure 111/60, pulse 79, temperature 97 8 °F (36 6 °C), resp  rate 17, height 5' 10" (1 778 m), weight 61 5 kg (135 lb 8 oz), SpO2 97 %  , Body mass index is 19 44 kg/m² , I/O last 3 completed shifts: In: 1160 [P O :1160]  Out: 2050 [Urine:2050]  No intake/output data recorded  Wt Readings from Last 3 Encounters:   12/05/18 61 5 kg (135 lb 8 oz)   11/22/18 67 5 kg (148 lb 13 oz)   10/10/18 73 5 kg (162 lb)       Intake/Output Summary (Last 24 hours) at 12/06/18 0853  Last data filed at 12/06/18 0307   Gross per 24 hour   Intake              380 ml   Output              825 ml   Net             -445 ml     I/O last 3 completed shifts: In: 1160 [P O :1160]  Out: 2050 [Urine:2050]    100 % V paced  Physical Exam:  Vitals:    12/06/18 0001 12/06/18 0307 12/06/18 0709 12/06/18 0839   BP: 95/58 111/63 106/65 111/60   BP Location: Left arm Left arm     Pulse: 80 88 77 79   Resp: 18 18 17    Temp: (!) 97 3 °F (36 3 °C) 97 5 °F (36 4 °C) 97 8 °F (36 6 °C)    TempSrc: Oral Oral     SpO2: 99% 99% 97%    Weight:       Height:           GEN: Prachi Thompson appears well, alert and oriented x 3, pleasant and cooperative   HEENT: pupils equal, round, and reactive to light; extraocular muscles intact  NECK: supple, no carotid bruits   HEART: regular rhythm, normal S1 and S2, no murmurs, clicks, gallops or rubs, no JVD     LUNGS: clear to auscultation bilaterally; no wheezes, rales, or rhonchi   ABDOMEN: normal bowel sounds, soft, no tenderness, no distention  EXTREMITIES: peripheral pulses normal; no clubbing, cyanosis, or edema  NEURO: no focal findings   SKIN: normal without suspicious lesions on exposed skin      Current Facility-Administered Medications:     acetaminophen (TYLENOL) tablet 650 mg, 650 mg, Oral, Q6H PRN, Ana Mix PA-C, 650 mg at 12/03/18 1835    bisacodyl (DULCOLAX) rectal suppository 10 mg, 10 mg, Rectal, Daily PRN, Sukhwinder Tiwari PA-C    calcium carbonate (TUMS) chewable tablet 500 mg, 500 mg, Oral, Daily PRN, Reinier Rose DO, 500 mg at 12/06/18 0047    diazepam (VALIUM) tablet 5 mg, 5 mg, Oral, Q8H PRN, Reinier Rose DO, 5 mg at 12/05/18 1407    diphenhydrAMINE (BENADRYL) tablet 50 mg, 50 mg, Oral, Q6H PRN, Jay Maldonado PA-C, 50 mg at 11/29/18 2314    docusate sodium (COLACE) capsule 100 mg, 100 mg, Oral, BID, Antonette Frausto PA-C, 100 mg at 12/03/18 6703    famotidine (PEPCID) tablet 20 mg, 20 mg, Oral, Daily, Ana Mix PA-C, 20 mg at 12/06/18 0839    furosemide (LASIX) tablet 40 mg, 40 mg, Oral, Daily, Sachin Sawyer MD, 40 mg at 12/06/18 0839    heparin (porcine) subcutaneous injection 5,000 Units, 5,000 Units, Subcutaneous, Q8H [address], Jaqueline Bowen MD, 5,000 Units at 12/06/18 6606    hydrocortisone 1 % cream, , Topical, 4x Daily PRN, Jay Maldonado PA-C    meperidine (DEMEROL) injection 12 5 mg, 12 5 mg, Intravenous, PRN, Mk Mccall MD    metoprolol (LOPRESSOR) injection 5 mg, 5 mg, Intravenous, Q6H PRN, Ana Mix PA-C, 5 mg at 11/24/18 1811    metoprolol succinate (TOPROL-XL) 24 hr tablet 100 mg, 100 mg, Oral, BID, Jay Maldonado PA-C, 100 mg at 12/06/18 0839    ondansetron (ZOFRAN) injection 4 mg, 4 mg, Intravenous, Q6H PRN, Ana Mix PA-C, 4 mg at 12/06/18 0244    polyethylene glycol (MIRALAX) packet 17 g, 17 g, Oral, Daily PRN, Antonette Frausto PA-C, 17 g at 11/27/18 2354    polyethylene glycol (MIRALAX) packet 17 g, 17 g, Oral, BID, Sachin Sawyer MD, Stopped at 11/30/18 9688   senna (SENOKOT) tablet 8 6 mg, 1 tablet, Oral, HS, Sharita Jessica GreenGERRI louis, 8 6 mg at 12/01/18 2111    tamsulosin (FLOMAX) capsule 0 4 mg, 0 4 mg, Oral, Daily With Dinner, Kalli Winn PA-C, 0 4 mg at 12/05/18 1704    zolpidem (AMBIEN) tablet 5 mg, 5 mg, Oral, HS PRN, Damon Schilder, PA-C, 2 5 mg at 11/26/18 2250      Labs & Results:          Results from last 7 days  Lab Units 12/05/18  0509 12/04/18  0658 12/03/18  0524   WBC Thousand/uL 3 37* 4 12* 4 57   HEMOGLOBIN g/dL 12 5 11 6* 11 7*   HEMATOCRIT % 38 0 35 3* 36 2*   PLATELETS Thousands/uL 75* 75* 82*           Results from last 7 days  Lab Units 12/05/18  0509 12/04/18  0658 12/03/18  0524   POTASSIUM mmol/L 3 7 3 8 4 2   CHLORIDE mmol/L 99* 97* 100   CO2 mmol/L 30 29 29   BUN mg/dL 33* 36* 33*   CREATININE mg/dL 1 57* 1 66* 1 44*   CALCIUM mg/dL 9 2 9 6 8 9   ALK PHOS U/L 108 103  --    ALT U/L 41 40  --    AST U/L 32 25  --        Results from last 7 days  Lab Units 11/30/18  0610   INR  1 27*           Counseling / Coordination of Care  Total floor / unit time spent today 20 minutes  Greater than 50% of total time was spent with the patient and / or family counseling and / or coordination of care  A description of the counseling / coordination of care: 20

## 2018-12-06 NOTE — ASSESSMENT & PLAN NOTE
History of ITP  Negative signs of bleeding with baseline platelets around 62,545  Platelet count 306/01/00/85  Appreciate Heme-Onc input and continue Medrol Dosepak according to them  Had a reaction to platelet transfusion with rash on the back and neck without any respiratory compromise on 11/28  Transfusion was held and regimen with Benadryl, Valium and Tylenol was given  Patient is already on steroids for ITP  As per patient Valium helps with his allergic reactions

## 2018-12-07 VITALS
BODY MASS INDEX: 19.07 KG/M2 | HEIGHT: 70 IN | DIASTOLIC BLOOD PRESSURE: 66 MMHG | HEART RATE: 77 BPM | TEMPERATURE: 97.3 F | WEIGHT: 133.2 LBS | SYSTOLIC BLOOD PRESSURE: 99 MMHG | RESPIRATION RATE: 20 BRPM | OXYGEN SATURATION: 99 %

## 2018-12-07 PROCEDURE — 99232 SBSQ HOSP IP/OBS MODERATE 35: CPT | Performed by: INTERNAL MEDICINE

## 2018-12-07 PROCEDURE — 99239 HOSP IP/OBS DSCHRG MGMT >30: CPT | Performed by: HOSPITALIST

## 2018-12-07 PROCEDURE — 97116 GAIT TRAINING THERAPY: CPT

## 2018-12-07 RX ORDER — METOPROLOL SUCCINATE 100 MG/1
100 TABLET, EXTENDED RELEASE ORAL 2 TIMES DAILY
Qty: 60 TABLET | Refills: 0 | Status: SHIPPED | OUTPATIENT
Start: 2018-12-07 | End: 2019-01-04 | Stop reason: SDUPTHER

## 2018-12-07 RX ORDER — BISACODYL 10 MG
10 SUPPOSITORY, RECTAL RECTAL DAILY PRN
Qty: 12 SUPPOSITORY | Refills: 0 | Status: SHIPPED | OUTPATIENT
Start: 2018-12-07 | End: 2019-01-04 | Stop reason: ALTCHOICE

## 2018-12-07 RX ORDER — SUCRALFATE ORAL 1 G/10ML
1000 SUSPENSION ORAL EVERY 6 HOURS SCHEDULED
Qty: 420 ML | Refills: 0 | Status: SHIPPED | OUTPATIENT
Start: 2018-12-07 | End: 2019-01-04 | Stop reason: ALTCHOICE

## 2018-12-07 RX ORDER — FUROSEMIDE 40 MG/1
40 TABLET ORAL DAILY
Qty: 30 TABLET | Refills: 0 | Status: SHIPPED | OUTPATIENT
Start: 2018-12-08 | End: 2019-01-05 | Stop reason: SDUPTHER

## 2018-12-07 RX ORDER — LISINOPRIL 5 MG/1
5 TABLET ORAL DAILY
Status: DISCONTINUED | OUTPATIENT
Start: 2018-12-07 | End: 2018-12-07 | Stop reason: HOSPADM

## 2018-12-07 RX ORDER — FAMOTIDINE 20 MG/1
20 TABLET, FILM COATED ORAL DAILY
Qty: 30 TABLET | Refills: 0 | Status: SHIPPED | OUTPATIENT
Start: 2018-12-08 | End: 2019-01-04 | Stop reason: ALTCHOICE

## 2018-12-07 RX ORDER — ZOLPIDEM TARTRATE 5 MG/1
5 TABLET ORAL
Qty: 30 TABLET | Refills: 0 | Status: SHIPPED | OUTPATIENT
Start: 2018-12-07 | End: 2019-01-04 | Stop reason: ALTCHOICE

## 2018-12-07 RX ORDER — TAMSULOSIN HYDROCHLORIDE 0.4 MG/1
0.4 CAPSULE ORAL
Qty: 30 CAPSULE | Refills: 0 | Status: SHIPPED | OUTPATIENT
Start: 2018-12-07 | End: 2020-01-01 | Stop reason: SDUPTHER

## 2018-12-07 RX ORDER — LISINOPRIL 5 MG/1
5 TABLET ORAL DAILY
Qty: 30 TABLET | Refills: 0 | Status: SHIPPED | OUTPATIENT
Start: 2018-12-08 | End: 2019-01-05 | Stop reason: SDUPTHER

## 2018-12-07 RX ORDER — SENNOSIDES 8.6 MG
1 TABLET ORAL
Qty: 30 TABLET | Refills: 0 | Status: SHIPPED | OUTPATIENT
Start: 2018-12-07 | End: 2019-01-04 | Stop reason: ALTCHOICE

## 2018-12-07 RX ORDER — DIAZEPAM 5 MG/1
5 TABLET ORAL EVERY 8 HOURS PRN
Qty: 30 TABLET | Refills: 0 | Status: ON HOLD | OUTPATIENT
Start: 2018-12-07 | End: 2019-02-13

## 2018-12-07 RX ORDER — ACETAMINOPHEN 325 MG/1
650 TABLET ORAL EVERY 6 HOURS PRN
Qty: 30 TABLET | Refills: 0 | Status: SHIPPED | OUTPATIENT
Start: 2018-12-07 | End: 2021-01-01 | Stop reason: HOSPADM

## 2018-12-07 RX ORDER — CALCIUM CARBONATE 200(500)MG
500 TABLET,CHEWABLE ORAL DAILY PRN
Qty: 30 TABLET | Refills: 0 | Status: SHIPPED | OUTPATIENT
Start: 2018-12-07 | End: 2020-01-01 | Stop reason: HOSPADM

## 2018-12-07 RX ADMIN — DIAZEPAM 5 MG: 5 TABLET ORAL at 05:32

## 2018-12-07 RX ADMIN — HEPARIN SODIUM 5000 UNITS: 5000 INJECTION INTRAVENOUS; SUBCUTANEOUS at 13:14

## 2018-12-07 RX ADMIN — LISINOPRIL 5 MG: 5 TABLET ORAL at 09:56

## 2018-12-07 RX ADMIN — METOPROLOL SUCCINATE 100 MG: 100 TABLET, FILM COATED, EXTENDED RELEASE ORAL at 12:44

## 2018-12-07 RX ADMIN — FUROSEMIDE 40 MG: 40 TABLET ORAL at 09:56

## 2018-12-07 RX ADMIN — HEPARIN SODIUM 5000 UNITS: 5000 INJECTION INTRAVENOUS; SUBCUTANEOUS at 05:32

## 2018-12-07 RX ADMIN — FAMOTIDINE 20 MG: 20 TABLET ORAL at 09:56

## 2018-12-07 NOTE — DISCHARGE SUMMARY
Discharge- Ramiro Azar 1945, 72 y o  male MRN: [id number]    Unit/Bed#: [facility] 531-01 Encounter: [id number]    Primary Care Provider: Roberta De La Cruz MD   Date and time admitted to hospital: 11/22/2018  7:39 PM        * Atrial fibrillation [facility])   Assessment & Plan    Presented with atrial fibrillation with rapid ventricular rate  Rate better controlled now  S/p BiV PPM; s/p AVJ ablation by EP  Continue metoprolol  mg b i d  Per Cardiology  No amiodarone or digoxin recommended upon [address] Cardiology input  Status post biv pacer on 11/30/2018   ablation 12/05/2008  Appreciate Cardiology/EP input  As per EP due to his chronic ITP- watchman device to be consider as possible technique to reduce risks of thrombosis without systemic AC     NSTEMI (non-ST elevated myocardial infarction) [facility])   Assessment & Plan    NSTEMI, type 2, POA, due to demand from atrial fibrillation with RVR with associated tachy induced cardiomyopathy" Troponins range from 0 03 to 0 07  Continue current medical management  Urinary retention   Assessment & Plan    Continue Flomax  Monitor urine output  Continue indwelling catheter- follow up outpatient with urology   I have communicated with the urology PA Ms  Kaitlynn Carolina via TT to follow up  Acute on chronic combined systolic and diastolic heart failure (HCC)   Assessment & Plan    Noted ejection fraction of 20%  [address] [employer] Cardiology input  Management for AFib as above   2 g sodium diet  2L fluid restriction  As per heart failure Cardiology recommendation lisinopril 2 5 mg p o  Daily added (if GFR is greater than 30)  GFR 44  Continue lasix 40mg po bid, BB     PALOMA (acute kidney injury) ([address] )   Assessment & Plan    Acute on chronic renal failure  Improving 33/1  57  Baseline creatinine between 1 3-1 6  At baseline       Thrombocytopenia [facility])   Assessment & Plan    History of ITP    Negative signs of bleeding with baseline platelets around 70,000  Platelet count 144/44/01/94  Appreciate Heme-Onc input         Hypertension   Assessment & Plan    BP stable  Continue meds, monitoring           Discharging Physician / Practitioner: Usman Almanza MD  PCP: Delgado Menendez MD  Admission Date:   Admission Orders     Ordered        11/22/18 2014  Inpatient Admission  Once             Discharge Date: 12/07/18    Resolved Problems  Date Reviewed: 12/7/2018          Resolved    Coronary artery disease 12/3/2018     Resolved by  Usman Almanza MD    Hyperlipidemia 12/3/2018     Resolved by  Usman Almanza MD    Dilated cardiomyopathy ([address] ) 12/3/2018     Resolved by  Usman Almanza MD    Hypokalemia 11/23/2018     Resolved by  BJ Cota    Hypomagnesemia 11/25/2018     Resolved by  Joel Luis PA-C    Acute respiratory insufficiency 11/25/2018     Resolved by  Joel Luis PA-C          Consultations During Hospital Stay:  Cardiology  EP cardiology    Procedures Performed:     · BiV PPM s/p AVJ ablation by Dr Pio Cabrera    Reason for Admission:    Hospital Course:     Shantelle Rankin is a 72 y o  male patient who originally presented to the hospital on 11/22/2018 due to atrial fibrillation RVR and acute systolic heart failure secondary to tachycardia induced cardiomyopathy  Patient presented to [facility] on 11/20/2018 with worsening fatigue and dyspnea on exertion-he was admitted treated for AFib RVR, transferred to [facility] for EP Cardiology evaluation  During hospitalization patient failed rhythm control, and required placement of Bi V pacemaker as well as AV J ablation by Dr Pio Cabrera  He was recommended to follow-up Cardiology EP outpatient Dr Megan Jones for further management outpatient  No anticoagulation will recommended upon discharge due to his high risk of of bleeding based on underlying chronic ITP    His heart failure was managed by heart failure Cardiology team and was recommended to continue with a 2 g sodium diet, 2L fluid restriction, a trial of lisinopril 2 5 mg p o  Daily as well as metoprolol 100 mg p o  B i d  Given the fact that anticoagulation is currently on contraindicated with him there is a consideration recommended for the Watchman device which is deferred for now  Patient will be discharged indwelling Abbott since patient failed voiding trials-recommended follow up outpatient urology for further evaluation of urinary tension  Please see above list of diagnoses and related plan for additional information  Condition at Discharge: good     Discharge Day Visit / Exam:     Subjective:  No complaints today  Review of systems negative otherwise  Vitals: Blood Pressure: 99/66 (12/07/18 1127)  Pulse: 77 (12/07/18 1127)  Temperature: (!) 97 3 °F (36 3 °C) (12/07/18 1127)  Temp Source: Oral (12/07/18 1127)  Respirations: 20 (12/07/18 1127)  Height: 5' 10" (177 8 cm) (11/22/18 1939)  Weight - Scale: 60 4 kg (133 lb 3 2 oz) (12/07/18 0600)  SpO2: 99 % (12/07/18 1127)  Exam:   Physical Exam   Constitutional: He is oriented to person, place, and time  No distress  HENT:   Head: Normocephalic and atraumatic  Mouth/Throat: Oropharynx is clear and moist    Eyes: Pupils are equal, round, and reactive to light  EOM are normal    Conjunctiva pallor   Neck: Normal range of motion  Neck supple  No JVD present  Cardiovascular: Normal rate and normal heart sounds  Exam reveals no gallop and no friction rub  No murmur heard  Pulmonary/Chest: Effort normal and breath sounds normal  No respiratory distress  He has no wheezes  Bi V ICD in place left upper chest wall   Abdominal: Soft  Bowel sounds are normal  He exhibits no distension  There is no tenderness  Indwelling Abbott   Musculoskeletal: Normal range of motion  He exhibits no edema  Neurological: He is alert and oriented to person, place, and time  No cranial nerve deficit  Skin: Skin is warm  No erythema     Psychiatric: He has a normal mood and affect  Discussion with Family:  Yes  Discharge instructions/Information to patient and family:   See after visit summary for information provided to patient and family  Provisions for Follow-Up Care:  See after visit summary for information related to follow-up care and any pertinent home health orders  Disposition:     Home    For Discharges to [facility] SNF:   · Not Applicable to this Patient - Not Applicable to this Patient    Planned Readmission: none     Discharge Statement:  I spent 35 minutes discharging the patient  This time was spent on the day of discharge  I had direct contact with the patient on the day of discharge  Greater than 50% of the total time was spent examining patient, answering all patient questions, arranging and discussing plan of care with patient as well as directly providing post-discharge instructions  Additional time then spent on discharge activities  Discharge Medications:  See after visit summary for reconciled discharge medications provided to patient and family        ** Please Note: This note has been constructed using a voice recognition system **

## 2018-12-07 NOTE — PHYSICAL THERAPY NOTE
PT Treatment       12/07/18 1245   Pain Assessment   Pain Assessment No/denies pain   Pain Score No Pain   Restrictions/Precautions   Weight Bearing Precautions Per Order No   Other Precautions Cardiac/sternal;Chair Alarm; Fall Risk;Telemetry  (Pacemaker)   General   Chart Reviewed Yes   Response to Previous Treatment Patient with no complaints from previous session  Family/Caregiver Present No   Cognition   Overall Cognitive Status WFL   Arousal/Participation Alert; Cooperative   Attention Within functional limits   Orientation Level Oriented X4   Memory Within functional limits   Following Commands Follows all commands and directions without difficulty   Bed Mobility   Additional Comments Seated in bedside chair on arrival   Transfers   Sit to Stand 6  Modified independent   Stand to Sit 6  Modified independent   Stand pivot 6  Modified independent   Ambulation/Elevation   Gait pattern Excessively slow; Short stride   Gait Assistance 5  Supervision   Assistive Device None   Distance 300'   Stair Management Assistance 5  Supervision   Additional items Verbal cues   Stair Management Technique One rail L;Reciprocal   Number of Stairs 14   Balance   Static Sitting Good   Dynamic Sitting Fair +   Static Standing Fair   Dynamic Standing Fair   Ambulatory Fair   Endurance Deficit   Endurance Deficit Yes   Endurance Deficit Description Slight SOB with stairs and distance ambulation   Activity Tolerance   Activity Tolerance Patient tolerated treatment well   Medical Staff Made Aware Spoke with CM regarding d/c planning   Assessment   Prognosis Good   Problem List Decreased strength;Decreased endurance; Impaired balance;Decreased mobility; Decreased skin integrity;Orthopedic restrictions   Assessment Pt was agreeable to PT treatment session  He demonstrated improved endurance compared to previous treatment notes, but continues to demonstrate slight SOB with distance ambulation and stairs    During ambulation, he demonstrates increased lateral sway and slight LOB (self corrected) requiring S level of assistance  Per pt report, he is not functioning at his baseline level; therefore, recommendation is continued PT services in the form of home PT upon d/c to return to [facility]  Barriers to Discharge None   Goals   Patient Goals Get back to moving better   STG Expiration Date 12/17/18   Short Term Goal #1 Pt completes bed mobility activities independently  Pt completes transfers independently without the need for an assistive device with good safety awareness  Pt ambulates >/= 360' independently without an assistive device with good safety noted  LE strength improves to 4+/5 for all groups  Up/down 1 flight of steps independently for return to home  Treatment Day 3   Plan   Treatment/Interventions Functional transfer training;LE strengthening/ROM; Elevations; Therapeutic exercise; Endurance training;Patient/family training;Equipment eval/education; Bed mobility;Gait training;Spoke to case management   Progress Improving as expected   PT Frequency (3-5x/wk)   Recommendation   Recommendation Home PT; Home with family support   Equipment Recommended (None)   PT - OK to Discharge Yes   Additional Comments When medically stable     Jackelyn Flores, PT, DPT

## 2018-12-07 NOTE — ASSESSMENT & PLAN NOTE
Continue Flomax  Monitor urine output  Continue indwelling catheter- follow up outpatient with urology   I have communicated with the urology PA Ms Vivek Castañeda via TT to follow up

## 2018-12-07 NOTE — ASSESSMENT & PLAN NOTE
Presented with atrial fibrillation with rapid ventricular rate  Rate better controlled now  Off amiodarone drip   Continue metoprolol  mg b i d  Per Cardiology  [address] Cardiology input  Status post biv pacer on 11/30/2018   ablation 12/05/2008  Appreciate Cardiology/EP input    As per EP due to his chronic ITP- watchman device to be consider as possible technique to reduce risks of thrombosis without systemic AC

## 2018-12-07 NOTE — PROGRESS NOTES
Blood pressures reviewed, held toprol XL 100mg this AM, Dr Rajat Davis notified spoke with Dr Godinez  and  instructed it's okay to give the medication

## 2018-12-07 NOTE — PLAN OF CARE
CARDIOVASCULAR - ADULT     Maintains optimal cardiac output and hemodynamic stability Progressing     Absence of cardiac dysrhythmias or at baseline rhythm Progressing        CHANGE IN BODY IMAGE     Pt/Family communicate acceptance of loss or change in body image and expresses psychological comfort and peace Progressing        COPING     Pt/Family able to verbalize concerns and demonstrate effective coping strategies Progressing     Will report anxiety at manageable levels Progressing        DEATH & DYING     Pt/Family communicate acceptance of impending death and expresses psychological comfort and peace Progressing        DECISION MAKING     Pt/Family able to effectively weigh alternatives and participate in decision making related to treatment and care [address] [facility] Discharge to post-acute care or home with appropriate resources Progressing        GENITOURINARY - ADULT     Urinary catheter remains patent Progressing        METABOLIC, FLUID AND ELECTROLYTES - ADULT     Electrolytes maintained within normal limits Progressing     Fluid balance maintained Progressing        Nutrition/Hydration-ADULT     Nutrient/Hydration intake appropriate for improving, restoring or maintaining nutritional needs Progressing        Potential for Falls     Patient will remain free of falls Progressing        Prexisting or High Potential for Compromised Skin Integrity     Skin integrity is maintained or improved Progressing        SKIN/TISSUE INTEGRITY - ADULT     Skin integrity remains intact Progressing        SPIRITUAL CARE     Pt/Family able to move forward in process of forgiving self, others and/or higher power Progressing     Patient feels balance and connection with others and/or higher power that empowers the self during times of loss, guilt and fear Progressing

## 2018-12-07 NOTE — ASSESSMENT & PLAN NOTE
Acute on chronic renal failure  Improving 33/1  57  Baseline creatinine between 1 3-1 6    At baseline

## 2018-12-07 NOTE — RESTORATIVE TECHNICIAN NOTE
Restorative Specialist Mobility Note       Activity: Chair, Stand at bedside, Turn, Ambulate in room, Ambulate in galvan     Assistive Device: None     Ambulation Response: Tolerated fairly well  Repositioned: Sitting, Up in chair              Anti-Embolism Device On:  Bilateral, Sequential compression devices, below knee

## 2018-12-07 NOTE — ASSESSMENT & PLAN NOTE
History of ITP  Negative signs of bleeding with baseline platelets around 64,428    Platelet count 519/40/35/37  Appreciate Heme-Onc input

## 2018-12-07 NOTE — PROGRESS NOTES
Heart Failure Service Progress Note - Nicole Sethter 72 y o  male MRN: [id number]    Unit/Bed#: [facility] 531-01 Encounter: [id number]      Assessment:    Principal Problem:    Atrial fibrillation ([place] [address] )  Active Problems:    Hypertension    Thrombocytopenia ([place] [address] )    PALOMA (acute kidney injury) ([place] [address] )    Acute on chronic combined systolic and diastolic heart failure ([place] [address] )    Urinary retention    NSTEMI (non-ST elevated myocardial infarction) ([place] [address] )    # Chronic AFib w/ RVR now s/p BiV PPM w/ LV and His lead s/p AVN node ablation: BiV paced  --Not a good candidate for DCCV/ablation given ITP hx  --Rate control per EP  --Given ITP, agree with consideration for Watchman device  --Will defer to EP re: amiodarone/digoxin plan     # Acute on chronic BiV HFrEF (NICM, LVEF 20%, LVIDd 4 6 cm), NYHA III, ACC/AHA Stage C: Most likely 2/2 to tachy mediated with mild RV dysfunction  Mod MR  Mod TR  LVEF has steadily decreased from 5/2/2017  Per LAURA, LVEF improved  Therapeutic:  --2g sodium diet  --2000 ml fluid restriction     Neurohormonal Blockade:  --Beta-Blocker: Continue metoprolol succinate metoprolol 100 mg PO BID  --ACEi, ARB or ARNi: Increase lisinopril 5 mg PO daily  --Aldosterone Receptor Blocker: GFR >30, will trial ACEi first  --Diuretic: Continue lasix 40 mg PO daily     Electrical Resynchronization/Sudden Cardiac Death Risk Reduction:  --BiV-ICD: Has BiV-PPM s/p AVN ablation     Advanced Therapies: Continue to monitor       # CKD III  # ITP    F/U as outpatient      Subjective:   Patient seen and examined  No significant events overnight  Objective:       [employer] (day, reason): Abbott catheter (day, reason):    Vitals: Blood pressure 99/66, pulse 77, temperature (!) 97 3 °F (36 3 °C), temperature source Oral, resp  rate 20, height 5' 10" (1 778 m), weight 60 4 kg (133 lb 3 2 oz), SpO2 99 %  , Body mass index is 19 11 kg/m² , I/O last 3 completed shifts:   In: 48 [P O :50]  Out: 1450 [Urine:1450]  I/O this shift:  In: 360 [P O :360]  Out: 200 [Urine:200]  Wt Readings from Last 3 Encounters:   12/07/18 60 4 kg (133 lb 3 2 oz)   11/22/18 67 5 kg (148 lb 13 oz)   10/10/18 73 5 kg (162 lb)       Intake/Output Summary (Last 24 hours) at 12/07/18 1238  Last data filed at 12/07/18 1155   Gross per 24 hour   Intake              410 ml   Output             1050 ml   Net             -640 ml     I/O last 3 completed shifts: In: 48 [P O :50]  Out: 1450 [Urine:1450]    No significant arrhythmias seen on telemetry review        Physical Exam:  Vitals:    12/07/18 0302 12/07/18 0600 12/07/18 0717 12/07/18 1127   BP: 97/58  101/60 99/66   BP Location: Left arm      Pulse: 80  79 77   Resp: 18  18 20   Temp: 97 6 °F (36 4 °C)  97 5 °F (36 4 °C) (!) 97 3 °F (36 3 °C)   TempSrc: Oral  Oral Oral   SpO2: 100%  100% 99%   Weight:  60 4 kg (133 lb 3 2 oz)     Height:           GEN: Prachi Thompson appears well, alert and oriented x 3, pleasant and cooperative   HEENT: pupils equal, round, and reactive to light; extraocular muscles intact  NECK: supple, no carotid bruits   HEART: regular rhythm, normal S1 and S2, no murmurs, clicks, gallops or rubs, JVP is    LUNGS: clear to auscultation bilaterally; no wheezes, rales, or rhonchi   ABDOMEN: normal bowel sounds, soft, no tenderness, no distention  EXTREMITIES: peripheral pulses normal; no clubbing, cyanosis, or edema  NEURO: no focal findings   SKIN: normal without suspicious lesions on exposed skin      Current Facility-Administered Medications:     acetaminophen (TYLENOL) tablet 650 mg, 650 mg, Oral, Q6H PRN, Antonino Carpenter PA-C, 650 mg at 12/03/18 1835    bisacodyl (DULCOLAX) rectal suppository 10 mg, 10 mg, Rectal, Daily PRN, Jolanta Davila PA-C    calcium carbonate (TUMS) chewable tablet 500 mg, 500 mg, Oral, Daily PRN, Teresita Plaza  mg at 12/06/18 0047    diazepam (VALIUM) tablet 5 mg, 5 mg, Oral, Q8H PRN, Teresita Plaza DO, 5 mg at 12/07/18 0532    diphenhydrAMINE (BENADRYL) tablet 50 mg, 50 mg, Oral, Q6H PRN, Jay Maldonado PA-C, 50 mg at 11/29/18 2314    docusate sodium (COLACE) capsule 100 mg, 100 mg, Oral, BID, Alpesh Frausto PA-C, 100 mg at 12/03/18 0903    famotidine (PEPCID) tablet 20 mg, 20 mg, Oral, Daily, Mp Russell PA-C, 20 mg at 12/07/18 0956    furosemide (LASIX) tablet 40 mg, 40 mg, Oral, Daily, True Zamarripa MD, 40 mg at 12/07/18 0956    heparin (porcine) subcutaneous injection 5,000 Units, 5,000 Units, Subcutaneous, Q8H [facility], Ari Espinosa MD, 5,000 Units at 12/07/18 0532    hydrocortisone 1 % cream, , Topical, 4x Daily PRN, Jay Maldonado PA-C    lisinopril (ZESTRIL) tablet 5 mg, 5 mg, Oral, Daily, Kourtney Hollis MD, 5 mg at 12/07/18 0956    meperidine (DEMEROL) injection 12 5 mg, 12 5 mg, Intravenous, PRN, Luigi Brownlee MD    metoprolol (LOPRESSOR) injection 5 mg, 5 mg, Intravenous, Q6H PRN, Mp Russell PA-C, 5 mg at 11/24/18 1811    metoprolol succinate (TOPROL-XL) 24 hr tablet 100 mg, 100 mg, Oral, BID, Jay Maldonado PA-C, 100 mg at 12/06/18 0839    ondansetron (ZOFRAN) injection 4 mg, 4 mg, Intravenous, Q6H PRN, Mp Russell PA-C, 4 mg at 12/06/18 0244    polyethylene glycol (MIRALAX) packet 17 g, 17 g, Oral, Daily PRN, Alpesh Frausto PA-C, 17 g at 11/27/18 0515    polyethylene glycol (MIRALAX) packet 17 g, 17 g, Oral, BID, True Zamarripa MD, Stopped at 11/30/18 0806    senna (SENOKOT) tablet 8 6 mg, 1 tablet, Oral, HS, Alpesh Frausto PA-C, 8 6 mg at 12/01/18 2111    sucralfate (CARAFATE) oral suspension 1,000 mg, 1,000 mg, Oral, Q6H [facility], Tamie Torres MD    tamsulosin (FLOMAX) capsule 0 4 mg, 0 4 mg, Oral, Daily With Dinner, Jaziel Forde PA-C, 0 4 mg at 12/06/18 1803    zolpidem (AMBIEN) tablet 5 mg, 5 mg, Oral, HS PRN, Mp Russell PA-C, 2 5 mg at 11/26/18 2250      Labs & Results:        Results from last 7 days  Lab Units 12/05/18  0509 12/04/18  0658 12/03/18  0524   WBC Thousand/uL 3 37* 4 12* 4 57   HEMOGLOBIN g/dL 12 5 11 6* 11 7*   HEMATOCRIT % 38 0 35 3* 36 2*   PLATELETS Thousands/uL 75* 75* 82*           Results from last 7 days  Lab Units 12/05/18  0509 12/04/18  0658 12/03/18  0524   POTASSIUM mmol/L 3 7 3 8 4 2   CHLORIDE mmol/L 99* 97* 100   CO2 mmol/L 30 29 29   BUN mg/dL 33* 36* 33*   CREATININE mg/dL 1 57* 1 66* 1 44*   CALCIUM mg/dL 9 2 9 6 8 9   ALK PHOS U/L 108 103  --    ALT U/L 41 40  --    AST U/L 32 25  --          EKG personally reviewed by Kaylene Guevara MD      Counseling / Coordination of Care  Total floor / unit time spent today 40 minutes  Greater than 50% of total time was spent with the patient and / or family counseling and / or coordination of care  A description of the counseling / coordination of care: 20 min  Thank you for the opportunity to participate in the care of this patient      Kaylene Guevara MD, PhD   Kelsi Taylor

## 2018-12-07 NOTE — SOCIAL WORK
Cm advised by therapist Gato Jackson that pt would like home PT, cm spoke with pt again and he is also agreeable to SN for garrison management  Referral sent via [facility] at pt's request to any [facility] that will be able to accept him  Pt accepted by [facility], cm made pt aware and he is agreeable  Pt would like to know which anticoag he will be going home on  Cm and bedside nurse in pt's room and pt was advised per the discharge instructions no anticoag is prescribed  Follow-up appointments on AVS has pt seeing Dr Carolyn Babinski on 1/28  Pt is requesting to speak with someone from cardiology, refusing to sign IMM  Cm paged EP and spoke with FRANCHESCA Clark who offered to speak with pt via phone as he is about to go into a procedure  Pt refusing and would like to speak with someone in person, Francie Clark will see pt after he has completed the procedure

## 2018-12-07 NOTE — SOCIAL WORK
Pt spoke with FRANCHESCA Contreras and cm followed up, he is satisfied with explanation and would like to sign IMM

## 2018-12-07 NOTE — SOCIAL WORK
Cm spoke with therapist Cralito Garcia regarding recommendation for pt  Per Carlito Garcia pt can be seen at home for evaluation and cancelled if not needed  Explained that pt does not want the service if not needed  Restorative therapist Josue Leblanc walked with pt and he was able to ambulate hallways with no assistive device  Pt declining VNA services at this time

## 2018-12-07 NOTE — ASSESSMENT & PLAN NOTE
Noted ejection fraction of 20%  [address] Cardiology input  Management for AFib as above   2 g sodium diet  2L fluid restriction  As per heart failure Cardiology recommendation lisinopril 2 5 mg p o   Daily added (if GFR is greater than 30)  GFR 44

## 2018-12-07 NOTE — PLAN OF CARE
Problem: PHYSICAL THERAPY ADULT  Goal: Performs mobility at highest level of function for planned discharge setting  See evaluation for individualized goals  Treatment/Interventions: Functional transfer training, LE strengthening/ROM, Patient/family training, Equipment eval/education, Bed mobility, Gait training, Spoke to nursing  Equipment Recommended:  (none v/s RW or SPC ?? still assessing  )       See flowsheet documentation for full assessment, interventions and recommendations  Prognosis: Good  Problem List: Decreased strength, Decreased endurance, Impaired balance, Decreased mobility, Decreased skin integrity, Orthopedic restrictions  Assessment: Pt was agreeable to PT treatment session  He demonstrated improved endurance compared to previous treatment notes, but continues to demonstrate slight SOB with distance ambulation and stairs  During ambulation, he demonstrates increased lateral sway and slight LOB (self corrected) requiring S level of assistance  Per pt report, he is not functioning at his baseline level; therefore, recommendation is continued PT services in the form of home PT upon d/c to return to [facility]  Barriers to Discharge: None     Recommendation: Home PT, Home with family support     PT - OK to Discharge: Yes    See flowsheet documentation for full assessment

## 2018-12-07 NOTE — PLAN OF CARE
Problem: PHYSICAL THERAPY ADULT  Goal: Performs mobility at highest level of function for planned discharge setting  See evaluation for individualized goals  Treatment/Interventions: Functional transfer training, LE strengthening/ROM, Patient/family training, Equipment eval/education, Bed mobility, Gait training, Spoke to nursing  Equipment Recommended:  (none v/s RW or SPC ?? still assessing  )       See flowsheet documentation for full assessment, interventions and recommendations  Prognosis: Good  Problem List: Decreased strength, Decreased endurance, Impaired balance, Decreased mobility, Decreased skin integrity, Orthopedic restrictions  Assessment: Pt was agreeable to PT treatment session  He demonstrated improved endurance compared to previous treatment notes, but continues to demonstrate slight SOB with distance ambulation and stairs  During ambulation, he demonstrates increased lateral sway and slight LOB (self corrected) requiring S level of assistance  Per pt report, he is not functioning at his baseline level; therefore, recommendation is continued PT services in the form of home PT upon d/c to return to [facility]  Barriers to Discharge: None     Recommendation: Home PT, Home with family support     PT - OK to Discharge: Yes    See flowsheet documentation for full assessment

## 2018-12-10 ENCOUNTER — EPISODE CHANGES (OUTPATIENT)
Dept: CASE MANAGEMENT | Facility: HOSPITAL | Age: 73
End: 2018-12-10

## 2018-12-11 ENCOUNTER — EPISODE CHANGES (OUTPATIENT)
Dept: CASE MANAGEMENT | Facility: HOSPITAL | Age: 73
End: 2018-12-11

## 2018-12-13 LAB — TRANSFUSION STATUS PATIENT QL: NORMAL

## 2018-12-14 ENCOUNTER — IN-CLINIC DEVICE VISIT (OUTPATIENT)
Dept: CARDIOLOGY CLINIC | Facility: CLINIC | Age: 73
End: 2018-12-14

## 2018-12-14 DIAGNOSIS — I25.5 ISCHEMIC CARDIOMYOPATHY: Primary | ICD-10-CM

## 2018-12-14 DIAGNOSIS — Z45.018 BIVENTRICULAR PACEMAKER CHECK: ICD-10-CM

## 2018-12-14 PROCEDURE — 99024 POSTOP FOLLOW-UP VISIT: CPT | Performed by: INTERNAL MEDICINE

## 2018-12-14 NOTE — PROGRESS NOTES
Results for orders placed or performed in visit on 12/14/18   Cardiac EP device report    Narrative    BI-V PPM (VVIR MODE)  WOUND CHECK: INCISION CLEAN AND DRY WITH EDGES APPROXIMATED; STERISTRIPS REMOVED; WOUND CARE AND RESTRICTIONS REVIEWED WITH PATIENT  DEVICE INTERROGATED IN THE [place] OFFICE  BATTERY ADEQUATE (7 1 YRS)  BVP 99%  ALL LEAD PARAMETERS WITHIN NORMAL LIMITS  NO EPISODES  NO PROGRAMMING CHANGES MADE TO DEVICE PARAMETERS  NORMAL DEVICE FUNCTION   PL

## 2018-12-15 LAB
BUN SERPL-MCNC: 53 MG/DL (ref 8–27)
BUN/CREAT SERPL: 20 (ref 10–24)
CALCIUM SERPL-MCNC: 9.3 MG/DL (ref 8.6–10.2)
CHLORIDE SERPL-SCNC: 95 MMOL/L (ref 96–106)
CO2 SERPL-SCNC: 25 MMOL/L (ref 20–29)
CREAT SERPL-MCNC: 2.67 MG/DL (ref 0.76–1.27)
GLUCOSE SERPL-MCNC: 86 MG/DL (ref 65–99)
LABCORP COMMENT: NORMAL
POTASSIUM SERPL-SCNC: 4.2 MMOL/L (ref 3.5–5.2)
SL AMB EGFR AFRICAN AMERICAN: 27 ML/MIN/1.73
SL AMB EGFR NON AFRICAN AMERICAN: 23 ML/MIN/1.73
SODIUM SERPL-SCNC: 137 MMOL/L (ref 134–144)

## 2018-12-18 ENCOUNTER — PATIENT OUTREACH (OUTPATIENT)
Dept: CASE MANAGEMENT | Facility: OTHER | Age: 73
End: 2018-12-18

## 2018-12-18 NOTE — PROGRESS NOTES
Ineligibility alert received in [facility]  Final  for anchor stay 11/22/18 is ineligible for Bundle episode  Case closed

## 2018-12-19 ENCOUNTER — EPISODE CHANGES (OUTPATIENT)
Dept: CASE MANAGEMENT | Facility: HOSPITAL | Age: 73
End: 2018-12-19

## 2018-12-20 ENCOUNTER — TELEPHONE (OUTPATIENT)
Dept: UROLOGY | Facility: AMBULATORY SURGERY CENTER | Age: 73
End: 2018-12-20

## 2018-12-20 NOTE — TELEPHONE ENCOUNTER
Reason for appointment/Complaint/Diagnosis : has garrison, wants it taken out     Insurance: Medicare & Access (yellow card)     History of Cancer?                        If yes, what kind? Previous urologist?     pt saw someone at [address] years ago                   Records requested/where? In [facility] [address]     Outside testing/where? In Epic     Location Preference for office visit?  [facility]

## 2018-12-29 ENCOUNTER — HOSPITAL ENCOUNTER (EMERGENCY)
Facility: HOSPITAL | Age: 73
Discharge: HOME | End: 2018-12-29
Attending: EMERGENCY MEDICINE | Admitting: EMERGENCY MEDICINE
Payer: MEDICARE

## 2018-12-29 VITALS
BODY MASS INDEX: 20.4 KG/M2 | TEMPERATURE: 96.6 F | RESPIRATION RATE: 17 BRPM | HEART RATE: 66 BPM | HEIGHT: 70 IN | DIASTOLIC BLOOD PRESSURE: 56 MMHG | WEIGHT: 142.5 LBS | OXYGEN SATURATION: 95 % | SYSTOLIC BLOOD PRESSURE: 105 MMHG

## 2018-12-29 DIAGNOSIS — T83.011A MALFUNCTION OF FOLEY CATHETER (HCC): Primary | ICD-10-CM

## 2018-12-29 PROCEDURE — 99283 EMERGENCY DEPT VISIT LOW MDM: CPT

## 2018-12-29 NOTE — ED NOTES
Pt states his bag would only leak from the bottom seam while standing  Pt states no leaks while sleeping and leg is up  Pt has dripping down legs and soaked pant legs and shoes       Sammy Conte RN  12/29/18 1128

## 2018-12-29 NOTE — DISCHARGE INSTRUCTIONS
Keep appointment with Dr aTz Jacobson on Friday  Return to ER if any problems with catheter draining  Urinary Leg Bag   WHAT YOU NEED TO KNOW:   What is a urinary leg bag? A urinary leg bag holds urine that drains from your catheter  It fits under your clothes and allows you to do your normal daily activities  How do I use a urinary leg bag? · Wash your hands  before and after you touch your catheter, tubing, or drainage bag  Use soap and water  This reduces the risk of infection  · Strap your leg bag to your thigh or calf  Make sure the straps are comfortable  The straps can cause problems with blood flow in your leg if they are too tight  · Clean the tip of the drainage tube with alcohol  before attaching it to your catheter  This helps prevent bacteria from getting into your catheter  · The connecting tube should not pull on your catheter  Skin breakdown can occur if there is constant pulling on the catheter  · Check the tube often to make sure it is not kinked or twisted  Blockage in the tube can cause urine to back up into your bladder  Your urine must flow straight through the tube into your leg bag  · Always keep the leg bag below your bladder  This prevents urine from the bag going back into your bladder, which may cause an infection  · Empty your leg bag when it is ½ full, or every 3 hours  A full bag may break or disconnect from the catheter  · Change to your bedside bag before you go to bed  Your bedside bag can hold more urine  Do not use your leg bag at night because it could become too full or break  · Clean your leg bag after every use  Fill the bag with 2 parts vinegar and 3 parts water  Let it soak for 20 minutes, then rinse and let dry  Follow your healthcare provider's instruction on replacing your leg bag with a new one  CARE AGREEMENT:   You have the right to help plan your care  Learn about your health condition and how it may be treated   Discuss treatment options with your caregivers to decide what care you want to receive  You always have the right to refuse treatment  The above information is an  only  It is not intended as medical advice for individual conditions or treatments  Talk to your doctor, nurse or pharmacist before following any medical regimen to see if it is safe and effective for you  © 2017 [address] Information is for End User's use only and may not be sold, redistributed or otherwise used for commercial purposes  All illustrations and images included in CareNotes® are the copyrighted property of [employer] M [employer]  or [employer]

## 2018-12-29 NOTE — ED NOTES
Pt's leg bag changed with new  Pt states follow up appointment with urology next week       Timothy Lauren, RN  12/29/18 2614

## 2018-12-29 NOTE — ED PROVIDER NOTES
History  Chief Complaint   Patient presents with    Urinary Catheter Problem     Patient present due to leaking urinary catheter  Denies injury/trauma or pain  Urology appt not until friday      Patient is a 73 y/o M with h/o pacer, urinary retention that presents to the ED for urinary leg bag malfunction  He states a couple days ago his bag started leaking when he was standing up, but when he would lie down it didn't leak  He denies abdominal pain or fevers  He states he was admitted to the hospital 3 weeks ago for a pacer and they found that he was retaining urine in his bladder, so they placed a garrison and told patient to keep the garrison in until he sees the urologist   Patient does have an appointment in 5 days to see Dr Prema Pardo  History provided by:  Patient  Urinary Catheter Problem   Location:  Leg bag leaking  Severity:  Mild  Onset quality:  Sudden  Duration:  2 days  Timing:  Intermittent  Progression:  Unchanged  Chronicity:  New  Context:  Leg bag leaking when standing  Relieved by:  Lying down  Associated symptoms: no chest pain, no diarrhea, no fever, no nausea and no vomiting        Prior to Admission Medications   Prescriptions Last Dose Informant Patient Reported? Taking?    Ascorbic Acid (VITAMIN C PO)  Self Yes No   Sig: Take by mouth   B Complex Vitamins (VITAMIN B COMPLEX PO)  Self Yes No   Sig: Take by mouth   Ferrous Gluconate-C-Folic Acid (IRON-C PO)  Self Yes No   Sig: Take by mouth   Lactobacillus (ACIDOPHILUS PO)  Self Yes No   Sig: Take by mouth   Lycopene 10 MG CAPS  Self Yes No   Sig: Take by mouth   Misc Natural Products (SAW PALMETTO) CAPS  Self Yes No   Sig: Take by mouth   Multiple Vitamin (MULTI-DAY VITAMINS) TABS  Self Yes No   Sig: Take by mouth   acetaminophen (TYLENOL) 325 mg tablet   No No   Sig: Take 2 tablets (650 mg total) by mouth every 6 (six) hours as needed for mild pain   bisacodyl (DULCOLAX) 10 mg suppository   No No   Sig: Insert 1 suppository (10 mg total) into the rectum daily as needed (constipation refractroy to miralax)   calcium carbonate (TUMS) 500 mg chewable tablet   No No   Sig: Chew 1 tablet (500 mg total) daily as needed for indigestion or heartburn   clobetasol (TEMOVATE) 0 05 % GEL  Self Yes No   Sig: Apply topically as needed     diazepam (VALIUM) 5 mg tablet   No No   Sig: Take 1 tablet (5 mg total) by mouth every 8 (eight) hours as needed for anxiety for up to 10 days   famotidine (PEPCID) 20 mg tablet   No No   Sig: Take 1 tablet (20 mg total) by mouth daily for 30 days   folic acid (FOLVITE) 1 mg tablet  Self Yes No   Sig: Take 1 mg by mouth daily   furosemide (LASIX) 40 mg tablet   No No   Sig: Take 1 tablet (40 mg total) by mouth daily for 30 days   lisinopril (ZESTRIL) 5 mg tablet   No No   Sig: Take 1 tablet (5 mg total) by mouth daily for 30 days   metoprolol succinate (TOPROL-XL) 100 mg 24 hr tablet   No No   Sig: Take 1 tablet (100 mg total) by mouth 2 (two) times a day for 30 days   senna (SENOKOT) 8 6 mg   No No   Sig: Take 1 tablet (8 6 mg total) by mouth daily at bedtime for 30 days   sucralfate (CARAFATE) 1 g/10 mL suspension   No No   Sig: Take 10 mL (1,000 mg total) by mouth every 6 (six) hours   tamsulosin (FLOMAX) 0 4 mg   No No   Sig: Take 1 capsule (0 4 mg total) by mouth daily with dinner for 30 days   zolpidem (AMBIEN) 5 mg tablet   No No   Sig: Take 1 tablet (5 mg total) by mouth daily at bedtime as needed for sleep for up to 10 days      Facility-Administered Medications: None       Past Medical History:   Diagnosis Date    Atrial fibrillation (HCC)     Congestive heart failure with left ventricular diastolic dysfunction, chronic (HCC)     Hypertension     Peripheral vascular disease of lower extremity (HCC)     Subdural hematoma (HCC)        Past Surgical History:   Procedure Laterality Date    BACK SURGERY      AUSTYN HOLE FOR SUBDURAL HEMATOMA      CLAVICLE SURGERY      HERNIA REPAIR      PROSTATE SURGERY Family History   Problem Relation Age of Onset    Heart disease Mother     Heart disease Father      I have reviewed and agree with the history as documented  Social History   Substance Use Topics    Smoking status: Never Smoker    Smokeless tobacco: Never Used    Alcohol use No        Review of Systems   Constitutional: Negative for chills and fever  Cardiovascular: Negative for chest pain  Gastrointestinal: Negative for diarrhea, nausea and vomiting  Genitourinary: Negative for dysuria  Musculoskeletal: Negative for back pain and neck pain  Skin: Negative for color change  All other systems reviewed and are negative  Physical Exam  Physical Exam   Constitutional: He is oriented to person, place, and time  He appears well-developed and well-nourished  HENT:   Head: Normocephalic  Eyes: Conjunctivae are normal    Cardiovascular: Normal rate and regular rhythm  Pulmonary/Chest: Effort normal and breath sounds normal    Abdominal: Soft  Bowel sounds are normal  There is no tenderness  Genitourinary:   Genitourinary Comments: Abbott catheter in place  He has a leg bag that has urine leaking from bottom of the bag  The leg bag was changed by the nurse  Neurological: He is alert and oriented to person, place, and time  No sensory deficit  Skin: Skin is warm and dry  No rash noted  Nursing note and vitals reviewed        Vital Signs  ED Triage Vitals   Temperature Pulse Respirations Blood Pressure SpO2   12/29/18 1414 12/29/18 1410 12/29/18 1410 12/29/18 1410 12/29/18 1410   (!) 96 6 °F (35 9 °C) 66 17 105/56 95 %      Temp Source Heart Rate Source Patient Position - Orthostatic VS BP Location FiO2 (%)   12/29/18 1414 12/29/18 1410 12/29/18 1410 12/29/18 1410 --   Temporal Monitor Sitting Left arm       Pain Score       12/29/18 1410       No Pain           Vitals:    12/29/18 1410   BP: 105/56   Pulse: 66   Patient Position - Orthostatic VS: Sitting       Visual Acuity      ED Medications  Medications - No data to display    Diagnostic Studies  Results Reviewed     None                 No orders to display              Procedures  Procedures       Phone Contacts  ED Phone Contact    ED Course                               MDM  Number of Diagnoses or Management Options  Malfunction of Abbott catheter [facility]): minor  Patient Progress  Patient progress: improved    CritCare Time    Disposition  Final diagnoses:   Malfunction of Abbott catheter [facility])     Time reflects when diagnosis was documented in both MDM as applicable and the Disposition within this note     Time User Action Codes Description Comment    12/29/2018  2:43 PM Kasandra Funez Add [T83 [id number]] Malfunction of Abbott catheter [facility])       ED Disposition     ED Disposition Condition Comment    Discharge  Prachi Thompson discharge to home/self care  Condition at discharge: Stable        Follow-up Information     Follow up With Specialties Details Why Contact Katherin Bush MD Urology In 1 week For recheck Karel  [address] [place], [place] [phone]            Patient's Medications   Discharge Prescriptions    No medications on file     No discharge procedures on file      ED Provider  Electronically Signed by           Manda Mendez PA-C  12/29/18 7411

## 2019-01-03 NOTE — TELEPHONE ENCOUNTER
Pt calling- stated he spoke with a nurse who stated his apt was for 10A, I told him apt was set for 9A 1/7/2019  Told him nothing was documented, but will return his call w time    [phone]

## 2019-01-03 NOTE — TELEPHONE ENCOUNTER
Pt visited [address] 12/29 for Abbott Malfunction, is 1/7 apt still appropriate?   [phone]

## 2019-01-04 ENCOUNTER — OFFICE VISIT (OUTPATIENT)
Dept: CARDIOLOGY CLINIC | Facility: CLINIC | Age: 74
End: 2019-01-04
Payer: MEDICARE

## 2019-01-04 VITALS
DIASTOLIC BLOOD PRESSURE: 62 MMHG | HEART RATE: 80 BPM | SYSTOLIC BLOOD PRESSURE: 98 MMHG | BODY MASS INDEX: 20.44 KG/M2 | HEIGHT: 70 IN | WEIGHT: 142.8 LBS

## 2019-01-04 DIAGNOSIS — Z98.890 S/P AV (ATRIOVENTRICULAR) NODAL ABLATION: ICD-10-CM

## 2019-01-04 DIAGNOSIS — D69.6 THROMBOCYTOPENIA (HCC): Chronic | ICD-10-CM

## 2019-01-04 DIAGNOSIS — I50.42 CHRONIC COMBINED SYSTOLIC AND DIASTOLIC HEART FAILURE (HCC): ICD-10-CM

## 2019-01-04 DIAGNOSIS — I42.0 DILATED CARDIOMYOPATHY (HCC): Chronic | ICD-10-CM

## 2019-01-04 DIAGNOSIS — I48.21 PERMANENT ATRIAL FIBRILLATION (HCC): Primary | ICD-10-CM

## 2019-01-04 PROCEDURE — 99214 OFFICE O/P EST MOD 30 MIN: CPT | Performed by: INTERNAL MEDICINE

## 2019-01-04 RX ORDER — METOPROLOL SUCCINATE 100 MG/1
100 TABLET, EXTENDED RELEASE ORAL DAILY
Qty: 30 TABLET | Refills: 6 | Status: SHIPPED | OUTPATIENT
Start: 2019-01-04 | End: 2019-08-14 | Stop reason: SDUPTHER

## 2019-01-04 NOTE — PROGRESS NOTES
Cardiology Follow Up    Jose Luis Bradley  1945  [id number]  [address] [facility] [id number]    1  Permanent atrial fibrillation (HCC)  metoprolol succinate (TOPROL-XL) 100 mg 24 hr tablet   2  Chronic combined systolic and diastolic heart failure (HCC)  Echo follow up/limited    CBC and Platelet    Basic metabolic panel   3  Thrombocytopenia ([facility] [address] )     4  Dilated cardiomyopathy ([facility] [address] )     5  S/P AV (atrioventricular) mary anne ablation         Interval History:     Albert is here for follow-up for his chronic diastolic CHF  He has a history of combined CHF in which his systolic function was about 35-40% when I met him a few years ago  This systolic dysfunction was likely associated with a tachycardic mediated cardiomyopathy as he was found in rapid atrial fibrillation at the time of his diagnosis  Also, his ejection fraction has improved with controlling his AFIB  HIs A-fib is chronic as he failed a rhythm control strategy  He has grade 3 diastolic dysfunction with evidence of restriction physiology  I some an appointment in October, and he had not seen me in well over a year prior to that  A couple years ago he had suffered a subdural hematoma and was hospitalized at [facility]  He has recovered from this  He was temporarily off anticoagulation  He had been taking Coumadin, but when he was cleared to restart anticoagulation we started Eliquis  In the past his atrial fibrillation was well controlled  Last year Albert was in the hospital with influenza and respiratory failure  In the setting of this he was also volume overloaded and in rapid atrial fibrillation  He did require an IV Cardizem drip  At discharge he was restarted on his digoxin and his metoprolol was increased  He also was given IV Lasix, and was sent home on Lasix    He had an echocardiogram that showed normal LV systolic function at that time  He did have a small to moderate pericardial effusion that did resolve  In October he was feeling well  Sara Carlisle then came back into the hospital with rapid atrial fibrillation and congestive heart failure  He was found to have his ejection fraction dropped down to 20%, likely tachycardic mediated  He was also having issues with thrombocytopenia and ITP, his Eliquis was held  During that hospitalization a rhythm control strategy was unsuccessful, his heart rates were difficult to control at times  At that point he underwent a biventricular pacemaker, and AV node ablation  We also did a LAURA, to work him up for the Watchman device for stroke prevention, given his relative contraindication to anticoagulation  He also had issues with urinary retention, and was discharged with a Abbott  His creatinine is higher than his baseline, with his most recent being around 2 6  Prachi has been fatigued since discharge, but overall feels well  He has improved from a volume standpoint  His blood pressure is running low  He denies any worsening shortness of breath  His edema is minimal   He denies chest pain or any symptoms of angina  No orthopnea or PND  No lightheadedness or syncope  He denies chest pain or any symptoms of angina        Patient Active Problem List   Diagnosis    Peripheral vascular disease of lower extremity ([facility] [address] )    Hypertension    Atrial fibrillation (HCC)    Thrombocytopenia ([facility])    PALOMA (acute kidney injury) ([facility] [address] )    Insomnia    Dilated cardiomyopathy ([facility])    Chronic combined systolic and diastolic heart failure ([facility] [address] )    Urinary retention    NSTEMI (non-ST elevated myocardial infarction) ([facility])    S/P AV (atrioventricular) mary anne ablation     Past Medical History:   Diagnosis Date    Atrial fibrillation ([facility])     Congestive heart failure with left ventricular diastolic dysfunction, chronic ([facility])     Hypertension     Peripheral vascular disease of lower extremity ([facility] [address] )     Subdural hematoma (HCC)      Social History     Social History    Marital status: Single     Spouse name: N/A    Number of children: N/A    Years of education: N/A     Occupational History    Not on file       Social History Main Topics    Smoking status: Never Smoker    Smokeless tobacco: Never Used    Alcohol use No    Drug use: No    Sexual activity: Not on file     Other Topics Concern    Not on file     Social History Narrative    No narrative on file      Family History   Problem Relation Age of Onset    Heart disease Mother     Heart disease Father      Past Surgical History:   Procedure Laterality Date    BACK SURGERY      AUSTYN HOLE FOR SUBDURAL HEMATOMA      CLAVICLE SURGERY      HERNIA REPAIR      PROSTATE SURGERY         Current Outpatient Prescriptions:     acetaminophen (TYLENOL) 325 mg tablet, Take 2 tablets (650 mg total) by mouth every 6 (six) hours as needed for mild pain, Disp: 30 tablet, Rfl: 0    Ascorbic Acid (VITAMIN C PO), Take by mouth, Disp: , Rfl:     B Complex Vitamins (VITAMIN B COMPLEX PO), Take by mouth, Disp: , Rfl:     calcium carbonate (TUMS) 500 mg chewable tablet, Chew 1 tablet (500 mg total) daily as needed for indigestion or heartburn, Disp: 30 tablet, Rfl: 0    clobetasol (TEMOVATE) 0 05 % GEL, Apply topically as needed  , Disp: , Rfl:     diazepam (VALIUM) 5 mg tablet, Take 1 tablet (5 mg total) by mouth every 8 (eight) hours as needed for anxiety for up to 10 days, Disp: 30 tablet, Rfl: 0    Ferrous Gluconate-C-Folic Acid (IRON-C PO), Take by mouth, Disp: , Rfl:     folic acid (FOLVITE) 1 mg tablet, Take 1 mg by mouth daily, Disp: , Rfl:     furosemide (LASIX) 40 mg tablet, Take 1 tablet (40 mg total) by mouth daily for 30 days, Disp: 30 tablet, Rfl: 0    Lactobacillus (ACIDOPHILUS PO), Take by mouth, Disp: , Rfl:     lisinopril (ZESTRIL) 5 mg tablet, Take 1 tablet (5 mg total) by mouth daily for 30 days, Disp: 30 tablet, Rfl: 0    Lycopene 10 MG CAPS, Take by mouth, Disp: , Rfl:     metoprolol succinate (TOPROL-XL) 100 mg 24 hr tablet, Take 1 tablet (100 mg total) by mouth daily for 30 days, Disp: 30 tablet, Rfl: 6    Misc Natural Products (SAW PALMETTO) CAPS, Take by mouth, Disp: , Rfl:     Multiple Vitamin (MULTI-DAY VITAMINS) TABS, Take by mouth, Disp: , Rfl:     tamsulosin (FLOMAX) 0 4 mg, Take 1 capsule (0 4 mg total) by mouth daily with dinner for 30 days, Disp: 30 capsule, Rfl: 0  No Known Allergies    Labs:  Lab Results   Component Value Date    K 4 2 12/14/2018    CL 95 (L) 12/14/2018    CO2 25 12/14/2018    BUN 53 (H) 12/14/2018    CREATININE 1 57 (H) 12/05/2018    GLUCOSE 127 11/20/2018    CALCIUM 9 2 12/05/2018     Lab Results   Component Value Date    WBC 3 37 (L) 12/05/2018    HGB 12 5 12/05/2018    HCT 38 0 12/05/2018    MCV 95 12/05/2018    PLT 75 (L) 12/05/2018       Imaging:  ECG obtained today demonstrates atrial fibrillation with controlled ventricular response, poor R-wave progression and nonspecific ST changes     LAURA(11/302018):  LEFT VENTRICLE:  Systolic function was mildly reduced  Ejection fraction was estimated to be 45 %  There was mild diffuse hypokinesis  Wall thickness was mildly increased      VENTRICULAR SEPTUM:  There was moderate dyssynergic motion  These changes are consistent with LBBB      RIGHT VENTRICLE:  The size was normal   Systolic function was low normal      LEFT ATRIUM:  The atrium was dilated  No thrombus was identified      LEFT ATRIAL APPENDAGE:  The left atrial appendage was normal in size  Anatomically, the ostium opened into a bended central lobe posteriorly and before leading into a distal lobe and probably is the chicken wing type      At 0 degrees, the ostium measured 11 mm and the length to the bended central lobe was 12 mm and to the distal lobe was 29 mm  At 15 degrees, the ostium measured 13 mm and the length to the distal lobe was 25 mm    At 30 degrees, the ostium measured 11 mm and the length to the distal lobe was 27 mm  At 40 degrees, the ostium measured 12 mm and the length to the bended central lobe was 17 5 mm and to the distal lobe was 33 mm  At 130 degrees, the ostium measured 14 mm and the length to the distal lobe was 29 mm  At 150 degrees, the ostium measured 11 2 mm and the length to the distal lobe was 25 1 mm      ATRIAL SEPTUM:  No defect or patent foramen ovale was identified by color Doppler      RIGHT ATRIUM:  The atrium was dilated      MITRAL VALVE:  There was moderate regurgitation  The regurgitant jet was centrally directed      AORTIC VALVE:  There was mild to moderate regurgitation      TRICUSPID VALVE:  There was mild regurgitation  Pulmonary artery systolic pressure was mildly increased          ECHO(11/2018):  LEFT VENTRICLE:  Systolic function was markedly reduced  Ejection fraction was estimated to be 20 %  There was severe diffuse hypokinesis with regional variations- akinesis of the anteroseptal, anterior and possibly the inferior walls  The patient was tachycardic throughout the study due to which ejection fraction and regional wall motion was not accurately assessed  Wall thickness was mildly increased  The changes were consistent with concentric remodeling (increased wall thickness with normal wall mass)      RIGHT VENTRICLE:  The size was normal   Systolic function was reduced      LEFT ATRIUM:  The atrium was dilated      RIGHT ATRIUM:  The atrium was dilated      MITRAL VALVE:  There was moderate regurgitation      AORTIC VALVE:  There was mild to moderate regurgitation      TRICUSPID VALVE:  There was moderate regurgitation      PERICARDIUM:  A small, free-flowing pericardial effusion was identified anterior and posterior to the heart  The fluid had no internal echoes  There was no evidence of hemodynamic compromise  There was a moderate-sized left pleural effusion        ECHO (5/2017):  LEFT VENTRICLE:  Systolic function was normal by visual assessment  Ejection fraction was estimated to be 70 %  There were no regional wall motion abnormalities      RIGHT VENTRICLE:  Systolic pressure was mildly increased  Estimated peak pressure was 41 mmHg      LEFT ATRIUM:  The atrium was mildly dilated      MITRAL VALVE:  There was mild regurgitation      AORTIC VALVE:  There was mild regurgitation      TRICUSPID VALVE:  There was mild regurgitation      AORTA:  The root exhibited moderate dilation      IVC, HEPATIC VEINS:  The inferior vena cava was dilated      PERICARDIUM:  A small to moderate pericardial effusion was identified circumferential to the heart  Review of Systems:  Review of Systems   Constitutional: Positive for fatigue  HENT: Negative  Eyes: Negative  Respiratory: Negative  Cardiovascular: Positive for leg swelling  Gastrointestinal: Negative  Musculoskeletal: Negative  Skin: Negative  Allergic/Immunologic: Negative  Neurological: Negative  Hematological: Negative  Psychiatric/Behavioral: Negative  All other systems reviewed and are negative  Physical Exam:  Physical Exam   Constitutional: He is oriented to person, place, and time  He appears well-developed and well-nourished  HENT:   Head: Normocephalic and atraumatic  Eyes: Pupils are equal, round, and reactive to light  EOM are normal  Right eye exhibits no discharge  Left eye exhibits no discharge  No scleral icterus  Neck: Normal range of motion  Neck supple  No JVD present  No thyromegaly present  Cardiovascular: Normal rate, regular rhythm, S1 normal, S2 normal, intact distal pulses and normal pulses  No extrasystoles are present  Exam reveals distant heart sounds  Exam reveals no gallop and no friction rub  No murmur heard  Pulmonary/Chest: Effort normal  No respiratory distress  He has decreased breath sounds  He has no wheezes  He has no rales  Abdominal: Soft  Bowel sounds are normal  He exhibits no distension  There is no tenderness  Musculoskeletal: Normal range of motion  He exhibits edema  He exhibits no tenderness or deformity  Neurological: He is alert and oriented to person, place, and time  No cranial nerve deficit  Skin: Skin is warm and dry  No rash noted  Psychiatric: He has a normal mood and affect  Judgment and thought content normal    Nursing note and vitals reviewed  Discussion/Summary:    1  Chronic combined systolic and diastolic CHF - Prachi was in the hospital a month ago with CHF, drop in his ejection fraction and atrial fibrillation  His initial ejection fraction was 20%, but by LAURA with a rate control of his atrial fibrillation, this improved to 40 to 45%  We ordered an updated limited echocardiogram today  We ordered updated blood work, as his creatinine is rising  He is somewhat hypotensive, so I am backing off of the metoprolol to once daily  We may need to back off of Lasix 2 depending upon his blood work result  We will have him back within 2 months  He will eventually enter cardiac rehabilitation  We went over a low-sodium diet and he should watch his weight closely  2     Atrial fibrillation - Permanent  He had a recent hospitalization for rapid atrial fibrillation, tachycardic mediated cardiomyopathy and CHF  He is now status post biventricular pacemaker with AV node ablation  Currently not an anticoagulation candidate given his ITP and thrombocytopenia  He went through a LAURA for workup for Watchman, with reviewing this and shape of his left atrial appendage, this may be technically difficult and not feasible  However I have asked him to follow up with Dr Shelby Machuca regarding this  If his ITP stabilizes, he may become an anticoagulation candidate as he wants tolerated Eliquis  As stated we are backing off of metoprolol  3  BI-PPM and AV Node Ablation - He is established with our device clinic and will continue to have this followed there        Counseling / Coordination of Care  Total floor / unit time spent today 30 minutes  Greater than 50% of total time was spent with the patient and / or family counseling and / or coordination of care

## 2019-01-04 NOTE — PATIENT INSTRUCTIONS
Low-Sodium Diet   AMBULATORY CARE:   A low-sodium diet  limits foods that are high in sodium (salt)  You will need to follow a low-sodium diet if you have high blood pressure, kidney disease, or heart failure  You may also need to follow this diet if you have a condition that is causing your body to retain (hold) extra fluid  You may need to limit the amount of sodium you eat to 1,500 mg  Ask your healthcare provider how much sodium you can have each day  How to use food labels to choose foods that are low in sodium:  Read food labels to find the amount of sodium they contain  The amount of sodium is listed in milligrams (mg)  The % Daily Value (DV) column tells you how much of your daily needs are met by 1 serving of the food for each nutrient listed  Choose foods that have less than 5% of the DV of sodium  These foods are considered low in sodium  Foods that have 20% or more of the DV of sodium are considered high in sodium  Some food labels may also list any of the following terms that tell you about the sodium content in the food:  · Sodium-free:  Less than 5 mg in each serving    · Very low sodium:  35 mg of sodium or less in each serving    · Low sodium:  140 mg of sodium or less in each serving    · Reduced sodium: At least 25% less sodium in each serving than the regular type    · Light in sodium:  50% less sodium in each serving    · Unsalted or no added salt:  No extra salt is added during processing (the food may still contain sodium)  Foods to avoid:  Salty foods are high in sodium   You should avoid the following:  · Processed foods:      ¨ Mixes for cornbread, biscuits, cake, and pudding     ¨ Instant foods, such as potatoes, cereals, noodles, and rice     ¨ Packaged foods, such as bread stuffing, rice and pasta mixes, snack dip mixes, and macaroni and cheese     ¨ Canned foods, such as canned vegetables, soups, broths, sauces, and vegetable or tomato juice    ¨ Snack foods, such as salted chips, popcorn, pretzels, pork rinds, salted crackers, and salted nuts    ¨ Frozen foods, such as dinners, entrees, vegetables with sauces, and breaded meats    ¨ Sauerkraut, pickled vegetables, and other foods prepared in brine    · Meats and cheeses:      ¨ Smoked or cured meat, such as corned beef, ruiz, ham, hot dogs, and sausage    ¨ Canned meats or spreads, such as potted meats, sardines, anchovies, and imitation seafood    ¨ Deli or lunch meats, such as bologna, ham, turkey, and roast beef    ¨ Processed cheese, such as American cheese and cheese spreads    · Condiments, sauces, and seasonings:      ¨ Salt (¼ teaspoon of salt contains 575 mg of sodium)    ¨ Seasonings made with salt, such as garlic salt, celery salt, onion salt, and seasoned salt    ¨ Regular soy sauce, barbecue sauce, teriyaki sauce, steak sauce, Worcestershire sauce, and most flavored vinegars    ¨ Canned gravy and mixes     ¨ Regular condiments, such as mustard, ketchup, and salad dressings    ¨ Pickles and olives    ¨ Meat tenderizers and monosodium glutamate (MSG)  Foods to include:  Read the food label to find the exact amount of sodium in each serving  · Bread and cereal:  Try to choose breads with less than 80 mg of sodium per serving  ¨ Bread, roll, chemo, tortilla, or unsalted crackers  ¨ Ready-to-eat cereals with less than 5% DV of sodium (examples include shredded wheat and puffed rice)    ¨ Pasta    · Vegetables and fruits:      ¨ Unsalted fresh, frozen, or canned vegetables    ¨ Fresh, frozen, or canned fruits    ¨ Fruit juice    · Dairy:  One serving has about 150 mg of sodium  ¨ Milk, all types    ¨ Yogurt    ¨ Hard cheese, such as cheddar, Swiss, [employer], or mozzarella    · Meat and other protein foods:  Some raw meats may have added sodium       ¨ Plain meats, fish, and poultry     ¨ Eggs    · Other foods:      ¨ Homemade pudding    ¨ Unsalted nuts, popcorn, or pretzels    ¨ Unsalted butter or margarine  Ways to decrease sodium:   · Add spices and herbs to foods instead of salt during cooking  Use salt-free seasonings to add flavor to foods  Examples include onion powder, garlic powder, basil, sosa powder, paprika, and parsley  Try lemon or lime juice or vinegar to give foods a tart flavor  Use hot peppers, pepper, or cayenne pepper to add a spicy flavor to foods  · Do not keep a salt shaker at your kitchen table  This may help keep you from adding salt to food at the table  It may take time to get used to enjoying the natural flavor of food instead of adding salt  Talk to your healthcare provider before you use salt substitutes  Some salt substitutes have a high amount of potassium and need to be avoided if you have kidney disease  · Choose low-sodium foods at restaurants  Meals from restaurants are often high in sodium  Some restaurants have nutrition information on the menu that tells you the amount of sodium in their foods  If possible, ask for your food to be prepared with less, or no salt  · Shop for unsalted or low-sodium foods and snacks at the grocery store  Examples include unsalted or low-sodium broths, soups, and canned vegetables  Choose fresh or frozen vegetables instead  Choose unsalted nuts or seeds or fresh fruits or vegetables as snacks  Read food labels and choose salt-free, very low-sodium, or low-sodium foods  © 2017 [facility] Information is for End User's use only and may not be sold, redistributed or otherwise used for commercial purposes  All illustrations and images included in CareNotes® are the copyrighted property of A D A M , Inc  or [employer] Lam[employer]  The above information is an  only  It is not intended as medical advice for individual conditions or treatments  Talk to your doctor, nurse or pharmacist before following any medical regimen to see if it is safe and effective for you

## 2019-01-05 DIAGNOSIS — I48.91 ATRIAL FIBRILLATION WITH TACHYCARDIC VENTRICULAR RATE (HCC): ICD-10-CM

## 2019-01-05 RX ORDER — FUROSEMIDE 40 MG/1
40 TABLET ORAL DAILY
Qty: 30 TABLET | Refills: 0 | Status: SHIPPED | OUTPATIENT
Start: 2019-01-05 | End: 2019-01-07 | Stop reason: SDUPTHER

## 2019-01-05 RX ORDER — LISINOPRIL 5 MG/1
5 TABLET ORAL DAILY
Qty: 30 TABLET | Refills: 0 | Status: SHIPPED | OUTPATIENT
Start: 2019-01-05 | End: 2019-01-07 | Stop reason: SDUPTHER

## 2019-01-07 ENCOUNTER — TELEPHONE (OUTPATIENT)
Dept: UROLOGY | Facility: AMBULATORY SURGERY CENTER | Age: 74
End: 2019-01-07

## 2019-01-07 ENCOUNTER — OFFICE VISIT (OUTPATIENT)
Dept: UROLOGY | Facility: HOSPITAL | Age: 74
End: 2019-01-07
Payer: MEDICARE

## 2019-01-07 VITALS
WEIGHT: 148 LBS | HEIGHT: 70 IN | BODY MASS INDEX: 21.19 KG/M2 | DIASTOLIC BLOOD PRESSURE: 52 MMHG | SYSTOLIC BLOOD PRESSURE: 100 MMHG

## 2019-01-07 DIAGNOSIS — R33.9 URINARY RETENTION: Primary | ICD-10-CM

## 2019-01-07 DIAGNOSIS — I48.91 ATRIAL FIBRILLATION WITH TACHYCARDIC VENTRICULAR RATE (HCC): ICD-10-CM

## 2019-01-07 LAB
BACTERIA UR QL AUTO: ABNORMAL /HPF
BILIRUB UR QL STRIP: NEGATIVE
CLARITY UR: ABNORMAL
COLOR UR: YELLOW
GLUCOSE UR STRIP-MCNC: NEGATIVE MG/DL
HGB UR QL STRIP.AUTO: ABNORMAL
HYALINE CASTS #/AREA URNS LPF: ABNORMAL /LPF
KETONES UR STRIP-MCNC: NEGATIVE MG/DL
LEUKOCYTE ESTERASE UR QL STRIP: ABNORMAL
NITRITE UR QL STRIP: POSITIVE
NON-SQ EPI CELLS URNS QL MICRO: ABNORMAL /HPF
PH UR STRIP.AUTO: 6.5 [PH] (ref 4.5–8)
PROT UR STRIP-MCNC: NEGATIVE MG/DL
RBC #/AREA URNS AUTO: ABNORMAL /HPF
SL AMB  POCT GLUCOSE, UA: NORMAL
SL AMB LEUKOCYTE ESTERASE,UA: NORMAL
SL AMB POCT BILIRUBIN,UA: NORMAL
SL AMB POCT BLOOD,UA: NORMAL
SL AMB POCT CLARITY,UA: NORMAL
SL AMB POCT COLOR,UA: YELLOW
SL AMB POCT KETONES,UA: 15
SL AMB POCT NITRITE,UA: NORMAL
SL AMB POCT PH,UA: 6
SL AMB POCT SPECIFIC GRAVITY,UA: 1
SL AMB POCT URINE PROTEIN: NORMAL
SL AMB POCT UROBILINOGEN: NORMAL
SP GR UR STRIP.AUTO: 1.01 (ref 1–1.03)
UROBILINOGEN UR QL STRIP.AUTO: 0.2 E.U./DL
WBC #/AREA URNS AUTO: ABNORMAL /HPF

## 2019-01-07 PROCEDURE — 87186 SC STD MICRODIL/AGAR DIL: CPT | Performed by: UROLOGY

## 2019-01-07 PROCEDURE — 87077 CULTURE AEROBIC IDENTIFY: CPT | Performed by: UROLOGY

## 2019-01-07 PROCEDURE — 81002 URINALYSIS NONAUTO W/O SCOPE: CPT | Performed by: UROLOGY

## 2019-01-07 PROCEDURE — 87086 URINE CULTURE/COLONY COUNT: CPT | Performed by: UROLOGY

## 2019-01-07 PROCEDURE — 81001 URINALYSIS AUTO W/SCOPE: CPT | Performed by: UROLOGY

## 2019-01-07 PROCEDURE — 99214 OFFICE O/P EST MOD 30 MIN: CPT | Performed by: UROLOGY

## 2019-01-07 RX ORDER — FUROSEMIDE 40 MG/1
40 TABLET ORAL DAILY
Qty: 90 TABLET | Refills: 1 | Status: SHIPPED | OUTPATIENT
Start: 2019-01-07 | End: 2019-06-11 | Stop reason: SDUPTHER

## 2019-01-07 RX ORDER — LISINOPRIL 5 MG/1
5 TABLET ORAL DAILY
Qty: 90 TABLET | Refills: 1 | Status: SHIPPED | OUTPATIENT
Start: 2019-01-07 | End: 2019-06-27 | Stop reason: SDUPTHER

## 2019-01-07 NOTE — PROGRESS NOTES
1/7/2019    Dearanurag Martinez  1945  [id number]    Diagnosis  Chief Complaint     Urinary Retention          Patient presents for void trial managed by Dr Marco Wong    Cystoscopy    Procedure Garrison removal/voiding trial    Garrison catheter removed after deflation of an intact balloon  Patient tolerated well  Encouraged patient to hydrate well and return this afternoon for post void residual   he knows he may return early if uncomfortable and unable to urinate  Patient agrees to this plan  Patient returned this afternoon  Patient states unable to void  Patient voided 0 ml while in office  Bladder ultrasound performed and PVR measured 350ml  When inserting garrison 16f 10 ml 425 Ml was returned  He is schedule for a Cystoscopy with Dr Breanne Abreu    No results found for this or any previous visit (from the past 1 hour(s))        Vitals:    01/07/19 0906   BP: 100/52   BP Location: Left arm   Patient Position: Sitting   Cuff Size: Standard   Weight: 67 1 kg (148 lb)   Height: 5' 10" (1 778 m)           Karen Crowder RN

## 2019-01-07 NOTE — PROGRESS NOTES
Assessment/Plan:    Urinary retention  Abbott catheter was removed and he will follow up this afternoon for a postvoid residual check  If the patient is emptying his bladder then we will continue him on tamsulosin indefinitely and he will follow up in 1 month for a postvoid residual check  If he is unable to urinate or has significantly elevated postvoid residuals we will need to replace the catheter and schedule him for cystoscopy for further evaluation and discussion of surgical procedures  Diagnoses and all orders for this visit:    Urinary retention          Total visit time was 60 minutes of which over 50% was spent on counseling  Subjective:     Patient ID: Katlin Mcarthur is a 72 y o  male    29-year-old male presents for evaluation of urinary retention  The patient had a prolonged hospital course at the end of November and beginning of December for heart issues and was found to be incompletely emptying his bladder during that hospital admission  He was urinating but they stated his postvoid residuals were too high  Abbott catheter was placed at that time and he was started on tamsulosin  The patient has a history of urinary retention  This occurred 1st with back surgery  The patient underwent transurethral resection of the prostate approximately 5-6 years ago at [facility]   He was not taking any prostate medications prior to this admission  He has no other complaints  The following portions of the patient's history were reviewed and updated as appropriate: allergies, current medications, past family history, past medical history, past social history, past surgical history and problem list     Review of Systems   Constitutional: Negative  HENT: Negative  Eyes: Negative  Respiratory: Negative  Cardiovascular: Negative  Gastrointestinal: Negative  Endocrine: Negative  Genitourinary:        As noted per HPI   Musculoskeletal: Negative  Skin: Negative  Allergic/Immunologic: Negative  Neurological: Negative  Hematological: Negative  Psychiatric/Behavioral: Negative  Objective:    Physical Exam   Constitutional: He is oriented to person, place, and time  He appears well-developed and well-nourished  Neck: Normal range of motion  Cardiovascular: Intact distal pulses  Pulmonary/Chest: Effort normal    Abdominal: Soft  Bowel sounds are normal  He exhibits no distension and no mass  There is no tenderness  There is no rebound and no guarding  Genitourinary: Rectum normal    Genitourinary Comments: Abbott with clear yellow urine  Prostate 50 g, smooth, no nodules, nontender  Musculoskeletal: Normal range of motion  Neurological: He is alert and oriented to person, place, and time  Skin: Skin is warm and dry  Psychiatric: He has a normal mood and affect  Vitals reviewed          Results  No results found for: PSA  Lab Results   Component Value Date    GLUCOSE 127 11/20/2018    CALCIUM 9 2 12/05/2018    K 4 2 12/14/2018    CO2 25 12/14/2018    CL 95 (L) 12/14/2018    BUN 53 (H) 12/14/2018    CREATININE 1 57 (H) 12/05/2018     Lab Results   Component Value Date    WBC 3 37 (L) 12/05/2018    HGB 12 5 12/05/2018    HCT 38 0 12/05/2018    MCV 95 12/05/2018    PLT 75 (L) 12/05/2018       No results found for this or any previous visit (from the past 1 hour(s)) ]

## 2019-01-07 NOTE — TELEPHONE ENCOUNTER
Patient calling back to speak to nurse  He is retaining urine  Wants to know if he should come back to office

## 2019-01-07 NOTE — ASSESSMENT & PLAN NOTE
Abbott catheter was removed and he will follow up this afternoon for a postvoid residual check  If the patient is emptying his bladder then we will continue him on tamsulosin indefinitely and he will follow up in 1 month for a postvoid residual check  If he is unable to urinate or has significantly elevated postvoid residuals we will need to replace the catheter and schedule him for cystoscopy for further evaluation and discussion of surgical procedures

## 2019-01-07 NOTE — TELEPHONE ENCOUNTER
Received call from pt - he had called service on the weekend to request refills  Per chart , request was processed by on call but went to print instead of e-script  Per pt, he is out of med  Will resend to [facility], [place]      Lisinopril 5 mg daily  Furosemide 40mg daily

## 2019-01-08 ENCOUNTER — TELEPHONE (OUTPATIENT)
Dept: UROLOGY | Facility: CLINIC | Age: 74
End: 2019-01-08

## 2019-01-08 NOTE — TELEPHONE ENCOUNTER
Patient called to see when he had his TURP procedure with Dr Dale Martinez and also wanted a list of [facility] urology providers  I verbally told patient the date of his procedure and mailed him a list of providers

## 2019-01-09 LAB — BACTERIA UR CULT: ABNORMAL

## 2019-01-21 ENCOUNTER — TELEPHONE (OUTPATIENT)
Dept: UROLOGY | Facility: HOSPITAL | Age: 74
End: 2019-01-21

## 2019-01-21 DIAGNOSIS — N39.0 ACUTE UTI: Primary | ICD-10-CM

## 2019-01-21 RX ORDER — SULFAMETHOXAZOLE AND TRIMETHOPRIM 800; 160 MG/1; MG/1
1 TABLET ORAL EVERY 12 HOURS SCHEDULED
Qty: 10 TABLET | Refills: 0 | Status: SHIPPED | OUTPATIENT
Start: 2019-01-21 | End: 2019-01-26

## 2019-01-21 NOTE — TELEPHONE ENCOUNTER
[facility] for patient regarding above    On message, stated that we sent medication to pharmacy and he should call us to clarify

## 2019-01-28 ENCOUNTER — TELEPHONE (OUTPATIENT)
Dept: UROLOGY | Facility: MEDICAL CENTER | Age: 74
End: 2019-01-28

## 2019-01-28 NOTE — TELEPHONE ENCOUNTER
I returned patient's call and explained the procedure to him   I answered patient's questions and he was satisfied with the answers

## 2019-01-28 NOTE — TELEPHONE ENCOUNTER
Patient is unhappy with his care  Patient is considering transferring care to different provider  Patient states he does not have relief he is still using catheter's and is not sure what a cysto is and why it is being performed  Patient also wanted to know is this procedure painful    Please advise

## 2019-01-29 ENCOUNTER — OFFICE VISIT (OUTPATIENT)
Dept: OBGYN CLINIC | Facility: CLINIC | Age: 74
End: 2019-01-29
Payer: MEDICARE

## 2019-01-29 ENCOUNTER — APPOINTMENT (OUTPATIENT)
Dept: RADIOLOGY | Facility: CLINIC | Age: 74
End: 2019-01-29
Payer: MEDICARE

## 2019-01-29 VITALS
SYSTOLIC BLOOD PRESSURE: 116 MMHG | WEIGHT: 148 LBS | HEIGHT: 70 IN | BODY MASS INDEX: 21.19 KG/M2 | DIASTOLIC BLOOD PRESSURE: 65 MMHG

## 2019-01-29 DIAGNOSIS — M70.42 BURSITIS, PREPATELLAR, LEFT: Primary | ICD-10-CM

## 2019-01-29 DIAGNOSIS — M25.562 LEFT KNEE PAIN, UNSPECIFIED CHRONICITY: ICD-10-CM

## 2019-01-29 PROCEDURE — 99203 OFFICE O/P NEW LOW 30 MIN: CPT | Performed by: ORTHOPAEDIC SURGERY

## 2019-01-29 PROCEDURE — 73564 X-RAY EXAM KNEE 4 OR MORE: CPT

## 2019-01-29 PROCEDURE — 20610 DRAIN/INJ JOINT/BURSA W/O US: CPT | Performed by: PHYSICIAN ASSISTANT

## 2019-01-29 RX ORDER — BETAMETHASONE SODIUM PHOSPHATE AND BETAMETHASONE ACETATE 3; 3 MG/ML; MG/ML
6 INJECTION, SUSPENSION INTRA-ARTICULAR; INTRALESIONAL; INTRAMUSCULAR; SOFT TISSUE
Status: COMPLETED | OUTPATIENT
Start: 2019-01-29 | End: 2019-01-29

## 2019-01-29 RX ADMIN — BETAMETHASONE SODIUM PHOSPHATE AND BETAMETHASONE ACETATE 6 MG: 3; 3 INJECTION, SUSPENSION INTRA-ARTICULAR; INTRALESIONAL; INTRAMUSCULAR; SOFT TISSUE at 12:01

## 2019-01-29 NOTE — PROGRESS NOTES
Assessment:     1  Bursitis, prepatellar, left        Plan:  The patient was seen and examined by Dr Vicente Luna and myself  Problem List Items Addressed This Visit        Musculoskeletal and Integument    Bursitis, prepatellar, left - Primary     Findings consistent with left prepatellar bursitis  Findings and treatment options were discussed with the patient  Offered aspiration and cortisone injection to the left prepatellar bursa today  He accepted and tolerated the procedure well  Advised to apply cold compress today  A compressive wrap was applied, and advised patient not to take it off for at least 2 days  Also advised patient to use a knee pad when kneeling, and to avoid kneeling as much as possible altogether  Follow-up as needed if symptoms return  All questions were answered to patient's satisfaction  Relevant Medications    betamethasone acetate-betamethasone sodium phosphate (CELESTONE) injection 6 mg (Completed)    Other Relevant Orders    XR knee 4+ vw left injury    Large joint arthrocentesis (Completed)         Subjective:     Patient ID: Loyda Harding is a 72 y o  male  Chief Complaint: This is a 37-year-old white male complaining of anterior left knee swelling for the past few weeks  He denies any injury  He states he notices discomfort when he kneels on that knee  Otherwise he has no pain  He denies any erythema in the area, fevers or chills  No treatment as of yet  He states that he believes he had similar swelling several years ago  The area was aspirated and he did well at that time        Allergy:  No Known Allergies  Medications:  all current active meds have been reviewed  Past Medical History:  Past Medical History:   Diagnosis Date    Atrial fibrillation [facility] [address] [facility]     Congestive heart failure with left ventricular diastolic dysfunction, chronic ([facility] [address] )     Hypertension     Peripheral vascular disease of lower extremity (HCC)     Subdural hematoma (HCC)      Past Surgical History:  Past Surgical History:   Procedure Laterality Date    BACK SURGERY      AUSTYN HOLE FOR SUBDURAL HEMATOMA      CARDIAC PACEMAKER PLACEMENT      CLAVICLE SURGERY      HERNIA REPAIR      PROSTATE SURGERY       Family History:  Family History   Problem Relation Age of Onset    Heart disease Mother     Heart disease Father      Social History:  History   Alcohol Use No     History   Drug Use No     History   Smoking Status    Never Smoker   Smokeless Tobacco    Never Used     Review of Systems   Constitutional: Negative  HENT: Negative  Eyes: Negative  Respiratory: Negative  Cardiovascular: Negative  Gastrointestinal: Negative  Endocrine: Negative  Genitourinary: Negative  Musculoskeletal: Positive for arthralgias and joint swelling  Skin: Negative  Allergic/Immunologic: Negative  Neurological: Negative  Hematological: Negative  Psychiatric/Behavioral: Negative  Objective:  BP Readings from Last 1 Encounters:   01/29/19 116/65      Wt Readings from Last 1 Encounters:   01/29/19 67 1 kg (148 lb)      BMI:   Estimated body mass index is 21 24 kg/m² as calculated from the following:    Height as of this encounter: 5' 10" (1 778 m)  Weight as of this encounter: 67 1 kg (148 lb)  BSA:   Estimated body surface area is 1 84 meters squared as calculated from the following:    Height as of this encounter: 5' 10" (1 778 m)  Weight as of this encounter: 67 1 kg (148 lb)  Physical Exam   Constitutional: He is oriented to person, place, and time  He appears well-developed  HENT:   Head: Normocephalic and atraumatic  Eyes: Conjunctivae and EOM are normal    Neck: Neck supple  Neurological: He is alert and oriented to person, place, and time  Skin: Skin is warm  Psychiatric: He has a normal mood and affect  Nursing note and vitals reviewed      Right Knee Exam   Right knee exam is normal       Left Knee Exam     Tenderness   The patient is experiencing no tenderness  Range of Motion   The patient has normal left knee ROM  Muscle Strength     The patient has normal left knee strength  Tests   Varus: negative  Valgus: negative    Other   Erythema: absent  Scars: absent  Sensation: normal  Pulse: present  Swelling: moderate (Prepatellar bursa)    Comments:  Fluid palpated in prepatellar bursa              I have personally reviewed pertinent films in PACS  X-rays left knee reveal mild osteoarthritis  Large joint arthrocentesis  Date/Time: 1/29/2019 12:01 PM  Consent given by: patient  Site marked: site marked  Timeout: Immediately prior to procedure a time out was called to verify the correct patient, procedure, equipment, support staff and site/side marked as required   Supporting Documentation  Indications: prepatella bursa swelling     Procedure Details  Location: knee - L knee (prepatella bursa)  Preparation: Patient was prepped and draped in the usual sterile fashion  Needle size: 18 G  Approach: lateral  Medications administered: 6 mg betamethasone acetate-betamethasone sodium phosphate 6 (3-3) mg/mL    Aspirate amount: 5 mL  Aspirate: clear, blood-tinged and yellow  Patient tolerance: patient tolerated the procedure well with no immediate complications  Dressing:  Sterile dressing applied

## 2019-01-29 NOTE — ASSESSMENT & PLAN NOTE
Findings consistent with left prepatellar bursitis  Findings and treatment options were discussed with the patient  Offered aspiration and cortisone injection to the left prepatellar bursa today  He accepted and tolerated the procedure well  Advised to apply cold compress today  A compressive wrap was applied, and advised patient not to take it off for at least 2 days  Also advised patient to use a knee pad when kneeling, and to avoid kneeling as much as possible altogether  Follow-up as needed if symptoms return  All questions were answered to patient's satisfaction

## 2019-01-29 NOTE — PATIENT INSTRUCTIONS
Leave Ace wrap on for 2 days  After that, the Ace wrap may be removed and he may shower  Patient has no restrictions  Advised patient to use a knee pad when kneeling in the future  Follow-up as needed if swelling returns

## 2019-02-04 ENCOUNTER — PROCEDURE VISIT (OUTPATIENT)
Dept: UROLOGY | Facility: HOSPITAL | Age: 74
End: 2019-02-04
Payer: MEDICARE

## 2019-02-04 VITALS — HEIGHT: 70 IN | BODY MASS INDEX: 22.05 KG/M2 | WEIGHT: 154 LBS

## 2019-02-04 DIAGNOSIS — R33.9 URINARY RETENTION: Primary | ICD-10-CM

## 2019-02-04 PROCEDURE — 99213 OFFICE O/P EST LOW 20 MIN: CPT | Performed by: UROLOGY

## 2019-02-05 PROCEDURE — 52000 CYSTOURETHROSCOPY: CPT | Performed by: UROLOGY

## 2019-02-05 NOTE — PROGRESS NOTES
Cystoscopy  Date/Time: 2/5/2019 10:36 AM  Performed by: Zohra Khalil  Authorized by: Zohra Khalil     Procedure details: cystoscopy        Written Consent Obtained      Indications for Procedure:   urinary retention     Physical Exam     Constitutional   General appearance: No acute distress, well appearing and well nourished  Pulmonary   Respiratory effort: No increased work of breathing or signs of respiratory distress  Cardiovascular   Examination of extremities for edema and/or varicosities: Normal     Abdomen   Abdomen: Non-tender, no masses  Liver and spleen: No hepatomegaly or splenomegaly  Genitourinary   Normal phallus and meatus   Musculoskeletal   Gait and station: Normal     Skin   Skin and subcutaneous tissue: Normal without rashes or lesions  Lymphatic   Palpation of lymph nodes in groin: No lymphadenopathy  Additional Exam: Neuro exam nonfocal   The flexible cystoscope was introduced into the urethra and advanced into the bladder  URETHRA:  Normal,  without strictures or lesions  PROSTATE:  Previous TUR defect noted with some regrowth but no evidence of obstruction  TRIGONE & UOs:   Normal anatomy with efflux of clear urine  No mucosal lesions or subtrigonal masses  BLADDER MUCOSA:  Diffuse catheter related edema  DETRUSOR: Normal capacity, without flaccidity, without excessive compliance, without trabeculation or diverticula, without uninhibited bladder contractions on fillings  RETROFLEXED SCOPE VIEW: Normal bladder neck    Plan:  Cystoscopy today did not demonstrate any obstruction from the prostate  We discussed that likely his bladder is hypotonic  We taught the patient how to catheterize himself  He was able to demonstrate good understanding of this  We will have him perform intermittent catheterization 4 times daily  He he is to stay on the tamsulosin  He will follow up in 3 months with a log of his postvoid residuals with catheterization    Hopefully he will start emptying better on his own and will not need catheterization permanently  In addition to the cystoscopy today I spent over 15 minutes discussing treatment plans for his urinary retention and teaching him how to catheterize

## 2019-02-12 ENCOUNTER — HOSPITAL ENCOUNTER (INPATIENT)
Facility: HOSPITAL | Age: 74
LOS: 1 days | Discharge: LEFT BEFORE DISCHARGE | DRG: 699 | End: 2019-02-13
Attending: EMERGENCY MEDICINE | Admitting: INTERNAL MEDICINE
Payer: MEDICARE
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
## 2019-02-12 DIAGNOSIS — I73.9 PERIPHERAL VASCULAR DISEASE OF LOWER EXTREMITY (HCC): Chronic | ICD-10-CM

## 2019-02-12 DIAGNOSIS — T68.XXXA HYPOTHERMIA, INITIAL ENCOUNTER: ICD-10-CM

## 2019-02-12 DIAGNOSIS — R29.898 LEFT LEG WEAKNESS: Primary | ICD-10-CM

## 2019-02-12 DIAGNOSIS — N18.9 CKD (CHRONIC KIDNEY DISEASE): ICD-10-CM

## 2019-02-12 DIAGNOSIS — D64.9 ANEMIA: ICD-10-CM

## 2019-02-12 PROCEDURE — 1124F ACP DISCUSS-NO DSCNMKR DOCD: CPT | Performed by: INTERNAL MEDICINE

## 2019-02-12 PROCEDURE — 99291 CRITICAL CARE FIRST HOUR: CPT

## 2019-02-13 ENCOUNTER — APPOINTMENT (EMERGENCY)
Dept: CT IMAGING | Facility: HOSPITAL | Age: 74
DRG: 699 | End: 2019-02-13
Payer: MEDICARE

## 2019-02-13 ENCOUNTER — APPOINTMENT (INPATIENT)
Dept: NON INVASIVE DIAGNOSTICS | Facility: HOSPITAL | Age: 74
DRG: 699 | End: 2019-02-13
Payer: MEDICARE

## 2019-02-13 VITALS
WEIGHT: 154.1 LBS | BODY MASS INDEX: 22.11 KG/M2 | DIASTOLIC BLOOD PRESSURE: 66 MMHG | SYSTOLIC BLOOD PRESSURE: 114 MMHG | RESPIRATION RATE: 18 BRPM | HEART RATE: 81 BPM | OXYGEN SATURATION: 100 % | TEMPERATURE: 97.3 F

## 2019-02-13 PROBLEM — T68.XXXA HYPOTHERMIA: Status: ACTIVE | Noted: 2019-02-13

## 2019-02-13 PROBLEM — N39.0 URINARY TRACT INFECTION ASSOCIATED WITH INDWELLING URETHRAL CATHETER (HCC): Status: ACTIVE | Noted: 2019-02-13
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
## 2019-02-13 PROBLEM — T83.511A URINARY TRACT INFECTION ASSOCIATED WITH INDWELLING URETHRAL CATHETER (HCC): Status: ACTIVE | Noted: 2019-02-13

## 2019-02-13 PROBLEM — G45.9 TIA (TRANSIENT ISCHEMIC ATTACK): Status: ACTIVE | Noted: 2019-02-13

## 2019-02-13 PROBLEM — D75.839 THROMBOCYTOSIS: Status: ACTIVE | Noted: 2019-02-13

## 2019-02-13 PROBLEM — D64.9 ANEMIA, UNSPECIFIED: Status: ACTIVE | Noted: 2019-02-13

## 2019-02-13 PROBLEM — D50.9 IRON DEFICIENCY ANEMIA: Status: ACTIVE | Noted: 2019-02-13

## 2019-02-13 LAB
ABO GROUP BLD: NORMAL
ACANTHOCYTES BLD QL SMEAR: PRESENT
ALBUMIN SERPL BCP-MCNC: 2.4 G/DL (ref 3.5–5)
ALBUMIN SERPL BCP-MCNC: 2.7 G/DL (ref 3.5–5)
ALP SERPL-CCNC: 74 U/L (ref 46–116)
ALP SERPL-CCNC: 94 U/L (ref 46–116)
ALT SERPL W P-5'-P-CCNC: 18 U/L (ref 12–78)
ALT SERPL W P-5'-P-CCNC: 19 U/L (ref 12–78)
ANION GAP SERPL CALCULATED.3IONS-SCNC: 13 MMOL/L (ref 4–13)
ANION GAP SERPL CALCULATED.3IONS-SCNC: 14 MMOL/L (ref 4–13)
ANISOCYTOSIS BLD QL SMEAR: PRESENT
APTT PPP: 36 SECONDS (ref 26–38)
AST SERPL W P-5'-P-CCNC: 33 U/L (ref 5–45)
AST SERPL W P-5'-P-CCNC: 36 U/L (ref 5–45)
ATRIAL RATE: 153 BPM
ATRIAL RATE: 164 BPM
BACTERIA UR QL AUTO: ABNORMAL /HPF
BASOPHILS # BLD MANUAL: 0 THOUSAND/UL (ref 0–0.1)
BASOPHILS NFR MAR MANUAL: 0 % (ref 0–1)
BILIRUB SERPL-MCNC: 0.9 MG/DL (ref 0.2–1)
BILIRUB SERPL-MCNC: 1.3 MG/DL (ref 0.2–1)
BILIRUB UR QL STRIP: NEGATIVE
BLD GP AB SCN SERPL QL: NEGATIVE
BUN SERPL-MCNC: 39 MG/DL (ref 5–25)
BUN SERPL-MCNC: 41 MG/DL (ref 5–25)
BURR CELLS BLD QL SMEAR: PRESENT
CALCIUM SERPL-MCNC: 8 MG/DL (ref 8.3–10.1)
CALCIUM SERPL-MCNC: 8.8 MG/DL (ref 8.3–10.1)
CHLORIDE SERPL-SCNC: 103 MMOL/L (ref 100–108)
CHLORIDE SERPL-SCNC: 106 MMOL/L (ref 100–108)
CHOLEST SERPL-MCNC: 108 MG/DL (ref 50–200)
CLARITY UR: ABNORMAL
CLARITY, POC: NORMAL
CO2 SERPL-SCNC: 18 MMOL/L (ref 21–32)
CO2 SERPL-SCNC: 22 MMOL/L (ref 21–32)
COLOR UR: YELLOW
COLOR, POC: YELLOW
CREAT SERPL-MCNC: 1.98 MG/DL (ref 0.6–1.3)
CREAT SERPL-MCNC: 2.36 MG/DL (ref 0.6–1.3)
CRP SERPL HS-MCNC: 74.3 MG/L
CRP SERPL QL: 77.1 MG/L
DACRYOCYTES BLD QL SMEAR: PRESENT
DIGOXIN SERPL-MCNC: <0.2 NG/ML (ref 0.8–2)
EOSINOPHIL # BLD MANUAL: 0 THOUSAND/UL (ref 0–0.4)
EOSINOPHIL NFR BLD MANUAL: 0 % (ref 0–6)
ERYTHROCYTE [DISTWIDTH] IN BLOOD BY AUTOMATED COUNT: 19.8 % (ref 11.6–15.1)
ERYTHROCYTE [DISTWIDTH] IN BLOOD BY AUTOMATED COUNT: 19.9 % (ref 11.6–15.1)
EST. AVERAGE GLUCOSE BLD GHB EST-MCNC: 94 MG/DL
EXT BILIRUBIN, UA: NEGATIVE
EXT BLOOD URINE: NORMAL
EXT GLUCOSE, UA: NEGATIVE
EXT KETONES: NEGATIVE
EXT NITRITE, UA: POSITIVE
EXT PH, UA: 6
EXT PROTEIN, UA: 300
EXT SPECIFIC GRAVITY, UA: 1.02
EXT UROBILINOGEN: 0.2
GFR SERPL CREATININE-BSD FRML MDRD: 27 ML/MIN/1.73SQ M
GFR SERPL CREATININE-BSD FRML MDRD: 33 ML/MIN/1.73SQ M
GLUCOSE SERPL-MCNC: 77 MG/DL (ref 65–140)
GLUCOSE SERPL-MCNC: 80 MG/DL (ref 65–140)
GLUCOSE SERPL-MCNC: 92 MG/DL (ref 65–140)
GLUCOSE UR STRIP-MCNC: NEGATIVE MG/DL
HBA1C MFR BLD: 4.9 % (ref 4.2–6.3)
HCT VFR BLD AUTO: 23.2 % (ref 36.5–49.3)
HCT VFR BLD AUTO: 27.5 % (ref 36.5–49.3)
HCYS SERPL-SCNC: 4.9 UMOL/L (ref 5.3–14.2)
HDLC SERPL-MCNC: 43 MG/DL (ref 40–60)
HGB BLD-MCNC: 7.2 G/DL (ref 12–17)
HGB BLD-MCNC: 8.8 G/DL (ref 12–17)
HGB UR QL STRIP.AUTO: ABNORMAL
INR PPP: 1.62 (ref 0.86–1.17)
KETONES UR STRIP-MCNC: NEGATIVE MG/DL
LDLC SERPL CALC-MCNC: 55 MG/DL (ref 0–100)
LEUKOCYTE ESTERASE UR QL STRIP: ABNORMAL
LYMPHOCYTES # BLD AUTO: 0.88 THOUSAND/UL (ref 0.6–4.47)
LYMPHOCYTES # BLD AUTO: 19 % (ref 14–44)
MAGNESIUM SERPL-MCNC: 1.9 MG/DL (ref 1.6–2.6)
MCH RBC QN AUTO: 30.1 PG (ref 26.8–34.3)
MCH RBC QN AUTO: 30.6 PG (ref 26.8–34.3)
MCHC RBC AUTO-ENTMCNC: 31 G/DL (ref 31.4–37.4)
MCHC RBC AUTO-ENTMCNC: 32 G/DL (ref 31.4–37.4)
MCV RBC AUTO: 94 FL (ref 82–98)
MCV RBC AUTO: 99 FL (ref 82–98)
MONOCYTES # BLD AUTO: 0.14 THOUSAND/UL (ref 0–1.22)
MONOCYTES NFR BLD: 3 % (ref 4–12)
NEUTROPHILS # BLD MANUAL: 3.6 THOUSAND/UL (ref 1.85–7.62)
NEUTS SEG NFR BLD AUTO: 78 % (ref 43–75)
NITRITE UR QL STRIP: POSITIVE
NON-SQ EPI CELLS URNS QL MICRO: ABNORMAL /HPF
NRBC BLD AUTO-RTO: 2 /100 WBCS
PH UR STRIP.AUTO: 6 [PH] (ref 4.5–8)
PHOSPHATE SERPL-MCNC: 4.2 MG/DL (ref 2.3–4.1)
PLATELET # BLD AUTO: 80 THOUSANDS/UL (ref 149–390)
PLATELET # BLD AUTO: 96 THOUSANDS/UL (ref 149–390)
PLATELET BLD QL SMEAR: ABNORMAL
POIKILOCYTOSIS BLD QL SMEAR: PRESENT
POLYCHROMASIA BLD QL SMEAR: PRESENT
POTASSIUM SERPL-SCNC: 4.4 MMOL/L (ref 3.5–5.3)
POTASSIUM SERPL-SCNC: 5 MMOL/L (ref 3.5–5.3)
PROT SERPL-MCNC: 5.7 G/DL (ref 6.4–8.2)
PROT SERPL-MCNC: 6.6 G/DL (ref 6.4–8.2)
PROT UR STRIP-MCNC: ABNORMAL MG/DL
PROTHROMBIN TIME: 18.3 SECONDS (ref 11.8–14.2)
QRS AXIS: -62 DEGREES
QRS AXIS: -66 DEGREES
QRSD INTERVAL: 110 MS
QRSD INTERVAL: 116 MS
QT INTERVAL: 244 MS
QT INTERVAL: 270 MS
QTC INTERVAL: 403 MS
QTC INTERVAL: 422 MS
RBC # BLD AUTO: 2.35 MILLION/UL (ref 3.88–5.62)
RBC # BLD AUTO: 2.92 MILLION/UL (ref 3.88–5.62)
RBC #/AREA URNS AUTO: ABNORMAL /HPF
RBC MORPH BLD: PRESENT
RH BLD: NEGATIVE
SODIUM SERPL-SCNC: 138 MMOL/L (ref 136–145)
SODIUM SERPL-SCNC: 138 MMOL/L (ref 136–145)
SP GR UR STRIP.AUTO: 1.02 (ref 1–1.03)
SPECIMEN EXPIRATION DATE: NORMAL
T WAVE AXIS: 151 DEGREES
T WAVE AXIS: 94 DEGREES
TRIGL SERPL-MCNC: 49 MG/DL
UROBILINOGEN UR QL STRIP.AUTO: 0.2 E.U./DL
VENTRICULAR RATE: 147 BPM
VENTRICULAR RATE: 164 BPM
WBC # BLD AUTO: 4.62 THOUSAND/UL (ref 4.31–10.16)
WBC # BLD AUTO: 8.46 THOUSAND/UL (ref 4.31–10.16)
WBC # BLD EST: NORMAL 10*3/UL
WBC #/AREA URNS AUTO: ABNORMAL /HPF

## 2019-02-13 PROCEDURE — 86141 C-REACTIVE PROTEIN HS: CPT | Performed by: PHYSICIAN ASSISTANT

## 2019-02-13 PROCEDURE — 86900 BLOOD TYPING SEROLOGIC ABO: CPT | Performed by: EMERGENCY MEDICINE

## 2019-02-13 PROCEDURE — 99223 1ST HOSP IP/OBS HIGH 75: CPT | Performed by: PHYSICIAN ASSISTANT

## 2019-02-13 PROCEDURE — 85610 PROTHROMBIN TIME: CPT | Performed by: EMERGENCY MEDICINE

## 2019-02-13 PROCEDURE — 83036 HEMOGLOBIN GLYCOSYLATED A1C: CPT | Performed by: PHYSICIAN ASSISTANT

## 2019-02-13 PROCEDURE — 83090 ASSAY OF HOMOCYSTEINE: CPT | Performed by: PHYSICIAN ASSISTANT

## 2019-02-13 PROCEDURE — 83735 ASSAY OF MAGNESIUM: CPT | Performed by: PHYSICIAN ASSISTANT

## 2019-02-13 PROCEDURE — 87186 SC STD MICRODIL/AGAR DIL: CPT | Performed by: EMERGENCY MEDICINE

## 2019-02-13 PROCEDURE — 87086 URINE CULTURE/COLONY COUNT: CPT | Performed by: EMERGENCY MEDICINE

## 2019-02-13 PROCEDURE — 85730 THROMBOPLASTIN TIME PARTIAL: CPT | Performed by: EMERGENCY MEDICINE

## 2019-02-13 PROCEDURE — 85007 BL SMEAR W/DIFF WBC COUNT: CPT | Performed by: PHYSICIAN ASSISTANT

## 2019-02-13 PROCEDURE — 85027 COMPLETE CBC AUTOMATED: CPT | Performed by: EMERGENCY MEDICINE

## 2019-02-13 PROCEDURE — 80162 ASSAY OF DIGOXIN TOTAL: CPT | Performed by: PHYSICIAN ASSISTANT

## 2019-02-13 PROCEDURE — 81001 URINALYSIS AUTO W/SCOPE: CPT | Performed by: EMERGENCY MEDICINE

## 2019-02-13 PROCEDURE — 80053 COMPREHEN METABOLIC PANEL: CPT | Performed by: EMERGENCY MEDICINE

## 2019-02-13 PROCEDURE — 70498 CT ANGIOGRAPHY NECK: CPT

## 2019-02-13 PROCEDURE — 82948 REAGENT STRIP/BLOOD GLUCOSE: CPT

## 2019-02-13 PROCEDURE — 70450 CT HEAD/BRAIN W/O DYE: CPT

## 2019-02-13 PROCEDURE — 86901 BLOOD TYPING SEROLOGIC RH(D): CPT | Performed by: EMERGENCY MEDICINE

## 2019-02-13 PROCEDURE — 80061 LIPID PANEL: CPT | Performed by: PHYSICIAN ASSISTANT

## 2019-02-13 PROCEDURE — 85027 COMPLETE CBC AUTOMATED: CPT | Performed by: PHYSICIAN ASSISTANT

## 2019-02-13 PROCEDURE — 84100 ASSAY OF PHOSPHORUS: CPT | Performed by: PHYSICIAN ASSISTANT

## 2019-02-13 PROCEDURE — 36415 COLL VENOUS BLD VENIPUNCTURE: CPT | Performed by: EMERGENCY MEDICINE

## 2019-02-13 PROCEDURE — 86140 C-REACTIVE PROTEIN: CPT | Performed by: PHYSICIAN ASSISTANT

## 2019-02-13 PROCEDURE — 93010 ELECTROCARDIOGRAM REPORT: CPT | Performed by: INTERNAL MEDICINE

## 2019-02-13 PROCEDURE — 80053 COMPREHEN METABOLIC PANEL: CPT | Performed by: PHYSICIAN ASSISTANT

## 2019-02-13 PROCEDURE — 93005 ELECTROCARDIOGRAM TRACING: CPT

## 2019-02-13 PROCEDURE — 70496 CT ANGIOGRAPHY HEAD: CPT

## 2019-02-13 PROCEDURE — 86850 RBC ANTIBODY SCREEN: CPT | Performed by: EMERGENCY MEDICINE

## 2019-02-13 PROCEDURE — 87077 CULTURE AEROBIC IDENTIFY: CPT | Performed by: EMERGENCY MEDICINE

## 2019-02-13 RX ORDER — TAMSULOSIN HYDROCHLORIDE 0.4 MG/1
0.4 CAPSULE ORAL
Status: DISCONTINUED | OUTPATIENT
Start: 2019-02-13 | End: 2019-02-13 | Stop reason: HOSPADM

## 2019-02-13 RX ORDER — ALBUMIN, HUMAN INJ 5% 5 %
12.5 SOLUTION INTRAVENOUS ONCE
Status: COMPLETED | OUTPATIENT
Start: 2019-02-13 | End: 2019-02-13

## 2019-02-13 RX ORDER — ASPIRIN 325 MG
325 TABLET ORAL DAILY
Status: DISCONTINUED | OUTPATIENT
Start: 2019-02-13 | End: 2019-02-13 | Stop reason: HOSPADM

## 2019-02-13 RX ORDER — ASPIRIN 81 MG/1
324 TABLET, CHEWABLE ORAL ONCE
Status: COMPLETED | OUTPATIENT
Start: 2019-02-13 | End: 2019-02-13

## 2019-02-13 RX ORDER — ACETAMINOPHEN 325 MG/1
650 TABLET ORAL EVERY 6 HOURS PRN
Status: DISCONTINUED | OUTPATIENT
Start: 2019-02-13 | End: 2019-02-13 | Stop reason: HOSPADM

## 2019-02-13 RX ORDER — CEFTRIAXONE 1 G/50ML
1000 INJECTION, SOLUTION INTRAVENOUS EVERY 24 HOURS
Status: DISCONTINUED | OUTPATIENT
Start: 2019-02-13 | End: 2019-02-13 | Stop reason: HOSPADM

## 2019-02-13 RX ORDER — ATORVASTATIN CALCIUM 40 MG/1
40 TABLET, FILM COATED ORAL EVERY EVENING
Status: DISCONTINUED | OUTPATIENT
Start: 2019-02-13 | End: 2019-02-13 | Stop reason: HOSPADM

## 2019-02-13 RX ORDER — FOLIC ACID 1 MG/1
1 TABLET ORAL DAILY
Status: DISCONTINUED | OUTPATIENT
Start: 2019-02-13 | End: 2019-02-13 | Stop reason: HOSPADM

## 2019-02-13 RX ORDER — CALCIUM CARBONATE 200(500)MG
500 TABLET,CHEWABLE ORAL DAILY PRN
Status: DISCONTINUED | OUTPATIENT
Start: 2019-02-13 | End: 2019-02-13 | Stop reason: HOSPADM

## 2019-02-13 RX ADMIN — CEFTRIAXONE 1000 MG: 1 INJECTION, SOLUTION INTRAVENOUS at 05:09

## 2019-02-13 RX ADMIN — FOLIC ACID 1 MG: 1 TABLET ORAL at 08:34

## 2019-02-13 RX ADMIN — ASPIRIN 81 MG 324 MG: 81 TABLET ORAL at 02:06

## 2019-02-13 RX ADMIN — ALBUMIN HUMAN 12.5 G: 0.05 INJECTION, SOLUTION INTRAVENOUS at 02:30

## 2019-02-13 RX ADMIN — ASPIRIN 325 MG ORAL TABLET 325 MG: 325 PILL ORAL at 08:34

## 2019-02-13 NOTE — PLAN OF CARE
Problem: Potential for Falls  Goal: Patient will remain free of falls  Description  INTERVENTIONS:  - Assess patient frequently for physical needs  -  Identify cognitive and physical deficits and behaviors that affect risk of falls    -  Chestnut fall precautions as indicated by assessment   - Educate patient/family on patient safety including physical limitations  - Instruct patient to call for assistance with activity based on assessment  - Modify environment to reduce risk of injury  - Consider OT/PT consult to assist with strengthening/mobility  Outcome: Progressing     Problem: PAIN - ADULT  Goal: Verbalizes/displays adequate comfort level or baseline comfort level  Description  Interventions:  - Encourage patient to monitor pain and request assistance  - Assess pain using appropriate pain scale  - Administer analgesics based on type and severity of pain and evaluate response  - Implement non-pharmacological measures as appropriate and evaluate response  - Consider cultural and social influences on pain and pain management  - Notify physician/advanced practitioner if interventions unsuccessful or patient reports new pain  Outcome: Progressing     Problem: INFECTION - ADULT  Goal: Absence or prevention of progression during hospitalization  Description  INTERVENTIONS:  - Assess and monitor for signs and symptoms of infection  - Monitor lab/diagnostic results  - Monitor all insertion sites, i e  indwelling lines, tubes, and drains  - Monitor endotracheal (as able) and nasal secretions for changes in amount and color  - Chestnut appropriate cooling/warming therapies per order  - Administer medications as ordered  - Instruct and encourage patient and family to use good hand hygiene technique  - Identify and instruct in appropriate isolation precautions for identified infection/condition  Outcome: Progressing  Goal: Absence of fever/infection during neutropenic period  Description  INTERVENTIONS:  - Monitor WBC  - Implement neutropenic guidelines  Outcome: Progressing     Problem: SAFETY ADULT  Goal: Patient will remain free of falls  Description  INTERVENTIONS:  - Assess patient frequently for physical needs  -  Identify cognitive and physical deficits and behaviors that affect risk of falls    -  Harvest fall precautions as indicated by assessment   - Educate patient/family on patient safety including physical limitations  - Instruct patient to call for assistance with activity based on assessment  - Modify environment to reduce risk of injury  - Consider OT/PT consult to assist with strengthening/mobility  Outcome: Progressing  Goal: Maintain or return to baseline ADL function  Description  INTERVENTIONS:  -  Assess patient's ability to carry out ADLs; assess patient's baseline for ADL function and identify physical deficits which impact ability to perform ADLs (bathing, care of mouth/teeth, toileting, grooming, dressing, etc )  - Assess/evaluate cause of self-care deficits   - Assess range of motion  - Assess patient's mobility; develop plan if impaired  - Assess patient's need for assistive devices and provide as appropriate  - Encourage maximum independence but intervene and supervise when necessary  ¯ Involve family in performance of ADLs  ¯ Assess for home care needs following discharge   ¯ Request OT consult to assist with ADL evaluation and planning for discharge  ¯ Provide patient education as appropriate  Outcome: Progressing  Goal: Maintain or return mobility status to optimal level  Description  INTERVENTIONS:  - Assess patient's baseline mobility status (ambulation, transfers, stairs, etc )    - Identify cognitive and physical deficits and behaviors that affect mobility  - Identify mobility aids required to assist with transfers and/or ambulation (gait belt, sit-to-stand, lift, walker, cane, etc )  - Harvest fall precautions as indicated by assessment  - Record patient progress and toleration of activity level on Mobility SBAR; progress patient to next Phase/Stage  - Instruct patient to call for assistance with activity based on assessment  - Request Rehabilitation consult to assist with strengthening/weightbearing, etc   Outcome: Progressing     Problem: DISCHARGE PLANNING  Goal: Discharge to home or other facility with appropriate resources  Description  INTERVENTIONS:  - Identify barriers to discharge w/patient and caregiver  - Arrange for needed discharge resources and transportation as appropriate  - Identify discharge learning needs (meds, wound care, etc )  - Arrange for interpretive services to assist at discharge as needed  - Refer to Case Management Department for coordinating discharge planning if the patient needs post-hospital services based on physician/advanced practitioner order or complex needs related to functional status, cognitive ability, or social support system  Outcome: Progressing     Problem: Knowledge Deficit  Goal: Patient/family/caregiver demonstrates understanding of disease process, treatment plan, medications, and discharge instructions  Description  Complete learning assessment and assess knowledge base  Interventions:  - Provide teaching at level of understanding  - Provide teaching via preferred learning methods  Outcome: Progressing     Problem: Neurological Deficit  Goal: Neurological status is stable or improving  Description  Interventions:  - Monitor and assess patient's level of consciousness, motor function, sensory function, and level of assistance needed for ADLs  - Monitor and report changes from baseline  Collaborate with interdisciplinary team to initiate plan and implement interventions as ordered  - Provide and maintain a safe environment  - Utilize seizure precautions  - Utilize fall precautions  - Utilize aspiration precautions  - Utilize bleeding precautions  Outcome: Progressing     Problem:  Activity Intolerance/Impaired Mobility  Goal: Mobility/activity is maintained at optimum level for patient  Description  Interventions:  - Assess and monitor patient  barriers to mobility and need for assistive/adaptive devices  - Assess patient's emotional response to limitations  - Collaborate with interdisciplinary team and initiate plans and interventions as ordered  - Encourage independent activity per ability   - Maintain proper body alignment  - Perform active/passive rom as tolerated/ordered  - Plan activities to conserve energy   - Turn patient  Outcome: Progressing     Problem: Communication Impairment  Goal: Ability to express needs and understand communication  Description  Assess patient's communication skills and ability to understand information  Patient will demonstrate use of effective communication techniques, alternative methods of communication and understanding even if not able to speak  - Encourage communication and provide alternate methods of communication as needed  - Collaborate with case management/ for discharge needs  - Include patient/family/caregiver in decisions related to communication  Outcome: Progressing     Problem: Nutrition  Goal: Nutrition/Hydration status is improving  Description  Monitor and assess patient's nutrition/hydration status for malnutrition (ex- brittle hair, bruises, dry skin, pale skin and conjunctiva, muscle wasting, smooth red tongue, and disorientation)  Collaborate with interdisciplinary team and initiate plan and interventions as ordered  Monitor patient's weight and dietary intake as ordered or per policy  Utilize nutrition screening tool and intervene per policy  Determine patient's food preferences and provide high-protein, high-caloric foods as appropriate  - Assist patient with eating   - Allow adequate time for meals   - Encourage patient to take dietary supplement as ordered    - Collaborate with clinical nutritionist   - Include patient/family/caregiver in decisions related to nutrition    Outcome: Progressing     Problem: Prexisting or High Potential for Compromised Skin Integrity  Goal: Skin integrity is maintained or improved  Description  INTERVENTIONS:  - Identify patients at risk for skin breakdown  - Assess and monitor skin integrity  - Assess and monitor nutrition and hydration status  - Monitor labs (i e  albumin)  - Assess for incontinence   - Turn and reposition patient  - Assist with mobility/ambulation  - Relieve pressure over bony prominences  - Avoid friction and shearing  - Provide appropriate hygiene as needed including keeping skin clean and dry  - Evaluate need for skin moisturizer/barrier cream  - Collaborate with interdisciplinary team (i e  Nutrition, Rehabilitation, etc )   - Patient/family teaching  Outcome: Progressing

## 2019-02-13 NOTE — ASSESSMENT & PLAN NOTE
Patient's hemoglobin dropped from 12 5-8 8  Hemoccult was negative  No history of bleeding  Probably related to chronic kidney disease with an elevated creatinine at 2 6  Seems to be his new baseline from last admission

## 2019-02-13 NOTE — OCCUPATIONAL THERAPY NOTE
Occupational Therapy Refusal        Patient Name: Trisha Fernandez JRIBS'H Date: 2/13/2019    Received orders, reviewed chart and met with pt at bedside  Pt reports he is signing himself out AMA and is not interested in therapy at this time  ELIZABETH OT       ZAN Burton, OTR/L

## 2019-02-13 NOTE — PLAN OF CARE
Problem: Potential for Falls  Goal: Patient will remain free of falls  Description  INTERVENTIONS:  - Assess patient frequently for physical needs  -  Identify cognitive and physical deficits and behaviors that affect risk of falls    -  [place] fall precautions as indicated by assessment   - Educate patient/family on patient safety including physical limitations  - Instruct patient to call for assistance with activity based on assessment  - Modify environment to reduce risk of injury  - Consider OT/PT consult to assist with strengthening/mobility  Outcome: Progressing     Problem: PAIN - ADULT  Goal: Verbalizes/displays adequate comfort level or baseline comfort level  Description  Interventions:  - Encourage patient to monitor pain and request assistance  - Assess pain using appropriate pain scale  - Administer analgesics based on type and severity of pain and evaluate response  - Implement non-pharmacological measures as appropriate and evaluate response  - Consider cultural and social influences on pain and pain management  - Notify physician/advanced practitioner if interventions unsuccessful or patient reports new pain  Outcome: Progressing     Problem: INFECTION - ADULT  Goal: Absence or prevention of progression during hospitalization  Description  INTERVENTIONS:  - Assess and monitor for signs and symptoms of infection  - Monitor lab/diagnostic results  - Monitor all insertion sites, i e  indwelling lines, tubes, and drains  - Monitor endotracheal (as able) and nasal secretions for changes in amount and color  - [place] appropriate cooling/warming therapies per order  - Administer medications as ordered  - Instruct and encourage patient and family to use good hand hygiene technique  - Identify and instruct in appropriate isolation precautions for identified infection/condition  Outcome: Progressing  Goal: Absence of fever/infection during neutropenic period  Description  INTERVENTIONS:  - Monitor WBC  - Implement neutropenic guidelines  Outcome: Progressing     Problem: SAFETY ADULT  Goal: Patient will remain free of falls  Description  INTERVENTIONS:  - Assess patient frequently for physical needs  -  Identify cognitive and physical deficits and behaviors that affect risk of falls    -  [place] fall precautions as indicated by assessment   - Educate patient/family on patient safety including physical limitations  - Instruct patient to call for assistance with activity based on assessment  - Modify environment to reduce risk of injury  - Consider OT/PT consult to assist with strengthening/mobility  Outcome: Progressing  Goal: Maintain or return to baseline ADL function  Description  INTERVENTIONS:  -  Assess patient's ability to carry out ADLs; assess patient's baseline for ADL function and identify physical deficits which impact ability to perform ADLs (bathing, care of mouth/teeth, toileting, grooming, dressing, etc )  - Assess/evaluate cause of self-care deficits   - Assess range of motion  - Assess patient's mobility; develop plan if impaired  - Assess patient's need for assistive devices and provide as appropriate  - Encourage maximum independence but intervene and supervise when necessary  ¯ Involve family in performance of ADLs  ¯ Assess for home care needs following discharge   ¯ Request OT consult to assist with ADL evaluation and planning for discharge  ¯ Provide patient education as appropriate  Outcome: Progressing  Goal: Maintain or return mobility status to optimal level  Description  INTERVENTIONS:  - Assess patient's baseline mobility status (ambulation, transfers, stairs, etc )    - Identify cognitive and physical deficits and behaviors that affect mobility  - Identify mobility aids required to assist with transfers and/or ambulation (gait belt, sit-to-stand, lift, walker, cane, etc )  - [place] fall precautions as indicated by assessment  - Record patient progress and toleration of activity level on Mobility SBAR; progress patient to next Phase/Stage  - Instruct patient to call for assistance with activity based on assessment  - Request Rehabilitation consult to assist with strengthening/weightbearing, etc   Outcome: Progressing     Problem: DISCHARGE PLANNING  Goal: Discharge to home or other facility with appropriate resources  Description  INTERVENTIONS:  - Identify barriers to discharge w/patient and caregiver  - Arrange for needed discharge resources and transportation as appropriate  - Identify discharge learning needs (meds, wound care, etc )  - Arrange for interpretive services to assist at discharge as needed  - Refer to Case Management Department for coordinating discharge planning if the patient needs post-hospital services based on physician/advanced practitioner order or complex needs related to functional status, cognitive ability, or social support system  Outcome: Progressing     Problem: Knowledge Deficit  Goal: Patient/family/caregiver demonstrates understanding of disease process, treatment plan, medications, and discharge instructions  Description  Complete learning assessment and assess knowledge base  Interventions:  - Provide teaching at level of understanding  - Provide teaching via preferred learning methods  Outcome: Progressing     Problem: Neurological Deficit  Goal: Neurological status is stable or improving  Description  Interventions:  - Monitor and assess patient's level of consciousness, motor function, sensory function, and level of assistance needed for ADLs  - Monitor and report changes from baseline  Collaborate with interdisciplinary team to initiate plan and implement interventions as ordered  - Provide and maintain a safe environment  - Utilize seizure precautions  - Utilize fall precautions  - Utilize aspiration precautions  - Utilize bleeding precautions  Outcome: Progressing     Problem:  Activity Intolerance/Impaired Mobility  Goal: Mobility/activity is maintained at optimum level for patient  Description  Interventions:  - Assess and monitor patient  barriers to mobility and need for assistive/adaptive devices  - Assess patient's emotional response to limitations  - Collaborate with interdisciplinary team and initiate plans and interventions as ordered  - Encourage independent activity per ability   - Maintain proper body alignment  - Perform active/passive rom as tolerated/ordered  - Plan activities to conserve energy   - Turn patient  Outcome: Progressing     Problem: Communication Impairment  Goal: Ability to express needs and understand communication  Description  Assess patient's communication skills and ability to understand information  Patient will demonstrate use of effective communication techniques, alternative methods of communication and understanding even if not able to speak  - Encourage communication and provide alternate methods of communication as needed  - Collaborate with case management/ for discharge needs  - Include patient/family/caregiver in decisions related to communication  Outcome: Progressing     Problem: Nutrition  Goal: Nutrition/Hydration status is improving  Description  Monitor and assess patient's nutrition/hydration status for malnutrition (ex- brittle hair, bruises, dry skin, pale skin and conjunctiva, muscle wasting, smooth red tongue, and disorientation)  Collaborate with interdisciplinary team and initiate plan and interventions as ordered  Monitor patient's weight and dietary intake as ordered or per policy  Utilize nutrition screening tool and intervene per policy  Determine patient's food preferences and provide high-protein, high-caloric foods as appropriate  - Assist patient with eating   - Allow adequate time for meals   - Encourage patient to take dietary supplement as ordered    - Collaborate with clinical nutritionist   - Include patient/family/caregiver in decisions related to nutrition    Outcome: Progressing

## 2019-02-13 NOTE — ED PROVIDER NOTES
History  Chief Complaint   Patient presents with    Cold Exposure     [facility] s/p slip and fall in snow approx 1 1/2 h prior to EMS activation, pt was lying in snow for that period of time until found by significant other  confused upon arrival     72year old male presents for evaluation of hypothermia  Patient states that he slipped on the ice while taking out the trash this evening  Patient states that he was unable to get up due to weakness of the left side of his body  Patient reportedly had been outside in the snow for approximately 1 1/2 hours  He was found to have an axillary temperature of 93 6F for EMS  He was started on warmed IV fluids with external warming measures in transit  Patient states that he had previously been prescribed Eliquis for Afib, but stopped taking it 1 month ago on his doctor's orders  Patient denies head injury or LOC  He denies pain of the extremities  Patient noted to have increased edema of the left lower extremity  He states this has been present for "a long time " He denies recent travel  Patient reports that he underwent pacemaker placement 1 month ago  History provided by:  Patient  CVA/TIA-like Symptoms   Presenting symptoms: weakness    Presenting symptoms: no headaches    Weakness:     Severity:  Severe (left side)    Onset quality:  Sudden    Duration:  90 minutes    Timing:  Constant    Progression:  Unchanged    Chronicity:  New  Date/time of last known well:  2/12/2019 10:30 PM  Onset quality:  Sudden  Duration:  90 minutes  Timing:  Constant  Progression:  Unchanged  Associated symptoms: no chest pain, no fever, no nausea, no neck pain and no vomiting        Prior to Admission Medications   Prescriptions Last Dose Informant Patient Reported? Taking?    Ascorbic Acid (VITAMIN C PO)  Self Yes No   Sig: Take by mouth   B Complex Vitamins (VITAMIN B COMPLEX PO)  Self Yes No   Sig: Take by mouth   Ferrous Gluconate-C-Folic Acid (IRON-C PO)  Self Yes No   Sig: Take by mouth   Lactobacillus (ACIDOPHILUS PO)  Self Yes No   Sig: Take by mouth   Lycopene 10 MG CAPS  Self Yes No   Sig: Take by mouth   Misc Natural Products (SAW PALMETTO) CAPS  Self Yes No   Sig: Take by mouth   Multiple Vitamin (MULTI-DAY VITAMINS) TABS  Self Yes No   Sig: Take by mouth   acetaminophen (TYLENOL) 325 mg tablet  Self No No   Sig: Take 2 tablets (650 mg total) by mouth every 6 (six) hours as needed for mild pain   calcium carbonate (TUMS) 500 mg chewable tablet  Self No No   Sig: Chew 1 tablet (500 mg total) daily as needed for indigestion or heartburn   clobetasol (TEMOVATE) 0 05 % GEL  Self Yes No   Sig: Apply topically as needed     diazepam (VALIUM) 5 mg tablet   No No   Sig: Take 1 tablet (5 mg total) by mouth every 8 (eight) hours as needed for anxiety for up to 10 days   folic acid (FOLVITE) 1 mg tablet  Self Yes No   Sig: Take 1 mg by mouth daily   furosemide (LASIX) 40 mg tablet  Self No No   Sig: Take 1 tablet (40 mg total) by mouth daily   lisinopril (ZESTRIL) 5 mg tablet  Self No No   Sig: Take 1 tablet (5 mg total) by mouth daily   metoprolol succinate (TOPROL-XL) 100 mg 24 hr tablet   No No   Sig: Take 1 tablet (100 mg total) by mouth daily for 30 days   tamsulosin (FLOMAX) 0 4 mg   No No   Sig: Take 1 capsule (0 4 mg total) by mouth daily with dinner for 30 days      Facility-Administered Medications: None       Past Medical History:   Diagnosis Date    Atrial fibrillation (HCC)     Congestive heart failure with left ventricular diastolic dysfunction, chronic (HCC)     Hypertension     Peripheral vascular disease of lower extremity (HCC)     Subdural hematoma (HCC)        Past Surgical History:   Procedure Laterality Date    BACK SURGERY      AUSTYN HOLE FOR SUBDURAL HEMATOMA      CARDIAC PACEMAKER PLACEMENT      CLAVICLE SURGERY      HERNIA REPAIR      PROSTATE SURGERY         Family History   Problem Relation Age of Onset    Heart disease Mother     Heart disease Father      I have reviewed and agree with the history as documented  Social History     Tobacco Use    Smoking status: Never Smoker    Smokeless tobacco: Never Used   Substance Use Topics    Alcohol use: No    Drug use: No        Review of Systems   Constitutional: Negative for appetite change, diaphoresis, fatigue and fever  HENT: Negative for congestion, rhinorrhea and sore throat  Respiratory: Negative for cough, chest tightness and shortness of breath  Cardiovascular: Negative for chest pain, palpitations and leg swelling  Gastrointestinal: Negative for abdominal pain, constipation, diarrhea, nausea and vomiting  Genitourinary: Negative for difficulty urinating, dysuria, frequency and hematuria  Musculoskeletal: Negative for myalgias, neck pain and neck stiffness  Skin: Negative for pallor  Neurological: Positive for weakness  Negative for syncope and headaches  All other systems reviewed and are negative  Physical Exam  Physical Exam   Constitutional: He is oriented to person, place, and time  He appears well-developed and well-nourished  Non-toxic appearance  No distress  HENT:   Head: Normocephalic and atraumatic  Eyes: Pupils are equal, round, and reactive to light  EOM are normal    Neck: Normal range of motion  No tracheal deviation present  No thyromegaly present  Cardiovascular: Normal rate, regular rhythm, normal heart sounds and intact distal pulses  Pulmonary/Chest: Effort normal and breath sounds normal    Abdominal: Soft  Bowel sounds are normal  He exhibits no distension  There is no tenderness  Genitourinary: Rectal exam shows guaiac negative stool  Musculoskeletal: He exhibits edema (2+ left lower extremity, trace right lower extremity)  Lymphadenopathy:     He has no cervical adenopathy  Neurological: He is alert and oriented to person, place, and time  No cranial nerve deficit or sensory deficit  He exhibits normal muscle tone     4/5 strength bilateral upper and lower extremities  No drift upper extremities  Drift present bilateral lower extremities  Skin: Skin is warm and dry  He is not diaphoretic  Abrasion lateral left lower extremity without active bleeding or gross contamination  Nursing note and vitals reviewed        Vital Signs  ED Triage Vitals   Temperature Pulse Respirations Blood Pressure SpO2   02/12/19 2351 02/12/19 2353 02/12/19 2351 02/12/19 2351 02/12/19 2353   (!) 93 °F (33 9 °C) 89 18 140/76 99 %      Temp Source Heart Rate Source Patient Position - Orthostatic VS BP Location FiO2 (%)   02/12/19 2351 02/12/19 2353 02/13/19 0010 -- --   Rectal Monitor Lying        Pain Score       --                  Vitals:    02/13/19 0025 02/13/19 0040 02/13/19 0055 02/13/19 0110   BP: 126/62 112/55 93/52 103/56   Pulse: (!) 130 83 80 80   Patient Position - Orthostatic VS: Lying Lying Lying Lying       Visual Acuity  Visual Acuity      Most Recent Value   L Pupil Size (mm)  3   R Pupil Size (mm)  3          ED Medications  Medications   sodium chloride 0 9 % bolus 1,000 mL (has no administration in time range)   aspirin chewable tablet 324 mg (has no administration in time range)       Diagnostic Studies  Results Reviewed     Procedure Component Value Units Date/Time    Urine culture [[id number]]     Lab Status:  No result Specimen:  Urine, Indwelling Abbott Catheter     Urine Microscopic [[id number]]  (Abnormal) Collected:  02/13/19 0129    Lab Status:  Final result Specimen:  Urine, Indwelling Abbott Catheter Updated:  02/13/19 0151     RBC, UA       Field obscured, unable to enumerate     /hpf     WBC, UA Innumerable /hpf      Epithelial Cells       Field obscured, unable to enumerate     /hpf     Bacteria, UA       Field obscured, unable to enumerate     /hpf    UA w Reflex to Microscopic [[id number]]  (Abnormal) Collected:  02/13/19 0129    Lab Status:  Final result Specimen:  Urine, Indwelling Abbott Catheter Updated:  02/13/19 9112 Color, UA Yellow     Clarity, UA Cloudy     Specific Gravity, UA 1 025     pH, UA 6 0     Leukocytes, UA Large     Nitrite, UA Positive     Protein,  (2+) mg/dl      Glucose, UA Negative mg/dl      Ketones, UA Negative mg/dl      Urobilinogen, UA 0 2 E U /dl      Bilirubin, UA Negative     Blood, UA Moderate    CBC and Platelet [[id number]]  (Abnormal) Collected:  02/13/19 0011    Lab Status:  Final result Specimen:  Blood from Arm, Left Updated:  02/13/19 0052     WBC 8 46 Thousand/uL      RBC 2 92 Million/uL      Hemoglobin 8 8 g/dL      Hematocrit 27 5 %      MCV 94 fL      MCH 30 1 pg      MCHC 32 0 g/dL      RDW 19 8 %      Platelets 96 Thousands/uL     POCT urinalysis dipstick [[id number]]  (Normal) Resulted:  02/13/19 0043    Lab Status:  Final result Specimen:  Urine Updated:  02/13/19 0045     Color, UA yellow     Clarity, UA turbid     Glucose, UA (Ref: Negative) negative     Bilirubin, UA (Ref: Negative) negative     Ketones, UA (Ref: Negative) negative     Spec Grav, UA (Ref:1 003-1 030) 1 020     Blood, UA (Ref: Negative) large     pH, UA (Ref: 4 5-8 0) 6 0     Protein, UA (Ref: Negative) 300     Urobilinogen, UA (Ref: 0 2- 1 0) 0 2      Leukocytes, UA (Ref: Negative) large     Nitrite, UA (Ref: Negative) positive    Comprehensive metabolic panel [[id number]]  (Abnormal) Collected:  02/13/19 0011    Lab Status:  Final result Specimen:  Blood from Arm, Left Updated:  02/13/19 0042     Sodium 138 mmol/L      Potassium 5 0 mmol/L      Chloride 103 mmol/L      CO2 22 mmol/L      ANION GAP 13 mmol/L      BUN 41 mg/dL      Creatinine 2 36 mg/dL      Glucose 80 mg/dL      Calcium 8 8 mg/dL      AST 33 U/L      ALT 19 U/L      Alkaline Phosphatase 94 U/L      Total Protein 6 6 g/dL      Albumin 2 7 g/dL      Total Bilirubin 1 30 mg/dL      eGFR 27 ml/min/1 73sq m     Narrative:       National Kidney Disease Education Program recommendations are as follows:  GFR calculation is accurate only with a steady state creatinine  Chronic Kidney disease less than 60 ml/min/1 73 sq  meters  Kidney failure less than 15 ml/min/1 73 sq  meters  Protime-INR [[id number]]  (Abnormal) Collected:  02/13/19 0011    Lab Status:  Final result Specimen:  Blood from Arm, Left Updated:  02/13/19 0032     Protime 18 3 seconds      INR 1 62    APTT [[id number]]  (Normal) Collected:  02/13/19 0011    Lab Status:  Final result Specimen:  Blood from Arm, Left Updated:  02/13/19 0032     PTT 36 seconds     Fingerstick Glucose (POCT) [[id number]]  (Normal) Collected:  02/13/19 0008    Lab Status:  Final result Updated:  02/13/19 0014     POC Glucose 92 mg/dl                  CTA stroke alert (head/neck)   Final Result by Skylar Garcia MD (02/13 0108)         1  No hemodynamically significant stenosis, dissection or occlusion of the carotid arteries  2   Focal moderate (50-69%) stenosis within the intradural segment of the left vertebral artery  3   No significant stenosis or occlusion of the major vessels of the [place]  Findings were directly discussed with Nicole Aranda on 2/13/2019 12:39 AM                      Workstation performed: [id number]         CT stroke alert brain   Final Result by Skylar Garcia MD (02/13 5618)      No evidence of acute vascular territorial infarction, hemorrhage or mass effect        Findings were directly discussed with Nicole Aranda on 2/13/2019 12:23 AM       Workstation performed: [id number]                    Procedures  ECG 12 Lead Documentation  Date/Time: 2/13/2019 1:07 AM  Performed by: Mohsen Trevino MD  Authorized by: Mohsen Trevino MD     Indications / Diagnosis:  Possible stroke  Patient location:  ED  Previous ECG:     Previous ECG:  Unavailable  Interpretation:     Interpretation: abnormal    Rate:     ECG rate:  78    ECG rate assessment: normal    Rhythm:     Rhythm: paced    Pacing:     Capture:  Complete  Ectopy:     Ectopy: none    QRS:     QRS axis: Normal    QRS intervals:  Normal  ST segments:     ST segments:  Normal  T waves:     T waves: inverted      Inverted:  AVL and V2           Phone Contacts  ED Phone Contact    ED Course  ED Course as of Feb 13 0155 Wed Feb 13, 2019   0051 2 67 two months ago   Creatinine(!): 2 36   0052 1 38 two months ago   TOTAL BILIRUBIN(!): 1 30   0052 12 5 two months ago   Hemoglobin(!): 8 8   0101 Drift on right lower extremity resolved  Drift still present on left lower extremity      0127 75 two months ago   Platelet Count(!): 96   0127 Straight caths at home chronically   Nitrite, UA: positive             Stroke Assessment     Row Name 02/13/19 0004 02/13/19 0116          NIH Stroke Scale    Interval  Baseline  Other (Comment) re-examination     Level of Consciousness (1a )  0  0     LOC Questions (1b )  0  0     LOC Commands (1c )  0  0     Best Gaze (2 )  0  0     Visual (3 )  0  0     Facial Palsy (4 )  0  0     Motor Arm, Left (5a )  0  0     Motor Arm, Right (5b )  0  0     Motor Leg, Left (6a )  1  1     Motor Leg, Right (6b )  1  0     Limb Ataxia (7 )  0  0     Sensory (8 )  0  0     Best Language (9 )  0  0     Dysarthria (10 )  0  0     Extinction and Inattention (11 ) (Formerly Neglect)  0  0     Total  2  1         First Filed Value   TPA Decision  Patient not a TPA candidate  Patient is not a candidate options  Symptoms resolved/clearly non disabling  MDM  Number of Diagnoses or Management Options  Anemia: new and requires workup  CKD (chronic kidney disease): new and requires workup  Fall, initial encounter: new and requires workup  Hypothermia, initial encounter: new and requires workup  Left leg weakness: new and requires workup  Diagnosis management comments: 72year old male brought in by EMS for evaluation of hypothermia after fall  Patient reports that he could not get up due to left-sided weakness  Stroke alert called   4/5 strength throughout with bilateral lower extremity drift on exam  Full ROM bilateral lower extremities  Baseline NIHSS 2  CT/CTA negative  NIHSS on re-evaluation now 1  Discussed case with Dr Jose F Kraus from neurology  Patient not a tpa candidate due to low NIHSS and improving symptoms  Patient's hypothermia improving with external warming and warm fluids  Anemia on labs which appears to be new  No apparent source of blood loss  Patient admitted under stroke pathway for further evaluation and management  Amount and/or Complexity of Data Reviewed  Clinical lab tests: ordered and reviewed  Tests in the radiology section of CPT®: ordered and reviewed  Discuss the patient with other providers: yes        Disposition  Final diagnoses:   Left leg weakness   Fall, initial encounter   Hypothermia, initial encounter   Anemia   CKD (chronic kidney disease)     Time reflects when diagnosis was documented in both MDM as applicable and the Disposition within this note     Time User Action Codes Description Comment    2/13/2019 12:03 AM Howard Flax Add [R29 898] Left leg weakness     2/13/2019  1:17 AM Howard Flax Add [K99  NRZE] Fall, initial encounter     2/13/2019  1:17 AM AdyDavey Add Laviana Purple  XXXA] Hypothermia, initial encounter     2/13/2019  1:17 AM Howard Flax Add [D64 9] Anemia     2/13/2019  1:22 AM Hwoard Flax Add [N18 9] CKD (chronic kidney disease)       ED Disposition     ED Disposition Condition Date/Time Comment    Admit Stable Wed Feb 13, 2019  1:28 AM Case was discussed with CORY and the patient's admission status was agreed to be Admission Status: inpatient status to the service of Dr Celestine José   Follow-up Information    None         Patient's Medications   Discharge Prescriptions    No medications on file     No discharge procedures on file      ED Provider  Electronically Signed by           Vandana Jay MD  02/13/19 4977       Vandana Jay MD  02/13/19 9323

## 2019-02-13 NOTE — ASSESSMENT & PLAN NOTE
Patient was laying 1 and 0 5 hr outside in the cold  Status post fall with left-sided weakness  No pain or injuries from fall except an abrasion on his left leg  Address abrasion - clean place antibiotic cream and dress

## 2019-02-13 NOTE — ASSESSMENT & PLAN NOTE
Acute on chronic kidney disease  2 6 seems to be is no baseline  2 36 within limits  Previously is 1 6

## 2019-02-13 NOTE — H&P
H&P- Arvid Yael 1945, 72 y o  male MRN: [id number]    Unit/Bed#: ED 04 B Encounter: [id number]    Primary Care Provider: Trish Chiang MD   Date and time admitted to hospital: 2/12/2019 11:49 PM        * TIA (transient ischemic attack)  Assessment & Plan  Patient was of fall outside with left-sided weakness  Stroke Alert was called- [facility] of 1  neurology's consult  CT of the head and CTA was performed  No tPA  Continue stroke protocol  MRI was ordered but probably can't be done patient has a pacemaker  Echocardiogram is ordered      Urinary tract infection associated with indwelling urethral catheter [facility])  Assessment & Plan  Patient has a chronic catheter seen by Urology  Urine is positive for nitrates, bacteria and white cells  Started on ceftriaxone 1 g Q 24 hr for 5 days  Last urine culture was positive for Klebsiella oxalate  Possible reason for fall is UTI    Thrombocytosis ([facility] )  Assessment & Plan  Patient has a history of 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 from past admissions  Continue to monitor platelet count  As needed Heme-Onc consult    Hypothermia  Assessment & Leel [address] until patient warms  Received 2 L of warm fluids in the ER  Monitor fluid status will be closely  ELIZABETH Milian been temperature is 98°    Fall at home, initial encounter  Assessment & Plan  Patient was laying 1 and 0 5 hr outside in the cold  Status post fall with left-sided weakness  No pain or injuries from fall except an abrasion on his left leg  Address abrasion - clean place antibiotic cream and dress    Anemia, unspecified  Assessment & Plan  Patient's hemoglobin dropped from 12 5-8 8  Hemoccult was negative  No history of bleeding  Probably related to chronic kidney disease with an elevated creatinine at 2 6  Seems to be his new baseline from last admission      Chronic combined systolic and diastolic heart failure ([facility] )  Assessment & Plan  Monitor ins and outs  Monitor weight  Monitor for volume overload  Seen by Cardiology has an ICD pacemaker in place  Last EF was 20%    PALOMA (acute kidney injury) ([facility] [address] )  Assessment & Plan  Acute on chronic kidney disease  2 6 seems to be is no baseline  2 36 within limits  Previously is 1 6        VTE Prophylaxis: nine   / sequential compression device   Code Status: level 1  POLST: POLST form is not discussed and not completed at this time  Anticipated Length of Stay:  Patient will be admitted on an Inpatient basis with an anticipated length of stay of  > 2 midnights  Justification for Hospital Stay: fall     Total Time for Visit, including Counseling / Coordination of Care: 30 minutes  Greater than 50% of this total time spent on direct patient counseling and coordination of care  Chief Complaint:   Fall with left-sided weakness    History of Present Illness:    Chyna Shields is a 72 y o  male who presents with to the ER after the fall and unable to get up state in environmental exposure with hypothermia  Patient stated he went outside to put his trash out and had a mechanical fall and was unable to get up and get back to the house until his neighbor called the police  Stated time outside was 1 5 hr, patient has an extensive medical history, with multiple admissions last year due to rapid AFib and acute on chronic heart failure he was last year admitted with influenza and respiratory failure with concern volume overload in rapid AFib he was treated with IV Cardizem drip and restarted on his digoxin and metoprolol and was sent home on Lasix at that point he had normal LV function, he then came back to the hospital with rapid AFib and congestive heart failure and he showed an EF of 20% likely from the tachycardia was he is having issues thrombocytopenia on ITP arm his Eliquis was discontinued, his AFib remained uncontrolled and he underwent a biventricular pacemaker 0 and an AV node ablation    During this admission he was evaluated for Watchman device for stroke prevention but it deemed to be too difficult to implant  He still unable to be on anticoagulation he had issues with urinary retention was discharged with a Abbott and Flomax was started at that point his creatinine was at a baseline of 2 6  Since the discharge to the hospital he saw a specialist urologist on the cardiologist and the Orthopedics  Patient was seen by Urology several times had a cystoscope done and the Abbott was removed and he does self-catheterizations  Abbott was reimplanted in the ER during the admission  Review of Systems:    Review of Systems   Constitutional: Negative  Negative for appetite change, chills, fatigue and fever  HENT: Negative for drooling, ear pain, facial swelling, rhinorrhea, trouble swallowing and voice change  Eyes: Negative  Negative for pain and redness  Respiratory: Negative  Negative for cough, chest tightness, shortness of breath, wheezing and stridor  Cardiovascular: Negative  Negative for chest pain, palpitations and leg swelling  Gastrointestinal: Negative  Negative for abdominal pain, blood in stool, nausea and vomiting  Endocrine: Negative  Negative for polydipsia, polyphagia and polyuria  Genitourinary: Positive for difficulty urinating, dysuria, frequency and urgency  Negative for flank pain and hematuria  Musculoskeletal: Negative  Negative for arthralgias, joint swelling, myalgias, neck pain and neck stiffness  Skin: Negative  Negative for pallor, rash and wound  Allergic/Immunologic: Negative  Negative for environmental allergies, food allergies and immunocompromised state  Neurological: Positive for weakness and light-headedness  Negative for dizziness, tremors, seizures, syncope, numbness and headaches  Hematological: Negative  Does not bruise/bleed easily  Psychiatric/Behavioral: Negative  Negative for agitation, confusion, hallucinations, self-injury and sleep disturbance  The patient is not hyperactive  Past Medical and Surgical History:     Past Medical History:   Diagnosis Date    Atrial fibrillation ([facility] )     Congestive heart failure with left ventricular diastolic dysfunction, chronic (HCC)     Hypertension     Peripheral vascular disease of lower extremity (HCC)     Subdural hematoma (HCC)        Past Surgical History:   Procedure Laterality Date    BACK SURGERY      AUSTYN HOLE FOR SUBDURAL HEMATOMA      CARDIAC PACEMAKER PLACEMENT      CLAVICLE SURGERY      HERNIA REPAIR      PROSTATE SURGERY         Meds/Allergies:    Prior to Admission medications    Medication Sig Start Date End Date Taking?  Authorizing Provider   acetaminophen (TYLENOL) 325 mg tablet Take 2 tablets (650 mg total) by mouth every 6 (six) hours as needed for mild pain 12/7/18   Salma Ridley MD   Ascorbic Acid (VITAMIN C PO) Take by mouth    Historical Provider, MD   B Complex Vitamins (VITAMIN B COMPLEX PO) Take by mouth    Historical Provider, MD   calcium carbonate (TUMS) 500 mg chewable tablet Chew 1 tablet (500 mg total) daily as needed for indigestion or heartburn 12/7/18   Salma Ridley MD   clobetasol (TEMOVATE) 0 05 % GEL Apply topically as needed   4/26/16   Historical Provider, MD   diazepam (VALIUM) 5 mg tablet Take 1 tablet (5 mg total) by mouth every 8 (eight) hours as needed for anxiety for up to 10 days 12/7/18 1/7/19  Salma Ridley MD   Ferrous Gluconate-C-Folic Acid (IRON-C PO) Take by mouth    Historical Provider, MD   folic acid (FOLVITE) 1 mg tablet Take 1 mg by mouth daily    Historical Provider, MD   furosemide (LASIX) 40 mg tablet Take 1 tablet (40 mg total) by mouth daily 1/7/19   Fabien Rosas MD   Lactobacillus (ACIDOPHILUS PO) Take by mouth    Historical Provider, MD   lisinopril (ZESTRIL) 5 mg tablet Take 1 tablet (5 mg total) by mouth daily 1/7/19   Fabien Rosas MD   Lycopene 10 MG CAPS Take by mouth    Historical Provider, MD   metoprolol succinate (TOPROL-XL) 100 mg 24 hr tablet Take 1 tablet (100 mg total) by mouth daily for 30 days 1/4/19 2/3/19  Bernarda Paz MD   [facility] Natural Products (SAW PALMETTO) CAPS Take by mouth    Historical Provider, MD   Multiple Vitamin (MULTI-DAY VITAMINS) TABS Take by mouth    Historical Provider, MD   tamsulosin (FLOMAX) 0 4 mg Take 1 capsule (0 4 mg total) by mouth daily with dinner for 30 days 12/7/18 1/7/19  Marilyn Fernandez MD     I have reviewed home medications with patient personally  Allergies: No Known Allergies    Social History:     Marital Status: Single   Occupation: retired   Patient Pre-hospital Living Situation: home   Patient Pre-hospital Level of Mobility: normal;   Patient Pre-hospital Diet Restrictions: none   Substance Use History:   Social History     Substance and Sexual Activity   Alcohol Use No     Social History     Tobacco Use   Smoking Status Never Smoker   Smokeless Tobacco Never Used     Social History     Substance and Sexual Activity   Drug Use No       Family History:    Family History   Problem Relation Age of Onset    Heart disease Mother     Heart disease Father        Physical Exam:     Vitals:   Blood Pressure: 103/56 (02/13/19 0155)  Pulse: 80 (02/13/19 0155)  Temperature: (!) 96 8 °F (36 °C) (02/13/19 0155)  Temp Source: Probe (02/13/19 0155)  Respirations: (!) 23 (02/13/19 0155)  Weight - Scale: 69 9 kg (154 lb 1 6 oz) (02/13/19 0010)  SpO2: 100 % (02/13/19 0155)    Physical Exam   Constitutional: He is oriented to person, place, and time  He appears well-developed and well-nourished  No distress  HENT:   Head: Normocephalic and atraumatic  Eyes: Pupils are equal, round, and reactive to light  Conjunctivae and EOM are normal  No scleral icterus  Neck: Normal range of motion  Neck supple  No JVD present  No tracheal deviation present  Cardiovascular: Normal rate, regular rhythm and intact distal pulses  Exam reveals no friction rub  Murmur heard    Pulmonary/Chest: Effort normal and breath sounds normal  No stridor  No respiratory distress  He has no wheezes  He has no rales  Abdominal: Soft  Bowel sounds are normal  He exhibits distension  There is no tenderness  There is no guarding  Genitourinary: Penis normal    Genitourinary Comments: Abbott in place    Musculoskeletal: He exhibits edema  On the left swelling and patellar bursa swelling alsi abrasion the left lateral side of the leg    Neurological: He is alert and oriented to person, place, and time  He has normal reflexes  NIHSS:    1a Level of Consciousness: 0 = Alert  1b  LOC Questions: 0 = Answers both correctly  1c  LOC Commands: 0 = Obeys both correctly  2  Best Gaze: 0 = Normal  3  Visual: 0 = No visual field loss  4  Facial Palsy: 0=Normal symmetric movement  5a  Motor Right Arm:  0=No drift, limb holds 90 (or 45) degrees for full 10 seconds  5b  Motor Left Arm: 0=No drift, limb holds 90 (or 45) degrees for full 10 seconds  6a  Motor Right Le=No drift, limb holds 90 (or 45) degrees for full 10 seconds  6b  Motor Left Le=Drift, limb holds 90 (or 45) degrees but drifts down before full 10 seconds: does not hit bed  7  Limb Ataxia:  0=Absent  8  Sensory: 0=Normal; no sensory loss  9  Best Language:  0=No aphasia, normal  10  Dysarthria: 0=Normal articulation  11  Extinction and Inattention (formerly Neglect): 0=No abnormality  Total Score: 1                   Skin: Skin is warm and dry  Capillary refill takes less than 2 seconds  He is not diaphoretic  Psychiatric: He has a normal mood and affect  His behavior is normal    Nursing note and vitals reviewed  Additional Data:     Lab Results: I have personally reviewed pertinent reports        Results from last 7 days   Lab Units 19  0011   WBC Thousand/uL 8 46   HEMOGLOBIN g/dL 8 8*   HEMATOCRIT % 27 5*   PLATELETS Thousands/uL 96*     Results from last 7 days   Lab Units 19  0011   POTASSIUM mmol/L 5 0   CHLORIDE mmol/L 103   CO2 mmol/L 22   BUN mg/dL 41*   CREATININE mg/dL 2 36*   CALCIUM mg/dL 8 8   ALK PHOS U/L 94   ALT U/L 19   AST U/L 33     Results from last 7 days   Lab Units 02/13/19  0011   INR  1 62*       Imaging: I have personally reviewed pertinent reports  Xr Knee 4+ Vw Left Injury    Result Date: 1/31/2019  Narrative: LEFT KNEE INDICATION:   M25 562: Pain in left knee  COMPARISON:  None VIEWS:  XR KNEE 4+ VW LEFT INJURY FINDINGS: There is no acute fracture or dislocation  There is no joint effusion  No significant degenerative changes  No lytic or blastic lesions are seen  Prepatellar soft tissue swelling  Impression: Peripatellar soft tissue noted  Findings may represent prepatellar bursitis  Workstation performed: [id number]     Ct Stroke Alert Brain    Result Date: 2/13/2019  Narrative: CT BRAIN - STROKE ALERT PROTOCOL INDICATION:   Left-sided weakness  COMPARISON:  6/3/2015  TECHNIQUE:  CT examination of the brain was performed  In addition to axial images, coronal reformatted images were created and submitted for interpretation  Radiation dose length product (DLP) for this visit:  987 25 mGy-cm   This examination, like all CT scans performed in the [facility], was performed utilizing techniques to minimize radiation dose exposure, including the use of iterative  reconstruction and automated exposure control  IMAGE QUALITY:  Diagnostic  FINDINGS:  PARENCHYMA:  No intracranial mass, mass effect or midline shift  No acute intracranial hemorrhage  No CT signs of acute infarction  Normal intracranial vasculature  VENTRICLES AND EXTRA-AXIAL SPACES:  No hydrocephalus or extra-axial collection  VISUALIZED ORBITS AND PARANASAL SINUSES:  Intact globes  No retro-orbital inflammation  Complete opacification of the right frontal sinus and frontoethmoid junction  Mucosal thickening and periosteal reaction in the right maxillary sinus  Findings consistent with chronic paranasal sinus disease   CALVARIUM AND EXTRACRANIAL SOFT TISSUES:  Left parietal delmy hole  Impression: No evidence of acute vascular territorial infarction, hemorrhage or mass effect  Findings were directly discussed with Nicole Aranda on 2/13/2019 12:23 AM  Workstation performed: [id number]     Cta Stroke Alert (head/neck)    Result Date: 2/13/2019  Narrative: CTA NECK AND BRAIN WITH CONTRAST INDICATION: left weakness COMPARISON:   None  TECHNIQUE:   Post contrast imaging was performed after administration of iodinated contrast through the neck and brain  Post contrast axial 0 625 mm images timed to opacify the arterial system  3D rendering was performed on an independent workstation  MIP reconstructions performed  Coronal reconstructions were performed of the noncontrast portion of the brain  Radiation dose length product (DLP) for this visit:  468 98 mGy-cm   This examination, like all CT scans performed in the [facility], was performed utilizing techniques to minimize radiation dose exposure, including the use of iterative  reconstruction and automated exposure control  IV Contrast:  100 mL of Visipaque  Ordering physician informed of hydration protocol  IMAGE QUALITY:   Diagnostic FINDINGS: CERVICAL VASCULATURE AORTIC ARCH AND GREAT VESSELS:  Three-vessel configuration aortic arch  No stenosis in the subclavian arteries  RIGHT VERTEBRAL ARTERY CERVICAL SEGMENT:  Calcification at the origin without significant stenosis  The vessel is small or hypoplastic throughout the cervical segment  LEFT VERTEBRAL ARTERY CERVICAL SEGMENT:  Calcification at the origin without significant stenosis  The vessel is normal in caliber throughout the cervical segment  RIGHT EXTRACRANIAL CAROTID SEGMENT:  Normal caliber common carotid artery  Minimal calcified plaque at the bifurcation without stenosis  Calcified plaque also present in the distal cervical segment of the internal carotid artery without significant stenosis   LEFT EXTRACRANIAL CAROTID SEGMENT:  Normal caliber common carotid artery  Minimal calcification at the bifurcation without significant stenosis  Normal caliber internal carotid artery  NASCET criteria was used to determine the degree of internal carotid artery diameter stenosis  INTRACRANIAL VASCULATURE INTERNAL CAROTID ARTERIES:  Minimal calcified plaque throughout the carotid siphons without significant stenosis  Patent ophthalmic arteries  ANTERIOR CIRCULATION:  Anterior communicating artery not clearly visualized  No stenosis in the anterior cerebral arteries  MIDDLE CEREBRAL ARTERY CIRCULATION:  M1 segment and middle cerebral artery branches demonstrate normal enhancement bilaterally  DISTAL VERTEBRAL ARTERIES:  Right vertebral artery is atretic distal to the PICA takeoff  Calcified plaque also noted just distal to the PICA takeoff  Calcified plaque within the intradural segment of the left vertebral artery results in moderate (50-69%) stenosis  Posterior inferior cerebellar artery origins are normal  Normal vertebral basilar junction  BASILAR ARTERY:  Physiologically small basilar artery  Patent superior cerebellar arteries  POSTERIOR CEREBRAL ARTERIES: Fetal type left posterior cerebral artery  Prominent right posterior communicating artery  No stenosis in the posterior cerebral arteries  DURAL VENOUS SINUSES:  Anterior aspect of the superior sagittal sinus is atretic  No evidence of dural venous sinus thrombosis NON VASCULAR ANATOMY BONY STRUCTURES:  Mild degenerative change in the lower cervical spine  Left frontal delmy hole  SOFT TISSUES OF THE NECK:  Fat-containing mass in the right lower neck posterior to the right lobe of the thyroid extending into the thoracic inlet posterior to the trachea, measuring 6 3 x 3 7 x 3 7 cm  Anterior and left lateral displacement of the trachea  Mass effect on the upper esophagus  THORACIC INLET:  Mild scarring in the lung apices and pleura  Impression: 1    No hemodynamically significant stenosis, dissection or occlusion of the carotid arteries  2   Focal moderate (50-69%) stenosis within the intradural segment of the left vertebral artery  3   No significant stenosis or occlusion of the major vessels of the [place]  Findings were directly discussed with Howard Mckee on 2/13/2019 12:39 AM  Workstation performed: [id number]       EKG, Pathology, and Other Studies Reviewed on Admission:   · EKG: paced     Allscripts / Epic Records Reviewed: Yes     ** Please Note: This note has been constructed using a voice recognition system   **

## 2019-02-13 NOTE — QUICK NOTE
Stroke alert 12:04 am  Neurology call back 12:04 am  Last normal 10:30 pm 2/12/18  NIH stroke scale per ED team 2 initially, eventually 3  72year old male with afib took himself off eloquis about a month ago  He slipped and fell on ice when taking garbage earlier this evening at 10:30 pm and felt his left side was weak after the fall  Per ED team he was laying on the floor for much of this time since the fall as he couldn't get up  He was found to be hypothermic with temp of 93 degrees F and is currently in the process of being rewarmed  He is able to lit his both LE about an inch off the ground  Right LE drift eventually resolved  Ct head no early signs of infarct or acute hemorrage  cta head/neck per my personal read not showing evidence of an acute thrombus  A/P: differential includes acute cardioembolic cva in context of known afib not on anticoagulation vs inability to get up in context of the fall with possible pain component which patient does not seem to endorse  athough within therapeutic window for IV tpa, given low stroke scale and improvement, decision made not to administer it  Aspirin 325 mg now then 81 mg daily  lipitor 80 mg daily  Stroke pathway  Flp, tsh, hba1c  Tele  2-D echo  Stat head ct with any neuro change  Prn antihypertensive for sbp>200

## 2019-02-13 NOTE — ASSESSMENT & PLAN NOTE
Patient was of fall outside with left-sided weakness  Stroke Alert was called- [facility] of 1  neurology's consult  CT of the head and CTA was performed  No tPA  Continue stroke protocol  MRI was ordered but probably can't be done patient has a pacemaker  Echocardiogram is ordered

## 2019-02-13 NOTE — PROGRESS NOTES
Upon introduction and assessment, the patient states he is refusing his MRI and is leaving today  I notified Dr Priscilla Aceves and we both went over the risks of leaving against medical advice  He signed the paper and stated his friend is picking him up at 1130

## 2019-02-13 NOTE — ASSESSMENT & PLAN NOTE
Continue Brent Rukhsana until patient warms  Received 2 L of warm fluids in the ER  Monitor fluid status will be closely  ELIZABETH Milian been temperature is 98°

## 2019-02-13 NOTE — ASSESSMENT & PLAN NOTE
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

## 2019-02-15 ENCOUNTER — TELEPHONE (OUTPATIENT)
Dept: UROLOGY | Facility: AMBULATORY SURGERY CENTER | Age: 74
End: 2019-02-15

## 2019-02-15 LAB — BACTERIA UR CULT: ABNORMAL

## 2019-02-15 NOTE — TELEPHONE ENCOUNTER
Jim Rodriguez from [facility] [address] called had a question about his catheters   He can be reached at [phone] ext [phone]

## 2019-02-15 NOTE — UTILIZATION REVIEW
Initial Clinical Review    Admission: Date/Time/Statement: 2/13/19 @ 0129   Orders Placed This Encounter   Procedures    Inpatient Admission (expected length of stay for this patient Order details is greater than two midnights)     Standing Status:   Standing     Number of Occurrences:   1     Order Specific Question:   Admitting Physician     Answer:   Roderick Levine [[id number]]     Order Specific Question:   Level of Care     Answer:   Med Surg [[id number]]     Order Specific Question:   Estimated length of stay     Answer:   More than 2 Midnights     Order Specific Question:   Certification     Answer:   I certify that inpatient services are medically necessary for this patient for a duration of greater than two midnights  See H&P and MD Progress Notes for additional information about the patient's course of treatment  ED: Date/Time/Mode of Arrival:   ED Arrival Information     Expected Arrival Acuity Means of Arrival Escorted By Service Admission Type    - 2/12/2019 23:48 Emergent Ambulance [facility] General Medicine Emergency    Arrival Complaint    HYPOTHERMIA        Chief Complaint:   Chief Complaint   Patient presents with    Cold Exposure     [facility] s/p slip and fall in snow approx 1 1/2 h prior to EMS activation, pt was lying in snow for that period of time until found by significant other  confused upon arrival     History of Illness:   72year old male presents for evaluation of hypothermia  Patient states that he slipped on the ice while taking out the trash this evening  Patient states that he was unable to get up due to weakness of the left side of his body  Patient reportedly had been outside in the snow for approximately 1 1/2 hours  He was found to have an axillary temperature of 93 6F for EMS  He was started on warmed IV fluids with external warming measures in transit   Patient states that he had previously been prescribed Eliquis for Afib, but stopped taking it 1 month ago on his doctor's orders  Patient denies head injury or LOC  He denies pain of the extremities  Patient noted to have increased edema of the left lower extremity  He states this has been present for "a long time " He denies recent travel  Patient reports that he underwent pacemaker placement 1 month ago  ED Vital Signs:   ED Triage Vitals   Temperature Pulse Respirations Blood Pressure SpO2   02/12/19 2351 02/12/19 2353 02/12/19 2351 02/12/19 2351 02/12/19 2353   (!) 93 °F (33 9 °C) 89 18 140/76 99 %      Temp Source Heart Rate Source Patient Position - Orthostatic VS BP Location FiO2 (%)   02/12/19 2351 02/12/19 2353 02/13/19 0010 02/13/19 0315 --   Rectal Monitor Lying Left arm       Pain Score       02/13/19 0210       No Pain        Wt Readings from Last 1 Encounters:   02/13/19 69 9 kg (154 lb 1 6 oz)     Vital Signs (abnormal):   Pertinent Labs/Diagnostic Test Results:   HGB 8 8 PLT 96 PT 18 3 INR 1 62 BUN 41 CR 2 36 GFR 27 TBILI 1 30 DIG <0 2 CRP 77 1  Homocyst(e)ine, P/S 5 3 - 14 2 umol/L 4 9   U/A:CLOUDY,+LEUK, NITRITES, BLOOD, PROT, WBC, BACTERIA   Urine Culture >100,000 cfu/ml Klebsiella oxytoca        CT BRAIN=No evidence of acute vascular territorial infarction, hemorrhage or mass effect  CTA HEAD & NECK=1  No hemodynamically significant stenosis, dissection or occlusion of the carotid arteries  2   Focal moderate (50-69%) stenosis within the intradural segment of the left vertebral artery  3   No significant stenosis or occlusion of the major vessels of the [place]  EKG=Electronic ventricular pacemaker  ED Treatment:   Medication Administration from 02/12/2019 2348 to 02/13/2019 0319       Date/Time Order Dose Route Action Action by Comments     02/13/2019 0206 aspirin chewable tablet 324 mg 324 mg Oral Given Saba Ahuja RN      02/13/2019 0230 albumin human (FLEXBUMIN) 5 % injection 12 5 g 12 5 g Intravenous New Bag Sandra Andersen RN         Past Medical/Surgical History:    Active Ambulatory Problems     Diagnosis Date Noted    Peripheral vascular disease of lower extremity ([place] [address] )     Hypertension     Atrial fibrillation ([place] [address] ) 05/02/2017    Thrombocytopenia ([place] [address] ) 05/04/2017    PALOMA (acute kidney injury) ([place] [address] ) 05/04/2017    Insomnia 05/04/2017    Dilated cardiomyopathy ([place] [address] ) 11/21/2018    Chronic combined systolic and diastolic heart failure ([place] [address] ) 11/22/2018    Urinary retention 11/25/2018    NSTEMI (non-ST elevated myocardial infarction) ([place] [address] ) 11/30/2018    S/P AV (atrioventricular) mary anne ablation 01/04/2019    Bursitis, prepatellar, left 01/29/2019     Resolved Ambulatory Problems     Diagnosis Date Noted    Acute respiratory failure with hypoxia ([place] [address] ) 05/02/2017    Severe sepsis ([place] [address] ) 05/02/2017    Pulmonary edema 05/04/2017    Influenza B 05/04/2017    Acute on chronic diastolic congestive heart failure ([place] [address] ) 10/03/2013    Coronary artery disease 12/17/2012    Hyperlipidemia 10/03/2013    Paroxysmal atrial fibrillation ([place] [address] ) 10/10/2018    Primary myocardial disease ([place] [address] ) 10/04/2013    Hypokalemia 11/22/2018    Hypomagnesemia 11/23/2018    Acute respiratory insufficiency 11/23/2018     Past Medical History:   Diagnosis Date    Atrial fibrillation ([place] [address] )     Congestive heart failure with left ventricular diastolic dysfunction, chronic (HCC)     Hypertension     Peripheral vascular disease of lower extremity (HCC)     Subdural hematoma (HCC)      Admitting Diagnosis: Hypothermia [T68  XXXA]  Anemia [D64 9]  CKD (chronic kidney disease) [N18 9]  Left leg weakness [R29 898]  Hypothermia, initial encounter [T68  XXXA]  Peripheral vascular disease of lower extremity ([place] [address] ) [I73 9]  Age/Sex: 72 y o  male  Assessment/Plan:   TIA (transient ischemic attack)  Assessment & Plan  Patient was of fall outside with left-sided weakness  Stroke Alert was called- [facility] of 1  neurology's consult  CT of the head and CTA was performed  No tPA  Continue stroke protocol  MRI was ordered but probably can't be done patient has a pacemaker  Echocardiogram is ordered        Urinary tract infection associated with indwelling urethral catheter 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

## 2019-02-21 NOTE — TELEPHONE ENCOUNTER
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

## 2019-02-21 NOTE — TELEPHONE ENCOUNTER
Miguel Wills from [facility] called  He needs justification as to why patient needs a coude tip catheter due to patient's medicare insurance  He can be reached at [phone], ext  [phone]

## 2019-03-06 ENCOUNTER — OFFICE VISIT (OUTPATIENT)
Dept: URGENT CARE | Facility: CLINIC | Age: 74
End: 2019-03-06
Payer: MEDICARE

## 2019-03-06 VITALS
SYSTOLIC BLOOD PRESSURE: 110 MMHG | DIASTOLIC BLOOD PRESSURE: 68 MMHG | TEMPERATURE: 96.1 F | RESPIRATION RATE: 16 BRPM | HEART RATE: 80 BPM

## 2019-03-06 DIAGNOSIS — L03.116 CELLULITIS OF LEFT ANTERIOR LOWER LEG: Primary | ICD-10-CM

## 2019-03-06 PROCEDURE — 99213 OFFICE O/P EST LOW 20 MIN: CPT | Performed by: EMERGENCY MEDICINE

## 2019-03-06 PROCEDURE — G0463 HOSPITAL OUTPT CLINIC VISIT: HCPCS | Performed by: EMERGENCY MEDICINE

## 2019-03-06 RX ORDER — SULFAMETHOXAZOLE AND TRIMETHOPRIM 800; 160 MG/1; MG/1
1 TABLET ORAL 2 TIMES DAILY
Qty: 20 TABLET | Refills: 0 | Status: SHIPPED | OUTPATIENT
Start: 2019-03-06 | End: 2019-03-16

## 2019-03-06 RX ORDER — IBUPROFEN 200 MG
CAPSULE ORAL ONCE
Status: COMPLETED | OUTPATIENT
Start: 2019-03-06 | End: 2019-03-06

## 2019-03-06 RX ADMIN — Medication: at 17:30

## 2019-03-06 NOTE — PROGRESS NOTES
Assessment/Plan:    No problem-specific Assessment & Plan notes found for this encounter  Diagnoses and all orders for this visit:    Cellulitis of left anterior lower leg  -     neomycin-polymyxin b-bacitracin (NEOSPORIN) 3 5-400-5,000 ointment  -     sulfamethoxazole-trimethoprim (BACTRIM DS) 800-160 mg per tablet; Take 1 tablet by mouth 2 (two) times a day for 10 days          Subjective:      Patient ID: Desiree Parks is a 72 y o  male  Pt states he fell 2 weeks ago, was trying to get up by pulling L leg up; sustained a few abrasions of L lower leg, which have not healed; few small weeping, open areas noted; L lower leg 3+ edema (R side 1+)    Leg Pain    The incident occurred more than 1 week ago  The incident occurred at home  Injury mechanism: abrasions  The pain is present in the left leg  The quality of the pain is described as aching  The pain is at a severity of 3/10  The pain is mild  The pain has been constant since onset  He reports no foreign bodies present  The symptoms are aggravated by weight bearing (leg being dependent)  Treatments tried: Neosporin  The treatment provided mild relief  The following portions of the patient's history were reviewed and updated as appropriate: current medications, past family history, past medical history, past social history, past surgical history and problem list     Review of Systems   Cardiovascular: Positive for leg swelling  Musculoskeletal:        Open draining abrasions on L lower leg   All other systems reviewed and are negative  Objective:      /68   Pulse 80   Temp (!) 96 1 °F (35 6 °C)   Resp 16          Physical Exam   Constitutional: He is oriented to person, place, and time  He appears well-developed and well-nourished  HENT:   Nose: Nose normal    Eyes: Pupils are equal, round, and reactive to light  Neck: Normal range of motion  Cardiovascular: Normal rate  Pulmonary/Chest: Effort normal    Abdominal: Soft  Musculoskeletal:        Left lower leg: He exhibits swelling and edema  Legs:  Neurological: He is alert and oriented to person, place, and time  Skin: Skin is dry  Capillary refill takes less than 2 seconds  Skin cool   Psychiatric: He has a normal mood and affect  His behavior is normal  Judgment and thought content normal    Nursing note and vitals reviewed

## 2019-03-06 NOTE — PATIENT INSTRUCTIONS
Rest, elevate leg; wash with soap and water and redress daily with Telfa pad, ace wrap and antibiotic ointment; Bactrim DS twice a day   Recheck in 2-3 days

## 2019-03-29 ENCOUNTER — TELEPHONE (OUTPATIENT)
Dept: CARDIOLOGY CLINIC | Facility: CLINIC | Age: 74
End: 2019-03-29

## 2019-03-29 ENCOUNTER — REMOTE DEVICE CLINIC VISIT (OUTPATIENT)
Dept: CARDIOLOGY CLINIC | Facility: CLINIC | Age: 74
End: 2019-03-29
Payer: MEDICARE

## 2019-03-29 DIAGNOSIS — I48.19 PERSISTENT ATRIAL FIBRILLATION (HCC): Primary | ICD-10-CM

## 2019-03-29 DIAGNOSIS — I42.9 CARDIOMYOPATHY, UNSPECIFIED TYPE (HCC): ICD-10-CM

## 2019-03-29 DIAGNOSIS — Z95.0 PRESENCE OF CARDIAC PACEMAKER: ICD-10-CM

## 2019-03-29 PROCEDURE — 93296 REM INTERROG EVL PM/IDS: CPT | Performed by: INTERNAL MEDICINE

## 2019-03-29 PROCEDURE — 93294 REM INTERROG EVL PM/LDLS PM: CPT | Performed by: INTERNAL MEDICINE

## 2019-03-29 NOTE — PROGRESS NOTES
Results for orders placed or performed in visit on 03/29/19   Cardiac EP device report    Narrative    BI-V PPM (VVIR MODE)  CARELINK TRANSMISSION: BATTERY VOLTAGE ADEQUATE (6 9YRS)  : 99 9% + VSRP: 0 1%  ALL AVAILABLE LEAD PARAMETERS WITHIN NORMAL LIMITS  11 VT MONITORED EPISODES W/ AVAIL EGRAMS SHOWING PROB NSVT vs RVR 8 BEATS @ 210 BPM, 7 BEATS @ 286 BPM, 7 BEATS @ 222 BPM, 6 BEATS @ 261 BPM, 5 BEATS @ 240 BPM   124 V-SENSE EPISODES (6 SEC/D) W AVAIL MARKERS SHOWING FUSION, PROB NSVT 13 BEATS @ 250 BPM, 9 BEATS @ 150 BPM   PT TAKES METOPROLOL SUCC  NOT ON AC @ THIS TIME  EF: 45% (LAURA 11/13/18)  OPTI-VOL FLUID THRESHOLD CROSSED SINCE FEB  TASK TO NP  TASK TO DR FAUSTIN  BI-V PACEMAKER FUNCTIONING APPROPRIATELY   [address] [place] [address]

## 2019-04-01 ENCOUNTER — OFFICE VISIT (OUTPATIENT)
Dept: URGENT CARE | Facility: CLINIC | Age: 74
End: 2019-04-01
Payer: MEDICARE

## 2019-04-01 VITALS
DIASTOLIC BLOOD PRESSURE: 60 MMHG | TEMPERATURE: 98.4 F | BODY MASS INDEX: 21.53 KG/M2 | OXYGEN SATURATION: 100 % | HEIGHT: 70 IN | HEART RATE: 82 BPM | RESPIRATION RATE: 20 BRPM | SYSTOLIC BLOOD PRESSURE: 106 MMHG | WEIGHT: 150.4 LBS

## 2019-04-01 DIAGNOSIS — M76.891 TENDINITIS OF RIGHT KNEE: Primary | ICD-10-CM

## 2019-04-01 PROCEDURE — G0463 HOSPITAL OUTPT CLINIC VISIT: HCPCS | Performed by: PREVENTIVE MEDICINE

## 2019-04-01 PROCEDURE — 99213 OFFICE O/P EST LOW 20 MIN: CPT | Performed by: PREVENTIVE MEDICINE

## 2019-04-01 RX ORDER — DEXAMETHASONE SODIUM PHOSPHATE 10 MG/ML
4 INJECTION, SOLUTION INTRAMUSCULAR; INTRAVENOUS ONCE
Status: COMPLETED | OUTPATIENT
Start: 2019-04-01 | End: 2019-04-01

## 2019-04-01 RX ORDER — LIDOCAINE HYDROCHLORIDE 20 MG/ML
1 INJECTION, SOLUTION EPIDURAL; INFILTRATION; INTRACAUDAL; PERINEURAL ONCE
Status: COMPLETED | OUTPATIENT
Start: 2019-04-01 | End: 2019-04-01

## 2019-04-01 RX ORDER — DEXAMETHASONE SODIUM PHOSPHATE 10 MG/ML
10 INJECTION, SOLUTION INTRAMUSCULAR; INTRAVENOUS ONCE
Status: DISCONTINUED | OUTPATIENT
Start: 2019-04-01 | End: 2019-04-01

## 2019-04-01 RX ADMIN — DEXAMETHASONE SODIUM PHOSPHATE 4 MG: 10 INJECTION, SOLUTION INTRAMUSCULAR; INTRAVENOUS at 19:10

## 2019-04-01 RX ADMIN — LIDOCAINE HYDROCHLORIDE 1 ML: 20 INJECTION, SOLUTION EPIDURAL; INFILTRATION; INTRACAUDAL; PERINEURAL at 19:16

## 2019-04-01 RX ADMIN — DEXAMETHASONE SODIUM PHOSPHATE 4 MG: 10 INJECTION, SOLUTION INTRAMUSCULAR; INTRAVENOUS at 19:15

## 2019-04-03 ENCOUNTER — IN-CLINIC DEVICE VISIT (OUTPATIENT)
Dept: CARDIOLOGY CLINIC | Facility: CLINIC | Age: 74
End: 2019-04-03
Payer: MEDICARE

## 2019-04-03 DIAGNOSIS — Z95.0 BIVENTRICULAR CARDIAC PACEMAKER IN SITU: ICD-10-CM

## 2019-04-03 DIAGNOSIS — I42.0 DILATED CARDIOMYOPATHY (HCC): ICD-10-CM

## 2019-04-03 DIAGNOSIS — I48.20 CHRONIC ATRIAL FIBRILLATION (HCC): ICD-10-CM

## 2019-04-03 DIAGNOSIS — I47.2 VENTRICULAR TACHYCARDIA (HCC): ICD-10-CM

## 2019-04-03 DIAGNOSIS — I97.190 AV BLOCK, COMPLETE, POST OP COMPLICATION OF AV NODAL ABLATION (HCC): ICD-10-CM

## 2019-04-03 DIAGNOSIS — I50.42 CHRONIC COMBINED SYSTOLIC AND DIASTOLIC CONGESTIVE HEART FAILURE (HCC): Primary | ICD-10-CM

## 2019-04-03 DIAGNOSIS — I44.2 AV BLOCK, COMPLETE, POST OP COMPLICATION OF AV NODAL ABLATION (HCC): ICD-10-CM

## 2019-04-03 PROCEDURE — 93280 PM DEVICE PROGR EVAL DUAL: CPT | Performed by: INTERNAL MEDICINE

## 2019-05-07 ENCOUNTER — DOCUMENTATION (OUTPATIENT)
Dept: CARDIOLOGY CLINIC | Facility: CLINIC | Age: 74
End: 2019-05-07

## 2019-06-11 DIAGNOSIS — I48.91 ATRIAL FIBRILLATION WITH TACHYCARDIC VENTRICULAR RATE (HCC): ICD-10-CM

## 2019-06-11 RX ORDER — FUROSEMIDE 40 MG/1
40 TABLET ORAL DAILY
Qty: 90 TABLET | Refills: 2 | Status: SHIPPED | OUTPATIENT
Start: 2019-06-11 | End: 2020-01-01 | Stop reason: SDUPTHER

## 2019-06-19 ENCOUNTER — OFFICE VISIT (OUTPATIENT)
Dept: OBGYN CLINIC | Facility: CLINIC | Age: 74
End: 2019-06-19
Payer: MEDICARE

## 2019-06-19 VITALS
BODY MASS INDEX: 21.33 KG/M2 | SYSTOLIC BLOOD PRESSURE: 122 MMHG | DIASTOLIC BLOOD PRESSURE: 70 MMHG | WEIGHT: 149 LBS | HEIGHT: 70 IN

## 2019-06-19 DIAGNOSIS — M70.42 BURSITIS, PREPATELLAR, LEFT: Primary | ICD-10-CM

## 2019-06-19 PROCEDURE — 1124F ACP DISCUSS-NO DSCNMKR DOCD: CPT | Performed by: ORTHOPAEDIC SURGERY

## 2019-06-19 PROCEDURE — 99213 OFFICE O/P EST LOW 20 MIN: CPT | Performed by: ORTHOPAEDIC SURGERY

## 2019-06-21 ENCOUNTER — OFFICE VISIT (OUTPATIENT)
Dept: FAMILY MEDICINE CLINIC | Facility: CLINIC | Age: 74
End: 2019-06-21
Payer: MEDICARE

## 2019-06-21 VITALS
HEART RATE: 90 BPM | WEIGHT: 150.6 LBS | HEIGHT: 70 IN | OXYGEN SATURATION: 94 % | SYSTOLIC BLOOD PRESSURE: 130 MMHG | TEMPERATURE: 97.7 F | DIASTOLIC BLOOD PRESSURE: 80 MMHG | BODY MASS INDEX: 21.56 KG/M2

## 2019-06-21 DIAGNOSIS — Z95.0 STATUS POST BIVENTRICULAR PACEMAKER: ICD-10-CM

## 2019-06-21 DIAGNOSIS — D69.6 THROMBOCYTOPENIA (HCC): Chronic | ICD-10-CM

## 2019-06-21 DIAGNOSIS — I48.21 PERMANENT ATRIAL FIBRILLATION (HCC): ICD-10-CM

## 2019-06-21 DIAGNOSIS — I73.9 PERIPHERAL VASCULAR DISEASE OF LOWER EXTREMITY (HCC): Chronic | ICD-10-CM

## 2019-06-21 DIAGNOSIS — I10 ESSENTIAL HYPERTENSION: Chronic | ICD-10-CM

## 2019-06-21 DIAGNOSIS — I50.42 CHRONIC COMBINED SYSTOLIC AND DIASTOLIC HEART FAILURE (HCC): Primary | ICD-10-CM

## 2019-06-21 DIAGNOSIS — I42.0 DILATED CARDIOMYOPATHY (HCC): Chronic | ICD-10-CM

## 2019-06-21 PROBLEM — R33.9 URINARY RETENTION: Status: RESOLVED | Noted: 2018-11-25 | Resolved: 2019-06-21

## 2019-06-21 PROBLEM — G47.00 INSOMNIA: Chronic | Status: RESOLVED | Noted: 2017-05-04 | Resolved: 2019-06-21

## 2019-06-21 PROBLEM — N39.0 URINARY TRACT INFECTION ASSOCIATED WITH INDWELLING URETHRAL CATHETER (HCC): Status: RESOLVED | Noted: 2019-02-13 | Resolved: 2019-06-21

## 2019-06-21 PROBLEM — D75.839 THROMBOCYTOSIS: Status: RESOLVED | Noted: 2019-02-13 | Resolved: 2019-06-21

## 2019-06-21 PROBLEM — T83.511A URINARY TRACT INFECTION ASSOCIATED WITH INDWELLING URETHRAL CATHETER (HCC): Status: RESOLVED | Noted: 2019-02-13 | Resolved: 2019-06-21

## 2019-06-21 PROBLEM — T68.XXXA HYPOTHERMIA: Status: RESOLVED | Noted: 2019-02-13 | Resolved: 2019-06-21

## 2019-06-21 PROBLEM — N17.9 AKI (ACUTE KIDNEY INJURY) (HCC): Status: RESOLVED | Noted: 2017-05-04 | Resolved: 2019-06-21

## 2019-06-21 PROBLEM — G45.9 TIA (TRANSIENT ISCHEMIC ATTACK): Status: RESOLVED | Noted: 2019-02-13 | Resolved: 2019-06-21

## 2019-06-21 PROCEDURE — 99204 OFFICE O/P NEW MOD 45 MIN: CPT | Performed by: FAMILY MEDICINE

## 2019-06-27 DIAGNOSIS — I48.91 ATRIAL FIBRILLATION WITH TACHYCARDIC VENTRICULAR RATE (HCC): ICD-10-CM

## 2019-06-27 RX ORDER — LISINOPRIL 5 MG/1
TABLET ORAL
Qty: 90 TABLET | Refills: 1 | Status: SHIPPED | OUTPATIENT
Start: 2019-06-27 | End: 2019-12-22 | Stop reason: SDUPTHER

## 2019-07-08 ENCOUNTER — REMOTE DEVICE CLINIC VISIT (OUTPATIENT)
Dept: CARDIOLOGY CLINIC | Facility: CLINIC | Age: 74
End: 2019-07-08
Payer: MEDICARE

## 2019-07-08 DIAGNOSIS — Z95.0 PACEMAKER: Primary | ICD-10-CM

## 2019-07-08 PROCEDURE — 93294 REM INTERROG EVL PM/LDLS PM: CPT | Performed by: INTERNAL MEDICINE

## 2019-07-08 PROCEDURE — 93296 REM INTERROG EVL PM/IDS: CPT | Performed by: INTERNAL MEDICINE

## 2019-07-08 NOTE — PROGRESS NOTES
Results for orders placed or performed in visit on 07/08/19   Cardiac EP device report    Narrative    MDT/BI-V PPM (VVIR MODE)  CARELINK TRANSMISSION: BATTERY ADEQUATE (9 3 YRS)  BVP 99 7%  ALL LEAD PARAMETERS WITHIN NORMAL LIMITS  1 NSVT EPISODE (8 BEATS @ 184 BPM)  EF 20% (ECHO/NOV, 2018)  PT  TAKES METOPROLOL SUCC  SENT TO [facility]  FOR REVIEW  OPTI-VOL WITHIN NORMAL LIMITS  NORMAL DEVICE FUNCTION   PL

## 2019-08-14 DIAGNOSIS — I48.21 PERMANENT ATRIAL FIBRILLATION (HCC): ICD-10-CM

## 2019-08-14 RX ORDER — METOPROLOL SUCCINATE 100 MG/1
100 TABLET, EXTENDED RELEASE ORAL DAILY
Qty: 90 TABLET | Refills: 3 | Status: SHIPPED | OUTPATIENT
Start: 2019-08-14 | End: 2020-01-01 | Stop reason: HOSPADM

## 2019-10-08 ENCOUNTER — REMOTE DEVICE CLINIC VISIT (OUTPATIENT)
Dept: CARDIOLOGY CLINIC | Facility: CLINIC | Age: 74
End: 2019-10-08
Payer: MEDICARE

## 2019-10-08 DIAGNOSIS — Z95.0 CARDIAC PACEMAKER IN SITU: Primary | ICD-10-CM

## 2019-10-08 PROCEDURE — 93296 REM INTERROG EVL PM/IDS: CPT | Performed by: INTERNAL MEDICINE

## 2019-10-08 PROCEDURE — 93294 REM INTERROG EVL PM/LDLS PM: CPT | Performed by: INTERNAL MEDICINE

## 2019-10-08 NOTE — PROGRESS NOTES
Results for orders placed or performed in visit on 10/08/19   Cardiac EP device report    Narrative    MDT/BI-V PPM (VVIR MODE)  CARELINK TRANSMISSION: BATTERY STATUS "OK"   99% VSRP 0 9%  ALL AVAILABLE LEAD PARAMETERS WITHIN NORMAL LIMITS  NO SIGNIFICANT HIGH RATE EPISODES  OPTI-VOL WITHIN NORMAL LIMITS  NORMAL DEVICE FUNCTION   NC

## 2019-11-05 ENCOUNTER — TELEPHONE (OUTPATIENT)
Dept: FAMILY MEDICINE CLINIC | Facility: CLINIC | Age: 74
End: 2019-11-05

## 2019-11-26 ENCOUNTER — TELEPHONE (OUTPATIENT)
Dept: FAMILY MEDICINE CLINIC | Facility: CLINIC | Age: 74
End: 2019-11-26

## 2019-12-22 DIAGNOSIS — I48.91 ATRIAL FIBRILLATION WITH TACHYCARDIC VENTRICULAR RATE (HCC): ICD-10-CM

## 2019-12-23 RX ORDER — LISINOPRIL 5 MG/1
TABLET ORAL
Qty: 90 TABLET | Refills: 0 | Status: SHIPPED | OUTPATIENT
Start: 2019-12-23 | End: 2020-01-10

## 2019-12-27 ENCOUNTER — TELEPHONE (OUTPATIENT)
Dept: FAMILY MEDICINE CLINIC | Facility: CLINIC | Age: 74
End: 2019-12-27

## 2019-12-27 NOTE — TELEPHONE ENCOUNTER
Left message for patient to please call office and schedule over due yearly physical   [place] due

## 2020-01-01 ENCOUNTER — TELEPHONE (OUTPATIENT)
Dept: HEMATOLOGY ONCOLOGY | Facility: CLINIC | Age: 75
End: 2020-01-01

## 2020-01-01 ENCOUNTER — APPOINTMENT (INPATIENT)
Dept: INTERVENTIONAL RADIOLOGY/VASCULAR | Facility: HOSPITAL | Age: 75
DRG: 901 | End: 2020-01-01
Payer: MEDICARE

## 2020-01-01 ENCOUNTER — OFFICE VISIT (OUTPATIENT)
Dept: SURGERY | Facility: HOSPITAL | Age: 75
End: 2020-01-01

## 2020-01-01 ENCOUNTER — TELEPHONE (OUTPATIENT)
Dept: HEMATOLOGY ONCOLOGY | Facility: HOSPITAL | Age: 75
End: 2020-01-01

## 2020-01-01 ENCOUNTER — TELEPHONE (OUTPATIENT)
Dept: OTHER | Facility: OTHER | Age: 75
End: 2020-01-01

## 2020-01-01 ENCOUNTER — OFFICE VISIT (OUTPATIENT)
Dept: WOUND CARE | Facility: HOSPITAL | Age: 75
End: 2020-01-01
Payer: MEDICARE

## 2020-01-01 ENCOUNTER — TELEPHONE (OUTPATIENT)
Dept: FAMILY MEDICINE CLINIC | Facility: CLINIC | Age: 75
End: 2020-01-01

## 2020-01-01 ENCOUNTER — TELEPHONE (OUTPATIENT)
Dept: CARDIOLOGY CLINIC | Facility: CLINIC | Age: 75
End: 2020-01-01

## 2020-01-01 ENCOUNTER — TELEPHONE (OUTPATIENT)
Dept: VASCULAR SURGERY | Facility: CLINIC | Age: 75
End: 2020-01-01

## 2020-01-01 ENCOUNTER — OFFICE VISIT (OUTPATIENT)
Dept: VASCULAR SURGERY | Facility: CLINIC | Age: 75
End: 2020-01-01
Payer: MEDICARE

## 2020-01-01 ENCOUNTER — TELEPHONE (OUTPATIENT)
Dept: ADMINISTRATIVE | Facility: HOSPITAL | Age: 75
End: 2020-01-01

## 2020-01-01 ENCOUNTER — ANESTHESIA (INPATIENT)
Dept: PERIOP | Facility: HOSPITAL | Age: 75
DRG: 901 | End: 2020-01-01
Payer: MEDICARE

## 2020-01-01 ENCOUNTER — TRANSCRIBE ORDERS (OUTPATIENT)
Dept: LAB | Facility: HOSPITAL | Age: 75
End: 2020-01-01

## 2020-01-01 ENCOUNTER — PATIENT OUTREACH (OUTPATIENT)
Dept: FAMILY MEDICINE CLINIC | Facility: CLINIC | Age: 75
End: 2020-01-01

## 2020-01-01 ENCOUNTER — APPOINTMENT (INPATIENT)
Dept: NON INVASIVE DIAGNOSTICS | Facility: HOSPITAL | Age: 75
DRG: 901 | End: 2020-01-01
Payer: MEDICARE

## 2020-01-01 ENCOUNTER — HOSPITAL ENCOUNTER (INPATIENT)
Facility: HOSPITAL | Age: 75
LOS: 6 days | Discharge: HOME HEALTH | DRG: 580 | End: 2020-11-22
Attending: EMERGENCY MEDICINE | Admitting: INTERNAL MEDICINE
Payer: MEDICARE

## 2020-01-01 ENCOUNTER — ANESTHESIA (INPATIENT)
Dept: PERIOP | Facility: HOSPITAL | Age: 75
DRG: 580 | End: 2020-01-01
Payer: MEDICARE

## 2020-01-01 ENCOUNTER — APPOINTMENT (OUTPATIENT)
Dept: LAB | Facility: HOSPITAL | Age: 75
End: 2020-01-01
Payer: MEDICARE

## 2020-01-01 ENCOUNTER — APPOINTMENT (EMERGENCY)
Dept: RADIOLOGY | Facility: HOSPITAL | Age: 75
DRG: 901 | End: 2020-01-01
Payer: MEDICARE

## 2020-01-01 ENCOUNTER — OFFICE VISIT (OUTPATIENT)
Dept: URGENT CARE | Facility: CLINIC | Age: 75
End: 2020-01-01
Payer: MEDICARE

## 2020-01-01 ENCOUNTER — LAB REQUISITION (OUTPATIENT)
Dept: LAB | Facility: HOSPITAL | Age: 75
End: 2020-01-01
Payer: MEDICARE

## 2020-01-01 ENCOUNTER — TELEPHONE (OUTPATIENT)
Dept: UROLOGY | Facility: MEDICAL CENTER | Age: 75
End: 2020-01-01

## 2020-01-01 ENCOUNTER — APPOINTMENT (INPATIENT)
Dept: RADIOLOGY | Facility: HOSPITAL | Age: 75
DRG: 580 | End: 2020-01-01
Payer: MEDICARE

## 2020-01-01 ENCOUNTER — ANESTHESIA EVENT (INPATIENT)
Dept: PERIOP | Facility: HOSPITAL | Age: 75
DRG: 901 | End: 2020-01-01
Payer: MEDICARE

## 2020-01-01 ENCOUNTER — TRANSITIONAL CARE MANAGEMENT (OUTPATIENT)
Dept: FAMILY MEDICINE CLINIC | Facility: CLINIC | Age: 75
End: 2020-01-01

## 2020-01-01 ENCOUNTER — APPOINTMENT (INPATIENT)
Dept: CT IMAGING | Facility: HOSPITAL | Age: 75
DRG: 901 | End: 2020-01-01
Payer: MEDICARE

## 2020-01-01 ENCOUNTER — HOSPITAL ENCOUNTER (INPATIENT)
Facility: HOSPITAL | Age: 75
LOS: 3 days | Discharge: HOME HEALTH | DRG: 907 | End: 2020-08-07
Attending: SURGERY | Admitting: SURGERY
Payer: MEDICARE

## 2020-01-01 ENCOUNTER — REMOTE DEVICE CLINIC VISIT (OUTPATIENT)
Dept: CARDIOLOGY CLINIC | Facility: CLINIC | Age: 75
End: 2020-01-01
Payer: MEDICARE

## 2020-01-01 ENCOUNTER — ANESTHESIA EVENT (OUTPATIENT)
Dept: PERIOP | Facility: HOSPITAL | Age: 75
DRG: 907 | End: 2020-01-01
Payer: MEDICARE

## 2020-01-01 ENCOUNTER — CLINICAL SUPPORT (OUTPATIENT)
Dept: UROLOGY | Facility: HOSPITAL | Age: 75
End: 2020-01-01
Payer: MEDICARE

## 2020-01-01 ENCOUNTER — APPOINTMENT (INPATIENT)
Dept: RADIOLOGY | Facility: HOSPITAL | Age: 75
DRG: 580 | End: 2020-01-01
Attending: INTERNAL MEDICINE
Payer: MEDICARE

## 2020-01-01 ENCOUNTER — ANESTHESIA EVENT (INPATIENT)
Dept: PERIOP | Facility: HOSPITAL | Age: 75
DRG: 580 | End: 2020-01-01
Payer: MEDICARE

## 2020-01-01 ENCOUNTER — TELEPHONE (OUTPATIENT)
Dept: NEPHROLOGY | Facility: CLINIC | Age: 75
End: 2020-01-01

## 2020-01-01 ENCOUNTER — TELEMEDICINE (OUTPATIENT)
Dept: VASCULAR SURGERY | Facility: CLINIC | Age: 75
End: 2020-01-01
Payer: MEDICARE

## 2020-01-01 ENCOUNTER — NURSE TRIAGE (OUTPATIENT)
Dept: OTHER | Facility: OTHER | Age: 75
End: 2020-01-01

## 2020-01-01 ENCOUNTER — VBI (OUTPATIENT)
Dept: FAMILY MEDICINE CLINIC | Facility: CLINIC | Age: 75
End: 2020-01-01

## 2020-01-01 ENCOUNTER — OFFICE VISIT (OUTPATIENT)
Dept: SURGERY | Facility: HOSPITAL | Age: 75
End: 2020-01-01
Payer: MEDICARE

## 2020-01-01 ENCOUNTER — TELEPHONE (OUTPATIENT)
Dept: OTHER | Facility: HOSPITAL | Age: 75
End: 2020-01-01

## 2020-01-01 ENCOUNTER — ANESTHESIA (OUTPATIENT)
Dept: PERIOP | Facility: HOSPITAL | Age: 75
DRG: 907 | End: 2020-01-01
Payer: MEDICARE

## 2020-01-01 ENCOUNTER — TELEPHONE (OUTPATIENT)
Dept: WOUND CARE | Facility: HOSPITAL | Age: 75
End: 2020-01-01

## 2020-01-01 ENCOUNTER — HOSPITAL ENCOUNTER (INPATIENT)
Facility: HOSPITAL | Age: 75
LOS: 14 days | Discharge: HOME HEALTH | DRG: 901 | End: 2020-07-31
Attending: EMERGENCY MEDICINE | Admitting: INTERNAL MEDICINE
Payer: MEDICARE

## 2020-01-01 ENCOUNTER — TELEPHONE (OUTPATIENT)
Dept: SURGERY | Facility: HOSPITAL | Age: 75
End: 2020-01-01

## 2020-01-01 ENCOUNTER — TELEPHONE (OUTPATIENT)
Dept: UROLOGY | Facility: HOSPITAL | Age: 75
End: 2020-01-01

## 2020-01-01 ENCOUNTER — HOSPITAL ENCOUNTER (EMERGENCY)
Facility: HOSPITAL | Age: 75
Discharge: HOME | End: 2020-08-09
Attending: EMERGENCY MEDICINE | Admitting: EMERGENCY MEDICINE
Payer: MEDICARE

## 2020-01-01 VITALS
DIASTOLIC BLOOD PRESSURE: 74 MMHG | TEMPERATURE: 98.2 F | OXYGEN SATURATION: 95 % | HEART RATE: 83 BPM | RESPIRATION RATE: 18 BRPM | SYSTOLIC BLOOD PRESSURE: 119 MMHG

## 2020-01-01 VITALS
TEMPERATURE: 98.1 F | SYSTOLIC BLOOD PRESSURE: 103 MMHG | RESPIRATION RATE: 16 BRPM | DIASTOLIC BLOOD PRESSURE: 60 MMHG | HEART RATE: 56 BPM

## 2020-01-01 VITALS
OXYGEN SATURATION: 98 % | SYSTOLIC BLOOD PRESSURE: 119 MMHG | RESPIRATION RATE: 16 BRPM | WEIGHT: 154.32 LBS | BODY MASS INDEX: 22.09 KG/M2 | HEIGHT: 70 IN | HEART RATE: 85 BPM | DIASTOLIC BLOOD PRESSURE: 61 MMHG | TEMPERATURE: 98.2 F

## 2020-01-01 VITALS
HEART RATE: 84 BPM | WEIGHT: 150 LBS | DIASTOLIC BLOOD PRESSURE: 81 MMHG | BODY MASS INDEX: 21.47 KG/M2 | SYSTOLIC BLOOD PRESSURE: 122 MMHG | TEMPERATURE: 98.4 F | HEIGHT: 70 IN

## 2020-01-01 VITALS
HEART RATE: 80 BPM | WEIGHT: 140 LBS | SYSTOLIC BLOOD PRESSURE: 145 MMHG | BODY MASS INDEX: 20.04 KG/M2 | TEMPERATURE: 97.8 F | OXYGEN SATURATION: 100 % | HEIGHT: 70 IN | RESPIRATION RATE: 19 BRPM | DIASTOLIC BLOOD PRESSURE: 68 MMHG

## 2020-01-01 VITALS
HEART RATE: 89 BPM | RESPIRATION RATE: 16 BRPM | TEMPERATURE: 97.2 F | DIASTOLIC BLOOD PRESSURE: 84 MMHG | HEIGHT: 70 IN | BODY MASS INDEX: 19.87 KG/M2 | SYSTOLIC BLOOD PRESSURE: 138 MMHG | WEIGHT: 138.8 LBS

## 2020-01-01 VITALS
SYSTOLIC BLOOD PRESSURE: 110 MMHG | DIASTOLIC BLOOD PRESSURE: 50 MMHG | TEMPERATURE: 97.8 F | RESPIRATION RATE: 18 BRPM | HEART RATE: 78 BPM

## 2020-01-01 VITALS
DIASTOLIC BLOOD PRESSURE: 76 MMHG | RESPIRATION RATE: 18 BRPM | SYSTOLIC BLOOD PRESSURE: 122 MMHG | HEART RATE: 60 BPM | TEMPERATURE: 97.8 F

## 2020-01-01 VITALS
HEART RATE: 68 BPM | RESPIRATION RATE: 16 BRPM | TEMPERATURE: 97.8 F | DIASTOLIC BLOOD PRESSURE: 60 MMHG | SYSTOLIC BLOOD PRESSURE: 110 MMHG

## 2020-01-01 VITALS
SYSTOLIC BLOOD PRESSURE: 108 MMHG | DIASTOLIC BLOOD PRESSURE: 68 MMHG | RESPIRATION RATE: 18 BRPM | HEART RATE: 84 BPM | TEMPERATURE: 98.5 F

## 2020-01-01 VITALS
SYSTOLIC BLOOD PRESSURE: 140 MMHG | DIASTOLIC BLOOD PRESSURE: 78 MMHG | RESPIRATION RATE: 18 BRPM | HEART RATE: 80 BPM | TEMPERATURE: 97.9 F

## 2020-01-01 VITALS
HEIGHT: 70 IN | WEIGHT: 132.3 LBS | HEART RATE: 60 BPM | DIASTOLIC BLOOD PRESSURE: 60 MMHG | SYSTOLIC BLOOD PRESSURE: 90 MMHG | BODY MASS INDEX: 18.94 KG/M2 | TEMPERATURE: 97.3 F

## 2020-01-01 VITALS
SYSTOLIC BLOOD PRESSURE: 130 MMHG | DIASTOLIC BLOOD PRESSURE: 82 MMHG | BODY MASS INDEX: 19.47 KG/M2 | TEMPERATURE: 96.6 F | WEIGHT: 136 LBS | HEIGHT: 70 IN | HEART RATE: 101 BPM | RESPIRATION RATE: 16 BRPM

## 2020-01-01 VITALS
HEART RATE: 64 BPM | TEMPERATURE: 97.6 F | SYSTOLIC BLOOD PRESSURE: 110 MMHG | RESPIRATION RATE: 18 BRPM | DIASTOLIC BLOOD PRESSURE: 64 MMHG

## 2020-01-01 VITALS
BODY MASS INDEX: 20.19 KG/M2 | SYSTOLIC BLOOD PRESSURE: 118 MMHG | HEART RATE: 64 BPM | RESPIRATION RATE: 18 BRPM | DIASTOLIC BLOOD PRESSURE: 72 MMHG | HEIGHT: 70 IN | WEIGHT: 141 LBS

## 2020-01-01 VITALS
RESPIRATION RATE: 18 BRPM | HEART RATE: 72 BPM | DIASTOLIC BLOOD PRESSURE: 60 MMHG | SYSTOLIC BLOOD PRESSURE: 108 MMHG | TEMPERATURE: 97.7 F

## 2020-01-01 VITALS
TEMPERATURE: 97.8 F | HEIGHT: 70 IN | DIASTOLIC BLOOD PRESSURE: 80 MMHG | SYSTOLIC BLOOD PRESSURE: 123 MMHG | HEART RATE: 86 BPM | RESPIRATION RATE: 16 BRPM | WEIGHT: 138 LBS | BODY MASS INDEX: 19.76 KG/M2

## 2020-01-01 VITALS
SYSTOLIC BLOOD PRESSURE: 108 MMHG | HEART RATE: 80 BPM | DIASTOLIC BLOOD PRESSURE: 58 MMHG | TEMPERATURE: 98.5 F | HEART RATE: 125 BPM | RESPIRATION RATE: 20 BRPM | RESPIRATION RATE: 18 BRPM | SYSTOLIC BLOOD PRESSURE: 102 MMHG | TEMPERATURE: 97.4 F | DIASTOLIC BLOOD PRESSURE: 68 MMHG

## 2020-01-01 VITALS
HEART RATE: 88 BPM | DIASTOLIC BLOOD PRESSURE: 76 MMHG | SYSTOLIC BLOOD PRESSURE: 120 MMHG | RESPIRATION RATE: 20 BRPM | TEMPERATURE: 97.6 F

## 2020-01-01 VITALS
OXYGEN SATURATION: 95 % | DIASTOLIC BLOOD PRESSURE: 69 MMHG | HEART RATE: 76 BPM | SYSTOLIC BLOOD PRESSURE: 128 MMHG | RESPIRATION RATE: 18 BRPM | TEMPERATURE: 98.3 F

## 2020-01-01 VITALS — WEIGHT: 146 LBS | BODY MASS INDEX: 20.9 KG/M2 | HEIGHT: 70 IN | TEMPERATURE: 99.4 F

## 2020-01-01 VITALS
TEMPERATURE: 98.9 F | DIASTOLIC BLOOD PRESSURE: 68 MMHG | SYSTOLIC BLOOD PRESSURE: 118 MMHG | RESPIRATION RATE: 16 BRPM | HEART RATE: 102 BPM | OXYGEN SATURATION: 99 %

## 2020-01-01 VITALS
SYSTOLIC BLOOD PRESSURE: 110 MMHG | WEIGHT: 150 LBS | HEIGHT: 70 IN | DIASTOLIC BLOOD PRESSURE: 60 MMHG | TEMPERATURE: 98.6 F | RESPIRATION RATE: 20 BRPM | BODY MASS INDEX: 21.47 KG/M2 | HEART RATE: 68 BPM

## 2020-01-01 VITALS
RESPIRATION RATE: 18 BRPM | HEART RATE: 64 BPM | DIASTOLIC BLOOD PRESSURE: 54 MMHG | SYSTOLIC BLOOD PRESSURE: 94 MMHG | TEMPERATURE: 97.8 F

## 2020-01-01 VITALS — HEART RATE: 84 BPM

## 2020-01-01 DIAGNOSIS — Z98.890 S/P AV (ATRIOVENTRICULAR) NODAL ABLATION: Chronic | ICD-10-CM

## 2020-01-01 DIAGNOSIS — I70.235 ATHSCL NATIVE ARTERIES OF RIGHT LEG W ULCER OTH PRT FOOT (HCC): ICD-10-CM

## 2020-01-01 DIAGNOSIS — I48.19 OTHER PERSISTENT ATRIAL FIBRILLATION (HCC): ICD-10-CM

## 2020-01-01 DIAGNOSIS — R19.8 UMBILICUS DISCHARGE: ICD-10-CM

## 2020-01-01 DIAGNOSIS — D64.9 ANEMIA: ICD-10-CM

## 2020-01-01 DIAGNOSIS — L97.212 LOWER LIMB ULCER, CALF, RIGHT, WITH FAT LAYER EXPOSED (HCC): Primary | ICD-10-CM

## 2020-01-01 DIAGNOSIS — I70.221 ATHEROSCLEROSIS OF NATIVE ARTERY OF RIGHT LOWER EXTREMITY WITH REST PAIN (HCC): ICD-10-CM

## 2020-01-01 DIAGNOSIS — I48.21 PERMANENT ATRIAL FIBRILLATION (HCC): ICD-10-CM

## 2020-01-01 DIAGNOSIS — I42.0 DILATED CARDIOMYOPATHY (HCC): Chronic | ICD-10-CM

## 2020-01-01 DIAGNOSIS — L89.610 UNSTAGEABLE PRESSURE ULCER OF RIGHT HEEL (HCC): ICD-10-CM

## 2020-01-01 DIAGNOSIS — S81.801A WOUND OF RIGHT LOWER EXTREMITY, INITIAL ENCOUNTER: ICD-10-CM

## 2020-01-01 DIAGNOSIS — I73.9 PERIPHERAL VASCULAR DISEASE OF LOWER EXTREMITY (HCC): Chronic | ICD-10-CM

## 2020-01-01 DIAGNOSIS — N18.30 CHRONIC KIDNEY DISEASE, STAGE 3 UNSPECIFIED (HCC): ICD-10-CM

## 2020-01-01 DIAGNOSIS — T81.30XA WOUND DEHISCENCE: Primary | ICD-10-CM

## 2020-01-01 DIAGNOSIS — I50.42 CHRONIC COMBINED SYSTOLIC AND DIASTOLIC HEART FAILURE (HCC): Chronic | ICD-10-CM

## 2020-01-01 DIAGNOSIS — I70.233 ATHEROSCLEROSIS OF NATIVE ARTERY OF RIGHT LOWER EXTREMITY WITH ULCERATION OF ANKLE (HCC): Primary | ICD-10-CM

## 2020-01-01 DIAGNOSIS — K21.9 GASTROESOPHAGEAL REFLUX DISEASE, ESOPHAGITIS PRESENCE NOT SPECIFIED: ICD-10-CM

## 2020-01-01 DIAGNOSIS — Z95.0 CARDIAC PACEMAKER IN SITU: Primary | ICD-10-CM

## 2020-01-01 DIAGNOSIS — L03.115 CELLULITIS OF RIGHT LEG: ICD-10-CM

## 2020-01-01 DIAGNOSIS — S31.109A OPEN ABDOMINAL WALL WOUND: Primary | ICD-10-CM

## 2020-01-01 DIAGNOSIS — L89.610 PRESSURE INJURY OF RIGHT HEEL, UNSTAGEABLE (HCC): ICD-10-CM

## 2020-01-01 DIAGNOSIS — I83.013 VENOUS ULCER OF ANKLE, RIGHT (HCC): ICD-10-CM

## 2020-01-01 DIAGNOSIS — I70.232 ATHEROSCLEROSIS OF NATIVE ARTERY OF RIGHT LOWER EXTREMITY WITH ULCERATION OF CALF (HCC): Primary | ICD-10-CM

## 2020-01-01 DIAGNOSIS — Z95.0 STATUS POST BIVENTRICULAR PACEMAKER: Chronic | ICD-10-CM

## 2020-01-01 DIAGNOSIS — N17.9 AKI (ACUTE KIDNEY INJURY) (HCC): ICD-10-CM

## 2020-01-01 DIAGNOSIS — R06.02 SHORTNESS OF BREATH: ICD-10-CM

## 2020-01-01 DIAGNOSIS — A49.02 MRSA INFECTION: ICD-10-CM

## 2020-01-01 DIAGNOSIS — L03.115 CELLULITIS OF RIGHT LEG: Primary | ICD-10-CM

## 2020-01-01 DIAGNOSIS — I70.232 ATHEROSCLEROSIS OF NATIVE ARTERY OF RIGHT LOWER EXTREMITY WITH ULCERATION OF CALF (HCC): ICD-10-CM

## 2020-01-01 DIAGNOSIS — I48.91 ATRIAL FIBRILLATION WITH TACHYCARDIC VENTRICULAR RATE (HCC): ICD-10-CM

## 2020-01-01 DIAGNOSIS — N40.1 BENIGN PROSTATIC HYPERPLASIA WITH URINARY RETENTION: ICD-10-CM

## 2020-01-01 DIAGNOSIS — L97.912 CHRONIC ULCER OF RIGHT LOWER EXTREMITY WITH FAT LAYER EXPOSED (HCC): Primary | ICD-10-CM

## 2020-01-01 DIAGNOSIS — Z98.890 POST-OPERATIVE STATE: Primary | ICD-10-CM

## 2020-01-01 DIAGNOSIS — L03.115 CELLULITIS OF RIGHT LOWER EXTREMITY: Primary | ICD-10-CM

## 2020-01-01 DIAGNOSIS — R33.8 BENIGN PROSTATIC HYPERPLASIA WITH URINARY RETENTION: ICD-10-CM

## 2020-01-01 DIAGNOSIS — S31.105A OPEN WOUND OF UMBILICAL REGION, INITIAL ENCOUNTER: ICD-10-CM

## 2020-01-01 DIAGNOSIS — D61.818 PANCYTOPENIA (HCC): ICD-10-CM

## 2020-01-01 DIAGNOSIS — N18.30 CKD (CHRONIC KIDNEY DISEASE) STAGE 3, GFR 30-59 ML/MIN (HCC): Chronic | ICD-10-CM

## 2020-01-01 DIAGNOSIS — L97.909 ATHEROSCLEROSIS OF ARTERY OF EXTREMITY WITH ULCERATION (HCC): ICD-10-CM

## 2020-01-01 DIAGNOSIS — L03.115 CELLULITIS OF RIGHT LOWER EXTREMITY: ICD-10-CM

## 2020-01-01 DIAGNOSIS — B35.3 TINEA PEDIS OF BOTH FEET: ICD-10-CM

## 2020-01-01 DIAGNOSIS — M86.9 OSTEOMYELITIS (HCC): Primary | ICD-10-CM

## 2020-01-01 DIAGNOSIS — L97.511 RIGHT FOOT ULCER, LIMITED TO BREAKDOWN OF SKIN (HCC): ICD-10-CM

## 2020-01-01 DIAGNOSIS — S31.109A OPEN ABDOMINAL WALL WOUND: ICD-10-CM

## 2020-01-01 DIAGNOSIS — T14.8XXA SURGICAL WOUND PRESENT: Primary | ICD-10-CM

## 2020-01-01 DIAGNOSIS — I10 ESSENTIAL HYPERTENSION: Chronic | ICD-10-CM

## 2020-01-01 DIAGNOSIS — L97.212 LOWER LIMB ULCER, CALF, RIGHT, WITH FAT LAYER EXPOSED (HCC): ICD-10-CM

## 2020-01-01 DIAGNOSIS — D64.9 ANEMIA, UNSPECIFIED TYPE: Chronic | ICD-10-CM

## 2020-01-01 DIAGNOSIS — D69.6 THROMBOCYTOPENIA (HCC): Chronic | ICD-10-CM

## 2020-01-01 DIAGNOSIS — L03.90 CELLULITIS, UNSPECIFIED CELLULITIS SITE: ICD-10-CM

## 2020-01-01 DIAGNOSIS — L97.312 NON-PRESSURE CHRONIC ULCER OF RIGHT ANKLE WITH FAT LAYER EXPOSED (HCC): ICD-10-CM

## 2020-01-01 DIAGNOSIS — E43 SEVERE PROTEIN-CALORIE MALNUTRITION (HCC): ICD-10-CM

## 2020-01-01 DIAGNOSIS — D64.9 CHRONIC ANEMIA: Primary | ICD-10-CM

## 2020-01-01 DIAGNOSIS — I73.9 PAD (PERIPHERAL ARTERY DISEASE) (HCC): Primary | ICD-10-CM

## 2020-01-01 DIAGNOSIS — R33.9 URINARY RETENTION: Primary | ICD-10-CM

## 2020-01-01 DIAGNOSIS — D61.818 PANCYTOPENIA (HCC): Primary | ICD-10-CM

## 2020-01-01 DIAGNOSIS — S81.802A OPEN WOUND OF LEFT LOWER LEG, INITIAL ENCOUNTER: ICD-10-CM

## 2020-01-01 DIAGNOSIS — I70.299 ATHEROSCLEROSIS OF ARTERY OF EXTREMITY WITH ULCERATION (HCC): ICD-10-CM

## 2020-01-01 DIAGNOSIS — L97.319 VENOUS ULCER OF ANKLE, RIGHT (HCC): ICD-10-CM

## 2020-01-01 DIAGNOSIS — Z71.89 COMPLEX CARE COORDINATION: Primary | ICD-10-CM

## 2020-01-01 DIAGNOSIS — D64.9 CHRONIC ANEMIA: ICD-10-CM

## 2020-01-01 DIAGNOSIS — L03.90 CELLULITIS: ICD-10-CM

## 2020-01-01 DIAGNOSIS — Z95.0 PRESENCE OF PERMANENT CARDIAC PACEMAKER: Primary | ICD-10-CM

## 2020-01-01 DIAGNOSIS — S31.105A OPEN WOUND OF UMBILICAL REGION, INITIAL ENCOUNTER: Primary | ICD-10-CM

## 2020-01-01 DIAGNOSIS — Z95.0 PRESENCE OF CARDIAC PACEMAKER: Primary | ICD-10-CM

## 2020-01-01 DIAGNOSIS — L97.912 LEG ULCER, RIGHT, WITH FAT LAYER EXPOSED (HCC): ICD-10-CM

## 2020-01-01 DIAGNOSIS — S98.131A AMPUTATION OF TOE OF RIGHT FOOT (HCC): ICD-10-CM

## 2020-01-01 LAB
25(OH)D3 SERPL-MCNC: 42.3 NG/ML (ref 30–100)
ABO GROUP BLD BPU: NORMAL
ABO GROUP BLD: NORMAL
ACANTHOCYTES BLD QL SMEAR: PRESENT
ACANTHOCYTES BLD QL SMEAR: PRESENT
ALBUMIN SERPL BCP-MCNC: 1.8 G/DL (ref 3.5–5)
ALBUMIN SERPL BCP-MCNC: 2.1 G/DL (ref 3.5–5)
ALBUMIN SERPL BCP-MCNC: 2.2 G/DL (ref 3.5–5)
ALBUMIN SERPL BCP-MCNC: 2.3 G/DL (ref 3.5–5)
ALBUMIN SERPL BCP-MCNC: 2.6 G/DL (ref 3.5–5)
ALBUMIN SERPL BCP-MCNC: 2.7 G/DL (ref 3.5–5)
ALBUMIN SERPL BCP-MCNC: 2.9 G/DL (ref 3.5–5)
ALBUMIN SERPL ELPH-MCNC: 2.86 G/DL (ref 3.5–5)
ALBUMIN SERPL ELPH-MCNC: 2.98 G/DL (ref 3.5–5)
ALBUMIN SERPL ELPH-MCNC: 53.9 % (ref 52–65)
ALBUMIN SERPL ELPH-MCNC: 54.1 % (ref 52–65)
ALBUMIN UR ELPH-MCNC: 100 %
ALP SERPL-CCNC: 100 U/L (ref 46–116)
ALP SERPL-CCNC: 74 U/L (ref 46–116)
ALP SERPL-CCNC: 78 U/L (ref 46–116)
ALP SERPL-CCNC: 80 U/L (ref 46–116)
ALP SERPL-CCNC: 81 U/L (ref 46–116)
ALP SERPL-CCNC: 87 U/L (ref 46–116)
ALP SERPL-CCNC: 99 U/L (ref 46–116)
ALPHA1 GLOB MFR UR ELPH: 0 %
ALPHA1 GLOB SERPL ELPH-MCNC: 0.56 G/DL (ref 0.1–0.4)
ALPHA1 GLOB SERPL ELPH-MCNC: 0.61 G/DL (ref 0.1–0.4)
ALPHA1 GLOB SERPL ELPH-MCNC: 10.5 % (ref 2.5–5)
ALPHA1 GLOB SERPL ELPH-MCNC: 11 % (ref 2.5–5)
ALPHA2 GLOB MFR UR ELPH: 0 %
ALPHA2 GLOB SERPL ELPH-MCNC: 0.63 G/DL (ref 0.4–1.2)
ALPHA2 GLOB SERPL ELPH-MCNC: 0.72 G/DL (ref 0.4–1.2)
ALPHA2 GLOB SERPL ELPH-MCNC: 11.9 % (ref 7–13)
ALPHA2 GLOB SERPL ELPH-MCNC: 13.1 % (ref 7–13)
ALT SERPL W P-5'-P-CCNC: 10 U/L (ref 12–78)
ALT SERPL W P-5'-P-CCNC: 16 U/L (ref 12–78)
ALT SERPL W P-5'-P-CCNC: 19 U/L (ref 12–78)
ALT SERPL W P-5'-P-CCNC: 21 U/L (ref 12–78)
ALT SERPL W P-5'-P-CCNC: 26 U/L (ref 12–78)
ALT SERPL W P-5'-P-CCNC: 26 U/L (ref 12–78)
ALT SERPL W P-5'-P-CCNC: 33 U/L (ref 12–78)
ANION GAP SERPL CALCULATED.3IONS-SCNC: 10 MMOL/L (ref 4–13)
ANION GAP SERPL CALCULATED.3IONS-SCNC: 11 MMOL/L (ref 4–13)
ANION GAP SERPL CALCULATED.3IONS-SCNC: 12 MMOL/L (ref 4–13)
ANION GAP SERPL CALCULATED.3IONS-SCNC: 13 MMOL/L (ref 4–13)
ANION GAP SERPL CALCULATED.3IONS-SCNC: 7 MMOL/L (ref 4–13)
ANION GAP SERPL CALCULATED.3IONS-SCNC: 7 MMOL/L (ref 4–13)
ANION GAP SERPL CALCULATED.3IONS-SCNC: 8 MMOL/L (ref 4–13)
ANION GAP SERPL CALCULATED.3IONS-SCNC: 9 MMOL/L (ref 4–13)
ANISOCYTOSIS BLD QL SMEAR: PRESENT
APTT PPP: 36 SECONDS (ref 23–37)
APTT PPP: 40 SECONDS (ref 23–37)
AST SERPL W P-5'-P-CCNC: 13 U/L (ref 5–45)
AST SERPL W P-5'-P-CCNC: 14 U/L (ref 5–45)
AST SERPL W P-5'-P-CCNC: 20 U/L (ref 5–45)
AST SERPL W P-5'-P-CCNC: 23 U/L (ref 5–45)
AST SERPL W P-5'-P-CCNC: 24 U/L (ref 5–45)
AST SERPL W P-5'-P-CCNC: 24 U/L (ref 5–45)
AST SERPL W P-5'-P-CCNC: 36 U/L (ref 5–45)
ATRIAL RATE: 138 BPM
ATRIAL RATE: 92 BPM
B-GLOBULIN MFR UR ELPH: 0 %
B2 MICROGLOB SERPL-MCNC: 6.3 MG/L (ref 0.6–2.4)
B2 MICROGLOB SERPL-MCNC: 6.5 MG/L (ref 0.6–2.4)
BACTERIA BLD CULT: NORMAL
BACTERIA SPEC ANAEROBE CULT: ABNORMAL
BACTERIA UR QL AUTO: ABNORMAL /HPF
BACTERIA WND AEROBE CULT: ABNORMAL
BASO STIPL BLD QL SMEAR: PRESENT
BASOPHILS # BLD AUTO: 0 THOUSANDS/ΜL (ref 0–0.1)
BASOPHILS # BLD AUTO: 0.01 THOUSANDS/ΜL (ref 0–0.1)
BASOPHILS # BLD MANUAL: 0 THOUSAND/UL (ref 0–0.1)
BASOPHILS NFR BLD AUTO: 0 % (ref 0–1)
BASOPHILS NFR BLD AUTO: 0 % (ref 0–1)
BASOPHILS NFR MAR MANUAL: 0 % (ref 0–1)
BETA GLOB ABNORMAL SERPL ELPH-MCNC: 0.27 G/DL (ref 0.4–0.8)
BETA GLOB ABNORMAL SERPL ELPH-MCNC: 0.3 G/DL (ref 0.4–0.8)
BETA1 GLOB SERPL ELPH-MCNC: 4.9 % (ref 5–13)
BETA1 GLOB SERPL ELPH-MCNC: 5.6 % (ref 5–13)
BETA2 GLOB SERPL ELPH-MCNC: 4.5 % (ref 2–8)
BETA2 GLOB SERPL ELPH-MCNC: 4.7 % (ref 2–8)
BETA2+GAMMA GLOB SERPL ELPH-MCNC: 0.25 G/DL (ref 0.2–0.5)
BETA2+GAMMA GLOB SERPL ELPH-MCNC: 0.25 G/DL (ref 0.2–0.5)
BILIRUB DIRECT SERPL-MCNC: 0.18 MG/DL (ref 0–0.2)
BILIRUB SERPL-MCNC: 0.4 MG/DL (ref 0.2–1)
BILIRUB SERPL-MCNC: 0.5 MG/DL (ref 0.2–1)
BILIRUB SERPL-MCNC: 0.5 MG/DL (ref 0.2–1)
BILIRUB SERPL-MCNC: 0.6 MG/DL (ref 0.2–1)
BILIRUB SERPL-MCNC: 0.6 MG/DL (ref 0.2–1)
BILIRUB SERPL-MCNC: 0.7 MG/DL (ref 0.2–1)
BILIRUB SERPL-MCNC: 0.8 MG/DL (ref 0.2–1)
BILIRUB UR QL STRIP: NEGATIVE
BLD GP AB SCN SERPL QL: NEGATIVE
BPU ID: NORMAL
BUN SERPL-MCNC: 21 MG/DL (ref 5–25)
BUN SERPL-MCNC: 22 MG/DL (ref 5–25)
BUN SERPL-MCNC: 22 MG/DL (ref 5–25)
BUN SERPL-MCNC: 24 MG/DL (ref 5–25)
BUN SERPL-MCNC: 25 MG/DL (ref 5–25)
BUN SERPL-MCNC: 25 MG/DL (ref 5–25)
BUN SERPL-MCNC: 27 MG/DL (ref 5–25)
BUN SERPL-MCNC: 27 MG/DL (ref 5–25)
BUN SERPL-MCNC: 28 MG/DL (ref 5–25)
BUN SERPL-MCNC: 28 MG/DL (ref 5–25)
BUN SERPL-MCNC: 29 MG/DL (ref 5–25)
BUN SERPL-MCNC: 29 MG/DL (ref 5–25)
BUN SERPL-MCNC: 31 MG/DL (ref 5–25)
BUN SERPL-MCNC: 31 MG/DL (ref 5–25)
BUN SERPL-MCNC: 32 MG/DL (ref 5–25)
BUN SERPL-MCNC: 35 MG/DL (ref 5–25)
BUN SERPL-MCNC: 35 MG/DL (ref 5–25)
BUN SERPL-MCNC: 36 MG/DL (ref 5–25)
BUN SERPL-MCNC: 37 MG/DL (ref 5–25)
BUN SERPL-MCNC: 39 MG/DL (ref 5–25)
BUN SERPL-MCNC: 40 MG/DL (ref 5–25)
BUN SERPL-MCNC: 42 MG/DL (ref 5–25)
BUN SERPL-MCNC: 42 MG/DL (ref 5–25)
BUN SERPL-MCNC: 55 MG/DL (ref 5–25)
BUN SERPL-MCNC: 59 MG/DL (ref 5–25)
BUN SERPL-MCNC: 59 MG/DL (ref 5–25)
BUN SERPL-MCNC: 80 MG/DL (ref 5–25)
CALCIUM ALBUM COR SERPL-MCNC: 9.2 MG/DL (ref 8.3–10.1)
CALCIUM ALBUM COR SERPL-MCNC: 9.4 MG/DL (ref 8.3–10.1)
CALCIUM ALBUM COR SERPL-MCNC: 9.5 MG/DL (ref 8.3–10.1)
CALCIUM SERPL-MCNC: 7.8 MG/DL (ref 8.3–10.1)
CALCIUM SERPL-MCNC: 7.9 MG/DL (ref 8.3–10.1)
CALCIUM SERPL-MCNC: 7.9 MG/DL (ref 8.3–10.1)
CALCIUM SERPL-MCNC: 8 MG/DL (ref 8.3–10.1)
CALCIUM SERPL-MCNC: 8.1 MG/DL (ref 8.3–10.1)
CALCIUM SERPL-MCNC: 8.1 MG/DL (ref 8.3–10.1)
CALCIUM SERPL-MCNC: 8.2 MG/DL (ref 8.3–10.1)
CALCIUM SERPL-MCNC: 8.4 MG/DL (ref 8.3–10.1)
CALCIUM SERPL-MCNC: 8.5 MG/DL (ref 8.3–10.1)
CALCIUM SERPL-MCNC: 8.6 MG/DL (ref 8.3–10.1)
CALCIUM SERPL-MCNC: 8.9 MG/DL (ref 8.3–10.1)
CALCIUM SERPL-MCNC: 8.9 MG/DL (ref 8.3–10.1)
CALCIUM SERPL-MCNC: 9 MG/DL (ref 8.3–10.1)
CHLORIDE SERPL-SCNC: 101 MMOL/L (ref 100–108)
CHLORIDE SERPL-SCNC: 102 MMOL/L (ref 100–108)
CHLORIDE SERPL-SCNC: 102 MMOL/L (ref 100–108)
CHLORIDE SERPL-SCNC: 103 MMOL/L (ref 100–108)
CHLORIDE SERPL-SCNC: 103 MMOL/L (ref 100–108)
CHLORIDE SERPL-SCNC: 104 MMOL/L (ref 100–108)
CHLORIDE SERPL-SCNC: 105 MMOL/L (ref 100–108)
CHLORIDE SERPL-SCNC: 106 MMOL/L (ref 100–108)
CHLORIDE SERPL-SCNC: 107 MMOL/L (ref 100–108)
CHLORIDE SERPL-SCNC: 109 MMOL/L (ref 100–108)
CHLORIDE SERPL-SCNC: 109 MMOL/L (ref 100–108)
CK SERPL-CCNC: 27 U/L (ref 39–308)
CLARITY UR: CLEAR
CO2 SERPL-SCNC: 18 MMOL/L (ref 21–32)
CO2 SERPL-SCNC: 19 MMOL/L (ref 21–32)
CO2 SERPL-SCNC: 20 MMOL/L (ref 21–32)
CO2 SERPL-SCNC: 21 MMOL/L (ref 21–32)
CO2 SERPL-SCNC: 22 MMOL/L (ref 21–32)
CO2 SERPL-SCNC: 23 MMOL/L (ref 21–32)
CO2 SERPL-SCNC: 24 MMOL/L (ref 21–32)
COLOR UR: NORMAL
COLOR UR: YELLOW
COLOR UR: YELLOW
CORTIS SERPL-MCNC: 27.6 UG/DL
CREAT SERPL-MCNC: 1.05 MG/DL (ref 0.6–1.3)
CREAT SERPL-MCNC: 1.2 MG/DL (ref 0.6–1.3)
CREAT SERPL-MCNC: 1.31 MG/DL (ref 0.6–1.3)
CREAT SERPL-MCNC: 1.33 MG/DL (ref 0.6–1.3)
CREAT SERPL-MCNC: 1.36 MG/DL (ref 0.6–1.3)
CREAT SERPL-MCNC: 1.39 MG/DL (ref 0.6–1.3)
CREAT SERPL-MCNC: 1.4 MG/DL (ref 0.6–1.3)
CREAT SERPL-MCNC: 1.42 MG/DL (ref 0.6–1.3)
CREAT SERPL-MCNC: 1.46 MG/DL (ref 0.6–1.3)
CREAT SERPL-MCNC: 1.48 MG/DL (ref 0.6–1.3)
CREAT SERPL-MCNC: 1.55 MG/DL (ref 0.6–1.3)
CREAT SERPL-MCNC: 1.61 MG/DL (ref 0.6–1.3)
CREAT SERPL-MCNC: 1.62 MG/DL (ref 0.6–1.3)
CREAT SERPL-MCNC: 1.69 MG/DL (ref 0.6–1.3)
CREAT SERPL-MCNC: 1.7 MG/DL (ref 0.6–1.3)
CREAT SERPL-MCNC: 1.72 MG/DL (ref 0.6–1.3)
CREAT SERPL-MCNC: 1.75 MG/DL (ref 0.6–1.3)
CREAT SERPL-MCNC: 1.77 MG/DL (ref 0.6–1.3)
CREAT SERPL-MCNC: 1.79 MG/DL (ref 0.6–1.3)
CREAT SERPL-MCNC: 1.8 MG/DL (ref 0.6–1.3)
CREAT SERPL-MCNC: 1.82 MG/DL (ref 0.6–1.3)
CREAT SERPL-MCNC: 1.82 MG/DL (ref 0.6–1.3)
CREAT SERPL-MCNC: 1.86 MG/DL (ref 0.6–1.3)
CREAT SERPL-MCNC: 1.86 MG/DL (ref 0.6–1.3)
CREAT SERPL-MCNC: 2.03 MG/DL (ref 0.6–1.3)
CREAT SERPL-MCNC: 2.18 MG/DL (ref 0.6–1.3)
CREAT SERPL-MCNC: 2.21 MG/DL (ref 0.6–1.3)
CROSSMATCH: NORMAL
CRP SERPL QL: 7.5 MG/L
DACRYOCYTES BLD QL SMEAR: PRESENT
EOSINOPHIL # BLD AUTO: 0 THOUSAND/ΜL (ref 0–0.61)
EOSINOPHIL # BLD AUTO: 0 THOUSAND/ΜL (ref 0–0.61)
EOSINOPHIL # BLD MANUAL: 0 THOUSAND/UL (ref 0–0.4)
EOSINOPHIL NFR BLD AUTO: 0 % (ref 0–6)
EOSINOPHIL NFR BLD AUTO: 0 % (ref 0–6)
EOSINOPHIL NFR BLD MANUAL: 0 % (ref 0–6)
ERYTHROCYTE [DISTWIDTH] IN BLOOD BY AUTOMATED COUNT: 16.2 % (ref 11.6–15.1)
ERYTHROCYTE [DISTWIDTH] IN BLOOD BY AUTOMATED COUNT: 16.3 % (ref 11.6–15.1)
ERYTHROCYTE [DISTWIDTH] IN BLOOD BY AUTOMATED COUNT: 16.3 % (ref 11.6–15.1)
ERYTHROCYTE [DISTWIDTH] IN BLOOD BY AUTOMATED COUNT: 16.4 % (ref 11.6–15.1)
ERYTHROCYTE [DISTWIDTH] IN BLOOD BY AUTOMATED COUNT: 16.5 % (ref 11.6–15.1)
ERYTHROCYTE [DISTWIDTH] IN BLOOD BY AUTOMATED COUNT: 16.5 % (ref 11.6–15.1)
ERYTHROCYTE [DISTWIDTH] IN BLOOD BY AUTOMATED COUNT: 16.6 % (ref 11.6–15.1)
ERYTHROCYTE [DISTWIDTH] IN BLOOD BY AUTOMATED COUNT: 16.6 % (ref 11.6–15.1)
ERYTHROCYTE [DISTWIDTH] IN BLOOD BY AUTOMATED COUNT: 16.7 % (ref 11.6–15.1)
ERYTHROCYTE [DISTWIDTH] IN BLOOD BY AUTOMATED COUNT: 16.8 % (ref 11.6–15.1)
ERYTHROCYTE [DISTWIDTH] IN BLOOD BY AUTOMATED COUNT: 17.1 % (ref 11.6–15.1)
ERYTHROCYTE [DISTWIDTH] IN BLOOD BY AUTOMATED COUNT: 17.2 % (ref 11.6–15.1)
ERYTHROCYTE [DISTWIDTH] IN BLOOD BY AUTOMATED COUNT: 17.3 % (ref 11.6–15.1)
ERYTHROCYTE [DISTWIDTH] IN BLOOD BY AUTOMATED COUNT: 17.4 % (ref 11.6–15.1)
ERYTHROCYTE [DISTWIDTH] IN BLOOD BY AUTOMATED COUNT: 17.4 % (ref 11.6–15.1)
ERYTHROCYTE [DISTWIDTH] IN BLOOD BY AUTOMATED COUNT: 17.5 % (ref 11.6–15.1)
ERYTHROCYTE [DISTWIDTH] IN BLOOD BY AUTOMATED COUNT: 17.5 % (ref 11.6–15.1)
ERYTHROCYTE [DISTWIDTH] IN BLOOD BY AUTOMATED COUNT: 17.8 % (ref 11.6–15.1)
ERYTHROCYTE [DISTWIDTH] IN BLOOD BY AUTOMATED COUNT: 18 % (ref 11.6–15.1)
ERYTHROCYTE [DISTWIDTH] IN BLOOD BY AUTOMATED COUNT: 18.1 % (ref 11.6–15.1)
ERYTHROCYTE [DISTWIDTH] IN BLOOD BY AUTOMATED COUNT: 18.4 % (ref 11.6–15.1)
ERYTHROCYTE [DISTWIDTH] IN BLOOD BY AUTOMATED COUNT: 18.5 % (ref 11.6–15.1)
ERYTHROCYTE [DISTWIDTH] IN BLOOD BY AUTOMATED COUNT: 18.7 % (ref 11.6–15.1)
ERYTHROCYTE [DISTWIDTH] IN BLOOD BY AUTOMATED COUNT: 18.8 % (ref 11.6–15.1)
ERYTHROCYTE [DISTWIDTH] IN BLOOD BY AUTOMATED COUNT: 20 % (ref 11.6–15.1)
ERYTHROCYTE [SEDIMENTATION RATE] IN BLOOD: 5 MM/HOUR (ref 0–19)
FERRITIN SERPL-MCNC: 303 NG/ML (ref 8–388)
FOLATE SERPL-MCNC: >20 NG/ML (ref 3.1–17.5)
GAMMA GLOB ABNORMAL SERPL ELPH-MCNC: 0.68 G/DL (ref 0.5–1.6)
GAMMA GLOB ABNORMAL SERPL ELPH-MCNC: 0.71 G/DL (ref 0.5–1.6)
GAMMA GLOB MFR UR ELPH: 0 %
GAMMA GLOB SERPL ELPH-MCNC: 12.4 % (ref 12–22)
GAMMA GLOB SERPL ELPH-MCNC: 13.4 % (ref 12–22)
GFR SERPL CREATININE-BSD FRML MDRD: 29 ML/MIN/1.73SQ M
GFR SERPL CREATININE-BSD FRML MDRD: 29 ML/MIN/1.73SQ M
GFR SERPL CREATININE-BSD FRML MDRD: 32 ML/MIN/1.73SQ M
GFR SERPL CREATININE-BSD FRML MDRD: 35 ML/MIN/1.73SQ M
GFR SERPL CREATININE-BSD FRML MDRD: 35 ML/MIN/1.73SQ M
GFR SERPL CREATININE-BSD FRML MDRD: 36 ML/MIN/1.73SQ M
GFR SERPL CREATININE-BSD FRML MDRD: 36 ML/MIN/1.73SQ M
GFR SERPL CREATININE-BSD FRML MDRD: 37 ML/MIN/1.73SQ M
GFR SERPL CREATININE-BSD FRML MDRD: 37 ML/MIN/1.73SQ M
GFR SERPL CREATININE-BSD FRML MDRD: 38 ML/MIN/1.73SQ M
GFR SERPL CREATININE-BSD FRML MDRD: 38 ML/MIN/1.73SQ M
GFR SERPL CREATININE-BSD FRML MDRD: 39 ML/MIN/1.73SQ M
GFR SERPL CREATININE-BSD FRML MDRD: 39 ML/MIN/1.73SQ M
GFR SERPL CREATININE-BSD FRML MDRD: 40 ML/MIN/1.73SQ M
GFR SERPL CREATININE-BSD FRML MDRD: 42 ML/MIN/1.73SQ M
GFR SERPL CREATININE-BSD FRML MDRD: 42 ML/MIN/1.73SQ M
GFR SERPL CREATININE-BSD FRML MDRD: 44 ML/MIN/1.73SQ M
GFR SERPL CREATININE-BSD FRML MDRD: 46 ML/MIN/1.73SQ M
GFR SERPL CREATININE-BSD FRML MDRD: 47 ML/MIN/1.73SQ M
GFR SERPL CREATININE-BSD FRML MDRD: 49 ML/MIN/1.73SQ M
GFR SERPL CREATININE-BSD FRML MDRD: 50 ML/MIN/1.73SQ M
GFR SERPL CREATININE-BSD FRML MDRD: 50 ML/MIN/1.73SQ M
GFR SERPL CREATININE-BSD FRML MDRD: 52 ML/MIN/1.73SQ M
GFR SERPL CREATININE-BSD FRML MDRD: 53 ML/MIN/1.73SQ M
GFR SERPL CREATININE-BSD FRML MDRD: 54 ML/MIN/1.73SQ M
GFR SERPL CREATININE-BSD FRML MDRD: 60 ML/MIN/1.73SQ M
GFR SERPL CREATININE-BSD FRML MDRD: 70 ML/MIN/1.73SQ M
GLUCOSE SERPL-MCNC: 100 MG/DL (ref 65–140)
GLUCOSE SERPL-MCNC: 106 MG/DL (ref 65–140)
GLUCOSE SERPL-MCNC: 106 MG/DL (ref 65–140)
GLUCOSE SERPL-MCNC: 108 MG/DL (ref 65–140)
GLUCOSE SERPL-MCNC: 108 MG/DL (ref 65–140)
GLUCOSE SERPL-MCNC: 110 MG/DL (ref 65–140)
GLUCOSE SERPL-MCNC: 111 MG/DL (ref 65–140)
GLUCOSE SERPL-MCNC: 113 MG/DL (ref 65–140)
GLUCOSE SERPL-MCNC: 114 MG/DL (ref 65–140)
GLUCOSE SERPL-MCNC: 115 MG/DL (ref 65–140)
GLUCOSE SERPL-MCNC: 116 MG/DL (ref 65–140)
GLUCOSE SERPL-MCNC: 117 MG/DL (ref 65–140)
GLUCOSE SERPL-MCNC: 119 MG/DL (ref 65–140)
GLUCOSE SERPL-MCNC: 119 MG/DL (ref 65–140)
GLUCOSE SERPL-MCNC: 120 MG/DL (ref 65–140)
GLUCOSE SERPL-MCNC: 145 MG/DL (ref 65–140)
GLUCOSE SERPL-MCNC: 150 MG/DL (ref 65–140)
GLUCOSE SERPL-MCNC: 178 MG/DL (ref 65–140)
GLUCOSE SERPL-MCNC: 183 MG/DL (ref 65–140)
GLUCOSE SERPL-MCNC: 87 MG/DL (ref 65–140)
GLUCOSE SERPL-MCNC: 89 MG/DL (ref 65–140)
GLUCOSE SERPL-MCNC: 92 MG/DL (ref 65–140)
GLUCOSE SERPL-MCNC: 96 MG/DL (ref 65–140)
GLUCOSE SERPL-MCNC: 97 MG/DL (ref 65–140)
GLUCOSE SERPL-MCNC: 97 MG/DL (ref 65–140)
GLUCOSE UR STRIP-MCNC: NEGATIVE MG/DL
GRAM STN SPEC: ABNORMAL
HCT VFR BLD AUTO: 19.4 % (ref 36.5–49.3)
HCT VFR BLD AUTO: 20.7 % (ref 36.5–49.3)
HCT VFR BLD AUTO: 20.8 % (ref 36.5–49.3)
HCT VFR BLD AUTO: 21.7 % (ref 36.5–49.3)
HCT VFR BLD AUTO: 21.9 % (ref 36.5–49.3)
HCT VFR BLD AUTO: 21.9 % (ref 36.5–49.3)
HCT VFR BLD AUTO: 22.1 % (ref 36.5–49.3)
HCT VFR BLD AUTO: 22.8 % (ref 36.5–49.3)
HCT VFR BLD AUTO: 22.9 % (ref 36.5–49.3)
HCT VFR BLD AUTO: 23 % (ref 36.5–49.3)
HCT VFR BLD AUTO: 23.2 % (ref 36.5–49.3)
HCT VFR BLD AUTO: 23.3 % (ref 36.5–49.3)
HCT VFR BLD AUTO: 23.6 % (ref 36.5–49.3)
HCT VFR BLD AUTO: 23.8 % (ref 36.5–49.3)
HCT VFR BLD AUTO: 23.9 % (ref 36.5–49.3)
HCT VFR BLD AUTO: 24 % (ref 36.5–49.3)
HCT VFR BLD AUTO: 24.3 % (ref 36.5–49.3)
HCT VFR BLD AUTO: 24.6 % (ref 36.5–49.3)
HCT VFR BLD AUTO: 24.8 % (ref 36.5–49.3)
HCT VFR BLD AUTO: 25 % (ref 36.5–49.3)
HCT VFR BLD AUTO: 25.1 % (ref 36.5–49.3)
HCT VFR BLD AUTO: 25.3 % (ref 36.5–49.3)
HCT VFR BLD AUTO: 25.4 % (ref 36.5–49.3)
HCT VFR BLD AUTO: 25.5 % (ref 36.5–49.3)
HCT VFR BLD AUTO: 25.7 % (ref 36.5–49.3)
HCT VFR BLD AUTO: 25.8 % (ref 36.5–49.3)
HCT VFR BLD AUTO: 25.9 % (ref 36.5–49.3)
HCT VFR BLD AUTO: 26.1 % (ref 36.5–49.3)
HCT VFR BLD AUTO: 27.1 % (ref 36.5–49.3)
HCT VFR BLD AUTO: 27.6 % (ref 36.5–49.3)
HCT VFR BLD AUTO: 28.2 % (ref 36.5–49.3)
HCT VFR BLD AUTO: 28.9 % (ref 36.5–49.3)
HCT VFR BLD AUTO: 28.9 % (ref 36.5–49.3)
HCT VFR BLD AUTO: 29 % (ref 36.5–49.3)
HCT VFR BLD AUTO: 30.2 % (ref 36.5–49.3)
HCT VFR BLD AUTO: 31.3 % (ref 36.5–49.3)
HELMET CELLS BLD QL SMEAR: PRESENT
HEMOCCULT STL QL: NEGATIVE
HGB BLD-MCNC: 10.3 G/DL (ref 12–17)
HGB BLD-MCNC: 6.1 G/DL (ref 12–17)
HGB BLD-MCNC: 6.7 G/DL (ref 12–17)
HGB BLD-MCNC: 6.7 G/DL (ref 12–17)
HGB BLD-MCNC: 7 G/DL (ref 12–17)
HGB BLD-MCNC: 7.1 G/DL (ref 12–17)
HGB BLD-MCNC: 7.2 G/DL (ref 12–17)
HGB BLD-MCNC: 7.2 G/DL (ref 12–17)
HGB BLD-MCNC: 7.3 G/DL (ref 12–17)
HGB BLD-MCNC: 7.6 G/DL (ref 12–17)
HGB BLD-MCNC: 7.7 G/DL (ref 12–17)
HGB BLD-MCNC: 7.8 G/DL (ref 12–17)
HGB BLD-MCNC: 7.8 G/DL (ref 12–17)
HGB BLD-MCNC: 7.9 G/DL (ref 12–17)
HGB BLD-MCNC: 8 G/DL (ref 12–17)
HGB BLD-MCNC: 8 G/DL (ref 12–17)
HGB BLD-MCNC: 8.2 G/DL (ref 12–17)
HGB BLD-MCNC: 8.3 G/DL (ref 12–17)
HGB BLD-MCNC: 8.4 G/DL (ref 12–17)
HGB BLD-MCNC: 8.5 G/DL (ref 12–17)
HGB BLD-MCNC: 8.5 G/DL (ref 12–17)
HGB BLD-MCNC: 8.7 G/DL (ref 12–17)
HGB BLD-MCNC: 8.7 G/DL (ref 12–17)
HGB BLD-MCNC: 9.1 G/DL (ref 12–17)
HGB BLD-MCNC: 9.2 G/DL (ref 12–17)
HGB BLD-MCNC: 9.3 G/DL (ref 12–17)
HGB BLD-MCNC: 9.8 G/DL (ref 12–17)
HGB UR QL STRIP.AUTO: NEGATIVE
HYPERCHROMIA BLD QL SMEAR: PRESENT
IGA SERPL-MCNC: 87 MG/DL (ref 70–400)
IGA SERPL-MCNC: 98 MG/DL (ref 70–400)
IGG SERPL-MCNC: 762 MG/DL (ref 700–1600)
IGG SERPL-MCNC: 804 MG/DL (ref 700–1600)
IGG/ALB SER: 1.17 {RATIO} (ref 1.1–1.8)
IGG/ALB SER: 1.18 {RATIO} (ref 1.1–1.8)
IGM SERPL-MCNC: 29 MG/DL (ref 40–230)
IGM SERPL-MCNC: 37 MG/DL (ref 40–230)
IMM GRANULOCYTES # BLD AUTO: 0.04 THOUSAND/UL (ref 0–0.2)
IMM GRANULOCYTES # BLD AUTO: 0.08 THOUSAND/UL (ref 0–0.2)
IMM GRANULOCYTES # BLD AUTO: 0.15 THOUSAND/UL (ref 0–0.2)
IMM GRANULOCYTES NFR BLD AUTO: 2 % (ref 0–2)
IMM GRANULOCYTES NFR BLD AUTO: 2 % (ref 0–2)
INR PPP: 1.46 (ref 0.84–1.19)
INR PPP: 1.57 (ref 0.84–1.19)
INTERPRETATION UR IFE-IMP: NORMAL
IRON SATN MFR SERPL: 80 %
IRON SERPL-MCNC: 229 UG/DL (ref 65–175)
KAPPA LC FREE SER-MCNC: 216.8 MG/L (ref 3.3–19.4)
KAPPA LC FREE SER-MCNC: 251.1 MG/L (ref 3.3–19.4)
KAPPA LC FREE/LAMBDA FREE SER: 10.69 {RATIO} (ref 0.26–1.65)
KAPPA LC FREE/LAMBDA FREE SER: 7.66 {RATIO} (ref 0.26–1.65)
KETONES UR STRIP-MCNC: NEGATIVE MG/DL
LACTATE SERPL-SCNC: 1.6 MMOL/L (ref 0.5–2)
LACTATE SERPL-SCNC: 2.1 MMOL/L (ref 0.5–2)
LACTATE SERPL-SCNC: 2.5 MMOL/L (ref 0.5–2)
LAMBDA LC FREE SERPL-MCNC: 23.5 MG/L (ref 5.7–26.3)
LAMBDA LC FREE SERPL-MCNC: 28.3 MG/L (ref 5.7–26.3)
LEUKOCYTE ESTERASE UR QL STRIP: NEGATIVE
LG PLATELETS BLD QL SMEAR: PRESENT
LIPASE SERPL-CCNC: 255 U/L (ref 73–393)
LYMPHOCYTES # BLD AUTO: 0.26 THOUSAND/UL (ref 0.6–4.47)
LYMPHOCYTES # BLD AUTO: 0.35 THOUSAND/UL (ref 0.6–4.47)
LYMPHOCYTES # BLD AUTO: 0.4 THOUSAND/UL (ref 0.6–4.47)
LYMPHOCYTES # BLD AUTO: 0.41 THOUSAND/UL (ref 0.6–4.47)
LYMPHOCYTES # BLD AUTO: 0.45 THOUSAND/UL (ref 0.6–4.47)
LYMPHOCYTES # BLD AUTO: 0.46 THOUSAND/UL (ref 0.6–4.47)
LYMPHOCYTES # BLD AUTO: 0.47 THOUSAND/UL (ref 0.6–4.47)
LYMPHOCYTES # BLD AUTO: 0.48 THOUSANDS/ΜL (ref 0.6–4.47)
LYMPHOCYTES # BLD AUTO: 0.59 THOUSANDS/ΜL (ref 0.6–4.47)
LYMPHOCYTES # BLD AUTO: 0.7 THOUSAND/UL (ref 0.6–4.47)
LYMPHOCYTES # BLD AUTO: 1.2 THOUSAND/UL (ref 0.6–4.47)
LYMPHOCYTES # BLD AUTO: 11 % (ref 14–44)
LYMPHOCYTES # BLD AUTO: 12 % (ref 14–44)
LYMPHOCYTES # BLD AUTO: 14 % (ref 14–44)
LYMPHOCYTES # BLD AUTO: 14 % (ref 14–44)
LYMPHOCYTES # BLD AUTO: 16 % (ref 14–44)
LYMPHOCYTES # BLD AUTO: 5 % (ref 14–44)
LYMPHOCYTES # BLD AUTO: 7 % (ref 14–44)
LYMPHOCYTES # BLD AUTO: 7 % (ref 14–44)
LYMPHOCYTES # BLD AUTO: 8 % (ref 14–44)
LYMPHOCYTES NFR BLD AUTO: 21 % (ref 14–44)
LYMPHOCYTES NFR BLD AUTO: 9 % (ref 14–44)
Lab: NORMAL
M PROTEIN 1 MFR SERPL ELPH: 4.5 %
M PROTEIN 1 MFR SERPL ELPH: 4.6 %
M PROTEIN 1 SERPL ELPH-MCNC: 0.24 G/DL
M PROTEIN 1 SERPL ELPH-MCNC: 0.25 G/DL
MAGNESIUM SERPL-MCNC: 1.3 MG/DL (ref 1.6–2.6)
MAGNESIUM SERPL-MCNC: 1.6 MG/DL (ref 1.6–2.6)
MAGNESIUM SERPL-MCNC: 1.7 MG/DL (ref 1.6–2.6)
MAGNESIUM SERPL-MCNC: 1.9 MG/DL (ref 1.6–2.6)
MAGNESIUM SERPL-MCNC: 2.3 MG/DL (ref 1.6–2.6)
MAGNESIUM SERPL-MCNC: 2.4 MG/DL (ref 1.6–2.6)
MCH RBC QN AUTO: 29.8 PG (ref 26.8–34.3)
MCH RBC QN AUTO: 29.9 PG (ref 26.8–34.3)
MCH RBC QN AUTO: 29.9 PG (ref 26.8–34.3)
MCH RBC QN AUTO: 30.1 PG (ref 26.8–34.3)
MCH RBC QN AUTO: 30.2 PG (ref 26.8–34.3)
MCH RBC QN AUTO: 30.3 PG (ref 26.8–34.3)
MCH RBC QN AUTO: 30.3 PG (ref 26.8–34.3)
MCH RBC QN AUTO: 30.5 PG (ref 26.8–34.3)
MCH RBC QN AUTO: 30.7 PG (ref 26.8–34.3)
MCH RBC QN AUTO: 30.7 PG (ref 26.8–34.3)
MCH RBC QN AUTO: 30.8 PG (ref 26.8–34.3)
MCH RBC QN AUTO: 30.9 PG (ref 26.8–34.3)
MCH RBC QN AUTO: 30.9 PG (ref 26.8–34.3)
MCH RBC QN AUTO: 31 PG (ref 26.8–34.3)
MCH RBC QN AUTO: 31 PG (ref 26.8–34.3)
MCH RBC QN AUTO: 31.1 PG (ref 26.8–34.3)
MCH RBC QN AUTO: 31.3 PG (ref 26.8–34.3)
MCH RBC QN AUTO: 31.3 PG (ref 26.8–34.3)
MCH RBC QN AUTO: 31.4 PG (ref 26.8–34.3)
MCH RBC QN AUTO: 31.6 PG (ref 26.8–34.3)
MCHC RBC AUTO-ENTMCNC: 31.4 G/DL (ref 31.4–37.4)
MCHC RBC AUTO-ENTMCNC: 31.5 G/DL (ref 31.4–37.4)
MCHC RBC AUTO-ENTMCNC: 31.8 G/DL (ref 31.4–37.4)
MCHC RBC AUTO-ENTMCNC: 32.1 G/DL (ref 31.4–37.4)
MCHC RBC AUTO-ENTMCNC: 32.1 G/DL (ref 31.4–37.4)
MCHC RBC AUTO-ENTMCNC: 32.2 G/DL (ref 31.4–37.4)
MCHC RBC AUTO-ENTMCNC: 32.4 G/DL (ref 31.4–37.4)
MCHC RBC AUTO-ENTMCNC: 32.4 G/DL (ref 31.4–37.4)
MCHC RBC AUTO-ENTMCNC: 32.5 G/DL (ref 31.4–37.4)
MCHC RBC AUTO-ENTMCNC: 32.6 G/DL (ref 31.4–37.4)
MCHC RBC AUTO-ENTMCNC: 32.7 G/DL (ref 31.4–37.4)
MCHC RBC AUTO-ENTMCNC: 32.8 G/DL (ref 31.4–37.4)
MCHC RBC AUTO-ENTMCNC: 32.9 G/DL (ref 31.4–37.4)
MCHC RBC AUTO-ENTMCNC: 32.9 G/DL (ref 31.4–37.4)
MCHC RBC AUTO-ENTMCNC: 33 G/DL (ref 31.4–37.4)
MCHC RBC AUTO-ENTMCNC: 33.1 G/DL (ref 31.4–37.4)
MCHC RBC AUTO-ENTMCNC: 33.1 G/DL (ref 31.4–37.4)
MCHC RBC AUTO-ENTMCNC: 33.3 G/DL (ref 31.4–37.4)
MCHC RBC AUTO-ENTMCNC: 33.3 G/DL (ref 31.4–37.4)
MCHC RBC AUTO-ENTMCNC: 33.5 G/DL (ref 31.4–37.4)
MCV RBC AUTO: 90 FL (ref 82–98)
MCV RBC AUTO: 92 FL (ref 82–98)
MCV RBC AUTO: 93 FL (ref 82–98)
MCV RBC AUTO: 94 FL (ref 82–98)
MCV RBC AUTO: 95 FL (ref 82–98)
MCV RBC AUTO: 96 FL (ref 82–98)
MCV RBC AUTO: 97 FL (ref 82–98)
METHYLMALONATE SERPL-SCNC: 388 NMOL/L (ref 0–378)
MISCELLANEOUS LAB TEST RESULT: NORMAL
MISCELLANEOUS LAB TEST RESULT: NORMAL
MONOCYTES # BLD AUTO: 0.05 THOUSAND/UL (ref 0–1.22)
MONOCYTES # BLD AUTO: 0.06 THOUSAND/UL (ref 0–1.22)
MONOCYTES # BLD AUTO: 0.1 THOUSAND/UL (ref 0–1.22)
MONOCYTES # BLD AUTO: 0.1 THOUSAND/ΜL (ref 0.17–1.22)
MONOCYTES # BLD AUTO: 0.18 THOUSAND/UL (ref 0–1.22)
MONOCYTES # BLD AUTO: 0.19 THOUSAND/UL (ref 0–1.22)
MONOCYTES # BLD AUTO: 0.2 THOUSAND/UL (ref 0–1.22)
MONOCYTES # BLD AUTO: 0.21 THOUSAND/UL (ref 0–1.22)
MONOCYTES # BLD AUTO: 0.21 THOUSAND/UL (ref 0–1.22)
MONOCYTES # BLD AUTO: 0.26 THOUSAND/ΜL (ref 0.17–1.22)
MONOCYTES # BLD AUTO: 0.52 THOUSAND/UL (ref 0–1.22)
MONOCYTES NFR BLD AUTO: 4 % (ref 4–12)
MONOCYTES NFR BLD AUTO: 5 % (ref 4–12)
MONOCYTES NFR BLD: 1 % (ref 4–12)
MONOCYTES NFR BLD: 2 % (ref 4–12)
MONOCYTES NFR BLD: 3 % (ref 4–12)
MONOCYTES NFR BLD: 4 % (ref 4–12)
MONOCYTES NFR BLD: 6 % (ref 4–12)
MONOCYTES NFR BLD: 7 % (ref 4–12)
MYELOCYTES NFR BLD MANUAL: 1 % (ref 0–1)
NEUTROPHILS # BLD AUTO: 1.62 THOUSANDS/ΜL (ref 1.85–7.62)
NEUTROPHILS # BLD AUTO: 5.29 THOUSANDS/ΜL (ref 1.85–7.62)
NEUTROPHILS # BLD MANUAL: 2.4 THOUSAND/UL (ref 1.85–7.62)
NEUTROPHILS # BLD MANUAL: 2.68 THOUSAND/UL (ref 1.85–7.62)
NEUTROPHILS # BLD MANUAL: 2.8 THOUSAND/UL (ref 1.85–7.62)
NEUTROPHILS # BLD MANUAL: 4.58 THOUSAND/UL (ref 1.85–7.62)
NEUTROPHILS # BLD MANUAL: 4.74 THOUSAND/UL (ref 1.85–7.62)
NEUTROPHILS # BLD MANUAL: 5.25 THOUSAND/UL (ref 1.85–7.62)
NEUTROPHILS # BLD MANUAL: 5.44 THOUSAND/UL (ref 1.85–7.62)
NEUTROPHILS # BLD MANUAL: 5.76 THOUSAND/UL (ref 1.85–7.62)
NEUTROPHILS # BLD MANUAL: 5.95 THOUSAND/UL (ref 1.85–7.62)
NEUTS BAND NFR BLD MANUAL: 1 % (ref 0–8)
NEUTS BAND NFR BLD MANUAL: 2 % (ref 0–8)
NEUTS HYPERSEG BLD QL SMEAR: PRESENT
NEUTS SEG NFR BLD AUTO: 72 % (ref 43–75)
NEUTS SEG NFR BLD AUTO: 77 % (ref 43–75)
NEUTS SEG NFR BLD AUTO: 82 % (ref 43–75)
NEUTS SEG NFR BLD AUTO: 83 % (ref 43–75)
NEUTS SEG NFR BLD AUTO: 83 % (ref 43–75)
NEUTS SEG NFR BLD AUTO: 85 % (ref 43–75)
NEUTS SEG NFR BLD AUTO: 86 % (ref 43–75)
NEUTS SEG NFR BLD AUTO: 87 % (ref 43–75)
NEUTS SEG NFR BLD AUTO: 89 % (ref 43–75)
NEUTS SEG NFR BLD AUTO: 90 % (ref 43–75)
NEUTS SEG NFR BLD AUTO: 92 % (ref 43–75)
NITRITE UR QL STRIP: NEGATIVE
NON-SQ EPI CELLS URNS QL MICRO: ABNORMAL /HPF
NRBC BLD AUTO-RTO: 1 /100 WBC (ref 0–2)
NRBC BLD AUTO-RTO: 1 /100 WBCS
NRBC BLD AUTO-RTO: 2 /100 WBCS
NRBC BLD AUTO-RTO: 3 /100 WBC (ref 0–2)
NRBC BLD AUTO-RTO: 3 /100 WBCS
NT-PROBNP SERPL-MCNC: 1596 PG/ML
OSMOLALITY UR/SERPL-RTO: 304 MMOL/KG (ref 282–298)
OSMOLALITY UR: 414 MMOL/KG
OVALOCYTES BLD QL SMEAR: PRESENT
PH UR STRIP.AUTO: 5.5 [PH]
PH UR STRIP.AUTO: 5.5 [PH]
PH UR STRIP.AUTO: 6 [PH]
PHOSPHATE SERPL-MCNC: 2.9 MG/DL (ref 2.3–4.1)
PHOSPHATE SERPL-MCNC: 3 MG/DL (ref 2.3–4.1)
PHOSPHATE SERPL-MCNC: 3 MG/DL (ref 2.3–4.1)
PHOSPHATE SERPL-MCNC: 3.5 MG/DL (ref 2.3–4.1)
PLATELET # BLD AUTO: 104 THOUSANDS/UL (ref 149–390)
PLATELET # BLD AUTO: 31 THOUSANDS/UL (ref 149–390)
PLATELET # BLD AUTO: 39 THOUSANDS/UL (ref 149–390)
PLATELET # BLD AUTO: 39 THOUSANDS/UL (ref 149–390)
PLATELET # BLD AUTO: 42 THOUSANDS/UL (ref 149–390)
PLATELET # BLD AUTO: 43 THOUSANDS/UL (ref 149–390)
PLATELET # BLD AUTO: 46 THOUSANDS/UL (ref 149–390)
PLATELET # BLD AUTO: 46 THOUSANDS/UL (ref 149–390)
PLATELET # BLD AUTO: 48 THOUSANDS/UL (ref 149–390)
PLATELET # BLD AUTO: 52 THOUSANDS/UL (ref 149–390)
PLATELET # BLD AUTO: 53 THOUSANDS/UL (ref 149–390)
PLATELET # BLD AUTO: 55 THOUSANDS/UL (ref 149–390)
PLATELET # BLD AUTO: 56 THOUSANDS/UL (ref 149–390)
PLATELET # BLD AUTO: 57 THOUSANDS/UL (ref 149–390)
PLATELET # BLD AUTO: 59 THOUSANDS/UL (ref 149–390)
PLATELET # BLD AUTO: 62 THOUSANDS/UL (ref 149–390)
PLATELET # BLD AUTO: 68 THOUSANDS/UL (ref 149–390)
PLATELET # BLD AUTO: 69 THOUSANDS/UL (ref 149–390)
PLATELET # BLD AUTO: 71 THOUSANDS/UL (ref 149–390)
PLATELET # BLD AUTO: 71 THOUSANDS/UL (ref 149–390)
PLATELET # BLD AUTO: 72 THOUSANDS/UL (ref 149–390)
PLATELET # BLD AUTO: 77 THOUSANDS/UL (ref 149–390)
PLATELET # BLD AUTO: 77 THOUSANDS/UL (ref 149–390)
PLATELET # BLD AUTO: 83 THOUSANDS/UL (ref 149–390)
PLATELET # BLD AUTO: 84 THOUSANDS/UL (ref 149–390)
PLATELET # BLD AUTO: 86 THOUSANDS/UL (ref 149–390)
PLATELET BLD QL SMEAR: ABNORMAL
PMV BLD AUTO: 11.7 FL (ref 8.9–12.7)
PMV BLD AUTO: 11.7 FL (ref 8.9–12.7)
PMV BLD AUTO: 11.8 FL (ref 8.9–12.7)
PMV BLD AUTO: 11.9 FL (ref 8.9–12.7)
PMV BLD AUTO: 12 FL (ref 8.9–12.7)
PMV BLD AUTO: 12.1 FL (ref 8.9–12.7)
PMV BLD AUTO: 12.1 FL (ref 8.9–12.7)
PMV BLD AUTO: 12.2 FL (ref 8.9–12.7)
PMV BLD AUTO: 12.2 FL (ref 8.9–12.7)
PMV BLD AUTO: 12.3 FL (ref 8.9–12.7)
PMV BLD AUTO: 12.4 FL (ref 8.9–12.7)
PMV BLD AUTO: 12.5 FL (ref 8.9–12.7)
PMV BLD AUTO: 12.6 FL (ref 8.9–12.7)
PMV BLD AUTO: 12.6 FL (ref 8.9–12.7)
PMV BLD AUTO: 12.7 FL (ref 8.9–12.7)
PMV BLD AUTO: 12.7 FL (ref 8.9–12.7)
PMV BLD AUTO: 13.1 FL (ref 8.9–12.7)
PMV BLD AUTO: 13.2 FL (ref 8.9–12.7)
PMV BLD AUTO: 13.7 FL (ref 8.9–12.7)
PMV BLD AUTO: 14.1 FL (ref 8.9–12.7)
POIKILOCYTOSIS BLD QL SMEAR: PRESENT
POLYCHROMASIA BLD QL SMEAR: PRESENT
POTASSIUM SERPL-SCNC: 4.2 MMOL/L (ref 3.5–5.3)
POTASSIUM SERPL-SCNC: 4.3 MMOL/L (ref 3.5–5.3)
POTASSIUM SERPL-SCNC: 4.4 MMOL/L (ref 3.5–5.3)
POTASSIUM SERPL-SCNC: 4.5 MMOL/L (ref 3.5–5.3)
POTASSIUM SERPL-SCNC: 4.6 MMOL/L (ref 3.5–5.3)
POTASSIUM SERPL-SCNC: 4.8 MMOL/L (ref 3.5–5.3)
POTASSIUM SERPL-SCNC: 4.9 MMOL/L (ref 3.5–5.3)
POTASSIUM SERPL-SCNC: 4.9 MMOL/L (ref 3.5–5.3)
POTASSIUM SERPL-SCNC: 5 MMOL/L (ref 3.5–5.3)
POTASSIUM SERPL-SCNC: 5.2 MMOL/L (ref 3.5–5.3)
PR INTERVAL: 264 MS
PREALB SERPL-MCNC: 10.8 MG/DL (ref 18–40)
PROCALCITONIN SERPL-MCNC: 0.81 NG/ML
PROCALCITONIN SERPL-MCNC: 2.1 NG/ML
PROCALCITONIN SERPL-MCNC: 2.18 NG/ML
PROCALCITONIN SERPL-MCNC: <0.05 NG/ML
PROT PATTERN SERPL ELPH-IMP: ABNORMAL
PROT PATTERN SERPL ELPH-IMP: ABNORMAL
PROT PATTERN UR ELPH-IMP: ABNORMAL
PROT SERPL-MCNC: 4.7 G/DL (ref 6.4–8.2)
PROT SERPL-MCNC: 4.9 G/DL (ref 6.4–8.2)
PROT SERPL-MCNC: 5.2 G/DL (ref 6.4–8.2)
PROT SERPL-MCNC: 5.3 G/DL (ref 6.4–8.2)
PROT SERPL-MCNC: 5.5 G/DL (ref 6.4–8.2)
PROT SERPL-MCNC: 5.7 G/DL (ref 6.4–8.2)
PROT SERPL-MCNC: 6.1 G/DL (ref 6.4–8.2)
PROT SERPL-MCNC: 6.2 G/DL (ref 6.4–8.2)
PROT SERPL-MCNC: 6.5 G/DL (ref 6.4–8.2)
PROT UR STRIP-MCNC: ABNORMAL MG/DL
PROT UR STRIP-MCNC: NEGATIVE MG/DL
PROT UR STRIP-MCNC: NEGATIVE MG/DL
PROT UR-MCNC: 57 MG/DL
PROTHROMBIN TIME: 17.4 SECONDS (ref 11.6–14.5)
PROTHROMBIN TIME: 18.6 SECONDS (ref 11.6–14.5)
PTH-INTACT SERPL-MCNC: 47.3 PG/ML (ref 18.4–80.1)
QRS AXIS: -38 DEGREES
QRS AXIS: -64 DEGREES
QRSD INTERVAL: 112 MS
QRSD INTERVAL: 120 MS
QT INTERVAL: 372 MS
QT INTERVAL: 398 MS
QTC INTERVAL: 482 MS
QTC INTERVAL: 592 MS
RBC # BLD AUTO: 2.04 MILLION/UL (ref 3.88–5.62)
RBC # BLD AUTO: 2.17 MILLION/UL (ref 3.88–5.62)
RBC # BLD AUTO: 2.2 MILLION/UL (ref 3.88–5.62)
RBC # BLD AUTO: 2.29 MILLION/UL (ref 3.88–5.62)
RBC # BLD AUTO: 2.31 MILLION/UL (ref 3.88–5.62)
RBC # BLD AUTO: 2.32 MILLION/UL (ref 3.88–5.62)
RBC # BLD AUTO: 2.42 MILLION/UL (ref 3.88–5.62)
RBC # BLD AUTO: 2.44 MILLION/UL (ref 3.88–5.62)
RBC # BLD AUTO: 2.62 MILLION/UL (ref 3.88–5.62)
RBC # BLD AUTO: 2.62 MILLION/UL (ref 3.88–5.62)
RBC # BLD AUTO: 2.66 MILLION/UL (ref 3.88–5.62)
RBC # BLD AUTO: 2.69 MILLION/UL (ref 3.88–5.62)
RBC # BLD AUTO: 2.72 MILLION/UL (ref 3.88–5.62)
RBC # BLD AUTO: 2.74 MILLION/UL (ref 3.88–5.62)
RBC # BLD AUTO: 2.74 MILLION/UL (ref 3.88–5.62)
RBC # BLD AUTO: 2.78 MILLION/UL (ref 3.88–5.62)
RBC # BLD AUTO: 2.81 MILLION/UL (ref 3.88–5.62)
RBC # BLD AUTO: 2.81 MILLION/UL (ref 3.88–5.62)
RBC # BLD AUTO: 2.83 MILLION/UL (ref 3.88–5.62)
RBC # BLD AUTO: 2.99 MILLION/UL (ref 3.88–5.62)
RBC # BLD AUTO: 3 MILLION/UL (ref 3.88–5.62)
RBC # BLD AUTO: 3.06 MILLION/UL (ref 3.88–5.62)
RBC # BLD AUTO: 3.09 MILLION/UL (ref 3.88–5.62)
RBC # BLD AUTO: 3.29 MILLION/UL (ref 3.88–5.62)
RBC # BLD AUTO: 3.42 MILLION/UL (ref 3.88–5.62)
RBC #/AREA URNS AUTO: ABNORMAL /HPF
RBC MORPH BLD: NORMAL
RBC MORPH BLD: PRESENT
RH BLD: NEGATIVE
SARS-COV-2 RNA RESP QL NAA+PROBE: NEGATIVE
SCAN RESULT: NORMAL
SCHISTOCYTES BLD QL SMEAR: PRESENT
SCHISTOCYTES BLD QL SMEAR: PRESENT
SL AMB DISCLAIMER: ABNORMAL
SODIUM 24H UR-SCNC: 21 MOL/L
SODIUM SERPL-SCNC: 132 MMOL/L (ref 136–145)
SODIUM SERPL-SCNC: 133 MMOL/L (ref 136–145)
SODIUM SERPL-SCNC: 134 MMOL/L (ref 136–145)
SODIUM SERPL-SCNC: 135 MMOL/L (ref 136–145)
SODIUM SERPL-SCNC: 136 MMOL/L (ref 136–145)
SODIUM SERPL-SCNC: 137 MMOL/L (ref 136–145)
SODIUM SERPL-SCNC: 138 MMOL/L (ref 136–145)
SODIUM SERPL-SCNC: 139 MMOL/L (ref 136–145)
SODIUM SERPL-SCNC: 141 MMOL/L (ref 136–145)
SP GR UR STRIP.AUTO: 1.01 (ref 1–1.03)
SP GR UR STRIP.AUTO: 1.01 (ref 1–1.03)
SP GR UR STRIP.AUTO: 1.02 (ref 1–1.03)
SPECIMEN EXPIRATION DATE: NORMAL
T WAVE AXIS: 65 DEGREES
T WAVE AXIS: 80 DEGREES
T4 FREE SERPL-MCNC: 1.72 NG/DL (ref 0.76–1.46)
TIBC SERPL-MCNC: 285 UG/DL (ref 250–450)
TOTAL CELLS COUNTED SPEC: 100
TROPONIN I SERPL-MCNC: <0.02 NG/ML
TSH SERPL DL<=0.05 MIU/L-ACNC: 2.27 UIU/ML (ref 0.36–3.74)
UNIT DISPENSE STATUS: NORMAL
UNIT PRODUCT CODE: NORMAL
UNIT RH: NORMAL
URATE SERPL-MCNC: 11.2 MG/DL (ref 4.2–8)
UROBILINOGEN UR QL STRIP.AUTO: 0.2 E.U./DL
UROBILINOGEN UR QL STRIP.AUTO: 0.2 E.U./DL
UROBILINOGEN UR QL STRIP.AUTO: 1 E.U./DL
VANCOMYCIN TROUGH SERPL-MCNC: 12.2 UG/ML (ref 10–20)
VANCOMYCIN TROUGH SERPL-MCNC: 13.8 UG/ML (ref 10–20)
VANCOMYCIN TROUGH SERPL-MCNC: 18.8 UG/ML (ref 10–20)
VARIANT LYMPHS # BLD AUTO: 1 %
VENTRICULAR RATE: 101 BPM
VENTRICULAR RATE: 133 BPM
VIT B12 SERPL-MCNC: 1696 PG/ML (ref 100–900)
WBC # BLD AUTO: 1.84 THOUSAND/UL (ref 4.31–10.16)
WBC # BLD AUTO: 2.2 THOUSAND/UL (ref 4.31–10.16)
WBC # BLD AUTO: 2.24 THOUSAND/UL (ref 4.31–10.16)
WBC # BLD AUTO: 2.24 THOUSAND/UL (ref 4.31–10.16)
WBC # BLD AUTO: 2.6 THOUSAND/UL (ref 4.31–10.16)
WBC # BLD AUTO: 2.84 THOUSAND/UL (ref 4.31–10.16)
WBC # BLD AUTO: 2.89 THOUSAND/UL (ref 4.31–10.16)
WBC # BLD AUTO: 2.94 THOUSAND/UL (ref 4.31–10.16)
WBC # BLD AUTO: 3.16 THOUSAND/UL (ref 4.31–10.16)
WBC # BLD AUTO: 3.23 THOUSAND/UL (ref 4.31–10.16)
WBC # BLD AUTO: 3.33 THOUSAND/UL (ref 4.31–10.16)
WBC # BLD AUTO: 3.42 THOUSAND/UL (ref 4.31–10.16)
WBC # BLD AUTO: 4.11 THOUSAND/UL (ref 4.31–10.16)
WBC # BLD AUTO: 4.87 THOUSAND/UL (ref 4.31–10.16)
WBC # BLD AUTO: 4.98 THOUSAND/UL (ref 4.31–10.16)
WBC # BLD AUTO: 5.27 THOUSAND/UL (ref 4.31–10.16)
WBC # BLD AUTO: 5.9 THOUSAND/UL (ref 4.31–10.16)
WBC # BLD AUTO: 5.91 THOUSAND/UL (ref 4.31–10.16)
WBC # BLD AUTO: 6 THOUSAND/UL (ref 4.31–10.16)
WBC # BLD AUTO: 6.16 THOUSAND/UL (ref 4.31–10.16)
WBC # BLD AUTO: 6.3 THOUSAND/UL (ref 4.31–10.16)
WBC # BLD AUTO: 6.32 THOUSAND/UL (ref 4.31–10.16)
WBC # BLD AUTO: 6.61 THOUSAND/UL (ref 4.31–10.16)
WBC # BLD AUTO: 7.48 THOUSAND/UL (ref 4.31–10.16)
WBC # BLD AUTO: 7.51 THOUSAND/UL (ref 4.31–10.16)
WBC #/AREA URNS AUTO: ABNORMAL /HPF

## 2020-01-01 PROCEDURE — 77075 RADEX OSSEOUS SURVEY COMPL: CPT

## 2020-01-01 PROCEDURE — 85025 COMPLETE CBC W/AUTO DIFF WBC: CPT | Performed by: PHYSICIAN ASSISTANT

## 2020-01-01 PROCEDURE — 97163 PT EVAL HIGH COMPLEX 45 MIN: CPT

## 2020-01-01 PROCEDURE — 85014 HEMATOCRIT: CPT | Performed by: INTERNAL MEDICINE

## 2020-01-01 PROCEDURE — 36415 COLL VENOUS BLD VENIPUNCTURE: CPT | Performed by: EMERGENCY MEDICINE

## 2020-01-01 PROCEDURE — 86900 BLOOD TYPING SEROLOGIC ABO: CPT | Performed by: EMERGENCY MEDICINE

## 2020-01-01 PROCEDURE — 99232 SBSQ HOSP IP/OBS MODERATE 35: CPT | Performed by: INTERNAL MEDICINE

## 2020-01-01 PROCEDURE — 85027 COMPLETE CBC AUTOMATED: CPT | Performed by: INTERNAL MEDICINE

## 2020-01-01 PROCEDURE — 87186 SC STD MICRODIL/AGAR DIL: CPT | Performed by: PODIATRIST

## 2020-01-01 PROCEDURE — 99232 SBSQ HOSP IP/OBS MODERATE 35: CPT | Performed by: HOSPITALIST

## 2020-01-01 PROCEDURE — 99285 EMERGENCY DEPT VISIT HI MDM: CPT | Performed by: PHYSICIAN ASSISTANT

## 2020-01-01 PROCEDURE — 85018 HEMOGLOBIN: CPT | Performed by: STUDENT IN AN ORGANIZED HEALTH CARE EDUCATION/TRAINING PROGRAM

## 2020-01-01 PROCEDURE — 96374 THER/PROPH/DIAG INJ IV PUSH: CPT

## 2020-01-01 PROCEDURE — 88237 TISSUE CULTURE BONE MARROW: CPT

## 2020-01-01 PROCEDURE — 85049 AUTOMATED PLATELET COUNT: CPT | Performed by: NURSE PRACTITIONER

## 2020-01-01 PROCEDURE — 85027 COMPLETE CBC AUTOMATED: CPT | Performed by: PHYSICIAN ASSISTANT

## 2020-01-01 PROCEDURE — 84100 ASSAY OF PHOSPHORUS: CPT | Performed by: INTERNAL MEDICINE

## 2020-01-01 PROCEDURE — 99233 SBSQ HOSP IP/OBS HIGH 50: CPT | Performed by: INTERNAL MEDICINE

## 2020-01-01 PROCEDURE — 82306 VITAMIN D 25 HYDROXY: CPT | Performed by: INTERNAL MEDICINE

## 2020-01-01 PROCEDURE — 86900 BLOOD TYPING SEROLOGIC ABO: CPT | Performed by: INTERNAL MEDICINE

## 2020-01-01 PROCEDURE — 85014 HEMATOCRIT: CPT | Performed by: PHYSICIAN ASSISTANT

## 2020-01-01 PROCEDURE — 80053 COMPREHEN METABOLIC PANEL: CPT | Performed by: EMERGENCY MEDICINE

## 2020-01-01 PROCEDURE — 11045 DBRDMT SUBQ TISS EACH ADDL: CPT | Performed by: PODIATRIST

## 2020-01-01 PROCEDURE — 047M3ZZ DILATION OF RIGHT POPLITEAL ARTERY, PERCUTANEOUS APPROACH: ICD-10-PCS | Performed by: RADIOLOGY

## 2020-01-01 PROCEDURE — 36415 COLL VENOUS BLD VENIPUNCTURE: CPT

## 2020-01-01 PROCEDURE — 84165 PROTEIN E-PHORESIS SERUM: CPT | Performed by: PATHOLOGY

## 2020-01-01 PROCEDURE — 77012 CT SCAN FOR NEEDLE BIOPSY: CPT

## 2020-01-01 PROCEDURE — 1111F DSCHRG MED/CURRENT MED MERGE: CPT | Performed by: PHYSICIAN ASSISTANT

## 2020-01-01 PROCEDURE — 80053 COMPREHEN METABOLIC PANEL: CPT | Performed by: PHYSICIAN ASSISTANT

## 2020-01-01 PROCEDURE — 99284 EMERGENCY DEPT VISIT MOD MDM: CPT

## 2020-01-01 PROCEDURE — 73590 X-RAY EXAM OF LOWER LEG: CPT

## 2020-01-01 PROCEDURE — 80048 BASIC METABOLIC PNL TOTAL CA: CPT | Performed by: INTERNAL MEDICINE

## 2020-01-01 PROCEDURE — 99232 SBSQ HOSP IP/OBS MODERATE 35: CPT | Performed by: PODIATRIST

## 2020-01-01 PROCEDURE — 93926 LOWER EXTREMITY STUDY: CPT | Performed by: SURGERY

## 2020-01-01 PROCEDURE — 87185 SC STD ENZYME DETCJ PER NZM: CPT | Performed by: PHYSICIAN ASSISTANT

## 2020-01-01 PROCEDURE — 87070 CULTURE OTHR SPECIMN AEROBIC: CPT | Performed by: NURSE PRACTITIONER

## 2020-01-01 PROCEDURE — 87205 SMEAR GRAM STAIN: CPT | Performed by: PODIATRIST

## 2020-01-01 PROCEDURE — 84165 PROTEIN E-PHORESIS SERUM: CPT | Performed by: NURSE PRACTITIONER

## 2020-01-01 PROCEDURE — 88364 INSITU HYBRIDIZATION (FISH): CPT | Performed by: PATHOLOGY

## 2020-01-01 PROCEDURE — P9016 RBC LEUKOCYTES REDUCED: HCPCS

## 2020-01-01 PROCEDURE — 99024 POSTOP FOLLOW-UP VISIT: CPT | Performed by: PHYSICIAN ASSISTANT

## 2020-01-01 PROCEDURE — 97167 OT EVAL HIGH COMPLEX 60 MIN: CPT

## 2020-01-01 PROCEDURE — 85007 BL SMEAR W/DIFF WBC COUNT: CPT | Performed by: INTERNAL MEDICINE

## 2020-01-01 PROCEDURE — 86923 COMPATIBILITY TEST ELECTRIC: CPT

## 2020-01-01 PROCEDURE — 88342 IMHCHEM/IMCYTCHM 1ST ANTB: CPT | Performed by: PATHOLOGY

## 2020-01-01 PROCEDURE — C1781 MESH (IMPLANTABLE): HCPCS | Performed by: SURGERY

## 2020-01-01 PROCEDURE — 84134 ASSAY OF PREALBUMIN: CPT | Performed by: PHYSICIAN ASSISTANT

## 2020-01-01 PROCEDURE — 85018 HEMOGLOBIN: CPT | Performed by: INTERNAL MEDICINE

## 2020-01-01 PROCEDURE — 99153 MOD SED SAME PHYS/QHP EA: CPT

## 2020-01-01 PROCEDURE — 11042 DBRDMT SUBQ TIS 1ST 20SQCM/<: CPT | Performed by: PODIATRIST

## 2020-01-01 PROCEDURE — 87040 BLOOD CULTURE FOR BACTERIA: CPT | Performed by: PHYSICIAN ASSISTANT

## 2020-01-01 PROCEDURE — 0WQF0ZZ REPAIR ABDOMINAL WALL, OPEN APPROACH: ICD-10-PCS | Performed by: SURGERY

## 2020-01-01 PROCEDURE — 1160F RVW MEDS BY RX/DR IN RCRD: CPT | Performed by: PHYSICIAN ASSISTANT

## 2020-01-01 PROCEDURE — 80202 ASSAY OF VANCOMYCIN: CPT | Performed by: PHYSICIAN ASSISTANT

## 2020-01-01 PROCEDURE — 99213 OFFICE O/P EST LOW 20 MIN: CPT | Performed by: PHYSICIAN ASSISTANT

## 2020-01-01 PROCEDURE — 87070 CULTURE OTHR SPECIMN AEROBIC: CPT | Performed by: PODIATRIST

## 2020-01-01 PROCEDURE — 84145 PROCALCITONIN (PCT): CPT | Performed by: PHYSICIAN ASSISTANT

## 2020-01-01 PROCEDURE — C2623 CATH, TRANSLUMIN, DRUG-COAT: HCPCS

## 2020-01-01 PROCEDURE — 83605 ASSAY OF LACTIC ACID: CPT | Performed by: NURSE PRACTITIONER

## 2020-01-01 PROCEDURE — 99285 EMERGENCY DEPT VISIT HI MDM: CPT

## 2020-01-01 PROCEDURE — 84484 ASSAY OF TROPONIN QUANT: CPT | Performed by: EMERGENCY MEDICINE

## 2020-01-01 PROCEDURE — 85018 HEMOGLOBIN: CPT | Performed by: PHYSICIAN ASSISTANT

## 2020-01-01 PROCEDURE — 80202 ASSAY OF VANCOMYCIN: CPT | Performed by: STUDENT IN AN ORGANIZED HEALTH CARE EDUCATION/TRAINING PROGRAM

## 2020-01-01 PROCEDURE — C9113 INJ PANTOPRAZOLE SODIUM, VIA: HCPCS | Performed by: INTERNAL MEDICINE

## 2020-01-01 PROCEDURE — 30233N1 TRANSFUSION OF NONAUTOLOGOUS RED BLOOD CELLS INTO PERIPHERAL VEIN, PERCUTANEOUS APPROACH: ICD-10-PCS | Performed by: INTERNAL MEDICINE

## 2020-01-01 PROCEDURE — 93926 LOWER EXTREMITY STUDY: CPT

## 2020-01-01 PROCEDURE — 83935 ASSAY OF URINE OSMOLALITY: CPT | Performed by: INTERNAL MEDICINE

## 2020-01-01 PROCEDURE — 86900 BLOOD TYPING SEROLOGIC ABO: CPT | Performed by: PHYSICIAN ASSISTANT

## 2020-01-01 PROCEDURE — NC001 PR NO CHARGE: Performed by: SURGERY

## 2020-01-01 PROCEDURE — 11045 DBRDMT SUBQ TISS EACH ADDL: CPT | Performed by: FAMILY MEDICINE

## 2020-01-01 PROCEDURE — 84100 ASSAY OF PHOSPHORUS: CPT | Performed by: PHYSICIAN ASSISTANT

## 2020-01-01 PROCEDURE — 99223 1ST HOSP IP/OBS HIGH 75: CPT | Performed by: NURSE PRACTITIONER

## 2020-01-01 PROCEDURE — 80048 BASIC METABOLIC PNL TOTAL CA: CPT | Performed by: PHYSICIAN ASSISTANT

## 2020-01-01 PROCEDURE — 99024 POSTOP FOLLOW-UP VISIT: CPT | Performed by: SURGERY

## 2020-01-01 PROCEDURE — C1894 INTRO/SHEATH, NON-LASER: HCPCS

## 2020-01-01 PROCEDURE — 88365 INSITU HYBRIDIZATION (FISH): CPT | Performed by: PATHOLOGY

## 2020-01-01 PROCEDURE — 99223 1ST HOSP IP/OBS HIGH 75: CPT | Performed by: INTERNAL MEDICINE

## 2020-01-01 PROCEDURE — 87070 CULTURE OTHR SPECIMN AEROBIC: CPT | Performed by: PHYSICIAN ASSISTANT

## 2020-01-01 PROCEDURE — 86850 RBC ANTIBODY SCREEN: CPT | Performed by: EMERGENCY MEDICINE

## 2020-01-01 PROCEDURE — 93005 ELECTROCARDIOGRAM TRACING: CPT

## 2020-01-01 PROCEDURE — 85730 THROMBOPLASTIN TIME PARTIAL: CPT | Performed by: EMERGENCY MEDICINE

## 2020-01-01 PROCEDURE — 80048 BASIC METABOLIC PNL TOTAL CA: CPT | Performed by: NURSE PRACTITIONER

## 2020-01-01 PROCEDURE — C1887 CATHETER, GUIDING: HCPCS

## 2020-01-01 PROCEDURE — 99239 HOSP IP/OBS DSCHRG MGMT >30: CPT | Performed by: INTERNAL MEDICINE

## 2020-01-01 PROCEDURE — 4040F PNEUMOC VAC/ADMIN/RCVD: CPT | Performed by: PHYSICIAN ASSISTANT

## 2020-01-01 PROCEDURE — C9113 INJ PANTOPRAZOLE SODIUM, VIA: HCPCS | Performed by: PHYSICIAN ASSISTANT

## 2020-01-01 PROCEDURE — 85652 RBC SED RATE AUTOMATED: CPT | Performed by: PHYSICIAN ASSISTANT

## 2020-01-01 PROCEDURE — 83883 ASSAY NEPHELOMETRY NOT SPEC: CPT | Performed by: NURSE PRACTITIONER

## 2020-01-01 PROCEDURE — 99285 EMERGENCY DEPT VISIT HI MDM: CPT | Performed by: EMERGENCY MEDICINE

## 2020-01-01 PROCEDURE — 85007 BL SMEAR W/DIFF WBC COUNT: CPT | Performed by: PHYSICIAN ASSISTANT

## 2020-01-01 PROCEDURE — 86850 RBC ANTIBODY SCREEN: CPT | Performed by: PHYSICIAN ASSISTANT

## 2020-01-01 PROCEDURE — 87075 CULTR BACTERIA EXCEPT BLOOD: CPT | Performed by: PODIATRIST

## 2020-01-01 PROCEDURE — 82728 ASSAY OF FERRITIN: CPT | Performed by: NURSE PRACTITIONER

## 2020-01-01 PROCEDURE — 88311 DECALCIFY TISSUE: CPT | Performed by: PATHOLOGY

## 2020-01-01 PROCEDURE — 93010 ELECTROCARDIOGRAM REPORT: CPT | Performed by: INTERNAL MEDICINE

## 2020-01-01 PROCEDURE — 38221 DX BONE MARROW BIOPSIES: CPT

## 2020-01-01 PROCEDURE — 86850 RBC ANTIBODY SCREEN: CPT | Performed by: INTERNAL MEDICINE

## 2020-01-01 PROCEDURE — 80053 COMPREHEN METABOLIC PANEL: CPT | Performed by: INTERNAL MEDICINE

## 2020-01-01 PROCEDURE — 88305 TISSUE EXAM BY PATHOLOGIST: CPT | Performed by: PATHOLOGY

## 2020-01-01 PROCEDURE — 84145 PROCALCITONIN (PCT): CPT | Performed by: NURSE PRACTITIONER

## 2020-01-01 PROCEDURE — NC001 PR NO CHARGE: Performed by: PHYSICIAN ASSISTANT

## 2020-01-01 PROCEDURE — 93294 REM INTERROG EVL PM/LDLS PM: CPT | Performed by: INTERNAL MEDICINE

## 2020-01-01 PROCEDURE — 75710 ARTERY X-RAYS ARM/LEG: CPT

## 2020-01-01 PROCEDURE — 0JDN0ZZ EXTRACTION OF RIGHT LOWER LEG SUBCUTANEOUS TISSUE AND FASCIA, OPEN APPROACH: ICD-10-PCS | Performed by: PODIATRIST

## 2020-01-01 PROCEDURE — 99232 SBSQ HOSP IP/OBS MODERATE 35: CPT | Performed by: NURSE PRACTITIONER

## 2020-01-01 PROCEDURE — 3075F SYST BP GE 130 - 139MM HG: CPT | Performed by: PHYSICIAN ASSISTANT

## 2020-01-01 PROCEDURE — 3079F DIAST BP 80-89 MM HG: CPT | Performed by: PHYSICIAN ASSISTANT

## 2020-01-01 PROCEDURE — 99211 OFF/OP EST MAY X REQ PHY/QHP: CPT

## 2020-01-01 PROCEDURE — 87147 CULTURE TYPE IMMUNOLOGIC: CPT | Performed by: EMERGENCY MEDICINE

## 2020-01-01 PROCEDURE — 82232 ASSAY OF BETA-2 PROTEIN: CPT | Performed by: NURSE PRACTITIONER

## 2020-01-01 PROCEDURE — 93306 TTE W/DOPPLER COMPLETE: CPT | Performed by: INTERNAL MEDICINE

## 2020-01-01 PROCEDURE — 83605 ASSAY OF LACTIC ACID: CPT | Performed by: EMERGENCY MEDICINE

## 2020-01-01 PROCEDURE — 88184 FLOWCYTOMETRY/ TC 1 MARKER: CPT

## 2020-01-01 PROCEDURE — 75710 ARTERY X-RAYS ARM/LEG: CPT | Performed by: RADIOLOGY

## 2020-01-01 PROCEDURE — 83970 ASSAY OF PARATHORMONE: CPT | Performed by: INTERNAL MEDICINE

## 2020-01-01 PROCEDURE — 97530 THERAPEUTIC ACTIVITIES: CPT

## 2020-01-01 PROCEDURE — 3074F SYST BP LT 130 MM HG: CPT | Performed by: SURGERY

## 2020-01-01 PROCEDURE — P9035 PLATELET PHERES LEUKOREDUCED: HCPCS

## 2020-01-01 PROCEDURE — 99442 PR PHYS/QHP TELEPHONE EVALUATION 11-20 MIN: CPT | Performed by: SURGERY

## 2020-01-01 PROCEDURE — 86901 BLOOD TYPING SEROLOGIC RH(D): CPT | Performed by: PHYSICIAN ASSISTANT

## 2020-01-01 PROCEDURE — 82784 ASSAY IGA/IGD/IGG/IGM EACH: CPT | Performed by: NURSE PRACTITIONER

## 2020-01-01 PROCEDURE — 1111F DSCHRG MED/CURRENT MED MERGE: CPT | Performed by: SURGERY

## 2020-01-01 PROCEDURE — 99223 1ST HOSP IP/OBS HIGH 75: CPT | Performed by: PODIATRIST

## 2020-01-01 PROCEDURE — 87205 SMEAR GRAM STAIN: CPT | Performed by: PHYSICIAN ASSISTANT

## 2020-01-01 PROCEDURE — C1769 GUIDE WIRE: HCPCS

## 2020-01-01 PROCEDURE — 97165 OT EVAL LOW COMPLEX 30 MIN: CPT

## 2020-01-01 PROCEDURE — 80048 BASIC METABOLIC PNL TOTAL CA: CPT | Performed by: HOSPITALIST

## 2020-01-01 PROCEDURE — 88262 CHROMOSOME ANALYSIS 15-20: CPT

## 2020-01-01 PROCEDURE — 99214 OFFICE O/P EST MOD 30 MIN: CPT | Performed by: SURGERY

## 2020-01-01 PROCEDURE — 99024 POSTOP FOLLOW-UP VISIT: CPT | Performed by: RADIOLOGY

## 2020-01-01 PROCEDURE — 81001 URINALYSIS AUTO W/SCOPE: CPT | Performed by: EMERGENCY MEDICINE

## 2020-01-01 PROCEDURE — 88304 TISSUE EXAM BY PATHOLOGIST: CPT | Performed by: PATHOLOGY

## 2020-01-01 PROCEDURE — 85097 BONE MARROW INTERPRETATION: CPT | Performed by: PATHOLOGY

## 2020-01-01 PROCEDURE — 84550 ASSAY OF BLOOD/URIC ACID: CPT | Performed by: INTERNAL MEDICINE

## 2020-01-01 PROCEDURE — 84166 PROTEIN E-PHORESIS/URINE/CSF: CPT | Performed by: INTERNAL MEDICINE

## 2020-01-01 PROCEDURE — 80202 ASSAY OF VANCOMYCIN: CPT | Performed by: INTERNAL MEDICINE

## 2020-01-01 PROCEDURE — 88341 IMHCHEM/IMCYTCHM EA ADD ANTB: CPT | Performed by: PATHOLOGY

## 2020-01-01 PROCEDURE — 87186 SC STD MICRODIL/AGAR DIL: CPT | Performed by: EMERGENCY MEDICINE

## 2020-01-01 PROCEDURE — 047K3ZZ DILATION OF RIGHT FEMORAL ARTERY, PERCUTANEOUS APPROACH: ICD-10-PCS | Performed by: RADIOLOGY

## 2020-01-01 PROCEDURE — 82272 OCCULT BLD FECES 1-3 TESTS: CPT | Performed by: INTERNAL MEDICINE

## 2020-01-01 PROCEDURE — 71045 X-RAY EXAM CHEST 1 VIEW: CPT

## 2020-01-01 PROCEDURE — 85018 HEMOGLOBIN: CPT | Performed by: NURSE PRACTITIONER

## 2020-01-01 PROCEDURE — 3079F DIAST BP 80-89 MM HG: CPT | Performed by: SURGERY

## 2020-01-01 PROCEDURE — 30233N1 TRANSFUSION OF NONAUTOLOGOUS RED BLOOD CELLS INTO PERIPHERAL VEIN, PERCUTANEOUS APPROACH: ICD-10-PCS | Performed by: EMERGENCY MEDICINE

## 2020-01-01 PROCEDURE — P9040 RBC LEUKOREDUCED IRRADIATED: HCPCS

## 2020-01-01 PROCEDURE — G2066 INTER DEVC REMOTE 30D: HCPCS | Performed by: INTERNAL MEDICINE

## 2020-01-01 PROCEDURE — 83690 ASSAY OF LIPASE: CPT | Performed by: EMERGENCY MEDICINE

## 2020-01-01 PROCEDURE — 99214 OFFICE O/P EST MOD 30 MIN: CPT | Performed by: PODIATRIST

## 2020-01-01 PROCEDURE — 11042 DBRDMT SUBQ TIS 1ST 20SQCM/<: CPT | Performed by: FAMILY MEDICINE

## 2020-01-01 PROCEDURE — 2W03X6Z CHANGE PRESSURE DRESSING ON ABDOMINAL WALL: ICD-10-PCS | Performed by: SURGERY

## 2020-01-01 PROCEDURE — 93297 REM INTERROG DEV EVAL ICPMS: CPT | Performed by: INTERNAL MEDICINE

## 2020-01-01 PROCEDURE — 99221 1ST HOSP IP/OBS SF/LOW 40: CPT | Performed by: PHYSICIAN ASSISTANT

## 2020-01-01 PROCEDURE — 87205 SMEAR GRAM STAIN: CPT | Performed by: EMERGENCY MEDICINE

## 2020-01-01 PROCEDURE — 49565 PR REPAIR RECURR INCIS HERNIA,REDUC: CPT | Performed by: PHYSICIAN ASSISTANT

## 2020-01-01 PROCEDURE — B410YZZ FLUOROSCOPY OF ABDOMINAL AORTA USING OTHER CONTRAST: ICD-10-PCS | Performed by: RADIOLOGY

## 2020-01-01 PROCEDURE — 83735 ASSAY OF MAGNESIUM: CPT | Performed by: PHYSICIAN ASSISTANT

## 2020-01-01 PROCEDURE — 87077 CULTURE AEROBIC IDENTIFY: CPT | Performed by: PODIATRIST

## 2020-01-01 PROCEDURE — 85014 HEMATOCRIT: CPT | Performed by: STUDENT IN AN ORGANIZED HEALTH CARE EDUCATION/TRAINING PROGRAM

## 2020-01-01 PROCEDURE — 99231 SBSQ HOSP IP/OBS SF/LOW 25: CPT | Performed by: INTERNAL MEDICINE

## 2020-01-01 PROCEDURE — 99222 1ST HOSP IP/OBS MODERATE 55: CPT | Performed by: SURGERY

## 2020-01-01 PROCEDURE — 87186 SC STD MICRODIL/AGAR DIL: CPT | Performed by: PHYSICIAN ASSISTANT

## 2020-01-01 PROCEDURE — 36415 COLL VENOUS BLD VENIPUNCTURE: CPT | Performed by: PHYSICIAN ASSISTANT

## 2020-01-01 PROCEDURE — 87076 CULTURE ANAEROBE IDENT EACH: CPT | Performed by: PHYSICIAN ASSISTANT

## 2020-01-01 PROCEDURE — 84165 PROTEIN E-PHORESIS SERUM: CPT | Performed by: INTERNAL MEDICINE

## 2020-01-01 PROCEDURE — 74176 CT ABD & PELVIS W/O CONTRAST: CPT

## 2020-01-01 PROCEDURE — G0008 ADMIN INFLUENZA VIRUS VAC: HCPCS | Performed by: INTERNAL MEDICINE

## 2020-01-01 PROCEDURE — 07DR3ZX EXTRACTION OF ILIAC BONE MARROW, PERCUTANEOUS APPROACH, DIAGNOSTIC: ICD-10-PCS | Performed by: RADIOLOGY

## 2020-01-01 PROCEDURE — 99222 1ST HOSP IP/OBS MODERATE 55: CPT | Performed by: INTERNAL MEDICINE

## 2020-01-01 PROCEDURE — 80048 BASIC METABOLIC PNL TOTAL CA: CPT | Performed by: STUDENT IN AN ORGANIZED HEALTH CARE EDUCATION/TRAINING PROGRAM

## 2020-01-01 PROCEDURE — 84300 ASSAY OF URINE SODIUM: CPT | Performed by: INTERNAL MEDICINE

## 2020-01-01 PROCEDURE — 37224 HB FEM/POPL REVAS W/TLA: CPT

## 2020-01-01 PROCEDURE — 3008F BODY MASS INDEX DOCD: CPT | Performed by: PHYSICIAN ASSISTANT

## 2020-01-01 PROCEDURE — 76937 US GUIDE VASCULAR ACCESS: CPT | Performed by: RADIOLOGY

## 2020-01-01 PROCEDURE — 37224 PR REVSC OPN/PRG FEM/POP W/ANGIOPLASTY UNI: CPT | Performed by: RADIOLOGY

## 2020-01-01 PROCEDURE — 88313 SPECIAL STAINS GROUP 2: CPT | Performed by: PATHOLOGY

## 2020-01-01 PROCEDURE — 96375 TX/PRO/DX INJ NEW DRUG ADDON: CPT

## 2020-01-01 PROCEDURE — 87075 CULTR BACTERIA EXCEPT BLOOD: CPT | Performed by: PHYSICIAN ASSISTANT

## 2020-01-01 PROCEDURE — 0WUF0JZ SUPPLEMENT ABDOMINAL WALL WITH SYNTHETIC SUBSTITUTE, OPEN APPROACH: ICD-10-PCS | Performed by: SURGERY

## 2020-01-01 PROCEDURE — 86901 BLOOD TYPING SEROLOGIC RH(D): CPT | Performed by: INTERNAL MEDICINE

## 2020-01-01 PROCEDURE — 99152 MOD SED SAME PHYS/QHP 5/>YRS: CPT

## 2020-01-01 PROCEDURE — 81003 URINALYSIS AUTO W/O SCOPE: CPT | Performed by: NURSE PRACTITIONER

## 2020-01-01 PROCEDURE — 83540 ASSAY OF IRON: CPT | Performed by: NURSE PRACTITIONER

## 2020-01-01 PROCEDURE — 99232 SBSQ HOSP IP/OBS MODERATE 35: CPT | Performed by: PHYSICIAN ASSISTANT

## 2020-01-01 PROCEDURE — 86901 BLOOD TYPING SEROLOGIC RH(D): CPT | Performed by: NURSE PRACTITIONER

## 2020-01-01 PROCEDURE — 83918 ORGANIC ACIDS TOTAL QUANT: CPT | Performed by: NURSE PRACTITIONER

## 2020-01-01 PROCEDURE — 82550 ASSAY OF CK (CPK): CPT | Performed by: EMERGENCY MEDICINE

## 2020-01-01 PROCEDURE — 86334 IMMUNOFIX E-PHORESIS SERUM: CPT | Performed by: INTERNAL MEDICINE

## 2020-01-01 PROCEDURE — 83550 IRON BINDING TEST: CPT | Performed by: NURSE PRACTITIONER

## 2020-01-01 PROCEDURE — 93922 UPR/L XTREMITY ART 2 LEVELS: CPT | Performed by: INTERNAL MEDICINE

## 2020-01-01 PROCEDURE — C1725 CATH, TRANSLUMIN NON-LASER: HCPCS

## 2020-01-01 PROCEDURE — B41FYZZ FLUOROSCOPY OF RIGHT LOWER EXTREMITY ARTERIES USING OTHER CONTRAST: ICD-10-PCS | Performed by: RADIOLOGY

## 2020-01-01 PROCEDURE — 99152 MOD SED SAME PHYS/QHP 5/>YRS: CPT | Performed by: RADIOLOGY

## 2020-01-01 PROCEDURE — 84166 PROTEIN E-PHORESIS/URINE/CSF: CPT | Performed by: PATHOLOGY

## 2020-01-01 PROCEDURE — 83883 ASSAY NEPHELOMETRY NOT SPEC: CPT | Performed by: INTERNAL MEDICINE

## 2020-01-01 PROCEDURE — 87185 SC STD ENZYME DETCJ PER NZM: CPT | Performed by: PODIATRIST

## 2020-01-01 PROCEDURE — 76937 US GUIDE VASCULAR ACCESS: CPT

## 2020-01-01 PROCEDURE — 0JB80ZZ EXCISION OF ABDOMEN SUBCUTANEOUS TISSUE AND FASCIA, OPEN APPROACH: ICD-10-PCS | Performed by: SURGERY

## 2020-01-01 PROCEDURE — 81450 HL NEO GSAP 5-50DNA/DNA&RNA: CPT

## 2020-01-01 PROCEDURE — 83880 ASSAY OF NATRIURETIC PEPTIDE: CPT | Performed by: EMERGENCY MEDICINE

## 2020-01-01 PROCEDURE — 97116 GAIT TRAINING THERAPY: CPT

## 2020-01-01 PROCEDURE — 84443 ASSAY THYROID STIM HORMONE: CPT | Performed by: INTERNAL MEDICINE

## 2020-01-01 PROCEDURE — 0JDQ0ZZ EXTRACTION OF RIGHT FOOT SUBCUTANEOUS TISSUE AND FASCIA, OPEN APPROACH: ICD-10-PCS | Performed by: PODIATRIST

## 2020-01-01 PROCEDURE — 49900 REPAIR OF ABDOMINAL WALL: CPT | Performed by: PHYSICIAN ASSISTANT

## 2020-01-01 PROCEDURE — 93923 UPR/LXTR ART STDY 3+ LVLS: CPT

## 2020-01-01 PROCEDURE — 93296 REM INTERROG EVL PM/IDS: CPT | Performed by: INTERNAL MEDICINE

## 2020-01-01 PROCEDURE — 85007 BL SMEAR W/DIFF WBC COUNT: CPT | Performed by: EMERGENCY MEDICINE

## 2020-01-01 PROCEDURE — 96365 THER/PROPH/DIAG IV INF INIT: CPT

## 2020-01-01 PROCEDURE — 079T3ZX DRAINAGE OF BONE MARROW, PERCUTANEOUS APPROACH, DIAGNOSTIC: ICD-10-PCS | Performed by: RADIOLOGY

## 2020-01-01 PROCEDURE — 87077 CULTURE AEROBIC IDENTIFY: CPT | Performed by: EMERGENCY MEDICINE

## 2020-01-01 PROCEDURE — 75625 CONTRAST EXAM ABDOMINL AORTA: CPT | Performed by: RADIOLOGY

## 2020-01-01 PROCEDURE — 80048 BASIC METABOLIC PNL TOTAL CA: CPT | Performed by: FAMILY MEDICINE

## 2020-01-01 PROCEDURE — 83930 ASSAY OF BLOOD OSMOLALITY: CPT | Performed by: INTERNAL MEDICINE

## 2020-01-01 PROCEDURE — 3008F BODY MASS INDEX DOCD: CPT | Performed by: SURGERY

## 2020-01-01 PROCEDURE — 97535 SELF CARE MNGMENT TRAINING: CPT

## 2020-01-01 PROCEDURE — 84145 PROCALCITONIN (PCT): CPT | Performed by: INTERNAL MEDICINE

## 2020-01-01 PROCEDURE — 87205 SMEAR GRAM STAIN: CPT | Performed by: NURSE PRACTITIONER

## 2020-01-01 PROCEDURE — 84439 ASSAY OF FREE THYROXINE: CPT | Performed by: INTERNAL MEDICINE

## 2020-01-01 PROCEDURE — 0JN80ZZ RELEASE ABDOMEN SUBCUTANEOUS TISSUE AND FASCIA, OPEN APPROACH: ICD-10-PCS | Performed by: SURGERY

## 2020-01-01 PROCEDURE — 1124F ACP DISCUSS-NO DSCNMKR DOCD: CPT | Performed by: EMERGENCY MEDICINE

## 2020-01-01 PROCEDURE — 87147 CULTURE TYPE IMMUNOLOGIC: CPT | Performed by: PHYSICIAN ASSISTANT

## 2020-01-01 PROCEDURE — 87147 CULTURE TYPE IMMUNOLOGIC: CPT | Performed by: NURSE PRACTITIONER

## 2020-01-01 PROCEDURE — 82607 VITAMIN B-12: CPT | Performed by: NURSE PRACTITIONER

## 2020-01-01 PROCEDURE — 83735 ASSAY OF MAGNESIUM: CPT | Performed by: INTERNAL MEDICINE

## 2020-01-01 PROCEDURE — 86850 RBC ANTIBODY SCREEN: CPT | Performed by: NURSE PRACTITIONER

## 2020-01-01 PROCEDURE — 88360 TUMOR IMMUNOHISTOCHEM/MANUAL: CPT | Performed by: PATHOLOGY

## 2020-01-01 PROCEDURE — 1160F RVW MEDS BY RX/DR IN RCRD: CPT | Performed by: SURGERY

## 2020-01-01 PROCEDURE — G0463 HOSPITAL OUTPT CLINIC VISIT: HCPCS | Performed by: PHYSICIAN ASSISTANT

## 2020-01-01 PROCEDURE — 87077 CULTURE AEROBIC IDENTIFY: CPT | Performed by: PHYSICIAN ASSISTANT

## 2020-01-01 PROCEDURE — 88185 FLOWCYTOMETRY/TC ADD-ON: CPT

## 2020-01-01 PROCEDURE — 96361 HYDRATE IV INFUSION ADD-ON: CPT

## 2020-01-01 PROCEDURE — 81003 URINALYSIS AUTO W/O SCOPE: CPT | Performed by: INTERNAL MEDICINE

## 2020-01-01 PROCEDURE — 85027 COMPLETE CBC AUTOMATED: CPT | Performed by: EMERGENCY MEDICINE

## 2020-01-01 PROCEDURE — 93922 UPR/L XTREMITY ART 2 LEVELS: CPT | Performed by: SURGERY

## 2020-01-01 PROCEDURE — 99213 OFFICE O/P EST LOW 20 MIN: CPT | Performed by: FAMILY MEDICINE

## 2020-01-01 PROCEDURE — 82533 TOTAL CORTISOL: CPT | Performed by: INTERNAL MEDICINE

## 2020-01-01 PROCEDURE — 85014 HEMATOCRIT: CPT | Performed by: NURSE PRACTITIONER

## 2020-01-01 PROCEDURE — 86901 BLOOD TYPING SEROLOGIC RH(D): CPT | Performed by: EMERGENCY MEDICINE

## 2020-01-01 PROCEDURE — 87040 BLOOD CULTURE FOR BACTERIA: CPT | Performed by: EMERGENCY MEDICINE

## 2020-01-01 PROCEDURE — 86900 BLOOD TYPING SEROLOGIC ABO: CPT | Performed by: NURSE PRACTITIONER

## 2020-01-01 PROCEDURE — 4040F PNEUMOC VAC/ADMIN/RCVD: CPT | Performed by: SURGERY

## 2020-01-01 PROCEDURE — 90662 IIV NO PRSV INCREASED AG IM: CPT | Performed by: INTERNAL MEDICINE

## 2020-01-01 PROCEDURE — 85610 PROTHROMBIN TIME: CPT | Performed by: PHYSICIAN ASSISTANT

## 2020-01-01 PROCEDURE — 0W9F30Z DRAINAGE OF ABDOMINAL WALL WITH DRAINAGE DEVICE, PERCUTANEOUS APPROACH: ICD-10-PCS | Performed by: SURGERY

## 2020-01-01 PROCEDURE — 30233R1 TRANSFUSION OF NONAUTOLOGOUS PLATELETS INTO PERIPHERAL VEIN, PERCUTANEOUS APPROACH: ICD-10-PCS | Performed by: INTERNAL MEDICINE

## 2020-01-01 PROCEDURE — 85730 THROMBOPLASTIN TIME PARTIAL: CPT | Performed by: PHYSICIAN ASSISTANT

## 2020-01-01 PROCEDURE — 80076 HEPATIC FUNCTION PANEL: CPT | Performed by: INTERNAL MEDICINE

## 2020-01-01 PROCEDURE — 87077 CULTURE AEROBIC IDENTIFY: CPT | Performed by: NURSE PRACTITIONER

## 2020-01-01 PROCEDURE — 82746 ASSAY OF FOLIC ACID SERUM: CPT | Performed by: NURSE PRACTITIONER

## 2020-01-01 PROCEDURE — 85610 PROTHROMBIN TIME: CPT | Performed by: EMERGENCY MEDICINE

## 2020-01-01 PROCEDURE — 36430 TRANSFUSION BLD/BLD COMPNT: CPT

## 2020-01-01 PROCEDURE — 93306 TTE W/DOPPLER COMPLETE: CPT

## 2020-01-01 PROCEDURE — 87076 CULTURE ANAEROBE IDENT EACH: CPT | Performed by: PODIATRIST

## 2020-01-01 PROCEDURE — 88333 PATH CONSLTJ SURG CYTO XM 1: CPT | Performed by: PATHOLOGY

## 2020-01-01 PROCEDURE — 86140 C-REACTIVE PROTEIN: CPT | Performed by: NURSE PRACTITIONER

## 2020-01-01 PROCEDURE — 0JBN0ZZ EXCISION OF RIGHT LOWER LEG SUBCUTANEOUS TISSUE AND FASCIA, OPEN APPROACH: ICD-10-PCS | Performed by: PODIATRIST

## 2020-01-01 PROCEDURE — 87070 CULTURE OTHR SPECIMN AEROBIC: CPT | Performed by: EMERGENCY MEDICINE

## 2020-01-01 PROCEDURE — 83735 ASSAY OF MAGNESIUM: CPT | Performed by: SURGERY

## 2020-01-01 PROCEDURE — 85027 COMPLETE CBC AUTOMATED: CPT | Performed by: STUDENT IN AN ORGANIZED HEALTH CARE EDUCATION/TRAINING PROGRAM

## 2020-01-01 PROCEDURE — 1036F TOBACCO NON-USER: CPT | Performed by: SURGERY

## 2020-01-01 PROCEDURE — 86334 IMMUNOFIX E-PHORESIS SERUM: CPT | Performed by: PATHOLOGY

## 2020-01-01 PROCEDURE — 99203 OFFICE O/P NEW LOW 30 MIN: CPT | Performed by: PODIATRIST

## 2020-01-01 PROCEDURE — 99232 SBSQ HOSP IP/OBS MODERATE 35: CPT | Performed by: SURGERY

## 2020-01-01 PROCEDURE — 87635 SARS-COV-2 COVID-19 AMP PRB: CPT | Performed by: EMERGENCY MEDICINE

## 2020-01-01 PROCEDURE — 87186 SC STD MICRODIL/AGAR DIL: CPT | Performed by: NURSE PRACTITIONER

## 2020-01-01 PROCEDURE — 99284 EMERGENCY DEPT VISIT MOD MDM: CPT | Performed by: EMERGENCY MEDICINE

## 2020-01-01 PROCEDURE — 85027 COMPLETE CBC AUTOMATED: CPT | Performed by: HOSPITALIST

## 2020-01-01 PROCEDURE — C1760 CLOSURE DEV, VASC: HCPCS

## 2020-01-01 DEVICE — PHASIX MESH, 4" X 6" (10.2 CM X 15.2 CM), RECTANGLE
Type: IMPLANTABLE DEVICE | Site: ABDOMEN | Status: FUNCTIONAL
Brand: PHASIX

## 2020-01-01 RX ORDER — PROPOFOL 10 MG/ML
INJECTION, EMULSION INTRAVENOUS AS NEEDED
Status: DISCONTINUED | OUTPATIENT
Start: 2020-01-01 | End: 2020-01-01 | Stop reason: SURG

## 2020-01-01 RX ORDER — ATORVASTATIN CALCIUM 40 MG/1
40 TABLET, FILM COATED ORAL
Status: DISCONTINUED | OUTPATIENT
Start: 2020-01-01 | End: 2020-01-01 | Stop reason: HOSPADM

## 2020-01-01 RX ORDER — PROPOFOL 10 MG/ML
INJECTION, EMULSION INTRAVENOUS AS NEEDED
Status: DISCONTINUED | OUTPATIENT
Start: 2020-01-01 | End: 2020-01-01

## 2020-01-01 RX ORDER — ASCORBIC ACID 500 MG
500 TABLET ORAL DAILY
Status: DISCONTINUED | OUTPATIENT
Start: 2020-01-01 | End: 2020-01-01 | Stop reason: HOSPADM

## 2020-01-01 RX ORDER — FUROSEMIDE 20 MG/1
20 TABLET ORAL DAILY
Status: DISCONTINUED | OUTPATIENT
Start: 2020-01-01 | End: 2020-01-01

## 2020-01-01 RX ORDER — ASPIRIN 81 MG/1
81 TABLET, CHEWABLE ORAL DAILY
Qty: 30 TABLET | Refills: 0 | Status: ON HOLD | OUTPATIENT
Start: 2020-01-01 | End: 2020-01-01 | Stop reason: CLARIF

## 2020-01-01 RX ORDER — GLYCOPYRROLATE 0.2 MG/ML
INJECTION INTRAMUSCULAR; INTRAVENOUS AS NEEDED
Status: DISCONTINUED | OUTPATIENT
Start: 2020-01-01 | End: 2020-01-01 | Stop reason: SURG

## 2020-01-01 RX ORDER — LEVOFLOXACIN 500 MG/1
500 TABLET, FILM COATED ORAL EVERY 24 HOURS
Qty: 7 TABLET | Refills: 0 | Status: SHIPPED | OUTPATIENT
Start: 2020-01-01 | End: 2020-01-01 | Stop reason: HOSPADM

## 2020-01-01 RX ORDER — ACETAMINOPHEN 325 MG/1
650 TABLET ORAL EVERY 6 HOURS PRN
Status: DISCONTINUED | OUTPATIENT
Start: 2020-01-01 | End: 2020-01-01 | Stop reason: HOSPADM

## 2020-01-01 RX ORDER — LISINOPRIL 5 MG/1
5 TABLET ORAL DAILY
Status: DISCONTINUED | OUTPATIENT
Start: 2020-01-01 | End: 2020-01-01

## 2020-01-01 RX ORDER — LEVOFLOXACIN 500 MG/1
500 TABLET, FILM COATED ORAL EVERY 24 HOURS
Qty: 7 TABLET | Refills: 0 | Status: SHIPPED | OUTPATIENT
Start: 2020-01-01 | End: 2020-01-01

## 2020-01-01 RX ORDER — SODIUM CHLORIDE 9 MG/ML
50 INJECTION, SOLUTION INTRAVENOUS CONTINUOUS
Status: DISPENSED | OUTPATIENT
Start: 2020-01-01 | End: 2020-01-01

## 2020-01-01 RX ORDER — OXYCODONE HYDROCHLORIDE AND ACETAMINOPHEN 5; 325 MG/1; MG/1
1 TABLET ORAL EVERY 8 HOURS PRN
Qty: 15 TABLET | Refills: 0 | Status: SHIPPED | OUTPATIENT
Start: 2020-01-01 | End: 2020-01-01

## 2020-01-01 RX ORDER — METOPROLOL SUCCINATE 25 MG/1
25 TABLET, EXTENDED RELEASE ORAL DAILY
Qty: 30 TABLET | Refills: 0 | Status: SHIPPED | OUTPATIENT
Start: 2020-01-01 | End: 2020-01-01 | Stop reason: SDUPTHER

## 2020-01-01 RX ORDER — ROCURONIUM BROMIDE 10 MG/ML
INJECTION, SOLUTION INTRAVENOUS AS NEEDED
Status: DISCONTINUED | OUTPATIENT
Start: 2020-01-01 | End: 2020-01-01 | Stop reason: SURG

## 2020-01-01 RX ORDER — DOXYCYCLINE HYCLATE 100 MG/1
100 CAPSULE ORAL EVERY 12 HOURS SCHEDULED
Qty: 14 CAPSULE | Refills: 0 | Status: SHIPPED | OUTPATIENT
Start: 2020-01-01 | End: 2020-01-01

## 2020-01-01 RX ORDER — HYDROCODONE BITARTRATE AND ACETAMINOPHEN 5; 325 MG/1; MG/1
1 TABLET ORAL EVERY 6 HOURS PRN
Status: DISCONTINUED | OUTPATIENT
Start: 2020-01-01 | End: 2020-01-01

## 2020-01-01 RX ORDER — VANCOMYCIN HYDROCHLORIDE 1 G/200ML
15 INJECTION, SOLUTION INTRAVENOUS EVERY 24 HOURS
Status: DISCONTINUED | OUTPATIENT
Start: 2020-01-01 | End: 2020-01-01

## 2020-01-01 RX ORDER — LISINOPRIL 5 MG/1
2.5 TABLET ORAL DAILY
Qty: 30 TABLET | Refills: 0
Start: 2020-01-01 | End: 2020-01-01 | Stop reason: SDUPTHER

## 2020-01-01 RX ORDER — ASPIRIN 81 MG/1
81 TABLET, CHEWABLE ORAL DAILY
Status: DISCONTINUED | OUTPATIENT
Start: 2020-01-01 | End: 2020-01-01

## 2020-01-01 RX ORDER — METOPROLOL SUCCINATE 50 MG/1
100 TABLET, EXTENDED RELEASE ORAL DAILY
Status: DISCONTINUED | OUTPATIENT
Start: 2020-01-01 | End: 2020-01-01

## 2020-01-01 RX ORDER — DIPHENHYDRAMINE HYDROCHLORIDE 50 MG/ML
25 INJECTION INTRAMUSCULAR; INTRAVENOUS EVERY 6 HOURS PRN
Status: DISCONTINUED | OUTPATIENT
Start: 2020-01-01 | End: 2020-01-01

## 2020-01-01 RX ORDER — SODIUM CHLORIDE 9 MG/ML
100 INJECTION, SOLUTION INTRAVENOUS CONTINUOUS
Status: DISCONTINUED | OUTPATIENT
Start: 2020-01-01 | End: 2020-01-01

## 2020-01-01 RX ORDER — SULFAMETHOXAZOLE AND TRIMETHOPRIM 800; 160 MG/1; MG/1
1 TABLET ORAL EVERY 12 HOURS SCHEDULED
Qty: 14 TABLET | Refills: 0 | Status: SHIPPED | OUTPATIENT
Start: 2020-01-01 | End: 2020-01-01

## 2020-01-01 RX ORDER — DIPHENHYDRAMINE HCL 25 MG
25 TABLET ORAL ONCE
Status: COMPLETED | OUTPATIENT
Start: 2020-01-01 | End: 2020-01-01

## 2020-01-01 RX ORDER — AMOXICILLIN AND CLAVULANATE POTASSIUM 875; 125 MG/1; MG/1
1 TABLET, FILM COATED ORAL EVERY 12 HOURS SCHEDULED
Status: DISCONTINUED | OUTPATIENT
Start: 2020-01-01 | End: 2020-01-01 | Stop reason: HOSPADM

## 2020-01-01 RX ORDER — BUPIVACAINE HYDROCHLORIDE 5 MG/ML
INJECTION, SOLUTION EPIDURAL; INTRACAUDAL AS NEEDED
Status: DISCONTINUED | OUTPATIENT
Start: 2020-01-01 | End: 2020-01-01 | Stop reason: HOSPADM

## 2020-01-01 RX ORDER — B-COMPLEX WITH VITAMIN C
1 TABLET ORAL DAILY
Status: DISCONTINUED | OUTPATIENT
Start: 2020-01-01 | End: 2020-01-01 | Stop reason: RX

## 2020-01-01 RX ORDER — METOPROLOL SUCCINATE 25 MG/1
25 TABLET, EXTENDED RELEASE ORAL DAILY
Status: DISCONTINUED | OUTPATIENT
Start: 2020-01-01 | End: 2020-01-01

## 2020-01-01 RX ORDER — ASPIRIN 81 MG/1
81 TABLET, CHEWABLE ORAL DAILY
Status: DISCONTINUED | OUTPATIENT
Start: 2020-01-01 | End: 2020-01-01 | Stop reason: HOSPADM

## 2020-01-01 RX ORDER — FOLIC ACID 1 MG/1
1 TABLET ORAL DAILY
Status: DISCONTINUED | OUTPATIENT
Start: 2020-01-01 | End: 2020-01-01 | Stop reason: HOSPADM

## 2020-01-01 RX ORDER — TAMSULOSIN HYDROCHLORIDE 0.4 MG/1
0.4 CAPSULE ORAL
Qty: 90 CAPSULE | Refills: 1 | Status: SHIPPED | OUTPATIENT
Start: 2020-01-01 | End: 2021-01-01 | Stop reason: HOSPADM

## 2020-01-01 RX ORDER — LIDOCAINE HYDROCHLORIDE 40 MG/ML
5 SOLUTION TOPICAL ONCE
Status: COMPLETED | OUTPATIENT
Start: 2020-01-01 | End: 2020-01-01

## 2020-01-01 RX ORDER — TAMSULOSIN HYDROCHLORIDE 0.4 MG/1
0.4 CAPSULE ORAL
Status: DISCONTINUED | OUTPATIENT
Start: 2020-01-01 | End: 2020-01-01 | Stop reason: HOSPADM

## 2020-01-01 RX ORDER — HYDROCODONE BITARTRATE AND ACETAMINOPHEN 5; 325 MG/1; MG/1
1 TABLET ORAL EVERY 4 HOURS PRN
Status: DISCONTINUED | OUTPATIENT
Start: 2020-01-01 | End: 2020-01-01 | Stop reason: HOSPADM

## 2020-01-01 RX ORDER — LORAZEPAM 1 MG/1
1 TABLET ORAL EVERY 8 HOURS PRN
Status: DISCONTINUED | OUTPATIENT
Start: 2020-01-01 | End: 2020-01-01 | Stop reason: HOSPADM

## 2020-01-01 RX ORDER — CEFTRIAXONE 2 G/50ML
2000 INJECTION, SOLUTION INTRAVENOUS EVERY 24 HOURS
Status: DISCONTINUED | OUTPATIENT
Start: 2020-01-01 | End: 2020-01-01

## 2020-01-01 RX ORDER — SODIUM CHLORIDE 9 MG/ML
125 INJECTION, SOLUTION INTRAVENOUS CONTINUOUS
Status: DISCONTINUED | OUTPATIENT
Start: 2020-01-01 | End: 2020-01-01

## 2020-01-01 RX ORDER — SODIUM CHLORIDE, SODIUM LACTATE, POTASSIUM CHLORIDE, CALCIUM CHLORIDE 600; 310; 30; 20 MG/100ML; MG/100ML; MG/100ML; MG/100ML
125 INJECTION, SOLUTION INTRAVENOUS CONTINUOUS
Status: CANCELLED | OUTPATIENT
Start: 2020-01-01

## 2020-01-01 RX ORDER — SODIUM CHLORIDE, SODIUM GLUCONATE, SODIUM ACETATE, POTASSIUM CHLORIDE, MAGNESIUM CHLORIDE, SODIUM PHOSPHATE, DIBASIC, AND POTASSIUM PHOSPHATE .53; .5; .37; .037; .03; .012; .00082 G/100ML; G/100ML; G/100ML; G/100ML; G/100ML; G/100ML; G/100ML
75 INJECTION, SOLUTION INTRAVENOUS CONTINUOUS
Status: DISCONTINUED | OUTPATIENT
Start: 2020-01-01 | End: 2020-01-01

## 2020-01-01 RX ORDER — OXYCODONE HYDROCHLORIDE AND ACETAMINOPHEN 5; 325 MG/1; MG/1
1 TABLET ORAL EVERY 4 HOURS PRN
Qty: 20 TABLET | Refills: 0 | Status: SHIPPED | OUTPATIENT
Start: 2020-01-01 | End: 2020-01-01 | Stop reason: SDUPTHER

## 2020-01-01 RX ORDER — CEFEPIME HYDROCHLORIDE 1 G/50ML
1000 INJECTION, SOLUTION INTRAVENOUS EVERY 24 HOURS
Status: DISCONTINUED | OUTPATIENT
Start: 2020-01-01 | End: 2020-01-01

## 2020-01-01 RX ORDER — DIPHENHYDRAMINE HYDROCHLORIDE 50 MG/ML
50 INJECTION INTRAMUSCULAR; INTRAVENOUS ONCE
Status: COMPLETED | OUTPATIENT
Start: 2020-01-01 | End: 2020-01-01

## 2020-01-01 RX ORDER — CEFEPIME HYDROCHLORIDE 2 G/50ML
2000 INJECTION, SOLUTION INTRAVENOUS ONCE
Status: COMPLETED | OUTPATIENT
Start: 2020-01-01 | End: 2020-01-01

## 2020-01-01 RX ORDER — LIDOCAINE HYDROCHLORIDE 10 MG/ML
INJECTION, SOLUTION EPIDURAL; INFILTRATION; INTRACAUDAL; PERINEURAL AS NEEDED
Status: DISCONTINUED | OUTPATIENT
Start: 2020-01-01 | End: 2020-01-01 | Stop reason: SURG

## 2020-01-01 RX ORDER — MIDAZOLAM HYDROCHLORIDE 2 MG/2ML
INJECTION, SOLUTION INTRAMUSCULAR; INTRAVENOUS CODE/TRAUMA/SEDATION MEDICATION
Status: COMPLETED | OUTPATIENT
Start: 2020-01-01 | End: 2020-01-01

## 2020-01-01 RX ORDER — GINSENG 100 MG
1 CAPSULE ORAL 2 TIMES DAILY
Status: DISCONTINUED | OUTPATIENT
Start: 2020-01-01 | End: 2020-01-01

## 2020-01-01 RX ORDER — GLYCOPYRROLATE 0.2 MG/ML
INJECTION INTRAMUSCULAR; INTRAVENOUS AS NEEDED
Status: DISCONTINUED | OUTPATIENT
Start: 2020-01-01 | End: 2020-01-01

## 2020-01-01 RX ORDER — LORAZEPAM 0.5 MG/1
0.5 TABLET ORAL EVERY 8 HOURS PRN
Status: DISCONTINUED | OUTPATIENT
Start: 2020-01-01 | End: 2020-01-01 | Stop reason: HOSPADM

## 2020-01-01 RX ORDER — HYDROMORPHONE HCL/PF 1 MG/ML
0.5 SYRINGE (ML) INJECTION EVERY 4 HOURS PRN
Status: DISCONTINUED | OUTPATIENT
Start: 2020-01-01 | End: 2020-01-01

## 2020-01-01 RX ORDER — SIMETHICONE 80 MG
80 TABLET,CHEWABLE ORAL EVERY 6 HOURS PRN
Status: DISCONTINUED | OUTPATIENT
Start: 2020-01-01 | End: 2020-01-01 | Stop reason: HOSPADM

## 2020-01-01 RX ORDER — METOPROLOL SUCCINATE 25 MG/1
25 TABLET, EXTENDED RELEASE ORAL DAILY
Status: DISCONTINUED | OUTPATIENT
Start: 2020-01-01 | End: 2020-01-01 | Stop reason: HOSPADM

## 2020-01-01 RX ORDER — OXYCODONE HYDROCHLORIDE AND ACETAMINOPHEN 5; 325 MG/1; MG/1
1 TABLET ORAL EVERY 4 HOURS PRN
Qty: 30 TABLET | Refills: 0 | Status: SHIPPED | OUTPATIENT
Start: 2020-01-01 | End: 2020-01-01

## 2020-01-01 RX ORDER — CEFTRIAXONE 1 G/50ML
1000 INJECTION, SOLUTION INTRAVENOUS ONCE
Status: COMPLETED | OUTPATIENT
Start: 2020-01-01 | End: 2020-01-01

## 2020-01-01 RX ORDER — ONDANSETRON 2 MG/ML
4 INJECTION INTRAMUSCULAR; INTRAVENOUS ONCE AS NEEDED
Status: DISCONTINUED | OUTPATIENT
Start: 2020-01-01 | End: 2020-01-01

## 2020-01-01 RX ORDER — ROCURONIUM BROMIDE 10 MG/ML
INJECTION, SOLUTION INTRAVENOUS AS NEEDED
Status: DISCONTINUED | OUTPATIENT
Start: 2020-01-01 | End: 2020-01-01

## 2020-01-01 RX ORDER — HYDROCODONE BITARTRATE AND ACETAMINOPHEN 5; 325 MG/1; MG/1
1 TABLET ORAL EVERY 6 HOURS PRN
Status: DISCONTINUED | OUTPATIENT
Start: 2020-01-01 | End: 2020-01-01 | Stop reason: HOSPADM

## 2020-01-01 RX ORDER — VANCOMYCIN HYDROCHLORIDE 1 G/200ML
15 INJECTION, SOLUTION INTRAVENOUS ONCE
Status: COMPLETED | OUTPATIENT
Start: 2020-01-01 | End: 2020-01-01

## 2020-01-01 RX ORDER — LEVOFLOXACIN 750 MG/1
750 TABLET ORAL EVERY OTHER DAY
Qty: 3 TABLET | Refills: 0 | Status: SHIPPED | OUTPATIENT
Start: 2020-01-01 | End: 2020-01-01

## 2020-01-01 RX ORDER — FENTANYL CITRATE/PF 50 MCG/ML
25 SYRINGE (ML) INJECTION
Status: DISCONTINUED | OUTPATIENT
Start: 2020-01-01 | End: 2020-01-01

## 2020-01-01 RX ORDER — PANTOPRAZOLE SODIUM 40 MG/1
40 TABLET, DELAYED RELEASE ORAL
Qty: 90 TABLET | Refills: 1 | Status: SHIPPED | OUTPATIENT
Start: 2020-01-01 | End: 2021-01-01 | Stop reason: SDUPTHER

## 2020-01-01 RX ORDER — HEPARIN SODIUM 5000 [USP'U]/ML
5000 INJECTION, SOLUTION INTRAVENOUS; SUBCUTANEOUS EVERY 8 HOURS SCHEDULED
Status: DISCONTINUED | OUTPATIENT
Start: 2020-01-01 | End: 2020-01-01

## 2020-01-01 RX ORDER — HYDROMORPHONE HCL/PF 1 MG/ML
0.5 SYRINGE (ML) INJECTION
Status: DISCONTINUED | OUTPATIENT
Start: 2020-01-01 | End: 2020-01-01

## 2020-01-01 RX ORDER — OXYCODONE HYDROCHLORIDE AND ACETAMINOPHEN 5; 325 MG/1; MG/1
1 TABLET ORAL
Qty: 25 TABLET | Refills: 0 | Status: SHIPPED | OUTPATIENT
Start: 2020-01-01 | End: 2020-01-01 | Stop reason: HOSPADM

## 2020-01-01 RX ORDER — NEOSTIGMINE METHYLSULFATE 1 MG/ML
INJECTION INTRAVENOUS AS NEEDED
Status: DISCONTINUED | OUTPATIENT
Start: 2020-01-01 | End: 2020-01-01

## 2020-01-01 RX ORDER — CALCIUM CARBONATE 200(500)MG
500 TABLET,CHEWABLE ORAL DAILY PRN
Status: DISCONTINUED | OUTPATIENT
Start: 2020-01-01 | End: 2020-01-01 | Stop reason: HOSPADM

## 2020-01-01 RX ORDER — LISINOPRIL 5 MG/1
5 TABLET ORAL DAILY
Qty: 30 TABLET | Refills: 0 | Status: ON HOLD | OUTPATIENT
Start: 2020-01-01 | End: 2020-01-01 | Stop reason: SDUPTHER

## 2020-01-01 RX ORDER — NALOXONE HYDROCHLORIDE 4 MG/.1ML
SPRAY NASAL
Qty: 1 EACH | Refills: 1 | Status: SHIPPED | OUTPATIENT
Start: 2020-01-01 | End: 2021-01-01 | Stop reason: ALTCHOICE

## 2020-01-01 RX ORDER — ONDANSETRON 2 MG/ML
INJECTION INTRAMUSCULAR; INTRAVENOUS AS NEEDED
Status: DISCONTINUED | OUTPATIENT
Start: 2020-01-01 | End: 2020-01-01 | Stop reason: SURG

## 2020-01-01 RX ORDER — HYDROCODONE BITARTRATE AND ACETAMINOPHEN 5; 325 MG/1; MG/1
1 TABLET ORAL EVERY 4 HOURS PRN
Qty: 20 TABLET | Refills: 0 | Status: SHIPPED | OUTPATIENT
Start: 2020-01-01 | End: 2020-01-01 | Stop reason: HOSPADM

## 2020-01-01 RX ORDER — MIDAZOLAM HYDROCHLORIDE 2 MG/2ML
INJECTION, SOLUTION INTRAMUSCULAR; INTRAVENOUS AS NEEDED
Status: DISCONTINUED | OUTPATIENT
Start: 2020-01-01 | End: 2020-01-01

## 2020-01-01 RX ORDER — OXYCODONE HYDROCHLORIDE AND ACETAMINOPHEN 5; 325 MG/1; MG/1
1 TABLET ORAL
Qty: 25 TABLET | Refills: 0 | Status: SHIPPED | OUTPATIENT
Start: 2020-01-01 | End: 2020-01-01 | Stop reason: SDUPTHER

## 2020-01-01 RX ORDER — SODIUM HYPOCHLORITE 2.5 MG/ML
1 SOLUTION TOPICAL ONCE
Qty: 473 ML | Refills: 0 | Status: SHIPPED | OUTPATIENT
Start: 2020-01-01 | End: 2020-01-01

## 2020-01-01 RX ORDER — CEPHALEXIN 500 MG/1
500 CAPSULE ORAL EVERY 6 HOURS SCHEDULED
Qty: 28 CAPSULE | Refills: 0 | Status: SHIPPED | OUTPATIENT
Start: 2020-01-01 | End: 2021-01-01

## 2020-01-01 RX ORDER — DIAZEPAM 5 MG/1
5 TABLET ORAL EVERY 12 HOURS PRN
COMMUNITY
End: 2021-01-01 | Stop reason: HOSPADM

## 2020-01-01 RX ORDER — SODIUM CHLORIDE, SODIUM LACTATE, POTASSIUM CHLORIDE, CALCIUM CHLORIDE 600; 310; 30; 20 MG/100ML; MG/100ML; MG/100ML; MG/100ML
INJECTION, SOLUTION INTRAVENOUS CONTINUOUS PRN
Status: DISCONTINUED | OUTPATIENT
Start: 2020-01-01 | End: 2020-01-01

## 2020-01-01 RX ORDER — ATORVASTATIN CALCIUM 40 MG/1
40 TABLET, FILM COATED ORAL
Qty: 30 TABLET | Refills: 0 | Status: SHIPPED | OUTPATIENT
Start: 2020-01-01 | End: 2020-01-01 | Stop reason: SDUPTHER

## 2020-01-01 RX ORDER — OXYCODONE HYDROCHLORIDE AND ACETAMINOPHEN 5; 325 MG/1; MG/1
1 TABLET ORAL EVERY 12 HOURS PRN
Qty: 30 TABLET | Refills: 0 | Status: SHIPPED | OUTPATIENT
Start: 2020-01-01 | End: 2020-01-01 | Stop reason: SDUPTHER

## 2020-01-01 RX ORDER — FENTANYL CITRATE 50 UG/ML
INJECTION, SOLUTION INTRAMUSCULAR; INTRAVENOUS AS NEEDED
Status: DISCONTINUED | OUTPATIENT
Start: 2020-01-01 | End: 2020-01-01

## 2020-01-01 RX ORDER — ATORVASTATIN CALCIUM 20 MG/1
20 TABLET, FILM COATED ORAL DAILY
Qty: 30 TABLET | Refills: 1 | Status: SHIPPED | OUTPATIENT
Start: 2020-01-01 | End: 2021-01-01 | Stop reason: DRUGHIGH

## 2020-01-01 RX ORDER — ATORVASTATIN CALCIUM 40 MG/1
40 TABLET, FILM COATED ORAL
Qty: 30 TABLET | Refills: 0 | Status: SHIPPED | OUTPATIENT
Start: 2020-01-01 | End: 2021-01-01 | Stop reason: SDUPTHER

## 2020-01-01 RX ORDER — CLOTRIMAZOLE AND BETAMETHASONE DIPROPIONATE 10; .64 MG/G; MG/G
CREAM TOPICAL 2 TIMES DAILY
Qty: 30 G | Refills: 0 | Status: SHIPPED | OUTPATIENT
Start: 2020-01-01 | End: 2021-01-01 | Stop reason: SDUPTHER

## 2020-01-01 RX ORDER — MORPHINE SULFATE 4 MG/ML
4 INJECTION, SOLUTION INTRAMUSCULAR; INTRAVENOUS EVERY 4 HOURS PRN
Status: DISCONTINUED | OUTPATIENT
Start: 2020-01-01 | End: 2020-01-01

## 2020-01-01 RX ORDER — CEFAZOLIN SODIUM 1 G/50ML
1000 SOLUTION INTRAVENOUS ONCE
Status: CANCELLED | OUTPATIENT
Start: 2020-01-01 | End: 2020-01-01

## 2020-01-01 RX ORDER — DIAZEPAM 5 MG/1
5 TABLET ORAL EVERY 12 HOURS PRN
Status: DISCONTINUED | OUTPATIENT
Start: 2020-01-01 | End: 2020-01-01 | Stop reason: HOSPADM

## 2020-01-01 RX ORDER — METOPROLOL SUCCINATE 50 MG/1
50 TABLET, EXTENDED RELEASE ORAL DAILY
Status: DISCONTINUED | OUTPATIENT
Start: 2020-01-01 | End: 2020-01-01

## 2020-01-01 RX ORDER — DIPHENHYDRAMINE HYDROCHLORIDE 50 MG/ML
12.5 INJECTION INTRAMUSCULAR; INTRAVENOUS ONCE
Status: COMPLETED | OUTPATIENT
Start: 2020-01-01 | End: 2020-01-01

## 2020-01-01 RX ORDER — MAGNESIUM HYDROXIDE 1200 MG/15ML
LIQUID ORAL AS NEEDED
Status: DISCONTINUED | OUTPATIENT
Start: 2020-01-01 | End: 2020-01-01 | Stop reason: HOSPADM

## 2020-01-01 RX ORDER — SODIUM CHLORIDE, SODIUM LACTATE, POTASSIUM CHLORIDE, CALCIUM CHLORIDE 600; 310; 30; 20 MG/100ML; MG/100ML; MG/100ML; MG/100ML
INJECTION, SOLUTION INTRAVENOUS CONTINUOUS PRN
Status: DISCONTINUED | OUTPATIENT
Start: 2020-01-01 | End: 2020-01-01 | Stop reason: SURG

## 2020-01-01 RX ORDER — FENTANYL CITRATE/PF 50 MCG/ML
25 SYRINGE (ML) INJECTION
Status: DISCONTINUED | OUTPATIENT
Start: 2020-01-01 | End: 2020-01-01 | Stop reason: HOSPADM

## 2020-01-01 RX ORDER — FENTANYL CITRATE 50 UG/ML
INJECTION, SOLUTION INTRAMUSCULAR; INTRAVENOUS CODE/TRAUMA/SEDATION MEDICATION
Status: COMPLETED | OUTPATIENT
Start: 2020-01-01 | End: 2020-01-01

## 2020-01-01 RX ORDER — FENTANYL CITRATE 50 UG/ML
INJECTION, SOLUTION INTRAMUSCULAR; INTRAVENOUS AS NEEDED
Status: DISCONTINUED | OUTPATIENT
Start: 2020-01-01 | End: 2020-01-01 | Stop reason: SURG

## 2020-01-01 RX ORDER — FUROSEMIDE 40 MG/1
40 TABLET ORAL DAILY
Qty: 90 TABLET | Refills: 0 | Status: SHIPPED | OUTPATIENT
Start: 2020-01-01 | End: 2020-01-01 | Stop reason: HOSPADM

## 2020-01-01 RX ORDER — HYDROCODONE BITARTRATE AND ACETAMINOPHEN 5; 325 MG/1; MG/1
2 TABLET ORAL EVERY 4 HOURS PRN
Status: DISCONTINUED | OUTPATIENT
Start: 2020-01-01 | End: 2020-01-01 | Stop reason: HOSPADM

## 2020-01-01 RX ORDER — OXYCODONE HYDROCHLORIDE 5 MG/1
5 TABLET ORAL EVERY 4 HOURS PRN
Status: DISCONTINUED | OUTPATIENT
Start: 2020-01-01 | End: 2020-01-01

## 2020-01-01 RX ORDER — LEVOFLOXACIN 500 MG/1
500 TABLET, FILM COATED ORAL EVERY 24 HOURS
Qty: 7 TABLET | Refills: 0 | Status: SHIPPED | OUTPATIENT
Start: 2020-01-01 | End: 2020-01-01 | Stop reason: SDUPTHER

## 2020-01-01 RX ORDER — OXYCODONE HYDROCHLORIDE AND ACETAMINOPHEN 5; 325 MG/1; MG/1
1 TABLET ORAL
Qty: 25 TABLET | Refills: 0 | Status: SHIPPED | OUTPATIENT
Start: 2020-01-01 | End: 2020-01-01

## 2020-01-01 RX ORDER — DIAZEPAM 5 MG/1
5 TABLET ORAL EVERY 12 HOURS PRN
Status: DISCONTINUED | OUTPATIENT
Start: 2020-01-01 | End: 2020-01-01

## 2020-01-01 RX ORDER — CEFAZOLIN SODIUM 1 G/50ML
1000 SOLUTION INTRAVENOUS ONCE
Status: COMPLETED | OUTPATIENT
Start: 2020-01-01 | End: 2020-01-01

## 2020-01-01 RX ORDER — DOCUSATE SODIUM 100 MG/1
100 CAPSULE, LIQUID FILLED ORAL 2 TIMES DAILY
Status: DISCONTINUED | OUTPATIENT
Start: 2020-01-01 | End: 2020-01-01 | Stop reason: HOSPADM

## 2020-01-01 RX ORDER — NEOSTIGMINE METHYLSULFATE 1 MG/ML
INJECTION INTRAVENOUS AS NEEDED
Status: DISCONTINUED | OUTPATIENT
Start: 2020-01-01 | End: 2020-01-01 | Stop reason: SURG

## 2020-01-01 RX ORDER — AMOXICILLIN AND CLAVULANATE POTASSIUM 875; 125 MG/1; MG/1
1 TABLET, FILM COATED ORAL EVERY 12 HOURS SCHEDULED
Qty: 16 TABLET | Refills: 0 | Status: SHIPPED | OUTPATIENT
Start: 2020-01-01 | End: 2020-01-01

## 2020-01-01 RX ORDER — TAMSULOSIN HYDROCHLORIDE 0.4 MG/1
0.4 CAPSULE ORAL
Qty: 30 CAPSULE | Refills: 1 | Status: SHIPPED | OUTPATIENT
Start: 2020-01-01 | End: 2020-01-01 | Stop reason: SDUPTHER

## 2020-01-01 RX ORDER — FUROSEMIDE 40 MG/1
40 TABLET ORAL DAILY
Status: DISCONTINUED | OUTPATIENT
Start: 2020-01-01 | End: 2020-01-01

## 2020-01-01 RX ORDER — DOXYCYCLINE 100 MG/1
100 CAPSULE ORAL 2 TIMES DAILY
Qty: 10 CAPSULE | Refills: 0 | Status: SHIPPED | OUTPATIENT
Start: 2020-01-01 | End: 2020-01-01

## 2020-01-01 RX ORDER — MEPERIDINE HYDROCHLORIDE 25 MG/ML
12.5 INJECTION INTRAMUSCULAR; INTRAVENOUS; SUBCUTANEOUS
Status: DISCONTINUED | OUTPATIENT
Start: 2020-01-01 | End: 2020-01-01

## 2020-01-01 RX ORDER — OXYCODONE HYDROCHLORIDE AND ACETAMINOPHEN 5; 325 MG/1; MG/1
1 TABLET ORAL ONCE
Status: COMPLETED | OUTPATIENT
Start: 2020-01-01 | End: 2020-01-01

## 2020-01-01 RX ORDER — SODIUM BICARBONATE 650 MG/1
650 TABLET ORAL
Status: COMPLETED | OUTPATIENT
Start: 2020-01-01 | End: 2020-01-01

## 2020-01-01 RX ORDER — METOPROLOL SUCCINATE 25 MG/1
25 TABLET, EXTENDED RELEASE ORAL DAILY
Qty: 90 TABLET | Refills: 1 | Status: SHIPPED | OUTPATIENT
Start: 2020-01-01 | End: 2021-01-01 | Stop reason: SDUPTHER

## 2020-01-01 RX ORDER — MIDAZOLAM HYDROCHLORIDE 2 MG/2ML
INJECTION, SOLUTION INTRAMUSCULAR; INTRAVENOUS AS NEEDED
Status: DISCONTINUED | OUTPATIENT
Start: 2020-01-01 | End: 2020-01-01 | Stop reason: SURG

## 2020-01-01 RX ORDER — OXYCODONE HYDROCHLORIDE AND ACETAMINOPHEN 5; 325 MG/1; MG/1
1 TABLET ORAL EVERY 4 HOURS PRN
Status: DISCONTINUED | OUTPATIENT
Start: 2020-01-01 | End: 2020-01-01

## 2020-01-01 RX ORDER — HYDROMORPHONE HCL/PF 1 MG/ML
1 SYRINGE (ML) INJECTION ONCE
Status: COMPLETED | OUTPATIENT
Start: 2020-01-01 | End: 2020-01-01

## 2020-01-01 RX ORDER — DIPHENHYDRAMINE HYDROCHLORIDE 50 MG/ML
INJECTION INTRAMUSCULAR; INTRAVENOUS CODE/TRAUMA/SEDATION MEDICATION
Status: COMPLETED | OUTPATIENT
Start: 2020-01-01 | End: 2020-01-01

## 2020-01-01 RX ORDER — OXYCODONE HYDROCHLORIDE AND ACETAMINOPHEN 5; 325 MG/1; MG/1
1 TABLET ORAL EVERY 12 HOURS PRN
Qty: 30 TABLET | Refills: 0 | Status: SHIPPED | OUTPATIENT
Start: 2020-01-01 | End: 2021-01-01 | Stop reason: SDUPTHER

## 2020-01-01 RX ORDER — LORAZEPAM 0.5 MG/1
0.5 TABLET ORAL ONCE
Status: COMPLETED | OUTPATIENT
Start: 2020-01-01 | End: 2020-01-01

## 2020-01-01 RX ORDER — ACETAMINOPHEN 325 MG/1
650 TABLET ORAL ONCE
Status: COMPLETED | OUTPATIENT
Start: 2020-01-01 | End: 2020-01-01

## 2020-01-01 RX ORDER — LISINOPRIL 5 MG/1
2.5 TABLET ORAL DAILY
Qty: 30 TABLET | Refills: 0 | Status: SHIPPED | OUTPATIENT
Start: 2020-01-01 | End: 2020-01-01 | Stop reason: SDUPTHER

## 2020-01-01 RX ORDER — PANTOPRAZOLE SODIUM 40 MG/1
40 TABLET, DELAYED RELEASE ORAL
Status: DISCONTINUED | OUTPATIENT
Start: 2020-01-01 | End: 2020-01-01 | Stop reason: HOSPADM

## 2020-01-01 RX ORDER — POLYETHYLENE GLYCOL 3350 17 G/17G
17 POWDER, FOR SOLUTION ORAL 2 TIMES DAILY
Status: DISCONTINUED | OUTPATIENT
Start: 2020-01-01 | End: 2020-01-01 | Stop reason: HOSPADM

## 2020-01-01 RX ORDER — PANTOPRAZOLE SODIUM 40 MG/1
40 INJECTION, POWDER, FOR SOLUTION INTRAVENOUS EVERY 12 HOURS SCHEDULED
Status: DISCONTINUED | OUTPATIENT
Start: 2020-01-01 | End: 2020-01-01

## 2020-01-01 RX ORDER — ONDANSETRON 2 MG/ML
4 INJECTION INTRAMUSCULAR; INTRAVENOUS ONCE
Status: DISCONTINUED | OUTPATIENT
Start: 2020-01-01 | End: 2020-01-01

## 2020-01-01 RX ORDER — OXYCODONE HYDROCHLORIDE AND ACETAMINOPHEN 5; 325 MG/1; MG/1
1 TABLET ORAL EVERY 6 HOURS PRN
Status: DISCONTINUED | OUTPATIENT
Start: 2020-01-01 | End: 2020-01-01 | Stop reason: HOSPADM

## 2020-01-01 RX ORDER — DIPHENHYDRAMINE HYDROCHLORIDE 50 MG/ML
50 INJECTION INTRAMUSCULAR; INTRAVENOUS EVERY 6 HOURS PRN
Status: DISCONTINUED | OUTPATIENT
Start: 2020-01-01 | End: 2020-01-01 | Stop reason: HOSPADM

## 2020-01-01 RX ORDER — PANTOPRAZOLE SODIUM 40 MG/1
40 TABLET, DELAYED RELEASE ORAL
Status: DISCONTINUED | OUTPATIENT
Start: 2020-01-01 | End: 2020-01-01

## 2020-01-01 RX ORDER — HYDROCODONE BITARTRATE AND ACETAMINOPHEN 5; 325 MG/1; MG/1
2 TABLET ORAL EVERY 6 HOURS PRN
Status: DISCONTINUED | OUTPATIENT
Start: 2020-01-01 | End: 2020-01-01

## 2020-01-01 RX ORDER — LISINOPRIL 5 MG/1
2.5 TABLET ORAL DAILY
Qty: 30 TABLET | Refills: 0 | Status: SHIPPED | OUTPATIENT
Start: 2020-01-01 | End: 2020-01-01 | Stop reason: HOSPADM

## 2020-01-01 RX ORDER — OXYCODONE HYDROCHLORIDE AND ACETAMINOPHEN 5; 325 MG/1; MG/1
2 TABLET ORAL EVERY 4 HOURS PRN
Status: DISCONTINUED | OUTPATIENT
Start: 2020-01-01 | End: 2020-01-01

## 2020-01-01 RX ORDER — DIPHENHYDRAMINE HYDROCHLORIDE 50 MG/ML
25 INJECTION INTRAMUSCULAR; INTRAVENOUS ONCE
Status: COMPLETED | OUTPATIENT
Start: 2020-01-01 | End: 2020-01-01

## 2020-01-01 RX ORDER — METOCLOPRAMIDE HYDROCHLORIDE 5 MG/ML
10 INJECTION INTRAMUSCULAR; INTRAVENOUS ONCE AS NEEDED
Status: DISCONTINUED | OUTPATIENT
Start: 2020-01-01 | End: 2020-01-01

## 2020-01-01 RX ORDER — MAGNESIUM SULFATE HEPTAHYDRATE 40 MG/ML
2 INJECTION, SOLUTION INTRAVENOUS ONCE
Status: COMPLETED | OUTPATIENT
Start: 2020-01-01 | End: 2020-01-01

## 2020-01-01 RX ORDER — HYDROMORPHONE HCL/PF 1 MG/ML
0.5 SYRINGE (ML) INJECTION EVERY 2 HOUR PRN
Status: DISCONTINUED | OUTPATIENT
Start: 2020-01-01 | End: 2020-01-01 | Stop reason: HOSPADM

## 2020-01-01 RX ORDER — SODIUM CHLORIDE, SODIUM LACTATE, POTASSIUM CHLORIDE, CALCIUM CHLORIDE 600; 310; 30; 20 MG/100ML; MG/100ML; MG/100ML; MG/100ML
125 INJECTION, SOLUTION INTRAVENOUS CONTINUOUS
Status: DISCONTINUED | OUTPATIENT
Start: 2020-01-01 | End: 2020-01-01

## 2020-01-01 RX ORDER — PANTOPRAZOLE SODIUM 40 MG/1
40 TABLET, DELAYED RELEASE ORAL
Qty: 30 TABLET | Refills: 0 | Status: SHIPPED | OUTPATIENT
Start: 2020-01-01 | End: 2020-01-01 | Stop reason: SDUPTHER

## 2020-01-01 RX ORDER — LIDOCAINE HYDROCHLORIDE 40 MG/ML
1 SOLUTION TOPICAL ONCE
Status: COMPLETED | OUTPATIENT
Start: 2020-01-01 | End: 2020-01-01

## 2020-01-01 RX ORDER — HYDROMORPHONE HCL/PF 1 MG/ML
0.5 SYRINGE (ML) INJECTION
Status: DISCONTINUED | OUTPATIENT
Start: 2020-01-01 | End: 2020-01-01 | Stop reason: HOSPADM

## 2020-01-01 RX ORDER — HEPARIN SODIUM 5000 [USP'U]/ML
5000 INJECTION, SOLUTION INTRAVENOUS; SUBCUTANEOUS EVERY 8 HOURS SCHEDULED
Status: DISCONTINUED | OUTPATIENT
Start: 2020-01-01 | End: 2020-01-01 | Stop reason: HOSPADM

## 2020-01-01 RX ORDER — POLYETHYLENE GLYCOL 3350 17 G/17G
17 POWDER, FOR SOLUTION ORAL DAILY
Status: DISCONTINUED | OUTPATIENT
Start: 2020-01-01 | End: 2020-01-01

## 2020-01-01 RX ORDER — HYDROMORPHONE HCL/PF 1 MG/ML
0.5 SYRINGE (ML) INJECTION ONCE
Status: COMPLETED | OUTPATIENT
Start: 2020-01-01 | End: 2020-01-01

## 2020-01-01 RX ORDER — HEPARIN SODIUM 1000 [USP'U]/ML
INJECTION, SOLUTION INTRAVENOUS; SUBCUTANEOUS CODE/TRAUMA/SEDATION MEDICATION
Status: COMPLETED | OUTPATIENT
Start: 2020-01-01 | End: 2020-01-01

## 2020-01-01 RX ADMIN — DOCUSATE SODIUM 100 MG: 100 CAPSULE, LIQUID FILLED ORAL at 17:50

## 2020-01-01 RX ADMIN — PANTOPRAZOLE SODIUM 40 MG: 40 INJECTION, POWDER, FOR SOLUTION INTRAVENOUS at 09:46

## 2020-01-01 RX ADMIN — DIAZEPAM 5 MG: 5 TABLET ORAL at 00:07

## 2020-01-01 RX ADMIN — DOCUSATE SODIUM 100 MG: 100 CAPSULE, LIQUID FILLED ORAL at 17:44

## 2020-01-01 RX ADMIN — ROCURONIUM BROMIDE 15 MG: 10 INJECTION, SOLUTION INTRAVENOUS at 17:46

## 2020-01-01 RX ADMIN — CEFEPIME HYDROCHLORIDE 2000 MG: 2 INJECTION, SOLUTION INTRAVENOUS at 17:07

## 2020-01-01 RX ADMIN — HEPARIN SODIUM 5000 UNITS: 5000 INJECTION INTRAVENOUS; SUBCUTANEOUS at 05:32

## 2020-01-01 RX ADMIN — POLYETHYLENE GLYCOL 3350 17 G: 17 POWDER, FOR SOLUTION ORAL at 08:56

## 2020-01-01 RX ADMIN — OXYCODONE HYDROCHLORIDE AND ACETAMINOPHEN 1 TABLET: 5; 325 TABLET ORAL at 14:12

## 2020-01-01 RX ADMIN — FOLIC ACID 1 MG: 1 TABLET ORAL at 09:25

## 2020-01-01 RX ADMIN — PANTOPRAZOLE SODIUM 40 MG: 40 INJECTION, POWDER, FOR SOLUTION INTRAVENOUS at 21:15

## 2020-01-01 RX ADMIN — HEPARIN SODIUM 5000 UNITS: 5000 INJECTION INTRAVENOUS; SUBCUTANEOUS at 22:20

## 2020-01-01 RX ADMIN — CEFEPIME HYDROCHLORIDE 1000 MG: 1 INJECTION, SOLUTION INTRAVENOUS at 00:34

## 2020-01-01 RX ADMIN — CEFTRIAXONE 2000 MG: 2 INJECTION, SOLUTION INTRAVENOUS at 15:14

## 2020-01-01 RX ADMIN — OXYCODONE HYDROCHLORIDE AND ACETAMINOPHEN 500 MG: 500 TABLET ORAL at 08:53

## 2020-01-01 RX ADMIN — OXYCODONE HYDROCHLORIDE AND ACETAMINOPHEN 1 TABLET: 5; 325 TABLET ORAL at 05:06

## 2020-01-01 RX ADMIN — DOCUSATE SODIUM 100 MG: 100 CAPSULE, LIQUID FILLED ORAL at 10:04

## 2020-01-01 RX ADMIN — DIPHENHYDRAMINE HYDROCHLORIDE 50 MG: 50 INJECTION, SOLUTION INTRAMUSCULAR; INTRAVENOUS at 16:51

## 2020-01-01 RX ADMIN — CEFTRIAXONE 2000 MG: 2 INJECTION, SOLUTION INTRAVENOUS at 15:33

## 2020-01-01 RX ADMIN — NEOSTIGMINE METHYLSULFATE 3 MG: 1 INJECTION, SOLUTION INTRAVENOUS at 14:47

## 2020-01-01 RX ADMIN — LORAZEPAM 0.5 MG: 0.5 TABLET ORAL at 16:18

## 2020-01-01 RX ADMIN — PANTOPRAZOLE SODIUM 40 MG: 40 INJECTION, POWDER, FOR SOLUTION INTRAVENOUS at 08:42

## 2020-01-01 RX ADMIN — CEFTAZIDIME 2000 MG: 2 INJECTION, POWDER, FOR SOLUTION INTRAVENOUS at 12:07

## 2020-01-01 RX ADMIN — FENTANYL CITRATE 25 MCG: 50 INJECTION, SOLUTION INTRAMUSCULAR; INTRAVENOUS at 15:17

## 2020-01-01 RX ADMIN — SODIUM BICARBONATE 75 ML/HR: 84 INJECTION, SOLUTION INTRAVENOUS at 10:15

## 2020-01-01 RX ADMIN — HYDROCODONE BITARTRATE AND ACETAMINOPHEN 1 TABLET: 5; 325 TABLET ORAL at 06:09

## 2020-01-01 RX ADMIN — TAMSULOSIN HYDROCHLORIDE 0.4 MG: 0.4 CAPSULE ORAL at 17:37

## 2020-01-01 RX ADMIN — FENTANYL CITRATE 25 MCG: 50 INJECTION, SOLUTION INTRAMUSCULAR; INTRAVENOUS at 19:09

## 2020-01-01 RX ADMIN — LORAZEPAM 1 MG: 1 TABLET ORAL at 10:12

## 2020-01-01 RX ADMIN — OXYCODONE HYDROCHLORIDE 5 MG: 5 TABLET ORAL at 02:33

## 2020-01-01 RX ADMIN — OXYCODONE HYDROCHLORIDE AND ACETAMINOPHEN 1 TABLET: 5; 325 TABLET ORAL at 06:43

## 2020-01-01 RX ADMIN — PANTOPRAZOLE SODIUM 40 MG: 40 INJECTION, POWDER, FOR SOLUTION INTRAVENOUS at 21:31

## 2020-01-01 RX ADMIN — HYDROMORPHONE HYDROCHLORIDE 0.5 MG: 1 INJECTION, SOLUTION INTRAMUSCULAR; INTRAVENOUS; SUBCUTANEOUS at 20:46

## 2020-01-01 RX ADMIN — GLYCOPYRROLATE 0.6 MG: 0.2 INJECTION, SOLUTION INTRAMUSCULAR; INTRAVENOUS at 14:47

## 2020-01-01 RX ADMIN — PANTOPRAZOLE SODIUM 40 MG: 40 TABLET, DELAYED RELEASE ORAL at 05:01

## 2020-01-01 RX ADMIN — HYDROCODONE BITARTRATE AND ACETAMINOPHEN 2 TABLET: 5; 325 TABLET ORAL at 14:23

## 2020-01-01 RX ADMIN — PANTOPRAZOLE SODIUM 40 MG: 40 TABLET, DELAYED RELEASE ORAL at 05:26

## 2020-01-01 RX ADMIN — OXYCODONE HYDROCHLORIDE AND ACETAMINOPHEN 500 MG: 500 TABLET ORAL at 09:44

## 2020-01-01 RX ADMIN — PANTOPRAZOLE SODIUM 40 MG: 40 TABLET, DELAYED RELEASE ORAL at 06:22

## 2020-01-01 RX ADMIN — PANTOPRAZOLE SODIUM 40 MG: 40 TABLET, DELAYED RELEASE ORAL at 05:40

## 2020-01-01 RX ADMIN — HYDROCORTISONE SODIUM SUCCINATE 100 MG: 100 INJECTION, POWDER, FOR SOLUTION INTRAMUSCULAR; INTRAVENOUS at 09:15

## 2020-01-01 RX ADMIN — CEFTRIAXONE 2000 MG: 2 INJECTION, SOLUTION INTRAVENOUS at 14:14

## 2020-01-01 RX ADMIN — DOCUSATE SODIUM 100 MG: 100 CAPSULE, LIQUID FILLED ORAL at 08:56

## 2020-01-01 RX ADMIN — BACITRACIN 1 SMALL APPLICATION: 500 OINTMENT TOPICAL at 18:24

## 2020-01-01 RX ADMIN — LIDOCAINE HYDROCHLORIDE 5 ML: 40 SOLUTION TOPICAL at 15:15

## 2020-01-01 RX ADMIN — DOCUSATE SODIUM 100 MG: 100 CAPSULE, LIQUID FILLED ORAL at 08:43

## 2020-01-01 RX ADMIN — PANTOPRAZOLE SODIUM 40 MG: 40 TABLET, DELAYED RELEASE ORAL at 06:16

## 2020-01-01 RX ADMIN — DIPHENHYDRAMINE HYDROCHLORIDE 50 MG: 50 INJECTION INTRAMUSCULAR; INTRAVENOUS at 14:11

## 2020-01-01 RX ADMIN — HYDROCODONE BITARTRATE AND ACETAMINOPHEN 1 TABLET: 5; 325 TABLET ORAL at 02:07

## 2020-01-01 RX ADMIN — ACETAMINOPHEN 650 MG: 325 TABLET ORAL at 11:44

## 2020-01-01 RX ADMIN — VANCOMYCIN HYDROCHLORIDE 1250 MG: 5 INJECTION, POWDER, LYOPHILIZED, FOR SOLUTION INTRAVENOUS at 17:21

## 2020-01-01 RX ADMIN — LORAZEPAM 1 MG: 1 TABLET ORAL at 13:12

## 2020-01-01 RX ADMIN — HYDROMORPHONE HYDROCHLORIDE 0.5 MG: 1 INJECTION, SOLUTION INTRAMUSCULAR; INTRAVENOUS; SUBCUTANEOUS at 23:37

## 2020-01-01 RX ADMIN — ATORVASTATIN CALCIUM 40 MG: 40 TABLET, FILM COATED ORAL at 16:05

## 2020-01-01 RX ADMIN — HYDROCODONE BITARTRATE AND ACETAMINOPHEN 1 TABLET: 5; 325 TABLET ORAL at 09:15

## 2020-01-01 RX ADMIN — LORAZEPAM 1 MG: 1 TABLET ORAL at 15:25

## 2020-01-01 RX ADMIN — LIDOCAINE HYDROCHLORIDE 5 ML: 40 SOLUTION TOPICAL at 15:32

## 2020-01-01 RX ADMIN — HYDROCODONE BITARTRATE AND ACETAMINOPHEN 2 TABLET: 5; 325 TABLET ORAL at 11:46

## 2020-01-01 RX ADMIN — ACETAMINOPHEN 650 MG: 325 TABLET, FILM COATED ORAL at 08:14

## 2020-01-01 RX ADMIN — DOCUSATE SODIUM 100 MG: 100 CAPSULE, LIQUID FILLED ORAL at 08:25

## 2020-01-01 RX ADMIN — FENTANYL CITRATE 25 MCG: 50 INJECTION INTRAMUSCULAR; INTRAVENOUS at 15:07

## 2020-01-01 RX ADMIN — HYDROMORPHONE HYDROCHLORIDE 0.5 MG: 1 INJECTION, SOLUTION INTRAMUSCULAR; INTRAVENOUS; SUBCUTANEOUS at 11:36

## 2020-01-01 RX ADMIN — HEPARIN SODIUM 5000 UNITS: 5000 INJECTION INTRAVENOUS; SUBCUTANEOUS at 13:12

## 2020-01-01 RX ADMIN — MORPHINE SULFATE 4 MG: 4 INJECTION INTRAVENOUS at 20:22

## 2020-01-01 RX ADMIN — FOLIC ACID 1 MG: 1 TABLET ORAL at 08:24

## 2020-01-01 RX ADMIN — ATORVASTATIN CALCIUM 40 MG: 40 TABLET, FILM COATED ORAL at 16:01

## 2020-01-01 RX ADMIN — DIPHENHYDRAMINE HYDROCHLORIDE 12.5 MG: 50 INJECTION, SOLUTION INTRAMUSCULAR; INTRAVENOUS at 17:44

## 2020-01-01 RX ADMIN — LORAZEPAM 1 MG: 1 TABLET ORAL at 08:43

## 2020-01-01 RX ADMIN — DOCUSATE SODIUM 100 MG: 100 CAPSULE, LIQUID FILLED ORAL at 18:00

## 2020-01-01 RX ADMIN — LORAZEPAM 1 MG: 1 TABLET ORAL at 20:15

## 2020-01-01 RX ADMIN — OXYCODONE HYDROCHLORIDE AND ACETAMINOPHEN 1 TABLET: 5; 325 TABLET ORAL at 20:34

## 2020-01-01 RX ADMIN — MIDAZOLAM 1 MG: 1 INJECTION INTRAMUSCULAR; INTRAVENOUS at 16:47

## 2020-01-01 RX ADMIN — DIPHENHYDRAMINE HYDROCHLORIDE 50 MG: 50 INJECTION INTRAMUSCULAR; INTRAVENOUS at 10:57

## 2020-01-01 RX ADMIN — CEFEPIME HYDROCHLORIDE 1000 MG: 1 INJECTION, SOLUTION INTRAVENOUS at 00:44

## 2020-01-01 RX ADMIN — METOPROLOL SUCCINATE 100 MG: 50 TABLET, EXTENDED RELEASE ORAL at 09:58

## 2020-01-01 RX ADMIN — FENTANYL CITRATE 25 MCG: 50 INJECTION, SOLUTION INTRAMUSCULAR; INTRAVENOUS at 18:52

## 2020-01-01 RX ADMIN — POLYETHYLENE GLYCOL 3350 17 G: 17 POWDER, FOR SOLUTION ORAL at 08:42

## 2020-01-01 RX ADMIN — HYDROCODONE BITARTRATE AND ACETAMINOPHEN 1 TABLET: 5; 325 TABLET ORAL at 10:22

## 2020-01-01 RX ADMIN — OXYCODONE HYDROCHLORIDE AND ACETAMINOPHEN 1 TABLET: 5; 325 TABLET ORAL at 16:16

## 2020-01-01 RX ADMIN — HYDROCODONE BITARTRATE AND ACETAMINOPHEN 2 TABLET: 5; 325 TABLET ORAL at 16:58

## 2020-01-01 RX ADMIN — HYDROCODONE BITARTRATE AND ACETAMINOPHEN 2 TABLET: 5; 325 TABLET ORAL at 00:38

## 2020-01-01 RX ADMIN — HYDROMORPHONE HYDROCHLORIDE 0.5 MG: 1 INJECTION, SOLUTION INTRAMUSCULAR; INTRAVENOUS; SUBCUTANEOUS at 22:43

## 2020-01-01 RX ADMIN — FOLIC ACID 1 MG: 1 TABLET ORAL at 09:23

## 2020-01-01 RX ADMIN — HYDROCODONE BITARTRATE AND ACETAMINOPHEN 2 TABLET: 5; 325 TABLET ORAL at 19:28

## 2020-01-01 RX ADMIN — LORAZEPAM 1 MG: 1 TABLET ORAL at 12:30

## 2020-01-01 RX ADMIN — TAMSULOSIN HYDROCHLORIDE 0.4 MG: 0.4 CAPSULE ORAL at 16:01

## 2020-01-01 RX ADMIN — CEFEPIME HYDROCHLORIDE 1000 MG: 1 INJECTION, SOLUTION INTRAVENOUS at 00:27

## 2020-01-01 RX ADMIN — HYDROCODONE BITARTRATE AND ACETAMINOPHEN 1 TABLET: 5; 325 TABLET ORAL at 23:44

## 2020-01-01 RX ADMIN — ATORVASTATIN CALCIUM 40 MG: 40 TABLET, FILM COATED ORAL at 16:54

## 2020-01-01 RX ADMIN — MIDAZOLAM 0.5 MG: 1 INJECTION INTRAMUSCULAR; INTRAVENOUS at 14:12

## 2020-01-01 RX ADMIN — BACITRACIN 1 SMALL APPLICATION: 500 OINTMENT TOPICAL at 09:15

## 2020-01-01 RX ADMIN — TAMSULOSIN HYDROCHLORIDE 0.4 MG: 0.4 CAPSULE ORAL at 17:00

## 2020-01-01 RX ADMIN — DOCUSATE SODIUM 100 MG: 100 CAPSULE, LIQUID FILLED ORAL at 17:22

## 2020-01-01 RX ADMIN — TAMSULOSIN HYDROCHLORIDE 0.4 MG: 0.4 CAPSULE ORAL at 17:04

## 2020-01-01 RX ADMIN — FOLIC ACID 1 MG: 1 TABLET ORAL at 09:31

## 2020-01-01 RX ADMIN — SODIUM BICARBONATE 75 ML/HR: 84 INJECTION, SOLUTION INTRAVENOUS at 18:50

## 2020-01-01 RX ADMIN — HEPARIN SODIUM 5000 UNITS: 5000 INJECTION INTRAVENOUS; SUBCUTANEOUS at 15:20

## 2020-01-01 RX ADMIN — MIDAZOLAM 2 MG: 1 INJECTION INTRAMUSCULAR; INTRAVENOUS at 13:51

## 2020-01-01 RX ADMIN — BACITRACIN 1 SMALL APPLICATION: 500 OINTMENT TOPICAL at 09:55

## 2020-01-01 RX ADMIN — CEFTRIAXONE 1000 MG: 1 INJECTION, SOLUTION INTRAVENOUS at 00:59

## 2020-01-01 RX ADMIN — MIDAZOLAM 0.5 MG: 1 INJECTION INTRAMUSCULAR; INTRAVENOUS at 14:32

## 2020-01-01 RX ADMIN — SODIUM CHLORIDE 50 ML/HR: 0.9 INJECTION, SOLUTION INTRAVENOUS at 17:13

## 2020-01-01 RX ADMIN — CEFEPIME HYDROCHLORIDE 1000 MG: 1 INJECTION, SOLUTION INTRAVENOUS at 00:24

## 2020-01-01 RX ADMIN — ASPIRIN 81 MG 81 MG: 81 TABLET ORAL at 08:56

## 2020-01-01 RX ADMIN — HYDROCODONE BITARTRATE AND ACETAMINOPHEN 2 TABLET: 5; 325 TABLET ORAL at 21:33

## 2020-01-01 RX ADMIN — HYDROCODONE BITARTRATE AND ACETAMINOPHEN 2 TABLET: 5; 325 TABLET ORAL at 21:10

## 2020-01-01 RX ADMIN — VANCOMYCIN HYDROCHLORIDE 1000 MG: 1 INJECTION, SOLUTION INTRAVENOUS at 00:33

## 2020-01-01 RX ADMIN — FENTANYL CITRATE 25 MCG: 50 INJECTION, SOLUTION INTRAMUSCULAR; INTRAVENOUS at 14:27

## 2020-01-01 RX ADMIN — DIPHENHYDRAMINE HYDROCHLORIDE 25 MG: 50 INJECTION, SOLUTION INTRAMUSCULAR; INTRAVENOUS at 19:27

## 2020-01-01 RX ADMIN — FENTANYL CITRATE 25 MCG: 50 INJECTION, SOLUTION INTRAMUSCULAR; INTRAVENOUS at 14:36

## 2020-01-01 RX ADMIN — VANCOMYCIN HYDROCHLORIDE 1000 MG: 1 INJECTION, SOLUTION INTRAVENOUS at 01:55

## 2020-01-01 RX ADMIN — FENTANYL CITRATE 25 MCG: 50 INJECTION, SOLUTION INTRAMUSCULAR; INTRAVENOUS at 19:02

## 2020-01-01 RX ADMIN — HEPARIN SODIUM 5000 UNITS: 5000 INJECTION INTRAVENOUS; SUBCUTANEOUS at 05:39

## 2020-01-01 RX ADMIN — PANTOPRAZOLE SODIUM 40 MG: 40 INJECTION, POWDER, FOR SOLUTION INTRAVENOUS at 09:31

## 2020-01-01 RX ADMIN — FOLIC ACID 1 MG: 1 TABLET ORAL at 08:15

## 2020-01-01 RX ADMIN — MAGNESIUM SULFATE IN WATER 2 G: 40 INJECTION, SOLUTION INTRAVENOUS at 08:16

## 2020-01-01 RX ADMIN — ATORVASTATIN CALCIUM 40 MG: 40 TABLET, FILM COATED ORAL at 16:52

## 2020-01-01 RX ADMIN — DIPHENHYDRAMINE HYDROCHLORIDE 50 MG: 50 INJECTION, SOLUTION INTRAMUSCULAR; INTRAVENOUS at 01:14

## 2020-01-01 RX ADMIN — FOLIC ACID 1 MG: 1 TABLET ORAL at 10:04

## 2020-01-01 RX ADMIN — PIPERACILLIN AND TAZOBACTAM 3.38 G: 3; .375 INJECTION, POWDER, LYOPHILIZED, FOR SOLUTION INTRAVENOUS at 06:20

## 2020-01-01 RX ADMIN — HEPARIN SODIUM 5000 UNITS: 5000 INJECTION INTRAVENOUS; SUBCUTANEOUS at 14:35

## 2020-01-01 RX ADMIN — LISINOPRIL 5 MG: 5 TABLET ORAL at 09:59

## 2020-01-01 RX ADMIN — FENTANYL CITRATE 25 MCG: 50 INJECTION, SOLUTION INTRAMUSCULAR; INTRAVENOUS at 14:29

## 2020-01-01 RX ADMIN — HEPARIN SODIUM 5000 UNITS: 5000 INJECTION INTRAVENOUS; SUBCUTANEOUS at 06:37

## 2020-01-01 RX ADMIN — HYDROCODONE BITARTRATE AND ACETAMINOPHEN 1 TABLET: 5; 325 TABLET ORAL at 15:27

## 2020-01-01 RX ADMIN — HYDROCODONE BITARTRATE AND ACETAMINOPHEN 2 TABLET: 5; 325 TABLET ORAL at 10:12

## 2020-01-01 RX ADMIN — HEPARIN SODIUM 1500 UNITS: 1000 INJECTION INTRAVENOUS; SUBCUTANEOUS at 15:06

## 2020-01-01 RX ADMIN — ATORVASTATIN CALCIUM 40 MG: 40 TABLET, FILM COATED ORAL at 17:39

## 2020-01-01 RX ADMIN — HYDROCODONE BITARTRATE AND ACETAMINOPHEN 2 TABLET: 5; 325 TABLET ORAL at 16:07

## 2020-01-01 RX ADMIN — FENTANYL CITRATE 25 MCG: 50 INJECTION, SOLUTION INTRAMUSCULAR; INTRAVENOUS at 14:22

## 2020-01-01 RX ADMIN — SODIUM CHLORIDE 50 ML/HR: 0.9 INJECTION, SOLUTION INTRAVENOUS at 15:38

## 2020-01-01 RX ADMIN — FOLIC ACID 1 MG: 1 TABLET ORAL at 08:17

## 2020-01-01 RX ADMIN — HYDROCODONE BITARTRATE AND ACETAMINOPHEN 2 TABLET: 5; 325 TABLET ORAL at 05:52

## 2020-01-01 RX ADMIN — PANTOPRAZOLE SODIUM 40 MG: 40 INJECTION, POWDER, FOR SOLUTION INTRAVENOUS at 21:35

## 2020-01-01 RX ADMIN — FENTANYL CITRATE 25 MCG: 50 INJECTION, SOLUTION INTRAMUSCULAR; INTRAVENOUS at 15:27

## 2020-01-01 RX ADMIN — LIDOCAINE HYDROCHLORIDE 5 ML: 40 SOLUTION TOPICAL at 16:51

## 2020-01-01 RX ADMIN — ATORVASTATIN CALCIUM 40 MG: 40 TABLET, FILM COATED ORAL at 16:04

## 2020-01-01 RX ADMIN — POLYETHYLENE GLYCOL 3350 8 G: 17 POWDER, FOR SOLUTION ORAL at 09:34

## 2020-01-01 RX ADMIN — HYDROCODONE BITARTRATE AND ACETAMINOPHEN 2 TABLET: 5; 325 TABLET ORAL at 14:51

## 2020-01-01 RX ADMIN — AMOXICILLIN AND CLAVULANATE POTASSIUM 1 TABLET: 875; 125 TABLET, FILM COATED ORAL at 08:15

## 2020-01-01 RX ADMIN — VANCOMYCIN HYDROCHLORIDE 1250 MG: 5 INJECTION, POWDER, LYOPHILIZED, FOR SOLUTION INTRAVENOUS at 17:29

## 2020-01-01 RX ADMIN — LORAZEPAM 1 MG: 1 TABLET ORAL at 20:49

## 2020-01-01 RX ADMIN — ATORVASTATIN CALCIUM 40 MG: 40 TABLET, FILM COATED ORAL at 16:56

## 2020-01-01 RX ADMIN — PIPERACILLIN AND TAZOBACTAM 3.38 G: 3; .375 INJECTION, POWDER, LYOPHILIZED, FOR SOLUTION INTRAVENOUS at 18:56

## 2020-01-01 RX ADMIN — TAMSULOSIN HYDROCHLORIDE 0.4 MG: 0.4 CAPSULE ORAL at 16:29

## 2020-01-01 RX ADMIN — MIDAZOLAM 0.5 MG: 1 INJECTION INTRAMUSCULAR; INTRAVENOUS at 14:46

## 2020-01-01 RX ADMIN — HEPARIN SODIUM 5000 UNITS: 5000 INJECTION INTRAVENOUS; SUBCUTANEOUS at 05:20

## 2020-01-01 RX ADMIN — ATORVASTATIN CALCIUM 40 MG: 40 TABLET, FILM COATED ORAL at 17:00

## 2020-01-01 RX ADMIN — HYDROCODONE BITARTRATE AND ACETAMINOPHEN 2 TABLET: 5; 325 TABLET ORAL at 16:26

## 2020-01-01 RX ADMIN — HYDROCODONE BITARTRATE AND ACETAMINOPHEN 2 TABLET: 5; 325 TABLET ORAL at 12:03

## 2020-01-01 RX ADMIN — OXYCODONE HYDROCHLORIDE AND ACETAMINOPHEN 500 MG: 500 TABLET ORAL at 09:42

## 2020-01-01 RX ADMIN — AMOXICILLIN AND CLAVULANATE POTASSIUM 1 TABLET: 875; 125 TABLET, FILM COATED ORAL at 21:21

## 2020-01-01 RX ADMIN — HYDROCODONE BITARTRATE AND ACETAMINOPHEN 1 TABLET: 5; 325 TABLET ORAL at 03:41

## 2020-01-01 RX ADMIN — ACETAMINOPHEN 650 MG: 325 TABLET, FILM COATED ORAL at 09:13

## 2020-01-01 RX ADMIN — HEPARIN SODIUM 5000 UNITS: 5000 INJECTION INTRAVENOUS; SUBCUTANEOUS at 14:13

## 2020-01-01 RX ADMIN — SODIUM CHLORIDE: 0.9 INJECTION, SOLUTION INTRAVENOUS at 11:59

## 2020-01-01 RX ADMIN — FENTANYL CITRATE 25 MCG: 50 INJECTION, SOLUTION INTRAMUSCULAR; INTRAVENOUS at 14:25

## 2020-01-01 RX ADMIN — DIPHENHYDRAMINE HCL 25 MG: 25 TABLET, COATED ORAL at 22:44

## 2020-01-01 RX ADMIN — PANTOPRAZOLE SODIUM 40 MG: 40 TABLET, DELAYED RELEASE ORAL at 05:00

## 2020-01-01 RX ADMIN — LORAZEPAM 1 MG: 1 TABLET ORAL at 01:16

## 2020-01-01 RX ADMIN — LORAZEPAM 0.5 MG: 0.5 TABLET ORAL at 05:36

## 2020-01-01 RX ADMIN — LORAZEPAM 1 MG: 1 TABLET ORAL at 12:34

## 2020-01-01 RX ADMIN — ASPIRIN 81 MG 81 MG: 81 TABLET ORAL at 08:24

## 2020-01-01 RX ADMIN — SODIUM BICARBONATE 650 MG TABLET 650 MG: at 13:29

## 2020-01-01 RX ADMIN — METOPROLOL SUCCINATE 25 MG: 25 TABLET, EXTENDED RELEASE ORAL at 08:20

## 2020-01-01 RX ADMIN — MIDAZOLAM 0.5 MG: 1 INJECTION INTRAMUSCULAR; INTRAVENOUS at 11:08

## 2020-01-01 RX ADMIN — FENTANYL CITRATE 25 MCG: 50 INJECTION, SOLUTION INTRAMUSCULAR; INTRAVENOUS at 14:12

## 2020-01-01 RX ADMIN — MIDAZOLAM 0.5 MG: 1 INJECTION INTRAMUSCULAR; INTRAVENOUS at 14:28

## 2020-01-01 RX ADMIN — ATORVASTATIN CALCIUM 40 MG: 40 TABLET, FILM COATED ORAL at 17:01

## 2020-01-01 RX ADMIN — FOLIC ACID 1 MG: 1 TABLET ORAL at 08:56

## 2020-01-01 RX ADMIN — PANTOPRAZOLE SODIUM 40 MG: 40 INJECTION, POWDER, FOR SOLUTION INTRAVENOUS at 09:15

## 2020-01-01 RX ADMIN — FENTANYL CITRATE 25 MCG: 50 INJECTION, SOLUTION INTRAMUSCULAR; INTRAVENOUS at 14:10

## 2020-01-01 RX ADMIN — METOPROLOL SUCCINATE 25 MG: 25 TABLET, EXTENDED RELEASE ORAL at 08:17

## 2020-01-01 RX ADMIN — PANTOPRAZOLE SODIUM 40 MG: 40 TABLET, DELAYED RELEASE ORAL at 05:06

## 2020-01-01 RX ADMIN — POLYETHYLENE GLYCOL 3350 17 G: 17 POWDER, FOR SOLUTION ORAL at 17:44

## 2020-01-01 RX ADMIN — TAMSULOSIN HYDROCHLORIDE 0.4 MG: 0.4 CAPSULE ORAL at 15:33

## 2020-01-01 RX ADMIN — HYDROCODONE BITARTRATE AND ACETAMINOPHEN 1 TABLET: 5; 325 TABLET ORAL at 00:05

## 2020-01-01 RX ADMIN — SODIUM CHLORIDE, SODIUM LACTATE, POTASSIUM CHLORIDE, AND CALCIUM CHLORIDE: .6; .31; .03; .02 INJECTION, SOLUTION INTRAVENOUS at 16:47

## 2020-01-01 RX ADMIN — BACITRACIN 1 SMALL APPLICATION: 500 OINTMENT TOPICAL at 21:39

## 2020-01-01 RX ADMIN — SODIUM CHLORIDE 500 ML: 0.9 INJECTION, SOLUTION INTRAVENOUS at 23:12

## 2020-01-01 RX ADMIN — HYDROMORPHONE HYDROCHLORIDE 0.5 MG: 1 INJECTION, SOLUTION INTRAMUSCULAR; INTRAVENOUS; SUBCUTANEOUS at 02:17

## 2020-01-01 RX ADMIN — METOPROLOL SUCCINATE 25 MG: 25 TABLET, EXTENDED RELEASE ORAL at 08:53

## 2020-01-01 RX ADMIN — PANTOPRAZOLE SODIUM 40 MG: 40 TABLET, DELAYED RELEASE ORAL at 05:53

## 2020-01-01 RX ADMIN — ROCURONIUM BROMIDE 50 MG: 10 INJECTION, SOLUTION INTRAVENOUS at 14:01

## 2020-01-01 RX ADMIN — HYDROCODONE BITARTRATE AND ACETAMINOPHEN 2 TABLET: 5; 325 TABLET ORAL at 20:28

## 2020-01-01 RX ADMIN — CEFEPIME HYDROCHLORIDE 1000 MG: 1 INJECTION, SOLUTION INTRAVENOUS at 02:42

## 2020-01-01 RX ADMIN — POLYETHYLENE GLYCOL 3350 17 G: 17 POWDER, FOR SOLUTION ORAL at 18:00

## 2020-01-01 RX ADMIN — HYDROCODONE BITARTRATE AND ACETAMINOPHEN 1 TABLET: 5; 325 TABLET ORAL at 07:38

## 2020-01-01 RX ADMIN — SODIUM CHLORIDE, SODIUM LACTATE, POTASSIUM CHLORIDE, AND CALCIUM CHLORIDE 125 ML/HR: .6; .31; .03; .02 INJECTION, SOLUTION INTRAVENOUS at 03:17

## 2020-01-01 RX ADMIN — CEFEPIME HYDROCHLORIDE 1000 MG: 1 INJECTION, SOLUTION INTRAVENOUS at 01:16

## 2020-01-01 RX ADMIN — VANCOMYCIN HYDROCHLORIDE 1000 MG: 1 INJECTION, SOLUTION INTRAVENOUS at 01:47

## 2020-01-01 RX ADMIN — PROPOFOL 150 MG: 10 INJECTION, EMULSION INTRAVENOUS at 17:12

## 2020-01-01 RX ADMIN — HYDROMORPHONE HYDROCHLORIDE 0.5 MG: 1 INJECTION, SOLUTION INTRAMUSCULAR; INTRAVENOUS; SUBCUTANEOUS at 19:43

## 2020-01-01 RX ADMIN — OXYCODONE HYDROCHLORIDE AND ACETAMINOPHEN 1 TABLET: 5; 325 TABLET ORAL at 05:50

## 2020-01-01 RX ADMIN — MIDAZOLAM 1 MG: 1 INJECTION INTRAMUSCULAR; INTRAVENOUS at 17:12

## 2020-01-01 RX ADMIN — CEFTAZIDIME 2000 MG: 2 INJECTION, POWDER, FOR SOLUTION INTRAVENOUS at 22:55

## 2020-01-01 RX ADMIN — LIDOCAINE HYDROCHLORIDE 5 ML: 40 SOLUTION TOPICAL at 14:34

## 2020-01-01 RX ADMIN — LEVOFLOXACIN 750 MG: 500 TABLET, FILM COATED ORAL at 17:44

## 2020-01-01 RX ADMIN — VANCOMYCIN HYDROCHLORIDE 1500 MG: 1 INJECTION, POWDER, LYOPHILIZED, FOR SOLUTION INTRAVENOUS at 17:49

## 2020-01-01 RX ADMIN — TAMSULOSIN HYDROCHLORIDE 0.4 MG: 0.4 CAPSULE ORAL at 17:22

## 2020-01-01 RX ADMIN — FENTANYL CITRATE 50 MCG: 50 INJECTION, SOLUTION INTRAMUSCULAR; INTRAVENOUS at 10:56

## 2020-01-01 RX ADMIN — TAMSULOSIN HYDROCHLORIDE 0.4 MG: 0.4 CAPSULE ORAL at 17:57

## 2020-01-01 RX ADMIN — CEFEPIME HYDROCHLORIDE 1000 MG: 1 INJECTION, SOLUTION INTRAVENOUS at 01:14

## 2020-01-01 RX ADMIN — HYDROMORPHONE HYDROCHLORIDE 0.5 MG: 1 INJECTION, SOLUTION INTRAMUSCULAR; INTRAVENOUS; SUBCUTANEOUS at 22:20

## 2020-01-01 RX ADMIN — HYDROCODONE BITARTRATE AND ACETAMINOPHEN 2 TABLET: 5; 325 TABLET ORAL at 12:09

## 2020-01-01 RX ADMIN — TAMSULOSIN HYDROCHLORIDE 0.4 MG: 0.4 CAPSULE ORAL at 16:07

## 2020-01-01 RX ADMIN — CEFEPIME HYDROCHLORIDE 1000 MG: 1 INJECTION, SOLUTION INTRAVENOUS at 23:30

## 2020-01-01 RX ADMIN — POLYETHYLENE GLYCOL 3350 17 G: 17 POWDER, FOR SOLUTION ORAL at 17:08

## 2020-01-01 RX ADMIN — HEPARIN SODIUM 5000 UNITS: 5000 INJECTION INTRAVENOUS; SUBCUTANEOUS at 05:51

## 2020-01-01 RX ADMIN — METRONIDAZOLE 500 MG: 500 INJECTION, SOLUTION INTRAVENOUS at 17:16

## 2020-01-01 RX ADMIN — ASPIRIN 81 MG 81 MG: 81 TABLET ORAL at 08:15

## 2020-01-01 RX ADMIN — VANCOMYCIN HYDROCHLORIDE 1500 MG: 1 INJECTION, POWDER, LYOPHILIZED, FOR SOLUTION INTRAVENOUS at 18:00

## 2020-01-01 RX ADMIN — HYDROCODONE BITARTRATE AND ACETAMINOPHEN 1 TABLET: 5; 325 TABLET ORAL at 22:48

## 2020-01-01 RX ADMIN — OXYCODONE HYDROCHLORIDE AND ACETAMINOPHEN 500 MG: 500 TABLET ORAL at 09:25

## 2020-01-01 RX ADMIN — HEPARIN SODIUM 5000 UNITS: 5000 INJECTION INTRAVENOUS; SUBCUTANEOUS at 21:15

## 2020-01-01 RX ADMIN — VANCOMYCIN HYDROCHLORIDE 1000 MG: 1 INJECTION, SOLUTION INTRAVENOUS at 01:29

## 2020-01-01 RX ADMIN — BACITRACIN 1 SMALL APPLICATION: 500 OINTMENT TOPICAL at 09:24

## 2020-01-01 RX ADMIN — DOCUSATE SODIUM 100 MG: 100 CAPSULE, LIQUID FILLED ORAL at 17:06

## 2020-01-01 RX ADMIN — PROPOFOL 35 MG: 10 INJECTION, EMULSION INTRAVENOUS at 13:33

## 2020-01-01 RX ADMIN — TAMSULOSIN HYDROCHLORIDE 0.4 MG: 0.4 CAPSULE ORAL at 16:05

## 2020-01-01 RX ADMIN — HYDROCODONE BITARTRATE AND ACETAMINOPHEN 1 TABLET: 5; 325 TABLET ORAL at 09:59

## 2020-01-01 RX ADMIN — OXYCODONE HYDROCHLORIDE AND ACETAMINOPHEN 1 TABLET: 5; 325 TABLET ORAL at 08:17

## 2020-01-01 RX ADMIN — VANCOMYCIN HYDROCHLORIDE 1000 MG: 1 INJECTION, SOLUTION INTRAVENOUS at 01:07

## 2020-01-01 RX ADMIN — POLYETHYLENE GLYCOL 3350 17 G: 17 POWDER, FOR SOLUTION ORAL at 08:25

## 2020-01-01 RX ADMIN — HYDROCODONE BITARTRATE AND ACETAMINOPHEN 2 TABLET: 5; 325 TABLET ORAL at 17:21

## 2020-01-01 RX ADMIN — POLYETHYLENE GLYCOL 3350 17 G: 17 POWDER, FOR SOLUTION ORAL at 19:28

## 2020-01-01 RX ADMIN — PROPOFOL 90 MG: 10 INJECTION, EMULSION INTRAVENOUS at 13:08

## 2020-01-01 RX ADMIN — MIDAZOLAM 1 MG: 1 INJECTION INTRAMUSCULAR; INTRAVENOUS at 14:00

## 2020-01-01 RX ADMIN — HYDROMORPHONE HYDROCHLORIDE 0.5 MG: 1 INJECTION, SOLUTION INTRAMUSCULAR; INTRAVENOUS; SUBCUTANEOUS at 08:08

## 2020-01-01 RX ADMIN — FENTANYL CITRATE 25 MCG: 50 INJECTION, SOLUTION INTRAMUSCULAR; INTRAVENOUS at 14:33

## 2020-01-01 RX ADMIN — SODIUM CHLORIDE, SODIUM LACTATE, POTASSIUM CHLORIDE, AND CALCIUM CHLORIDE: .6; .31; .03; .02 INJECTION, SOLUTION INTRAVENOUS at 14:00

## 2020-01-01 RX ADMIN — VANCOMYCIN HYDROCHLORIDE 1250 MG: 5 INJECTION, POWDER, LYOPHILIZED, FOR SOLUTION INTRAVENOUS at 17:00

## 2020-01-01 RX ADMIN — FENTANYL CITRATE 25 MCG: 50 INJECTION, SOLUTION INTRAMUSCULAR; INTRAVENOUS at 11:20

## 2020-01-01 RX ADMIN — HYDROMORPHONE HYDROCHLORIDE 0.5 MG: 1 INJECTION, SOLUTION INTRAMUSCULAR; INTRAVENOUS; SUBCUTANEOUS at 03:14

## 2020-01-01 RX ADMIN — TAMSULOSIN HYDROCHLORIDE 0.4 MG: 0.4 CAPSULE ORAL at 16:03

## 2020-01-01 RX ADMIN — ASPIRIN 81 MG 81 MG: 81 TABLET ORAL at 10:04

## 2020-01-01 RX ADMIN — HYDROCODONE BITARTRATE AND ACETAMINOPHEN 1 TABLET: 5; 325 TABLET ORAL at 01:32

## 2020-01-01 RX ADMIN — LIDOCAINE HYDROCHLORIDE 30 MG: 10 INJECTION, SOLUTION EPIDURAL; INFILTRATION; INTRACAUDAL; PERINEURAL at 13:19

## 2020-01-01 RX ADMIN — HYDROCODONE BITARTRATE AND ACETAMINOPHEN 2 TABLET: 5; 325 TABLET ORAL at 01:15

## 2020-01-01 RX ADMIN — FENTANYL CITRATE 100 MCG: 50 INJECTION, SOLUTION INTRAMUSCULAR; INTRAVENOUS at 17:12

## 2020-01-01 RX ADMIN — FOLIC ACID 1 MG: 1 TABLET ORAL at 08:42

## 2020-01-01 RX ADMIN — ATORVASTATIN CALCIUM 40 MG: 40 TABLET, FILM COATED ORAL at 16:18

## 2020-01-01 RX ADMIN — POLYETHYLENE GLYCOL 3350 17 G: 17 POWDER, FOR SOLUTION ORAL at 08:24

## 2020-01-01 RX ADMIN — HYDROCODONE BITARTRATE AND ACETAMINOPHEN 2 TABLET: 5; 325 TABLET ORAL at 15:22

## 2020-01-01 RX ADMIN — GLYCOPYRROLATE 0.6 MG: 0.2 INJECTION, SOLUTION INTRAMUSCULAR; INTRAVENOUS at 18:22

## 2020-01-01 RX ADMIN — ATORVASTATIN CALCIUM 40 MG: 40 TABLET, FILM COATED ORAL at 17:22

## 2020-01-01 RX ADMIN — LORAZEPAM 1 MG: 1 TABLET ORAL at 22:27

## 2020-01-01 RX ADMIN — LORAZEPAM 1 MG: 1 TABLET ORAL at 23:03

## 2020-01-01 RX ADMIN — LORAZEPAM 0.5 MG: 0.5 TABLET ORAL at 00:47

## 2020-01-01 RX ADMIN — SIMETHICONE CHEW TAB 80 MG 80 MG: 80 TABLET ORAL at 16:38

## 2020-01-01 RX ADMIN — PROPOFOL 100 MG: 10 INJECTION, EMULSION INTRAVENOUS at 14:17

## 2020-01-01 RX ADMIN — LIDOCAINE HYDROCHLORIDE 5 ML: 40 SOLUTION TOPICAL at 15:39

## 2020-01-01 RX ADMIN — HYDROCODONE BITARTRATE AND ACETAMINOPHEN 2 TABLET: 5; 325 TABLET ORAL at 08:42

## 2020-01-01 RX ADMIN — ASPIRIN 81 MG 81 MG: 81 TABLET ORAL at 09:31

## 2020-01-01 RX ADMIN — HYDROCODONE BITARTRATE AND ACETAMINOPHEN 1 TABLET: 5; 325 TABLET ORAL at 16:18

## 2020-01-01 RX ADMIN — CEFEPIME HYDROCHLORIDE 1000 MG: 1 INJECTION, SOLUTION INTRAVENOUS at 00:22

## 2020-01-01 RX ADMIN — HEPARIN SODIUM 5000 UNITS: 5000 INJECTION INTRAVENOUS; SUBCUTANEOUS at 15:01

## 2020-01-01 RX ADMIN — METOPROLOL SUCCINATE 25 MG: 25 TABLET, EXTENDED RELEASE ORAL at 09:44

## 2020-01-01 RX ADMIN — BACITRACIN 1 SMALL APPLICATION: 500 OINTMENT TOPICAL at 08:51

## 2020-01-01 RX ADMIN — DOCUSATE SODIUM 100 MG: 100 CAPSULE, LIQUID FILLED ORAL at 08:14

## 2020-01-01 RX ADMIN — MIDAZOLAM 0.5 MG: 1 INJECTION INTRAMUSCULAR; INTRAVENOUS at 11:20

## 2020-01-01 RX ADMIN — CEFEPIME HYDROCHLORIDE 1000 MG: 1 INJECTION, SOLUTION INTRAVENOUS at 23:21

## 2020-01-01 RX ADMIN — HYDROCODONE BITARTRATE AND ACETAMINOPHEN 2 TABLET: 5; 325 TABLET ORAL at 20:20

## 2020-01-01 RX ADMIN — ACETAMINOPHEN 650 MG: 325 TABLET ORAL at 13:12

## 2020-01-01 RX ADMIN — INFLUENZA A VIRUS A/MICHIGAN/45/2015 X-275 (H1N1) ANTIGEN (FORMALDEHYDE INACTIVATED), INFLUENZA A VIRUS A/SINGAPORE/INFIMH-16-0019/2016 IVR-186 (H3N2) ANTIGEN (FORMALDEHYDE INACTIVATED), INFLUENZA B VIRUS B/PHUKET/3073/2013 ANTIGEN (FORMALDEHYDE INACTIVATED), AND INFLUENZA B VIRUS B/MARYLAND/15/2016 BX-69A ANTIGEN (FORMALDEHYDE INACTIVATED) 0.7 ML: 60; 60; 60; 60 INJECTION, SUSPENSION INTRAMUSCULAR at 12:36

## 2020-01-01 RX ADMIN — HYDROCODONE BITARTRATE AND ACETAMINOPHEN 1 TABLET: 5; 325 TABLET ORAL at 16:53

## 2020-01-01 RX ADMIN — ATORVASTATIN CALCIUM 40 MG: 40 TABLET, FILM COATED ORAL at 16:03

## 2020-01-01 RX ADMIN — ASPIRIN 81 MG 81 MG: 81 TABLET ORAL at 08:43

## 2020-01-01 RX ADMIN — HYDROCODONE BITARTRATE AND ACETAMINOPHEN 2 TABLET: 5; 325 TABLET ORAL at 12:32

## 2020-01-01 RX ADMIN — FOLIC ACID 1 MG: 1 TABLET ORAL at 09:14

## 2020-01-01 RX ADMIN — PANTOPRAZOLE SODIUM 40 MG: 40 TABLET, DELAYED RELEASE ORAL at 06:01

## 2020-01-01 RX ADMIN — OXYCODONE HYDROCHLORIDE AND ACETAMINOPHEN 1 TABLET: 5; 325 TABLET ORAL at 19:29

## 2020-01-01 RX ADMIN — HYDROMORPHONE HYDROCHLORIDE 0.5 MG: 1 INJECTION, SOLUTION INTRAMUSCULAR; INTRAVENOUS; SUBCUTANEOUS at 13:26

## 2020-01-01 RX ADMIN — HYDROCODONE BITARTRATE AND ACETAMINOPHEN 2 TABLET: 5; 325 TABLET ORAL at 09:05

## 2020-01-01 RX ADMIN — MORPHINE SULFATE 4 MG: 4 INJECTION INTRAVENOUS at 01:39

## 2020-01-01 RX ADMIN — HEPARIN SODIUM 5000 UNITS: 5000 INJECTION INTRAVENOUS; SUBCUTANEOUS at 14:05

## 2020-01-01 RX ADMIN — ASPIRIN 81 MG 81 MG: 81 TABLET ORAL at 08:25

## 2020-01-01 RX ADMIN — FOLIC ACID 1 MG: 1 TABLET ORAL at 09:59

## 2020-01-01 RX ADMIN — LORAZEPAM 0.5 MG: 0.5 TABLET ORAL at 22:08

## 2020-01-01 RX ADMIN — NEOSTIGMINE METHYLSULFATE 3 MG: 1 INJECTION, SOLUTION INTRAVENOUS at 18:22

## 2020-01-01 RX ADMIN — ONDANSETRON 4 MG: 2 INJECTION INTRAMUSCULAR; INTRAVENOUS at 14:10

## 2020-01-01 RX ADMIN — METOPROLOL SUCCINATE 25 MG: 25 TABLET, EXTENDED RELEASE ORAL at 09:42

## 2020-01-01 RX ADMIN — AMOXICILLIN AND CLAVULANATE POTASSIUM 1 TABLET: 875; 125 TABLET, FILM COATED ORAL at 22:00

## 2020-01-01 RX ADMIN — METOPROLOL SUCCINATE 25 MG: 25 TABLET, EXTENDED RELEASE ORAL at 09:27

## 2020-01-01 RX ADMIN — LEVOFLOXACIN 750 MG: 500 TABLET, FILM COATED ORAL at 08:15

## 2020-01-01 RX ADMIN — PANTOPRAZOLE SODIUM 40 MG: 40 TABLET, DELAYED RELEASE ORAL at 05:05

## 2020-01-01 RX ADMIN — HYDROCODONE BITARTRATE AND ACETAMINOPHEN 1 TABLET: 5; 325 TABLET ORAL at 14:13

## 2020-01-01 RX ADMIN — ASPIRIN 81 MG 81 MG: 81 TABLET ORAL at 08:42

## 2020-01-01 RX ADMIN — DIAZEPAM 5 MG: 5 TABLET ORAL at 12:05

## 2020-01-01 RX ADMIN — VANCOMYCIN HYDROCHLORIDE 1000 MG: 1 INJECTION, SOLUTION INTRAVENOUS at 04:01

## 2020-01-01 RX ADMIN — HYDROCODONE BITARTRATE AND ACETAMINOPHEN 2 TABLET: 5; 325 TABLET ORAL at 04:02

## 2020-01-01 RX ADMIN — FOLIC ACID 1 MG: 1 TABLET ORAL at 08:43

## 2020-01-01 RX ADMIN — VANCOMYCIN HYDROCHLORIDE 1000 MG: 1 INJECTION, SOLUTION INTRAVENOUS at 17:35

## 2020-01-01 RX ADMIN — FENTANYL CITRATE 25 MCG: 50 INJECTION, SOLUTION INTRAMUSCULAR; INTRAVENOUS at 11:09

## 2020-01-01 RX ADMIN — VANCOMYCIN HYDROCHLORIDE 1500 MG: 1 INJECTION, POWDER, LYOPHILIZED, FOR SOLUTION INTRAVENOUS at 17:29

## 2020-01-01 RX ADMIN — SODIUM CHLORIDE, SODIUM GLUCONATE, SODIUM ACETATE, POTASSIUM CHLORIDE, MAGNESIUM CHLORIDE, SODIUM PHOSPHATE, DIBASIC, AND POTASSIUM PHOSPHATE 75 ML/HR: .53; .5; .37; .037; .03; .012; .00082 INJECTION, SOLUTION INTRAVENOUS at 17:01

## 2020-01-01 RX ADMIN — LORAZEPAM 0.5 MG: 0.5 TABLET ORAL at 10:08

## 2020-01-01 RX ADMIN — ATORVASTATIN CALCIUM 40 MG: 40 TABLET, FILM COATED ORAL at 17:04

## 2020-01-01 RX ADMIN — PANTOPRAZOLE SODIUM 40 MG: 40 INJECTION, POWDER, FOR SOLUTION INTRAVENOUS at 09:59

## 2020-01-01 RX ADMIN — PANTOPRAZOLE SODIUM 40 MG: 40 TABLET, DELAYED RELEASE ORAL at 05:46

## 2020-01-01 RX ADMIN — TAMSULOSIN HYDROCHLORIDE 0.4 MG: 0.4 CAPSULE ORAL at 17:01

## 2020-01-01 RX ADMIN — FENTANYL CITRATE 25 MCG: 50 INJECTION, SOLUTION INTRAMUSCULAR; INTRAVENOUS at 18:47

## 2020-01-01 RX ADMIN — VANCOMYCIN HYDROCHLORIDE 1000 MG: 1 INJECTION, SOLUTION INTRAVENOUS at 06:42

## 2020-01-01 RX ADMIN — FENTANYL CITRATE 50 MCG: 50 INJECTION, SOLUTION INTRAMUSCULAR; INTRAVENOUS at 13:53

## 2020-01-01 RX ADMIN — HYDROCODONE BITARTRATE AND ACETAMINOPHEN 2 TABLET: 5; 325 TABLET ORAL at 04:00

## 2020-01-01 RX ADMIN — FENTANYL CITRATE 25 MCG: 50 INJECTION, SOLUTION INTRAMUSCULAR; INTRAVENOUS at 14:28

## 2020-01-01 RX ADMIN — HYDROCODONE BITARTRATE AND ACETAMINOPHEN 1 TABLET: 5; 325 TABLET ORAL at 06:03

## 2020-01-01 RX ADMIN — CEFEPIME HYDROCHLORIDE 1000 MG: 1 INJECTION, SOLUTION INTRAVENOUS at 00:20

## 2020-01-01 RX ADMIN — SODIUM CHLORIDE, SODIUM GLUCONATE, SODIUM ACETATE, POTASSIUM CHLORIDE, MAGNESIUM CHLORIDE, SODIUM PHOSPHATE, DIBASIC, AND POTASSIUM PHOSPHATE 75 ML/HR: .53; .5; .37; .037; .03; .012; .00082 INJECTION, SOLUTION INTRAVENOUS at 16:45

## 2020-01-01 RX ADMIN — ATORVASTATIN CALCIUM 40 MG: 40 TABLET, FILM COATED ORAL at 15:33

## 2020-01-01 RX ADMIN — DIPHENHYDRAMINE HYDROCHLORIDE 50 MG: 50 INJECTION INTRAMUSCULAR; INTRAVENOUS at 01:23

## 2020-01-01 RX ADMIN — HYDROCODONE BITARTRATE AND ACETAMINOPHEN 2 TABLET: 5; 325 TABLET ORAL at 00:26

## 2020-01-01 RX ADMIN — FENTANYL CITRATE 50 MCG: 50 INJECTION, SOLUTION INTRAMUSCULAR; INTRAVENOUS at 14:00

## 2020-01-01 RX ADMIN — TAMSULOSIN HYDROCHLORIDE 0.4 MG: 0.4 CAPSULE ORAL at 16:56

## 2020-01-01 RX ADMIN — HYDROMORPHONE HYDROCHLORIDE 0.5 MG: 1 INJECTION, SOLUTION INTRAMUSCULAR; INTRAVENOUS; SUBCUTANEOUS at 06:36

## 2020-01-01 RX ADMIN — DIPHENHYDRAMINE HYDROCHLORIDE 25 MG: 50 INJECTION, SOLUTION INTRAMUSCULAR; INTRAVENOUS at 15:09

## 2020-01-01 RX ADMIN — POLYETHYLENE GLYCOL 3350 17 G: 17 POWDER, FOR SOLUTION ORAL at 08:14

## 2020-01-01 RX ADMIN — VANCOMYCIN HYDROCHLORIDE 1500 MG: 1 INJECTION, POWDER, LYOPHILIZED, FOR SOLUTION INTRAVENOUS at 17:56

## 2020-01-01 RX ADMIN — ATORVASTATIN CALCIUM 40 MG: 40 TABLET, FILM COATED ORAL at 17:56

## 2020-01-01 RX ADMIN — PANTOPRAZOLE SODIUM 40 MG: 40 INJECTION, POWDER, FOR SOLUTION INTRAVENOUS at 21:08

## 2020-01-01 RX ADMIN — FOLIC ACID 1 MG: 1 TABLET ORAL at 08:49

## 2020-01-01 RX ADMIN — ROCURONIUM BROMIDE 35 MG: 10 INJECTION, SOLUTION INTRAVENOUS at 17:12

## 2020-01-01 RX ADMIN — FENTANYL CITRATE 50 MCG: 50 INJECTION, SOLUTION INTRAMUSCULAR; INTRAVENOUS at 13:32

## 2020-01-01 RX ADMIN — PANTOPRAZOLE SODIUM 40 MG: 40 TABLET, DELAYED RELEASE ORAL at 05:51

## 2020-01-01 RX ADMIN — IOHEXOL 50 ML: 240 INJECTION, SOLUTION INTRATHECAL; INTRAVASCULAR; INTRAVENOUS; ORAL at 14:46

## 2020-01-01 RX ADMIN — HYDROMORPHONE HYDROCHLORIDE 0.5 MG: 1 INJECTION, SOLUTION INTRAMUSCULAR; INTRAVENOUS; SUBCUTANEOUS at 23:30

## 2020-01-01 RX ADMIN — PANTOPRAZOLE SODIUM 40 MG: 40 INJECTION, POWDER, FOR SOLUTION INTRAVENOUS at 21:32

## 2020-01-01 RX ADMIN — LIDOCAINE HYDROCHLORIDE 5 ML: 40 SOLUTION TOPICAL at 14:50

## 2020-01-01 RX ADMIN — HEPARIN SODIUM 5000 UNITS: 5000 INJECTION INTRAVENOUS; SUBCUTANEOUS at 21:08

## 2020-01-01 RX ADMIN — HEPARIN SODIUM 5000 UNITS: 5000 INJECTION INTRAVENOUS; SUBCUTANEOUS at 15:04

## 2020-01-01 RX ADMIN — HEPARIN SODIUM 5000 UNITS: 5000 INJECTION INTRAVENOUS; SUBCUTANEOUS at 05:25

## 2020-01-01 RX ADMIN — DOCUSATE SODIUM 100 MG: 100 CAPSULE, LIQUID FILLED ORAL at 08:24

## 2020-01-01 RX ADMIN — SODIUM BICARBONATE 75 ML/HR: 84 INJECTION, SOLUTION INTRAVENOUS at 04:46

## 2020-01-01 RX ADMIN — FOLIC ACID 1 MG: 1 TABLET ORAL at 09:44

## 2020-01-01 RX ADMIN — POLYETHYLENE GLYCOL 3350 17 G: 17 POWDER, FOR SOLUTION ORAL at 18:58

## 2020-01-01 RX ADMIN — PROPOFOL 100 MG: 10 INJECTION, EMULSION INTRAVENOUS at 14:01

## 2020-01-01 RX ADMIN — TAMSULOSIN HYDROCHLORIDE 0.4 MG: 0.4 CAPSULE ORAL at 17:39

## 2020-01-01 RX ADMIN — FENTANYL CITRATE 25 MCG: 50 INJECTION INTRAMUSCULAR; INTRAVENOUS at 15:14

## 2020-01-01 RX ADMIN — PIPERACILLIN AND TAZOBACTAM 3.38 G: 3; .375 INJECTION, POWDER, LYOPHILIZED, FOR SOLUTION INTRAVENOUS at 12:03

## 2020-01-01 RX ADMIN — LORAZEPAM 1 MG: 1 TABLET ORAL at 23:44

## 2020-01-01 RX ADMIN — HYDROCODONE BITARTRATE AND ACETAMINOPHEN 2 TABLET: 5; 325 TABLET ORAL at 06:28

## 2020-01-01 RX ADMIN — SODIUM BICARBONATE 650 MG TABLET 650 MG: at 17:00

## 2020-01-01 RX ADMIN — HEPARIN SODIUM 1500 UNITS: 1000 INJECTION INTRAVENOUS; SUBCUTANEOUS at 14:35

## 2020-01-01 RX ADMIN — PANTOPRAZOLE SODIUM 40 MG: 40 INJECTION, POWDER, FOR SOLUTION INTRAVENOUS at 09:23

## 2020-01-01 RX ADMIN — MIDAZOLAM 1 MG: 1 INJECTION INTRAMUSCULAR; INTRAVENOUS at 10:56

## 2020-01-01 RX ADMIN — POLYETHYLENE GLYCOL 3350 17 G: 17 POWDER, FOR SOLUTION ORAL at 18:12

## 2020-01-01 RX ADMIN — AMOXICILLIN AND CLAVULANATE POTASSIUM 1 TABLET: 875; 125 TABLET, FILM COATED ORAL at 08:43

## 2020-01-01 RX ADMIN — FOLIC ACID 1 MG: 1 TABLET ORAL at 09:42

## 2020-01-01 RX ADMIN — BACITRACIN 1 SMALL APPLICATION: 500 OINTMENT TOPICAL at 09:59

## 2020-01-01 RX ADMIN — HYDROCODONE BITARTRATE AND ACETAMINOPHEN 2 TABLET: 5; 325 TABLET ORAL at 05:57

## 2020-01-01 RX ADMIN — OXYCODONE HYDROCHLORIDE AND ACETAMINOPHEN 1 TABLET: 5; 325 TABLET ORAL at 18:08

## 2020-01-01 RX ADMIN — HYDROCODONE BITARTRATE AND ACETAMINOPHEN 2 TABLET: 5; 325 TABLET ORAL at 23:44

## 2020-01-01 RX ADMIN — VANCOMYCIN HYDROCHLORIDE 1500 MG: 1 INJECTION, POWDER, LYOPHILIZED, FOR SOLUTION INTRAVENOUS at 12:37

## 2020-01-01 RX ADMIN — LORAZEPAM 0.5 MG: 0.5 TABLET ORAL at 10:33

## 2020-01-01 RX ADMIN — TAMSULOSIN HYDROCHLORIDE 0.4 MG: 0.4 CAPSULE ORAL at 16:04

## 2020-01-01 RX ADMIN — ACETAMINOPHEN 650 MG: 325 TABLET ORAL at 02:01

## 2020-01-01 RX ADMIN — HYDROCODONE BITARTRATE AND ACETAMINOPHEN 1 TABLET: 5; 325 TABLET ORAL at 21:56

## 2020-01-01 RX ADMIN — FENTANYL CITRATE 25 MCG: 50 INJECTION, SOLUTION INTRAMUSCULAR; INTRAVENOUS at 18:57

## 2020-01-01 RX ADMIN — METOPROLOL SUCCINATE 25 MG: 25 TABLET, EXTENDED RELEASE ORAL at 08:40

## 2020-01-01 RX ADMIN — HEPARIN SODIUM 5000 UNITS: 5000 INJECTION INTRAVENOUS; SUBCUTANEOUS at 22:04

## 2020-01-01 RX ADMIN — OXYCODONE HYDROCHLORIDE AND ACETAMINOPHEN 500 MG: 500 TABLET ORAL at 09:14

## 2020-01-01 RX ADMIN — HYDROCODONE BITARTRATE AND ACETAMINOPHEN 2 TABLET: 5; 325 TABLET ORAL at 01:24

## 2020-01-01 RX ADMIN — HYDROCODONE BITARTRATE AND ACETAMINOPHEN 2 TABLET: 5; 325 TABLET ORAL at 05:04

## 2020-01-01 RX ADMIN — HEPARIN SODIUM 5000 UNITS: 5000 INJECTION INTRAVENOUS; SUBCUTANEOUS at 21:32

## 2020-01-01 RX ADMIN — ATORVASTATIN CALCIUM 40 MG: 40 TABLET, FILM COATED ORAL at 17:17

## 2020-01-01 RX ADMIN — HYDROCODONE BITARTRATE AND ACETAMINOPHEN 2 TABLET: 5; 325 TABLET ORAL at 05:09

## 2020-01-01 RX ADMIN — HYDROCODONE BITARTRATE AND ACETAMINOPHEN 2 TABLET: 5; 325 TABLET ORAL at 05:53

## 2020-01-01 RX ADMIN — VANCOMYCIN HYDROCHLORIDE 1500 MG: 1 INJECTION, POWDER, LYOPHILIZED, FOR SOLUTION INTRAVENOUS at 14:12

## 2020-01-01 RX ADMIN — FOLIC ACID 1 MG: 1 TABLET ORAL at 08:53

## 2020-01-01 RX ADMIN — SODIUM CHLORIDE, SODIUM LACTATE, POTASSIUM CHLORIDE, AND CALCIUM CHLORIDE 125 ML/HR: .6; .31; .03; .02 INJECTION, SOLUTION INTRAVENOUS at 19:46

## 2020-01-01 RX ADMIN — ONDANSETRON 4 MG: 2 INJECTION INTRAMUSCULAR; INTRAVENOUS at 14:42

## 2020-01-01 RX ADMIN — PIPERACILLIN AND TAZOBACTAM 3.38 G: 3; .375 INJECTION, POWDER, LYOPHILIZED, FOR SOLUTION INTRAVENOUS at 00:28

## 2020-01-01 RX ADMIN — HYDROCODONE BITARTRATE AND ACETAMINOPHEN 1 TABLET: 5; 325 TABLET ORAL at 04:10

## 2020-01-01 RX ADMIN — DIPHENHYDRAMINE HYDROCHLORIDE 25 MG: 50 INJECTION, SOLUTION INTRAMUSCULAR; INTRAVENOUS at 09:15

## 2020-01-01 RX ADMIN — SODIUM BICARBONATE 650 MG TABLET 650 MG: at 09:42

## 2020-01-01 RX ADMIN — LORAZEPAM 1 MG: 1 TABLET ORAL at 11:36

## 2020-01-01 RX ADMIN — BACITRACIN 1 SMALL APPLICATION: 500 OINTMENT TOPICAL at 17:03

## 2020-01-01 RX ADMIN — HYDROCODONE BITARTRATE AND ACETAMINOPHEN 1 TABLET: 5; 325 TABLET ORAL at 21:55

## 2020-01-01 RX ADMIN — DOCUSATE SODIUM 100 MG: 100 CAPSULE, LIQUID FILLED ORAL at 17:57

## 2020-01-01 RX ADMIN — HYDROCODONE BITARTRATE AND ACETAMINOPHEN 2 TABLET: 5; 325 TABLET ORAL at 01:33

## 2020-01-01 RX ADMIN — HYDROCODONE BITARTRATE AND ACETAMINOPHEN 1 TABLET: 5; 325 TABLET ORAL at 21:38

## 2020-01-01 RX ADMIN — CEFAZOLIN SODIUM 1000 MG: 1 SOLUTION INTRAVENOUS at 17:16

## 2020-01-01 RX ADMIN — HYDROMORPHONE HYDROCHLORIDE 1 MG: 1 INJECTION, SOLUTION INTRAMUSCULAR; INTRAVENOUS; SUBCUTANEOUS at 22:07

## 2020-01-01 RX ADMIN — HYDROCODONE BITARTRATE AND ACETAMINOPHEN 1 TABLET: 5; 325 TABLET ORAL at 18:32

## 2020-01-01 RX ADMIN — LIDOCAINE HYDROCHLORIDE 30 MG: 10 INJECTION, SOLUTION EPIDURAL; INFILTRATION; INTRACAUDAL; PERINEURAL at 13:07

## 2020-01-01 RX ADMIN — ATORVASTATIN CALCIUM 40 MG: 40 TABLET, FILM COATED ORAL at 16:07

## 2020-01-01 RX ADMIN — SODIUM BICARBONATE 50 ML/HR: 84 INJECTION, SOLUTION INTRAVENOUS at 06:43

## 2020-01-01 RX ADMIN — HYDROMORPHONE HYDROCHLORIDE 0.5 MG: 1 INJECTION, SOLUTION INTRAMUSCULAR; INTRAVENOUS; SUBCUTANEOUS at 16:54

## 2020-01-01 RX ADMIN — OXYCODONE HYDROCHLORIDE AND ACETAMINOPHEN 1 TABLET: 5; 325 TABLET ORAL at 11:07

## 2020-01-01 RX ADMIN — LIDOCAINE HYDROCHLORIDE 1 ML: 40 SOLUTION TOPICAL at 15:06

## 2020-01-01 RX ADMIN — FOLIC ACID 1 MG: 1 TABLET ORAL at 09:15

## 2020-01-01 RX ADMIN — SODIUM CHLORIDE, SODIUM LACTATE, POTASSIUM CHLORIDE, AND CALCIUM CHLORIDE: .6; .31; .03; .02 INJECTION, SOLUTION INTRAVENOUS at 13:40

## 2020-01-01 RX ADMIN — FUROSEMIDE 40 MG: 40 TABLET ORAL at 09:59

## 2020-01-01 RX ADMIN — ATORVASTATIN CALCIUM 40 MG: 40 TABLET, FILM COATED ORAL at 17:37

## 2020-01-01 RX ADMIN — DOCUSATE SODIUM 100 MG: 100 CAPSULE, LIQUID FILLED ORAL at 08:42

## 2020-01-01 RX ADMIN — HYDROCODONE BITARTRATE AND ACETAMINOPHEN 1 TABLET: 5; 325 TABLET ORAL at 08:20

## 2020-01-01 RX ADMIN — OXYCODONE HYDROCHLORIDE AND ACETAMINOPHEN 1 TABLET: 5; 325 TABLET ORAL at 22:19

## 2020-01-01 RX ADMIN — DIAZEPAM 5 MG: 5 TABLET ORAL at 00:09

## 2020-01-01 RX ADMIN — FOLIC ACID 1 MG: 1 TABLET ORAL at 08:25

## 2020-01-01 RX ADMIN — PANTOPRAZOLE SODIUM 40 MG: 40 INJECTION, POWDER, FOR SOLUTION INTRAVENOUS at 21:33

## 2020-01-01 RX ADMIN — HYDROCODONE BITARTRATE AND ACETAMINOPHEN 2 TABLET: 5; 325 TABLET ORAL at 05:43

## 2020-01-01 RX ADMIN — OXYCODONE HYDROCHLORIDE AND ACETAMINOPHEN 500 MG: 500 TABLET ORAL at 08:17

## 2020-01-06 ENCOUNTER — REMOTE DEVICE CLINIC VISIT (OUTPATIENT)
Dept: CARDIOLOGY CLINIC | Facility: CLINIC | Age: 75
End: 2020-01-06
Payer: MEDICARE

## 2020-01-06 DIAGNOSIS — Z95.0 PRESENCE OF PERMANENT CARDIAC PACEMAKER: Primary | ICD-10-CM

## 2020-01-06 PROCEDURE — 93296 REM INTERROG EVL PM/IDS: CPT | Performed by: INTERNAL MEDICINE

## 2020-01-06 PROCEDURE — 93294 REM INTERROG EVL PM/LDLS PM: CPT | Performed by: INTERNAL MEDICINE

## 2020-01-06 NOTE — PROGRESS NOTES
MDT/BI-V PPM (VVIR 80 PPPM) - ACTIVE SYSTEM IS MRI CONDITIONAL  CARELINK TRANSMISSION:  BATTERY VOLTAGE ADEQUATE (9 0 YR)   BP 97 1% (VSRP 2 9%)   ALL AVAILABLE LEAD PARAMETERS WITHIN NORMAL LIMITS   9 VT EPISODES SINCE 10/8/19 (8 EPISODES WITH HR > 200 BPM) WITH 5 AVAILABLE EGMS SHOWING NSVT VS RVR (8 @ 211 BPM, 7 @ 214 BPM + 7 @ 214 BPM, 7 @ 222 BPM, 15 @ 206 BPM)    1 V SENSE EPISODE WITH MARKERS ONLY SHOWING 15 @ 206  BPM   TASK TO DR FAUSTIN   OPTI-VOL FLUID THRESHOLD CROSSED & TASKED TO HF RN   PT TAKES METOPROLOL, AND FUROSEMIDE   NORMAL DEVICE FUNCTION   RG

## 2020-01-10 DIAGNOSIS — I48.91 ATRIAL FIBRILLATION WITH TACHYCARDIC VENTRICULAR RATE (HCC): ICD-10-CM

## 2020-01-10 RX ORDER — LISINOPRIL 5 MG/1
5 TABLET ORAL DAILY
Qty: 90 TABLET | Refills: 3 | Status: SHIPPED | OUTPATIENT
Start: 2020-01-10 | End: 2020-01-01 | Stop reason: SDUPTHER

## 2020-07-16 NOTE — PROGRESS NOTES
Results for orders placed or performed in visit on 07/16/20   Cardiac EP device report    Narrative    MDT/BI-V PPM (VVIR 80 PPPM) - ACTIVE SYSTEM IS MRI CONDITIONAL  CARELINK TRANSMISSION: BATTERY STATUS "OK"   88% VSRP 11%  ALL AVAILABLE LEAD PARAMETERS WITHIN NORMAL LIMITS  1 VHR NOTED  [address] PT ON METO SUCC & EF 45% (2018)  OPTI-VOL WITHIN NORMAL LIMITS  NORMAL DEVICE FUNCTION   NC       Current Outpatient Medications:     acetaminophen (TYLENOL) 325 mg tablet, Take 2 tablets (650 mg total) by mouth every 6 (six) hours as needed for mild pain, Disp: 30 tablet, Rfl: 0    Ascorbic Acid (VITAMIN C PO), Take by mouth, Disp: , Rfl:     B Complex Vitamins (VITAMIN B COMPLEX PO), Take by mouth, Disp: , Rfl:     calcium carbonate (TUMS) 500 mg chewable tablet, Chew 1 tablet (500 mg total) daily as needed for indigestion or heartburn, Disp: 30 tablet, Rfl: 0    clobetasol (TEMOVATE) 0 05 % GEL, Apply topically as needed  , Disp: , Rfl:     Ferrous Gluconate-C-Folic Acid (IRON-C PO), Take by mouth, Disp: , Rfl:     folic acid (FOLVITE) 1 mg tablet, Take 1 mg by mouth daily, Disp: , Rfl:     furosemide (LASIX) 40 mg tablet, Take 1 tablet (40 mg total) by mouth daily, Disp: 90 tablet, Rfl: 0    Lactobacillus (ACIDOPHILUS PO), Take by mouth, Disp: , Rfl:     lisinopril (ZESTRIL) 5 mg tablet, Take 1 tablet (5 mg total) by mouth daily, Disp: 30 tablet, Rfl: 0    Lycopene 10 MG CAPS, Take by mouth, Disp: , Rfl:     metoprolol succinate (TOPROL-XL) 100 mg 24 hr tablet, Take 1 tablet (100 mg total) by mouth daily for 168 days, Disp: 90 tablet, Rfl: 3    Misc Natural Products (SAW PALMETTO) CAPS, Take by mouth, Disp: , Rfl:     Multiple Vitamin (MULTI-DAY VITAMINS) TABS, Take by mouth, Disp: , Rfl:     tamsulosin (FLOMAX) 0 4 mg, Take 1 capsule (0 4 mg total) by mouth daily with dinner for 30 days, Disp: 30 capsule, Rfl: 0

## 2020-07-17 PROBLEM — Z95.0 STATUS POST BIVENTRICULAR PACEMAKER: Chronic | Status: ACTIVE | Noted: 2019-06-21

## 2020-07-17 PROBLEM — D64.9 ANEMIA, UNSPECIFIED: Chronic | Status: ACTIVE | Noted: 2019-02-13
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
## 2020-07-17 PROBLEM — I48.19 OTHER PERSISTENT ATRIAL FIBRILLATION (HCC): Status: ACTIVE | Noted: 2017-05-02
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
## 2020-07-17 PROBLEM — M70.42 BURSITIS, PREPATELLAR, LEFT: Status: RESOLVED | Noted: 2019-01-29 | Resolved: 2020-01-01

## 2020-07-17 PROBLEM — I48.91 ATRIAL FIBRILLATION (HCC): Chronic | Status: ACTIVE | Noted: 2017-05-02

## 2020-07-17 PROBLEM — I21.4 NSTEMI (NON-ST ELEVATED MYOCARDIAL INFARCTION) (HCC): Status: RESOLVED | Noted: 2018-11-30 | Resolved: 2020-01-01

## 2020-07-17 PROBLEM — N40.1 BENIGN PROSTATIC HYPERPLASIA WITH URINARY RETENTION: Status: ACTIVE | Noted: 2020-01-01

## 2020-07-17 PROBLEM — E43 SEVERE PROTEIN-CALORIE MALNUTRITION (HCC): Status: ACTIVE | Noted: 2020-01-01

## 2020-07-17 PROBLEM — R19.8 UMBILICUS DISCHARGE: Status: ACTIVE | Noted: 2020-01-01

## 2020-07-17 PROBLEM — Z98.890 S/P AV (ATRIOVENTRICULAR) NODAL ABLATION: Chronic | Status: ACTIVE | Noted: 2019-01-04

## 2020-07-17 PROBLEM — N18.30 CKD (CHRONIC KIDNEY DISEASE) STAGE 3, GFR 30-59 ML/MIN (HCC): Chronic | Status: ACTIVE | Noted: 2020-01-01

## 2020-07-17 PROBLEM — I50.42 CHRONIC COMBINED SYSTOLIC AND DIASTOLIC HEART FAILURE (HCC): Chronic | Status: ACTIVE | Noted: 2018-11-22

## 2020-07-17 PROBLEM — R06.00 DOE (DYSPNEA ON EXERTION): Status: ACTIVE | Noted: 2020-01-01

## 2020-07-17 PROBLEM — R33.8 BENIGN PROSTATIC HYPERPLASIA WITH URINARY RETENTION: Status: ACTIVE | Noted: 2020-01-01

## 2020-07-17 PROBLEM — N18.30 CKD (CHRONIC KIDNEY DISEASE) STAGE 3, GFR 30-59 ML/MIN (HCC): Status: ACTIVE | Noted: 2020-01-01

## 2020-07-17 PROBLEM — E87.2 LACTIC ACIDOSIS: Status: ACTIVE | Noted: 2020-01-01

## 2020-07-17 PROBLEM — L03.90 CELLULITIS: Status: ACTIVE | Noted: 2020-01-01

## 2020-07-17 NOTE — MALNUTRITION/BMI
This medical record reflects one or more clinical indicators suggestive of malnutrition and/or morbid obesity  Malnutrition Findings:      Degree of Malnutrition: Other severe protein calorie malnutrition(Pt presents with severe protein calorie malnutrition as evidenced by clavicle protrusion, orbital hollwoing, temporal wasting, 12% wt loss in 13 months and < 75% po intake in 1 month  Treat with diet and supplements  )  Malnutrition Characteristics: Fat loss, Muscle loss, Inadequate energy    BMI Findings: Body mass index is 19 17 kg/m²  See Nutrition note dated 07/17/2020 for additional details  Completed nutrition assessment is viewable in the nutrition documentation

## 2020-07-17 NOTE — CONSULTS
CHF, thrombocytopenia, Consult - Podiatry   Griselda Mendoza 69 y o  male MRN: [id number]  Unit/Bed#: -01 SDU Encounter: [id number]    Assessment/Plan     Cellulitis right leg  Skin ulceration right leg fat layer exposed  Peripheral vascular disease   -arterial duplex results pending, currently being evaluated   -continue with current IV antibiotics   -hemoglobin 7 8, platelets 83, creatinine 2 18, BUN 80   -will need OR debridement right leg when medically stable   -ABD/Kerlix dressing for now until wounds are debrided   -RLE X-ray negative for osteo, vascular calcifications noted  Lactic acidosis, necrotic umbilical wound, chronic kidney disease stage 3, anemia, and AFib   -managed per Internal Medicine      History of Present Illness     HPI:  Griselda Mendoza is a 69 y o  male who presents with red swollen right leg with significant open wounds  Patient relates he has been treating this at home for over the past 2 months  He is using peroxide and a topical ointment  Has significant history of CHF with ejection fraction of 20% and AFib without anticoagulation  Also has significant history of chronic kidney disease stage 3-4 and anemia       Podiatry consult requested to evaluate right lower extremity wounds  Patient's chart reviewed noting all pertinent clinical laboratory data  Inpatient consult to Podiatry  Consult performed by: Shantel Ramsey DPM  Consult ordered by: BJ Kulkarni        Review of Systems   Constitutional: Negative  HENT: Negative  Eyes: Negative  Respiratory: Negative  Cardiovascular:  CHF with shortness of breath    Gastrointestinal: Negative      Musculoskeletal:  Negative   Skin:  Multiple large wounds right leg   Neurological:  Negative        Historical Information   Past Medical History:   Diagnosis Date    Anemia     Atrial fibrillation (HCC)     Benign prostatic hyperplasia without urinary obstruction     Congestive heart failure with left ventricular diastolic dysfunction, chronic (HCC)     History of ITP     Hypertension     Lumbosacral disc herniation     Peripheral vascular disease of lower extremity (HCC)     Renal disorder     Subdural hematoma (HCC)      Past Surgical History:   Procedure Laterality Date    BACK SURGERY      AUSTYN HOLE FOR SUBDURAL HEMATOMA      CARDIAC PACEMAKER PLACEMENT      CLAVICLE SURGERY Left 2011    HERNIA REPAIR      PROSTATE SURGERY       Social History   Social History     Substance and Sexual Activity   Alcohol Use No    Alcohol/week: 0 0 standard drinks    Comment: Denied use     Social History     Substance and Sexual Activity   Drug Use No    Comment: Denied use     Social History     Tobacco Use   Smoking Status Never Smoker   Smokeless Tobacco Never Used   Tobacco Comment    per [employer]-Former Smoker and Never Smoker-pls confirm     Family History:   Family History   Problem Relation Age of Onset    Heart disease Mother     Heart disease Father     Hypertension Father     Diabetes Other     Depression Other     Cancer Sister     Depression Cousin        Meds/Allergies   Medications Prior to Admission   Medication    Ascorbic Acid (VITAMIN C PO)    B Complex Vitamins (VITAMIN B COMPLEX PO)    calcium carbonate (TUMS) 500 mg chewable tablet    diazepam (VALIUM) 5 mg tablet    folic acid (FOLVITE) 1 mg tablet    furosemide (LASIX) 40 mg tablet    Lactobacillus (ACIDOPHILUS PO)    lisinopril (ZESTRIL) 5 mg tablet    Lycopene 10 MG CAPS    metoprolol succinate (TOPROL-XL) 100 mg 24 hr tablet    Misc Natural Products (SAW PALMETTO) CAPS    Multiple Vitamin (MULTI-DAY VITAMINS) TABS    acetaminophen (TYLENOL) 325 mg tablet    clobetasol (TEMOVATE) 0 05 % GEL    Ferrous Gluconate-C-Folic Acid (IRON-C PO)    tamsulosin (FLOMAX) 0 4 mg     No Known Allergies    Objective   First Vitals:   Blood Pressure: 118/57 (07/16/20 2240)  Pulse: 83 (07/16/20 2240)  Temperature: 97 8 °F (36 6 °C) (07/16/20 2240)  Temp Source: Temporal (07/16/20 2240)  Respirations: 16 (07/16/20 2240)  Height: 5' 10" (177 8 cm) (07/16/20 2240)  Weight - Scale: 65 8 kg (145 lb) (07/16/20 2240)  SpO2: 92 % (07/16/20 2240)    Current Vitals:   Blood Pressure: 113/59 (07/17/20 1136)  Pulse: 86 (07/17/20 1136)  Temperature: 97 7 °F (36 5 °C) (07/17/20 1136)  Temp Source: Oral (07/17/20 1136)  Respirations: 16 (07/17/20 1136)  Height: 5' 10" (177 8 cm) (07/17/20 0600)  Weight - Scale: 60 6 kg (133 lb 9 6 oz) (07/17/20 0600)  SpO2: 100 % (07/17/20 1136)        /59 (BP Location: Right arm)   Pulse 86   Temp 97 7 °F (36 5 °C) (Oral)   Resp 16   Ht 5' 10" (1 778 m)   Wt 60 6 kg (133 lb 9 6 oz)   SpO2 100%   BMI 19 17 kg/m²     General Appearance:    Alert, cooperative, no distress   Head:    Normocephalic, without obvious abnormality, atraumatic   Eyes:    PERRL, conjunctiva/corneas clear, EOM's intact            Nose:   Moist mucous membranes, no drainage or sinus tenderness   Throat:   No tenderness, no exudates   Neck:   Supple, symmetrical, trachea midline, no JVD   Back:     Symmetric, no CVA tenderness   Lungs:     Respirations unlabored   Chest wall:    No tenderness or deformity   Heart:    Rate and rhythm noted on last vitals  Abdomen:     Soft, non-tender, bowel sounds active all four quadrants,     no masses, no organomegaly       Lower Ext/Ortho: No gross deformities noted     Neuro/Vasc: CNII-XII intact  Normal strength  Sensation and reflexes:  Grossly intact  Pulses: Right: DP 0/4, PT 0/4, Left: DP 0/4, PT 0/4  Capillary Filling:  3 Sec, Edema:  +1 right leg, none left  Vascular calcifications noted on tib-fib x-ray RLE   Skin: Texture, Tone and Turgor:  Diminished, Digital Hair:  None  Atrophic Changes:   Moderate atrophic changes with diminished thin shiny atrophic skin of the right leg   Lesions:  None  Nail Pathology:  Multiple dystrophic  Ulcers/Wounds:  Several large wounds present on the medial and lateral aspect of the right lower leg, fat layer exposed with necrotic nature and significant slough, wounds are 80-90% yellow, localized erythema and calor noted consistent with cellulitis  Lab Results:   CBC w/diff  Results from last 7 days   Lab Units 07/17/20  0816 07/17/20  0815 07/16/20  2243   WBC Thousand/uL  --   --  7 48   HEMOGLOBIN g/dL  --  7 8* 6 7*   HEMATOCRIT %  --  23 6* 20 8*   PLATELETS Thousands/uL 83*  --  104*   LYMPHO PCT %  --   --  16   MONO PCT %  --   --  7   EOS PCT %  --   --  0     BMP  Results from last 7 days   Lab Units 07/16/20  2243   POTASSIUM mmol/L 4 6   CHLORIDE mmol/L 103   CO2 mmol/L 20*   BUN mg/dL 80*   CREATININE mg/dL 2 18*   CALCIUM mg/dL 9 0     CMP  Results from last 7 days   Lab Units 07/16/20  2243   POTASSIUM mmol/L 4 6   CHLORIDE mmol/L 103   CO2 mmol/L 20*   BUN mg/dL 80*   CREATININE mg/dL 2 18*   CALCIUM mg/dL 9 0   ALK PHOS U/L 87   ALT U/L 21   AST U/L 20     @Culture@  Lab Results   Component Value Date    BLOODCX Received in Microbiology Lab  Culture in Progress  07/16/2020    BLOODCX Received in Microbiology Lab  Culture in Progress  07/16/2020    BLOODCX No Growth After 5 Days  05/02/2017    BLOODCX No Growth After 5 Days  05/02/2017    URINECX >100,000 cfu/ml Klebsiella oxytoca (A) 02/13/2019    URINECX >100,000 cfu/ml Klebsiella oxytoca (A) 01/07/2019         Imaging: I have personally reviewed pertinent films in PACS      EKG, Pathology, and Other Studies: I have personally reviewed pertinent reports  Code Status: Level 1 - Full Code      Please Note: Voice to text dictation software was used in the creation of this document         Pepe Kim DPM

## 2020-07-17 NOTE — ASSESSMENT & PLAN NOTE
· Patient states ongoing since hernia surgery small bloody ooze  Scab is noted in the umbilicus with foul-smelling no drainage    · Will await wound consult  · May need surgery consult for evaluation of cauterization

## 2020-07-17 NOTE — CONSULTS
Consult Note - Wound   Radha Bloom 69 y o  male MRN: [id number]  Unit/Bed#: -01 SDU Encounter: [id number]      History and Present Illness:  69year old male presented to the hospital with weakness, dyspnea, nausea, and vomiting  Patient's history significant for PVD, neuropathy, CHF  Assessment Findings:   Patient seen for wound care consultation  Patient is able to turn/weight shift independently in bed  Reports pain in right lower extremity  Bilateral heels intact  PVD discoloration to lower extremities with flaking, scaling, cool  Bruising to arms  1   Present on admission scab to right buttocks--patient states he has had a "scab" (well adhered eschar) to this area, unsure how long  Unsure of original cause  Likely a healing pressure injury (unstageable at this time)  No donna-wound induration or fluctuance  2   Right lower extremity wounds--podiatry at bedside during assessment  Local wound care per podiatry  Arterial duplex and wound cultures pending  3   Umbilical wound--dressing dry and intact per surgery team   CT scan of abdomen scheduled for this afternoon  Excision of infected mesh tentative for next week per surgery note  Patient on low air-loss mattress in ICU  Patient is unable to tolerate lower extremity elevation at this time  Skin Care Plan:   1-Hydraguard lotion to bilateral sacrum, buttock and heels BID and PRN  2-Ehob cushion in chair when out of bed  4-Moisturize skin daily with skin nourishing cream   5-Encourage/remind patient to turn/reposition q2h when in bed and weight shift frequently while in chair for pressure re-distribution on skin  Provide assistance as needed  Wound care team to follow        Wound 07/17/20 Buttocks Right (Active)   Wound Image   7/17/2020  2:28 PM   Wound Description Dry;Brown 7/17/2020  2:28 PM   Donna-wound Assessment Erythema 7/17/2020  2:28 PM   Wound Length (cm) 0 5 cm 7/17/2020  2:28 PM   Wound Width (cm) 0 5 cm 7/17/2020  2:28 PM   Wound Depth (cm) 0 7/17/2020  2:28 PM   Calculated Wound Area (cm^2) 0 25 cm^2 7/17/2020  2:28 PM   Calculated Wound Volume (cm^3) 0 cm^3 7/17/2020  2:28 PM   Drainage Amount None 7/17/2020  2:28 PM   Non-staged Wound Description Not applicable 0/51/2923  0:95 PM   Dressing Open to air 7/17/2020  2:28 PM   Patient Tolerance Tolerated well 7/17/2020  2:28 PM     Benny Giles BSN, RN, [employer]

## 2020-07-17 NOTE — PROGRESS NOTES
Pt has Severe protein-calorie malnutrition, in the setting of chronically ill patient with history of noncompliance with care, as evidenced by BMI 19 17 with an 11 33% unplanned weight loss over the past year, multiple nonhealing wounds appearance of muscle wasting/fat loss and decreased mobility noted on nursing assessment, Findings: height 5' 10", weight 133 lb 9 6 oz, BMI 19 17 Screen: (+) Unplanned weight loss in the last three months; (+) Stage III-IV pressure ulcer or non-healing wound; (+) Appearance of muscle wasting or fat loss;

## 2020-07-17 NOTE — ASSESSMENT & PLAN NOTE
Patient self catheterizes at home for BPH with retention,   I placed him on urine retention protocol

## 2020-07-17 NOTE — ASSESSMENT & PLAN NOTE
· Appears more chronic in nature  · Will check full workup  · Patient being transfuse 1 unit of PRBCs premedicating given prior history of hives with transfusion  · Repeat hemoglobin post transfusion  · Check occult

## 2020-07-17 NOTE — ASSESSMENT & PLAN NOTE
Patient is status post ablation and pacemaker insertion  EKG shows paced rhythm      He has not anticoagulation candidate due to ITP and thrombocytopenia

## 2020-07-17 NOTE — ASSESSMENT & PLAN NOTE
· Continue metoprolol  · Status post ablation patient not on anticoagulation given thrombocytopenia with ITP

## 2020-07-17 NOTE — ASSESSMENT & PLAN NOTE
Wt Readings from Last 3 Encounters:   07/17/20 60 6 kg (133 lb 9 6 oz)   06/21/19 68 3 kg (150 lb 9 6 oz)   06/19/19 67 6 kg (149 lb)     I do not see evidence of volume overload on physical examination suggest JVD, crackles on examination or lower extremity edema  Chest x-ray showed no CHF  Patient's shortness of breath on exertion is most likely due to severe anemia      Will get echocardiogram to evaluate LV systolic function which was severely depressed in 2018 with ejection fraction of 20%    Continue lisinopril, Lasix    Will ask Cardiology to see the patient as well

## 2020-07-17 NOTE — ED PROVIDER NOTES
History  Chief Complaint   Patient presents with    Wound Infection - Complicated     Pt with leg wounds x 2 months, here for dyspnea, weakness and feeling ill   +n/+v      72-year-old male with past medical history of CHF, peripheral vascular disease, neuropathy, chronic ITP, AFib presents for evaluation of dyspnea on exertion x2 days associated with nausea and intermittent vomiting, nonbilious nonbloody, no abdominal pain, no diarrhea, no constipation  He also has some deep ulcerations on his right lower leg which have been worsening over last 2 months, he states that he has been bandaging them but otherwise received a wound care  He complains of pain to the area worse with walking  No recent antibiotic use  No numbness or tingling in the extremities  He normally follows with Dr Kathy Tobias with Cardiology but has not seen him in about 8 months  Previously was on Lasix for his CHF but currently was taken off the medication  No orthopnea, no lower extremity swelling  Denies any drug or alcohol use  Lives at home on his own  No Sick contacts, no fever, no cough  Prior to Admission Medications   Prescriptions Last Dose Informant Patient Reported? Taking?    Ascorbic Acid (VITAMIN C PO) Not Taking at Unknown time Self Yes No   Sig: Take by mouth   B Complex Vitamins (VITAMIN B COMPLEX PO) Not Taking at Unknown time Self Yes No   Sig: Take by mouth   Ferrous Gluconate-C-Folic Acid (IRON-C PO)  Self Yes No   Sig: Take by mouth   Lactobacillus (ACIDOPHILUS PO)  Self Yes No   Sig: Take by mouth   Lycopene 10 MG CAPS  Self Yes No   Sig: Take by mouth   Misc Natural Products (SAW PALMETTO) CAPS  Self Yes No   Sig: Take by mouth   Multiple Vitamin (MULTI-DAY VITAMINS) TABS  Self Yes No   Sig: Take by mouth   acetaminophen (TYLENOL) 325 mg tablet Not Taking at Unknown time Self No No   Sig: Take 2 tablets (650 mg total) by mouth every 6 (six) hours as needed for mild pain   Patient not taking: Reported on 7/17/2020   calcium carbonate (TUMS) 500 mg chewable tablet Not Taking at Unknown time Self No No   Sig: Chew 1 tablet (500 mg total) daily as needed for indigestion or heartburn   Patient not taking: Reported on 7/17/2020   clobetasol (TEMOVATE) 0 05 % GEL Not Taking at Unknown time Self Yes No   Sig: Apply topically as needed     folic acid (FOLVITE) 1 mg tablet 7/17/2020 at Unknown time Self Yes Yes   Sig: Take 1 mg by mouth daily   furosemide (LASIX) 40 mg tablet 7/17/2020 at Unknown time  No Yes   Sig: Take 1 tablet (40 mg total) by mouth daily   lisinopril (ZESTRIL) 5 mg tablet 7/17/2020 at Unknown time  No Yes   Sig: Take 1 tablet (5 mg total) by mouth daily   metoprolol succinate (TOPROL-XL) 100 mg 24 hr tablet 7/17/2020 at Unknown time  No Yes   Sig: Take 1 tablet (100 mg total) by mouth daily for 168 days   tamsulosin (FLOMAX) 0 4 mg   No No   Sig: Take 1 capsule (0 4 mg total) by mouth daily with dinner for 30 days      Facility-Administered Medications: None       Past Medical History:   Diagnosis Date    Anemia     Atrial fibrillation (HCC)     Benign prostatic hyperplasia without urinary obstruction     Congestive heart failure with left ventricular diastolic dysfunction, chronic (HCC)     History of ITP     Hypertension     Lumbosacral disc herniation     Peripheral vascular disease of lower extremity (HCC)     Renal disorder     Subdural hematoma (HCC)        Past Surgical History:   Procedure Laterality Date    BACK SURGERY      AUSTYN HOLE FOR SUBDURAL HEMATOMA      CARDIAC PACEMAKER PLACEMENT      CLAVICLE SURGERY Left 2011    HERNIA REPAIR      PROSTATE SURGERY         Family History   Problem Relation Age of Onset    Heart disease Mother     Heart disease Father     Hypertension Father     Diabetes Other     Depression Other     Cancer Sister     Depression Cousin      I have reviewed and agree with the history as documented      E-Cigarette/Vaping     E-Cigarette/Vaping Substances    Nicotine No     THC No     CBD No     Flavoring No     Other No     Unknown No      Social History     Tobacco Use    Smoking status: Never Smoker    Smokeless tobacco: Never Used    Tobacco comment: per [employer]-Former Smoker and Never Smoker-pls confirm   Substance Use Topics    Alcohol use: No     Alcohol/week: 0 0 standard drinks     Comment: Denied use    Drug use: No     Comment: Denied use       Review of Systems   Constitutional: Negative for appetite change, chills and fever  HENT: Negative for rhinorrhea and sore throat  Eyes: Negative for photophobia and visual disturbance  Respiratory: Positive for shortness of breath  Negative for cough  Cardiovascular: Negative for chest pain and palpitations  Gastrointestinal: Negative for abdominal pain and diarrhea  Genitourinary: Negative for dysuria, frequency and urgency  Skin: Positive for wound  Negative for rash  Neurological: Negative for dizziness and weakness  All other systems reviewed and are negative  Physical Exam  Physical Exam   Constitutional: He is oriented to person, place, and time  He appears well-developed and well-nourished  HENT:   Head: Normocephalic and atraumatic  Right Ear: External ear normal    Left Ear: External ear normal    Mouth/Throat: Oropharynx is clear and moist    Eyes: Pupils are equal, round, and reactive to light  Conjunctivae and EOM are normal    Neck: Normal range of motion  Neck supple  No JVD present  No tracheal deviation present  Cardiovascular: Normal rate, regular rhythm and normal heart sounds  Exam reveals no gallop and no friction rub  No murmur heard  Pulmonary/Chest: Effort normal  No stridor  No respiratory distress  He has no wheezes  He has no rales  Abdominal: Soft  He exhibits no distension and no mass  There is no tenderness  There is no rebound and no guarding  Musculoskeletal: Normal range of motion  He exhibits no edema     3 deep ulcerationns with surrounding erythema as punctured below, purulent and foul-smelling with some necrosed tissue, distally the extremity is neurovascularly intact with intact motor, sensory, no crepitus, palpable DP   Neurological: He is alert and oriented to person, place, and time  No cranial nerve deficit  Skin: Skin is warm and dry  No rash noted  No erythema  No pallor  Psychiatric: He has a normal mood and affect  Nursing note and vitals reviewed  Vital Signs  ED Triage Vitals [07/16/20 2240]   Temperature Pulse Respirations Blood Pressure SpO2   97 8 °F (36 6 °C) 83 16 118/57 92 %      Temp Source Heart Rate Source Patient Position - Orthostatic VS BP Location FiO2 (%)   Temporal Monitor Lying Right arm --      Pain Score       7           Vitals:    07/16/20 2240 07/17/20 0000   BP: 118/57 118/61   Pulse: 83 80   Patient Position - Orthostatic VS: Lying          Visual Acuity      ED Medications  Medications   HYDROmorphone (DILAUDID) injection 0 5 mg (has no administration in time range)   cefTRIAXone (ROCEPHIN) IVPB (premix) 1,000 mg 50 mL (0 mg Intravenous Stopped 7/17/20 0107)   vancomycin (VANCOCIN) IVPB (premix) 1,000 mg 200 mL (1,000 mg Intravenous New Bag 7/17/20 0107)   sodium chloride 0 9 % bolus 500 mL (500 mL Intravenous New Bag 7/16/20 2312)   HYDROmorphone (DILAUDID) injection 0 5 mg (0 5 mg Intravenous Given 7/16/20 2337)   diphenhydrAMINE (BENADRYL) injection 50 mg (50 mg Intravenous Given 7/17/20 0114)       Diagnostic Studies  Results Reviewed     Procedure Component Value Units Date/Time    Lactic acid 2 Hours [[id number]] Collected:  07/17/20 0043    Lab Status:   In process Specimen:  Blood from Arm, Right Updated:  07/17/20 0108    Lactic acid, plasma [[id number]]  (Abnormal) Collected:  07/16/20 2243    Lab Status:  Final result Specimen:  Blood from Arm, Right Updated:  07/17/20 0011     LACTIC ACID 2 5 mmol/L     Narrative:       Result may be elevated if tourniquet was used during collection  Novel Coronavirus [facility] [[id number]]  (Normal) Collected:  07/16/20 2243    Lab Status:  Final result Specimen:  Nares from Nose Updated:  07/17/20 0004     SARS-CoV-2 Negative    Narrative: The specimen collection materials, transport medium, and/or testing methodology utilized in the production of these test results have been proven to be reliable in a limited validation with an abbreviated program under the Emergency Utilization Authorization provided by the FDA  Testing reported as "Presumptive positive" will be confirmed with secondary testing with a reference laboratory to ensure result accuracy  Clinical caution and judgement should be used with the interpretation of these results with consideration of the clinical impression and other laboratory testing  Testing reported as "Positive" or "Negative" has been proven to be accurate according to standard laboratory validation requirements  All testing is performed with control materials showing appropriate reactivity at standard intervals  Protime-INR [[id number]]  (Abnormal) Collected:  07/16/20 2248    Lab Status:  Final result Specimen:  Blood from Arm, Right Updated:  07/16/20 2351     Protime 17 4 seconds      INR 1 46    APTT [[id number]]  (Normal) Collected:  07/16/20 2248    Lab Status:  Final result Specimen:  Blood from Arm, Right Updated:  07/16/20 2351     PTT 36 seconds     CBC and differential [[id number]]  (Abnormal) Collected:  07/16/20 2243    Lab Status:  Final result Specimen:  Blood from Arm, Right Updated:  07/16/20 2350     WBC 7 48 Thousand/uL      RBC 2 20 Million/uL      Hemoglobin 6 7 g/dL      Hematocrit 20 8 %      MCV 95 fL      MCH 30 5 pg      MCHC 32 2 g/dL      RDW 20 0 %      MPV 12 1 fL      Platelets 653 Thousands/uL     Narrative: This is an appended report  These results have been appended to a previously verified report      NT-BNP PRO [[id number]]  (Abnormal) Collected:  07/16/20 2243    Lab Status:  Final result Specimen:  Blood from Arm, Right Updated:  07/16/20 2337     NT-proBNP 1,596 pg/mL     Troponin I [[id number]]  (Normal) Collected:  07/16/20 2243    Lab Status:  Final result Specimen:  Blood from Arm, Right Updated:  07/16/20 2320     Troponin I <0 02 ng/mL     Comprehensive metabolic panel [[id number]]  (Abnormal) Collected:  07/16/20 2243    Lab Status:  Final result Specimen:  Blood from Arm, Right Updated:  07/16/20 2318     Sodium 136 mmol/L      Potassium 4 6 mmol/L      Chloride 103 mmol/L      CO2 20 mmol/L      ANION GAP 13 mmol/L      BUN 80 mg/dL      Creatinine 2 18 mg/dL      Glucose 145 mg/dL      Calcium 9 0 mg/dL      AST 20 U/L      ALT 21 U/L      Alkaline Phosphatase 87 U/L      Total Protein 6 5 g/dL      Albumin 2 9 g/dL      Total Bilirubin 0 50 mg/dL      eGFR 29 ml/min/1 73sq m     Narrative:       [place] guidelines for Chronic Kidney Disease (CKD):     Stage 1 with normal or high GFR (GFR > 90 mL/min/1 73 square meters)    Stage 2 Mild CKD (GFR = 60-89 mL/min/1 73 square meters)    Stage 3A Moderate CKD (GFR = 45-59 mL/min/1 73 square meters)    Stage 3B Moderate CKD (GFR = 30-44 mL/min/1 73 square meters)    Stage 4 Severe CKD (GFR = 15-29 mL/min/1 73 square meters)    Stage 5 End Stage CKD (GFR <15 mL/min/1 73 square meters)  Note: GFR calculation is accurate only with a steady state creatinine    Lipase [[id number]]  (Normal) Collected:  07/16/20 2243    Lab Status:  Final result Specimen:  Blood from Arm, Right Updated:  07/16/20 2318     Lipase 255 u/L     CK (with reflex to MB) [[id number]]  (Abnormal) Collected:  07/16/20 2243    Lab Status:  Final result Specimen:  Blood from Arm, Right Updated:  07/16/20 2317     Total CK 27 U/L     Wound culture and Gram stain [[id number]] Collected:  07/16/20 2246    Lab Status:   In process Specimen:  Wound from Leg, Right Updated:  07/16/20 2254    Blood culture [[id number]] Collected:  07/16/20 2246    Lab Status: In process Specimen:  Blood from Arm, Right Updated:  07/16/20 2254    Blood culture [[id number]] Collected:  07/16/20 2246    Lab Status: In process Specimen:  Blood from Arm, Left Updated:  07/16/20 2253    UA w Reflex to Microscopic w Reflex to Culture [[id number]]     Lab Status:  No result Specimen:  Urine                  XR chest portable    (Results Pending)   XR tibia fibula 2 views RIGHT    (Results Pending)              Procedures  Procedures         ED Course  ED Course as of Jul 17 0216   u Jul 16, 2020 2248 Procedure Note: EKG  Date/Time: 07/16/20 10:49 PM   Performed by: Marisabel Kahn  Authorized by: aMrisabel Kahn  Indications / Diagnosis: SOB  ECG reviewed by me, the ED Provider: yes   The EKG demonstrates:  Rhythm: v paced rhythm  Intervals: normal intervals  Axis: normal axis  QRS/Blocks: normal QRS  ST Changes: No acute ST Changes, no STD/MARTY  PVCs          2301 Patient is in no acute respiratory distress, saturating mid 90s on room air, x-ray without any evidence of fluid overload, will start gentle hydration with 500 mL of normal saline over 1 hour      2330 Patient reportedly had a transfusion reaction, allergic, in 2018, he got a blood transfusion because his hemoglobin was low prior to his pacer placement, since then no further transfusions  No recent melena hematochezia  Most recent hemoglobin in low sevens      2354 Baseline   Creatinine(!): 2 18   Fri Jul 17, 2020   0041 Discussed with the blood bank, patient did have a transfusion reaction 2 years ago, blood bank will go through records and see if there is any further matching of the blood that needs to be done, will premedicate patient with Benadryl prior to starting transfusion          US AUDIT      Most Recent Value   Initial Alcohol Screen: US AUDIT-C    1  How often do you have a drink containing alcohol?  0 Filed at: 07/16/2020 2241   2   How many drinks containing alcohol do you have on a typical day you are drinking? 0 Filed at: 07/16/2020 2241   3a  Male UNDER 65: How often do you have five or more drinks on one occasion? 0 Filed at: 07/16/2020 2241   3b  FEMALE Any Age, or MALE 65+: How often do you have 4 or more drinks on one occassion? 0 Filed at: 07/16/2020 2241   Audit-C Score  0 Filed at: 07/16/2020 2241                  SADE/DAST-10      Most Recent Value   How many times in the past year have you    Used an illegal drug or used a prescription medication for non-medical reasons? Never Filed at: 07/16/2020 2241              Initial Sepsis Screening     Row Name 07/17/20 0015                Is the patient's history suggestive of a new or worsening infection? (!) Yes (Proceed)  -EB        Suspected source of infection  soft tissue  -EB        Are two or more of the following signs & symptoms of infection both present and new to the patient? No  -EB        Indicate SIRS criteria          If the answer is yes to both questions, suspicion of sepsis is present          If severe sepsis is present AND tissue hypoperfusion perists in the hour after fluid resuscitation or lactate > 4, the patient meets criteria for SEPTIC SHOCK          Are any of the following organ dysfunction criteria present within 6 hours of suspected infection and SIRS criteria that are NOT considered to be chronic conditions?         Organ dysfunction          Date of presentation of severe sepsis          Time of presentation of severe sepsis          Tissue hypoperfusion persists in the hour after crystalloid fluid administration, evidenced, by either:          Was hypotension present within one hour of the conclusion of crystalloid fluid administration?           Date of presentation of septic shock          Time of presentation of septic shock            User Key  (r) = Recorded By, (t) = Taken By, (c) = Cosigned By    [address] Name Provider Type    KAREN Gonzalez MD Physician MDM  Number of Diagnoses or Management Options  Diagnosis management comments: 53-year-old male presents for evaluation of shortness of breath on exertion, infected ulcerations on his right lower extremity, he is in no acute respiratory distress, currently saturating 100% on room air  Extremities neurovascularly intact, will obtain lab work, will start on empiric antibiotics and admit for further care        Disposition  Final diagnoses:   Osteomyelitis ([facility] [address] )   Cellulitis   Wound of right lower extremity, initial encounter   Anemia   Shortness of breath     Time reflects when diagnosis was documented in both MDM as applicable and the Disposition within this note     Time User Action Codes Description Comment    7/17/2020 12:07 AM Elizabeth Blase Add [M86 9] Osteomyelitis ([facility] [address] )     7/17/2020 12:08 AM Ward Blase Add [L03 90] Cellulitis     7/17/2020 12:08 AM Elizabeth Blase Add [S81 801A] Wound of right lower extremity, initial encounter     7/17/2020 12:08 AM Elizabeth Blase Add [D64 9] Anemia     7/17/2020 12:08 AM Ward Blase Add [R06 02] Shortness of breath     7/17/2020 12:15 AM Elizabeth Blase Remove [R06 02] Shortness of breath     7/17/2020 12:15 AM Elizabeth Blase Add [R06 02] Shortness of breath       ED Disposition     ED Disposition Condition Date/Time Comment    Admit Stable Fri Jul 17, 2020 12:15 AM Case was discussed with CORY and the patient's admission status was agreed to be Admission Status: inpatient status to the service of Dr Malin Pulse             Follow-up Information    None         Current Discharge Medication List      CONTINUE these medications which have NOT CHANGED    Details   folic acid (FOLVITE) 1 mg tablet Take 1 mg by mouth daily      furosemide (LASIX) 40 mg tablet Take 1 tablet (40 mg total) by mouth daily  Qty: 90 tablet, Refills: 0    Associated Diagnoses: Atrial fibrillation with tachycardic ventricular rate (HCC)      lisinopril (ZESTRIL) 5 mg tablet Take 1 tablet (5 mg total) by mouth daily  Qty: 30 tablet, Refills: 0    Associated Diagnoses: Atrial fibrillation with tachycardic ventricular rate (HCC)      metoprolol succinate (TOPROL-XL) 100 mg 24 hr tablet Take 1 tablet (100 mg total) by mouth daily for 168 days  Qty: 90 tablet, Refills: 3    Associated Diagnoses: Permanent atrial fibrillation (HCC)      acetaminophen (TYLENOL) 325 mg tablet Take 2 tablets (650 mg total) by mouth every 6 (six) hours as needed for mild pain  Qty: 30 tablet, Refills: 0    Associated Diagnoses: Atrial fibrillation with tachycardic ventricular rate (HCC)      Ascorbic Acid (VITAMIN C PO) Take by mouth      B Complex Vitamins (VITAMIN B COMPLEX PO) Take by mouth      calcium carbonate (TUMS) 500 mg chewable tablet Chew 1 tablet (500 mg total) daily as needed for indigestion or heartburn  Qty: 30 tablet, Refills: 0    Associated Diagnoses: Atrial fibrillation with tachycardic ventricular rate (HCC)      clobetasol (TEMOVATE) 0 05 % GEL Apply topically as needed        Ferrous Gluconate-C-Folic Acid (IRON-C PO) Take by mouth      Lactobacillus (ACIDOPHILUS PO) Take by mouth      Lycopene 10 MG CAPS Take by mouth      Misc Natural Products (SAW PALMETTO) CAPS Take by mouth      Multiple Vitamin (MULTI-DAY VITAMINS) TABS Take by mouth      tamsulosin (FLOMAX) 0 4 mg Take 1 capsule (0 4 mg total) by mouth daily with dinner for 30 days  Qty: 30 capsule, Refills: 0    Associated Diagnoses: Atrial fibrillation with tachycardic ventricular rate (HCC)           No discharge procedures on file      PDMP Review     None          ED Provider  Electronically Signed by           Catherine Gonzalez MD  07/17/20 1120

## 2020-07-17 NOTE — CONSULTS
Consultation - Cardiology   Kimberli Holland 69 y o  male MRN: [id number]  Unit/Bed#: -01 SDU Encounter: [id number]    Assessment/Plan     Assessment:    Chronic Diastolic CHF    Perm AF     AVN ablation    BI V Pacer      Plan:    Clinically his volume status looks okay and I would continue with his oral metoprolol  His dyspnea is likely multifactorial, including anemia, deconditioning, heart failure, etc     From our perspective Continue with current medical therapy  Eliquis is held with anemia and concern for GI bleeding  GI is to see the patient  History of Present Illness   Physician Requesting Consult: Lachelle Valdez MD  Reason for Consult / Principal Problem: CHF  HPI: Kimberli Holland is a 69y o  year old male who presents with multiple complaints including shortness of breath, leg wounds and weakness  He is currently being treated for cellulitis  IV fluids were given in the ER  The patient has chronic systolic heart failure, Perm  Atrial Fibrillation, history of subdural hemaoma  EF was as low as 20% in the past and improved to 45%  He also has a history of AVN ablation and BI V PPM      Inpatient consult to Cardiology  Consult performed by: Burke Escobar MD  Consult ordered by: Lachelle Valdez MD          Review of Systems   Constitutional: Positive for fatigue  HENT: Negative  Eyes: Negative  Respiratory: Positive for shortness of breath  Cardiovascular: Negative  Gastrointestinal: Negative  Endocrine: Negative  Genitourinary: Negative  Musculoskeletal: Negative  Skin: Positive for wound  Allergic/Immunologic: Negative  Neurological: Positive for weakness  Hematological: Negative  Psychiatric/Behavioral: Negative          Historical Information   Past Medical History:   Diagnosis Date    Anemia     Atrial fibrillation (HCC)     Benign prostatic hyperplasia without urinary obstruction     Congestive heart failure with left ventricular diastolic dysfunction, chronic (HCC)     History of ITP     Hypertension     Lumbosacral disc herniation     Peripheral vascular disease of lower extremity (HCC)     Renal disorder     Subdural hematoma (HCC)      Past Surgical History:   Procedure Laterality Date    BACK SURGERY      AUSTYN HOLE FOR SUBDURAL HEMATOMA      CARDIAC PACEMAKER PLACEMENT      CLAVICLE SURGERY Left 2011    HERNIA REPAIR      PROSTATE SURGERY       Social History     Substance and Sexual Activity   Alcohol Use No    Alcohol/week: 0 0 standard drinks    Comment: Denied use     Social History     Substance and Sexual Activity   Drug Use No    Comment: Denied use     E-Cigarette/Vaping     E-Cigarette/Vaping Substances    Nicotine No     THC No     CBD No     Flavoring No     Other No     Unknown No      Social History     Tobacco Use   Smoking Status Never Smoker   Smokeless Tobacco Never Used   Tobacco Comment    per [employer]-Former Smoker and Never Smoker-pls confirm     Family History:   Family History   Problem Relation Age of Onset    Heart disease Mother     Heart disease Father     Hypertension Father     Diabetes Other     Depression Other     Cancer Sister     Depression Cousin        Meds/Allergies   current meds:   Current Facility-Administered Medications   Medication Dose Route Frequency    acetaminophen (TYLENOL) tablet 650 mg  650 mg Oral Q6H PRN    bacitracin topical ointment 1 small application  1 small application Topical BID    [START ON 7/18/2020] cefepime (MAXIPIME) IVPB (premix) 1,000 mg 50 mL  1,000 mg Intravenous Z09O    folic acid (FOLVITE) tablet 1 mg  1 mg Oral Daily    furosemide (LASIX) tablet 40 mg  40 mg Oral Daily    heparin (porcine) subcutaneous injection 5,000 Units  5,000 Units Subcutaneous Q8H [facility]    HYDROcodone-acetaminophen (NORCO) 5-325 mg per tablet 1 tablet  1 tablet Oral Q6H PRN    HYDROmorphone (DILAUDID) injection 0 5 mg  0 5 mg Intravenous Q4H PRN    lisinopril (ZESTRIL) tablet 5 mg  5 mg Oral Daily    LORazepam (ATIVAN) tablet 1 mg  1 mg Oral Q8H PRN    metoprolol succinate (TOPROL-XL) 24 hr tablet 100 mg  100 mg Oral Daily    pantoprazole (PROTONIX) injection 40 mg  40 mg Intravenous Q12H [address]    tamsulosin (FLOMAX) capsule 0 4 mg  0 4 mg Oral Daily With Dinner    [START ON 7/18/2020] vancomycin (VANCOCIN) IVPB (premix) 1,000 mg 200 mL  15 mg/kg Intravenous Q24H     No Known Allergies    Objective   Vitals: Blood pressure 147/74, pulse 80, temperature 97 8 °F (36 6 °C), temperature source Axillary, resp  rate 21, height 5' 10" (1 778 m), weight 60 6 kg (133 lb 9 6 oz), SpO2 100 %  Orthostatic Blood Pressures      Most Recent Value   Blood Pressure  147/74 filed at 07/17/2020 0700   Patient Position - Orthostatic VS  Lying filed at 07/17/2020 6780            Intake/Output Summary (Last 24 hours) at 7/17/2020 1033  Last data filed at 7/17/2020 0601  Gross per 24 hour   Intake 750 ml   Output 650 ml   Net 100 ml       Invasive Devices     Peripheral Intravenous Line            Peripheral IV 07/16/20 Right Antecubital less than 1 day    Peripheral IV 07/17/20 Left;Dorsal (posterior) Forearm less than 1 day                Physical Exam   Constitutional: He is oriented to person, place, and time  He appears well-developed  No distress  HENT:   Head: Normocephalic  Mouth/Throat: No oropharyngeal exudate  Eyes: Conjunctivae are normal  No scleral icterus  Neck: Normal range of motion  No JVD present  Cardiovascular: Normal rate and regular rhythm  Murmur heard  Pulmonary/Chest: Effort normal and breath sounds normal  No stridor  No respiratory distress  He has no wheezes  Abdominal: Soft  Bowel sounds are normal  He exhibits no distension  There is no tenderness  There is no guarding  Musculoskeletal: Normal range of motion  He exhibits edema  He exhibits no tenderness or deformity  Neurological: He is alert and oriented to person, place, and time     Skin: Skin is warm and dry  Rash noted  He is not diaphoretic  Lab Results:   I have personally reviewed pertinent lab results  CBC with diff:   Results from last 7 days   Lab Units 07/17/20  1520 07/17/20  0816  07/16/20  2243   WBC Thousand/uL  --   --   --  7 48   RBC Million/uL  --   --   --  2 20*   HEMOGLOBIN g/dL 7 7*  --    < > 6 7*   HEMATOCRIT % 23 2*  --    < > 20 8*   MCV fL  --   --   --  95   MCH pg  --   --   --  30 5   MCHC g/dL  --   --   --  32 2   RDW %  --   --   --  20 0*   MPV fL  --  13 2*  --  12 1   PLATELETS Thousands/uL  --  83*  --  104*    < > = values in this interval not displayed  CMP:   Results from last 7 days   Lab Units 07/16/20  2243   SODIUM mmol/L 136   POTASSIUM mmol/L 4 6   CHLORIDE mmol/L 103   CO2 mmol/L 20*   BUN mg/dL 80*   CREATININE mg/dL 2 18*   CALCIUM mg/dL 9 0   AST U/L 20   ALT U/L 21   ALK PHOS U/L 87   EGFR ml/min/1 73sq m 29     Troponin:   0   Lab Value Date/Time    TROPONINI <0 02 07/16/2020 2243    TROPONINI 0 02 11/23/2018 0543    TROPONINI 0 03 11/23/2018 0030    TROPONINI 0 04 11/22/2018 2043    TROPONINI 0 06 (H) 11/22/2018 0437    TROPONINI 0 07 (H) 11/22/2018 0130    TROPONINI 0 06 (H) 11/21/2018 2226    TROPONINI 0 06 (H) 11/21/2018 1923    TROPONINI 0 03 11/21/2018 1545    TROPONINI <0 02 11/21/2018 1147    TROPONINI <0 02 11/20/2018 1632    TROPONINI 0 02 05/02/2017 1432    TROPONINI 0 02 05/02/2017 1105    TROPONINI 0 02 05/02/2017 0648     BNP:   Results from last 7 days   Lab Units 07/16/20  2243   POTASSIUM mmol/L 4 6   CHLORIDE mmol/L 103   CO2 mmol/L 20*   BUN mg/dL 80*   CREATININE mg/dL 2 18*   CALCIUM mg/dL 9 0   EGFR ml/min/1 73sq m 29     Coags:   Results from last 7 days   Lab Units 07/16/20  2248   PTT seconds 36   INR  1 46*     TSH:     Magnesium:     Lipid Profile:     Imaging: I have personally reviewed pertinent films in PACS  EKG: AFIB, V pacing   PVCS      Code Status: Level 1 - Full Code  Advance Directive and Living Will: Power of :    POLST:      Counseling / Coordination of Care  Total floor / unit time spent today 45 minutes  Greater than 50% of total time was spent with the patient and / or family counseling and / or coordination of care  A description of the counseling / coordination of care

## 2020-07-17 NOTE — ASSESSMENT & PLAN NOTE
Patient presented with severe symptomatic normocytic anemia 6 7 causing shortness of breath on exertion  He was taking NSAIDs for chronic right lower leg ulcers that were getting worse  He also developed nausea vomiting several times last 2 days  He denies symptoms of GI bleeding  I am concerned about gastritis, gastric ulcer  Patient received packed red blood cell transfusion and hemoglobin increased to 7 8 today  I am asking GI to see the patient and perform an EGD      Patient requires more than 2 midnights hospital stay

## 2020-07-17 NOTE — CONSULTS
Consultation - GI   Loyda Harding 69 y o  male MRN: [id number]  Unit/Bed#: -01 Encounter: [id number]    Consults  ASSESSMENT and PLAN    Anemia - no signs or symptoms of GI blood loss  MCV is 95  Iron saturation not low  Patient still could be having occult GI blood loss from gastritis or occult malignancy  May have underlying anemia of chronic disease or poor marrow function related to his malnutrition  Advise patient that his physicians will continue to optimize his breathing and cardiac status along with treating his cellulitis and that GI evaluation should be entertained when he is stable  · Follow H&H and transfuse as needed  · Observe for any signs of GI bleeding  · Check stool hemoccults  · Empiric PPI  · EGD when clinically stable, consider colonoscopy pending EGD    Abnormal CT scan of the stomach - antral abnormality noted  Suspect gastritis, patient does take NSAIDs  Rule out ulcer, rule out neoplasm  · As above, ppi, EGD when stable    Chief Complaint   Patient presents with    Wound Infection - Complicated     Pt with leg wounds x 2 months, here for dyspnea, weakness and feeling ill   +n/+v  Physician Requesting Consult: Dru De Souza MD    HPI Loyda Harding is a 69y o  year old male who presents with dyspnea and cellulitis  Patient found to have a normocytic anemia  Denies any GI symptoms although has had some nausea and anorexia  Acknowledges not eating well  Denies any melena or hematochezia  No hematemesis or coffee-ground emesis  Discussed possibility of GI blood loss along with abnormality on CT scan showing gastric abnormality  Patient states he is more interested in treating his acute problems of his sores and shortness of breath  Denies ever having had any GI workup or screening colonoscopy      Historical Information   Past Medical History:   Diagnosis Date    Anemia     Atrial fibrillation ([facility] [address] )     Benign prostatic hyperplasia without urinary obstruction  Congestive heart failure with left ventricular diastolic dysfunction, chronic (HCC)     History of ITP     Hypertension     Lumbosacral disc herniation     Peripheral vascular disease of lower extremity (HCC)     Renal disorder     Subdural hematoma (HCC)      Past Surgical History:   Procedure Laterality Date    BACK SURGERY      AUSTYN HOLE FOR SUBDURAL HEMATOMA      CARDIAC PACEMAKER PLACEMENT      CLAVICLE SURGERY Left 2011    HERNIA REPAIR      PROSTATE SURGERY       Social History   Social History     Substance and Sexual Activity   Alcohol Use No    Alcohol/week: 0 0 standard drinks    Comment: Denied use     Social History     Substance and Sexual Activity   Drug Use No    Comment: Denied use     Social History     Tobacco Use   Smoking Status Never Smoker   Smokeless Tobacco Never Used   Tobacco Comment    per [employer]-Former Smoker and Never Smoker-pls confirm     Family History   Problem Relation Age of Onset    Heart disease Mother     Heart disease Father     Hypertension Father     Diabetes Other     Depression Other     Cancer Sister     Depression Cousin        Meds/Allergies   Current Facility-Administered Medications   Medication Dose Route Frequency    acetaminophen (TYLENOL) tablet 650 mg  650 mg Oral Q6H PRN    bacitracin topical ointment 1 small application  1 small application Topical BID    [START ON 7/18/2020] cefepime (MAXIPIME) IVPB (premix) 1,000 mg 50 mL  1,000 mg Intravenous G10Q    folic acid (FOLVITE) tablet 1 mg  1 mg Oral Daily    furosemide (LASIX) tablet 40 mg  40 mg Oral Daily    heparin (porcine) subcutaneous injection 5,000 Units  5,000 Units Subcutaneous Q8H [address]    HYDROcodone-acetaminophen (NORCO) 5-325 mg per tablet 1 tablet  1 tablet Oral Q6H PRN    HYDROmorphone (DILAUDID) injection 0 5 mg  0 5 mg Intravenous Q4H PRN    lisinopril (ZESTRIL) tablet 5 mg  5 mg Oral Daily    LORazepam (ATIVAN) tablet 1 mg  1 mg Oral Q8H PRN    metoprolol succinate (TOPROL-XL) 24 hr tablet 100 mg  100 mg Oral Daily    pantoprazole (PROTONIX) injection 40 mg  40 mg Intravenous Q12H [address]    tamsulosin (FLOMAX) capsule 0 4 mg  0 4 mg Oral Daily With Dinner    [START ON 7/18/2020] vancomycin (VANCOCIN) IVPB (premix) 1,000 mg 200 mL  15 mg/kg Intravenous Q24H     Medications Prior to Admission   Medication    Ascorbic Acid (VITAMIN C PO)    B Complex Vitamins (VITAMIN B COMPLEX PO)    calcium carbonate (TUMS) 500 mg chewable tablet    diazepam (VALIUM) 5 mg tablet    folic acid (FOLVITE) 1 mg tablet    furosemide (LASIX) 40 mg tablet    Lactobacillus (ACIDOPHILUS PO)    lisinopril (ZESTRIL) 5 mg tablet    Lycopene 10 MG CAPS    metoprolol succinate (TOPROL-XL) 100 mg 24 hr tablet    Misc Natural Products (SAW PALMETTO) CAPS    Multiple Vitamin (MULTI-DAY VITAMINS) TABS    acetaminophen (TYLENOL) 325 mg tablet    clobetasol (TEMOVATE) 0 05 % GEL    Ferrous Gluconate-C-Folic Acid (IRON-C PO)    tamsulosin (FLOMAX) 0 4 mg       No Known Allergies    PHYSICALEXAM  Blood pressure 119/67, pulse 82, temperature 97 6 °F (36 4 °C), temperature source Oral, resp  rate 20, height 5' 10" (1 778 m), weight 60 6 kg (133 lb 9 6 oz), SpO2 100 %  Body mass index is 19 17 kg/m²  General Appearance: NAD, cooperative, alert  Eyes: Anicteric, PERRLA, EOMI  ENT:  Normocephalic, atraumatic, normal mucosa      Neck:  Supple, symmetrical, trachea midline  Resp:  Clear to auscultation bilaterally; no rales, rhonchi or wheezing; respirations unlabored   CV:  S1 S2, positive flow murmur  GI:  Soft, non-tender, non-distended; normal bowel sounds; no masses, no organomegaly   Rectal: Deferred  Heme/Lymph: No palpable cervical lymphadenopathy  Psych:  Flat affect, good eye contact  Neuro: No gross deficits, AAOx3    Lab Results   Component Value Date    GLUCOSE 127 11/20/2018    CALCIUM 9 0 07/16/2020    K 4 6 07/16/2020    CO2 20 (L) 07/16/2020     07/16/2020    BUN 80 (H) 07/16/2020    CREATININE 2 18 (H) 07/16/2020     Lab Results   Component Value Date    WBC 7 48 07/16/2020    HGB 7 7 (L) 07/17/2020    HCT 23 2 (L) 07/17/2020    MCV 95 07/16/2020    PLT 83 (L) 07/17/2020     Lab Results   Component Value Date    ALT 21 07/16/2020    AST 20 07/16/2020    ALKPHOS 87 07/16/2020     No results found for: AMYLASE  Lab Results   Component Value Date    LIPASE 255 07/16/2020     Lab Results   Component Value Date    IRON 229 (H) 07/16/2020    TIBC 285 07/16/2020    FERRITIN 303 07/16/2020     Lab Results   Component Value Date    INR 1 46 (H) 07/16/2020       Imaging Studies: I have personally reviewed pertinent reports  EKG, Pathology, and Other Studies: I have personally reviewed pertinent reports  REVIEW OF SYSTEMS:    CONSTITUTIONAL: Denies any fever, chills, rigors, and weight loss  HEENT: No earache or tinnitus  Denies hearing loss or visual disturbances  CARDIOVASCULAR: No chest pain or palpitations  RESPIRATORY: Denies any cough, hemoptysis, shortness of breath or dyspnea on exertion  GASTROINTESTINAL: As noted in the History of Present Illness  GENITOURINARY: No problems with urination  Denies any hematuria or dysuria  NEUROLOGIC: No dizziness or vertigo, denies headaches  MUSCULOSKELETAL: Denies any muscle or joint pain  SKIN: Denies skin rashes or itching  ENDOCRINE: Denies excessive thirst  Denies intolerance to heat or cold  PSYCHOSOCIAL: Denies depression or anxiety  Denies any recent memory loss

## 2020-07-17 NOTE — ASSESSMENT & PLAN NOTE
· Patient states ongoing since hernia surgery small bloody ooze  Scab is noted in the umbilicus with foul-smelling no drainage    · Will await wound consult  Surgeries consulted

## 2020-07-17 NOTE — ASSESSMENT & PLAN NOTE
Patient has right lower leg wounds with cellulitis    Continue vancomycin cefepime      Wound care and podiatry are consulted

## 2020-07-17 NOTE — ASSESSMENT & PLAN NOTE
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

## 2020-07-17 NOTE — SOCIAL WORK
LOS: 0  Pt is not a documented bundle  Pt is not a 30 day readmission  Met with Pt  Pt presents AA&Ox3  Discussed role of , discharge planning, identifying help at home and discharge preference  Pt reports he lives with his significant other in [address], steps through front door, no steps through back door  Pt reports he is independent with adls and ambulation  Pt reports he has cane and walker but does not use it  Pt reports he uses [address] [facility], has prescription plan and is able to afford medications  Pt reports he is unsure if he has living will or POA, reports he will need to look through legal papework  Pt reports he drives and goes grocery shopping  Pt reports he had [facility] in past but unable to recall agency name  Pt denies hx of SNF and mental health and drug and alcohol treatment  Pt reports his significant other will help him at home if needed  Pt reports he may need ride home upon discharge  CM to follow for discharge planning

## 2020-07-17 NOTE — H&P
H&P- Ryne Wilburn 1945, 69 y o  male MRN: [id number]    Unit/Bed#: -01 SDU Encounter: [id number]    Primary Care Provider: Franklin Proctor DO   Date and time admitted to hospital: 7/16/2020 10:25 PM        * Cellulitis  Assessment & Plan  · Of the right lower extremity secondary to PVD  · Patient states wounds ongoing for greater than 2 months and was attempting to manage at home with topical antibiotic ointment  · Right tib-fib x-ray pending questionable concern for osteo will check CRP may need further imaging  · Cultures sent in the ED  · Cefepime/vancomycin  · Consult wound/podiatry  · Wet to dry dressings for now    Lactic acidosis  Assessment & Plan  · Status post 500 cc of IV fluids in the ER  · Lactic acid improved to 2 1    Umbilicus discharge  Assessment & Plan  · Patient states ongoing since hernia surgery small bloody ooze  Scab is noted in the umbilicus with foul-smelling no drainage  · Will await wound consult  · May need surgery consult for evaluation of cauterization    SANCHEZ (dyspnea on exertion)  Assessment & Plan  · Likely secondary to anemia  · Chest x-ray clear  · EKG without acute changes  · Troponin negative    CKD (chronic kidney disease) stage 3, GFR 30-59 ml/min ([facility])  Assessment & Plan  · Baseline creatinine 1 9-2 3  · Follow-up BMP in the a m      Anemia, unspecified  Assessment & Plan  · Appears more chronic in nature  · Will check full workup  · Patient being transfuse 1 unit of PRBCs premedicating given prior history of hives with transfusion  · Repeat hemoglobin post transfusion  · Check occult    Chronic combined systolic and diastolic heart failure (HCC)  Assessment & Plan  Wt Readings from Last 3 Encounters:   07/16/20 65 8 kg (145 lb)   06/21/19 68 3 kg (150 lb 9 6 oz)   06/19/19 67 6 kg (149 lb)     · Echo with an EF of 20% moderate MR/TR in 2018  · Documented history of noncompliance  · Euvolemic on exam per notes patient was taken off Lasix  · Continue home Lisinopril/Toprol for goal-directed therapies  ·         Thrombocytopenia (HCC)  Assessment & Plan  · History of ITP stable    Atrial fibrillation (HCC)  Assessment & Plan  · Continue metoprolol  · Status post ablation patient not on anticoagulation given thrombocytopenia with ITP        VTE Prophylaxis: Heparin  / sequential compression device   Code Status:  Full code  POLST: POLST form is not discussed and not completed at this time  Discussion with family:  Patient declined because significant other at this time    Anticipated Length of Stay:  Patient will be admitted on an Inpatient basis with an anticipated length of stay of  greater than 2 midnights  Justification for Hospital Stay:  Cellulitis    Total Time for Visit, including Counseling / Coordination of Care: 45 minutes  Greater than 50% of this total time spent on direct patient counseling and coordination of care  Chief Complaint:   Dyspnea on exertion    History of Present Illness:    Truong Hamm is a 69 y o  male with past medical history of combined CHF with an EF of 20%, AFib not on anticoagulation, PVD, CKD stage 3 to 4, anemia, ITP, BPH, hypertension, noncompliance who presents with dyspnea on exertion  Patient denied any fevers/chills/cough/chest pain/palpitations  Incidentally he was noted to have a right lower extremity wound on exam which he states is ongoing for approximately 2 months and worsening  He is treating at home with topical ointment  Vital signs were stable  Labs revealed a hemoglobin of 6 7/platelets of 701/ADCRORJJ negative/BNP of 1 596/lactic acid of 2 5  Wound cultures were done of right lower extremity and patient was given ceftriaxone/vancomycin  Chest x-ray was negative  COVID testing was negative  Cultures were sent  Right tib-fib x-ray is pending  Review of Systems:    Review of Systems   Constitutional: Negative for chills and fever  Respiratory: Positive for shortness of breath  Cardiovascular: Negative  Gastrointestinal:        Umbilicous bleeding   Musculoskeletal: Positive for myalgias  All other systems reviewed and are negative  Past Medical and Surgical History:     Past Medical History:   Diagnosis Date    Anemia     Atrial fibrillation (HCC)     Benign prostatic hyperplasia without urinary obstruction     Congestive heart failure with left ventricular diastolic dysfunction, chronic (HCC)     History of ITP     Hypertension     Lumbosacral disc herniation     Peripheral vascular disease of lower extremity (HCC)     Renal disorder     Subdural hematoma ([address] )        Past Surgical History:   Procedure Laterality Date    BACK SURGERY      AUSTYN HOLE FOR SUBDURAL HEMATOMA      CARDIAC PACEMAKER PLACEMENT      CLAVICLE SURGERY Left 2011    HERNIA REPAIR      PROSTATE SURGERY         Meds/Allergies:    Prior to Admission medications    Medication Sig Start Date End Date Taking?  Authorizing Provider   folic acid (FOLVITE) 1 mg tablet Take 1 mg by mouth daily   Yes Historical Provider, MD   furosemide (LASIX) 40 mg tablet Take 1 tablet (40 mg total) by mouth daily 3/20/20  Yes Estevan Nelson MD   lisinopril (ZESTRIL) 5 mg tablet Take 1 tablet (5 mg total) by mouth daily 4/15/20  Yes Estevan Nelson MD   metoprolol succinate (TOPROL-XL) 100 mg 24 hr tablet Take 1 tablet (100 mg total) by mouth daily for 168 days 8/14/19 7/17/20 Yes Esetvan Nelson MD   acetaminophen (TYLENOL) 325 mg tablet Take 2 tablets (650 mg total) by mouth every 6 (six) hours as needed for mild pain  Patient not taking: Reported on 7/17/2020 12/7/18   Usman Almanza MD   Ascorbic Acid (VITAMIN C PO) Take by mouth    Historical Provider, MD   B Complex Vitamins (VITAMIN B COMPLEX PO) Take by mouth    Historical Provider, MD   calcium carbonate (TUMS) 500 mg chewable tablet Chew 1 tablet (500 mg total) daily as needed for indigestion or heartburn  Patient not taking: Reported on 7/17/2020 12/7/18   Karen Barker MD   clobetasol (TEMOVATE) 0 05 % GEL Apply topically as needed   4/26/16   Historical Provider, MD   Ferrous Gluconate-C-Folic Acid (IRON-C PO) Take by mouth    Historical Provider, MD   Lactobacillus (ACIDOPHILUS PO) Take by mouth    Historical Provider, MD   Lycopene 10 MG CAPS Take by mouth    Historical Provider, MD   Misc Natural Products (SAW PALMETTO) CAPS Take by mouth    Historical Provider, MD   Multiple Vitamin (MULTI-DAY VITAMINS) TABS Take by mouth    Historical Provider, MD   tamsulosin (FLOMAX) 0 4 mg Take 1 capsule (0 4 mg total) by mouth daily with dinner for 30 days 12/7/18 6/21/19  Karen Barker MD     I have reviewed home medications with patient personally      Allergies: No Known Allergies    Social History:     Marital Status: Single     Patient Pre-hospital Living Situation:  Home  Patient Pre-hospital Level of Mobility:  Ambulatory  Patient Pre-hospital Diet Restrictions:  Cardiac  Substance Use History:   Social History     Substance and Sexual Activity   Alcohol Use No    Alcohol/week: 0 0 standard drinks    Comment: Denied use     Social History     Tobacco Use   Smoking Status Never Smoker   Smokeless Tobacco Never Used   Tobacco Comment    per [employer]-Former Smoker and Never Smoker-pls confirm     Social History     Substance and Sexual Activity   Drug Use No    Comment: Denied use       Family History:    Family History   Problem Relation Age of Onset    Heart disease Mother     Heart disease Father     Hypertension Father     Diabetes Other     Depression Other     Cancer Sister     Depression Cousin        Physical Exam:     Vitals:   Blood Pressure: 145/65 (07/17/20 0233)  Pulse: 80 (07/17/20 0233)  Temperature: 98 8 °F (37 1 °C) (07/17/20 0233)  Temp Source: Oral (07/17/20 0233)  Respirations: 22 (07/17/20 0233)  Height: 5' 10" (177 8 cm) (07/16/20 2240)  Weight - Scale: 65 8 kg (145 lb) (07/16/20 2240)  SpO2: 100 % (07/17/20 0000)    Physical Exam   Constitutional: He is oriented to person, place, and time  No distress  HENT:   Head: Normocephalic and atraumatic  Mouth/Throat: Oropharynx is clear and moist    Eyes: Pupils are equal, round, and reactive to light  Conjunctivae are normal  No scleral icterus  Neck: Neck supple  No tracheal deviation present  Cardiovascular: Normal rate, regular rhythm, normal heart sounds and intact distal pulses  Exam reveals no gallop and no friction rub  No murmur heard  Pulmonary/Chest: Effort normal and breath sounds normal  No respiratory distress  He has no wheezes  He has no rales  Abdominal: Soft  Bowel sounds are normal  He exhibits no distension  There is no tenderness  Umbilicus withhold bloody drainage on Band-Aid and scab   Musculoskeletal: Normal range of motion  He exhibits no edema, tenderness or deformity  Neurological: He is alert and oriented to person, place, and time  No cranial nerve deficit  Skin: Skin is warm and dry  No rash noted  There is erythema  No pallor  Right lower extremity with wounds multiple with foul-smelling drainage   Psychiatric: He has a normal mood and affect  His behavior is normal            Additional Data:     Lab Results: I have personally reviewed pertinent reports        Results from last 7 days   Lab Units 07/16/20  2243   WBC Thousand/uL 7 48   HEMOGLOBIN g/dL 6 7*   HEMATOCRIT % 20 8*   PLATELETS Thousands/uL 104*   LYMPHO PCT % 16   MONO PCT % 7   EOS PCT % 0     Results from last 7 days   Lab Units 07/16/20  2243   SODIUM mmol/L 136   POTASSIUM mmol/L 4 6   CHLORIDE mmol/L 103   CO2 mmol/L 20*   BUN mg/dL 80*   CREATININE mg/dL 2 18*   ANION GAP mmol/L 13   CALCIUM mg/dL 9 0   ALBUMIN g/dL 2 9*   TOTAL BILIRUBIN mg/dL 0 50   ALK PHOS U/L 87   ALT U/L 21   AST U/L 20   GLUCOSE RANDOM mg/dL 145*     Results from last 7 days   Lab Units 07/16/20  2248   INR  1 46*             Results from last 7 days   Lab Units 07/17/20  0043 07/16/20  2243   LACTIC ACID mmol/L 2 1* 2 5*       Imaging: I have personally reviewed pertinent reports  and I have personally reviewed pertinent films in PACS    XR chest portable    (Results Pending)   XR tibia fibula 2 views RIGHT    (Results Pending)       EKG, Pathology, and Other Studies Reviewed on Admission:   · EKG:  V paced with PVCs    Allscripts / Epic Records Reviewed: Yes     ** Please Note: This note has been constructed using a voice recognition system   **

## 2020-07-17 NOTE — CONSULTS
Consultation - General Surgery   Silvina Wagner 69 y o  male MRN: [id number]  Unit/Bed#: -01 SDU Encounter: [id number]    Assessment/Plan      Patient is a 70-year-old unkempt cachectic male with multiple medical comorbidities admitted with shortness of breath, abdominal pain, and chronic leg wounds  Found to be significantly anemic on admission    Right lower extremity chronic venous ulcer disease with open wounds and surrounding cellulitis  -wounds appear chronic with overlying slough and active cellulitis  -would add santal and Bailee Sack for now, may require further debridement, however Podiatry is consulted  -continue IV antibiotics  -continue to monitor wounds  -patient will need outpatient follow-up in the [address] [place] [address] abdominal wound  -patient admits to chronic umbilical wound  -wound with necrotic tissue and foul odor, tender to palpation, able to express purulent drainage  -history of previous umbilical hernia repair with mesh  -wound cultures obtained  -patient will need further wound debridement with likely explantation of underlying mesh  This can be scheduled for next week     This wound is chronic in nature   -continue empiric antibiotics    Anemia with hemoglobin of 6 7 on admission  -status post 2 units of PRBCs  -continue further workup  -possibly related to GI bleed with associated abdominal pain, nausea and vomiting, GI consult pending  -continue to monitor H&H  -transfuse as needed    Other medical comorbidities including CHF, persistent AFIB, pacemaker, CKD, BPH, chronic ITP  -monitor fluid status   -monitor platelet count   -continue medical management, medical optimization in anticipation of surgical procedure next week      History of Present Illness     HPI:  Silvina Wagner is a 69 y o  male with past medical history significant for CHF, PVD, neuropathy, chronic ITP, AFib presented to the emergency department yesterday with complaints of dyspnea on exertion, nausea, and vomiting  Complains of some associated pain  Non bloody emesis  No diarrhea  No fevers or chills  Complains of draining wound at his belly button  States he did have a hernia repair with mesh done at sometime in the past   States he has ulcers of his right lower extremity which she has been treating himself  He complains of pain in this right lower extremity  He does have a history of CHF and AFib but has not followed with Cardiology in at least 8 months  He has stopped taking his Lasix  Patient admits to being noncompliant with medical regimen over the past few months  Admits to recent weight loss  He lives by himself  Denies any drug or alcohol use  Denies any sick or COVID contacts  Consults    Review of Systems   Constitutional: Positive for appetite change and unexpected weight change  Negative for chills and fever  HENT: Negative  Eyes: Negative  Respiratory: Positive for shortness of breath  Negative for cough  Cardiovascular: Positive for leg swelling  Negative for chest pain and palpitations  Gastrointestinal: Positive for abdominal pain, nausea and vomiting  Negative for blood in stool, constipation and diarrhea  Endocrine: Negative  Genitourinary: Positive for difficulty urinating  Musculoskeletal: Negative  Lower extremity pain   Skin: Positive for pallor and wound  Allergic/Immunologic: Negative  Neurological: Positive for weakness  Hematological: Negative  Psychiatric/Behavioral: Negative  All other systems reviewed and are negative        Historical Information   Past Medical History:   Diagnosis Date    Anemia     Atrial fibrillation (HCC)     Benign prostatic hyperplasia without urinary obstruction     Congestive heart failure with left ventricular diastolic dysfunction, chronic (HCC)     History of ITP     Hypertension     Lumbosacral disc herniation     Peripheral vascular disease of lower extremity [facility] [address] [facility]     Renal disorder     Subdural hematoma (HCC)      Past Surgical History:   Procedure Laterality Date    BACK SURGERY      AUSTYN HOLE FOR SUBDURAL HEMATOMA      CARDIAC PACEMAKER PLACEMENT      CLAVICLE SURGERY Left 2011    HERNIA REPAIR      PROSTATE SURGERY       Social History   Social History     Substance and Sexual Activity   Alcohol Use No    Alcohol/week: 0 0 standard drinks    Comment: Denied use     Social History     Substance and Sexual Activity   Drug Use No    Comment: Denied use     E-Cigarette/Vaping     E-Cigarette/Vaping Substances    Nicotine No     THC No     CBD No     Flavoring No     Other No     Unknown No      Social History     Tobacco Use   Smoking Status Never Smoker   Smokeless Tobacco Never Used   Tobacco Comment    per [employer]-Former Smoker and Never Smoker-pls confirm     Family History: non-contributory    Meds/Allergies   all current active meds have been reviewed  No Known Allergies    Objective   First Vitals:   Blood Pressure: 118/57 (07/16/20 2240)  Pulse: 83 (07/16/20 2240)  Temperature: 97 8 °F (36 6 °C) (07/16/20 2240)  Temp Source: Temporal (07/16/20 2240)  Respirations: 16 (07/16/20 2240)  Height: 5' 10" (177 8 cm) (07/16/20 2240)  Weight - Scale: 65 8 kg (145 lb) (07/16/20 2240)  SpO2: 92 % (07/16/20 2240)    Current Vitals:   Blood Pressure: 147/74 (07/17/20 0700)  Pulse: 80 (07/17/20 0700)  Temperature: 97 8 °F (36 6 °C) (07/17/20 0700)  Temp Source: Axillary (07/17/20 0700)  Respirations: 21 (07/17/20 0700)  Height: 5' 10" (177 8 cm) (07/17/20 0600)  Weight - Scale: 60 6 kg (133 lb 9 6 oz) (07/17/20 0600)  SpO2: 100 % (07/17/20 0700)      Intake/Output Summary (Last 24 hours) at 7/17/2020 1142  Last data filed at 7/17/2020 1035  Gross per 24 hour   Intake 750 ml   Output 1350 ml   Net -600 ml       Invasive Devices     Peripheral Intravenous Line            Peripheral IV 07/16/20 Right Antecubital less than 1 day    Peripheral IV 07/17/20 Left;Dorsal (posterior) Forearm less than 1 day                Physical Exam   Constitutional: He is oriented to person, place, and time  No distress  Unkempt, cachectic   HENT:   Head: Normocephalic and atraumatic  Mouth/Throat: No oropharyngeal exudate  Oropharynx dry   Eyes: EOM are normal  Right eye exhibits no discharge  Left eye exhibits no discharge  No scleral icterus  Neck: Normal range of motion  Neck supple  No JVD present  No tracheal deviation present  Cardiovascular:   No murmur heard  Irregularly irregular rhythm, rate controlled   Pulmonary/Chest: Effort normal and breath sounds normal  No respiratory distress  He has no wheezes  Abdominal: Soft  Mild abdominal distension, tenderness over mid abdomen, few bowel sounds   Musculoskeletal: Normal range of motion  He exhibits edema  He exhibits no deformity  Patient with palpable femoral and dopplerable distal pulses   Neurological: He is alert and oriented to person, place, and time  No focal deficits   Skin: Skin is warm and dry  No rash noted  He is not diaphoretic  Patient is pale appearing    Multiple venous stasis ulcers of medial and lateral right lower leg with surrounding cellulitis and serous drainage    Necrotic wound at umbilicus with purulent drainage mild surrounding erythema, no underlying fluctuance, foul odor   Psychiatric: He has a normal mood and affect  His behavior is normal        Lab Results:   I have personally reviewed pertinent lab results    , CBC:   Lab Results   Component Value Date    WBC 7 48 07/16/2020    HGB 7 8 (L) 07/17/2020    HCT 23 6 (L) 07/17/2020    MCV 95 07/16/2020    PLT 83 (L) 07/17/2020    MCH 30 5 07/16/2020    MCHC 32 2 07/16/2020    RDW 20 0 (H) 07/16/2020    MPV 13 2 (H) 07/17/2020    NRBC 3 (H) 07/16/2020   , CMP:   Lab Results   Component Value Date    SODIUM 136 07/16/2020    K 4 6 07/16/2020     07/16/2020    CO2 20 (L) 07/16/2020    BUN 80 (H) 07/16/2020    CREATININE 2 18 (H) 07/16/2020 CALCIUM 9 0 07/16/2020    AST 20 07/16/2020    ALT 21 07/16/2020    ALKPHOS 87 07/16/2020    EGFR 29 07/16/2020   , Coagulation:   Lab Results   Component Value Date    INR 1 46 (H) 07/16/2020   , Urinalysis:   Lab Results   Component Value Date    COLORU Yellow 07/17/2020    CLARITYU Clear 07/17/2020    SPECGRAV 1 010 07/17/2020    PHUR 5 5 07/17/2020    LEUKOCYTESUR Negative 07/17/2020    NITRITE Negative 07/17/2020    GLUCOSEU Negative 07/17/2020    KETONESU Negative 07/17/2020    BILIRUBINUR Negative 07/17/2020    BLOODU Negative 07/17/2020   , Amylase: No results found for: AMYLASE, Lipase:   Lab Results   Component Value Date    LIPASE 255 07/16/2020     Imaging: I have personally reviewed pertinent reports  EKG, Pathology, and Other Studies: I have personally reviewed pertinent reports        Adriana Simms PA-C

## 2020-07-17 NOTE — PROGRESS NOTES
Vancomycin Assessment    Prachi CORBETT Lakeisha Amaya is a 69 y o  male who is currently receiving vancomycin 1000mg once for skin-soft tissue infection     Relevant clinical data and objective history reviewed:  Creatinine   Date Value Ref Range Status   07/16/2020 2 18 (H) 0 60 - 1 30 mg/dL Final     Comment:     Standardized to IDMS reference method   02/13/2019 1 98 (H) 0 60 - 1 30 mg/dL Final     Comment:     Standardized to IDMS reference method   02/13/2019 2 36 (H) 0 60 - 1 30 mg/dL Final     Comment:     Standardized to IDMS reference method     /65 (BP Location: Right arm)   Pulse 80   Temp 98 8 °F (37 1 °C) (Oral)   Resp 22   Ht 5' 10" (1 778 m)   Wt 65 8 kg (145 lb)   SpO2 100%   BMI 20 81 kg/m²   No intake/output data recorded  Lab Results   Component Value Date/Time    BUN 80 (H) 07/16/2020 10:43 PM    BUN 53 (H) 12/14/2018 12:00 AM    WBC 7 48 07/16/2020 10:43 PM    HGB 6 7 (LL) 07/16/2020 10:43 PM    HCT 20 8 (L) 07/16/2020 10:43 PM    MCV 95 07/16/2020 10:43 PM     (L) 07/16/2020 10:43 PM     Temp Readings from Last 3 Encounters:   07/17/20 98 8 °F (37 1 °C) (Oral)   06/21/19 97 7 °F (36 5 °C) (Tympanic)   04/01/19 98 4 °F (36 9 °C)     Vancomycin Days of Therapy: 2    Assessment/Plan  The patient is currently on vancomycin utilizing scheduled dosing based on actual body weight  Baseline risks associated with therapy include: pre-existing renal impairment, concomitant nephrotoxic medications, advanced age and dehydration  The patient is currently receiving 1000mg once and after clinical evaluation will be changed to 1000mg q24h  Pharmacy will also follow closely for s/sx of nephrotoxicity, infusion reactions and appropriateness of therapy  BMP and CBC will be ordered per protocol  Plan for trough as patient approaches steady state, prior to the 4th  dose at approximately [email or web address]    Due to infection severity, will target a trough of 15-20 (appropriate for most indications)   Pharmacy will continue to follow the patients culture results and clinical progress daily      Chely Saldana, Pharmacist

## 2020-07-17 NOTE — PROGRESS NOTES
Progress Note - Oliviaranurag Martinez 1945, 69 y o  male MRN: [id number]    Unit/Bed#: -01 SDU Encounter: [id number]    Primary Care Provider: Gregorio Roman DO   Date and time admitted to hospital: 7/16/2020 10:25 PM        * Anemia, unspecified  Assessment & Plan  Patient presented with severe symptomatic normocytic anemia 6 7 causing shortness of breath on exertion  He was taking NSAIDs for chronic right lower leg ulcers that were getting worse  He also developed nausea vomiting several times last 2 days  He denies symptoms of GI bleeding  I am concerned about gastritis, gastric ulcer  Patient received packed red blood cell transfusion and hemoglobin increased to 7 8 today  I am asking GI to see the patient and perform an EGD  Patient requires more than 2 midnights hospital stay    Chronic combined systolic and diastolic heart failure (HCC)  Assessment & Plan  Wt Readings from Last 3 Encounters:   07/17/20 60 6 kg (133 lb 9 6 oz)   06/21/19 68 3 kg (150 lb 9 6 oz)   06/19/19 67 6 kg (149 lb)     I do not see evidence of volume overload on physical examination suggest JVD, crackles on examination or lower extremity edema  Chest x-ray showed no CHF  Patient's shortness of breath on exertion is most likely due to severe anemia  Will get echocardiogram to evaluate LV systolic function which was severely depressed in 2018 with ejection fraction of 20%    Continue lisinopril, Lasix    Will ask Cardiology to see the patient as well      Other persistent atrial fibrillation [facility])  Assessment & Plan  Patient is status post ablation and pacemaker insertion  EKG shows paced rhythm  He has not anticoagulation candidate due to ITP and thrombocytopenia    Cellulitis  Assessment & Plan  Patient has right lower leg wounds with cellulitis    Continue vancomycin cefepime      Wound care and podiatry are consulted    CKD (chronic kidney disease) stage 3, GFR 30-59 ml/min [facility])  Assessment & Plan  Patient has stage III chronic disease with creatinine around 2 0 at baseline  Patient's renal function is at baseline    Will continue monitor renal function    Thrombocytopenia [facility])  Assessment & Plan  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 with platelet counts running between 60-115K  he denies signs of bleeding  Retic count is around baseline, will monitor level    Umbilicus discharge  Assessment & Plan  · Patient states ongoing since hernia surgery small bloody ooze  Scab is noted in the umbilicus with foul-smelling no drainage  · Will await wound consult  Surgeries consulted    Benign prostatic hyperplasia with urinary retention  Assessment & Plan  Patient self catheterizes at home for BPH with retention,   I placed him on urine retention protocol    Lactic acidosis  Assessment & Plan  Lactic acidosis most likely multifactorial, patient has CHF, necrotic umbilical wound        VTE Prophylaxis: in place    Patient Centered Rounds: I rounded with patient's nurse    Current Length of Stay: 0 day(s)    Current Patient Status: Inpatient    Certification Statement: Pt requires additional inpatient hospital stay due to: see assessment and plan        Subjective:   Patient presented for evaluation of shortness of breath which was getting progressively worse over 48 hours and was made worse by walking from 1 room to the other and was relieved by sitting down and resting  He denied any chest pain, palpitations, lightheadedness, dizziness, cough, sputum production, fevers or chills  Patient also complains of nausea and several episodes of vomiting over last 48 hours without signs of GI bleeding  He has not been able to keep much fluid down  He denies any abdominal pain  Patient is also concerned about the chronic right lower leg ulcers that have not been getting better with topical antibiotic cream or last months    He denies any significant bleeding or oozing of blood from those ulcers or the ulcer of his umbilicus  All other ROS are negative    Objective:     Vitals:   Temp (24hrs), Av 3 °F (36 8 °C), Min:97 8 °F (36 6 °C), Max:98 8 °F (37 1 °C)    Temp:  [97 8 °F (36 6 °C)-98 8 °F (37 1 °C)] 97 8 °F (36 6 °C)  HR:  [79-85] 80  Resp:  [14-57] 21  BP: (118-147)/(57-75) 147/74  SpO2:  [92 %-100 %] 100 %  Body mass index is 19 17 kg/m²  Input and Output Summary (last 24 hours): Intake/Output Summary (Last 24 hours) at 2020 0930  Last data filed at 2020 0601  Gross per 24 hour   Intake 750 ml   Output 650 ml   Net 100 ml       Physical Exam:     Physical Exam   Constitutional: He is oriented to person, place, and time  He appears well-developed  No distress  HENT:   Head: Normocephalic  Mouth/Throat: Oropharynx is clear and moist    Eyes: Conjunctivae are normal    Neck: Neck supple  No JVD present  Cardiovascular: Normal rate and regular rhythm  Pulmonary/Chest: Effort normal  No respiratory distress  He has no wheezes  He has no rales  Abdominal: Soft  Bowel sounds are normal  He exhibits no distension  There is no tenderness  Musculoskeletal: He exhibits no tenderness  Lymphadenopathy:     He has no cervical adenopathy  Neurological: He is alert and oriented to person, place, and time  No cranial nerve deficit  Skin: Skin is warm and dry  Rash (Right lower leg ulcers are dressed, review images taken on admission) noted  The umbilicus looks necrotic, there is no active bleeding   Psychiatric: He has a normal mood and affect  Vitals reviewed  I personally reviewed labs and imaging reports for today        Last 24 Hours Medication List:     Current Facility-Administered Medications:  acetaminophen 650 mg Oral Q6H PRN BJ Galicia   bacitracin 1 small application Topical BID BJ Hoskins   [START ON 2020] cefepime 1,000 mg Intravenous Q24H BJ Hoskins   folic acid 1 mg Oral Daily BJ Humphrey   furosemide 40 mg Oral Daily BJ Hoskins   heparin (porcine) 5,000 Units Subcutaneous Q8H [address] BJ Humphrey   HYDROcodone-acetaminophen 1 tablet Oral Q6H PRN BJ Humphrey   HYDROmorphone 0 5 mg Intravenous Q4H PRN BJ Humphrey   lisinopril 5 mg Oral Daily BJ Hoskins   metoprolol succinate 100 mg Oral Daily BJ Hoskins   pantoprazole 40 mg Intravenous Q12H [address] [address], MD   tamsulosin 0 4 mg Oral Daily With [employer]   [START ON 7/18/2020] vancomycin 15 mg/kg Intravenous Q24H JB Humphrey          Today, Patient Was Seen By: Mona Polo MD    ** Please Note: Dictation voice to text software may have been used in the creation of this document   **

## 2020-07-17 NOTE — ASSESSMENT & PLAN NOTE
· Of the right lower extremity secondary to PVD  · Patient states wounds ongoing for greater than 2 months and was attempting to manage at home with topical antibiotic ointment  · Right tib-fib x-ray pending questionable concern for osteo will check CRP may need further imaging  · Cultures sent in the ED  · Cefepime/vancomycin  · Consult wound/podiatry  · Wet to dry dressings for now

## 2020-07-17 NOTE — ASSESSMENT & PLAN NOTE
Wt Readings from Last 3 Encounters:   07/16/20 65 8 kg (145 lb)   06/21/19 68 3 kg (150 lb 9 6 oz)   06/19/19 67 6 kg (149 lb)     · Echo with an EF of 20% moderate MR/TR in 2018  · Documented history of noncompliance  · Euvolemic on exam per notes patient was taken off Lasix  · Continue home Lisinopril/Toprol for goal-directed therapies  ·

## 2020-07-17 NOTE — ASSESSMENT & PLAN NOTE
Patient has stage III chronic disease with creatinine around 2 0 at baseline      Patient's renal function is at baseline    Will continue monitor renal function

## 2020-07-18 NOTE — ASSESSMENT & PLAN NOTE
Patient presents with RLE wounds he had been treating with antibiotic ointment on his own  He is a poor historian with unclear timeline for the wounds however he reports 2 or so months  He admits to previous claudication symptoms which recently worsened to the point that he could not walk the grocery store at which time he developed rest pain relieved by dangling his foot  He has no previous vascular workup    LEADs done 7/17 show:  Right: >75% stenosis at the mid SFA without PPG waveforms and absent tracings  Left: NESTOR 1 3, no MTP due to lack of PPG waveform, absent tracings      Patient will require RLE angiogram prior to planned debridement - IR consult placed and discussed with Iliana Mccloud via TT  Nephrology consult for acute on chronic kidney injury with planned angio  Discussed with Dr Perez Fret

## 2020-07-18 NOTE — PROGRESS NOTES
Progress Note - Camilo Vieyra 1945, 69 y o  male MRN: [id number]    Unit/Bed#: -01 Encounter: [id number]    Primary Care Provider: Ada Billy DO   Date and time admitted to hospital: 7/16/2020 10:25 PM        * Anemia, unspecified  Assessment & Plan  Patient presented with severe symptomatic normocytic anemia 6 7 causing shortness of breath on exertion  He was taking NSAIDs for chronic right lower leg ulcers that were getting worse  Patient's hemoglobin decreased and needed a unit of packed red blood cell transfusion last night  Hemoglobin currently is 8 3, will continue monitor hemoglobin q 6 hours  Continue IV Protonix b i d ,  CT of abdomen suggest gastritis  Patient will need EGD during this hospital stay    Chronic combined systolic and diastolic heart failure (HCC)  Assessment & Plan  Wt Readings from Last 3 Encounters:   07/17/20 60 6 kg (133 lb 9 6 oz)   06/21/19 68 3 kg (150 lb 9 6 oz)   06/19/19 67 6 kg (149 lb)     Echocardiogram on this admission shows ejection fraction of 50%  Patient's lungs are clear, has no JVD  Has chronic systolic CHF is compensated  He was hypotensive with systolic blood pressures     I am holding patient's Lasix and lisinopril  I decreased Toprol's dose to 50 mg daily    Other persistent atrial fibrillation [facility])  Assessment & Plan  Patient is status post ablation and pacemaker insertion  EKG showed paced rhythm  He has not anticoagulation candidate due to ITP and thrombocytopenia    Peripheral vascular disease of lower extremity [facility])  Assessment & Plan  Patient has more than 75% stenosis of the right superficial femoral artery on arterial Doppler    Patient scheduled for angiogram for Monday  Nephrology will be seeing the patient before that  Will start atorvastatin      I am holding aspirin because he may have a gastric ulcer which is bleeding    CKD (chronic kidney disease) stage 3, GFR 30-59 ml/min [facility])  Assessment & Plan  Patient has stage III chronic disease with creatinine around 2 0 at baseline  Patient's renal function is at baseline:  Creatinine is 2 03 today    Nephrology will be seen the patient before angiogram    Thrombocytopenia [facility])  Assessment & Plan  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 with platelet counts running between 60-115K  he denies signs of bleeding  Platelet count is 88080 today at baseline    He should be able to undergo abdominal surgery angiogram with this platelet count    Benign prostatic hyperplasia with urinary retention  Assessment & Plan  Patient self catheterizes at home when he feels he needs to for BPH with retention,   I placed him on urine retention protocol  Continue Flomax    Severe protein-calorie malnutrition ([address] )  Assessment & Plan  Pt has Severe protein-calorie malnutrition, in the setting of chronically ill patient with history of noncompliance with care, as evidenced by BMI 19 17 with an 11 33% unplanned weight loss over the past year, multiple nonhealing wounds appearance of muscle wasting/fat loss and decreased mobility noted on nursing assessment, Findings: height 5' 10", weight 133 lb 9 6 oz, BMI 19 17 Screen: (+) Unplanned weight loss in the last three months; (+) Stage III-IV pressure ulcer or non-healing wound; (+) Appearance of muscle wasting or fat loss;           VTE Prophylaxis: in place    Patient Centered Rounds: I rounded with patient's nurse    Current Length of Stay: 1 day(s)    Current Patient Status: Inpatient    Certification Statement: Pt requires additional inpatient hospital stay due to: see assessment and plan        Subjective:   Patient's nurse reports that patient was hypotensive and we needed to hold ACE-inhibitor, Lasix and Toprol  Patient required packed red blood cell transfusion for anemia  Patient denies dizziness, lightheadedness, chest pain, shortness of breath, abdominal pain diarrhea, signs of bleeding    He has difficulty urinating in the hospital usually and when that happens at home he catheterizes himself  His right lower leg pain due to the ulcers is controlled with Tylenol  All other ROS are negative    Objective:     Vitals:   Temp (24hrs), Av 1 °F (36 7 °C), Min:97 1 °F (36 2 °C), Max:98 9 °F (37 2 °C)    Temp:  [97 1 °F (36 2 °C)-98 9 °F (37 2 °C)] 97 1 °F (36 2 °C)  HR:  [] 82  Resp:  [16-20] 18  BP: ()/(47-67) 102/58  SpO2:  [19 %-100 %] 100 %  Body mass index is 19 17 kg/m²  Input and Output Summary (last 24 hours): Intake/Output Summary (Last 24 hours) at 2020 1235  Last data filed at 2020 2789  Gross per 24 hour   Intake 1350 ml   Output 2550 ml   Net -1200 ml       Physical Exam:     Physical Exam   Constitutional: He is oriented to person, place, and time  No distress  HENT:   Head: Normocephalic  Mouth/Throat: Oropharynx is clear and moist    Eyes: Conjunctivae are normal    Neck: Neck supple  No JVD present  Cardiovascular: Normal rate and regular rhythm  Pulmonary/Chest: Effort normal  No respiratory distress  He has no wheezes  He has no rales  Abdominal: Soft  Bowel sounds are normal  He exhibits distension (Patient has chronic mild distension)  There is no tenderness  Neurological: He is alert and oriented to person, place, and time  No cranial nerve deficit  Patient has no facial droop, speech is normal, moves all extremities on command   Skin: Skin is warm and dry  Patient's right lower leg is stress, left lower leg shows hyperpigmentation but no ulcers or edema   Vitals reviewed  I personally reviewed labs and imaging reports for today        Last 24 Hours Medication List:     Current Facility-Administered Medications:  acetaminophen 650 mg Oral Q6H PRN Allyson Bryan MD    atorvastatin 40 mg Oral Daily With Liliam Tapia MD    bacitracin 1 small application Topical BID Allyson Bryan MD    cefepime 1,000 mg Intravenous Q24H Jessica Banda MD Last Rate: 1,000 mg (55/55/80 8480)   folic acid 1 mg Oral Daily Jessica Banda MD    heparin (porcine) 5,000 Units Subcutaneous Q8H [address] Jessica Banda MD    HYDROcodone-acetaminophen 1 tablet Oral Q6H PRN Jessica Banda MD    HYDROmorphone 0 5 mg Intravenous Q4H PRN Jessica Banda MD    LORazepam 1 mg Oral Q8H PRN Jessica Banda MD    [START ON 7/19/2020] metoprolol succinate 50 mg Oral Daily Jessica Banda MD    pantoprazole 40 mg Intravenous Q12H [address] [place] [address] MD    tamsulosin 0 4 mg Oral Daily With Ascencion Coronel MD    vancomycin 15 mg/kg Intravenous Q24H Jessica Banda MD Last Rate: 1,000 mg (07/18/20 0659)          Today, Patient Was Seen By: Jessica Banda MD    ** Please Note: Dictation voice to text software may have been used in the creation of this document   **

## 2020-07-18 NOTE — ASSESSMENT & PLAN NOTE
Baseline cr appears to 1 4-1 5 currently 2   With planned angio for SFA stenosis and right lower extremity wounds  Will consult nephrology for pre/post fluid recs

## 2020-07-18 NOTE — ASSESSMENT & PLAN NOTE
Wt Readings from Last 3 Encounters:   07/17/20 60 6 kg (133 lb 9 6 oz)   06/21/19 68 3 kg (150 lb 9 6 oz)   06/19/19 67 6 kg (149 lb)     Echocardiogram on this admission shows ejection fraction of 50%  Patient's lungs are clear, has no JVD  Has chronic systolic CHF is compensated  He was hypotensive with systolic blood pressures     I am holding patient's Lasix and lisinopril      I decreased Toprol's dose to 50 mg daily

## 2020-07-18 NOTE — ASSESSMENT & PLAN NOTE
Patient is status post ablation and pacemaker insertion  EKG showed paced rhythm      He has not anticoagulation candidate due to ITP and thrombocytopenia

## 2020-07-18 NOTE — ASSESSMENT & PLAN NOTE
Patient self catheterizes at home when he feels he needs to for BPH with retention,   I placed him on urine retention protocol  Continue Flomax

## 2020-07-18 NOTE — ASSESSMENT & PLAN NOTE
Patient has stage III chronic disease with creatinine around 2 0 at baseline      Patient's renal function is at baseline:  Creatinine is 2 03 today    Nephrology will be seen the patient before angiogram

## 2020-07-18 NOTE — PLAN OF CARE
Problem: Potential for Falls  Goal: Patient will remain free of falls  Description  INTERVENTIONS:  - Assess patient frequently for physical needs  -  Identify cognitive and physical deficits and behaviors that affect risk of falls  -  [place] fall precautions as indicated by assessment   - Educate patient/family on patient safety including physical limitations  - Instruct patient to call for assistance with activity based on assessment  - Modify environment to reduce risk of injury  - Consider OT/PT consult to assist with strengthening/mobility  Outcome: Progressing     Problem: Prexisting or High Potential for Compromised Skin Integrity  Goal: Skin integrity is maintained or improved  Description  INTERVENTIONS:  - Identify patients at risk for skin breakdown  - Assess and monitor skin integrity  - Assess and monitor nutrition and hydration status  - Monitor labs   - Assess for incontinence   - Turn and reposition patient  - Assist with mobility/ambulation  - Relieve pressure over bony prominences  - Avoid friction and shearing  - Provide appropriate hygiene as needed including keeping skin clean and dry  - Evaluate need for skin moisturizer/barrier cream  - Collaborate with interdisciplinary team   - Patient/family teaching  - Consider wound care consult   Outcome: Progressing     Problem: Nutrition/Hydration-ADULT  Goal: Nutrient/Hydration intake appropriate for improving, restoring or maintaining nutritional needs  Description  Monitor and assess patient's nutrition/hydration status for malnutrition  Collaborate with interdisciplinary team and initiate plan and interventions as ordered  Monitor patient's weight and dietary intake as ordered or per policy  Utilize nutrition screening tool and intervene as necessary  Determine patient's food preferences and provide high-protein, high-caloric foods as appropriate       INTERVENTIONS:  - Monitor oral intake, urinary output, labs, and treatment plans  - Assess nutrition and hydration status and recommend course of action  - Evaluate amount of meals eaten  - Assist patient with eating if necessary   - Allow adequate time for meals  - Recommend/ encourage appropriate diets, oral nutritional supplements, and vitamin/mineral supplements  - Order, calculate, and assess calorie counts as needed  - Recommend, monitor, and adjust tube feedings and TPN/PPN based on assessed needs  - Assess need for intravenous fluids  - Provide specific nutrition/hydration education as appropriate  - Include patient/family/caregiver in decisions related to nutrition  Outcome: Progressing     Problem: NEUROSENSORY - ADULT  Goal: Achieves maximal functionality and self care  Description  INTERVENTIONS  - Monitor swallowing and airway patency with patient fatigue and changes in neurological status  - Encourage and assist patient to increase activity and self care     - Encourage visually impaired, hearing impaired and aphasic patients to use assistive/communication devices  Outcome: Progressing     Problem: GASTROINTESTINAL - ADULT  Goal: Minimal or absence of nausea and/or vomiting  Description  INTERVENTIONS:  - Administer IV fluids if ordered to ensure adequate hydration  - Maintain NPO status until nausea and vomiting are resolved  - Nasogastric tube if ordered  - Administer ordered antiemetic medications as needed  - Provide nonpharmacologic comfort measures as appropriate  - Advance diet as tolerated, if ordered  - Consider nutrition services referral to assist patient with adequate nutrition and appropriate food choices  Outcome: Progressing  Goal: Maintains adequate nutritional intake  Description  INTERVENTIONS:  - Monitor percentage of each meal consumed  - Identify factors contributing to decreased intake, treat as appropriate  - Assist with meals as needed  - Monitor I&O, weight, and lab values if indicated  - Obtain nutrition services referral as needed  Outcome: Progressing     Problem: METABOLIC, FLUID AND ELECTROLYTES - ADULT  Goal: Electrolytes maintained within normal limits  Description  INTERVENTIONS:  - Monitor labs and assess patient for signs and symptoms of electrolyte imbalances  - Administer electrolyte replacement as ordered  - Monitor response to electrolyte replacements, including repeat lab results as appropriate  - Instruct patient on fluid and nutrition as appropriate  Outcome: Progressing  Goal: Fluid balance maintained  Description  INTERVENTIONS:  - Monitor labs   - Monitor I/O and WT  - Instruct patient on fluid and nutrition as appropriate  - Assess for signs & symptoms of volume excess or deficit  Outcome: Progressing     Problem: SKIN/TISSUE INTEGRITY - ADULT  Goal: Skin integrity remains intact  Description  INTERVENTIONS  - Identify patients at risk for skin breakdown  - Assess and monitor skin integrity  - Assess and monitor nutrition and hydration status  - Monitor labs (i e  albumin)  - Assess for incontinence   - Turn and reposition patient  - Assist with mobility/ambulation  - Relieve pressure over bony prominences  - Avoid friction and shearing  - Provide appropriate hygiene as needed including keeping skin clean and dry  - Evaluate need for skin moisturizer/barrier cream  - Collaborate with interdisciplinary team (i e  Nutrition, Rehabilitation, etc )   - Patient/family teaching  Outcome: Progressing  Goal: Incision(s), wounds(s) or drain site(s) healing without S/S of infection  Description  INTERVENTIONS  - Assess and document risk factors for skin impairment   - Assess and document dressing, incision, wound bed, drain sites and surrounding tissue  - Consider nutrition services referral as needed  - Oral mucous membranes remain intact  - Provide patient/ family education  Outcome: Progressing     Problem: HEMATOLOGIC - ADULT  Goal: Maintains hematologic stability  Description  INTERVENTIONS  - Assess for signs and symptoms of bleeding or hemorrhage  - Monitor labs  - Administer supportive blood products/factors as ordered and appropriate  Outcome: Progressing     Problem: MUSCULOSKELETAL - ADULT  Goal: Maintain or return mobility to safest level of function  Description  INTERVENTIONS:  - Assess patient's ability to carry out ADLs; assess patient's baseline for ADL function and identify physical deficits which impact ability to perform ADLs (bathing, care of mouth/teeth, toileting, grooming, dressing, etc )  - Assess/evaluate cause of self-care deficits   - Assess range of motion  - Assess patient's mobility  - Assess patient's need for assistive devices and provide as appropriate  - Encourage maximum independence but intervene and supervise when necessary  - Involve family in performance of ADLs  - Assess for home care needs following discharge   - Consider OT consult to assist with ADL evaluation and planning for discharge  - Provide patient education as appropriate  Outcome: Progressing     Problem: PAIN - ADULT  Goal: Verbalizes/displays adequate comfort level or baseline comfort level  Description  Interventions:  - Encourage patient to monitor pain and request assistance  - Assess pain using appropriate pain scale  - Administer analgesics based on type and severity of pain and evaluate response  - Implement non-pharmacological measures as appropriate and evaluate response  - Consider cultural and social influences on pain and pain management  - Notify physician/advanced practitioner if interventions unsuccessful or patient reports new pain  Outcome: Progressing     Problem: INFECTION - ADULT  Goal: Absence or prevention of progression during hospitalization  Description  INTERVENTIONS:  - Assess and monitor for signs and symptoms of infection  - Monitor lab/diagnostic results  - Monitor all insertion sites, i e  indwelling lines, tubes, and drains  - Monitor endotracheal if appropriate and nasal secretions for changes in amount and color  - [place] appropriate cooling/warming therapies per order  - Administer medications as ordered  - Instruct and encourage patient and family to use good hand hygiene technique  - Identify and instruct in appropriate isolation precautions for identified infection/condition  Outcome: Progressing  Goal: Absence of fever/infection during neutropenic period  Description  INTERVENTIONS:  - Monitor WBC    Outcome: Progressing     Problem: SAFETY ADULT  Goal: Patient will remain free of falls  Description  INTERVENTIONS:  - Assess patient frequently for physical needs  -  Identify cognitive and physical deficits and behaviors that affect risk of falls  -  [place] fall precautions as indicated by assessment   - Educate patient/family on patient safety including physical limitations  - Instruct patient to call for assistance with activity based on assessment  - Modify environment to reduce risk of injury  - Consider OT/PT consult to assist with strengthening/mobility  Outcome: Progressing     Problem: DISCHARGE PLANNING  Goal: Discharge to home or other facility with appropriate resources  Description  INTERVENTIONS:  - Identify barriers to discharge w/patient and caregiver  - Arrange for needed discharge resources and transportation as appropriate  - Identify discharge learning needs (meds, wound care, etc )  - Arrange for interpretive services to assist at discharge as needed  - Refer to Case Management Department for coordinating discharge planning if the patient needs post-hospital services based on physician/advanced practitioner order or complex needs related to functional status, cognitive ability, or social support system  Outcome: Progressing     Problem: Knowledge Deficit  Goal: Patient/family/caregiver demonstrates understanding of disease process, treatment plan, medications, and discharge instructions  Description  Complete learning assessment and assess knowledge base    Interventions:  - Provide teaching at level of understanding  - Provide teaching via preferred learning methods  Outcome: Progressing

## 2020-07-18 NOTE — PROGRESS NOTES
Progress Note - General Surgery  Trisha Fernandez 69 y o  male MRN: [id number]  Unit/Bed#: -01 Encounter: [id number]    Assessment/Plan:  Umbilical abscess with exposed mesh  CTAP without fistula, evidence of abscess in superficial abdominal wall  Mesh exposed on exam with continuous drainage of grey purulent material  Plan for explantation of mesh and primary repair of umbilical hernia on 4/86 - Okay for cardiac diet until then  Continue empiric abx    Anemia  S/p 2 units RBC in ER and another unit last night   Hgb 8 3 this am - monitor  Check stool hemoccults  CTAP with antral thickening - gastritis vs Neoplasm   GI on board - patient refusing EGD/colonoscopy until his leg and abdominal wounds are addressed      Right lower extremity wounds  Mixed venous statsis and arterial wounds  With evidence of resting claudication, leg dangling while laying down  LEADs with >75% stenosis mid SFA NESTOR unable to be obtained secondary to absent PPG waveform  Podiatry on board with plans for debridement  Will need vascular eval - consult placed    Other medical comorbid conditions  CHF, Persistent Atrial fibrillation, pacemaker, CKD, BPH, Chronic ITP  Monitor fluid status  Cardiology on board  Appreciate medical management    Subjective/Objective   Subjective: Reports continued RLE pain  Able to ambulate to chair without difficulty  Reports resting pain in right leg relieved with dangling his foot  No other complaints  Objective:     Blood pressure 100/57, pulse 82, temperature (!) 97 1 °F (36 2 °C), temperature source Oral, resp  rate 18, height 5' 10" (1 778 m), weight 60 6 kg (133 lb 9 6 oz), SpO2 100 %  ,Body mass index is 19 17 kg/m²        Intake/Output Summary (Last 24 hours) at 7/18/2020 0926  Last data filed at 7/18/2020 0614  Gross per 24 hour   Intake 1350 ml   Output 3250 ml   Net -1900 ml       Invasive Devices     Peripheral Intravenous Line            Peripheral IV 07/16/20 Right Antecubital 1 day Physical Exam: /58   Pulse 82   Temp (!) 97 1 °F (36 2 °C) (Oral)   Resp 18   Ht 5' 10" (1 778 m)   Wt 60 6 kg (133 lb 9 6 oz)   SpO2 100%   BMI 19 17 kg/m²   Lungs: clear to auscultation bilaterally and without wheezes, crackles, or rhonchi  Heart: regular rate and rhythm, S1, S2 normal, no murmur, click, rub or gallop  Abdomen: soft, periumbilical tenderness, open wound at umbilicus with exposed umbilical hernia mesh and grey, foul smelling, purulent drainage      Lab, Imaging and other studies:  CBC:   Lab Results   Component Value Date    WBC 5 91 07/18/2020    HGB 8 3 (L) 07/18/2020    HGB 8 3 (L) 07/18/2020    HCT 25 8 (L) 07/18/2020    HCT 25 4 (L) 07/18/2020    MCV 94 07/18/2020    PLT 84 (L) 07/18/2020    MCH 30 9 07/18/2020    MCHC 32 7 07/18/2020    RDW 18 0 (H) 07/18/2020    MPV 12 6 07/18/2020   , CMP:   Lab Results   Component Value Date    SODIUM 138 07/18/2020    K 4 3 07/18/2020     07/18/2020    CO2 22 07/18/2020    BUN 59 (H) 07/18/2020    CREATININE 2 03 (H) 07/18/2020    CALCIUM 8 5 07/18/2020    EGFR 32 07/18/2020     VTE Pharmacologic Prophylaxis: Heparin  VTE Mechanical Prophylaxis: reason for no mechanical VTE prophylaxis bilateral lower extremity wounds

## 2020-07-18 NOTE — PROGRESS NOTES
Progress Note - Podiatry  Camilo Vieyra 69 y o  male MRN: [id number]  Unit/Bed#: -01 Encounter: [id number]    Assessment/Plan:  Cellulitis right leg  Skin ulceration right leg fat layer exposed  Peripheral vascular disease with ulceration              -arterial duplex results reviewed showing 75% stenosis of right SFA              -continue with current IV antibiotics              -arteriogram schedule for Monday, discussed with vascular surgeon PA              -will need OR debridement right leg after RLE perfusion is optimized   -hemoglobin 8 3, platelets 84, BUN 59, Cr 2 03               -ABD/Kerlix dressing for now until wounds are debrided              -RLE X-ray negative for osteo, vascular calcifications noted  Lactic acidosis, necrotic umbilical wound, chronic kidney disease stage 3, anemia, and AFib              -managed per Internal Medicine    Subjective/Objective   Chief Complaint:   Chief Complaint   Patient presents with    Wound Infection - Complicated     Pt with leg wounds x 2 months, here for dyspnea, weakness and feeling ill   +n/+v  Subjective:  72-year-old white male seen resting bed with legs dependent on right side to lessen his pain  Continues to the amount pain from right leg wound  Denies any fever or shortness of breath  Blood pressure 102/58, pulse 82, temperature (!) 97 1 °F (36 2 °C), temperature source Oral, resp  rate 18, height 5' 10" (1 778 m), weight 60 6 kg (133 lb 9 6 oz), SpO2 100 %  ,Body mass index is 19 17 kg/m²      Invasive Devices     Peripheral Intravenous Line            Peripheral IV 07/18/20 Right Forearm less than 1 day                Physical Exam:   General appearance: alert, cooperative and with pain right leg  Neuro/Vascular: Normal strength  Sensation and reflexes:  Grossly intact  Pulses: Right: DP 0/4, PT 0/4, Left: DP 0/4, PT 0/4  Capillary Filling:  3 Sec, Edema:  +1 right leg, none left  Vascular calcifications noted on tib-fib x-ray RLE  Arterial duplex 7/17/2020:    · RIGHT LOWER LIMB:   This resting evaluation shows evidence of a >75% stenosis within the mid superficial femoral artery  NESTOR: unable to obtain due to patient's leg movements making it difficult to determine return of waveform   Metatarsal pressure and GT pressure were not obtained due to lack of a PPG waveform   PVR/ PPG tracings are absent  ·  LEFT LOWER LIMB:   Ankle/Brachial index: 1 32, Metatarsal pressure and GT pressure unobtainable due to lack of a PPG waveform   PVR/ PPG tracings are absent  Extremity: Several large wounds present on the medial and lateral aspect of the right lower leg with images within media, fat layer exposed with necrotic nature and significant slough, wounds are 80-90% yellow/white, localized erythema and calor noted consistent with cellulitis  Labs and other studies:   CBC w/diff  Results from last 7 days   Lab Units 07/18/20  0637  07/16/20  2243   WBC Thousand/uL 5 91  --  7 48   HEMOGLOBIN g/dL 8 3*  8 3*   < > 6 7*   HEMATOCRIT % 25 4*  25 8*   < > 20 8*   PLATELETS Thousands/uL 84*   < > 104*   LYMPHO PCT %  --   --  16   MONO PCT %  --   --  7   EOS PCT %  --   --  0    < > = values in this interval not displayed  BMP  Results from last 7 days   Lab Units 07/18/20  0637   POTASSIUM mmol/L 4 3   CHLORIDE mmol/L 105   CO2 mmol/L 22   BUN mg/dL 59*   CREATININE mg/dL 2 03*   CALCIUM mg/dL 8 5     CMP  Results from last 7 days   Lab Units 07/18/20  0637 07/16/20  2243   POTASSIUM mmol/L 4 3 4 6   CHLORIDE mmol/L 105 103   CO2 mmol/L 22 20*   BUN mg/dL 59* 80*   CREATININE mg/dL 2 03* 2 18*   CALCIUM mg/dL 8 5 9 0   ALK PHOS U/L  --  87   ALT U/L  --  21   AST U/L  --  20       @Culture@  Lab Results   Component Value Date    BLOODCX No Growth at 24 hrs  07/16/2020    BLOODCX No Growth at 24 hrs  07/16/2020    BLOODCX No Growth After 5 Days  05/02/2017    BLOODCX No Growth After 5 Days   05/02/2017    URINECX >100,000 cfu/ml Klebsiella oxytoca (A) 02/13/2019    URINECX >100,000 cfu/ml Klebsiella oxytoca (A) 01/07/2019         Current Facility-Administered Medications:     acetaminophen (TYLENOL) tablet 650 mg, 650 mg, Oral, Q6H PRN, Magui Strong MD, 650 mg at 07/18/20 1144    atorvastatin (LIPITOR) tablet 40 mg, 40 mg, Oral, Daily With Judy Sharma MD    bacitracin topical ointment 1 small application, 1 small application, Topical, BID, Magui Strong MD, 1 small application at 12/41/16 0915    cefepime (MAXIPIME) IVPB (premix) 1,000 mg 50 mL, 1,000 mg, Intravenous, Q24H, Magui Strong MD, Last Rate: 100 mL/hr at 07/18/20 0116, 1,000 mg at 52/11/62 3835    folic acid (FOLVITE) tablet 1 mg, 1 mg, Oral, Daily, Magui Strong MD, 1 mg at 07/18/20 0915    heparin (porcine) subcutaneous injection 5,000 Units, 5,000 Units, Subcutaneous, Q8H [address], 5,000 Units at 07/17/20 2132 **AND** [COMPLETED] Platelet count, , , Once, BJ Heck    HYDROcodone-acetaminophen (NORCO) 5-325 mg per tablet 1 tablet, 1 tablet, Oral, Q6H PRN, Magui Strong MD, 1 tablet at 07/18/20 0915    HYDROmorphone (DILAUDID) injection 0 5 mg, 0 5 mg, Intravenous, Q4H PRN, Magui Strong MD, 0 5 mg at 07/18/20 0636    LORazepam (ATIVAN) tablet 1 mg, 1 mg, Oral, Q8H PRN, Magui Strong MD, 1 mg at 07/17/20 1230    [START ON 7/19/2020] metoprolol succinate (TOPROL-XL) 24 hr tablet 50 mg, 50 mg, Oral, Daily, Magui Strong MD    pantoprazole (PROTONIX) injection 40 mg, 40 mg, Intravenous, Q12H [address], Magui Strong MD, 40 mg at 07/18/20 0915    tamsulosin (FLOMAX) capsule 0 4 mg, 0 4 mg, Oral, Daily With Judy Sharma MD, 0 4 mg at 07/17/20 1629    vancomycin (VANCOCIN) IVPB (premix) 1,000 mg 200 mL, 15 mg/kg, Intravenous, Q24H, Magui Strong MD, Last Rate: 200 mL/hr at 07/18/20 0642, 1,000 mg at 07/18/20 9354    Imaging: I have personally reviewed pertinent films in PACS  EKG, Pathology, and Other Studies: I have personally reviewed pertinent reports    VTE Pharmacologic Prophylaxis: Heparin  VTE Mechanical Prophylaxis: reason for no mechanical VTE prophylaxis Wounds right lower extremity    Danae Jay DPM

## 2020-07-18 NOTE — ASSESSMENT & PLAN NOTE
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 with platelet counts running between 60-115K  he denies signs of bleeding  Platelet count is 19145 today at baseline    He should be able to undergo abdominal surgery angiogram with this platelet count

## 2020-07-18 NOTE — ASSESSMENT & PLAN NOTE
S/p 2U PRBC on admission with 1 unit overnight  hgb 8 3 this am  Continue to monitor  GI on board however patient currently refusing EGD/Colon   Transfuse as needed

## 2020-07-18 NOTE — PROGRESS NOTES
Bedside RN called to report repeat Hgb 7 0  Chart reviewed, pt admitted with Hgb 6 7, transfused 1 unit PRBC's, post transfusion Hgb 7 8, now with acute drop to 7 0  Pt appears pale with slight increase in RR  Per RN no signs of bleeding, pt has not had a bm since admission  Will order transfusion of 1 unit PRBC's and IV Benadryl given pt's history of transfusion associated hives  Cont Q6 Hgb checks  Pt taking po Lasix daily, may need additional dose vs IV dosing tomorrow if increase in SOB post transfusion secondary to volume overload

## 2020-07-18 NOTE — PROGRESS NOTES
Vancomycin IV Pharmacy-to-Dose Consultation    Mavis Melgar is a 69 y o  male who is currently receiving Vancomycin IV with management by the Pharmacy Consult service  Assessment/Plan:  The patient was reviewed  Renal function is stable and no signs or symptoms of nephrotoxicity and/or infusion reactions were documented in the chart  Based on todays assessment, continue current vancomycin (day # 3) dosing of 1000 mg q24h, with a plan for trough to be drawn at 0000 on 7/20/20  We will continue to follow the patients culture results and clinical progress daily      Rob Arias, Pharmacist

## 2020-07-18 NOTE — PROGRESS NOTES
Kimberli Ramirez  [id number]    69 y o   male      ASSESSMENT and PLAN    Anemia - no signs or symptoms of GI blood loss  MCV is 95  Iron saturation not low  Patient still could be having occult GI blood loss from gastritis or occult malignancy  May have underlying anemia of chronic disease or poor marrow function related to his malnutrition  · Follow H&H and transfuse as needed  · Observe for any signs of GI bleeding  · Check stool hemoccults  · Empiric PPI  · EGD when clinically stable, consider colonoscopy pending EGD    Chief Complaint   Patient presents with    Wound Infection - Complicated     Pt with leg wounds x 2 months, here for dyspnea, weakness and feeling ill   +n/+v  SUBJECTIVE/HPI   denies any GI symptoms  No dysphagia or dyspepsia  Denies abdominal pain nausea or vomiting  No bowel movement since admission      /73 (BP Location: Left arm)   Pulse 98   Temp 98 6 °F (37 °C) (Oral)   Resp 18   Ht 5' 10" (1 778 m)   Wt 60 6 kg (133 lb 9 6 oz)   SpO2 99%   BMI 19 17 kg/m²     PHYSICALEXAM  General appearance: alert, appears stated age and cooperative  Head: Normocephalic, without obvious abnormality, atraumatic  Lungs: clear to auscultation bilaterally  Heart: regular rate and rhythm, S1, S2 normal, no murmur, click, rub or gallop  Abdomen: soft, non-tender; bowel sounds normal; no masses,  no organomegaly  Neurologic: Grossly normal    Lab Results   Component Value Date    GLUCOSE 127 11/20/2018    CALCIUM 8 5 07/18/2020    K 4 3 07/18/2020    CO2 22 07/18/2020     07/18/2020    BUN 59 (H) 07/18/2020    CREATININE 2 03 (H) 07/18/2020     Lab Results   Component Value Date    WBC 5 91 07/18/2020    HGB 8 3 (L) 07/18/2020    HGB 8 3 (L) 07/18/2020    HCT 25 8 (L) 07/18/2020    HCT 25 4 (L) 07/18/2020    MCV 94 07/18/2020    PLT 84 (L) 07/18/2020     Lab Results   Component Value Date    ALT 21 07/16/2020    AST 20 07/16/2020    ALKPHOS 87 07/16/2020     No results found for: AMYLASE  Lab Results   Component Value Date    LIPASE 255 07/16/2020     Lab Results   Component Value Date    IRON 229 (H) 07/16/2020    TIBC 285 07/16/2020    FERRITIN 303 07/16/2020     Lab Results   Component Value Date    INR 1 46 (H) 07/16/2020       Counseling / Coordination of Care  Total floor / unit time spent today 20 minutes

## 2020-07-18 NOTE — ASSESSMENT & PLAN NOTE
Patient has more than 75% stenosis of the right superficial femoral artery on arterial Doppler    Patient scheduled for angiogram for Monday  Nephrology will be seeing the patient before that  Will start atorvastatin      I am holding aspirin because he may have a gastric ulcer which is bleeding

## 2020-07-18 NOTE — ASSESSMENT & PLAN NOTE
Patient presented with severe symptomatic normocytic anemia 6 7 causing shortness of breath on exertion  He was taking NSAIDs for chronic right lower leg ulcers that were getting worse  Patient's hemoglobin decreased and needed a unit of packed red blood cell transfusion last night  Hemoglobin currently is 8 3, will continue monitor hemoglobin q 6 hours      Continue IV Protonix b i d ,  CT of abdomen suggest gastritis  Patient will need EGD during this hospital stay

## 2020-07-18 NOTE — CONSULTS
Progress Note - Loyda Harding 1945, 69 y o  male MRN: [id number]    Unit/Bed#: -01 Encounter: [id number]    Primary Care Provider: DO Rajat   Date and time admitted to hospital: 7/16/2020 10:25 PM        Peripheral vascular disease of lower extremity [facility])  Assessment & Plan  Patient presents with RLE wounds he had been treating with antibiotic ointment on his own  He is a poor historian with unclear timeline for the wounds however he reports 2 or so months  He admits to previous claudication symptoms which recently worsened to the point that he could not walk the grocery store at which time he developed rest pain relieved by dangling his foot  He has no previous vascular workup    LEADs done 7/17 show:  Right: >75% stenosis at the mid SFA without PPG waveforms and absent tracings  Left: NESTOR 1 3, no MTP due to lack of PPG waveform, absent tracings  Patient will require RLE angiogram prior to planned debridement - IR consult placed and discussed with Shakira Reyna via TT  Nephrology consult for acute on chronic kidney injury with planned angio  Discussed with Dr Karthikeyan Quintero    CKD (chronic kidney disease) stage 3, GFR 30-59 ml/min ([facility] [address] )  Assessment & Plan  Baseline cr appears to 1 4-1 5 currently 2   With planned angio for SFA stenosis and right lower extremity wounds  Will consult nephrology for pre/post fluid recs     Thrombocytopenia (HCC)  Assessment & Plan  Chronic  Monitor platelets    * Anemia, unspecified  Assessment & Plan  S/p 2U PRBC on admission with 1 unit overnight  hgb 8 3 this am  Continue to monitor  GI on board however patient currently refusing EGD/Colon   Transfuse as needed    HPI  Tika Smart is a 70 yo male with PMH of CHF, atrial fibrillation, PVD, CKD stage 3-4, anemia, ITP, BPH, and HTN, as well as history of non-compliance who presents to [facility] with complaints of dyspnea on exertion, fatigue, and malaise   He was found to have b/l right worse than left lower extremity wounds and draining abdominal wound  He reports the leg wounds have been present for several months and he has been treating them at home with topical ointment  He states he has had pain in his right calf with ambulation around the grocery store for some time however in the past few weeks that pain became so severe he started using a scooter in the store  Around that same time he developed pain in his leg when laying flat and started dangling his foot over the side of the bed to gain relief  He denies associated fevers, chills, cp, n/v, cough, or trauma to the leg  He denies previous vascular workup  ROS  12 point ROS neg except as noted  RLE wounds with rest pain and worsening claudication  Abdominal wound with drainage  dyspnea  Fatigue  Malaise    Vitals:  /58   Pulse 82   Temp (!) 97 1 °F (36 2 °C) (Oral)   Resp 18   Ht 5' 10" (1 778 m)   Wt 60 6 kg (133 lb 9 6 oz)   SpO2 100%   BMI 19 17 kg/m²     I/Os:  I/O last 3 completed shifts: In: 2100 [P O :1000; Blood:350; IV Piggyback:750]  Out: 3900 [Urine:3900]  No intake/output data recorded        Lab Results and Cultures:   CBC with diff:   Lab Results   Component Value Date    WBC 5 91 07/18/2020    HGB 8 3 (L) 07/18/2020    HGB 8 3 (L) 07/18/2020    HCT 25 8 (L) 07/18/2020    HCT 25 4 (L) 07/18/2020    MCV 94 07/18/2020    PLT 84 (L) 07/18/2020    MCH 30 9 07/18/2020    MCHC 32 7 07/18/2020    RDW 18 0 (H) 07/18/2020    MPV 12 6 07/18/2020    NRBC 3 (H) 07/16/2020   ,   BMP/CMP:  Lab Results   Component Value Date    SODIUM 138 07/18/2020    SODIUM 137 12/14/2018    K 4 3 07/18/2020    K 4 2 12/14/2018     07/18/2020    CL 95 (L) 12/14/2018    CO2 22 07/18/2020    CO2 25 12/14/2018    BUN 59 (H) 07/18/2020    BUN 53 (H) 12/14/2018    CREATININE 2 03 (H) 07/18/2020    GLUCOSE 127 11/20/2018    CALCIUM 8 5 07/18/2020    AST 20 07/16/2020    ALT 21 07/16/2020    ALKPHOS 87 07/16/2020    EGFR 32 07/18/2020    EGFR 35 11/20/2018   , Lipid Panel: No results found for: CHOL,   Coags:   Lab Results   Component Value Date    PTT 36 07/16/2020    INR 1 46 (H) 07/16/2020    INR 2 19 (H) 03/10/2015   ,     Blood Culture:   Lab Results   Component Value Date    BLOODCX No Growth at 24 hrs  07/16/2020    BLOODCX No Growth at 24 hrs  07/16/2020   ,   Urinalysis:   Lab Results   Component Value Date    COLORU Yellow 07/17/2020    CLARITYU Clear 07/17/2020    SPECGRAV 1 010 07/17/2020    PHUR 5 5 07/17/2020    PHUR 6 0 02/13/2019    LEUKOCYTESUR Negative 07/17/2020    NITRITE Negative 07/17/2020    GLUCOSEU Negative 07/17/2020    KETONESU Negative 07/17/2020    BILIRUBINUR Negative 07/17/2020    BLOODU Negative 07/17/2020   ,   Urine Culture:   Lab Results   Component Value Date    URINECX >100,000 cfu/ml Klebsiella oxytoca (A) 02/13/2019   ,   Wound Culure: No results found for: WOUNDCULT    Medications:  Current Facility-Administered Medications   Medication Dose Route Frequency    acetaminophen (TYLENOL) tablet 650 mg  650 mg Oral Q6H PRN    bacitracin topical ointment 1 small application  1 small application Topical BID    cefepime (MAXIPIME) IVPB (premix) 1,000 mg 50 mL  1,000 mg Intravenous J27C    folic acid (FOLVITE) tablet 1 mg  1 mg Oral Daily    furosemide (LASIX) tablet 40 mg  40 mg Oral Daily    heparin (porcine) subcutaneous injection 5,000 Units  5,000 Units Subcutaneous Q8H [address]    HYDROcodone-acetaminophen (NORCO) 5-325 mg per tablet 1 tablet  1 tablet Oral Q6H PRN    HYDROmorphone (DILAUDID) injection 0 5 mg  0 5 mg Intravenous Q4H PRN    lisinopril (ZESTRIL) tablet 5 mg  5 mg Oral Daily    LORazepam (ATIVAN) tablet 1 mg  1 mg Oral Q8H PRN    metoprolol succinate (TOPROL-XL) 24 hr tablet 100 mg  100 mg Oral Daily    pantoprazole (PROTONIX) injection 40 mg  40 mg Intravenous Q12H [address]    tamsulosin (FLOMAX) capsule 0 4 mg  0 4 mg Oral Daily With Dinner    vancomycin (VANCOCIN) IVPB (premix) 1,000 mg 200 mL  15 mg/kg Intravenous Q24H       Imaging:  LEADs 7/17   RIGHT LOWER LIMB:  This resting evaluation shows evidence of a >75% stenosis within the mid  superficial femoral artery  Ankle/Brachial index: unable to obtain due to patient's leg movements making it  difficult to determine return of waveform  Metatarsal pressure and great toe pressure were not obtained due to lack of a  PPG waveform  PVR/ PPG tracings are absent  LEFT LOWER LIMB:  Ankle/Brachial index: 1 32  Metatarsal pressure and great toe pressure were not obtained due to lack of a  PPG waveform  PVR/ PPG tracings are absent  Right Tib/Fib xray 7/16:  IMPRESSION:  No acute osseous abnormality      Physical Exam:    General: AAOx3, sitting up on bedside couch  CV: reg rate, irr irr rhythm, S1 S2 without murmur, rub, or gallop  Respiratory: Clear to auscultation without wheezes, crackles, or rhonchi  Abdominal: Draining open wound at umbilicus with purulent drainage, tender to palp, without surrounding erythema  Extremities: RLE with chronic wounds - see attached photo    Pulse exam:  Femoral: Right: Palpable 2+ Left: 2+  DP: Right: Monophasic by doppler Left: Monophasic by doppler  PT: Right: Monophasic by doppler Left: Monophasic by doppler    Andrew Ramirez PA-C  7/18/2020

## 2020-07-19 NOTE — ASSESSMENT & PLAN NOTE
Patient presented with severe symptomatic normocytic anemia 6 7 causing shortness of breath on exertion  He was taking NSAIDs for chronic right lower leg ulcers that were getting worse  Patient was transfused packed red blood cells during the hospital stay    Hemoglobin currently is 8 2 this morning, will continue monitor hemoglobin q 6 hours      Continue IV Protonix b i d ,  CT of abdomen suggest gastritis  Patient will need EGD during this hospital stay

## 2020-07-19 NOTE — PROGRESS NOTES
Tania Jorge  [id number]    69 y o   male      ASSESSMENT and PLAN    Anemia - no signs or symptoms of GI blood loss   MCV is 95  Iron saturation not low   Patient still could be having occult GI blood loss from gastritis or occult malignancy   May have underlying anemia of chronic disease or poor marrow function related to his malnutrition     · Follow H&H and transfuse as needed  · Observe for any signs of GI bleeding  · Check stool hemoccults  · Empiric PPI  · EGD when clinically stable after planned vascular procedures, consider colonoscopy pending EGD    Chief Complaint   Patient presents with    Wound Infection - Complicated     Pt with leg wounds x 2 months, here for dyspnea, weakness and feeling ill   +n/+v  SUBJECTIVE/HPI   complains of some abdominal pain at his abdominal wound site but otherwise tolerating p o  No nausea vomiting diarrhea melena or hematochezia      BP (!) 95/48   Pulse 83   Temp (!) 97 2 °F (36 2 °C) (Oral)   Resp 17   Ht 5' 10" (1 778 m)   Wt 62 1 kg (136 lb 12 8 oz)   SpO2 95%   BMI 19 63 kg/m²     PHYSICALEXAM  General appearance: alert, appears stated age and cooperative  Head: Normocephalic, without obvious abnormality, atraumatic  Lungs:  Decreased breath sounds but clear to auscultation bilaterally  Heart: regular rate and rhythm, S1, S2 normal, no murmur, click, rub or gallop  Abdomen: soft, mild tenderness at umbilical wound site, bowel sounds normal; no masses,  no organomegaly  Neurologic: Grossly normal    Lab Results   Component Value Date    GLUCOSE 127 11/20/2018    CALCIUM 8 6 07/19/2020    K 4 3 07/19/2020    CO2 20 (L) 07/19/2020     07/19/2020    BUN 59 (H) 07/19/2020    CREATININE 2 21 (H) 07/19/2020     Lab Results   Component Value Date    WBC 7 51 07/19/2020    HGB 8 2 (L) 07/19/2020    HCT 24 8 (L) 07/19/2020    MCV 93 07/19/2020    PLT 77 (L) 07/19/2020     Lab Results   Component Value Date    ALT 21 07/16/2020    AST 20 07/16/2020 ALKPHOS 87 07/16/2020     No results found for: AMYLASE  Lab Results   Component Value Date    LIPASE 255 07/16/2020     Lab Results   Component Value Date    IRON 229 (H) 07/16/2020    TIBC 285 07/16/2020    FERRITIN 303 07/16/2020     Lab Results   Component Value Date    INR 1 46 (H) 07/16/2020       Counseling / Coordination of Care  Total floor / unit time spent today 20 minutes

## 2020-07-19 NOTE — NURSING NOTE
Continuing to follow urinary retention protocol with pt this shift  Pt bladder scanned twice this shift after urinating with a PVR of 570 both times  Pt continues to refuse straight cath throughout night  Also, refuses to use urinal in order to measure how much output he has  Educated on urinal use, straight cath, and the importance of emptying bladder on a regular basis  Continues to refuse

## 2020-07-19 NOTE — ASSESSMENT & PLAN NOTE
Patient self catheterizes at home when he feels he needs to for BPH with retention,     Patient had to be catheterized 6 times during this hospital stay      I ordered straight cath q 8 hours because patient has urinary retention, will try to avoid placement of Abbott    Continue Flomax

## 2020-07-19 NOTE — CONSULTS
Consultation - Nephrology   Radha Bloom 69 y o  male MRN: [id number]  Unit/Bed#: -01 Encounter: [id number]    ASSESSMENT AND PLAN:  Patient is 79-year-old male with significant medical issues of hypertension for last five years, CKD stage 3, CAD, CHF, AFib, presented with severe anemia, shortness of breath  Was also found to have suspected cellulitis  We are consulted for PALOMA/CKD management  CKD stage 3, no recent baseline available, prior creatinine 1 9-2 3 in early 2019  -creatinine 2 1 on admission slightly increased to 2 2 today  ? Overall progression of CKD  -also suspect slightly increased creatinine in the setting of severe anemia, use of lisinopril, hypotension, autoregulatory failure, use of NSAIDs at home  -avoid any nephrotoxins or NSAIDs  -BMP in a m   -will give gentle IV normal saline 50 mL/hour for 10 hours today  -eventual plan for CT angiogram noted by vascular  Please notify Nephrology when exact date is finalized  Patient will need pre and post contrast hydration  I had detailed discussion with patient regarding risk of PALOMA and possible need for dialysis with contrast exposure  He verbalized understanding of my discussion with him   -agree with continue to hold lisinopril, Lasix  -CKD due to hypertension, age-related nephron loss    Suspected distal left ureteral stricture with distal left ureteral dilatation, no wilfredo hydronephrosis reported on CT scan, improved dilatation of bilateral extrarenal pelvis  Continue to monitor renal function  Eventually consider urology evaluation if renal function getting worse  Mild hyponatremia, serum sodium 134, monitor with IV fluid  BMP in a m , will do further evaluation if sodium level drops    Slightly low bicarb, continue to monitor for now  If remains lower persistently, consider sodium bicarb supplement  Severe anemia  -status post blood transfusion this admission    Eventual plan for endoscopy noted by GI   -iron saturation 80%    History of CHF, echo this admission shows EF 50% mildly dilated IVC  Currently seems compensated  Remains on room air and comfortable  Does not seem to be in any respiratory distress   -Lasix for time being    PAD, plan noted for angiogram     Discussed above plan in detail with primary team    HISTORY OF PRESENT ILLNESS:  Requesting Physician: Dru De Souza MD  Reason for Consult:  CKD    Loyda Harding is a 69y o  year old male who was admitted to [place] [address] after presenting with shortness of breath, severe anemia  A renal consultation is requested today for assistance in the management of CKD  Old medical records were reviewed  Patient does not have any recent baseline available  Prior serum creatinine 1 9 to 2 3 in early 2019  He presented with shortness of breath and was found to have severe anemia  Status post blood transfusion  His creatinine slightly increased to 2 2  Also was found to have hypotension  Currently lisinopril, Lasix are on hold  Also due to PAD he is being considered for angiogram   Currently the time of my encounter, denies any chest pain or shortness of breath  Remains on room air  Denies any urinary complaint    Patient was taking four tablets of above ibuprofen daily for last two weeks before coming to the hospital     PAST MEDICAL HISTORY:  Past Medical History:   Diagnosis Date    Anemia     Atrial fibrillation ([address] )     Benign prostatic hyperplasia without urinary obstruction     Congestive heart failure with left ventricular diastolic dysfunction, chronic (HCC)     History of ITP     Hypertension     Lumbosacral disc herniation     Peripheral vascular disease of lower extremity (HCC)     Renal disorder     Subdural hematoma (HCC)        PAST SURGICAL HISTORY:  Past Surgical History:   Procedure Laterality Date    BACK SURGERY      AUSTYN HOLE FOR SUBDURAL HEMATOMA      CARDIAC PACEMAKER PLACEMENT      CLAVICLE SURGERY Left 2011    HERNIA REPAIR      PROSTATE SURGERY         ALLERGIES:  No Known Allergies    SOCIAL HISTORY:  Social History     Substance and Sexual Activity   Alcohol Use Not Currently    Alcohol/week: 0 0 standard drinks    Frequency: Never    Binge frequency: Never    Comment: Denied use     Social History     Substance and Sexual Activity   Drug Use Never    Comment: Denied use     Social History     Tobacco Use   Smoking Status Never Smoker   Smokeless Tobacco Never Used   Tobacco Comment    per [employer]-Former Smoker and Never Smoker-pls confirm       FAMILY HISTORY:  Family History   Problem Relation Age of Onset    Heart disease Mother     Heart disease Father     Hypertension Father     Diabetes Other     Depression Other     Cancer Sister     Depression Cousin        MEDICATIONS:    Current Facility-Administered Medications:     acetaminophen (TYLENOL) tablet 650 mg, 650 mg, Oral, Q6H PRN, Les Ware MD, 650 mg at 07/19/20 1312    atorvastatin (LIPITOR) tablet 40 mg, 40 mg, Oral, Daily With Alexia Worthy MD, 40 mg at 07/18/20 1739    bacitracin topical ointment 1 small application, 1 small application, Topical, BID, Les Ware MD, 1 small application at 95/66/09 0924    calcium carbonate (TUMS) chewable tablet 500 mg, 500 mg, Oral, Daily PRN, Beatriz Lopez MD    cefepime (MAXIPIME) IVPB (premix) 1,000 mg 50 mL, 1,000 mg, Intravenous, Q24H, Les Ware MD, Last Rate: 100 mL/hr at 07/18/20 2330, 1,000 mg at 36/46/02 5311    folic acid (FOLVITE) tablet 1 mg, 1 mg, Oral, Daily, Les Ware MD, 1 mg at 07/19/20 0923    heparin (porcine) subcutaneous injection 5,000 Units, 5,000 Units, Subcutaneous, Q8H [address], 5,000 Units at 07/19/20 1312 **AND** [COMPLETED] Platelet count, , , Once, BJ Heck    HYDROcodone-acetaminophen (NORCO) 5-325 mg per tablet 1 tablet, 1 tablet, Oral, Q6H PRN, Les Ware MD, 1 tablet at 07/19/20 0341    HYDROmorphone (DILAUDID) injection 0 5 mg, 0 5 mg, Intravenous, Q4H PRN, Fran Porter MD, 0 5 mg at 07/18/20 2330    LORazepam (ATIVAN) tablet 1 mg, 1 mg, Oral, Q8H PRN, Fran Porter MD, 1 mg at 07/19/20 1312    pantoprazole (PROTONIX) injection 40 mg, 40 mg, Intravenous, Q12H [address], Fran Porter MD, 40 mg at 07/19/20 0923    tamsulosin (FLOMAX) capsule 0 4 mg, 0 4 mg, Oral, Daily With Shantelle Stearns MD, 0 4 mg at 07/18/20 1739    vancomycin (VANCOCIN) IVPB (premix) 1,000 mg 200 mL, 15 mg/kg, Intravenous, Q24H, Fran Porter MD, Last Rate: 200 mL/hr at 07/19/20 0033, 1,000 mg at 07/19/20 0033    REVIEW OF SYSTEMS:  Complete 10 point review of systems were obtained and discussed in length with the patient  Complete review of systems were negative / unremarkable except mentioned in the HPI section       PHYSICAL EXAM:  Current Weight: Weight - Scale: 62 1 kg (136 lb 12 8 oz)  First Weight: Weight - Scale: 65 8 kg (145 lb)  Vitals:    07/19/20 1608   BP: (!) 96/46   Pulse: 89   Resp: 16   Temp: 98 2 °F (36 8 °C)   SpO2: 95%       Intake/Output Summary (Last 24 hours) at 7/19/2020 1647  Last data filed at 7/19/2020 1025  Gross per 24 hour   Intake    Output 300 ml   Net -300 ml     Wt Readings from Last 3 Encounters:   07/19/20 62 1 kg (136 lb 12 8 oz)   06/21/19 68 3 kg (150 lb 9 6 oz)   06/19/19 67 6 kg (149 lb)     Temp Readings from Last 3 Encounters:   07/19/20 98 2 °F (36 8 °C) (Oral)   06/21/19 97 7 °F (36 5 °C) (Tympanic)   04/01/19 98 4 °F (36 9 °C)     BP Readings from Last 3 Encounters:   07/19/20 (!) 96/46   06/21/19 130/80   06/19/19 122/70     Pulse Readings from Last 3 Encounters:   07/19/20 89   06/21/19 90   04/01/19 82        Physical Examination:  General:  Sitting in chair, no acute distress  Eyes:  Mild conjunctival pallor present  ENT:  External examination of ears and nose unremarkable  Neck:  No obvious lymphadenopathy appreciated  Respiratory:  Bilateral air entry present, no crackles present  CVS:  S1, S2 present  GI:  Soft, nontender, nondistended  CNS:  Active alert oriented x3  Extremities:  No significant edema in legs  Psych:  Conscious, oriented  Skin:  Right leg wrap present    Invasive Devices:      Lab Results:   Results from last 7 days   Lab Units 07/19/20  0529 07/18/20  1550 07/18/20  0637 07/17/20  2225 07/17/20  1520 07/17/20  0816 07/17/20  0815 07/16/20  2243   WBC Thousand/uL 7 51  --  5 91  --   --   --   --  7 48   HEMOGLOBIN g/dL 8 2* 7 8* 8 3*  8 3* 7 0* 7 7*  --  7 8* 6 7*   HEMATOCRIT % 24 8* 23 3* 25 4*  25 8* 21 9* 23 2*  --  23 6* 20 8*   PLATELETS Thousands/uL 77*  --  84*  --   --  83*  --  104*   POTASSIUM mmol/L 4 3  --  4 3  --   --   --   --  4 6   CHLORIDE mmol/L 102  --  105  --   --   --   --  103   CO2 mmol/L 20*  --  22  --   --   --   --  20*   BUN mg/dL 59*  --  59*  --   --   --   --  80*   CREATININE mg/dL 2 21*  --  2 03*  --   --   --   --  2 18*   CALCIUM mg/dL 8 6  --  8 5  --   --   --   --  9 0       Other Studies:   CT abdomen pelvis wo contrast   Final Result by Bridger Ochoa MD (07/17 1559)      Degraded by respiratory motion  1   Small blind-ending fat and fluid-containing collection at the umbilicus communicating with the skin surface  This does not appear to communicate with adjacent opacified small bowel loops  2   Stable distal left ureteral dilatation with improved dilation of bilateral extra renal pelves and no hydronephrosis  This is likely secondary to a distal left ureteral stricture  3   Thickening of the gastric antrum suggestive of gastritis though a mass is difficult to entirely exclude  Recommend nonemergent endoscopic or upper GI evaluation  4   Cholelithiasis  5   Splenomegaly of uncertain etiology, though only mildly increased from the prior study from 2011  The study was marked in [facility] for immediate notification        Workstation performed: [id number]         VAS lower limb arterial duplex, limited/unilateral   Final Result by Yariel Donaldson MD (07/17 2117)      XR tibia fibula 2 views RIGHT   Final Result by Gokul Rogers MD (07/17 2082)      No acute osseous abnormality  Workstation performed: [id number]         XR chest portable   Final Result by Finn Cabrera DO (07/17 0715)      No acute cardiopulmonary disease  Workstation performed: [id number]         Chest x-ray images personally reviewed which shows clear lungs    Portions of the record may have been created with voice recognition software  Occasional wrong word or "sound a like" substitutions may have occurred due to the inherent limitations of voice recognition software  Read the chart carefully and recognize, using context, where substitutions have occurred

## 2020-07-19 NOTE — PROGRESS NOTES
Progress Note - Milagro Brian 1945, 69 y o  male MRN: [id number]    Unit/Bed#: -01 Encounter: [id number]    Primary Care Provider: Lawanda King DO   Date and time admitted to hospital: 7/16/2020 10:25 PM        * Anemia, unspecified  Assessment & Plan  Patient presented with severe symptomatic normocytic anemia 6 7 causing shortness of breath on exertion  He was taking NSAIDs for chronic right lower leg ulcers that were getting worse  Patient was transfused packed red blood cells during the hospital stay    Hemoglobin currently is 8 2 this morning, will continue monitor hemoglobin q 6 hours  Continue IV Protonix b i d ,  CT of abdomen suggest gastritis  Patient will need EGD during this hospital stay    Chronic combined systolic and diastolic heart failure (HCC)  Assessment & Plan  Wt Readings from Last 3 Encounters:   07/19/20 62 1 kg (136 lb 12 8 oz)   06/21/19 68 3 kg (150 lb 9 6 oz)   06/19/19 67 6 kg (149 lb)     Echocardiogram on this admission shows ejection fraction of 50%  Patient's lungs are clear, has no JVD  Has chronic systolic CHF is compensated  He was hypotensive with systolic blood pressures in the 90s    I discontinued lisinopril, Lasix and Toprol because of hypotension    Other persistent atrial fibrillation [facility])  Assessment & Plan  Patient is status post ablation and pacemaker insertion  EKG showed paced rhythm  He has not anticoagulation candidate due to ITP and thrombocytopenia    Cellulitis  Assessment & Plan  Patient has right lower leg wounds with cellulitis    Continue vancomycin and cefepime  Patient will have angiogram of the right lower leg before debridement of the right lower leg wounds by podiatry  Peripheral vascular disease of lower extremity [facility])  Assessment & Plan  Patient has more than 75% stenosis of the right superficial femoral artery on arterial Doppler    Patient scheduled for angiogram for Monday      Nephrology will be seeing the patient before that  Will start atorvastatin  I am holding aspirin because he may have a gastric ulcer which is bleeding    CKD (chronic kidney disease) stage 3, GFR 30-59 ml/min ([facility])  Assessment & Plan  Patient has stage III chronic disease with creatinine around 2 0 at baseline  Patient's renal function is at baseline:  Creatinine is 2 21 today    Nephrology will be seen the patient before angiogram    Thrombocytopenia [facility])  Assessment & Plan  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 with platelet counts running between 60-115K  he denies signs of bleeding  Platelet count is 95R today at baseline    He should be able to undergo abdominal surgery angiogram with this platelet count    Benign prostatic hyperplasia with urinary retention  Assessment & Plan  Patient self catheterizes at home when he feels he needs to for BPH with retention,     Patient had to be catheterized 6 times during this hospital stay  I ordered straight cath q 8 hours because patient has urinary retention, will try to avoid placement of Abbott    Continue Flomax        VTE Prophylaxis: in place    Patient Centered Rounds: I rounded with patient's nurse    Current Length of Stay: 2 day(s)    Current Patient Status: Inpatient    Certification Statement: Pt requires additional inpatient hospital stay due to: see assessment and plan        Subjective:   Patient denies chest pain, shortness of breath, dizziness, lightheadedness, palpitations, abdominal pain  Patient's nurse reports that patient has urinary retention needing catheterization and at times he refuses  Patient had no visible bleeding  All other ROS are negative    Objective:     Vitals:   Temp (24hrs), Av °F (36 7 °C), Min:97 2 °F (36 2 °C), Max:98 6 °F (37 °C)    Temp:  [97 2 °F (36 2 °C)-98 6 °F (37 °C)] 97 2 °F (36 2 °C)  HR:  [73-98] 83  Resp:  [17-18] 17  BP: ()/(46-73) 95/48  SpO2:  [95 %-100 %] 95 %  Body mass index is 19 63 kg/m²  Input and Output Summary (last 24 hours): Intake/Output Summary (Last 24 hours) at 7/19/2020 1032  Last data filed at 7/19/2020 1025  Gross per 24 hour   Intake 100 ml   Output 300 ml   Net -200 ml       Physical Exam:     Physical Exam   Constitutional: He appears well-developed  No distress  HENT:   Head: Normocephalic  Mouth/Throat: Oropharynx is clear and moist    Eyes: Conjunctivae are normal    Neck: Neck supple  No JVD present  Cardiovascular: Normal rate and regular rhythm  Murmur heard  Pulmonary/Chest: Effort normal  No respiratory distress  He has no wheezes  He has no rales  Abdominal: Soft  Bowel sounds are normal  He exhibits no distension  There is no tenderness  Neurological: He is alert  No cranial nerve deficit  Skin: Skin is warm and dry  No rash noted  Right lower leg is dressed, he has no edema of the left lower leg   Psychiatric: He has a normal mood and affect  Vitals reviewed  I personally reviewed labs and imaging reports for today        Last 24 Hours Medication List:     Current Facility-Administered Medications:  acetaminophen 650 mg Oral Q6H PRN Sara Arevalo MD    atorvastatin 40 mg Oral Daily With Tanya Sims MD    bacitracin 1 small application Topical BID Sara Arevalo MD    calcium carbonate 500 mg Oral Daily PRN Star Fishman MD    cefepime 1,000 mg Intravenous Q24H Sara Arevalo MD Last Rate: 1,000 mg (38/49/82 6952)   folic acid 1 mg Oral Daily Sara Arevalo MD    heparin (porcine) 5,000 Units Subcutaneous Q8H [address] Sara Arevalo MD    HYDROcodone-acetaminophen 1 tablet Oral Q6H PRN Sara Arevalo MD    HYDROmorphone 0 5 mg Intravenous Q4H PRN Sara Arevalo MD    LORazepam 1 mg Oral Q8H PRN Sara Arevalo MD    pantoprazole 40 mg Intravenous Q12H [address] [id number], MD    tamsulosin 0 4 mg Oral Daily With Tanya Sims MD    vancomycin 15 mg/kg Intravenous Q24H Sara Arevalo MD Last Rate: 1,000 mg (07/19/20 0033) Today, Patient Was Seen By: Akil Parks MD    ** Please Note: Dictation voice to text software may have been used in the creation of this document   **

## 2020-07-19 NOTE — ASSESSMENT & PLAN NOTE
Wt Readings from Last 3 Encounters:   07/19/20 62 1 kg (136 lb 12 8 oz)   06/21/19 68 3 kg (150 lb 9 6 oz)   06/19/19 67 6 kg (149 lb)     Echocardiogram on this admission shows ejection fraction of 50%  Patient's lungs are clear, has no JVD  Has chronic systolic CHF is compensated      He was hypotensive with systolic blood pressures in the 90s    I discontinued lisinopril, Lasix and Toprol because of hypotension

## 2020-07-19 NOTE — ASSESSMENT & PLAN NOTE
Patient has right lower leg wounds with cellulitis    Continue vancomycin and cefepime  Patient will have angiogram of the right lower leg before debridement of the right lower leg wounds by podiatry

## 2020-07-19 NOTE — ASSESSMENT & PLAN NOTE
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

## 2020-07-19 NOTE — ASSESSMENT & PLAN NOTE
Patient has stage III chronic disease with creatinine around 2 0 at baseline      Patient's renal function is at baseline:  Creatinine is 2 21 today    Nephrology will be seen the patient before angiogram

## 2020-07-19 NOTE — PROGRESS NOTES
Vancomycin IV Pharmacy-to-Dose Consultation    Angella Pearson is a 69 y o  male who is currently receiving Vancomycin IV with management by the Pharmacy Consult service  Assessment/Plan:  The patient was reviewed  Renal function is declining (dose may be adjusted tonight when trough level is known) and no infusion reactions were documented in the chart  Based on todays assessment, (day # 4) patient is receiving 1000 mg q24h and most recent dose was given at [facility] on 7/19/20  Plan for trough to be drawn at 0000 on 7/20/20 before the 4th dose  We will continue to follow the patients culture results and clinical progress daily and make adustments to vanco dose and interval as necessary      Cony Franklin, Pharmacist

## 2020-07-20 NOTE — PROGRESS NOTES
Vancomycin Assessment    Prachi Bernstein is a 69 y o  male who is currently receiving vancomycin 1000mg IV Q24H  for skin-soft tissue infection     Relevant clinical data and objective history reviewed:  Creatinine   Date Value Ref Range Status   07/20/2020 1 79 (H) 0 60 - 1 30 mg/dL Final     Comment:     Standardized to IDMS reference method   07/19/2020 2 21 (H) 0 60 - 1 30 mg/dL Final     Comment:     Standardized to IDMS reference method   07/18/2020 2 03 (H) 0 60 - 1 30 mg/dL Final     Comment:     Standardized to IDMS reference method     BP 99/52 (BP Location: Right arm)   Pulse (!) 50   Temp (!) 97 4 °F (36 3 °C) (Oral)   Resp 20   Ht 5' 10" (1 778 m)   Wt 63 5 kg (140 lb)   SpO2 100%   BMI 20 09 kg/m²   I/O last 3 completed shifts:  In: -   Out: 850 [Urine:850]  Lab Results   Component Value Date/Time    BUN 55 (H) 07/20/2020 04:46 AM    BUN 53 (H) 12/14/2018 12:00 AM    WBC 6 00 07/20/2020 04:46 AM    HGB 7 1 (L) 07/20/2020 04:46 AM    HCT 21 7 (L) 07/20/2020 04:46 AM    MCV 94 07/20/2020 04:46 AM    PLT 72 (L) 07/20/2020 04:46 AM     Temp Readings from Last 3 Encounters:   07/20/20 (!) 97 4 °F (36 3 °C) (Oral)   06/21/19 97 7 °F (36 5 °C) (Tympanic)   04/01/19 98 4 °F (36 9 °C)     Vancomycin Days of Therapy: 5    Assessment/Plan  The patient is currently on vancomycin utilizing scheduled dosing  Baseline risks associated with therapy include: pre-existing renal impairment and advanced age  The patient is receiving 1000mg IV Q24H  with the most recent vancomycin level being at steady-state and therapeutic (18 8 ug/ml) based on a goal of 15-20 (appropriate for most indications) ; therefore, is clinically appropriate and dose will be continued   Pharmacy will continue to follow closely for s/sx of nephrotoxicity, infusion reactions and appropriateness of therapy  BMP and CBC will be ordered per protocol  Plan for repeat trough  prior to the 0130 on 7/23/20 dose at approximately 0100  Pharmacy will continue to follow the patients culture results and clinical progress daily      Laron Dunaway, Pharmacist

## 2020-07-20 NOTE — PROGRESS NOTES
Progress Note - Nephrology   Odette Quinteros 69 y o  male MRN: [id number]  Unit/Bed#: -01 Encounter: [id number]    ASSESSMENT AND PLAN:  Patient is 74-year-old male with significant medical issues of hypertension for last five years, CKD stage 3, CAD, CHF, AFib, presented with severe anemia, shortness of breath  Was also found to have suspected cellulitis  We are consulted for PALOMA/CKD management  1  CKD stage 3/AK I:  POA:  -no recent baseline available:  Creatinine was 1 9-2 3 in early 2019  Etiology of CKD felt related to hypertensive nephrosclerosis/arteriolar nephrosclerosis/nephron loss from aging  -current creatinine improved at 1 79  -admission creatinine 2 21  This would suggest a an element/component of AK I probably prerenal along with the anemia and relative hypotension at times/lisinopril/NSAID use at home  Possible mild ATN  Also possible mild obstructive component is patient is BPH and urinary retention and self catheterizes at times  Patient is status post arteriogram today with minimal dye exposure  Still at moderate risk for KYM  Recommend:  Workup:  · UA:  No proteinuria and no hematuria  · CT scan without contrast of the abdomen pelvis:  Stable distal left ureteral dilatation with improved dilation the bilateral extrarenal pelvis sees and no hydronephrosis  This was felt likely to distal left ureteral structure  Treatment:  · Slow IV fluids status post contrast  · Monitor renal function closely status post contrast  · Avoid nephrotoxic agents such as NSAIDs and further contrast if at all possible  · Hold lisinopril specially the setting of occasional borderline low blood pressure status post contrast   And hold furosemide  · Avoid hypotension/hypoperfusion  · MONITOR PVR BLADDER SCANS ONGOING:  Urology consultation for some    2  Volume: At this point he appears euvolemic however, on Lasix at home  May need to be re-initiated in the next 1-2 days      3  Electrolytes:  · Hyponatremia:  Probably corrects for glucose at 134  May also be low from an element of prerenal   Rule out other causes:  Such as hypothyroidism or adrenal insufficiency/SIADH  Please see initial workup orders including:  · Urine osmolality/urine sodium/serum osmolality/TSH-free T4/uric acid/a m  Cortisol  · CT the abdomen pelvis:  See above, no overt etiology for hyponatremia  Treatment:  · For now isotonic saline low-dose given contrast  · Oral fluid restriction  · Monitor sodium    4  Mineral bone disease of CKD:  · Check magnesium and phosphorus in a m  · Check PTH intact level/vitamin-D level in a m     5  Anemia:  No overt GI bleeding  Rule out multiple myeloma  · Iron studies:  Adequate  · Check SPEP/UPEP and light chain ratio  · Transfuse per primary service for hemoglobin less than 7 0    6  Other problems:  · Right lower extremity cellulitis status post interventional radiology procedure on antibiotics per primary team and followed by Podiatry  · History of CHF with ejection fraction of 50% mildly dilated obesity holding Lasix for now will need to reinitiate next 1-2 days  · Hernia surgery:  Scheduled for repair in a m   · History of ITP  · Atrial fibrillation    Case discussed with Cardiology in particular regarding furosemide      Subjective: The patient is status post interventional Radiology evaluation  Apparently he was somewhat agitated during the procedure  He is refusing to give any verbal communication at this time  Objective:     Vitals: Blood pressure 132/65, pulse 82, temperature 98 1 °F (36 7 °C), temperature source Oral, resp  rate 18, height 5' 10" (1 778 m), weight 63 5 kg (140 lb), SpO2 100 %  ,Body mass index is 20 09 kg/m²      Weight (last 2 days)     Date/Time   Weight    07/20/20 0600   63 5 (140)    07/19/20 0600   62 1 (136 8)                Intake/Output Summary (Last 24 hours) at 7/20/2020 1651  Last data filed at 7/20/2020 0107  Gross per 24 hour Intake    Output 550 ml   Net -550 ml            Physical Exam: General:  Clearly upset after the interventional radiology procedure  Skin:  No acute rash  Eyes:  No scleral icterus and noninjected  ENT:  Moist mucous membranes  Neck:  Supple, no jugular venous distention, trachea midline, overall appearance is normal  Chest:  Clear to auscultation, but poor effort  CVS:  Regular rate and rhythm, without a rub or gallops  Abdomen:  :  Bandage present    Normal bowel sounds, soft and nontender and nondistended at this time again limited exam patient not very cooperative  Extremities:  Right lower extremity leg/foot is wrapped, and no cyanosis, no significant arthritic changes  Neuro:  No gross focality  Psych:  Alert                 Medications:    Scheduled Meds:  Current Facility-Administered Medications:  acetaminophen 650 mg Oral Q6H PRN Magui Strong MD    atorvastatin 40 mg Oral Daily With Erica Hoang MD    bacitracin 1 small application Topical BID Magui Strong MD    calcium carbonate 500 mg Oral Daily PRN Payam Jones MD    cefepime 1,000 mg Intravenous Q24H Magui Strong MD Last Rate: 1,000 mg (85/53/65 0699)   folic acid 1 mg Oral Daily Magui Strong MD    HYDROcodone-acetaminophen 1 tablet Oral Q6H PRN Magui Strong MD    HYDROmorphone 0 5 mg Intravenous Q4H PRN Magui Strong MD    LORazepam 1 mg Oral Q8H PRN Magui Strong MD    pantoprazole 40 mg Intravenous Q12H [facility] 12, MD    polyethylene glycol 17 g Oral Daily BJ Biswas    sodium chloride 50 mL/hr Intravenous Continuous Laure Kwong PA-C Last Rate: 50 mL/hr (07/20/20 6494)   tamsulosin 0 4 mg Oral Daily With Erica Hoang MD    vancomycin 15 mg/kg Intravenous Q24H Magui tSrong MD Last Rate: 1,000 mg (07/20/20 0147)       PRN Meds:   acetaminophen    calcium carbonate    HYDROcodone-acetaminophen    HYDROmorphone    LORazepam    Continuous Infusions:  sodium chloride 50 mL/hr Last Rate: 50 mL/hr (07/20/20 5120)       Lab, Imaging and other studies: I have personally reviewed pertinent labs  Laboratory Results:  Results from last 7 days   Lab Units 07/20/20  0446 07/19/20  0529 07/18/20  1550 07/18/20  0637 07/17/20  2225 07/17/20  1520 07/17/20  0816 07/17/20  0815 07/16/20  2243   WBC Thousand/uL 6 00 7 51  --  5 91  --   --   --   --  7 48   HEMOGLOBIN g/dL 7 1* 8 2* 7 8* 8 3*  8 3* 7 0* 7 7*  --  7 8* 6 7*   HEMATOCRIT % 21 7* 24 8* 23 3* 25 4*  25 8* 21 9* 23 2*  --  23 6* 20 8*   PLATELETS Thousands/uL 72* 77*  --  84*  --   --  83*  --  104*   POTASSIUM mmol/L 4 6 4 3  --  4 3  --   --   --   --  4 6   CHLORIDE mmol/L 101 102  --  105  --   --   --   --  103   CO2 mmol/L 21 20*  --  22  --   --   --   --  20*   BUN mg/dL 55* 59*  --  59*  --   --   --   --  80*   CREATININE mg/dL 1 79* 2 21*  --  2 03*  --   --   --   --  2 18*   CALCIUM mg/dL 8 1* 8 6  --  8 5  --   --   --   --  9 0     Urinalysis: Lab Results   Component Value Date    COLORU Yellow 07/17/2020    CLARITYU Clear 07/17/2020    SPECGRAV 1 010 07/17/2020    PHUR 5 5 07/17/2020    PHUR 6 0 02/13/2019    LEUKOCYTESUR Negative 07/17/2020    NITRITE Negative 07/17/2020    GLUCOSEU Negative 07/17/2020    KETONESU Negative 07/17/2020    BILIRUBINUR Negative 07/17/2020    BLOODU Negative 07/17/2020     ABGs:   Lab Results   Component Value Date    PH 7 301 (L) 11/21/2018     Radiology review:     Portions of the record may have been created with voice recognition software  Occasional wrong word or "sound a like" substitutions may have occurred due to the inherent limitations of voice recognition software  Read the chart carefully and recognize, using context, where substitutions have occurred

## 2020-07-20 NOTE — PROGRESS NOTES
Progress Note - Jose Cadet 1945, 69 y o  male MRN: [id number]    Unit/Bed#: -01 Encounter: [id number]    Primary Care Provider: Haley Peters DO   Date and time admitted to hospital: 7/16/2020 10:25 PM        Severe protein-calorie malnutrition ([facility] [address] )  Assessment & Plan  Pt has Severe protein-calorie malnutrition, in the setting of chronically ill patient with history of noncompliance with care, as evidenced by BMI 19 17 with an 11 33% unplanned weight loss over the past year, multiple nonhealing wounds appearance of muscle wasting/fat loss and decreased mobility noted on nursing assessment, Findings: height 5' 10", weight 133 lb 9 6 oz, BMI 19 17 Screen: (+) Unplanned weight loss in the last three months; (+) Stage III-IV pressure ulcer or non-healing wound; (+) Appearance of muscle wasting or fat loss; Benign prostatic hyperplasia with urinary retention  Assessment & Plan  Patient self catheterizes at home when he feels he needs to for BPH with retention,   Patient had to be catheterized 6 times during this hospital stay  Continue straight cath q 8 hours because patient has urinary retention, will try to avoid placement of Abbott  Continue Flomax    Umbilicus discharge  Assessment & Plan  · Patient states ongoing since hernia surgery small bloody ooze  Scab is noted in the umbilicus with foul-smelling no drainage  · Surgeries on board  · Patient is scheduled for umbilical hernia repair in a m    · NPO after midnight    CKD (chronic kidney disease) stage 3, GFR 30-59 ml/min ([facility] [address] )  Assessment & Plan  Patient has stage III chronic disease with creatinine around 2 0 at baseline  Patient's renal function is at baseline:  Creatinine is 1 79 today  Nephrology on board  Monitor renal function in a m      Lactic acidosis  Assessment & Plan  Lactic acidosis most likely multifactorial, patient has CHF, necrotic umbilical wound    Cellulitis  Assessment & Plan  Patient has right lower leg wounds with cellulitis  Continue vancomycin and cefepime  Patient will have angiogram of the right lower leg before debridement of the right lower leg wounds by podiatry  Follow-up culture results    Chronic combined systolic and diastolic heart failure (HCC)  Assessment & Plan  Wt Readings from Last 3 Encounters:   07/20/20 63 5 kg (140 lb)   06/21/19 68 3 kg (150 lb 9 6 oz)   06/19/19 67 6 kg (149 lb)     Echocardiogram on this admission shows ejection fraction of 50%  Patient's lungs are clear, has no JVD  Has chronic systolic CHF is compensated  Continue to hold lisinopril, Lasix and Toprol because of relative hypotension  Daily weight and I&Os    Thrombocytopenia ([facility] [address] )  Assessment & Plan  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 with platelet counts running between 60-115K  he denies signs of bleeding  Platelet count is 65O today at baseline  He should be able to undergo abdominal surgery and angiogram with this platelet count    Other persistent atrial fibrillation [facility])  Assessment & Plan  Patient is status post ablation and pacemaker insertion  EKG showed paced rhythm  He has not  anticoagulation candidate due to ITP and thrombocytopenia    Peripheral vascular disease of lower extremity [facility])  Assessment & Plan  Patient has more than 75% stenosis of the right superficial femoral artery on arterial Doppler  Continue atorvastatin  I am holding aspirin because he may have a gastric ulcer which is bleeding  Patient is scheduled for arteriogram this morning  IR on board    * Anemia, unspecified  Assessment & Plan  Patient presented with severe symptomatic normocytic anemia 6 7 causing shortness of breath on exertion  He was taking NSAIDs for chronic right lower leg ulcers that were getting worse  Patient was transfused packed red blood cells during the hospital stay  Hemoglobin currently is 7 1 this morning, will continue monitor hemoglobin q 6 hours    Continue IV Protonix b i d ,  Abnormal CT scan the abdomen pelvis showing thickening of the gastric antrum likely gastritis but unable to exclude gastric neoplasm  Will transfuse 1 unit of packed red cell transfusion  GI follow-up needed  Patient will need eventually endoscopic workup      Labs & Imaging: I have personally reviewed pertinent reports  VTE Pharmacologic Prophylaxis: Reason for no pharmacologic prophylaxis Anemia  VTE Mechanical Prophylaxis: sequential compression device    Code Status:   Level 1 - Full Code    Patient Centered Rounds: I have performed bedside rounds with nursing staff today  Discussions with Specialists or Other Care Team Provider:  Surgery    Education and Discussions with Family / Patient:  Patient does not want me to talk to his family and states he will update them  Current Length of Stay: 3 day(s)    Current Patient Status: Inpatient   Certification Statement: The patient will continue to require additional inpatient hospital stay due to see my assessment and plan  Subjective:   Patient is seen and examined at bedside  Denies any new complaints  Afebrile  All other ROS are negative  Objective:    Vitals: Blood pressure 99/52, pulse (!) 50, temperature (!) 97 4 °F (36 3 °C), temperature source Oral, resp  rate 20, height 5' 10" (1 778 m), weight 63 5 kg (140 lb), SpO2 100 %  ,Body mass index is 20 09 kg/m²  SPO2 RA Rest      ED to Hosp-Admission (Current) from 7/16/2020 in [address] Med Surg Unit   SpO2  100 %   SpO2 Activity  At Rest   O2 Device  None (Room air)   O2 Flow Rate          I&O:     Intake/Output Summary (Last 24 hours) at 7/20/2020 1113  Last data filed at 7/20/2020 0107  Gross per 24 hour   Intake    Output 550 ml   Net -550 ml       Physical Exam:    General- Alert, lying comfortably in bed  Not in any acute distress  HEENT- MIKI, EOM intact    Neck- Supple, No JVD  CVS- regular, S1 and S2 normal   Chest- Bilateral Air entry, No rhochi, crackles or wheezing present  Abdomen- soft, necrotic abdominal wound with foul odor and purulent discharge  Extremities-  trace pedal edema  Right lower extremity-dressing in place  CNS-   Alert, awake and orientedx3  No focal deficits present      Invasive Devices     Peripheral Intravenous Line            Peripheral IV 07/20/20 Left Arm less than 1 day                      Social History  reviewed  Family History   Problem Relation Age of Onset    Heart disease Mother     Heart disease Father     Hypertension Father     Diabetes Other     Depression Other     Cancer Sister     Depression Cousin     reviewed    Meds:  Current Facility-Administered Medications   Medication Dose Route Frequency Provider Last Rate Last Dose    acetaminophen (TYLENOL) tablet 650 mg  650 mg Oral Q6H PRN Ismael White MD   650 mg at 07/19/20 1312    atorvastatin (LIPITOR) tablet 40 mg  40 mg Oral Daily With Milla Hudson MD   40 mg at 07/19/20 1700    bacitracin topical ointment 1 small application  1 small application Topical BID Ismael White MD   1 small application at 56/66/48 0955    calcium carbonate (TUMS) chewable tablet 500 mg  500 mg Oral Daily PRN Jono Christy MD        cefepime (MAXIPIME) IVPB (premix) 1,000 mg 50 mL  1,000 mg Intravenous Q24H Ismael White  mL/hr at 07/20/20 0114 1,000 mg at 51/48/34 9174    folic acid (FOLVITE) tablet 1 mg  1 mg Oral Daily Ismael White MD   1 mg at 07/19/20 0923    heparin (porcine) subcutaneous injection 5,000 Units  5,000 Units Subcutaneous [facility] Ismael White MD   5,000 Units at 07/19/20 2115    HYDROcodone-acetaminophen (NORCO) 5-325 mg per tablet 1 tablet  1 tablet Oral Q6H PRN Ismael White MD   1 tablet at 07/19/20 0341    HYDROmorphone (DILAUDID) injection 0 5 mg  0 5 mg Intravenous Q4H PRN Ismael White MD   0 5 mg at 07/18/20 2330    LORazepam (ATIVAN) tablet 1 mg  1 mg Oral Q8H PRN Ismael White MD   1 mg at 07/19/20 2227    pantoprazole (PROTONIX) injection 40 mg  40 mg Intravenous Q12H 1000 Ramos Laurent MD   40 mg at 07/20/20 0946    polyethylene glycol (MIRALAX) packet 17 g  17 g Oral Daily BJ Biswas        sodium chloride 0 9 % infusion  50 mL/hr Intravenous Continuous Laure Kwong PA-C 50 mL/hr at 07/20/20 0952 50 mL/hr at 07/20/20 0952    tamsulosin (FLOMAX) capsule 0 4 mg  0 4 mg Oral Daily With Lahoma Homans, MD   0 4 mg at 07/19/20 1700    vancomycin (VANCOCIN) IVPB (premix) 1,000 mg 200 mL  15 mg/kg Intravenous Q24H Anay Cancino  mL/hr at 07/20/20 0147 1,000 mg at 07/20/20 0147      Medications Prior to Admission   Medication    Ascorbic Acid (VITAMIN C PO)    B Complex Vitamins (VITAMIN B COMPLEX PO)    calcium carbonate (TUMS) 500 mg chewable tablet    diazepam (VALIUM) 5 mg tablet    folic acid (FOLVITE) 1 mg tablet    furosemide (LASIX) 40 mg tablet    Lactobacillus (ACIDOPHILUS PO)    lisinopril (ZESTRIL) 5 mg tablet    Lycopene 10 MG CAPS    metoprolol succinate (TOPROL-XL) 100 mg 24 hr tablet    Misc Natural Products (SAW PALMETTO) CAPS    Multiple Vitamin (MULTI-DAY VITAMINS) TABS    acetaminophen (TYLENOL) 325 mg tablet    clobetasol (TEMOVATE) 0 05 % GEL    Ferrous Gluconate-C-Folic Acid (IRON-C PO)    tamsulosin (FLOMAX) 0 4 mg       Labs:  Results from last 7 days   Lab Units 07/20/20  0446 07/19/20  0529 07/18/20  1550 07/18/20  0637  07/16/20  2243   WBC Thousand/uL 6 00 7 51  --  5 91  --  7 48   HEMOGLOBIN g/dL 7 1* 8 2* 7 8* 8 3*  8 3*   < > 6 7*   HEMATOCRIT % 21 7* 24 8* 23 3* 25 4*  25 8*   < > 20 8*   PLATELETS Thousands/uL 72* 77*  --  84*   < > 104*   LYMPHO PCT %  --   --   --   --   --  16   MONO PCT %  --   --   --   --   --  7   EOS PCT %  --   --   --   --   --  0    < > = values in this interval not displayed       Results from last 7 days   Lab Units 07/20/20  0446 07/19/20  0529 07/18/20  0637 07/16/20  2243   POTASSIUM mmol/L 4 6 4 3 4 3 4 6   CHLORIDE mmol/L 101 102 105 103 CO2 mmol/L 21 20* 22 20*   BUN mg/dL 55* 59* 59* 80*   CREATININE mg/dL 1 79* 2 21* 2 03* 2 18*   CALCIUM mg/dL 8 1* 8 6 8 5 9 0   ALK PHOS U/L  --   --   --  87   ALT U/L  --   --   --  21   AST U/L  --   --   --  20     Lab Results   Component Value Date    TROPONINI <0 02 07/16/2020    TROPONINI 0 02 11/23/2018    TROPONINI 0 03 11/23/2018    CKTOTAL 27 (L) 07/16/2020     Results from last 7 days   Lab Units 07/16/20  2248   INR  1 46*     Lab Results   Component Value Date    BLOODCX No Growth at 72 hrs  07/16/2020    BLOODCX No Growth at 72 hrs  07/16/2020    BLOODCX No Growth After 5 Days  05/02/2017    BLOODCX No Growth After 5 Days  05/02/2017    URINECX >100,000 cfu/ml Klebsiella oxytoca (A) 02/13/2019    URINECX >100,000 cfu/ml Klebsiella oxytoca (A) 01/07/2019    WOUNDCULT 4+ Growth of Enterobacter cloacae complex (A) 07/17/2020    WOUNDCULT 4+ Growth of Morganella morganii (A) 07/17/2020    WOUNDCULT 4+ Growth of Enterococcus faecalis (A) 07/17/2020    WOUNDCULT (A) 07/17/2020     4+ Growth of Alpha Hemolytic Streptococcus NOT Enterococcus         Imaging:  Results for orders placed during the hospital encounter of 07/16/20   XR chest portable    Narrative CHEST     INDICATION:   Acute Resp Failure  COMPARISON:  12/1/2018    EXAM PERFORMED/VIEWS:  XR CHEST PORTABLE      FINDINGS:  Left-sided chest wall pacemaker is identified  Pacemaker leads are intact  Cardiomediastinal silhouette appears unremarkable  The lungs are clear  No pneumothorax or pleural effusion  Stable bony structures  No acute osseous abnormality  Impression No acute cardiopulmonary disease  Workstation performed: [id number]       Results for orders placed during the hospital encounter of 11/22/18   XR chest 2 views    Narrative CHEST     INDICATION:   Patient s/p Pacemaker/ICD Insertion  COMPARISON:  11/30/2018      EXAM PERFORMED/VIEWS:  XR CHEST PA & LATERAL      FINDINGS:  Left-sided chest wall pacemaker is identified  Pacemaker leads are intact  Cardiomediastinal silhouette appears unremarkable  The lungs are clear  No pneumothorax  Unchanged small bilateral pleural effusions  Orthopedic hardware again seen in the left clavicle  Visualized osseous structures appear otherwise unremarkable for the patient's age  Impression No pneumothorax  Stable small bilateral pleural effusions  Workstation performed: [id number]         Last 24 Hours Medication List:     Current Facility-Administered Medications:  acetaminophen 650 mg Oral Q6H PRN Sara Arevalo MD    atorvastatin 40 mg Oral Daily With Tanya Sims MD    bacitracin 1 small application Topical BID Sara Arevalo MD    calcium carbonate 500 mg Oral Daily PRN Star Fishman MD    cefepime 1,000 mg Intravenous Q24H Sara Arevalo MD Last Rate: 1,000 mg (43/83/89 8134)   folic acid 1 mg Oral Daily Sara Arevalo MD    heparin (porcine) 5,000 Units Subcutaneous Q8H [facility] Sara Arevalo MD    HYDROcodone-acetaminophen 1 tablet Oral Q6H PRN Sara Arevalo MD    HYDROmorphone 0 5 mg Intravenous Q4H PRN Sara Arevalo MD    LORazepam 1 mg Oral Q8H PRN Sara Arevalo MD    pantoprazole 40 mg Intravenous Q12H Dhruv Bernstein MD    polyethylene glycol 17 g Oral Daily BJ Biswas    sodium chloride 50 mL/hr Intravenous Continuous Laure Kwong PA-C Last Rate: 50 mL/hr (07/20/20 9380)   tamsulosin 0 4 mg Oral Daily With Tanya Sims MD    vancomycin 15 mg/kg Intravenous Q24H Sara Arevalo MD Last Rate: 1,000 mg (07/20/20 0147)        Today, Patient Was Seen By: Tyler Pitt MD    ** Please Note: Dictation voice to text software may have been used in the creation of this document   **

## 2020-07-20 NOTE — ASSESSMENT & PLAN NOTE
Wt Readings from Last 3 Encounters:   07/20/20 63 5 kg (140 lb)   06/21/19 68 3 kg (150 lb 9 6 oz)   06/19/19 67 6 kg (149 lb)     Echocardiogram on this admission shows ejection fraction of 50%  Patient's lungs are clear, has no JVD  Has chronic systolic CHF is compensated    Continue to hold lisinopril, Lasix and Toprol because of relative hypotension  Daily weight and I&Os

## 2020-07-20 NOTE — ASSESSMENT & PLAN NOTE
Patient has right lower leg wounds with cellulitis  Continue vancomycin and cefepime  Patient will have angiogram of the right lower leg before debridement of the right lower leg wounds by podiatry    Follow-up culture results

## 2020-07-20 NOTE — PROGRESS NOTES
General Cardiology   Progress Note -  [facility]   Radha Bloom 69 y o  male MRN: [id number]    Unit/Bed#: -01 Encounter: [id number]    Assessment/ Plan    1  Chronic combined systolic and diastolic heart failure  Echocardiogram 7/17/2020 showed EF 50%, no regional wall motion abnormalities, RV systolic function mildly reduced, dilated left and right atrium, trace MR, mild AI and mild TR  Patient is not on any maintenance diuretic at this time  He has previously prescribed furosemide 40 mg p o  Daily  Patient also takes metoprolol  mg PO daily and lisinopril 5 mg PO daily at home  BP has been borderline and these medications have not been reordered  Resume prior to discharge  Monitor volume status closely  I&Os -850 ml in 24 hours   Daily weights 140 lb  Currently NPO    2  Permanent atrial fibrillation s/p AV node ablation and BIV PPM     3  R lower extremity wounds with cellulitis and PAD  Podiatry and vascular surgery following    On IV antibiotics   LEADs done 7/17 showed:  Right: >75% stenosis at the mid SFA without PPG waveforms and absent tracings  Left: NESTOR 1 3, no MTP due to lack of PPG waveform, absent tracings  Plan for RLE angiogram prior to debridement     4  Anemia s/p 3 units of PRBCs   Hemoglobin 7 1 today from 8 2 yesterday  GI following and noting to transfuse 1-2 units today   On PPI   Eventual EGD and colonoscopy in future     5  CKD stage III  Creatinine 1 79 today from 2 2 yesterday    6  ITP platelet count 72    7  Umbilical abscess with exposed mesh    Surgery following   Plan for extraction of infected umbilical mesh 4/91/9535    Subjective  Patient reporting no complaint of chest pain, SOB or palpitations  He is reporting mild swelling in his legs  No fever or chills  Review of Systems   Constitution: Negative for chills, fever and malaise/fatigue  Cardiovascular: Positive for leg swelling  Negative for chest pain, dyspnea on exertion, orthopnea and palpitations  Respiratory: Negative for cough and shortness of breath  Skin: Positive for poor wound healing  Musculoskeletal: Negative for falls  Gastrointestinal: Positive for bloating  Negative for nausea and vomiting  Neurological: Negative for dizziness and light-headedness  Psychiatric/Behavioral: Negative for altered mental status  All other systems reviewed and are negative  Objective:   Vitals: Blood pressure 99/52, pulse (!) 50, temperature (!) 97 4 °F (36 3 °C), temperature source Oral, resp  rate 20, height 5' 10" (1 778 m), weight 63 5 kg (140 lb), SpO2 100 %  ,       Body mass index is 20 09 kg/m²  ,     Systolic (81OPE), GV , Min:95 , DMW:72     Diastolic (95LLN), CLT:15, Min:46, Max:52      Intake/Output Summary (Last 24 hours) at 2020 0905  Last data filed at 2020 0107  Gross per 24 hour   Intake    Output 850 ml   Net -850 ml     Weight (last 2 days)     Date/Time   Weight    20 0600   63 5 (140)    20 0600   62 1 (136 8)            Telemetry Review: No telemetry     Physical Exam   Constitutional: He is oriented to person, place, and time  No distress  Frail    HENT:   Head: Normocephalic  Neck: Neck supple  Cardiovascular: Normal rate, regular rhythm, normal heart sounds and intact distal pulses  No murmur heard  Pulmonary/Chest: Effort normal and breath sounds normal  No stridor  No respiratory distress  Abdominal: Soft  Bowel sounds are normal  He exhibits distension  Musculoskeletal: He exhibits edema  Trace edema bilateral LE R> L    Neurological: He is alert and oriented to person, place, and time  Skin: Skin is warm and dry  He is not diaphoretic  There is pallor  Bilateral LE PVD discoloration    Psychiatric: He has a normal mood and affect         LABORATORY RESULTS  Results from last 7 days   Lab Units 20  2243   CK TOTAL U/L 27*   TROPONIN I ng/mL <0 02     CBC with diff: Results from last 7 days   Lab Units 20  3636 07/19/20  0529 07/18/20  1550 07/18/20  0637 07/17/20  2225 07/17/20  1520 07/17/20  0816 07/17/20  0815 07/16/20  2243   WBC Thousand/uL 6 00 7 51  --  5 91  --   --   --   --  7 48   HEMOGLOBIN g/dL 7 1* 8 2* 7 8* 8 3*  8 3* 7 0* 7 7*  --  7 8* 6 7*   HEMATOCRIT % 21 7* 24 8* 23 3* 25 4*  25 8* 21 9* 23 2*  --  23 6* 20 8*   MCV fL 94 93  --  94  --   --   --   --  95   PLATELETS Thousands/uL 72* 77*  --  84*  --   --  83*  --  104*   MCH pg 30 7 30 8  --  30 9  --   --   --   --  30 5   MCHC g/dL 32 7 33 1  --  32 7  --   --   --   --  32 2   RDW % 18 4* 18 7*  --  18 0*  --   --   --   --  20 0*   MPV fL 12 2 11 7  --  12 6  --   --  13 2*  --  12 1   NRBC /100 WBC  --   --   --   --   --   --   --   --  3*       CMP:  Results from last 7 days   Lab Units 07/20/20 0446 07/19/20  0529 07/18/20 0637 07/16/20  2243   POTASSIUM mmol/L 4 6 4 3 4 3 4 6   CHLORIDE mmol/L 101 102 105 103   CO2 mmol/L 21 20* 22 20*   BUN mg/dL 55* 59* 59* 80*   CREATININE mg/dL 1 79* 2 21* 2 03* 2 18*   CALCIUM mg/dL 8 1* 8 6 8 5 9 0   AST U/L  --   --   --  20   ALT U/L  --   --   --  21   ALK PHOS U/L  --   --   --  87   EGFR ml/min/1 73sq m 37 29 32 29       BMP:  Results from last 7 days   Lab Units 07/20/20 0446 07/19/20  0529 07/18/20  0637 07/16/20  2243   POTASSIUM mmol/L 4 6 4 3 4 3 4 6   CHLORIDE mmol/L 101 102 105 103   CO2 mmol/L 21 20* 22 20*   BUN mg/dL 55* 59* 59* 80*   CREATININE mg/dL 1 79* 2 21* 2 03* 2 18*   CALCIUM mg/dL 8 1* 8 6 8 5 9 0       Lab Results   Component Value Date    NTBNP 1,596 (H) 07/16/2020    NTBNP 2,012 (H) 11/20/2018    NTBNP 1,147 (H) 05/07/2017                               Results from last 7 days   Lab Units 07/16/20  2248   INR  1 46*       Lipid Profile:   No results found for: CHOL  Lab Results   Component Value Date    HDL 43 02/13/2019     Lab Results   Component Value Date    LDLCALC 55 02/13/2019     Lab Results   Component Value Date    TRIG 49 02/13/2019       Cardiac testing: Results for orders placed during the hospital encounter of 20   Echo complete with contrast if indicated    Narrative [address]  [facility], [address] [place] [address]    Transthoracic Echocardiogram  2D, M-mode, Doppler, and Color Doppler    Study date:  2020    Patient: Babs Vital  MR number: [id number]  Account number: [de-identified]  : 1945  Age: 69 years  Gender: Male  Status: Inpatient  Location: [address] [place] [address]  Height: 70 in  Weight: 132 7 lb  BP: 147/ 74 mmHg    Indications: Cardiomyopathy    Diagnoses: I42 9 - Cardiomyopathy, unspecified    Sonographer:  SEGUNDO Brock  Primary Physician:  Ann Hill DO  Referring Physician:  Akil Parks MD  Group:  [facility]  Interpreting Physician:  Nam Rothman MD    SUMMARY    LEFT VENTRICLE:  Systolic function was at the lower limits of normal by visual assessment  Ejection fraction was estimated to be 50 %  There were no regional wall motion abnormalities  Wall thickness was mildly to moderately increased  RIGHT VENTRICLE:  The ventricle was mildly dilated  Systolic function was mildly reduced  Systolic pressure was mildly increased  Estimated peak pressure was 40 mmHg  LEFT ATRIUM:  The atrium was mildly dilated  RIGHT ATRIUM:  The atrium was mildly dilated  MITRAL VALVE:  There was mild annular calcification  There was trace regurgitation  AORTIC VALVE:  The valve was trileaflet  Leaflets exhibited normal thickness, moderate calcification, and moderately reduced cuspal separation  There was mild regurgitation  TRICUSPID VALVE:  There was mild regurgitation  IVC, HEPATIC VEINS:  The inferior vena cava was mildly dilated  HISTORY: PRIOR HISTORY: CHF; Anemia; CKD3; Afib; PVD; HTN; Dilated CM; Pacemaker    PROCEDURE: The study was performed in the [address] [place] [address]  This was a routine study  The transthoracic approach was used   The study included complete 2D imaging, M-mode, complete spectral Doppler, and color Doppler  Echocardiographic  views were limited due to poor patient compliance, poor acoustic window availability, decreased penetration, and lung interference  This was a technically difficult study  LEFT VENTRICLE: Size was normal  Systolic function was at the lower limits of normal by visual assessment  Ejection fraction was estimated to be 50 %  There were no regional wall motion abnormalities  Wall thickness was mildly to  moderately increased  DOPPLER: Transmitral flow pattern: atrial fibrillation  RIGHT VENTRICLE: The ventricle was mildly dilated  Systolic function was mildly reduced  A pacing wire was present  DOPPLER: Systolic pressure was mildly increased  Estimated peak pressure was 40 mmHg  LEFT ATRIUM: The atrium was mildly dilated  RIGHT ATRIUM: The atrium was mildly dilated  MITRAL VALVE: There was mild annular calcification  Valve structure was normal  There was mild calcification  There was normal leaflet separation  DOPPLER: The transmitral velocity was within the normal range  There was no evidence for  stenosis  There was trace regurgitation  AORTIC VALVE: The valve was trileaflet  Leaflets exhibited normal thickness, moderate calcification, and moderately reduced cuspal separation  DOPPLER: Transaortic velocity was within the normal range  There was no evidence for stenosis  There was mild regurgitation  TRICUSPID VALVE: The valve structure was normal  There was normal leaflet separation  DOPPLER: The transtricuspid velocity was within the normal range  There was no evidence for stenosis  There was mild regurgitation  PULMONIC VALVE: Leaflets exhibited normal thickness, no calcification, and normal cuspal separation  DOPPLER: The transpulmonic velocity was within the normal range  There was no regurgitation  PERICARDIUM: There was no pericardial effusion      AORTA: The root exhibited normal size     SYSTEMIC VEINS: IVC: The inferior vena cava was mildly dilated  SYSTEM MEASUREMENT TABLES    2D  %FS: 33 52 %  AV Diam: 3 07 cm  EDV(Teich): 87 9 ml  EF(Teich): 62 48 %  ESV(Teich): 32 99 ml  IVSd: 0 87 cm  LA Area: 18 13 cm2  LA Diam: 2 57 cm  LVEDV MOD A4C: 88 29 ml  LVEF MOD A4C: 70 73 %  LVESV MOD A4C: 25 84 ml  LVIDd: 4 4 cm  LVIDs: 2 93 cm  LVLd A4C: 7 1 cm  LVLs A4C: 5 55 cm  LVPWd: 0 99 cm  RA Area: 22 42 cm2  RVIDd: 3 29 cm  SV MOD A4C: 62 45 ml  SV(Teich): 54 92 ml    CW  TR Vmax: 2 72 m/s  TR maxP 59 mmHg    MM  TAPSE: 1 81 cm    [facility]    Prepared and electronically signed by    Calvin Rosales MD  Signed 2020 13:51:19       Results for orders placed during the hospital encounter of 18   LAURA    Narrative [facility]  [facility], [address] [facility]  [phone]    Transesophageal Echocardiogram  2D, 3D, Doppler, and Color Doppler    Study date:  2018    Patient: Jonnie Peterson  MR number: [id number]  Account number: [de-identified]  : 1945  Age: 72 years  Gender: Male  Status: Inpatient  Location: Cath lab  Height: 70 in  Weight: 148 lb  BP: 124/ 71 mmHg    Indications: Atrial fibrillation  Diagnoses: I48 0 - Atrial fibrillation    Sonographer:  Abdi Mooney RDCS  Interpreting Physician:  Kenny Morris MD  Primary Physician:  Mickey Jamison MD  Referring Physician:  Angelina Vila MD  Group:  [facility]  Cardiology Fellow:  Tani Crockett MD    SUMMARY    LEFT VENTRICLE:  Systolic function was mildly reduced  Ejection fraction was estimated to be 45 %  There was mild diffuse hypokinesis  Wall thickness was mildly increased  VENTRICULAR SEPTUM:  There was moderate dyssynergic motion  These changes are consistent with LBBB      RIGHT VENTRICLE:  The size was normal   Systolic function was low normal     LEFT ATRIUM:  The atrium was dilated  No thrombus was identified  LEFT ATRIAL APPENDAGE:  The left atrial appendage was normal in size  Anatomically, the ostium opened into a bended central lobe posteriorly and before leading into a distal lobe and probably is the chicken wing type  At 0 degrees, the ostium measured 11 mm and the length to the bended central lobe was 12 mm and to the distal lobe was 29 mm  At 15 degrees, the ostium measured 13 mm and the length to the distal lobe was 25 mm  At 30 degrees, the ostium measured 11 mm and the length to the distal lobe was 27 mm  At 40 degrees, the ostium measured 12 mm and the length to the bended central lobe was 17 5 mm and to the distal lobe was 33 mm  At 130 degrees, the ostium measured 14 mm and the length to the distal lobe was 29 mm  At 150 degrees, the ostium measured 11 2 mm and the length to the distal lobe was 25 1 mm  ATRIAL SEPTUM:  No defect or patent foramen ovale was identified by color Doppler  RIGHT ATRIUM:  The atrium was dilated  MITRAL VALVE:  There was moderate regurgitation  The regurgitant jet was centrally directed  AORTIC VALVE:  There was mild to moderate regurgitation  TRICUSPID VALVE:  There was mild regurgitation  Pulmonary artery systolic pressure was mildly increased  HISTORY: PRIOR HISTORY: Hypertension, Congestive heart failure, Coronary artery disease, Atrial fibrillation, Hyperlipidemia, Cardiomyopathy  PROCEDURE: The procedure was performed in the catheterization laboratory  This was a routine study  The risks and alternatives of the procedure were explained to the patient and informed consent was obtained  The transesophageal approach  was used  The study included complete 2D imaging, 3D imaging, complete spectral Doppler, and color Doppler  The heart rate was 77 bpm, at the start of the study   An adult omniplane probe was inserted by the cardiology fellow under direct  supervision of the attending cardiologist  Intubated with ease  One intubation attempt(s)  There was no blood detected on the probe  Image quality was adequate  There were no complications during the procedure  MEDICATIONS: Anesthesia  administered by anesthesia team     LEFT VENTRICLE: Size was normal  Systolic function was mildly reduced  Ejection fraction was estimated to be 45 %  There was mild diffuse hypokinesis  Wall thickness was mildly increased  DOPPLER: The study was not technically sufficient  to allow evaluation of LV diastolic function  VENTRICULAR SEPTUM: There was moderate dyssynergic motion  These changes are consistent with LBBB  RIGHT VENTRICLE: The size was normal  Systolic function was low normal     LEFT ATRIUM: The atrium was dilated  No thrombus was identified  APPENDAGE: The left atrial appendage was normal in size  Anatomically, the ostium opened into a bended central lobe posteriorly and before leading into a distal lobe and  probably is the chicken wing type  At 0 degrees, the ostium measured 11 mm and the length to the bended central lobe was 12 mm and to the distal lobe was 29 mm  At 15 degrees, the ostium measured 13 mm and the length to the distal lobe was 25 mm  At 30 degrees, the ostium measured 11 mm and the length to the distal lobe was 27 mm  At 40 degrees, the ostium measured 12 mm and the length to the bended central lobe was 17 5 mm and to the distal lobe was 33 mm  At 130 degrees, the ostium measured 14 mm and the length to the distal lobe was 29 mm  At 150 degrees, the ostium measured 11 2 mm and the length to the distal lobe was 25 1 mm  The size was normal  No thrombus was identified  DOPPLER: The function was reduced  ATRIAL SEPTUM: No defect or patent foramen ovale was identified by color Doppler  RIGHT ATRIUM: The atrium was dilated  No thrombus was identified  MITRAL VALVE: Valve structure was normal  There was normal leaflet separation  DOPPLER: There was moderate regurgitation   The regurgitant jet was centrally directed  AORTIC VALVE: The valve was trileaflet  Leaflets exhibited normal thickness and normal cuspal separation  DOPPLER: There was mild to moderate regurgitation  TRICUSPID VALVE: The valve structure was normal  There was normal leaflet separation  DOPPLER: There was mild regurgitation  Pulmonary artery systolic pressure was mildly increased  Estimated peak PA pressure was 35 mmHg  PULMONIC VALVE: Leaflets exhibited normal thickness, no calcification, and normal cuspal separation  DOPPLER: There was no significant regurgitation  PERICARDIUM: There was no pericardial effusion  The pericardium was normal in appearance  AORTA: The root exhibited normal size  There was no atheroma  There was no evidence for dissection  There was no evidence for aneurysm  PULMONARY VEINS: The pulmonary veins were normal sized  DOPPLER: Doppler flow pattern was normal in the pulmonary vein(s)      [address] Accredited Echocardiography Laboratory    Prepared and electronically signed by    Alcira Quezada MD  Signed 03-Dec-2018 13:26:14         Meds/Allergies   all current active meds have been reviewed and current meds:   Current Facility-Administered Medications   Medication Dose Route Frequency    acetaminophen (TYLENOL) tablet 650 mg  650 mg Oral Q6H PRN    atorvastatin (LIPITOR) tablet 40 mg  40 mg Oral Daily With Dinner    bacitracin topical ointment 1 small application  1 small application Topical BID    calcium carbonate (TUMS) chewable tablet 500 mg  500 mg Oral Daily PRN    cefepime (MAXIPIME) IVPB (premix) 1,000 mg 50 mL  1,000 mg Intravenous V24N    folic acid (FOLVITE) tablet 1 mg  1 mg Oral Daily    heparin (porcine) subcutaneous injection 5,000 Units  5,000 Units Subcutaneous Q8H [address]    HYDROcodone-acetaminophen (NORCO) 5-325 mg per tablet 1 tablet  1 tablet Oral Q6H PRN    HYDROmorphone (DILAUDID) injection 0 5 mg  0 5 mg Intravenous Q4H PRN    LORazepam (ATIVAN) tablet 1 mg  1 mg Oral Q8H PRN    pantoprazole (PROTONIX) injection 40 mg  40 mg Intravenous Q12H [facility]    sodium chloride 0 9 % infusion  50 mL/hr Intravenous Continuous    tamsulosin (FLOMAX) capsule 0 4 mg  0 4 mg Oral Daily With Dinner    vancomycin (VANCOCIN) IVPB (premix) 1,000 mg 200 mL  15 mg/kg Intravenous Q24H     Medications Prior to Admission   Medication    Ascorbic Acid (VITAMIN C PO)    B Complex Vitamins (VITAMIN B COMPLEX PO)    calcium carbonate (TUMS) 500 mg chewable tablet    diazepam (VALIUM) 5 mg tablet    folic acid (FOLVITE) 1 mg tablet    furosemide (LASIX) 40 mg tablet    Lactobacillus (ACIDOPHILUS PO)    lisinopril (ZESTRIL) 5 mg tablet    Lycopene 10 MG CAPS    metoprolol succinate (TOPROL-XL) 100 mg 24 hr tablet    Misc Natural Products (SAW PALMETTO) CAPS    Multiple Vitamin (MULTI-DAY VITAMINS) TABS    acetaminophen (TYLENOL) 325 mg tablet    clobetasol (TEMOVATE) 0 05 % GEL    Ferrous Gluconate-C-Folic Acid (IRON-C PO)    tamsulosin (FLOMAX) 0 4 mg         sodium chloride 50 mL/hr     Counseling / Coordination of Care  Total floor / unit time spent today 20 minutes  Greater than 50% of total time was spent with the patient and / or family counseling and / or coordination of care  ** Please Note: [employer] Dictation voice to text software may have been used in the creation of this document   **

## 2020-07-20 NOTE — ASSESSMENT & PLAN NOTE
Patient has stage III chronic disease with creatinine around 2 0 at baseline  Patient's renal function is at baseline:  Creatinine is 1 79 today  Nephrology on board  Monitor renal function in a m

## 2020-07-20 NOTE — PROGRESS NOTES
Progress Note - General Surgery   Wayne Nuno 69 y o  male MRN: [id number]  Unit/Bed#: -01 Encounter: [id number]    Assessment/Plan:    Umbilical wound with abscess and protruding mesh  CTAP without fistula, evidence of abscess in superficial abdominal wall  Mesh exposed on exam with continuous drainage of grey purulent material with foul odor  Plan for explantation of mesh and primary repair of umbilical hernia on 0/52 - Okay for cardiac diet until MN  Continue empiric abx     Anemia  S/p blood transfusion  Hgb 7 1 this AM  Check stool hemoccults  CTAP with antral thickening - gastritis vs Neoplasm   GI on board - patient refusing EGD/colonoscopy until his leg and abdominal wounds are addressed  Transfuse as needed     Right lower extremity wounds   Mixed venous statsis and arterial wounds  With evidence of resting claudication, leg dangling while laying down  LEADs with >75% stenosis mid SFA NESTOR unable to be obtained secondary to absent PPG waveform  Podiatry on board with plans for debridement  IR consulted for angiogram today- keep NPO     Other medical comorbid conditions  CHF, Persistent Atrial fibrillation, pacemaker, CKD, BPH, Chronic ITP  Monitor fluid status  Cardiology on board    Subjective/Objective   Chief Complaint:  Leg pain    Subjective:  Complains of right leg pain  Denies fevers or chills  Patient for arteriogram today  Minimal pain at umbilical site  Continues with drainage  Objective:   Blood pressure 99/52, pulse (!) 50, temperature (!) 97 4 °F (36 3 °C), temperature source Oral, resp  rate 20, height 5' 10" (1 778 m), weight 63 5 kg (140 lb), SpO2 100 %  ,Body mass index is 20 09 kg/m²        Intake/Output Summary (Last 24 hours) at 7/20/2020 0908  Last data filed at 7/20/2020 0107  Gross per 24 hour   Intake    Output 850 ml   Net -850 ml       Invasive Devices     Peripheral Intravenous Line            Peripheral IV 07/20/20 Left Arm less than 1 day Physical Exam:   General appearance: thin, frail, alert and oriented to person and place, patient talkative with tangential speech this morning  Head: Normocephalic, without obvious abnormality, atraumatic, sclerae anicteric, mucous membranes moist  Neck: no JVD and supple, symmetrical, trachea midline  Lungs: clear to auscultation, no wheezes or rales  Heart:  Irregularly irregular rhythm  Abdomen:   Flat, soft, tenderness over umbilicus, active bowel sounds  Necrotic abdominal wound with foul odor and purulent drainage with exposed mesh  Extremities:   Left lower extremity with venous stasis changes, right lower extremity with dressing in place  Skin: Warm, dry  Nursing notes and vital signs reviewed      Lab, Imaging and other studies:  I have personally reviewed pertinent lab results    , CBC:   Lab Results   Component Value Date    WBC 6 00 07/20/2020    HGB 7 1 (L) 07/20/2020    HCT 21 7 (L) 07/20/2020    MCV 94 07/20/2020    PLT 72 (L) 07/20/2020    MCH 30 7 07/20/2020    MCHC 32 7 07/20/2020    RDW 18 4 (H) 07/20/2020    MPV 12 2 07/20/2020   , CMP:   Lab Results   Component Value Date    SODIUM 132 (L) 07/20/2020    K 4 6 07/20/2020     07/20/2020    CO2 21 07/20/2020    BUN 55 (H) 07/20/2020    CREATININE 1 79 (H) 07/20/2020    CALCIUM 8 1 (L) 07/20/2020    EGFR 37 07/20/2020     VTE Pharmacologic Prophylaxis: Reason for no pharmacologic prophylaxis anemia  VTE Mechanical Prophylaxis: reason for no mechanical VTE prophylaxis PAD, open wounds     Christie Bolanos PA-C

## 2020-07-20 NOTE — SEDATION DOCUMENTATION
Patient unable to lay still on the table throughout the procedure  If more extensive work is needed in the future, then anesthesia may be a valid recommendation

## 2020-07-20 NOTE — ASSESSMENT & PLAN NOTE
Patient self catheterizes at home when he feels he needs to for BPH with retention,   Patient had to be catheterized 6 times during this hospital stay    Continue straight cath q 8 hours because patient has urinary retention, will try to avoid placement of Abbott  Continue Flomax

## 2020-07-20 NOTE — PROGRESS NOTES
Initially reticent to share w/  Decided to open up  Is struggling with his sense of who God is  This is an important question for patient  He said little about his health condition  Receives support from [place]  Seems to lead a fairly isolated, solitary life  Encouraged him to seek out a Wanda Banerjee and address his spiritual     07/20/20 [address] Beliefs/Perceptions   Concept of God Other (Comment)  (An issue pt is struggling with)   Relationship with God Ambivalent   Support Systems Spouse/significant other   Stress Factors   Patient Stress Factors Health changes; Resistant to accept help   Coping Responses   Patient Coping Anxiety;Open/discussion   Plan of Care   Assessment Completed by: Unit visit    questions with him/her

## 2020-07-20 NOTE — PROGRESS NOTES
Patient arrived to IR for RLE arteriogram     The procedure and risks were discussed with the patient  All questions were answered  Informed consent was obtained

## 2020-07-20 NOTE — BRIEF OP NOTE (RAD/CATH)
Right Lower Extremity Arteriogram Procedure Note    PATIENT NAME: Shnatelle Headings  : 1945  MRN: [id number]     Pre-op Diagnosis:   1  Osteomyelitis ([facility] )    2  Cellulitis    3  Wound of right lower extremity, initial encounter    4  Anemia    5  Shortness of breath    6  Umbilicus discharge    7  Chronic combined systolic and diastolic heart failure ([facility] [address] [address] )    8  Atherosclerosis of artery of extremity with ulceration ([facility] [address] )    9  Peripheral vascular disease of lower extremity ([facility] )    10  CKD (chronic kidney disease) stage 3, GFR 30-59 ml/min ([facility])      Post-op Diagnosis:   1  Osteomyelitis ([facility] [address] )    2  Cellulitis    3  Wound of right lower extremity, initial encounter    4  Anemia    5  Shortness of breath    6  Umbilicus discharge    7  Chronic combined systolic and diastolic heart failure ([facility] [address] )    8  Atherosclerosis of artery of extremity with ulceration ([facility] )    9  Peripheral vascular disease of lower extremity ([facility] )    10  CKD (chronic kidney disease) stage 3, GFR 30-59 ml/min ([facility])        Surgeon:   Lior López DO  Assistants:     No qualified resident was available  Estimated Blood Loss: None  Findings:   · Left common femoral artery access, closed with [place] and manual pressure  · No significant inflow stenosis, noting limited detailed review due to significant patient motion and carbon dioxide contrast  · High-grade (>80%) focal proximal / mid R SFA stenosis, multifocal mild to moderate distal SFA / popliteal stenoses  Significant improvement following 6 mm / 7 mm POBA / DCB  <30% residual prox / mid R SFA stenosis  Considered stent placement, but patient was non-cooperative  · Significant R tibial disease, noting occluded DANY and prox / mid occluded PTA  Dominant peroneal runoff / reconstituted distal PTA with flow in to plantar arch  Unable to intervene given significant patient uncooperativeness and request to finish procedure   Could consider repeat tibial intervention w/ anesthesia      Specimens: None    Complications:  None    Anesthesia: Conscious sedation and Local    Bull Du DO     Date: 7/20/2020  Time: 4:06 PM

## 2020-07-20 NOTE — ASSESSMENT & PLAN NOTE
Patient has more than 75% stenosis of the right superficial femoral artery on arterial Doppler  Continue atorvastatin  I am holding aspirin because he may have a gastric ulcer which is bleeding    Patient is scheduled for arteriogram this morning  IR on board

## 2020-07-20 NOTE — ASSESSMENT & PLAN NOTE
Patient presented with severe symptomatic normocytic anemia 6 7 causing shortness of breath on exertion  He was taking NSAIDs for chronic right lower leg ulcers that were getting worse  Patient was transfused packed red blood cells during the hospital stay  Hemoglobin currently is 7 1 this morning, will continue monitor hemoglobin q 6 hours  Continue IV Protonix b i d ,  Abnormal CT scan the abdomen pelvis showing thickening of the gastric antrum likely gastritis but unable to exclude gastric neoplasm  Will transfuse 1 unit of packed red cell transfusion  GI follow-up needed    Patient will need eventually endoscopic workup

## 2020-07-20 NOTE — ASSESSMENT & PLAN NOTE
· Patient states ongoing since hernia surgery small bloody ooze  Scab is noted in the umbilicus with foul-smelling no drainage    · Surgeries on board  · Patient is scheduled for umbilical hernia repair in a m    · NPO after midnight

## 2020-07-20 NOTE — ASSESSMENT & PLAN NOTE
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

## 2020-07-20 NOTE — PROGRESS NOTES
Pt refused blood after angiogram   Stated he wants to wait 3 hours after returning from procedure as he was instructed to remain flat for three hours and wants to hear from IR directly  Explained blood was scheduled prior to procedure and reminded of HGB at 7 1 and IR did not order the blood    Waiting until 7:30 per patient

## 2020-07-20 NOTE — PROGRESS NOTES
Progress note - Gastroenterology   Santiagoger Headings 69 y o  male MRN: [id number]  Unit/Bed#: -01 Encounter: [id number]    ASSESSMENT and PLAN    1  Anemia - no signs or symptoms of GI blood loss  Hemoglobin 6 7gms on admission and increased to 8 3gms following 2 units of packed red blood cells  Today's hemoglobin  dropped to 7 1gms  Normocytic  Not iron, folate or vitamin B12 deficient   Patient still could be having occult GI blood loss from gastritis or occult malignancy   May hae underlying anemia of chronic disease or poor marrow function related to his malnutrition       · Follow H&H   · Would transfuse with 1 or 2 units of packed red blood cells today  · Observe for any overt signs of GI blood loss  · Check stool hemoccults  · Continue empiric PPI with Protonix 40 mg IV q 12 hours  · EGD when clinically stable after planned vascular procedures, consider colonoscopy pending EGD    2  Abnormal CT scan the abdomen pelvis showing thickening of the gastric antrum likely gastritis but unable to exclude gastric neoplasm     - EGD as above  - Protonix 40 mg IV q 12 hours      2  Constipation  Reports not having a bowel movement since admission     - add MiraLax 17 g in 8 oz of fluid daily    Chief Complaint   Patient presents with    Wound Infection - Complicated     Pt with leg wounds x 2 months, here for dyspnea, weakness and feeling ill   +n/+v  SUBJECTIVE/HPI   NPO for RLE agram today  Denies any melena, rectal bleeding, abdominal pain, nausea, diarrhea, heartburn or dysphagia  Has not had a bowel movement yet      BP 99/52 (BP Location: Right arm)   Pulse (!) 50   Temp (!) 97 4 °F (36 3 °C) (Oral)   Resp 20   Ht 5' 10" (1 778 m)   Wt 63 5 kg (140 lb)   SpO2 100%   BMI 20 09 kg/m²     PHYSICALEXAM  General appearance: alert, appears stated age and cooperative  Eyes:  Pale conjunctivae  Head: Normocephalic  Lungs: clear to auscultation bilaterally  Heart: regular rate and rhythm, S1, S2 normal, no murmur, click, rub or gallop  Abdomen: soft, non-tender; bowel sounds normal; no masses,  no organomegaly, positive gauze dressing over the umbilicus  Extremities:  Right lower extremity ace bandage intact  Neurologic: Grossly normal    Lab Results   Component Value Date    GLUCOSE 127 11/20/2018    CALCIUM 8 1 (L) 07/20/2020    K 4 6 07/20/2020    CO2 21 07/20/2020     07/20/2020    BUN 55 (H) 07/20/2020    CREATININE 1 79 (H) 07/20/2020     Lab Results   Component Value Date    WBC 6 00 07/20/2020    HGB 7 1 (L) 07/20/2020    HCT 21 7 (L) 07/20/2020    MCV 94 07/20/2020    PLT 72 (L) 07/20/2020     Lab Results   Component Value Date    ALT 21 07/16/2020    AST 20 07/16/2020    ALKPHOS 87 07/16/2020     No results found for: AMYLASE  Lab Results   Component Value Date    LIPASE 255 07/16/2020     Lab Results   Component Value Date    IRON 229 (H) 07/16/2020    TIBC 285 07/16/2020    FERRITIN 303 07/16/2020     Lab Results   Component Value Date    INR 1 46 (H) 07/16/2020

## 2020-07-20 NOTE — PROGRESS NOTES
Pastoral Care Progress Note    2020  Patient: Desiree Parks : 1945  Admission Date & Time: 20205  MRN: [id number] [facility]: [id number]                     Chaplaincy Interventions Utilized:   Empowerment: Clarified, confirmed, or reviewed information from treatment team , Provided anticipatory guidance and Provided chaplaincy education    Exploration: Explored emotional needs & resources, Explored relational needs & resources, Explored spiritual needs & resources and Facilitated story telling        Relationship Building: Cultivated a relationship of care and support and Listened empathically    haplaincy Outcomes Achieved:  Distress reduced, Expressed gratitude, Identified priorities, Progressed toward meaning and Progressed toward purpose          Spiritual Coping Strategies Utilized:   Spiritual meaning and Spiritual struggle       [phone] Beliefs/Perceptions   Concept of God Other (Comment)  (An issue pt is struggling with)   Relationship with God Ambivalent   Support Systems Spouse/significant other   Stress Factors   Patient Stress Factors Health changes; Resistant to accept help   Coping Responses   Patient Coping Anxiety;Open/discussion   Plan of Care   Assessment Completed by: Unit visit

## 2020-07-20 NOTE — NURSING NOTE
Pt sitting in chair when this nurse approached pt about a bladder scan  Pt states he went in the bathroom and this nurse illustrated the importance of tracking his urine and must use a urinal for accurate I/O  This pt reluctant to nurse's advice  Nurse further explained the importance of getting a bladder scan and following physician's orders  Pt then allowed this nurse to bladder scan  Bladder scan showed 750cc  Nurse then further explained to him the importance of the emptying your bladder to prevent urinary tract infections  Pt refusing straight cath  Made NP aware  NP advised to the reassess in an hour and asked about a garrison catheter  Nurse asked pt and pt states he is adamantly against a garrison catheter  WTCM  0040 : Pt stated he voided in the urinal but dumped it down the toilet  Pt states it urine is bloody but can not get confirmation and no recollection of how much  Pt  Bladder scanned for 512

## 2020-07-21 NOTE — PLAN OF CARE
Problem: Potential for Falls  Goal: Patient will remain free of falls  Description  INTERVENTIONS:  - Assess patient frequently for physical needs  -  Identify cognitive and physical deficits and behaviors that affect risk of falls  -  [place] fall precautions as indicated by assessment   - Educate patient/family on patient safety including physical limitations  - Instruct patient to call for assistance with activity based on assessment  - Modify environment to reduce risk of injury  - Consider OT/PT consult to assist with strengthening/mobility  Outcome: Progressing     Problem: Prexisting or High Potential for Compromised Skin Integrity  Goal: Skin integrity is maintained or improved  Description  INTERVENTIONS:  - Identify patients at risk for skin breakdown  - Assess and monitor skin integrity  - Assess and monitor nutrition and hydration status  - Monitor labs   - Assess for incontinence   - Turn and reposition patient  - Assist with mobility/ambulation  - Relieve pressure over bony prominences  - Avoid friction and shearing  - Provide appropriate hygiene as needed including keeping skin clean and dry  - Evaluate need for skin moisturizer/barrier cream  - Collaborate with interdisciplinary team   - Patient/family teaching  - Consider wound care consult   Outcome: Progressing     Problem: Nutrition/Hydration-ADULT  Goal: Nutrient/Hydration intake appropriate for improving, restoring or maintaining nutritional needs  Description  Monitor and assess patient's nutrition/hydration status for malnutrition  Collaborate with interdisciplinary team and initiate plan and interventions as ordered  Monitor patient's weight and dietary intake as ordered or per policy  Utilize nutrition screening tool and intervene as necessary  Determine patient's food preferences and provide high-protein, high-caloric foods as appropriate       INTERVENTIONS:  - Monitor oral intake, urinary output, labs, and treatment plans  - Assess nutrition and hydration status and recommend course of action  - Evaluate amount of meals eaten  - Assist patient with eating if necessary   - Allow adequate time for meals  - Recommend/ encourage appropriate diets, oral nutritional supplements, and vitamin/mineral supplements  - Order, calculate, and assess calorie counts as needed  - Recommend, monitor, and adjust tube feedings and TPN/PPN based on assessed needs  - Assess need for intravenous fluids  - Provide specific nutrition/hydration education as appropriate  - Include patient/family/caregiver in decisions related to nutrition  Outcome: Progressing     Problem: NEUROSENSORY - ADULT  Goal: Achieves maximal functionality and self care  Description  INTERVENTIONS  - Monitor swallowing and airway patency with patient fatigue and changes in neurological status  - Encourage and assist patient to increase activity and self care     - Encourage visually impaired, hearing impaired and aphasic patients to use assistive/communication devices  Outcome: Progressing     Problem: GASTROINTESTINAL - ADULT  Goal: Minimal or absence of nausea and/or vomiting  Description  INTERVENTIONS:  - Administer IV fluids if ordered to ensure adequate hydration  - Maintain NPO status until nausea and vomiting are resolved  - Nasogastric tube if ordered  - Administer ordered antiemetic medications as needed  - Provide nonpharmacologic comfort measures as appropriate  - Advance diet as tolerated, if ordered  - Consider nutrition services referral to assist patient with adequate nutrition and appropriate food choices  Outcome: Progressing  Goal: Maintains adequate nutritional intake  Description  INTERVENTIONS:  - Monitor percentage of each meal consumed  - Identify factors contributing to decreased intake, treat as appropriate  - Assist with meals as needed  - Monitor I&O, weight, and lab values if indicated  - Obtain nutrition services referral as needed  Outcome: Progressing     Problem: METABOLIC, FLUID AND ELECTROLYTES - ADULT  Goal: Electrolytes maintained within normal limits  Description  INTERVENTIONS:  - Monitor labs and assess patient for signs and symptoms of electrolyte imbalances  - Administer electrolyte replacement as ordered  - Monitor response to electrolyte replacements, including repeat lab results as appropriate  - Instruct patient on fluid and nutrition as appropriate  Outcome: Progressing  Goal: Fluid balance maintained  Description  INTERVENTIONS:  - Monitor labs   - Monitor I/O and WT  - Instruct patient on fluid and nutrition as appropriate  - Assess for signs & symptoms of volume excess or deficit  Outcome: Progressing     Problem: SKIN/TISSUE INTEGRITY - ADULT  Goal: Skin integrity remains intact  Description  INTERVENTIONS  - Identify patients at risk for skin breakdown  - Assess and monitor skin integrity  - Assess and monitor nutrition and hydration status  - Monitor labs (i e  albumin)  - Assess for incontinence   - Turn and reposition patient  - Assist with mobility/ambulation  - Relieve pressure over bony prominences  - Avoid friction and shearing  - Provide appropriate hygiene as needed including keeping skin clean and dry  - Evaluate need for skin moisturizer/barrier cream  - Collaborate with interdisciplinary team (i e  Nutrition, Rehabilitation, etc )   - Patient/family teaching  Outcome: Progressing  Goal: Incision(s), wounds(s) or drain site(s) healing without S/S of infection  Description  INTERVENTIONS  - Assess and document risk factors for skin impairment   - Assess and document dressing, incision, wound bed, drain sites and surrounding tissue  - Consider nutrition services referral as needed  - Oral mucous membranes remain intact  - Provide patient/ family education  Outcome: Progressing     Problem: HEMATOLOGIC - ADULT  Goal: Maintains hematologic stability  Description  INTERVENTIONS  - Assess for signs and symptoms of bleeding or hemorrhage  - Monitor labs  - Administer supportive blood products/factors as ordered and appropriate  Outcome: Progressing     Problem: MUSCULOSKELETAL - ADULT  Goal: Maintain or return mobility to safest level of function  Description  INTERVENTIONS:  - Assess patient's ability to carry out ADLs; assess patient's baseline for ADL function and identify physical deficits which impact ability to perform ADLs (bathing, care of mouth/teeth, toileting, grooming, dressing, etc )  - Assess/evaluate cause of self-care deficits   - Assess range of motion  - Assess patient's mobility  - Assess patient's need for assistive devices and provide as appropriate  - Encourage maximum independence but intervene and supervise when necessary  - Involve family in performance of ADLs  - Assess for home care needs following discharge   - Consider OT consult to assist with ADL evaluation and planning for discharge  - Provide patient education as appropriate  Outcome: Progressing     Problem: PAIN - ADULT  Goal: Verbalizes/displays adequate comfort level or baseline comfort level  Description  Interventions:  - Encourage patient to monitor pain and request assistance  - Assess pain using appropriate pain scale  - Administer analgesics based on type and severity of pain and evaluate response  - Implement non-pharmacological measures as appropriate and evaluate response  - Consider cultural and social influences on pain and pain management  - Notify physician/advanced practitioner if interventions unsuccessful or patient reports new pain  Outcome: Progressing     Problem: INFECTION - ADULT  Goal: Absence or prevention of progression during hospitalization  Description  INTERVENTIONS:  - Assess and monitor for signs and symptoms of infection  - Monitor lab/diagnostic results  - Monitor all insertion sites, i e  indwelling lines, tubes, and drains  - Monitor endotracheal if appropriate and nasal secretions for changes in amount and color  - [place] appropriate cooling/warming therapies per order  - Administer medications as ordered  - Instruct and encourage patient and family to use good hand hygiene technique  - Identify and instruct in appropriate isolation precautions for identified infection/condition  Outcome: Progressing  Goal: Absence of fever/infection during neutropenic period  Description  INTERVENTIONS:  - Monitor WBC    Outcome: Progressing     Problem: SAFETY ADULT  Goal: Patient will remain free of falls  Description  INTERVENTIONS:  - Assess patient frequently for physical needs  -  Identify cognitive and physical deficits and behaviors that affect risk of falls  -  [place] fall precautions as indicated by assessment   - Educate patient/family on patient safety including physical limitations  - Instruct patient to call for assistance with activity based on assessment  - Modify environment to reduce risk of injury  - Consider OT/PT consult to assist with strengthening/mobility  Outcome: Progressing     Problem: DISCHARGE PLANNING  Goal: Discharge to home or other facility with appropriate resources  Description  INTERVENTIONS:  - Identify barriers to discharge w/patient and caregiver  - Arrange for needed discharge resources and transportation as appropriate  - Identify discharge learning needs (meds, wound care, etc )  - Arrange for interpretive services to assist at discharge as needed  - Refer to Case Management Department for coordinating discharge planning if the patient needs post-hospital services based on physician/advanced practitioner order or complex needs related to functional status, cognitive ability, or social support system  Outcome: Progressing     Problem: Knowledge Deficit  Goal: Patient/family/caregiver demonstrates understanding of disease process, treatment plan, medications, and discharge instructions  Description  Complete learning assessment and assess knowledge base    Interventions:  - Provide teaching at level of understanding  - Provide teaching via preferred learning methods  Outcome: Progressing

## 2020-07-21 NOTE — NURSING NOTE
Pt  Extremely noncompliant climbing out of chair with IV trailing behind  Refuses any help and gets belligerent when rationale explain  At this point a fall is very likely to occur considering patients disregard for safety

## 2020-07-21 NOTE — PROGRESS NOTES
Progress Note - Podiatry  Payamjosselyn Nance 69 y o  male MRN: [id number]  Unit/Bed#: -01 Encounter: [id number]    Assessment/Plan:  Cellulitis right leg  Skin ulceration right leg fat layer exposed  PVD/Atherosclerosis of native artery right leg with ulceration              -S/P repair umbilical hernia earlier this afternoon   -Agram completed yesterday, minimal increased perfusion of the right lower extremity, discussed with Dr Italia Valdovinos    -necrotic wounds right leg clinically unchanged and require OR debridement    -await results of repeat arterial duplex before proceeding with leg debridement    -anticipate taking patient to the OR on Thursday at the earliest or Friday    Lactic acidosis, necrotic umbilical wound, chronic kidney disease stage 3, anemia, and AFib              -managed per Internal Medicine, general surgery, and Nephrology    Subjective/Objective   Chief Complaint:   Chief Complaint   Patient presents with    Wound Infection - Complicated     Pt with leg wounds x 2 months, here for dyspnea, weakness and feeling ill   +n/+v  Subjective:  74-year-old white male seen resting bed postop having just had his umbilical hernia repaired  Continues to the amount pain from right leg wound  Denies any fever or shortness of breath  Blood pressure 113/61, pulse 81, temperature 98 6 °F (37 °C), resp  rate 22, height 5' 10" (1 778 m), weight 63 5 kg (139 lb 15 9 oz), SpO2 100 %  ,Body mass index is 20 09 kg/m²      Invasive Devices     Peripheral Intravenous Line            Peripheral IV 07/20/20 Left Arm 1 day                Physical Exam:   General appearance: alert, cooperative and with pain right leg  Neuro/Vascular: Normal strength  Sensation and reflexes:  Grossly intact  Pulses: Right: DP 0/4, PT 0/4, Left: DP 0/4, PT 0/4  Capillary Filling:  3 Sec, Edema:  +1 right leg, none left  Vascular calcifications noted on tib-fib x-ray RLE  Arteriogram 7/20/2020:  · RLE: CFA: Patent, Profunda femoris: Patent   SFA: Proximal portion is patent, normal caliber  High-grade (greater than 80%) 1 cm focal stenosis mid SFA  Moderate grade tandem distal stenoses and in particular 60% stenosis at the abductor canal    Popliteal artery: Patent, noting 60% focal stenosis in the above-the-knee segment  Runoff: Peroneal runoff to the foot with distally reconstituted posterior tibial artery filling of the plantar arch  Chronically occluded anterior tibial artery  Proximal posterior tibial artery is patent and then occludes  · LLE:    CFA: Patent Profunda femoris: Proximal portion is patent   SFA: Proximal portion is patent  · POST INTERVENTION FINDINGS:   1  Significant luminal gain of multifocal right SFA and popliteal artery stenoses following a combination of 6 mm     and 7 mm POBA and DCB  2   Refractory proximal R SFA (30%)  stenosis  Considered stent placement; however, patient was quite     uncooperative with movement and asked multiple times for the procedure to be terminated  3   No change in tibial runoff  4   Left common femoral artery access, successfully closed with a Vascade      · IMPRESSION:  Technically successful multifocal balloon angioplasty of multiple stenoses throughout the right superficial femoral and popliteal arteries  Patient has significant right tibial disease with in-line flow into the peroneal artery and distal reconstituted posterior tibial artery  Could consider tibial intervention in the future  Would recommend anesthesia support given patient inability to lay still  Extremity: Several large wounds present on the medial and lateral aspect of the right lower leg with fat layer exposed  Necrotic nature and significant slough, wounds are 80-90% yellow/white, localized erythema and calor appear to be resolving satisfactory                      Labs and other studies:   CBC w/diff  Results from last 7 days   Lab Units 07/21/20  0838   WBC Thousand/uL 6 30   HEMOGLOBIN g/dL 8 7*   HEMATOCRIT % 26 1*   PLATELETS Thousands/uL 52*   NEUTROS PCT % 85*   LYMPHS PCT % 9*   MONOS PCT % 4   EOS PCT % 0     BMP  Results from last 7 days   Lab Units 07/21/20  0839   POTASSIUM mmol/L 4 9   CHLORIDE mmol/L 105   CO2 mmol/L 20*   BUN mg/dL 42*   CREATININE mg/dL 1 77*   CALCIUM mg/dL 8 5     CMP  Results from last 7 days   Lab Units 07/21/20  0839   POTASSIUM mmol/L 4 9   CHLORIDE mmol/L 105   CO2 mmol/L 20*   BUN mg/dL 42*   CREATININE mg/dL 1 77*   CALCIUM mg/dL 8 5   ALK PHOS U/L 100   ALT U/L 33   AST U/L 36       @Culture@  Lab Results   Component Value Date    BLOODCX No Growth After 4 Days  07/16/2020    BLOODCX No Growth After 4 Days  07/16/2020    BLOODCX No Growth After 5 Days  05/02/2017    BLOODCX No Growth After 5 Days   05/02/2017    URINECX >100,000 cfu/ml Klebsiella oxytoca (A) 02/13/2019    URINECX >100,000 cfu/ml Klebsiella oxytoca (A) 01/07/2019         Current Facility-Administered Medications:     acetaminophen (TYLENOL) tablet 650 mg, 650 mg, Oral, Q6H PRN, Jm Tracey-FRANCHESCA Garcia-C, 650 mg at 07/19/20 1312    aspirin chewable tablet 81 mg, 81 mg, Oral, Daily, Brittani Tracey-Jose PA-C    atorvastatin (LIPITOR) tablet 40 mg, 40 mg, Oral, Daily With Lorin Drone Kyung-Jose PA-C, 40 mg at 07/19/20 1700    bacitracin topical ointment 1 small application, 1 small application, Topical, BID, Brittani Tracey-Jose PA-C, 1 small application at 15/85/78 0851    calcium carbonate (TUMS) chewable tablet 500 mg, 500 mg, Oral, Daily PRN, Jm Bolanos PA-C    cefepime (MAXIPIME) IVPB (premix) 1,000 mg 50 mL, 1,000 mg, Intravenous, Q24H, Brittani Tracey-FRANCHESCA Garcia-CHELLE, Last Rate: 100 mL/hr at 07/21/20 0242, 1,000 mg at 07/21/20 0242    fentaNYL (SUBLIMAZE) injection 25 mcg, 25 mcg, Intravenous, Q5 Min PRN, Zack Mejia MD, 25 mcg at 52/20/97 6680    folic acid (FOLVITE) tablet 1 mg, 1 mg, Oral, Daily, Brittani Bolanos PA-C, 1 mg at 07/21/20 0849    HYDROcodone-acetaminophen (NORCO) 5-325 mg per tablet 1 tablet, 1 tablet, Oral, Q4H PRN, Abdulkadir Mcknight Astl-Jose, PA-C    HYDROcodone-acetaminophen (NORCO) 5-325 mg per tablet 2 tablet, 2 tablet, Oral, Q4H PRN, [place] Juan Luis Astl-Jose, PA-C, 2 tablet at 07/21/20 1658    HYDROmorphone (DILAUDID) injection 0 5 mg, 0 5 mg, Intravenous, Q10 Min PRN, Rachel Duffy MD    HYDROmorphone (DILAUDID) injection 0 5 mg, 0 5 mg, Intravenous, Q2H PRN, Abdulkadir Mcknight Astl-Jose, PA-C    LORazepam (ATIVAN) tablet 1 mg, 1 mg, Oral, Q8H PRN, Abdulkadir Mcknight Astl-Jose, PA-C, 1 mg at 07/19/20 2227    multi-electrolyte (PLASMALYTE-A/ISOLYTE-S PH 7 4) IV solution, 75 mL/hr, Intravenous, Continuous, Brittani Astanurag-Jose, PA-C, Last Rate: 75 mL/hr at 07/21/20 1645, 75 mL/hr at 07/21/20 1645    pantoprazole (PROTONIX) injection 40 mg, 40 mg, Intravenous, Q12H [address], Brittani Astl-Jose, PA-C, 40 mg at 07/21/20 7528    polyethylene glycol (MIRALAX) packet 17 g, 17 g, Oral, Daily, Abdulkadir Mcknight Astl-Jose, PA-C    sodium bicarbonate 75 mEq in sodium chloride 0 45 % 1,000 mL infusion, 75 mL/hr, Intravenous, Continuous, BJ Jones    tamsulosin (FLOMAX) capsule 0 4 mg, 0 4 mg, Oral, Daily With Victor Hugo Tracey-Jose, PA-C, 0 4 mg at 07/19/20 1700    vancomycin (VANCOCIN) IVPB (premix) 1,000 mg 200 mL, 15 mg/kg, Intravenous, Q24H, Brittani Astl-Jose, PA-C, Last Rate: 200 mL/hr at 07/21/20 0401, 1,000 mg at 07/21/20 0401    Imaging: I have personally reviewed pertinent films in PACS  EKG, Pathology, and Other Studies: I have personally reviewed pertinent reports    VTE Pharmacologic Prophylaxis: Heparin  VTE Mechanical Prophylaxis: reason for no mechanical VTE prophylaxis Wounds right lower extremity    Jocelyne Weeks DPM

## 2020-07-21 NOTE — ASSESSMENT & PLAN NOTE
Wt Readings from Last 3 Encounters:   07/21/20 63 5 kg (139 lb 15 9 oz)   06/21/19 68 3 kg (150 lb 9 6 oz)   06/19/19 67 6 kg (149 lb)     Echocardiogram on this admission shows ejection fraction of 50%  Patient's lungs are clear, has no JVD  Has chronic systolic CHF is compensated    Continue to hold lisinopril, Lasix and Toprol because of relative hypotension  Daily weight and I&Os

## 2020-07-21 NOTE — ASSESSMENT & PLAN NOTE
Patient has more than 75% stenosis of the right superficial femoral artery on arterial Doppler  Continue atorvastatin  Patient underwent right SFA PTA with single-vessel peroneal runoff on 07/20/2020  Vascular surgery follow-up noted  IR was unable to perform tibial intervention secondary to patient's inability to tolerate procedure    Started on aspirin given recent intervention  Will discuss with GI to consider addition of Plavix  Post intervention ABIs were ordered  Continue vascular surgery follow-up

## 2020-07-21 NOTE — PLAN OF CARE
Problem: Potential for Falls  Goal: Patient will remain free of falls  Description  INTERVENTIONS:  - Assess patient frequently for physical needs  -  Identify cognitive and physical deficits and behaviors that affect risk of falls  -  [place] fall precautions as indicated by assessment   - Educate patient/family on patient safety including physical limitations  - Instruct patient to call for assistance with activity based on assessment  - Modify environment to reduce risk of injury  - Consider OT/PT consult to assist with strengthening/mobility  Outcome: Progressing     Problem: Prexisting or High Potential for Compromised Skin Integrity  Goal: Skin integrity is maintained or improved  Description  INTERVENTIONS:  - Identify patients at risk for skin breakdown  - Assess and monitor skin integrity  - Assess and monitor nutrition and hydration status  - Monitor labs   - Assess for incontinence   - Turn and reposition patient  - Assist with mobility/ambulation  - Relieve pressure over bony prominences  - Avoid friction and shearing  - Provide appropriate hygiene as needed including keeping skin clean and dry  - Evaluate need for skin moisturizer/barrier cream  - Collaborate with interdisciplinary team   - Patient/family teaching  - Consider wound care consult   Outcome: Progressing     Problem: Nutrition/Hydration-ADULT  Goal: Nutrient/Hydration intake appropriate for improving, restoring or maintaining nutritional needs  Description  Monitor and assess patient's nutrition/hydration status for malnutrition  Collaborate with interdisciplinary team and initiate plan and interventions as ordered  Monitor patient's weight and dietary intake as ordered or per policy  Utilize nutrition screening tool and intervene as necessary  Determine patient's food preferences and provide high-protein, high-caloric foods as appropriate       INTERVENTIONS:  - Monitor oral intake, urinary output, labs, and treatment plans  - Assess nutrition and hydration status and recommend course of action  - Evaluate amount of meals eaten  - Assist patient with eating if necessary   - Allow adequate time for meals  - Recommend/ encourage appropriate diets, oral nutritional supplements, and vitamin/mineral supplements  - Order, calculate, and assess calorie counts as needed  - Recommend, monitor, and adjust tube feedings and TPN/PPN based on assessed needs  - Assess need for intravenous fluids  - Provide specific nutrition/hydration education as appropriate  - Include patient/family/caregiver in decisions related to nutrition  Outcome: Progressing     Problem: NEUROSENSORY - ADULT  Goal: Achieves maximal functionality and self care  Description  INTERVENTIONS  - Monitor swallowing and airway patency with patient fatigue and changes in neurological status  - Encourage and assist patient to increase activity and self care     - Encourage visually impaired, hearing impaired and aphasic patients to use assistive/communication devices  Outcome: Progressing     Problem: GASTROINTESTINAL - ADULT  Goal: Minimal or absence of nausea and/or vomiting  Description  INTERVENTIONS:  - Administer IV fluids if ordered to ensure adequate hydration  - Maintain NPO status until nausea and vomiting are resolved  - Nasogastric tube if ordered  - Administer ordered antiemetic medications as needed  - Provide nonpharmacologic comfort measures as appropriate  - Advance diet as tolerated, if ordered  - Consider nutrition services referral to assist patient with adequate nutrition and appropriate food choices  Outcome: Progressing  Goal: Maintains adequate nutritional intake  Description  INTERVENTIONS:  - Monitor percentage of each meal consumed  - Identify factors contributing to decreased intake, treat as appropriate  - Assist with meals as needed  - Monitor I&O, weight, and lab values if indicated  - Obtain nutrition services referral as needed  Outcome: Progressing     Problem: METABOLIC, FLUID AND ELECTROLYTES - ADULT  Goal: Electrolytes maintained within normal limits  Description  INTERVENTIONS:  - Monitor labs and assess patient for signs and symptoms of electrolyte imbalances  - Administer electrolyte replacement as ordered  - Monitor response to electrolyte replacements, including repeat lab results as appropriate  - Instruct patient on fluid and nutrition as appropriate  Outcome: Progressing  Goal: Fluid balance maintained  Description  INTERVENTIONS:  - Monitor labs   - Monitor I/O and WT  - Instruct patient on fluid and nutrition as appropriate  - Assess for signs & symptoms of volume excess or deficit  Outcome: Progressing     Problem: SKIN/TISSUE INTEGRITY - ADULT  Goal: Skin integrity remains intact  Description  INTERVENTIONS  - Identify patients at risk for skin breakdown  - Assess and monitor skin integrity  - Assess and monitor nutrition and hydration status  - Monitor labs (i e  albumin)  - Assess for incontinence   - Turn and reposition patient  - Assist with mobility/ambulation  - Relieve pressure over bony prominences  - Avoid friction and shearing  - Provide appropriate hygiene as needed including keeping skin clean and dry  - Evaluate need for skin moisturizer/barrier cream  - Collaborate with interdisciplinary team (i e  Nutrition, Rehabilitation, etc )   - Patient/family teaching  Outcome: Progressing  Goal: Incision(s), wounds(s) or drain site(s) healing without S/S of infection  Description  INTERVENTIONS  - Assess and document risk factors for skin impairment   - Assess and document dressing, incision, wound bed, drain sites and surrounding tissue  - Consider nutrition services referral as needed  - Oral mucous membranes remain intact  - Provide patient/ family education  Outcome: Progressing     Problem: HEMATOLOGIC - ADULT  Goal: Maintains hematologic stability  Description  INTERVENTIONS  - Assess for signs and symptoms of bleeding or hemorrhage  - Monitor labs  - Administer supportive blood products/factors as ordered and appropriate  Outcome: Progressing     Problem: MUSCULOSKELETAL - ADULT  Goal: Maintain or return mobility to safest level of function  Description  INTERVENTIONS:  - Assess patient's ability to carry out ADLs; assess patient's baseline for ADL function and identify physical deficits which impact ability to perform ADLs (bathing, care of mouth/teeth, toileting, grooming, dressing, etc )  - Assess/evaluate cause of self-care deficits   - Assess range of motion  - Assess patient's mobility  - Assess patient's need for assistive devices and provide as appropriate  - Encourage maximum independence but intervene and supervise when necessary  - Involve family in performance of ADLs  - Assess for home care needs following discharge   - Consider OT consult to assist with ADL evaluation and planning for discharge  - Provide patient education as appropriate  Outcome: Progressing     Problem: PAIN - ADULT  Goal: Verbalizes/displays adequate comfort level or baseline comfort level  Description  Interventions:  - Encourage patient to monitor pain and request assistance  - Assess pain using appropriate pain scale  - Administer analgesics based on type and severity of pain and evaluate response  - Implement non-pharmacological measures as appropriate and evaluate response  - Consider cultural and social influences on pain and pain management  - Notify physician/advanced practitioner if interventions unsuccessful or patient reports new pain  Outcome: Progressing     Problem: INFECTION - ADULT  Goal: Absence or prevention of progression during hospitalization  Description  INTERVENTIONS:  - Assess and monitor for signs and symptoms of infection  - Monitor lab/diagnostic results  - Monitor all insertion sites, i e  indwelling lines, tubes, and drains  - Monitor endotracheal if appropriate and nasal secretions for changes in amount and color  - [place] appropriate cooling/warming therapies per order  - Administer medications as ordered  - Instruct and encourage patient and family to use good hand hygiene technique  - Identify and instruct in appropriate isolation precautions for identified infection/condition  Outcome: Progressing  Goal: Absence of fever/infection during neutropenic period  Description  INTERVENTIONS:  - Monitor WBC    Outcome: Progressing     Problem: SAFETY ADULT  Goal: Patient will remain free of falls  Description  INTERVENTIONS:  - Assess patient frequently for physical needs  -  Identify cognitive and physical deficits and behaviors that affect risk of falls  -  [place] fall precautions as indicated by assessment   - Educate patient/family on patient safety including physical limitations  - Instruct patient to call for assistance with activity based on assessment  - Modify environment to reduce risk of injury  - Consider OT/PT consult to assist with strengthening/mobility  Outcome: Progressing     Problem: DISCHARGE PLANNING  Goal: Discharge to home or other facility with appropriate resources  Description  INTERVENTIONS:  - Identify barriers to discharge w/patient and caregiver  - Arrange for needed discharge resources and transportation as appropriate  - Identify discharge learning needs (meds, wound care, etc )  - Arrange for interpretive services to assist at discharge as needed  - Refer to Case Management Department for coordinating discharge planning if the patient needs post-hospital services based on physician/advanced practitioner order or complex needs related to functional status, cognitive ability, or social support system  Outcome: Progressing     Problem: Knowledge Deficit  Goal: Patient/family/caregiver demonstrates understanding of disease process, treatment plan, medications, and discharge instructions  Description  Complete learning assessment and assess knowledge base    Interventions:  - Provide teaching at level of understanding  - Provide teaching via preferred learning methods  Outcome: Progressing

## 2020-07-21 NOTE — INTERIM OP NOTE
LAPAROTOMY EXPLORATORY, excision of infected mesh and repair of ventral heria, REPAIR HERNIA VENTRAL - EXCSION OF INFECTED MESH  Postoperative Note  PATIENT NAME: Payam Nance  : 1945  MRN: [id number]  [facility] OR ROOM 01    Surgery Date: 2020    Preop Diagnosis:  Cellulitis [L03 90]    Post-Op Diagnosis Codes:     * Cellulitis [L03 90]    Procedure(s) (LRB):  LAPAROTOMY EXPLORATORY, excision of infected mesh and repair of ventral heria (N/A)  REPAIR HERNIA VENTRAL - EXCSION OF INFECTED MESH (N/A)    Surgeon(s) and Role:     * Juan José Parks MD - Primary     * Brittani Bolanos PA-C - Assisting    Specimens:  ID Type Source Tests Collected by Time Destination   1 : infected hernia mesh and umbilical wound Tissue Wound TISSUE EXAM Juan José Parks MD 2020 1426        Estimated Blood Loss:   Minimal    Anesthesia Type:   General ETA    Findings:   As above  No bowel involvement    Complications:   None    Hernia Surgery Operative Note    Name: Payam Nance    Gender: male    Age: 69 y o  Race:     BMI: Body mass index is 20 09 kg/m²  DIAGNOSIS:   1  Osteomyelitis ([address] )     2  Cellulitis  Inpatient consult to Wound Care Provider    Inpatient consult to Podiatry    Inpatient consult to Wound Care Provider    Inpatient consult to Podiatry    Case request operating room: LAPAROTOMY EXPLORATORY, excision of infected mesh and repair of ventral heria    Case request operating room: LAPAROTOMY EXPLORATORY, excision of infected mesh and repair of ventral heria    Tissue Exam    Tissue Exam   3  Wound of right lower extremity, initial encounter  Inpatient consult to Podiatry    Inpatient consult to Podiatry    Inpatient consult to Vascular Surgery    Inpatient consult to Vascular Surgery   4  Anemia  Inpatient consult to gastroenterology    Inpatient consult to gastroenterology   5   Shortness of breath     6  Umbilicus discharge  Inpatient consult to Acute Care Surgery(General Surgery) Inpatient consult to Acute Care Surgery(General Surgery)   7  Chronic combined systolic and diastolic heart failure [facility])  Inpatient consult to Cardiology    Inpatient consult to Cardiology   8  Atherosclerosis of artery of extremity with ulceration (HCC)  VAS lower limb arterial duplex, limited/unilateral    VAS lower limb arterial duplex, limited/unilateral   9  Peripheral vascular disease of lower extremity [facility])  Inpatient consult to Vascular Surgery    Inpatient consult to Vascular Surgery    Inpatient consult to Nephrology    Inpatient consult to Nephrology   10   CKD (chronic kidney disease) stage 3, GFR 30-59 ml/min [facility])  Inpatient consult to Nephrology    Inpatient consult to Nephrology       Diabetes Mellitis: Yes    Coronary Heart Disease: Yes    Cancer: No    Steroid Use: No    Tobacco use: No   Last used: never smoked     Alcohol use: No    Location of Hernia: ventral, umbilical  RLCEIK:3 8 cm  Width:3 0 cm  Primary: Yes  Recurrent: No   Number of recurrences:    Access: Open    Component Separation: No    Mesh:   No   Primary repair secondary to infected wound    Operative Time: 25 min           SIGNATURE: Trish Urban PA-C   DATE: July 21, 2020   TIME: 2:49 PM

## 2020-07-21 NOTE — ASSESSMENT & PLAN NOTE
68 y/o male with HTN, HLD, BPH, ITP, hx SDH, DDD, atrial fibrillation, PPM, CKD III (creat 5 9-7 7), umbilical abdominal wall abscess w/exposed mesh and PAD w/R lower leg tissue loss  -s/p R SFA PTA w/single vessel peroneal runoff 7/20/2020    Diagnostics:  -EVELIN:  >75% mid SFA stenosis, R NESTOR unobtainable secondary to movement and flat line MTP/GTP  L NESTOR 1 32/-/-    Plan:  -successful R SFA PTA but unable to stent secondary to patient movement  AT and PT occlusion with single-vessel peroneal runoff with in-line flow to the foot  Unable to perform tibial intervention secondary to patient's inability to tolerate procedure   -left groin access site clear  Right lower leg pain improved  -will obtain post intervention ABIs to reassess healing potential and determine need for further intervention  If further intervention required, will need to have angiogram under general anesthesia  -continue statin therapy  -will start aspirin given recent intervention    Consider addition of Plavix if not contraindicated by GI given anemia workup  -for OR today with general surgery related to umbilical abscess  -formal recommendations to follow review of post intervention ABIs  -discussed with Dr Nicole Beltran  -discussed with Dr Shirley Gerardo

## 2020-07-21 NOTE — PROGRESS NOTES
Progress Note - Kimberli Amalia 1945, 69 y o  male MRN: [id number]    Unit/Bed#: -01 Encounter: [id number]    Primary Care Provider: Chay Marin,    Date and time admitted to hospital: 7/16/2020 10:25 PM        Severe protein-calorie malnutrition ([address] )  Assessment & Plan  Pt has Severe protein-calorie malnutrition, in the setting of chronically ill patient with history of noncompliance with care, as evidenced by BMI 19 17 with an 11 33% unplanned weight loss over the past year, multiple nonhealing wounds appearance of muscle wasting/fat loss and decreased mobility noted on nursing assessment, Findings: height 5' 10", weight 133 lb 9 6 oz, BMI 19 17 Screen: (+) Unplanned weight loss in the last three months; (+) Stage III-IV pressure ulcer or non-healing wound; (+) Appearance of muscle wasting or fat loss; Benign prostatic hyperplasia with urinary retention  Assessment & Plan  Patient self catheterizes at home when he feels he needs to for BPH with retention,   Patient had to be catheterized 6 times during this hospital stay  Continue straight cath q 8 hours because patient has urinary retention, will try to avoid placement of Abbott  Continue Flomax    Umbilicus discharge  Assessment & Plan  · Patient states ongoing since hernia surgery small bloody ooze  Scab is noted in the umbilicus with foul-smelling no drainage  · Surgeries on board  · Patient is scheduled for umbilical hernia repair this morning   · NPO after midnight  · Pain management p r n  CKD (chronic kidney disease) stage 3, GFR 30-59 ml/min ([facility])  Assessment & Plan  Patient has stage III chronic disease with creatinine around 2 0 at baseline  Patient's renal function is at baseline:  Creatinine is 1 77 today  Nephrology on board  Monitor renal function in a m      Lactic acidosis  Assessment & Plan  Lactic acidosis most likely multifactorial, patient has CHF, necrotic umbilical wound    Cellulitis  Assessment & Plan  Patient has right lower leg wounds with cellulitis  Continue vancomycin and cefepime  Patient will have angiogram of the right lower leg before debridement of the right lower leg wounds by podiatry  Final culture results reviewed    Chronic combined systolic and diastolic heart failure (HCC)  Assessment & Plan  Wt Readings from Last 3 Encounters:   07/21/20 63 5 kg (139 lb 15 9 oz)   06/21/19 68 3 kg (150 lb 9 6 oz)   06/19/19 67 6 kg (149 lb)     Echocardiogram on this admission shows ejection fraction of 50%  Patient's lungs are clear, has no JVD  Has chronic systolic CHF is compensated  Continue to hold lisinopril, Lasix and Toprol because of relative hypotension  Daily weight and I&Os    Thrombocytopenia ([address] )  Assessment & Plan  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)  Assessment & Plan  Patient is status post ablation and pacemaker insertion  EKG showed paced rhythm  He has not  anticoagulation candidate due to ITP and thrombocytopenia    Peripheral vascular disease of lower extremity [facility])  Assessment & Plan  Patient has more than 75% stenosis of the right superficial femoral artery on arterial Doppler  Continue atorvastatin  Patient underwent right SFA PTA with single-vessel peroneal runoff on 07/20/2020  Vascular surgery follow-up noted  IR was unable to perform tibial intervention secondary to patient's inability to tolerate procedure  Started on aspirin given recent intervention  Will discuss with GI to consider addition of Plavix  Post intervention ABIs were ordered  Continue vascular surgery follow-up    * Anemia, unspecified  Assessment & Plan  Patient presented with severe symptomatic normocytic anemia 6 7 causing shortness of breath on exertion    He was taking NSAIDs for chronic right lower leg ulcers that were getting worse  Patient was transfused packed red blood cells during the hospital stay  Hemoglobin currently is 8 7 this morning  Monitor H&H and transfuse as needed  Continue IV Protonix b i d ,  Abnormal CT scan the abdomen pelvis showing thickening of the gastric antrum likely gastritis but unable to exclude gastric neoplasm  Patient received another 1 unit of packed red cell transfusion on July 20  GI follow-up needed  Patient will need eventually endoscopic workup  Refusing at this time and wants his abdominal issues to be addressed first       Labs & Imaging: I have personally reviewed pertinent reports  VTE Pharmacologic Prophylaxis: Reason for no pharmacologic prophylaxis Anemia  VTE Mechanical Prophylaxis: sequential compression device    Code Status:   Level 1 - Full Code    Patient Centered Rounds: I have performed bedside rounds with nursing staff today  Discussions with Specialists or Other Care Team Provider:  Vascular surgery    Education and Discussions with Family / Patient:  Offered to call  Patient states he will update his friend  Current Length of Stay: 4 day(s)    Current Patient Status: Inpatient   Certification Statement: The patient will continue to require additional inpatient hospital stay due to see my assessment and plan  Subjective:   Patient is seen and examined at bedside  Complains of tingling in right lower extremity  No new complaints  Afebrile  Pain is well controlled  All other ROS are negative  Objective:    Vitals: Blood pressure 110/58, pulse 80, temperature (!) 97 2 °F (36 2 °C), temperature source Axillary, resp  rate 15, height 5' 10" (1 778 m), weight 63 5 kg (139 lb 15 9 oz), SpO2 100 %  ,Body mass index is 20 09 kg/m²    SPO2 RA Rest      ED to Hosp-Admission (Current) from 7/16/2020 in [address] Med Surg Unit   SpO2  100 %   SpO2 Activity  At Rest   O2 Device  None (Room air) O2 Flow Rate          I&O:     Intake/Output Summary (Last 24 hours) at 7/21/2020 1036  Last data filed at 7/21/2020 0242  Gross per 24 hour   Intake 560 ml   Output 250 ml   Net 310 ml       Physical Exam:    General- Alert, sitting comfortably in chair  Not in any acute distress  HEENT- MIKI, EOM intact  CVS- regular, S1 and S2 normal  Chest- Bilateral Air entry, No rhochi, crackles or wheezing present  Abdomen- soft, nontender, not distended, no guarding or rigidity, BS+  Extremities-  No pedal edema, No calf tenderness  Chronic venous stasis bilateral lower extremities  Left groin access site without any erythema and induration or had  Right lower extremity-dressing which is C/D/I  CNS-   Alert, awake and orientedx3  No focal deficits present      Invasive Devices     Peripheral Intravenous Line            Peripheral IV 07/20/20 Left Arm 1 day                      Social History  reviewed  Family History   Problem Relation Age of Onset    Heart disease Mother     Heart disease Father     Hypertension Father     Diabetes Other     Depression Other     Cancer Sister     Depression Cousin     reviewed    Meds:  Current Facility-Administered Medications   Medication Dose Route Frequency Provider Last Rate Last Dose    acetaminophen (TYLENOL) tablet 650 mg  650 mg Oral Q6H PRN Noé Weiss MD   650 mg at 07/19/20 1312    aspirin chewable tablet 81 mg  81 mg Oral Daily Sherry Jay PA-C        atorvastatin (LIPITOR) tablet 40 mg  40 mg Oral Daily With Davida Martino MD   40 mg at 07/19/20 1700    bacitracin topical ointment 1 small application  1 small application Topical BID Noé Weiss MD   1 small application at 93/97/95 0851    calcium carbonate (TUMS) chewable tablet 500 mg  500 mg Oral Daily PRN Waylon Salinas MD        cefepime (MAXIPIME) IVPB (premix) 1,000 mg 50 mL  1,000 mg Intravenous Q24H Noé Weiss  mL/hr at 07/21/20 0242 1,000 mg at 77/88/11 7656    folic acid (FOLVITE) tablet 1 mg  1 mg Oral Daily Allyson Bryan MD   1 mg at 07/21/20 0849    HYDROcodone-acetaminophen (NORCO) 5-325 mg per tablet 1 tablet  1 tablet Oral Q6H PRN Allyson Bryan MD   1 tablet at 07/19/20 0341    HYDROmorphone (DILAUDID) injection 0 5 mg  0 5 mg Intravenous Q4H PRN Allyson Bryan MD   0 5 mg at 07/18/20 2330    LORazepam (ATIVAN) tablet 1 mg  1 mg Oral Q8H PRN Allyson Bryan MD   1 mg at 07/19/20 2227    pantoprazole (PROTONIX) injection 40 mg  40 mg Intravenous Q12H [address] Allyson Bryan MD   40 mg at 07/21/20 0842    polyethylene glycol (MIRALAX) packet 17 g  17 g Oral Daily BJ Alvares        tamsulosin (FLOMAX) capsule 0 4 mg  0 4 mg Oral Daily With Liliam Tapia MD   0 4 mg at 07/19/20 1700    vancomycin (VANCOCIN) IVPB (premix) 1,000 mg 200 mL  15 mg/kg Intravenous Q24H Allyson Bryan  mL/hr at 07/21/20 0401 1,000 mg at 07/21/20 0401      Medications Prior to Admission   Medication    Ascorbic Acid (VITAMIN C PO)    B Complex Vitamins (VITAMIN B COMPLEX PO)    calcium carbonate (TUMS) 500 mg chewable tablet    diazepam (VALIUM) 5 mg tablet    folic acid (FOLVITE) 1 mg tablet    furosemide (LASIX) 40 mg tablet    Lactobacillus (ACIDOPHILUS PO)    lisinopril (ZESTRIL) 5 mg tablet    Lycopene 10 MG CAPS    metoprolol succinate (TOPROL-XL) 100 mg 24 hr tablet    Misc Natural Products (SAW PALMETTO) CAPS    Multiple Vitamin (MULTI-DAY VITAMINS) TABS    acetaminophen (TYLENOL) 325 mg tablet    clobetasol (TEMOVATE) 0 05 % GEL    Ferrous Gluconate-C-Folic Acid (IRON-C PO)    tamsulosin (FLOMAX) 0 4 mg       Labs:  Results from last 7 days   Lab Units 07/21/20  0838 07/20/20  0446 07/19/20  0529  07/16/20  2243   WBC Thousand/uL 6 30 6 00 7 51   < > 7 48   HEMOGLOBIN g/dL 8 7* 7 1* 8 2*   < > 6 7*   HEMATOCRIT % 26 1* 21 7* 24 8*   < > 20 8*   PLATELETS Thousands/uL 52* 72* 77*   < > 104*   NEUTROS PCT % 85*  --   --   --   --    LYMPHS PCT % 9*  --   -- --   --    LYMPHO PCT %  --   --   --   --  16   MONOS PCT % 4  --   --   --   --    MONO PCT %  --   --   --   --  7   EOS PCT % 0  --   --   --  0    < > = values in this interval not displayed  Results from last 7 days   Lab Units 07/21/20  0839 07/21/20  0838 07/20/20  0446  07/16/20  2243   POTASSIUM mmol/L 4 9 4 8 4 6   < > 4 6   CHLORIDE mmol/L 105 103 101   < > 103   CO2 mmol/L 20* 20* 21   < > 20*   BUN mg/dL 42* 42* 55*   < > 80*   CREATININE mg/dL 1 77* 1 82* 1 79*   < > 2 18*   CALCIUM mg/dL 8 5 8 9 8 1*   < > 9 0   ALK PHOS U/L 100  --   --   --  87   ALT U/L 33  --   --   --  21   AST U/L 36  --   --   --  20    < > = values in this interval not displayed  Lab Results   Component Value Date    TROPONINI <0 02 07/16/2020    TROPONINI 0 02 11/23/2018    TROPONINI 0 03 11/23/2018    CKTOTAL 27 (L) 07/16/2020     Results from last 7 days   Lab Units 07/16/20  2248   INR  1 46*     Lab Results   Component Value Date    BLOODCX No Growth After 4 Days  07/16/2020    BLOODCX No Growth After 4 Days  07/16/2020    BLOODCX No Growth After 5 Days  05/02/2017    BLOODCX No Growth After 5 Days  05/02/2017    URINECX >100,000 cfu/ml Klebsiella oxytoca (A) 02/13/2019    URINECX >100,000 cfu/ml Klebsiella oxytoca (A) 01/07/2019    WOUNDCULT 4+ Growth of Enterobacter cloacae complex (A) 07/17/2020    WOUNDCULT 4+ Growth of Morganella morganii (A) 07/17/2020    WOUNDCULT 4+ Growth of Enterococcus faecalis (A) 07/17/2020    WOUNDCULT (A) 07/17/2020     4+ Growth of Alpha Hemolytic Streptococcus NOT Enterococcus         Imaging:  Results for orders placed during the hospital encounter of 07/16/20   XR chest portable    Narrative CHEST     INDICATION:   Acute Resp Failure  COMPARISON:  12/1/2018    EXAM PERFORMED/VIEWS:  XR CHEST PORTABLE      FINDINGS:  Left-sided chest wall pacemaker is identified  Pacemaker leads are intact  Cardiomediastinal silhouette appears unremarkable  The lungs are clear    No pneumothorax or pleural effusion  Stable bony structures  No acute osseous abnormality  Impression No acute cardiopulmonary disease  Workstation performed: [id number]       Results for orders placed during the hospital encounter of 11/22/18   XR chest 2 views    Narrative CHEST     INDICATION:   Patient s/p Pacemaker/ICD Insertion  COMPARISON:  11/30/2018  EXAM PERFORMED/VIEWS:  XR CHEST PA & LATERAL      FINDINGS:  Left-sided chest wall pacemaker is identified  Pacemaker leads are intact  Cardiomediastinal silhouette appears unremarkable  The lungs are clear  No pneumothorax  Unchanged small bilateral pleural effusions  Orthopedic hardware again seen in the left clavicle  Visualized osseous structures appear otherwise unremarkable for the patient's age  Impression No pneumothorax  Stable small bilateral pleural effusions          Workstation performed: [id number]         Last 24 Hours Medication List:     Current Facility-Administered Medications:  acetaminophen 650 mg Oral Q6H PRN Noé Weiss MD    aspirin 81 mg Oral Daily Sherry Misty, PA-C    atorvastatin 40 mg Oral Daily With Davida Martino MD    bacitracin 1 small application Topical BID Noé Weiss MD    calcium carbonate 500 mg Oral Daily PRN Waylon Salinas MD    cefepime 1,000 mg Intravenous Q24H Noé Weiss MD Last Rate: 1,000 mg (18/25/97 0136)   folic acid 1 mg Oral Daily Noé Weiss MD    HYDROcodone-acetaminophen 1 tablet Oral Q6H PRN Noé Weiss MD    HYDROmorphone 0 5 mg Intravenous Q4H PRN Noé Weiss MD    LORazepam 1 mg Oral Q8H PRN Barnet MD Abhishek    pantoprazole 40 mg Intravenous Q12H Lanre Dick MD    polyethylene glycol 17 g Oral Daily BJ Biswas    tamsulosin 0 4 mg Oral Daily With Davida Martino MD    vancomycin 15 mg/kg Intravenous Q24H Noé Weiss MD Last Rate: 1,000 mg (07/21/20 0401)        Today, Patient Was Seen By: Maryann Reynolds MD    ** Please Note: Dictation voice to text software may have been used in the creation of this document   **

## 2020-07-21 NOTE — ASSESSMENT & PLAN NOTE
· Patient states ongoing since hernia surgery small bloody ooze  Scab is noted in the umbilicus with foul-smelling no drainage  · Surgeries on board  · Patient is scheduled for umbilical hernia repair this morning   · NPO after midnight  · Pain management p r n

## 2020-07-21 NOTE — PROGRESS NOTES
Progress note - Gastroenterology   Odette Quinteros 69 y o  male MRN: [id number]  Unit/Bed#: -01 Encounter: [id number]    ASSESSMENT and PLAN    1  Anemia - no signs or symptoms of GI blood loss  Hemoglobin 6 7gms on admission and increased to 8 7gms following blood transfusion with a total of 3 units of packed red blood cells   Normocytic  Not iron, folate or vitamin B12 deficient  No overt GI  blood loss   Exclude chronic GI blood loss  May have underlying anemia of chronic disease or poor marrow function related to his malnutrition   Exclude MDS       · Follow H&H  · Observe for any overt signs of GI blood loss  · Check stool hemoccults  · Continue empiric PPI with Protonix 40 mg IV q 12 hours  · EGD when clinically stable in addition to colonoscopy after planned vascular procedures and umbilical hernia repair - patient refusing an upper and lower endoscopy until leg and abdominal wounds have been addressed  · Advise Hematology consult     2  Abnormal CT scan the abdomen pelvis showing thickening of the gastric antrum likely gastritis but unable to exclude gastric neoplasm      - EGD as above  - Protonix 40 mg IV q 12 hours     3  Umbilical wound with abscess and protruding mesh    - OR today for exploration for exploration of mesh and primary repair of umbilical hernia today for continues drainage    2  Constipation  Reports that he had a bowel movement yesterday      -  continue MiraLax 17 g in 8 oz of fluid daily    Chief Complaint   Patient presents with    Wound Infection - Complicated     Pt with leg wounds x 2 months, here for dyspnea, weakness and feeling ill   +n/+v  SUBJECTIVE/HPI   Denies any GI complaints  Denies any melena or rectal bleeding  Had a bowel yesterday  Denies any diarrhea, nausea, vomiting or abdominal pain      /58 (BP Location: Left arm)   Pulse 80   Temp (!) 97 2 °F (36 2 °C) (Axillary)   Resp 15   Ht 5' 10" (1 778 m)   Wt 63 5 kg (139 lb 15 9 oz)   SpO2 100%   BMI 20 09 kg/m²     PHYSICALEXAM  General appearance: alert, appears stated age and cooperative  Eyes:  no icterus   Head: Normocephalic  Lungs: clear to auscultation bilaterally  Heart: regular rate and rhythm, S1, S2 normal  Abdomen: soft, non-tender; bowel sounds normal; dressing intact over umbilical area  Extremities:  Right lower extremity dressing intact    Neurologic:  Awake, alert and oriented x3    Lab Results   Component Value Date    GLUCOSE 127 11/20/2018    CALCIUM 8 1 (L) 07/20/2020    K 4 6 07/20/2020    CO2 21 07/20/2020     07/20/2020    BUN 55 (H) 07/20/2020    CREATININE 1 79 (H) 07/20/2020     Lab Results   Component Value Date    WBC 6 30 07/21/2020    HGB 8 7 (L) 07/21/2020    HCT 26 1 (L) 07/21/2020    MCV 93 07/21/2020    PLT 52 (L) 07/21/2020     Lab Results   Component Value Date    ALT 21 07/16/2020    AST 20 07/16/2020    ALKPHOS 87 07/16/2020     No results found for: AMYLASE  Lab Results   Component Value Date    LIPASE 255 07/16/2020     Lab Results   Component Value Date    IRON 229 (H) 07/16/2020    TIBC 285 07/16/2020    FERRITIN 303 07/16/2020     Lab Results   Component Value Date    INR 1 46 (H) 07/16/2020

## 2020-07-21 NOTE — ANESTHESIA POSTPROCEDURE EVALUATION
Post-Op Assessment Note    CV Status:  Stable  Pain Score: 0    Pain management: adequate     Mental Status:  Alert and awake   Hydration Status:  Euvolemic   PONV Controlled:  Controlled   Airway Patency:  Patent  Airway: intubated   Post Op Vitals Reviewed: Yes      Staff: Anesthesiologist, with CRNAs   Comments: Awake on 3L NC resp geg and nonlabored          BP   146/68   Temp      Pulse  85   Resp   16   SpO2   100

## 2020-07-21 NOTE — ASSESSMENT & PLAN NOTE
From: Alyssa Caicedo  To: Rosendo Miller MD  Sent: 8/21/2018 8:54 AM CDT  Subject: Visit Follow-up Question    I have uploaded to [employer].        My username: [email or web address] Ambrocio: [id number] Patient self catheterizes at home when he feels he needs to for BPH with retention,   Patient had to be catheterized 6 times during this hospital stay    Continue straight cath q 8 hours because patient has urinary retention, will try to avoid placement of Abbott  Continue Flomax

## 2020-07-21 NOTE — PROGRESS NOTES
Progress Note - Meenakshi Edwards 1945, 69 y o  male MRN: [id number]    Unit/Bed#: -[facility] Encounter: [id number]    Primary Care Provider: DO Rajat   Date and time admitted to hospital: 7/16/2020 10:25 PM    Peripheral vascular disease of lower extremity ([address] )  Assessment & Plan  68 y/o male with HTN, HLD, BPH, ITP, hx SDH, DDD, atrial fibrillation, PPM, CKD III (creat 1 8-1 5), umbilical abdominal wall abscess w/exposed mesh and PAD w/R lower leg tissue loss  -s/p R SFA PTA w/single vessel peroneal runoff 7/20/2020    Diagnostics:  -EVELIN:  >75% mid SFA stenosis, R NESTOR unobtainable secondary to movement and flat line MTP/GTP  L NESTOR 1 32/-/-    Plan:  -successful R SFA PTA but unable to stent secondary to patient movement  AT and PT occlusion with single-vessel peroneal runoff with in-line flow to the foot  Unable to perform tibial intervention secondary to patient's inability to tolerate procedure   -left groin access site clear  Right lower leg pain improved  -will obtain post intervention ABIs to reassess healing potential and determine need for further intervention  If further intervention required, will need to have angiogram under general anesthesia  -continue statin therapy  -will start aspirin given recent intervention  Consider addition of Plavix if not contraindicated by GI given anemia workup  -check am labs, creat  -for OR today with general surgery related to umbilical abscess  -formal recommendations to follow review of post intervention ABIs  -discussed with Dr Tamar Davison  -discussed with Dr Shonna Krueger        Subjective:  Patient without complaints  Denies right lower extremity rest pain  Pain from right lower leg improved per patient  Denies left groin pain  A m  Labs pending    VSS    Vitals:  /58 (BP Location: Left arm)   Pulse 80   Temp (!) 97 2 °F (36 2 °C) (Axillary)   Resp 15   Ht 5' 10" (1 778 m)   Wt 63 5 kg (139 lb 15 9 oz)   SpO2 100%   BMI 20 09 kg/m²     I/Os:  I/O last 3 completed shifts: In: 560 [Blood:510; IV Piggyback:50]  Out: 800 [Urine:800]  No intake/output data recorded  Lab Results and Cultures:   Lab Results   Component Value Date    WBC 6 30 07/21/2020    HGB 8 7 (L) 07/21/2020    HCT 26 1 (L) 07/21/2020    MCV 93 07/21/2020    PLT 52 (L) 07/21/2020     Lab Results   Component Value Date    GLUCOSE 127 11/20/2018    CALCIUM 8 5 07/21/2020    K 4 9 07/21/2020    CO2 20 (L) 07/21/2020     07/21/2020    BUN 42 (H) 07/21/2020    CREATININE 1 77 (H) 07/21/2020     Lab Results   Component Value Date    INR 1 46 (H) 07/16/2020    INR 1 62 (H) 02/13/2019    INR 1 27 (H) 11/30/2018    PROTIME 17 4 (H) 07/16/2020    PROTIME 18 3 (H) 02/13/2019    PROTIME 16 0 (H) 11/30/2018        Blood Culture:   Lab Results   Component Value Date    BLOODCX No Growth After 4 Days  07/16/2020    BLOODCX No Growth After 4 Days   07/16/2020   ,   Urinalysis:   Lab Results   Component Value Date    COLORU Yellow 07/17/2020    CLARITYU Clear 07/17/2020    SPECGRAV 1 010 07/17/2020    PHUR 5 5 07/17/2020    PHUR 6 0 02/13/2019    LEUKOCYTESUR Negative 07/17/2020    NITRITE Negative 07/17/2020    GLUCOSEU Negative 07/17/2020    KETONESU Negative 07/17/2020    BILIRUBINUR Negative 07/17/2020    BLOODU Negative 07/17/2020   ,   Urine Culture:   Lab Results   Component Value Date    URINECX >100,000 cfu/ml Klebsiella oxytoca (A) 02/13/2019   ,   Wound Culure:   Lab Results   Component Value Date    WOUNDCULT 4+ Growth of Enterobacter cloacae complex (A) 07/17/2020    WOUNDCULT 4+ Growth of Morganella morganii (A) 07/17/2020    WOUNDCULT 4+ Growth of Enterococcus faecalis (A) 07/17/2020    WOUNDCULT (A) 07/17/2020     4+ Growth of Alpha Hemolytic Streptococcus NOT Enterococcus       Medications:  Current Facility-Administered Medications   Medication Dose Route Frequency    acetaminophen (TYLENOL) tablet 650 mg  650 mg Oral Q6H PRN    atorvastatin (LIPITOR) tablet 40 mg  40 mg Oral Daily With Dinner    bacitracin topical ointment 1 small application  1 small application Topical BID    calcium carbonate (TUMS) chewable tablet 500 mg  500 mg Oral Daily PRN    cefepime (MAXIPIME) IVPB (premix) 1,000 mg 50 mL  1,000 mg Intravenous S44K    folic acid (FOLVITE) tablet 1 mg  1 mg Oral Daily    HYDROcodone-acetaminophen (NORCO) 5-325 mg per tablet 1 tablet  1 tablet Oral Q6H PRN    HYDROmorphone (DILAUDID) injection 0 5 mg  0 5 mg Intravenous Q4H PRN    LORazepam (ATIVAN) tablet 1 mg  1 mg Oral Q8H PRN    pantoprazole (PROTONIX) injection 40 mg  40 mg Intravenous Q12H ALANNAH    polyethylene glycol (MIRALAX) packet 17 g  17 g Oral Daily    tamsulosin (FLOMAX) capsule 0 4 mg  0 4 mg Oral Daily With Dinner    vancomycin (VANCOCIN) IVPB (premix) 1,000 mg 200 mL  15 mg/kg Intravenous Q24H       Imaging:  Abdominal aortogram with right lower extremity runoff (IR) 7/20/2020:  Imaging study and images reviewed  See report as noted above    Physical Exam:    General appearance: alert and oriented, in no acute distress  Neurologic: Grossly normal  Neck: no adenopathy, no carotid bruit, no JVD, supple, symmetrical, trachea midline and thyroid not enlarged, symmetric, no tenderness/mass/nodules  Lungs: clear to auscultation bilaterally  Heart: regular rate and rhythm, S1, S2 normal, no S3 or S4, no rub and III/IV systolic murmur  Abdomen: soft, non-tender; bowel sounds normal; no masses,  no organomegaly and No bruits  Extremities: Bilateral lower extremities warm, motor and sensory intact and well perfused  Chronic venous stasis changes bilateral lower legs  See Clinical images below of right leg tissue loss  Dressings intact  Left groin access site soft and clear without erythema, induration, hemorrhage, hematoma or pulsatile mass      Wound/Incision:  Right leg dressing clean, dry, and intact    Pulse exam:  Radial: Right: 2+ Left[de-identified] 2+  Femoral: Right: 2+ Left: 2+  Popliteal: Right: 1+   DP: Right: doppler signal and Monophasic Left: non-palpable        Danii Steel PA-C  7/21/2020  [facility], [phone]

## 2020-07-21 NOTE — ASSESSMENT & PLAN NOTE
Patient has right lower leg wounds with cellulitis  Continue vancomycin and cefepime  Patient will have angiogram of the right lower leg before debridement of the right lower leg wounds by podiatry    Final culture results reviewed

## 2020-07-21 NOTE — PROGRESS NOTES
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 + age-related nephron loss  - upon review medical records, no recent baseline creatinine available, prior creatinine 1 9 in 2 3 mg/dL in early 2019  Acute kidney injury (POA) on chronic kidney disease of stage III:  - acute kidney injury suspect prerenal secondary to anemia + hemodynamic perturbations with hypotension with Ace inhibitor and NSAID use +/- ATN +/- obstruction with BPH and urinary retention  - patient was admitted with a creatinine of 2 21 mg/dL  - patient's creatinine today is at 1 77 mg/dL  - post gentle IVF post contrast exposure  - UA without proteinuria or hematuria  - CT scan with stable distal left ureteral dilatation which improved dilatation of bilateral extrarenal pelvis sees in no hydronephrosis  Likely secondary to a distal left ureteral stricture  - SPEP/UPEP and free light chain ratio pending    - hyperuricemia with most recent uric acid level 11 2     - continue to hold lisinopril + furosemide  - avoid NSAIDS, nephrotoxins, IV contrast if possible  - adjust medications to appropriate GFR   - avoid hypotension/ fluctuations in blood pressure to prevent decreased renal perfusion    - check BMP in a m    - check PVR with bladder scans  Maintain urinary retention protocol    - await renal recovery  - monitor volume status with strict intake/output  - please perform daily weights  Blood pressure:  - blood pressure with fluctuations  - optimize hemodynamics  - maintain MAP > 65mmHg  - avoid hypotension/ fluctuations in blood pressure  Volume status/CHF:  - clinically patient examines euvolemic   - most recent EF 50%; on furosemide 40 mg daily  - monitor for re-initiation of oral diuretics  Electrolytes:  - mild hyponatremia with most recent sodium 135     - workup:     - urine sodium -    - urine osm -     - cortisol -    - uric acid 11 2     - TSH 2 271     - serum osm -    - plan :    - continue 1L fluid restriction when no longer NPO  - will plan to provide gentle IVF post procedure with isolyte at 75 mL/hour      - will continue to monitor with daily BMP  Acid/base:  - most recent bicarbonate 20 with anion gap of 10    - will continue to monitor with daily BMP  Anemia likely of chronic disease:  - most recent hemoglobin at 8 7 grams/ deciliter post PRBC transfusion   - maintain hemoglobin greater than 8 grams/deciliter  - check stool Hemoccult  - SPEP/UPEP and free light chain ratio pending  Mineral bone disease of chronic kidney disease:  - most recent phosphorous acceptable at 3 5     - magnesium stable at 2 3    - PTH pending   - vitamin d pending  Other:   - umbilical hernia repair today, 07/21/2020; per General surgery  - abnormal CT scan of abdomen revealing thickening of the gastric antrum likely gastritis:  EGD/colonoscopy when clinically able; per GI    - atrial fibrillation    SUBJECTIVE:  Patient resting in bed prior to procedure  Patient without shortness of breath, chest pain, nausea, vomiting, diarrhea       OBJECTIVE:  Current Weight: Weight - Scale: 63 5 kg (139 lb 15 9 oz)  Vitals:    07/21/20 0758   BP: 110/58   Pulse: 80   Resp:    Temp: (!) 97 2 °F (36 2 °C)   SpO2: 100%       Intake/Output Summary (Last 24 hours) at 7/21/2020 1242  Last data filed at 7/21/2020 0242  Gross per 24 hour   Intake 560 ml   Output 250 ml   Net 310 ml       General:  No acute distress  Skin:  Warm, no rash  Eyes:  Sclerae anicteric  ENT:  Moist lips and mucous membranes  Neck:  Supple trachea midline  Chest:  Clear breath sounds bilaterally   CVS:  Regular rate regular rhythm  Abdomen:  Soft, nontender, normoactive bowel sounds  Extremities:  No edema   Neuro:  Alert and oriented  Psych:  Appropriate affect      Medications:  Scheduled Meds:  Current Facility-Administered Medications:  acetaminophen 650 mg Oral Q6H PRN Pola Manzano MD    aspirin 81 mg Oral Daily Sherry Jay PA-C    atorvastatin 40 mg Oral Daily With Carlos House MD    bacitracin 1 small application Topical BID Pola Manzano MD    calcium carbonate 500 mg Oral Daily PRN Tamiko Marie MD    cefepime 1,000 mg Intravenous Q24H Pola Manzano MD Last Rate: 1,000 mg (33/38/07 7809)   folic acid 1 mg Oral Daily Pola Manzano MD    HYDROcodone-acetaminophen 1 tablet Oral Q6H PRN Pola Manzano MD    HYDROmorphone 0 5 mg Intravenous Q4H PRN Pola Manzano MD    LORazepam 1 mg Oral Q8H PRN Pola Manazno MD    pantoprazole 40 mg Intravenous Q12H Shun Ramsay MD    polyethylene glycol 17 g Oral Daily BJ Haynes    tamsulosin 0 4 mg Oral Daily With Carlos House MD    vancomycin 15 mg/kg Intravenous Q24H Pola Manzano MD Last Rate: 1,000 mg (07/21/20 0401)       PRN Meds:   acetaminophen    calcium carbonate    HYDROcodone-acetaminophen    HYDROmorphone    LORazepam    Continuous Infusions:     Laboratory Results:  Results from last 7 days   Lab Units 07/21/20  0839 07/21/20  0838 07/20/20  0446 07/19/20  0529 07/18/20  1550 07/18/20  0637 07/17/20  2225 07/17/20  1520 07/17/20  0816  07/16/20  2243   WBC Thousand/uL  --  6 30 6 00 7 51  --  5 91  --   --   --   --  7 48   HEMOGLOBIN g/dL  --  8 7* 7 1* 8 2* 7 8* 8 3*  8 3* 7 0* 7 7*  --    < > 6 7*   HEMATOCRIT %  --  26 1* 21 7* 24 8* 23 3* 25 4*  25 8* 21 9* 23 2*  --    < > 20 8*   PLATELETS Thousands/uL  --  52* 72* 77*  --  84*  --   --  83*  --  104*   SODIUM mmol/L 135* 134* 132* 134*  --  138  --   --   --   --  136   POTASSIUM mmol/L 4 9 4 8 4 6 4 3  --  4 3  --   --   --   --  4 6   CHLORIDE mmol/L 105 103 101 102  --  105  --   --   --   --  103   CO2 mmol/L 20* 20* 21 20*  --  22  --   --   --   --  20*   BUN mg/dL 42* 42* 55* 59*  --  59*  --   --   --   --  80*   CREATININE mg/dL 1 77* 1 82* 1 79* 2 21*  --  2 03*  --   --   --   --  2 18*   CALCIUM mg/dL 8 5 8 9 8 1* 8 6  --  8 5  --   --   --   --  9 0   MAGNESIUM mg/dL 2 3  --   --   --   --   --   --   --   --   --   --    PHOSPHORUS mg/dL 3 5  --   --   --   --   --   --   --   --   --   --     < > = values in this interval not displayed

## 2020-07-21 NOTE — PROGRESS NOTES
Vancomycin IV Pharmacy-to-Dose Consultation    Desiree Parks is a 69 y o  male who is currently receiving Vancomycin 1000mg IV Q24H via management by the Pharmacy Consult service  As per consult order, the indication is soft tissue infection with a goal trough of 15-20  Assessment/Plan:  The patient was reviewed  Renal function is stable based on last SCr  1 79 on 7/20/20  No new labs for today; appears BMP is pending for today  There were no signs or symptoms of infusion reactions documented in the chart since the last pharmacy progress note  Based on todays assessment, continue current regimen of Vancomycin 1000mg IV Q24H (day # 5), with a previously ordered repeat trough to be drawn at 0100 on 7/23/2020  Please note that this morning's dose was given 2 5 hours late which should be given consideration when assessing level results  Pharmacy will continue to follow the patients culture results and clinical progress daily      Marilee Marie, Pharmacist

## 2020-07-21 NOTE — ASSESSMENT & PLAN NOTE
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

## 2020-07-21 NOTE — ASSESSMENT & PLAN NOTE
Patient has stage III chronic disease with creatinine around 2 0 at baseline  Patient's renal function is at baseline:  Creatinine is 1 77 today  Nephrology on board  Monitor renal function in a m

## 2020-07-21 NOTE — PROGRESS NOTES
Cardiology Progress Note - Angella Pearson 69 y o  male MRN: [id number]    Unit/Bed#: -01 Encounter: [id number]        Subjective:    No significant events overnight  Plan for surgery today  No dyspnea  Review of Systems   Constitution: Positive for malaise/fatigue  Cardiovascular: Negative for chest pain  Respiratory: Negative for shortness of breath  Objective:   Vitals: Blood pressure 110/58, pulse 80, temperature (!) 97 2 °F (36 2 °C), temperature source Axillary, resp  rate 15, height 5' 10" (1 778 m), weight 63 5 kg (139 lb 15 9 oz), SpO2 100 %  , Body mass index is 20 09 kg/m² ,   Orthostatic Blood Pressures      Most Recent Value   Blood Pressure  110/58 filed at 07/21/2020 0758   Patient Position - Orthostatic VS  Lying filed at 07/21/2020 2768         Systolic (06ORP), SZK:862 , Min:98 , JQW:870     Diastolic (07PMZ), AST:64, Min:58, Max:90      Intake/Output Summary (Last 24 hours) at 7/21/2020 1239  Last data filed at 7/21/2020 0242  Gross per 24 hour   Intake 560 ml   Output 250 ml   Net 310 ml     Weight (last 2 days)     Date/Time   Weight    07/21/20 0600   63 5 (139 99)    07/20/20 0600   63 5 (140)    07/19/20 0600   62 1 (136 8)                    Physical Exam   Constitutional: He is oriented to person, place, and time  He appears well-developed  HENT:   Head: Normocephalic  Eyes: Conjunctivae are normal  No scleral icterus  Neck: Normal range of motion  No JVD present  Cardiovascular: Normal rate  An irregularly irregular rhythm present  Pulmonary/Chest: Effort normal and breath sounds normal  No stridor  No respiratory distress  Abdominal: Soft  Bowel sounds are normal  He exhibits no distension  There is no tenderness  There is no guarding  Musculoskeletal: He exhibits no edema, tenderness or deformity  Neurological: He is alert and oriented to person, place, and time  Skin: He is not diaphoretic  Psychiatric: He has a normal mood and affect   His behavior is normal  Thought content normal          Laboratory Results:  Results from last 7 days   Lab Units 07/16/20  2243   CK TOTAL U/L 27*   TROPONIN I ng/mL <0 02       CBC with diff:   Results from last 7 days   Lab Units 07/21/20  0838 07/20/20  0446 07/19/20  0529 07/18/20  1550 07/18/20  0637 07/17/20  2225 07/17/20  1520 07/17/20  0816  07/16/20  2243   WBC Thousand/uL 6 30 6 00 7 51  --  5 91  --   --   --   --  7 48   HEMOGLOBIN g/dL 8 7* 7 1* 8 2* 7 8* 8 3*  8 3* 7 0* 7 7*  --    < > 6 7*   HEMATOCRIT % 26 1* 21 7* 24 8* 23 3* 25 4*  25 8* 21 9* 23 2*  --    < > 20 8*   MCV fL 93 94 93  --  94  --   --   --   --  95   PLATELETS Thousands/uL 52* 72* 77*  --  84*  --   --  83*  --  104*   MCH pg 31 0 30 7 30 8  --  30 9  --   --   --   --  30 5   MCHC g/dL 33 3 32 7 33 1  --  32 7  --   --   --   --  32 2   RDW % 17 5* 18 4* 18 7*  --  18 0*  --   --   --   --  20 0*   MPV fL 14 1* 12 2 11 7  --  12 6  --   --  13 2*  --  12 1   NRBC AUTO /100 WBCs 2  --   --   --   --   --   --   --   --   --    NRBC /100 WBC  --   --   --   --   --   --   --   --   --  3*    < > = values in this interval not displayed           CMP:  Results from last 7 days   Lab Units 07/21/20  0839 07/21/20  0682 07/20/20  0446 07/19/20  0529 07/18/20  0637 07/16/20  2243   POTASSIUM mmol/L 4 9 4 8 4 6 4 3 4 3 4 6   CHLORIDE mmol/L 105 103 101 102 105 103   CO2 mmol/L 20* 20* 21 20* 22 20*   BUN mg/dL 42* 42* 55* 59* 59* 80*   CREATININE mg/dL 1 77* 1 82* 1 79* 2 21* 2 03* 2 18*   CALCIUM mg/dL 8 5 8 9 8 1* 8 6 8 5 9 0   AST U/L 36  --   --   --   --  20   ALT U/L 33  --   --   --   --  21   ALK PHOS U/L 100  --   --   --   --  87   EGFR ml/min/1 73sq m 38 36 37 29 32 29         BMP:  Results from last 7 days   Lab Units 07/21/20  0839 07/21/20  0838 07/20/20  0446 07/19/20  0529 07/18/20  0637 07/16/20  2243   POTASSIUM mmol/L 4 9 4 8 4 6 4 3 4 3 4 6   CHLORIDE mmol/L 105 103 101 102 105 103   CO2 mmol/L 20* 20* 21 20* 22 20* BUN mg/dL 42* 42* 55* 59* 59* 80*   CREATININE mg/dL 1 77* 1 82* 1 79* 2 21* 2 03* 2 18*   CALCIUM mg/dL 8 5 8 9 8 1* 8 6 8 5 9 0       BNP: No results for input(s): BNP in the last 72 hours  Magnesium:   Results from last 7 days   Lab Units 20  0839   MAGNESIUM mg/dL 2 3       Coags:   Results from last 7 days   Lab Units 20  2248   PTT seconds 36   INR  1 46*       TSH: No results found for: TSH    Hemoglobin A1C       Lipid Profile:       Cardiac testing:   Results for orders placed during the hospital encounter of 20   Echo complete with contrast if indicated    Narrative [facility] [address]    Transthoracic Echocardiogram  2D, M-mode, Doppler, and Color Doppler    Study date:  2020    Patient: Norm Beltran  MR number: [id number]  Account number: [de-identified]  : 1945  Age: 69 years  Gender: Male  Status: Inpatient  Location: [address] [place] [address]  Height: 70 in  Weight: 132 7 lb  BP: 147/ 74 mmHg    Indications: Cardiomyopathy    Diagnoses: I42 9 - Cardiomyopathy, unspecified    Sonographer:  SEGUNDO Oakley  Primary Physician:  Neeta Perez DO  Referring Physician:  Angelina Contreras MD  Group:  [facility]  Interpreting Physician:  Williams Trevino MD    SUMMARY    LEFT VENTRICLE:  Systolic function was at the lower limits of normal by visual assessment  Ejection fraction was estimated to be 50 %  There were no regional wall motion abnormalities  Wall thickness was mildly to moderately increased  RIGHT VENTRICLE:  The ventricle was mildly dilated  Systolic function was mildly reduced  Systolic pressure was mildly increased  Estimated peak pressure was 40 mmHg  LEFT ATRIUM:  The atrium was mildly dilated  RIGHT ATRIUM:  The atrium was mildly dilated  MITRAL VALVE:  There was mild annular calcification  There was trace regurgitation      AORTIC VALVE:  The valve was trileaflet  Leaflets exhibited normal thickness, moderate calcification, and moderately reduced cuspal separation  There was mild regurgitation  TRICUSPID VALVE:  There was mild regurgitation  IVC, HEPATIC VEINS:  The inferior vena cava was mildly dilated  HISTORY: PRIOR HISTORY: CHF; Anemia; CKD3; Afib; PVD; HTN; Dilated CM; Pacemaker    PROCEDURE: The study was performed in the [employer]  This was a routine study  The transthoracic approach was used  The study included complete 2D imaging, M-mode, complete spectral Doppler, and color Doppler  Echocardiographic  views were limited due to poor patient compliance, poor acoustic window availability, decreased penetration, and lung interference  This was a technically difficult study  LEFT VENTRICLE: Size was normal  Systolic function was at the lower limits of normal by visual assessment  Ejection fraction was estimated to be 50 %  There were no regional wall motion abnormalities  Wall thickness was mildly to  moderately increased  DOPPLER: Transmitral flow pattern: atrial fibrillation  RIGHT VENTRICLE: The ventricle was mildly dilated  Systolic function was mildly reduced  A pacing wire was present  DOPPLER: Systolic pressure was mildly increased  Estimated peak pressure was 40 mmHg  LEFT ATRIUM: The atrium was mildly dilated  RIGHT ATRIUM: The atrium was mildly dilated  MITRAL VALVE: There was mild annular calcification  Valve structure was normal  There was mild calcification  There was normal leaflet separation  DOPPLER: The transmitral velocity was within the normal range  There was no evidence for  stenosis  There was trace regurgitation  AORTIC VALVE: The valve was trileaflet  Leaflets exhibited normal thickness, moderate calcification, and moderately reduced cuspal separation  DOPPLER: Transaortic velocity was within the normal range  There was no evidence for stenosis  There was mild regurgitation      TRICUSPID VALVE: The valve structure was normal  There was normal leaflet separation  DOPPLER: The transtricuspid velocity was within the normal range  There was no evidence for stenosis  There was mild regurgitation  PULMONIC VALVE: Leaflets exhibited normal thickness, no calcification, and normal cuspal separation  DOPPLER: The transpulmonic velocity was within the normal range  There was no regurgitation  PERICARDIUM: There was no pericardial effusion  AORTA: The root exhibited normal size  SYSTEMIC VEINS: IVC: The inferior vena cava was mildly dilated  SYSTEM MEASUREMENT TABLES    2D  %FS: 33 52 %  AV Diam: 3 07 cm  EDV(Teich): 87 9 ml  EF(Teich): 62 48 %  ESV(Teich): 32 99 ml  IVSd: 0 87 cm  LA Area: 18 13 cm2  LA Diam: 2 57 cm  LVEDV MOD A4C: 88 29 ml  LVEF MOD A4C: 70 73 %  LVESV MOD A4C: 25 84 ml  LVIDd: 4 4 cm  LVIDs: 2 93 cm  LVLd A4C: 7 1 cm  LVLs A4C: 5 55 cm  LVPWd: 0 99 cm  RA Area: 22 42 cm2  RVIDd: 3 29 cm  SV MOD A4C: 62 45 ml  SV(Teich): 54 92 ml    CW  TR Vmax: 2 72 m/s  TR maxP 59 mmHg    MM  TAPSE: 1 81 cm    [facility] Accredited Echocardiography Laboratory    Prepared and electronically signed by    Angelica Montez MD  Signed 2020 13:51:19       Results for orders placed during the hospital encounter of 18   LAURA    Narrative [facility]  [facility], [address] [place] [address]  [phone]    Transesophageal Echocardiogram  2D, 3D, Doppler, and Color Doppler    Study date:  2018    Patient: Deandre Luu  MR number: [id number]  Account number: [de-identified]  : 1945  Age: 72 years  Gender: Male  Status: Inpatient  Location: Cath lab  Height: 70 in  Weight: 148 lb  BP: 124/ 71 mmHg    Indications: Atrial fibrillation      Diagnoses: I48 0 - Atrial fibrillation    Sonographer:  Kang Tran RDCS  Interpreting Physician:  Bernarda Paz MD  Primary Physician:  Mika Aldrich MD  Referring Physician:  Prabhakar Pritchard MD  Group:  [facility]  Cardiology Fellow:  Leanne Law MD    SUMMARY    LEFT VENTRICLE:  Systolic function was mildly reduced  Ejection fraction was estimated to be 45 %  There was mild diffuse hypokinesis  Wall thickness was mildly increased  VENTRICULAR SEPTUM:  There was moderate dyssynergic motion  These changes are consistent with LBBB  RIGHT VENTRICLE:  The size was normal   Systolic function was low normal     LEFT ATRIUM:  The atrium was dilated  No thrombus was identified  LEFT ATRIAL APPENDAGE:  The left atrial appendage was normal in size  Anatomically, the ostium opened into a bended central lobe posteriorly and before leading into a distal lobe and probably is the chicken wing type  At 0 degrees, the ostium measured 11 mm and the length to the bended central lobe was 12 mm and to the distal lobe was 29 mm  At 15 degrees, the ostium measured 13 mm and the length to the distal lobe was 25 mm  At 30 degrees, the ostium measured 11 mm and the length to the distal lobe was 27 mm  At 40 degrees, the ostium measured 12 mm and the length to the bended central lobe was 17 5 mm and to the distal lobe was 33 mm  At 130 degrees, the ostium measured 14 mm and the length to the distal lobe was 29 mm  At 150 degrees, the ostium measured 11 2 mm and the length to the distal lobe was 25 1 mm  ATRIAL SEPTUM:  No defect or patent foramen ovale was identified by color Doppler  RIGHT ATRIUM:  The atrium was dilated  MITRAL VALVE:  There was moderate regurgitation  The regurgitant jet was centrally directed  AORTIC VALVE:  There was mild to moderate regurgitation  TRICUSPID VALVE:  There was mild regurgitation  Pulmonary artery systolic pressure was mildly increased  HISTORY: PRIOR HISTORY: Hypertension, Congestive heart failure, Coronary artery disease, Atrial fibrillation, Hyperlipidemia, Cardiomyopathy      PROCEDURE: The procedure was performed in the catheterization laboratory  This was a routine study  The risks and alternatives of the procedure were explained to the patient and informed consent was obtained  The transesophageal approach  was used  The study included complete 2D imaging, 3D imaging, complete spectral Doppler, and color Doppler  The heart rate was 77 bpm, at the start of the study  An adult omniplane probe was inserted by the cardiology fellow under direct  supervision of the attending cardiologist  Intubated with ease  One intubation attempt(s)  There was no blood detected on the probe  Image quality was adequate  There were no complications during the procedure  MEDICATIONS: Anesthesia  administered by anesthesia team     LEFT VENTRICLE: Size was normal  Systolic function was mildly reduced  Ejection fraction was estimated to be 45 %  There was mild diffuse hypokinesis  Wall thickness was mildly increased  DOPPLER: The study was not technically sufficient  to allow evaluation of LV diastolic function  VENTRICULAR SEPTUM: There was moderate dyssynergic motion  These changes are consistent with LBBB  RIGHT VENTRICLE: The size was normal  Systolic function was low normal     LEFT ATRIUM: The atrium was dilated  No thrombus was identified  APPENDAGE: The left atrial appendage was normal in size  Anatomically, the ostium opened into a bended central lobe posteriorly and before leading into a distal lobe and  probably is the chicken wing type  At 0 degrees, the ostium measured 11 mm and the length to the bended central lobe was 12 mm and to the distal lobe was 29 mm  At 15 degrees, the ostium measured 13 mm and the length to the distal lobe was 25 mm  At 30 degrees, the ostium measured 11 mm and the length to the distal lobe was 27 mm  At 40 degrees, the ostium measured 12 mm and the length to the bended central lobe was 17 5 mm and to the distal lobe was 33 mm    At 130 degrees, the ostium measured 14 mm and the length to the distal lobe was 29 mm  At 150 degrees, the ostium measured 11 2 mm and the length to the distal lobe was 25 1 mm  The size was normal  No thrombus was identified  DOPPLER: The function was reduced  ATRIAL SEPTUM: No defect or patent foramen ovale was identified by color Doppler  RIGHT ATRIUM: The atrium was dilated  No thrombus was identified  MITRAL VALVE: Valve structure was normal  There was normal leaflet separation  DOPPLER: There was moderate regurgitation  The regurgitant jet was centrally directed  AORTIC VALVE: The valve was trileaflet  Leaflets exhibited normal thickness and normal cuspal separation  DOPPLER: There was mild to moderate regurgitation  TRICUSPID VALVE: The valve structure was normal  There was normal leaflet separation  DOPPLER: There was mild regurgitation  Pulmonary artery systolic pressure was mildly increased  Estimated peak PA pressure was 35 mmHg  PULMONIC VALVE: Leaflets exhibited normal thickness, no calcification, and normal cuspal separation  DOPPLER: There was no significant regurgitation  PERICARDIUM: There was no pericardial effusion  The pericardium was normal in appearance  AORTA: The root exhibited normal size  There was no atheroma  There was no evidence for dissection  There was no evidence for aneurysm  PULMONARY VEINS: The pulmonary veins were normal sized  DOPPLER: Doppler flow pattern was normal in the pulmonary vein(s)  [address] Accredited Echocardiography Laboratory    Prepared and electronically signed by    Chante Camacho MD  Signed 03-Dec-2018 13:26:14       No results found for this or any previous visit  No results found for this or any previous visit      Meds/Allergies   current meds:   Current Facility-Administered Medications   Medication Dose Route Frequency    [MAR Hold] acetaminophen (TYLENOL) tablet 650 mg  650 mg Oral Q6H PRN    [MAR Hold] aspirin chewable tablet 81 mg  81 mg Oral Daily    [MAR Hold] atorvastatin (LIPITOR) tablet 40 mg  40 mg Oral Daily With Dinner   [facility] Hold] bacitracin topical ointment 1 small application  1 small application Topical BID    [MAR Hold] calcium carbonate (TUMS) chewable tablet 500 mg  500 mg Oral Daily PRN    [MAR Hold] cefepime (MAXIPIME) IVPB (premix) 1,000 mg 50 mL  1,000 mg Intravenous Q24H    [MAR Hold] folic acid (FOLVITE) tablet 1 mg  1 mg Oral Daily    [MAR Hold] HYDROcodone-acetaminophen (NORCO) 5-325 mg per tablet 1 tablet  1 tablet Oral Q6H PRN    [MAR Hold] HYDROmorphone (DILAUDID) injection 0 5 mg  0 5 mg Intravenous Q4H PRN    [MAR Hold] LORazepam (ATIVAN) tablet 1 mg  1 mg Oral Q8H PRN    multi-electrolyte (PLASMALYTE-A/ISOLYTE-S PH 7 4) IV solution  75 mL/hr Intravenous Continuous    [MAR Hold] pantoprazole (PROTONIX) injection 40 mg  40 mg Intravenous Q12H [address]    [MAR Hold] polyethylene glycol (MIRALAX) packet 17 g  17 g Oral Daily    [MAR Hold] tamsulosin (FLOMAX) capsule 0 4 mg  0 4 mg Oral Daily With Dinner    [MAR Hold] vancomycin (VANCOCIN) IVPB (premix) 1,000 mg 200 mL  15 mg/kg Intravenous Q24H     Facility-Administered Medications Ordered in Other Encounters   Medication Dose Route Frequency    fentanyl citrate (PF) 100 MCG/2ML   Intravenous PRN    lactated ringers infusion    Continuous PRN    midazolam (VERSED) injection   Intravenous PRN    propofol (DIPRIVAN) 200 MG/20ML bolus injection   Intravenous PRN    ROCuronium (ZEMURON) injection    PRN     Medications Prior to Admission   Medication    Ascorbic Acid (VITAMIN C PO)    B Complex Vitamins (VITAMIN B COMPLEX PO)    calcium carbonate (TUMS) 500 mg chewable tablet    diazepam (VALIUM) 5 mg tablet    folic acid (FOLVITE) 1 mg tablet    furosemide (LASIX) 40 mg tablet    Lactobacillus (ACIDOPHILUS PO)    lisinopril (ZESTRIL) 5 mg tablet    Lycopene 10 MG CAPS    metoprolol succinate (TOPROL-XL) 100 mg 24 hr tablet    Misc Natural Products (SAW PALMETTO) CAPS    Multiple Vitamin (MULTI-DAY VITAMINS) TABS    acetaminophen (TYLENOL) 325 mg tablet    clobetasol (TEMOVATE) 0 05 % GEL    Ferrous Gluconate-C-Folic Acid (IRON-C PO)    tamsulosin (FLOMAX) 0 4 mg          Assessment:  Principal Problem:    Anemia, unspecified  Active Problems:    Other persistent atrial fibrillation (HCC)    Thrombocytopenia (HCC)    Chronic combined systolic and diastolic heart failure (HCC)    Peripheral vascular disease of lower extremity (HCC)    Cellulitis    Lactic acidosis    CKD (chronic kidney disease) stage 3, GFR 30-59 ml/min (HCC)    Umbilicus discharge    Benign prostatic hyperplasia with urinary retention    Severe protein-calorie malnutrition (HCC)    Plan:    Chronic diastolic CHF / Perm AF / AVN ablation BI V pacemaker  EF 50%    Volume status is stable  COntinue with current medical management  Counseling / Coordination of Care  Total floor / unit time spent today 25 minutes  Greater than 50% of total time was spent with the patient and / or family counseling and / or coordination of care  A description of the counseling / coordination of care

## 2020-07-21 NOTE — ASSESSMENT & PLAN NOTE
Patient presented with severe symptomatic normocytic anemia 6 7 causing shortness of breath on exertion  He was taking NSAIDs for chronic right lower leg ulcers that were getting worse  Patient was transfused packed red blood cells during the hospital stay  Hemoglobin currently is 8 7 this morning  Monitor H&H and transfuse as needed  Continue IV Protonix b i d ,  Abnormal CT scan the abdomen pelvis showing thickening of the gastric antrum likely gastritis but unable to exclude gastric neoplasm  Patient received another 1 unit of packed red cell transfusion on July 20  GI follow-up needed  Patient will need eventually endoscopic workup    Refusing at this time and wants his abdominal issues to be addressed first

## 2020-07-21 NOTE — ANESTHESIA PREPROCEDURE EVALUATION
Review of Systems/Medical History          Cardiovascular  Pacemaker/AICD, Hypertension , Valvular heart disease , aortic regurgitation, Dysrhythmias , atrial fibrillation, CHF compensated CHF, PVD,    Pulmonary       GI/Hepatic      Comment: Purulent d/c from umbilicus     Chronic kidney disease stage 3,   Comment: PALOMA on CKD has been improving     Endo/Other     GYN       Hematology  Anemia acute blood loss anemia,     Musculoskeletal       Neurology   Psychology           Physical Exam    Airway    Mallampati score: III  TM Distance: >3 FB  Neck ROM: full     Dental   Comment: Upper partial, upper dentures,     Cardiovascular  Rhythm: irregular, Rate: normal,     Pulmonary  Pulmonary exam normal Breath sounds clear to auscultation,     Other Findings        Anesthesia Plan  ASA Score- 3     Anesthesia Type- general with ASA Monitors  Additional Monitors:   Airway Plan: ETT  Comment: Benefits and risks of planned anesthetic discussed with patient, and agrees to proceed  I, Dr Suman Fernandez, the attending anesthesiologist, have personally seen and evaluated the patient prior to anesthetic care  I have reviewed the preanesthetic record, and other medical records if appropriate to the anesthetic care  If a CRNA is involved in the case, I have reviewed the CRNA assessment, if present, and agree  The patient is in a suitable condition to proceed with my formulated anesthetic plan        Plan Factors-    Induction- intravenous  Postoperative Plan- Plan for postoperative opioid use  Informed Consent- Anesthetic plan and risks discussed with patient

## 2020-07-21 NOTE — PROGRESS NOTES
Progress Note - General Surgery   Michael Calvo 69 y o  male MRN: [id number]  Unit/Bed#: -01 Encounter: [id number]      Assessment/Plan:     Umbilical wound with abscess and protruding mesh  CTAP without fistula, evidence of abscess in superficial abdominal wall  Mesh exposed on exam with continuous drainage of grey purulent material with foul odor  Plan for explantation of mesh and primary repair of umbilical hernia today  Procedure discussed in detail with Dr Nolen Duverney  Consent obtained  Continue NPO  Continue empiric abx     Anemia  S/p blood transfusion  Hgb 7 1 yesterday, did receive packed red blood cells  Repeat H&H this morning  Check stool hemoccults  CTAP with antral thickening - gastritis vs Neoplasm   GI on board - patient refusing EGD/colonoscopy until his leg and abdominal wounds are addressed  Transfuse as needed     Right lower extremity wounds   Mixed venous statsis and arterial wounds  With evidence of resting claudication, leg dangling while laying down  LEADs with >75% stenosis mid SFA NESTOR unable to be obtained secondary to absent PPG waveform  Status post arteriogram yesterday with angioplasty of proximal/mid SFA, patient was not cooperative for stenting or further procedure to distal extremity      Other medical comorbid conditions  CHF, Persistent Atrial fibrillation, pacemaker, CKD, BPH, Chronic ITP  Monitor fluid status  Cardiology on board  Continue to monitor platelets  Medical manage       Subjective/Objective   Chief Complaint:  Pain in lower extremity    Subjective:  Status post angioplasty  Patient denies abdominal pain, nausea or vomiting  Denies fevers or chills  For OR today  Objective:     Blood pressure 98/66, pulse 72, temperature 98 °F (36 7 °C), resp  rate 15, height 5' 10" (1 778 m), weight 63 5 kg (139 lb 15 9 oz), SpO2 99 %  ,Body mass index is 20 09 kg/m²        Intake/Output Summary (Last 24 hours) at 7/21/2020 0726  Last data filed at 7/21/2020 0242  Gross per 24 hour   Intake 560 ml   Output 250 ml   Net 310 ml       Invasive Devices     Peripheral Intravenous Line            Peripheral IV 07/20/20 Left Arm 1 day                Physical Exam:   General appearance: alert and oriented, in no acute distress, more clear this a m  Head: Normocephalic, without obvious abnormality, atraumatic, sclerae anicteric, mucous membranes moist  Neck: no JVD and supple, symmetrical, trachea midline  Lungs: clear to auscultation, no wheezes or rales, decreased at bases  Heart:   Irregularly irregular rhythm, rate controlled  Abdomen:   Flat, soft, mild tenderness at wound site, active bowel sounds  Extremities:  Left lower extremity with venous stasis changes, right lower extremity with dressing in place   Skin: Warm, dry  Nursing notes and vital signs reviewed      Lab, Imaging and other studies:I have personally reviewed pertinent lab results    , CBC: No results found for: WBC, HGB, HCT, MCV, PLT, ADJUSTEDWBC, MCH, MCHC, RDW, MPV, NRBC, CMP: No results found for: SODIUM, K, CL, CO2, ANIONGAP, BUN, CREATININE, GLUCOSE, CALCIUM, AST, ALT, ALKPHOS, PROT, BILITOT, EGFR  VTE Pharmacologic Prophylaxis: Heparin  VTE Mechanical Prophylaxis: sequential compression device     Chestine Myron Bolanos PA-C

## 2020-07-22 NOTE — PROGRESS NOTES
Progress Note - General Surgery   Nicole Cutter 69 y o  male MRN: [id number]  Unit/Bed#: -01 Encounter: [id number]    Assessment/Plan:  Umbilical wound with infection, abscess and protruding mesh  -POD#1 status post wound exploration, explantation of mesh, primary repair of umbilical hernia  -patient doing well, minimal incisional pain  -continue diet as tolerated  -pain control as needed  -monitor wound, will remove marshal tomorrow  -continue antibiotics per Infectious Disease    Urinary retention  -patient refusing catheterization yesterday, bladder appears full this morning  -continue monitoring and catheterization as needed     Anemia  S/p blood transfusions  Hgb 7 7  today  CTAP with antral thickening - gastritis vs Neoplasm   Continue GI workup, GI on board - patient refusing EGD/colonoscopy until his leg and abdominal wounds are addressed  Transfuse as needed     Right lower extremity wounds   Mixed venous statsis and arterial wounds  With evidence of resting claudication, leg dangling while laying down  LEADs with >75% stenosis mid SFA NESTOR unable to be obtained secondary to absent PPG waveform  Status post arteriogram with angioplasty of proximal/mid SFA, patient was not cooperative for stenting or further procedure to distal extremity    -repeats ABIs pending  -podiatry plan for wound debridement tomorrow  -continue local wound care for Podiatry  -vascular follow-up    Other medical comorbid conditions  CHF, Persistent Atrial fibrillation, pacemaker, CKD, BPH, Chronic ITP  Platelets [phone] today continue to monitor  Monitor fluid status, cardiology on board  Nephrology following  Medical management        Subjective/Objective   Chief Complaint:  Abdominal and leg pain    Subjective:  Some pain at incision site, no nausea or vomiting, tolerating diet  Refused straight cath last evening  Still with right leg pain    Denies fevers or chills    Objective:    Blood pressure 105/68, pulse 103, temperature 97 7 °F (36 5 °C), temperature source Oral, resp  rate 20, height 5' 10" (1 778 m), weight 63 5 kg (139 lb 15 9 oz), SpO2 100 %  ,Body mass index is 20 09 kg/m²  Intake/Output Summary (Last 24 hours) at 7/22/2020 0930  Last data filed at 7/22/2020 2275  Gross per 24 hour   Intake 1565 ml   Output 775 ml   Net 790 ml       Invasive Devices     Peripheral Intravenous Line            Peripheral IV 07/20/20 Left Arm 2 days                Physical Exam:   General appearance: alert and oriented, in no acute distress  Head: Normocephalic, without obvious abnormality, atraumatic, sclerae anicteric, mucous membranes moist  Neck: no JVD and supple, symmetrical, trachea midline  Lungs: clear to auscultation, no wheezes or rales, decreased at bases  Heart:   Irregularly irregular rhythm, mild tachycardia  Abdomen:   Soft, some distension, tenderness over incision site, few bowel sounds  Fullness over bladder  Extremities:   Right leg dressing in place  Skin: Warm, dry; incision site dressing intact  Nursing notes and vital signs reviewed      Lab, Imaging and other studies:  I have personally reviewed pertinent lab results    , CBC:   Lab Results   Component Value Date    WBC 5 90 07/22/2020    HGB 7 7 (L) 07/22/2020    HCT 23 0 (L) 07/22/2020    MCV 94 07/22/2020    PLT 46 (LL) 07/22/2020    MCH 31 6 07/22/2020    MCHC 33 5 07/22/2020    RDW 17 5 (H) 07/22/2020    MPV 12 2 07/22/2020    NRBC 2 07/22/2020   , CMP:   Lab Results   Component Value Date    SODIUM 134 (L) 07/22/2020    K 4 6 07/22/2020     07/22/2020    CO2 21 07/22/2020    BUN 40 (H) 07/22/2020    CREATININE 1 86 (H) 07/22/2020    CALCIUM 8 1 (L) 07/22/2020    EGFR 35 07/22/2020     VTE Pharmacologic Prophylaxis: Heparin  VTE Mechanical Prophylaxis: sequential compression device     Izabel Miguel Bolanos PA-C

## 2020-07-22 NOTE — PROGRESS NOTES
Cardiology Progress Note - Dmitry Conley 69 y o  male MRN: [id number]    Unit/Bed#: -01 Encounter: [id number]        Subjective:    No significant events overnight  POD1  No chest pain and no dyspnea at rest      Review of Systems   Constitution: Positive for malaise/fatigue  Cardiovascular: Negative for chest pain  Respiratory: Negative for shortness of breath  Objective:   Vitals: Blood pressure 99/66, pulse 95, temperature 98 °F (36 7 °C), temperature source Oral, resp  rate 20, height 5' 10" (1 778 m), weight 63 5 kg (139 lb 15 9 oz), SpO2 100 %  , Body mass index is 20 09 kg/m² ,   Orthostatic Blood Pressures      Most Recent Value   Blood Pressure  99/66 filed at 07/22/2020 1132   Patient Position - Orthostatic VS  Sitting filed at 07/22/2020 0456         Systolic (75HPK), DGB:186 , Min:99 , KNJ:296     Diastolic (48ZKK), INC:33, Min:61, Max:68      Intake/Output Summary (Last 24 hours) at 7/22/2020 1323  Last data filed at 7/22/2020 6885  Gross per 24 hour   Intake 1565 ml   Output 775 ml   Net 790 ml     Weight (last 2 days)     Date/Time   Weight    07/22/20 0600   63 5 (139 99)    07/21/20 0600   63 5 (139 99)    07/20/20 0600   63 5 (140)                    Physical Exam   Constitutional: He is oriented to person, place, and time  He appears well-developed  No distress  HENT:   Head: Normocephalic  Eyes: Conjunctivae are normal  No scleral icterus  Neck: Normal range of motion  No JVD present  Cardiovascular: Normal rate, regular rhythm and normal heart sounds  Pulmonary/Chest: Effort normal  No stridor  No respiratory distress  He has no wheezes  Abdominal: Soft  Bowel sounds are normal  He exhibits no distension  There is no tenderness  There is no guarding  Musculoskeletal: Normal range of motion  He exhibits edema  He exhibits no tenderness or deformity  Neurological: He is alert and oriented to person, place, and time  Skin: Skin is warm and dry   He is not diaphoretic  Psychiatric: He has a normal mood and affect  His behavior is normal          Laboratory Results:  Results from last 7 days   Lab Units 07/16/20  2243   CK TOTAL U/L 27*   TROPONIN I ng/mL <0 02       CBC with diff:   Results from last 7 days   Lab Units 07/22/20  0941 07/22/20  0631 07/22/20  0034 07/21/20  1801 07/21/20  0261 07/20/20  0446 07/19/20  0529  07/18/20  0637  07/17/20  0816  07/16/20  2243   WBC Thousand/uL  --  5 90  --   --  6 30 6 00 7 51  --  5 91  --   --   --  7 48   HEMOGLOBIN g/dL 8 0* 7 7* 7 7* 7 9* 8 7* 7 1* 8 2*   < > 8 3*  8 3*   < >  --    < > 6 7*   HEMATOCRIT % 24 0* 23 0* 23 2* 23 9* 26 1* 21 7* 24 8*   < > 25 4*  25 8*   < >  --    < > 20 8*   MCV fL  --  94  --   --  93 94 93  --  94  --   --   --  95   PLATELETS Thousands/uL  --  46*  --   --  52* 72* 77*  --  84*  --  83*  --  104*   MCH pg  --  31 6  --   --  31 0 30 7 30 8  --  30 9  --   --   --  30 5   MCHC g/dL  --  33 5  --   --  33 3 32 7 33 1  --  32 7  --   --   --  32 2   RDW %  --  17 5*  --   --  17 5* 18 4* 18 7*  --  18 0*  --   --   --  20 0*   MPV fL  --  12 2  --   --  14 1* 12 2 11 7  --  12 6  --  13 2*  --  12 1   NRBC AUTO /100 WBCs  --  2  --   --  2  --   --   --   --   --   --   --   --    NRBC /100 WBC  --   --   --   --   --   --   --   --   --   --   --   --  3*    < > = values in this interval not displayed           CMP:  Results from last 7 days   Lab Units 07/22/20  0631 07/21/20  4291 07/21/20  7387 07/20/20  0446 07/19/20  0529 07/18/20  0637 07/16/20  2243   POTASSIUM mmol/L 4 6 4 9 4 8 4 6 4 3 4 3 4 6   CHLORIDE mmol/L 104 105 103 101 102 105 103   CO2 mmol/L 21 20* 20* 21 20* 22 20*   BUN mg/dL 40* 42* 42* 55* 59* 59* 80*   CREATININE mg/dL 1 86* 1 77* 1 82* 1 79* 2 21* 2 03* 2 18*   CALCIUM mg/dL 8 1* 8 5 8 9 8 1* 8 6 8 5 9 0   AST U/L  --  36  --   --   --   --  20   ALT U/L  --  33  --   --   --   --  21   ALK PHOS U/L  --  100  --   --   --   --  87   EGFR ml/min/1 73sq m 35 38 36 37 29 32 29         BMP:  Results from last 7 days   Lab Units 20  0631 20  0839 20  0838 20  0446 20  0529 20  0637 20  2243   POTASSIUM mmol/L 4 6 4 9 4 8 4 6 4 3 4 3 4 6   CHLORIDE mmol/L 104 105 103 101 102 105 103   CO2 mmol/L 21 20* 20* 21 20* 22 20*   BUN mg/dL 40* 42* 42* 55* 59* 59* 80*   CREATININE mg/dL 1 86* 1 77* 1 82* 1 79* 2 21* 2 03* 2 18*   CALCIUM mg/dL 8 1* 8 5 8 9 8 1* 8 6 8 5 9 0       BNP: No results for input(s): BNP in the last 72 hours  Magnesium:   Results from last 7 days   Lab Units 20  0839   MAGNESIUM mg/dL 2 3       Coags:   Results from last 7 days   Lab Units 20  2248   PTT seconds 36   INR  1 46*       TSH: No results found for: TSH    Hemoglobin A1C       Lipid Profile:       Cardiac testing:   Results for orders placed during the hospital encounter of 20   Echo complete with contrast if indicated    [address]    Transthoracic Echocardiogram  2D, M-mode, Doppler, and Color Doppler    Study date:  2020    Patient: Naomi Siddiqi  MR number: [id number]  Account number: [de-identified]  : 1945  Age: 69 years  Gender: Male  Status: Inpatient  Location: [address] [place] [address]  Height: 70 in  Weight: 132 7 lb  BP: 147/ 74 mmHg    Indications: Cardiomyopathy    Diagnoses: I42 9 - Cardiomyopathy, unspecified    Sonographer:  SEGUNDO Welch  Primary Physician:  DO Rajat  Referring Physician:  Les Ware MD  Group:  [facility]  Interpreting Physician:  Cathy Esqueda MD    SUMMARY    LEFT VENTRICLE:  Systolic function was at the lower limits of normal by visual assessment  Ejection fraction was estimated to be 50 %  There were no regional wall motion abnormalities  Wall thickness was mildly to moderately increased  RIGHT VENTRICLE:  The ventricle was mildly dilated    Systolic function was mildly reduced  Systolic pressure was mildly increased  Estimated peak pressure was 40 mmHg  LEFT ATRIUM:  The atrium was mildly dilated  RIGHT ATRIUM:  The atrium was mildly dilated  MITRAL VALVE:  There was mild annular calcification  There was trace regurgitation  AORTIC VALVE:  The valve was trileaflet  Leaflets exhibited normal thickness, moderate calcification, and moderately reduced cuspal separation  There was mild regurgitation  TRICUSPID VALVE:  There was mild regurgitation  IVC, HEPATIC VEINS:  The inferior vena cava was mildly dilated  HISTORY: PRIOR HISTORY: CHF; Anemia; CKD3; Afib; PVD; HTN; Dilated CM; Pacemaker    PROCEDURE: The study was performed in the [address] [place] [address]  This was a routine study  The transthoracic approach was used  The study included complete 2D imaging, M-mode, complete spectral Doppler, and color Doppler  Echocardiographic  views were limited due to poor patient compliance, poor acoustic window availability, decreased penetration, and lung interference  This was a technically difficult study  LEFT VENTRICLE: Size was normal  Systolic function was at the lower limits of normal by visual assessment  Ejection fraction was estimated to be 50 %  There were no regional wall motion abnormalities  Wall thickness was mildly to  moderately increased  DOPPLER: Transmitral flow pattern: atrial fibrillation  RIGHT VENTRICLE: The ventricle was mildly dilated  Systolic function was mildly reduced  A pacing wire was present  DOPPLER: Systolic pressure was mildly increased  Estimated peak pressure was 40 mmHg  LEFT ATRIUM: The atrium was mildly dilated  RIGHT ATRIUM: The atrium was mildly dilated  MITRAL VALVE: There was mild annular calcification  Valve structure was normal  There was mild calcification  There was normal leaflet separation  DOPPLER: The transmitral velocity was within the normal range  There was no evidence for  stenosis   There was trace regurgitation  AORTIC VALVE: The valve was trileaflet  Leaflets exhibited normal thickness, moderate calcification, and moderately reduced cuspal separation  DOPPLER: Transaortic velocity was within the normal range  There was no evidence for stenosis  There was mild regurgitation  TRICUSPID VALVE: The valve structure was normal  There was normal leaflet separation  DOPPLER: The transtricuspid velocity was within the normal range  There was no evidence for stenosis  There was mild regurgitation  PULMONIC VALVE: Leaflets exhibited normal thickness, no calcification, and normal cuspal separation  DOPPLER: The transpulmonic velocity was within the normal range  There was no regurgitation  PERICARDIUM: There was no pericardial effusion  AORTA: The root exhibited normal size  SYSTEMIC VEINS: IVC: The inferior vena cava was mildly dilated      SYSTEM MEASUREMENT TABLES    2D  %FS: 33 52 %  AV Diam: 3 07 cm  EDV(Teich): 87 9 ml  EF(Teich): 62 48 %  ESV(Teich): 32 99 ml  IVSd: 0 87 cm  LA Area: 18 13 cm2  LA Diam: 2 57 cm  LVEDV MOD A4C: 88 29 ml  LVEF MOD A4C: 70 73 %  LVESV MOD A4C: 25 84 ml  LVIDd: 4 4 cm  LVIDs: 2 93 cm  LVLd A4C: 7 1 cm  LVLs A4C: 5 55 cm  LVPWd: 0 99 cm  RA Area: 22 42 cm2  RVIDd: 3 29 cm  SV MOD A4C: 62 45 ml  SV(Teich): 54 92 ml    CW  TR Vmax: 2 72 m/s  TR maxP 59 mmHg    MM  TAPSE: 1 81 cm    [facility] Accredited Echocardiography Laboratory    Prepared and electronically signed by    Kandy Rock MD  Signed 2020 13:51:19       Results for orders placed during the hospital encounter of 18   LAURA    Narrative Amy [address]  [facility] [place] [facility], [address] [facility]  [phone]    Transesophageal Echocardiogram  2D, 3D, Doppler, and Color Doppler    Study date:  2018    Patient: Caty Solis  MR number: [id number]  Account number: [de-identified]  : 1945  Age: 72 years  Gender: Male  Status: Inpatient  Location: Cath lab  Height: 70 in  Weight: 148 lb  BP: 124/ 71 mmHg    Indications: Atrial fibrillation  Diagnoses: I48 0 - Atrial fibrillation    Sonographer:  Ninoska Jacobs RDCS  Interpreting Physician:  Lo Mata MD  Primary Physician:  Veronica Dickson MD  Referring Physician:  China Nolan MD  Group:  [facility]  Cardiology Fellow:  Katya Colmenares MD    SUMMARY    LEFT VENTRICLE:  Systolic function was mildly reduced  Ejection fraction was estimated to be 45 %  There was mild diffuse hypokinesis  Wall thickness was mildly increased  VENTRICULAR SEPTUM:  There was moderate dyssynergic motion  These changes are consistent with LBBB  RIGHT VENTRICLE:  The size was normal   Systolic function was low normal     LEFT ATRIUM:  The atrium was dilated  No thrombus was identified  LEFT ATRIAL APPENDAGE:  The left atrial appendage was normal in size  Anatomically, the ostium opened into a bended central lobe posteriorly and before leading into a distal lobe and probably is the chicken wing type  At 0 degrees, the ostium measured 11 mm and the length to the bended central lobe was 12 mm and to the distal lobe was 29 mm  At 15 degrees, the ostium measured 13 mm and the length to the distal lobe was 25 mm  At 30 degrees, the ostium measured 11 mm and the length to the distal lobe was 27 mm  At 40 degrees, the ostium measured 12 mm and the length to the bended central lobe was 17 5 mm and to the distal lobe was 33 mm  At 130 degrees, the ostium measured 14 mm and the length to the distal lobe was 29 mm  At 150 degrees, the ostium measured 11 2 mm and the length to the distal lobe was 25 1 mm  ATRIAL SEPTUM:  No defect or patent foramen ovale was identified by color Doppler  RIGHT ATRIUM:  The atrium was dilated  MITRAL VALVE:  There was moderate regurgitation  The regurgitant jet was centrally directed      AORTIC VALVE:  There was mild to moderate regurgitation  TRICUSPID VALVE:  There was mild regurgitation  Pulmonary artery systolic pressure was mildly increased  HISTORY: PRIOR HISTORY: Hypertension, Congestive heart failure, Coronary artery disease, Atrial fibrillation, Hyperlipidemia, Cardiomyopathy  PROCEDURE: The procedure was performed in the catheterization laboratory  This was a routine study  The risks and alternatives of the procedure were explained to the patient and informed consent was obtained  The transesophageal approach  was used  The study included complete 2D imaging, 3D imaging, complete spectral Doppler, and color Doppler  The heart rate was 77 bpm, at the start of the study  An adult omniplane probe was inserted by the cardiology fellow under direct  supervision of the attending cardiologist  Intubated with ease  One intubation attempt(s)  There was no blood detected on the probe  Image quality was adequate  There were no complications during the procedure  MEDICATIONS: Anesthesia  administered by anesthesia team     LEFT VENTRICLE: Size was normal  Systolic function was mildly reduced  Ejection fraction was estimated to be 45 %  There was mild diffuse hypokinesis  Wall thickness was mildly increased  DOPPLER: The study was not technically sufficient  to allow evaluation of LV diastolic function  VENTRICULAR SEPTUM: There was moderate dyssynergic motion  These changes are consistent with LBBB  RIGHT VENTRICLE: The size was normal  Systolic function was low normal     LEFT ATRIUM: The atrium was dilated  No thrombus was identified  APPENDAGE: The left atrial appendage was normal in size  Anatomically, the ostium opened into a bended central lobe posteriorly and before leading into a distal lobe and  probably is the chicken wing type  At 0 degrees, the ostium measured 11 mm and the length to the bended central lobe was 12 mm and to the distal lobe was 29 mm    At 15 degrees, the ostium measured 13 mm and the length to the distal lobe was 25 mm  At 30 degrees, the ostium measured 11 mm and the length to the distal lobe was 27 mm  At 40 degrees, the ostium measured 12 mm and the length to the bended central lobe was 17 5 mm and to the distal lobe was 33 mm  At 130 degrees, the ostium measured 14 mm and the length to the distal lobe was 29 mm  At 150 degrees, the ostium measured 11 2 mm and the length to the distal lobe was 25 1 mm  The size was normal  No thrombus was identified  DOPPLER: The function was reduced  ATRIAL SEPTUM: No defect or patent foramen ovale was identified by color Doppler  RIGHT ATRIUM: The atrium was dilated  No thrombus was identified  MITRAL VALVE: Valve structure was normal  There was normal leaflet separation  DOPPLER: There was moderate regurgitation  The regurgitant jet was centrally directed  AORTIC VALVE: The valve was trileaflet  Leaflets exhibited normal thickness and normal cuspal separation  DOPPLER: There was mild to moderate regurgitation  TRICUSPID VALVE: The valve structure was normal  There was normal leaflet separation  DOPPLER: There was mild regurgitation  Pulmonary artery systolic pressure was mildly increased  Estimated peak PA pressure was 35 mmHg  PULMONIC VALVE: Leaflets exhibited normal thickness, no calcification, and normal cuspal separation  DOPPLER: There was no significant regurgitation  PERICARDIUM: There was no pericardial effusion  The pericardium was normal in appearance  AORTA: The root exhibited normal size  There was no atheroma  There was no evidence for dissection  There was no evidence for aneurysm  PULMONARY VEINS: The pulmonary veins were normal sized  DOPPLER: Doppler flow pattern was normal in the pulmonary vein(s)      [address] Accredited Echocardiography Laboratory    Prepared and electronically signed by    Rosangela Lucio MD  Signed 03-Dec-2018 13:26:14       No results found for this or any previous visit  No results found for this or any previous visit      Meds/Allergies   current meds:   Current Facility-Administered Medications   Medication Dose Route Frequency    acetaminophen (TYLENOL) tablet 650 mg  650 mg Oral Q6H PRN    aspirin chewable tablet 81 mg  81 mg Oral Daily    atorvastatin (LIPITOR) tablet 40 mg  40 mg Oral Daily With Dinner    bacitracin topical ointment 1 small application  1 small application Topical BID    calcium carbonate (TUMS) chewable tablet 500 mg  500 mg Oral Daily PRN    cefepime (MAXIPIME) IVPB (premix) 1,000 mg 50 mL  1,000 mg Intravenous Q24H    fentaNYL (SUBLIMAZE) injection 25 mcg  25 mcg Intravenous Q5 Min PRN    folic acid (FOLVITE) tablet 1 mg  1 mg Oral Daily    HYDROcodone-acetaminophen (NORCO) 5-325 mg per tablet 1 tablet  1 tablet Oral Q4H PRN    HYDROcodone-acetaminophen (NORCO) 5-325 mg per tablet 2 tablet  2 tablet Oral Q4H PRN    HYDROmorphone (DILAUDID) injection 0 5 mg  0 5 mg Intravenous Q10 Min PRN    HYDROmorphone (DILAUDID) injection 0 5 mg  0 5 mg Intravenous Q2H PRN    LORazepam (ATIVAN) tablet 1 mg  1 mg Oral Q8H PRN    pantoprazole (PROTONIX) injection 40 mg  40 mg Intravenous Q12H ALANNAH    polyethylene glycol (MIRALAX) packet 17 g  17 g Oral Daily    tamsulosin (FLOMAX) capsule 0 4 mg  0 4 mg Oral Daily With Dinner    vancomycin (VANCOCIN) IVPB (premix) 1,000 mg 200 mL  15 mg/kg Intravenous Q24H     Medications Prior to Admission   Medication    Ascorbic Acid (VITAMIN C PO)    B Complex Vitamins (VITAMIN B COMPLEX PO)    calcium carbonate (TUMS) 500 mg chewable tablet    diazepam (VALIUM) 5 mg tablet    folic acid (FOLVITE) 1 mg tablet    furosemide (LASIX) 40 mg tablet    Lactobacillus (ACIDOPHILUS PO)    lisinopril (ZESTRIL) 5 mg tablet    Lycopene 10 MG CAPS    metoprolol succinate (TOPROL-XL) 100 mg 24 hr tablet    Misc Natural Products (SAW PALMETTO) CAPS    Multiple Vitamin (MULTI-DAY VITAMINS) TABS    acetaminophen (TYLENOL) 325 mg tablet    clobetasol (TEMOVATE) 0 05 % GEL    Ferrous Gluconate-C-Folic Acid (IRON-C PO)    tamsulosin (FLOMAX) 0 4 mg          Assessment:  Principal Problem:    Anemia, unspecified  Active Problems:    Peripheral vascular disease of lower extremity (HCC)    Other persistent atrial fibrillation (HCC)    Thrombocytopenia (HCC)    Chronic combined systolic and diastolic heart failure (HCC)    Cellulitis    Lactic acidosis    CKD (chronic kidney disease) stage 3, GFR 30-59 ml/min (HCC)    Umbilicus discharge    Benign prostatic hyperplasia with urinary retention    Severe protein-calorie malnutrition (HCC)    Open wound of umbilical region      Plan:     Chronic diastolic CHF / Perm AF / AVN ablation BI V pacemaker      EF 50%     Volume status is stable  Continue with current medical management  Diuretics on hold  Patient to have RLE wound debridement later this week  Restart oral diuretics postop when able  Will sign off           Counseling / Coordination of Care  Total floor / unit time spent today 25 minutes  Greater than 50% of total time was spent with the patient and / or family counseling and / or coordination of care  A description of the counseling / coordination of care

## 2020-07-22 NOTE — PLAN OF CARE
Problem: Potential for Falls  Goal: Patient will remain free of falls  Description  INTERVENTIONS:  - Assess patient frequently for physical needs  -  Identify cognitive and physical deficits and behaviors that affect risk of falls  -  [place] fall precautions as indicated by assessment   - Educate patient/family on patient safety including physical limitations  - Instruct patient to call for assistance with activity based on assessment  - Modify environment to reduce risk of injury  - Consider OT/PT consult to assist with strengthening/mobility  Outcome: Progressing     Problem: Prexisting or High Potential for Compromised Skin Integrity  Goal: Skin integrity is maintained or improved  Description  INTERVENTIONS:  - Identify patients at risk for skin breakdown  - Assess and monitor skin integrity  - Assess and monitor nutrition and hydration status  - Monitor labs   - Assess for incontinence   - Turn and reposition patient  - Assist with mobility/ambulation  - Relieve pressure over bony prominences  - Avoid friction and shearing  - Provide appropriate hygiene as needed including keeping skin clean and dry  - Evaluate need for skin moisturizer/barrier cream  - Collaborate with interdisciplinary team   - Patient/family teaching  - Consider wound care consult   Outcome: Progressing     Problem: Nutrition/Hydration-ADULT  Goal: Nutrient/Hydration intake appropriate for improving, restoring or maintaining nutritional needs  Description  Monitor and assess patient's nutrition/hydration status for malnutrition  Collaborate with interdisciplinary team and initiate plan and interventions as ordered  Monitor patient's weight and dietary intake as ordered or per policy  Utilize nutrition screening tool and intervene as necessary  Determine patient's food preferences and provide high-protein, high-caloric foods as appropriate       INTERVENTIONS:  - Monitor oral intake, urinary output, labs, and treatment plans  - Assess nutrition and hydration status and recommend course of action  - Evaluate amount of meals eaten  - Assist patient with eating if necessary   - Allow adequate time for meals  - Recommend/ encourage appropriate diets, oral nutritional supplements, and vitamin/mineral supplements  - Order, calculate, and assess calorie counts as needed  - Recommend, monitor, and adjust tube feedings and TPN/PPN based on assessed needs  - Assess need for intravenous fluids  - Provide specific nutrition/hydration education as appropriate  - Include patient/family/caregiver in decisions related to nutrition  Outcome: Progressing     Problem: NEUROSENSORY - ADULT  Goal: Achieves maximal functionality and self care  Description  INTERVENTIONS  - Monitor swallowing and airway patency with patient fatigue and changes in neurological status  - Encourage and assist patient to increase activity and self care     - Encourage visually impaired, hearing impaired and aphasic patients to use assistive/communication devices  Outcome: Progressing     Problem: GASTROINTESTINAL - ADULT  Goal: Minimal or absence of nausea and/or vomiting  Description  INTERVENTIONS:  - Administer IV fluids if ordered to ensure adequate hydration  - Maintain NPO status until nausea and vomiting are resolved  - Nasogastric tube if ordered  - Administer ordered antiemetic medications as needed  - Provide nonpharmacologic comfort measures as appropriate  - Advance diet as tolerated, if ordered  - Consider nutrition services referral to assist patient with adequate nutrition and appropriate food choices  Outcome: Progressing  Goal: Maintains adequate nutritional intake  Description  INTERVENTIONS:  - Monitor percentage of each meal consumed  - Identify factors contributing to decreased intake, treat as appropriate  - Assist with meals as needed  - Monitor I&O, weight, and lab values if indicated  - Obtain nutrition services referral as needed  Outcome: Progressing     Problem: METABOLIC, FLUID AND ELECTROLYTES - ADULT  Goal: Electrolytes maintained within normal limits  Description  INTERVENTIONS:  - Monitor labs and assess patient for signs and symptoms of electrolyte imbalances  - Administer electrolyte replacement as ordered  - Monitor response to electrolyte replacements, including repeat lab results as appropriate  - Instruct patient on fluid and nutrition as appropriate  Outcome: Progressing  Goal: Fluid balance maintained  Description  INTERVENTIONS:  - Monitor labs   - Monitor I/O and WT  - Instruct patient on fluid and nutrition as appropriate  - Assess for signs & symptoms of volume excess or deficit  Outcome: Progressing     Problem: SKIN/TISSUE INTEGRITY - ADULT  Goal: Skin integrity remains intact  Description  INTERVENTIONS  - Identify patients at risk for skin breakdown  - Assess and monitor skin integrity  - Assess and monitor nutrition and hydration status  - Monitor labs (i e  albumin)  - Assess for incontinence   - Turn and reposition patient  - Assist with mobility/ambulation  - Relieve pressure over bony prominences  - Avoid friction and shearing  - Provide appropriate hygiene as needed including keeping skin clean and dry  - Evaluate need for skin moisturizer/barrier cream  - Collaborate with interdisciplinary team (i e  Nutrition, Rehabilitation, etc )   - Patient/family teaching  Outcome: Progressing  Goal: Incision(s), wounds(s) or drain site(s) healing without S/S of infection  Description  INTERVENTIONS  - Assess and document risk factors for skin impairment   - Assess and document dressing, incision, wound bed, drain sites and surrounding tissue  - Consider nutrition services referral as needed  - Oral mucous membranes remain intact  - Provide patient/ family education  Outcome: Progressing     Problem: HEMATOLOGIC - ADULT  Goal: Maintains hematologic stability  Description  INTERVENTIONS  - Assess for signs and symptoms of bleeding or hemorrhage  - Monitor labs  - Administer supportive blood products/factors as ordered and appropriate  Outcome: Progressing     Problem: MUSCULOSKELETAL - ADULT  Goal: Maintain or return mobility to safest level of function  Description  INTERVENTIONS:  - Assess patient's ability to carry out ADLs; assess patient's baseline for ADL function and identify physical deficits which impact ability to perform ADLs (bathing, care of mouth/teeth, toileting, grooming, dressing, etc )  - Assess/evaluate cause of self-care deficits   - Assess range of motion  - Assess patient's mobility  - Assess patient's need for assistive devices and provide as appropriate  - Encourage maximum independence but intervene and supervise when necessary  - Involve family in performance of ADLs  - Assess for home care needs following discharge   - Consider OT consult to assist with ADL evaluation and planning for discharge  - Provide patient education as appropriate  Outcome: Progressing     Problem: PAIN - ADULT  Goal: Verbalizes/displays adequate comfort level or baseline comfort level  Description  Interventions:  - Encourage patient to monitor pain and request assistance  - Assess pain using appropriate pain scale  - Administer analgesics based on type and severity of pain and evaluate response  - Implement non-pharmacological measures as appropriate and evaluate response  - Consider cultural and social influences on pain and pain management  - Notify physician/advanced practitioner if interventions unsuccessful or patient reports new pain  Outcome: Progressing     Problem: INFECTION - ADULT  Goal: Absence or prevention of progression during hospitalization  Description  INTERVENTIONS:  - Assess and monitor for signs and symptoms of infection  - Monitor lab/diagnostic results  - Monitor all insertion sites, i e  indwelling lines, tubes, and drains  - Monitor endotracheal if appropriate and nasal secretions for changes in amount and color  - [place] appropriate cooling/warming therapies per order  - Administer medications as ordered  - Instruct and encourage patient and family to use good hand hygiene technique  - Identify and instruct in appropriate isolation precautions for identified infection/condition  Outcome: Progressing  Goal: Absence of fever/infection during neutropenic period  Description  INTERVENTIONS:  - Monitor WBC    Outcome: Progressing     Problem: SAFETY ADULT  Goal: Patient will remain free of falls  Description  INTERVENTIONS:  - Assess patient frequently for physical needs  -  Identify cognitive and physical deficits and behaviors that affect risk of falls  -  [place] fall precautions as indicated by assessment   - Educate patient/family on patient safety including physical limitations  - Instruct patient to call for assistance with activity based on assessment  - Modify environment to reduce risk of injury  - Consider OT/PT consult to assist with strengthening/mobility  Outcome: Progressing     Problem: DISCHARGE PLANNING  Goal: Discharge to home or other facility with appropriate resources  Description  INTERVENTIONS:  - Identify barriers to discharge w/patient and caregiver  - Arrange for needed discharge resources and transportation as appropriate  - Identify discharge learning needs (meds, wound care, etc )  - Arrange for interpretive services to assist at discharge as needed  - Refer to Case Management Department for coordinating discharge planning if the patient needs post-hospital services based on physician/advanced practitioner order or complex needs related to functional status, cognitive ability, or social support system  Outcome: Progressing     Problem: Knowledge Deficit  Goal: Patient/family/caregiver demonstrates understanding of disease process, treatment plan, medications, and discharge instructions  Description  Complete learning assessment and assess knowledge base    Interventions:  - Provide teaching at level of understanding  - Provide teaching via preferred learning methods  Outcome: Progressing

## 2020-07-22 NOTE — ASSESSMENT & PLAN NOTE
Patient presented with severe symptomatic normocytic anemia 6 7 causing shortness of breath on exertion  He was taking NSAIDs for chronic right lower leg ulcers that were getting worse  Patient was transfused packed red blood cells during the hospital stay  Hemoglobin currently is 8 0 this morning  Monitor H&H and transfuse as needed  Continue IV Protonix b i d ,  Abnormal CT scan the abdomen pelvis showing thickening of the gastric antrum likely gastritis but unable to exclude gastric neoplasm  Patient received another 1 unit of packed red cell transfusion on July 20  GI follow-up needed  Patient will need eventually endoscopic workup    Refusing at this time and wants his abdominal issues to be addressed first   GI recommended Hematology evaluation

## 2020-07-22 NOTE — PROGRESS NOTES
Progress Note - Podiatry  Payamanuradha Nance 69 y o  male MRN: [id number]  Unit/Bed#: -01 Encounter: [id number]    Assessment/Plan:  Cellulitis right leg  Skin ulceration right leg fat layer exposed  PVD/Atherosclerosis of native artery right leg with ulceration              -S/P repair umbilical hernia earlier this afternoon   -repeat arterial duplex showed improved blood flow to the right lower extremity   -necrotic wounds right leg clinically unchanged, to OR for debridement Friday at 1:00 p m      -discussed case with Internal Medicine regarding concern of low platelets with the extensive debridement planned  Lactic acidosis, necrotic umbilical wound, chronic kidney disease stage 3, anemia, and AFib              -managed per Internal Medicine, general surgery, and Nephrology    Subjective/Objective   Chief Complaint:   Chief Complaint   Patient presents with    Wound Infection - Complicated     Pt with leg wounds x 2 months, here for dyspnea, weakness and feeling ill   +n/+v  Subjective:  24-year-old white male seen resting at bedside in recliner  Continues to the have pain from right leg wound which improves with dependency  Denies any fever or shortness of breath  Blood pressure 99/66, pulse 95, temperature 98 °F (36 7 °C), temperature source Oral, resp  rate 20, height 5' 10" (1 778 m), weight 63 5 kg (139 lb 15 9 oz), SpO2 100 %  ,Body mass index is 20 09 kg/m²      Invasive Devices     Peripheral Intravenous Line            Peripheral IV 07/20/20 Left Arm 2 days                Physical Exam:   General appearance: alert, cooperative and with pain right leg  Neuro/Vascular: Normal strength  Sensation and reflexes:  Grossly intact  Pulses: Right: DP 0/4, PT 0/4, Left: DP 0/4, PT 0/4  Capillary Filling:  3 Sec, Edema:  +1 right leg, none left  Vascular calcifications noted on tib-fib x-ray RLE  Arteriogram 7/20/2020:  · RLE: CFA: Patent, Profunda femoris: Patent   SFA: Proximal portion is patent, normal caliber  High-grade (greater than 80%) 1 cm focal stenosis mid SFA  Moderate grade tandem distal stenoses and in particular 60% stenosis at the abductor canal    Popliteal artery: Patent, noting 60% focal stenosis in the above-the-knee segment  Runoff: Peroneal runoff to the foot with distally reconstituted posterior tibial artery filling of the plantar arch  Chronically occluded anterior tibial artery  Proximal posterior tibial artery is patent and then occludes  · LLE:    CFA: Patent Profunda femoris: Proximal portion is patent   SFA: Proximal portion is patent  · POST INTERVENTION FINDINGS:   1  Significant luminal gain of multifocal right SFA and popliteal artery stenoses following a combination of 6 mm     and 7 mm POBA and DCB  2   Refractory proximal R SFA (30%)  stenosis  Considered stent placement; however, patient was quite     uncooperative with movement and asked multiple times for the procedure to be terminated  3   No change in tibial runoff  4   Left common femoral artery access, successfully closed with a [place]  · IMPRESSION:  Technically successful multifocal balloon angioplasty of multiple stenoses throughout the right superficial femoral and popliteal arteries  Patient has significant right tibial disease with in-line flow into the peroneal artery and distal reconstituted posterior tibial artery  Could consider tibial intervention in the future  Would recommend anesthesia support given patient inability to lay still  Arterial duplex 7/21/2020:  · RIGHT LOWER LIMB   Ankle/Brachial Index: 1 49 which is in the unreliable range   PPG/PVR Tracings are slightly dampened  Biphasic waveforms at the ankle  Metatarsal Pressure 147mmHg   Great Toe Pressure: unable to obtain due to patient's foot movements  · LEFT LOWER LIMB   Ankle/Brachial Index: 1 27 which is in the normal range   PPG/PVR Tracings are slightly dampened    Triphasic waveforms at the ankle    Metatarsal Pressure 73mmHg   Great Toe Pressure: 51mmHg, within the healing range        Extremity: Several large wounds present on the medial and lateral aspect of the right lower leg with fat layer exposed  Necrotic nature and significant slough, wounds are 80-90% yellow/white, localized erythema and calor appear to be resolving satisfactory  Labs and other studies:   CBC w/diff  Results from last 7 days   Lab Units 07/22/20  0941 07/22/20  0631  07/21/20  0838   WBC Thousand/uL  --  5 90  --  6 30   HEMOGLOBIN g/dL 8 0* 7 7*   < > 8 7*   HEMATOCRIT % 24 0* 23 0*   < > 26 1*   PLATELETS Thousands/uL  --  46*  --  52*   NEUTROS PCT %  --   --   --  85*   LYMPHS PCT %  --   --   --  9*   LYMPHO PCT %  --  8*  --   --    MONOS PCT %  --   --   --  4   MONO PCT %  --  3*  --   --    EOS PCT %  --  0  --  0    < > = values in this interval not displayed  BMP  Results from last 7 days   Lab Units 07/22/20  0631   POTASSIUM mmol/L 4 6   CHLORIDE mmol/L 104   CO2 mmol/L 21   BUN mg/dL 40*   CREATININE mg/dL 1 86*   CALCIUM mg/dL 8 1*     CMP  Results from last 7 days   Lab Units 07/22/20  0631 07/21/20  0839   POTASSIUM mmol/L 4 6 4 9   CHLORIDE mmol/L 104 105   CO2 mmol/L 21 20*   BUN mg/dL 40* 42*   CREATININE mg/dL 1 86* 1 77*   CALCIUM mg/dL 8 1* 8 5   ALK PHOS U/L  --  100   ALT U/L  --  33   AST U/L  --  36       @Culture@  Lab Results   Component Value Date    BLOODCX No Growth After 5 Days  07/16/2020    BLOODCX No Growth After 5 Days  07/16/2020    BLOODCX No Growth After 5 Days  05/02/2017    BLOODCX No Growth After 5 Days   05/02/2017    URINECX >100,000 cfu/ml Klebsiella oxytoca (A) 02/13/2019    URINECX >100,000 cfu/ml Klebsiella oxytoca (A) 01/07/2019         Current Facility-Administered Medications:     acetaminophen (TYLENOL) tablet 650 mg, 650 mg, Oral, Q6H PRN, Niels Bolanos PA-C, 650 mg at 07/19/20 1312    aspirin chewable tablet 81 mg, 81 mg, Oral, Daily, Niels Whaley Kyung-FRANCHESCA Garcia-C, 81 mg at 07/22/20 0931    atorvastatin (LIPITOR) tablet 40 mg, 40 mg, Oral, Daily With Brand Deem Kyung-FRANCHESCA Garcia-C, 40 mg at 07/19/20 1700    bacitracin topical ointment 1 small application, 1 small application, Topical, BID, Brittani Tracey-Jose PA-C, 1 small application at 43/46/59 0851    calcium carbonate (TUMS) chewable tablet 500 mg, 500 mg, Oral, Daily PRN, Ashlee Bolanos PA-C    cefepime (MAXIPIME) IVPB (premix) 1,000 mg 50 mL, 1,000 mg, Intravenous, Q24H, Brittani Tracey-FRANCHESCA Garcia-C, Stopped at 07/22/20 0100    fentaNYL (SUBLIMAZE) injection 25 mcg, 25 mcg, Intravenous, Q5 Min PRN, Alvin Arzate MD, 25 mcg at 41/46/94 0429    folic acid (FOLVITE) tablet 1 mg, 1 mg, Oral, Daily, FRANCHESCA Nugent-C, 1 mg at 07/22/20 0931    HYDROcodone-acetaminophen (NORCO) 5-325 mg per tablet 1 tablet, 1 tablet, Oral, Q4H PRN, Ashlee Bolanos PA-C    HYDROcodone-acetaminophen (NORCO) 5-325 mg per tablet 2 tablet, 2 tablet, Oral, Q4H PRN, FRANCHESCA Molina-C, 2 tablet at 07/22/20 1232    HYDROmorphone (DILAUDID) injection 0 5 mg, 0 5 mg, Intravenous, Q10 Min PRN, Alvin Arzate MD    HYDROmorphone (DILAUDID) injection 0 5 mg, 0 5 mg, Intravenous, Q2H PRN, RAE MolinaC    LORazepam (ATIVAN) tablet 1 mg, 1 mg, Oral, Q8H PRN, Ashlee Bolanos PA-C, 1 mg at 07/19/20 2227    pantoprazole (PROTONIX) injection 40 mg, 40 mg, Intravenous, Q12H [address], Brittani Astl-Jose, PA-C, 40 mg at 07/22/20 0931    polyethylene glycol (MIRALAX) packet 17 g, 17 g, Oral, Daily, Brittani Astl-Jose, PA-C, 8 g at 07/22/20 0934    tamsulosin (FLOMAX) capsule 0 4 mg, 0 4 mg, Oral, Daily With Brand Deem Astl-Joes, PA-C, 0 4 mg at 07/19/20 1700    vancomycin (VANCOCIN) IVPB (premix) 1,000 mg 200 mL, 15 mg/kg, Intravenous, Q24H, Brittani Astl-Jose, PA-C, Stopped at 07/22/20 0300    Imaging: I have personally reviewed pertinent films in PACS  EKG, Pathology, and Other Studies: I have personally reviewed pertinent reports    VTE Pharmacologic Prophylaxis: Heparin  VTE Mechanical Prophylaxis: reason for no mechanical VTE prophylaxis Wounds right lower extremity    Pedro Carson DPM

## 2020-07-22 NOTE — ASSESSMENT & PLAN NOTE
· Patient states ongoing since hernia surgery small bloody ooze  Scab is noted in the umbilicus with foul-smelling no drainage    · Patient underwent  EXPLORATORY laparotomy, excision of infected mesh and repair of ventral heria (N/A) on 07/21/2020  · Surgical follow-up  · Pain management p r n   · On IV antibiotics

## 2020-07-22 NOTE — PROGRESS NOTES
Progress note - Gastroenterology   Chyna Shields 69 y o  male MRN: [id number]  Unit/Bed#: -01 Encounter: [id number]    ASSESSMENT and PLAN      Anemia - no signs or symptoms of GI blood loss   Hemoglobin 6 7gms on admission  Normocytic   Not iron, folate or vitamin B12 deficient  No overt GI  blood loss   Exclude chronic GI blood loss  May have underlying anemia of chronic disease or poor marrow function related to his malnutrition   Exclude MDS  Never had colonoscopy   · Follow H&H and transfuse as needed  · Observe for any overt signs of GI blood loss  · Check stool hemoccults  · Continue empiric PPI with Protonix 40 mg IV q 12 hours  · EGD when clinically stable in addition to colonoscopy after planned vascular procedures and umbilical hernia repair - patient refusing an upper and lower endoscopy until leg and abdominal wounds have been addressed (in vs out patient)  · Advise Hematology consult     2  Abnormal CT scan the abdomen pelvis showing thickening of the gastric antrum likely gastritis but unable to exclude gastric neoplasm      - EGD as above  - Protonix 40 mg IV q 12 hours     3  Umbilical wound with abscess and protruding mesh   S/P surgical repair 7/21     2  Constipation  Reports that he had a bowel movement yesterday      -  continue MiraLax 17 g in 8 oz of fluid daily    Chief Complaint   Patient presents with    Wound Infection - Complicated     Pt with leg wounds x 2 months, here for dyspnea, weakness and feeling ill   +n/+v  SUBJECTIVE/HPI   No GI complaints  Eating without difficulty    No bowel moved    /68 (BP Location: Right arm)   Pulse 103   Temp 97 7 °F (36 5 °C) (Oral)   Resp 20   Ht 5' 10" (1 778 m)   Wt 63 5 kg (139 lb 15 9 oz)   SpO2 100%   BMI 20 09 kg/m²     PHYSICALEXAM  General appearance: alert, appears stated age and cooperative  Eyes: PERLLA, EOMI, no icterus   Head: Normocephalic, without obvious abnormality, atraumatic  Lungs: clear to auscultation bilaterally  Heart: regular rate and rhythm, S1, S2 normal, no murmur, click, rub or gallop  Abdomen: soft, mild incisional tenderness; quite bowel sounds no masses,  no organomegaly  Extremities: extremities normal, atraumatic, no cyanosis or edema  Neurologic: Grossly normal    Lab Results   Component Value Date    GLUCOSE 127 11/20/2018    CALCIUM 8 1 (L) 07/22/2020    K 4 6 07/22/2020    CO2 21 07/22/2020     07/22/2020    BUN 40 (H) 07/22/2020    CREATININE 1 86 (H) 07/22/2020     Lab Results   Component Value Date    WBC 5 90 07/22/2020    HGB 7 7 (L) 07/22/2020    HCT 23 0 (L) 07/22/2020    MCV 94 07/22/2020    PLT 46 (LL) 07/22/2020     Lab Results   Component Value Date    ALT 33 07/21/2020    AST 36 07/21/2020    ALKPHOS 100 07/21/2020     No results found for: AMYLASE  Lab Results   Component Value Date    LIPASE 255 07/16/2020     Lab Results   Component Value Date    IRON 229 (H) 07/16/2020    TIBC 285 07/16/2020    FERRITIN 303 07/16/2020     Lab Results   Component Value Date    INR 1 46 (H) 07/16/2020

## 2020-07-22 NOTE — PROGRESS NOTES
Progress Note - Carmela Hurt 1945, 69 y o  male MRN: [id number]    Unit/Bed#: -01 Encounter: [id number]    Primary Care Provider: Gabriel Blood DO   Date and time admitted to hospital: 7/16/2020 10:25 PM        Open wound of umbilical region  Assessment & Plan  Exploratory laparotomy, excision of infected mesh and repair of ventral heria (N/A) on 07/21/2020  Wound care and dressing changes per surgery  Pain management  See above    Severe protein-calorie malnutrition (HCC)  Assessment & Plan  Pt has Severe protein-calorie malnutrition, in the setting of chronically ill patient with history of noncompliance with care, as evidenced by BMI 19 17 with an 11 33% unplanned weight loss over the past year, multiple nonhealing wounds appearance of muscle wasting/fat loss and decreased mobility noted on nursing assessment, Findings: height 5' 10", weight 133 lb 9 6 oz, BMI 19 17 Screen: (+) Unplanned weight loss in the last three months; (+) Stage III-IV pressure ulcer or non-healing wound; (+) Appearance of muscle wasting or fat loss; Benign prostatic hyperplasia with urinary retention  Assessment & Plan  Patient self catheterizes at home when he feels he needs to for BPH with retention,   Patient had to be catheterized 6 times during this hospital stay  Continue straight cath q 8 hours because patient has urinary retention, will try to avoid placement of Abbott  Continue Flomax    Umbilicus discharge  Assessment & Plan  · Patient states ongoing since hernia surgery small bloody ooze  Scab is noted in the umbilicus with foul-smelling no drainage    · Patient underwent  EXPLORATORY laparotomy, excision of infected mesh and repair of ventral heria (N/A) on 07/21/2020  · Surgical follow-up  · Pain management p r n   · On IV antibiotics    CKD (chronic kidney disease) stage 3, GFR 30-59 ml/min ([facility])  Assessment & Plan  Patient has stage III chronic disease with creatinine around 2 0 at baseline  Patient's renal function is at baseline:  Creatinine is 1 86 today  Nephrology on board  Monitor renal function in a m  Lactic acidosis  Assessment & Plan  Lactic acidosis most likely multifactorial, patient has CHF, necrotic umbilical wound    Cellulitis  Assessment & Plan  Patient has right lower leg wounds with cellulitis  Continue vancomycin and cefepime  Patient underwent angiogram   Podiatry is planning to to debridement on Thursday or Friday  Final culture results reviewed    Chronic combined systolic and diastolic heart failure (HCC)  Assessment & Plan  Wt Readings from Last 3 Encounters:   07/22/20 63 5 kg (139 lb 15 9 oz)   06/21/19 68 3 kg (150 lb 9 6 oz)   06/19/19 67 6 kg (149 lb)     Echocardiogram on this admission shows ejection fraction of 50%  Patient's lungs are clear, has no JVD  Has chronic systolic CHF is compensated  Continue to hold lisinopril, Lasix and Toprol because of relative hypotension  Daily weight and I&Os    Thrombocytopenia ([address] )  Assessment & Plan  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)  Assessment & Plan  Patient is status post ablation and pacemaker insertion  EKG showed paced rhythm  He has not  anticoagulation candidate due to ITP and thrombocytopenia    Peripheral vascular disease of lower extremity [facility])  Assessment & Plan  Patient has more than 75% stenosis of the right superficial femoral artery on arterial Doppler  Continue atorvastatin  Patient underwent right SFA PTA with single-vessel peroneal runoff on 07/20/2020  Vascular surgery follow-up noted  IR was unable to perform tibial intervention secondary to patient's inability to tolerate procedure    Continue on aspirin given recent intervention  Will discuss with GI to consider addition of Plavix   Post intervention ABIs were ordered  Vascular surgery and Podiatry follow-up  Continue vascular surgery follow-up    * Anemia, unspecified  Assessment & Plan  Patient presented with severe symptomatic normocytic anemia 6 7 causing shortness of breath on exertion  He was taking NSAIDs for chronic right lower leg ulcers that were getting worse  Patient was transfused packed red blood cells during the hospital stay  Hemoglobin currently is 8 0 this morning  Monitor H&H and transfuse as needed  Continue IV Protonix b i d ,  Abnormal CT scan the abdomen pelvis showing thickening of the gastric antrum likely gastritis but unable to exclude gastric neoplasm  Patient received another 1 unit of packed red cell transfusion on July 20  GI follow-up needed  Patient will need eventually endoscopic workup  Refusing at this time and wants his abdominal issues to be addressed first   GI recommended Hematology evaluation      Labs & Imaging: I have personally reviewed pertinent reports  VTE Pharmacologic Prophylaxis: Reason for no pharmacologic prophylaxis Anemia  VTE Mechanical Prophylaxis: sequential compression device    Code Status:   Level 1 - Full Code    Patient Centered Rounds: I have performed bedside rounds with nursing staff today  Discussions with Specialists or Other Care Team Provider:  GI    Education and Discussions with Family / Patient:  Patient states he will update his family  Current Length of Stay: 5 day(s)    Current Patient Status: Inpatient   Certification Statement: The patient will continue to require additional inpatient hospital stay due to see my assessment and plan  Subjective:   Patient is seen and examined at bedside  Denies any new complaints  Pain is well controlled  Afebrile  All other ROS are negative  Objective:    Vitals: Blood pressure 113/61, pulse 84, temperature 97 9 °F (36 6 °C), resp   rate 20, height 5' 10" (1 778 m), weight 63 5 kg (139 lb 15 9 oz), SpO2 99 %  ,Body mass index is 20 09 kg/m²  SPO2 RA Rest      ED to Hosp-Admission (Current) from 7/16/2020 in [address] Med [facility] [address]   SpO2  99 %   SpO2 Activity  At Rest   O2 Device  Nasal cannula   O2 Flow Rate          I&O:     Intake/Output Summary (Last 24 hours) at 7/22/2020 0704  Last data filed at 7/22/2020 0600  Gross per 24 hour   Intake 1565 ml   Output    Net 1565 ml       Physical Exam:    General- Alert, lying comfortably in bed  Not in any acute distress  CVS- irregular, S1 and S2 normal   Chest- Bilateral Air entry, No rhochi, crackles or wheezing present  Abdomen- soft, nontender, not distended, no guarding or rigidity, BS+  Extremities- chronic venous stasis bilateral lower extremities  Right lower extremity in a dressing  CNS-   Alert, awake and orientedx3  No focal deficits present      Invasive Devices     Peripheral Intravenous Line            Peripheral IV 07/20/20 Left Arm 2 days                      Social History  reviewed  Family History   Problem Relation Age of Onset    Heart disease Mother     Heart disease Father     Hypertension Father     Diabetes Other     Depression Other     Cancer Sister     Depression Cousin     reviewed    Meds:  Current Facility-Administered Medications   Medication Dose Route Frequency Provider Last Rate Last Dose    acetaminophen (TYLENOL) tablet 650 mg  650 mg Oral Q6H PRN Brittanicarli Tracey-Jose PA-C   650 mg at 07/19/20 1312    aspirin chewable tablet 81 mg  81 mg Oral Daily Brittanicarli Bolanos PA-CHELLE        atorvastatin (LIPITOR) tablet 40 mg  40 mg Oral Daily With [employer] Astanurag-Jose PA-C   40 mg at 07/19/20 1700    bacitracin topical ointment 1 small application  1 small application Topical BID Brittani Luis Miguel PA-C   1 small application at 09/34/08 0851    calcium carbonate (TUMS) chewable tablet 500 mg  500 mg Oral Daily PRN Trinna Shadow Kyung-Jose PA-CHELLE        cefepime (MAXIPIME) IVPB (premix) 1,000 mg 50 mL  1,000 mg Intravenous Q24H FRANCHESCA Nugent-C   Stopped at 07/22/20 0100    fentaNYL (SUBLIMAZE) injection 25 mcg  25 mcg Intravenous Q5 Min PRN Thiago Leal MD   25 mcg at 61/94/96 4284    folic acid (FOLVITE) tablet 1 mg  1 mg Oral Daily FRANCHESCA Nugent-C   1 mg at 07/21/20 0849    HYDROcodone-acetaminophen (NORCO) 5-325 mg per tablet 1 tablet  1 tablet Oral Q4H PRN Orpah FRANCHESCA Child-C        HYDROcodone-acetaminophen (NORCO) 5-325 mg per tablet 2 tablet  2 tablet Oral Q4H PRN RAE IniguezC   2 tablet at 07/22/20 0509    HYDROmorphone (DILAUDID) injection 0 5 mg  0 5 mg Intravenous Q10 Min PRN Thiago Leal MD        HYDROmorphone (DILAUDID) injection 0 5 mg  0 5 mg Intravenous Q2H PRN Orpah Bernadette Bolanos PA-C        LORazepam (ATIVAN) tablet 1 mg  1 mg Oral Q8H PRN RAE NugentC   1 mg at 07/19/20 2227    pantoprazole (PROTONIX) injection 40 mg  40 mg Intravenous Q12H [facility] Brittani Bolanos PA-C   40 mg at 07/21/20 2131    polyethylene glycol (MIRALAX) packet 17 g  17 g Oral Daily Brittani Bolanos PA-C        sodium bicarbonate 75 mEq in sodium chloride 0 45 % 1,000 mL infusion  75 mL/hr Intravenous Continuous BJ Jones 75 mL/hr at 07/22/20 0446 75 mL/hr at 07/22/20 0446    tamsulosin (FLOMAX) capsule 0 4 mg  0 4 mg Oral Daily With [employer] GERRI Bolanos   0 4 mg at 07/19/20 1700    vancomycin (VANCOCIN) IVPB (premix) 1,000 mg 200 mL  15 mg/kg Intravenous Q24H Brittani Bolanos PA-C   Stopped at 07/22/20 0300      Medications Prior to Admission   Medication    Ascorbic Acid (VITAMIN C PO)    B Complex Vitamins (VITAMIN B COMPLEX PO)    calcium carbonate (TUMS) 500 mg chewable tablet    diazepam (VALIUM) 5 mg tablet    folic acid (FOLVITE) 1 mg tablet    furosemide (LASIX) 40 mg tablet    Lactobacillus (ACIDOPHILUS PO)    lisinopril (ZESTRIL) 5 mg tablet    Lycopene 10 MG CAPS    metoprolol succinate (TOPROL-XL) 100 mg 24 hr tablet    Misc Natural Products (SAW PALMETTO) CAPS    Multiple Vitamin (MULTI-DAY VITAMINS) TABS    acetaminophen (TYLENOL) 325 mg tablet    clobetasol (TEMOVATE) 0 05 % GEL    Ferrous Gluconate-C-Folic Acid (IRON-C PO)    tamsulosin (FLOMAX) 0 4 mg       Labs:  Results from last 7 days   Lab Units 07/22/20  0034 07/21/20  1801 07/21/20  0838 07/20/20  0446 07/19/20  0529  07/16/20  2243   WBC Thousand/uL  --   --  6 30 6 00 7 51   < > 7 48   HEMOGLOBIN g/dL 7 7* 7 9* 8 7* 7 1* 8 2*   < > 6 7*   HEMATOCRIT % 23 2* 23 9* 26 1* 21 7* 24 8*   < > 20 8*   PLATELETS Thousands/uL  --   --  52* 72* 77*   < > 104*   NEUTROS PCT %  --   --  85*  --   --   --   --    LYMPHS PCT %  --   --  9*  --   --   --   --    LYMPHO PCT %  --   --   --   --   --   --  16   MONOS PCT %  --   --  4  --   --   --   --    MONO PCT %  --   --   --   --   --   --  7   EOS PCT %  --   --  0  --   --   --  0    < > = values in this interval not displayed  Results from last 7 days   Lab Units 07/22/20  0631 07/21/20  0839 07/21/20  0838  07/16/20  2243   POTASSIUM mmol/L 4 6 4 9 4 8   < > 4 6   CHLORIDE mmol/L 104 105 103   < > 103   CO2 mmol/L 21 20* 20*   < > 20*   BUN mg/dL 40* 42* 42*   < > 80*   CREATININE mg/dL 1 86* 1 77* 1 82*   < > 2 18*   CALCIUM mg/dL 8 1* 8 5 8 9   < > 9 0   ALK PHOS U/L  --  100  --   --  87   ALT U/L  --  33  --   --  21   AST U/L  --  36  --   --  20    < > = values in this interval not displayed  Lab Results   Component Value Date    TROPONINI <0 02 07/16/2020    TROPONINI 0 02 11/23/2018    TROPONINI 0 03 11/23/2018    CKTOTAL 27 (L) 07/16/2020     Results from last 7 days   Lab Units 07/16/20  2248   INR  1 46*     Lab Results   Component Value Date    BLOODCX No Growth After 4 Days  07/16/2020    BLOODCX No Growth After 4 Days  07/16/2020    BLOODCX No Growth After 5 Days  05/02/2017    BLOODCX No Growth After 5 Days   05/02/2017    URINECX >100,000 cfu/ml Klebsiella oxytoca (A) 02/13/2019 URINECX >100,000 cfu/ml Klebsiella oxytoca (A) 01/07/2019    WOUNDCULT 4+ Growth of Enterobacter cloacae complex (A) 07/17/2020    WOUNDCULT 4+ Growth of Morganella morganii (A) 07/17/2020    WOUNDCULT 4+ Growth of Enterococcus faecalis (A) 07/17/2020    WOUNDCULT (A) 07/17/2020     4+ Growth of Alpha Hemolytic Streptococcus NOT Enterococcus         Imaging:  Results for orders placed during the hospital encounter of 07/16/20   XR chest portable    Narrative CHEST     INDICATION:   Acute Resp Failure  COMPARISON:  12/1/2018    EXAM PERFORMED/VIEWS:  XR CHEST PORTABLE      FINDINGS:  Left-sided chest wall pacemaker is identified  Pacemaker leads are intact  Cardiomediastinal silhouette appears unremarkable  The lungs are clear  No pneumothorax or pleural effusion  Stable bony structures  No acute osseous abnormality  Impression No acute cardiopulmonary disease  Workstation performed: [id number]       Results for orders placed during the hospital encounter of 11/22/18   XR chest 2 views    Narrative CHEST     INDICATION:   Patient s/p Pacemaker/ICD Insertion  COMPARISON:  11/30/2018  EXAM PERFORMED/VIEWS:  XR CHEST PA & LATERAL      FINDINGS:  Left-sided chest wall pacemaker is identified  Pacemaker leads are intact  Cardiomediastinal silhouette appears unremarkable  The lungs are clear  No pneumothorax  Unchanged small bilateral pleural effusions  Orthopedic hardware again seen in the left clavicle  Visualized osseous structures appear otherwise unremarkable for the patient's age  Impression No pneumothorax  Stable small bilateral pleural effusions          Workstation performed: [id number]         Last 24 Hours Medication List:     Current Facility-Administered Medications:  acetaminophen 650 mg Oral Q6H PRN Moira Ahumada Astl-Reimer, PA-C    aspirin 81 mg Oral Daily Brittani Bolanos PA-C    atorvastatin 40 mg Oral Daily With [employer] GERRI Bolanos    bacitracin 1 small application Topical BID Patricia Bolanos, PA-C    calcium carbonate 500 mg Oral Daily PRN Patricia Tracey-Jose, PA-C    cefepime 1,000 mg Intravenous Q24H Brittani Tracey-Jose, PA-C Last Rate: Stopped (07/22/20 0100)   fentaNYL 25 mcg Intravenous Q5 Min PRN Sofy Goodson MD    folic acid 1 mg Oral Daily Brittani Tracey-Jose, PA-C    HYDROcodone-acetaminophen 1 tablet Oral Q4H PRN Patricia Tracey-Jose, PA-C    HYDROcodone-acetaminophen 2 tablet Oral Q4H PRN Patricia Bolanos, PA-C    HYDROmorphone 0 5 mg Intravenous Q10 Min PRN Sofy Goodson MD    HYDROmorphone 0 5 mg Intravenous Q2H PRN Patricia Bolanos, PA-C    LORazepam 1 mg Oral Q8H PRN Patricia Bolanos, PA-C    pantoprazole 40 mg Intravenous Q12H [facility] Brittani Tracey-Jose, PA-C    polyethylene glycol 17 g Oral Daily Brittani Tracey-Jose, PA-C    sodium bicarbonate infusion 75 mL/hr Intravenous Continuous BJ Jones Last Rate: 75 mL/hr (07/22/20 0446)   tamsulosin 0 4 mg Oral Daily With Vedia  Kyung-Jose, PA-C    vancomycin 15 mg/kg Intravenous Q24H Brittani Tracey-Jose, PA-C Last Rate: Stopped (07/22/20 0300)        Today, Patient Was Seen By: Maryann Reynolds MD    ** Please Note: Dictation voice to text software may have been used in the creation of this document   **

## 2020-07-22 NOTE — ASSESSMENT & PLAN NOTE
Patient has more than 75% stenosis of the right superficial femoral artery on arterial Doppler  Continue atorvastatin  Patient underwent right SFA PTA with single-vessel peroneal runoff on 07/20/2020  Vascular surgery follow-up noted  IR was unable to perform tibial intervention secondary to patient's inability to tolerate procedure  Continue on aspirin given recent intervention  Will discuss with GI to consider addition of Plavix   Post intervention ABIs were ordered    Vascular surgery and Podiatry follow-up  Continue vascular surgery follow-up

## 2020-07-22 NOTE — ASSESSMENT & PLAN NOTE
Patient has stage III chronic disease with creatinine around 2 0 at baseline  Patient's renal function is at baseline:  Creatinine is 1 86 today  Nephrology on board  Monitor renal function in a m

## 2020-07-22 NOTE — TELEPHONE ENCOUNTER
Patient is tentatively scheduled for 8/3/20 with Dr Ramirez in [facility], in hopes he will be discharged by then  Will monitor chart for discharge date

## 2020-07-22 NOTE — CONSULTS
Medical Oncology/Hematology Consult Note  Nancy Monge, 69 y o , 1945  /-[birth year], [id number]  07/22/20    Assessment and Plan:    1  Anemia   Patient's anemia is most likely multifactorial and possible differential diagnoses include:  -anemia of chronic disease secondary to renal insufficiency  -chronic GI blood loss secondary to gastritis versus possible gastric mass as noted on recent CT scan  GI is following patient is currently refusing colonoscopy and EGD at present  -poor marrow function secondary to his severe protein calorie malnutrition  -Myleodysplastic syndrome  Hematology recommends bone marrow biopsy to rule out a primary bone marrow issue  IR consult has been placed and [facility] form has been completed and placed on patient's chart  At this time, patient would like to think about undergoing bone marrow biopsy stating that he would prefer to focus on his other medical problems, I e   his leg wounds at this time  Recommend continuing to follow CBC and transfuse as needed to keep hemoglobin greater than or equal to 8  I will check an MMA  2  Thrombocytopenia  Patient has a history of 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  Would recommend transfusing platelets to keep his counts at or above 50 if he is going to require continued surgical interventions      Please do not hesitate to contact me if you have any questions or need additional information  Thank you for this consult  Reason for Consultation: Anemia    Chief complaint:   Chief Complaint   Patient presents with    Wound Infection - Complicated     Pt with leg wounds x 2 months, here for dyspnea, weakness and feeling ill   +n/+v         History of present illness:   Nancy Monge is a 69 y o  male with a past medical history significant for chronic systolic heart failure, Afib, PVD, CKD 3, neuropathy, chronic ITP who presented to ED at [address] on 7/17 with symptoms of worsening shortness of breath and worsening deep ulcerations on his right lower leg  He was anemic at presentation with Hgb of 6 7   He was noted to have draining wound at his belly button and his leg wounds were concerning for cellulitis  CT of abdomen/pelvis was abnormal revealing a "thickening of the gastric antrum suggestive of gastritis though a mass is difficult to entirely exclude and Splenomegaly of uncertain etiology, though only mildly increased from the prior study from 2011"    During this hospital stay, he has undergone an angiogram with minimal increased perfusion of the right lower extremity, surgical debridement of right lower extremity leg wounds and treatment with IV antibiotics  His necrotic leg wounds will require further OR debridement per podiatry  He is status post exploratory laparotomy with excision of infected mesh and repair of ventral hernia on 7/21  GI has been following for his anemia  There have been no signs or symptoms of a GI blood loss and workup has not revealed any folate, B12, or iron deficiency  Patient is refusing GI workup until his leg and abdominal wounds are addressed  Hematology is consulted to evaluate chronic anemia  His CBC at admission revealed a white count of 7 48, hemoglobin 6 7, hematocrit 20 8, normal MCV of 95, platelet count 704  His hemoglobin has fluctuated throughout this hospital stay and has ranged between 7 and 8 7  Of note, his platelet count has been dropping  CBC done today reveals WBC 5 9, hemoglobin 7 7, hematocrit 23, platelet count 46  Upon review of patient's laboratory studies dating back to 2017, patient does have a history of thrombocytopenia  His platelets have run as low as 37 back in November of 2018  His iron studies were reviewed; he does not appear iron deficient  His total iron was 229 with an iron saturation of 80, ferritin 303    Interval history: Patient seen and examined  He endorses some pain in his leg  Denies any chest pain, shortness of breath   He denies any bleeding  Review of Systems   Skin: Positive for pallor and wound  All other review of systems were negative  Past medical history:   Past Medical History:   Diagnosis Date    Anemia     Atrial fibrillation ([address] )     Benign prostatic hyperplasia without urinary obstruction     Congestive heart failure with left ventricular diastolic dysfunction, chronic (HCC)     History of ITP     Hypertension     Lumbosacral disc herniation     Peripheral vascular disease of lower extremity (HCC)     Renal disorder     Subdural hematoma (HCC)        Past surgical history:   Past Surgical History:   Procedure Laterality Date    BACK SURGERY      AUSTYN HOLE FOR SUBDURAL HEMATOMA      CARDIAC PACEMAKER PLACEMENT      CLAVICLE SURGERY Left 2011    HERNIA REPAIR      IR ABDOMINAL ANGIOGRAPHY / INTERVENTION  7/20/2020    LAPAROTOMY N/A 7/21/2020    Procedure: LAPAROTOMY EXPLORATORY, excision of infected mesh and repair of ventral heria;   Surgeon: Duy Shelton MD;  Location:  MAIN OR;  Service: General    PROSTATE SURGERY      VENTRAL HERNIA REPAIR N/A 7/21/2020    Procedure: REPAIR HERNIA VENTRAL - EXCSION OF INFECTED MESH;  Surgeon: Duy Shelton MD;  Location:  MAIN OR;  Service: General       Allergies: No Known Allergies    Home medications:   Medications Prior to Admission   Medication    Ascorbic Acid (VITAMIN C PO)    B Complex Vitamins (VITAMIN B COMPLEX PO)    calcium carbonate (TUMS) 500 mg chewable tablet    diazepam (VALIUM) 5 mg tablet    folic acid (FOLVITE) 1 mg tablet    furosemide (LASIX) 40 mg tablet    Lactobacillus (ACIDOPHILUS PO)    lisinopril (ZESTRIL) 5 mg tablet    Lycopene 10 MG CAPS    metoprolol succinate (TOPROL-XL) 100 mg 24 hr tablet    Misc Natural Products (SAW PALMETTO) CAPS    Multiple Vitamin (MULTI-DAY VITAMINS) TABS    acetaminophen (TYLENOL) 325 mg tablet    clobetasol (TEMOVATE) 0 05 % GEL    Ferrous Gluconate-C-Folic Acid (IRON-C PO)    tamsulosin (FLOMAX) 0 4 mg       Hospital medications:   Current Facility-Administered Medications:     acetaminophen (TYLENOL) tablet 650 mg, 650 mg, Oral, Q6H PRN, FRANCHESCA Leary-CHELLE, 650 mg at 07/19/20 1312    aspirin chewable tablet 81 mg, 81 mg, Oral, Daily, FRANCHESCA Nugent-C, 81 mg at 07/22/20 0931    atorvastatin (LIPITOR) tablet 40 mg, 40 mg, Oral, Daily With FRANCHESCA Hand-C, 40 mg at 07/19/20 1700    bacitracin topical ointment 1 small application, 1 small application, Topical, BID, FRANCHESCA Nugent-C, 1 small application at 78/41/49 0851    calcium carbonate (TUMS) chewable tablet 500 mg, 500 mg, Oral, Daily PRN, Margie Bolanos PA-C    cefepime (MAXIPIME) IVPB (premix) 1,000 mg 50 mL, 1,000 mg, Intravenous, Q24H, FRANCHESCA Nugent-C, Stopped at 07/22/20 0100    fentaNYL (SUBLIMAZE) injection 25 mcg, 25 mcg, Intravenous, Q5 Min PRN, Yvette Mcmahon MD, 25 mcg at 70/22/18 2907    folic acid (FOLVITE) tablet 1 mg, 1 mg, Oral, Daily, FRANCHESCA Nugent-C, 1 mg at 07/22/20 0931    HYDROcodone-acetaminophen (NORCO) 5-325 mg per tablet 1 tablet, 1 tablet, Oral, Q4H PRN, Margie Bolanos PA-C    HYDROcodone-acetaminophen (NORCO) 5-325 mg per tablet 2 tablet, 2 tablet, Oral, Q4H PRN, Margie Bolanos PA-C, 2 tablet at 07/22/20 0509    HYDROmorphone (DILAUDID) injection 0 5 mg, 0 5 mg, Intravenous, Q10 Min PRN, Yvette Mcmahon MD    HYDROmorphone (DILAUDID) injection 0 5 mg, 0 5 mg, Intravenous, Q2H PRN, FRANCHESCA Leary-C    LORazepam (ATIVAN) tablet 1 mg, 1 mg, Oral, Q8H PRN, FRANCHESCA Leary-CHELLE, 1 mg at 07/19/20 2227    pantoprazole (PROTONIX) injection 40 mg, 40 mg, Intravenous, Q12H [address], Brittani Tracey-FRANCHESCA Garcia-C, 40 mg at 07/22/20 0931    polyethylene glycol (MIRALAX) packet 17 g, 17 g, Oral, Daily, Brittani Bolanos PA-C, 8 g at 07/22/20 0934    sodium bicarbonate 75 mEq in sodium chloride 0 45 % 1,000 mL infusion, 75 mL/hr, Intravenous, Continuous, BJ Jones, Last Rate: 75 mL/hr at 07/22/20 1015, 75 mL/hr at 07/22/20 1015    tamsulosin (FLOMAX) capsule 0 4 mg, 0 4 mg, Oral, Daily With Gerardo Bolanos PA-C, 0 4 mg at 07/19/20 1700    vancomycin (VANCOCIN) IVPB (premix) 1,000 mg 200 mL, 15 mg/kg, Intravenous, Q24H, Brittani Bolanos PA-C, Stopped at 07/22/20 0300    Social history:   Social History     Tobacco Use    Smoking status: Never Smoker    Smokeless tobacco: Never Used    Tobacco comment: per [employer]-Former Smoker and Never Smoker-pls confirm   Substance Use Topics    Alcohol use: Not Currently     Alcohol/week: 0 0 standard drinks     Frequency: Never     Binge frequency: Never     Comment: Denied use    Drug use: Never     Comment: Denied use       Family history:   Family History   Problem Relation Age of Onset    Heart disease Mother     Heart disease Father     Hypertension Father     Diabetes Other     Depression Other     Cancer Sister     Depression Cousin        Vitals:  Vitals:    07/22/20 0755   BP: 105/68   Pulse: 103   Resp: 20   Temp: 97 7 °F (36 5 °C)   SpO2: 100%       Physical Exam   Constitutional: He is oriented to person, place, and time  No distress  Alert, pleasant, chronically ill appearing, underweight  No acute distress   HENT:   Head: Normocephalic and atraumatic  Right Ear: External ear normal    Left Ear: External ear normal    Mouth/Throat: Oropharynx is clear and moist  No oropharyngeal exudate  Eyes: EOM are normal  Right eye exhibits no discharge  Left eye exhibits no discharge  No scleral icterus  Neck: Normal range of motion  Cardiovascular: Normal rate and regular rhythm  Pulmonary/Chest: Effort normal and breath sounds normal    Abdominal: Soft  Bowel sounds are normal    Neurological: He is alert and oriented to person, place, and time  Skin: Skin is warm and dry  He is not diaphoretic  There is pallor     Lower extremity wounds wrapped in protective dressings   Psychiatric: He has a normal mood and affect   His behavior is normal        Recent Results (from the past 48 hour(s))   Type and screen    Collection Time: 07/20/20 12:27 PM   Result Value Ref Range    ABO Grouping B     Rh Factor Negative     Antibody Screen Negative     Specimen Expiration Date 20200723    Prepare Leukoreduced RBC: 1 Units    Collection Time: 07/21/20  5:49 AM   Result Value Ref Range    Unit Product Code M6303K54     Unit Number [id number]     Unit ABO O     Unit RH NEG     Crossmatch Compatible     Unit Dispense Status Presumed Trans    T4, free    Collection Time: 07/21/20  8:38 AM   Result Value Ref Range    Free T4 1 72 (H) 0 76 - 1 46 ng/dL   PTH, intact    Collection Time: 07/21/20  8:38 AM   Result Value Ref Range    PTH 47 3 18 4 - 80 1 pg/mL   CBC and differential    Collection Time: 07/21/20  8:38 AM   Result Value Ref Range    WBC 6 30 4 31 - 10 16 Thousand/uL    RBC 2 81 (L) 3 88 - 5 62 Million/uL    Hemoglobin 8 7 (L) 12 0 - 17 0 g/dL    Hematocrit 26 1 (L) 36 5 - 49 3 %    MCV 93 82 - 98 fL    MCH 31 0 26 8 - 34 3 pg    MCHC 33 3 31 4 - 37 4 g/dL    RDW 17 5 (H) 11 6 - 15 1 %    MPV 14 1 (H) 8 9 - 12 7 fL    Platelets 52 (L) 873 - 390 Thousands/uL    nRBC 2 /100 WBCs    Neutrophils Relative 85 (H) 43 - 75 %    Immat GRANS % 2 0 - 2 %    Lymphocytes Relative 9 (L) 14 - 44 %    Monocytes Relative 4 4 - 12 %    Eosinophils Relative 0 0 - 6 %    Basophils Relative 0 0 - 1 %    Neutrophils Absolute 5 29 1 85 - 7 62 Thousands/µL    Immature Grans Absolute 0 15 0 00 - 0 20 Thousand/uL    Lymphocytes Absolute 0 59 (L) 0 60 - 4 47 Thousands/µL    Monocytes Absolute 0 26 0 17 - 1 22 Thousand/µL    Eosinophils Absolute 0 00 0 00 - 0 61 Thousand/µL    Basophils Absolute 0 01 0 00 - 0 10 Thousands/µL   Basic metabolic panel    Collection Time: 07/21/20  8:38 AM   Result Value Ref Range    Sodium 134 (L) 136 - 145 mmol/L    Potassium 4 8 3 5 - 5 3 mmol/L    Chloride 103 100 - 108 mmol/L    CO2 20 (L) 21 - 32 mmol/L    ANION GAP 11 4 - 13 mmol/L    BUN 42 (H) 5 - 25 mg/dL    Creatinine 1 82 (H) 0 60 - 1 30 mg/dL    Glucose 119 65 - 140 mg/dL    Calcium 8 9 8 3 - 10 1 mg/dL    eGFR 36 ml/min/1 73sq m   Magnesium    Collection Time: 07/21/20  8:39 AM   Result Value Ref Range    Magnesium 2 3 1 6 - 2 6 mg/dL   Comprehensive metabolic panel    Collection Time: 07/21/20  8:39 AM   Result Value Ref Range    Sodium 135 (L) 136 - 145 mmol/L    Potassium 4 9 3 5 - 5 3 mmol/L    Chloride 105 100 - 108 mmol/L    CO2 20 (L) 21 - 32 mmol/L    ANION GAP 10 4 - 13 mmol/L    BUN 42 (H) 5 - 25 mg/dL    Creatinine 1 77 (H) 0 60 - 1 30 mg/dL    Glucose 117 65 - 140 mg/dL    Calcium 8 5 8 3 - 10 1 mg/dL    AST 36 5 - 45 U/L    ALT 33 12 - 78 U/L    Alkaline Phosphatase 100 46 - 116 U/L    Total Protein 6 2 (L) 6 4 - 8 2 g/dL    Albumin 2 6 (L) 3 5 - 5 0 g/dL    Total Bilirubin 0 80 0 20 - 1 00 mg/dL    eGFR 38 ml/min/1 73sq m   Phosphorus    Collection Time: 07/21/20  8:39 AM   Result Value Ref Range    Phosphorus 3 5 2 3 - 4 1 mg/dL   Osmolality-If this is regarding a toxic alcohol, STOP  Test is not routinely indicated  Please consult medical  on call for further guidance      Collection Time: 07/21/20  8:39 AM   Result Value Ref Range    Osmolality Serum 304 (H) 282 - 298 mmol/KG   TSH, 3rd generation    Collection Time: 07/21/20  8:39 AM   Result Value Ref Range    TSH 3RD GENERATON 2 271 0 358 - 3 740 uIU/mL   Uric acid    Collection Time: 07/21/20  8:39 AM   Result Value Ref Range    Uric Acid 11 2 (H) 4 2 - 8 0 mg/dL   Vitamin D 25 hydroxy    Collection Time: 07/21/20  8:39 AM   Result Value Ref Range    Vit D, 25-Hydroxy 42 3 30 0 - 100 0 ng/mL   Cortisol    Collection Time: 07/21/20  8:40 AM   Result Value Ref Range    Cortisol, Random 27 6 ug/dL   Osmolality, urine    Collection Time: 07/21/20 12:36 PM   Result Value Ref Range    Osmolality, Ur 414 250 - 900 mmol/KG   Sodium, urine, random    Collection Time: 07/21/20 12:36 PM   Result Value Ref Range    Sodium, Ur 21    Hemoglobin and hematocrit, blood    Collection Time: 07/21/20  6:01 PM   Result Value Ref Range    Hemoglobin 7 9 (L) 12 0 - 17 0 g/dL    Hematocrit 23 9 (L) 36 5 - 49 3 %   Hemoglobin and hematocrit, blood    Collection Time: 07/22/20 12:34 AM   Result Value Ref Range    Hemoglobin 7 7 (L) 12 0 - 17 0 g/dL    Hematocrit 23 2 (L) 36 5 - 49 3 %   CBC and differential    Collection Time: 07/22/20  6:31 AM   Result Value Ref Range    WBC 5 90 4 31 - 10 16 Thousand/uL    RBC 2 44 (L) 3 88 - 5 62 Million/uL    Hemoglobin 7 7 (L) 12 0 - 17 0 g/dL    Hematocrit 23 0 (L) 36 5 - 49 3 %    MCV 94 82 - 98 fL    MCH 31 6 26 8 - 34 3 pg    MCHC 33 5 31 4 - 37 4 g/dL    RDW 17 5 (H) 11 6 - 15 1 %    MPV 12 2 8 9 - 12 7 fL    Platelets 46 (LL) 254 - 390 Thousands/uL    nRBC 2 /100 WBCs   Basic metabolic panel    Collection Time: 07/22/20  6:31 AM   Result Value Ref Range    Sodium 134 (L) 136 - 145 mmol/L    Potassium 4 6 3 5 - 5 3 mmol/L    Chloride 104 100 - 108 mmol/L    CO2 21 21 - 32 mmol/L    ANION GAP 9 4 - 13 mmol/L    BUN 40 (H) 5 - 25 mg/dL    Creatinine 1 86 (H) 0 60 - 1 30 mg/dL    Glucose 183 (H) 65 - 140 mg/dL    Calcium 8 1 (L) 8 3 - 10 1 mg/dL    eGFR 35 ml/min/1 73sq m   Manual Differential(PHLEBS Do Not Order)    Collection Time: 07/22/20  6:31 AM   Result Value Ref Range    Segmented % 87 (H) 43 - 75 %    Bands % 2 0 - 8 %    Lymphocytes % 8 (L) 14 - 44 %    Monocytes % 3 (L) 4 - 12 %    Eosinophils, % 0 0 - 6 %    Basophils % 0 0 - 1 %    Absolute Neutrophils 5 25 1 85 - 7 62 Thousand/uL    Lymphocytes Absolute 0 47 (L) 0 60 - 4 47 Thousand/uL    Monocytes Absolute 0 18 0 00 - 1 22 Thousand/uL    Eosinophils Absolute 0 00 0 00 - 0 40 Thousand/uL    Basophils Absolute 0 00 0 00 - 0 10 Thousand/uL    Total Counted 100     RBC Morphology Present     Anisocytosis Present     Ovalocytes Present     Polychromasia Present Schistocytes Present     Tear Drop Cells Present     Platelet Estimate Decreased (A) Adequate    Large Platelet Present    Hemoglobin and hematocrit, blood    Collection Time: 07/22/20  9:41 AM   Result Value Ref Range    Hemoglobin 8 0 (L) 12 0 - 17 0 g/dL    Hematocrit 24 0 (L) 36 5 - 49 3 %       Imaging Studies:   Ct Abdomen Pelvis Wo Contrast    Result Date: 7/17/2020  Narrative: CT ABDOMEN AND PELVIS WITHOUT IV CONTRAST INDICATION:   exposed umbilical hernia mesh r/o fistula  COMPARISON:  11/10/2011 TECHNIQUE:  CT examination of the abdomen and pelvis was performed without intravenous contrast   Axial, sagittal, and coronal 2D reformatted images were created from the source data and submitted for interpretation  Radiation dose length product (DLP) for this visit:  417 89 mGy-cm   This examination, like all CT scans performed in the [facility], was performed utilizing techniques to minimize radiation dose exposure, including the use of iterative  reconstruction and automated exposure control  Enteric contrast was administered  FINDINGS: The study is degraded by respiratory motion in the upper abdomen  ABDOMEN LOWER CHEST:  No clinically significant abnormality identified in the visualized lower chest  LIVER/BILIARY TREE:  Unremarkable  GALLBLADDER:  There are gallstone(s) within the gallbladder, without pericholecystic inflammatory changes  SPLEEN:  The spleen is enlarged, measuring  15 7 cm craniocaudal   This is slightly increased from the prior study where the spleen measured 14 3 cm  PANCREAS:  Unremarkable  ADRENAL GLANDS:  Unremarkable  KIDNEYS/URETERS:  There are small right renal cysts as seen on the previous CT  Again seen is bilateral extra renal pelves dilatation, left greater than right  This is improved compared to the prior CT and there is no hydronephrosis  The left ureter is dilated to the level of the bladder, similar to the previous study    Ureteral dilatation seen on the previous CT has improved  STOMACH AND BOWEL:  There is thickening of the gastric antrum most likely representing antral gastritis  The possibility of a mass is not entirely excluded  APPENDIX:  No findings to suggest appendicitis  ABDOMINOPELVIC CAVITY:  No ascites  No pneumoperitoneum  No lymphadenopathy  VESSELS:  Unremarkable for patient's age  PELVIS REPRODUCTIVE ORGANS:  There is a TURP defect  URINARY BLADDER:  The bladder is thick-walled, similar to the previous study and likely on the basis of chronic outlet obstruction  ABDOMINAL WALL/INGUINAL REGIONS:  There is a small fluid and gas collection in the at the umbilicus  This measures 1 8 x 2 6 cm and extends from the peritoneal cavity to the skin surface  The collection appears blind-ending and does not appear to communicate with adjacent opacified small bowel loops  The patient is status post bilateral inguinal hernia repair  OSSEOUS STRUCTURES:  No acute fracture or destructive osseous lesion  Impression: Degraded by respiratory motion  1   Small blind-ending fat and fluid-containing collection at the umbilicus communicating with the skin surface  This does not appear to communicate with adjacent opacified small bowel loops  2   Stable distal left ureteral dilatation with improved dilation of bilateral extra renal pelves and no hydronephrosis  This is likely secondary to a distal left ureteral stricture  3   Thickening of the gastric antrum suggestive of gastritis though a mass is difficult to entirely exclude  Recommend nonemergent endoscopic or upper GI evaluation  4   Cholelithiasis  5   Splenomegaly of uncertain etiology, though only mildly increased from the prior study from 2011  The study was marked in [facility] for immediate notification  Workstation performed: [id number]     Xr Chest Portable    Result Date: 7/17/2020  Narrative: CHEST INDICATION:   Acute Resp Failure   COMPARISON:  12/1/2018 EXAM PERFORMED/VIEWS:  XR CHEST PORTABLE FINDINGS:  Left-sided chest wall pacemaker is identified  Pacemaker leads are intact  Cardiomediastinal silhouette appears unremarkable  The lungs are clear  No pneumothorax or pleural effusion  Stable bony structures  No acute osseous abnormality  Impression: No acute cardiopulmonary disease  Workstation performed: [id number]     Xr Tibia Fibula 2 Views Right    Result Date: 7/17/2020  Narrative: RIGHT TIBIA AND FIBULA INDICATION:   ulcerations, infection  COMPARISON:  Left knee radiographs 1/29/2019 VIEWS:  XR TIBIA FIBULA 2 VW RIGHT FINDINGS: There is no acute fracture or dislocation  No significant degenerative changes  No lytic or blastic osseous lesion  Soft tissues are unremarkable  Vascular calcifications  Impression: No acute osseous abnormality  Workstation performed: [id number]     Vas Lower Limb Arterial Duplex, Limited/unilateral    Result Date: 7/17/2020  Narrative:  [facility] REPORT CLINICAL: Indications:  Patient presents with ulcers on his right calf which started several months ago  Patient reports he sometimes gets leg pain with walking  Risk Factors The patient has history of HTN  Clinical Right Pressure: unable to obtain, Left Pressure:  114/ mm Hg  FINDINGS:  Right                  Impression  Waveform    PSV (cm/s)  EDV  Common Femoral Artery              Triphasic           46    8  Prox Profunda                      Triphasic          102    6  Prox SFA                           Biphasic            46    9  Mid SFA                >75%        Biphasic           555  158  Dist SFA                           Monophasic          35   13  Proximal Pop                       Monophasic          38   11  Distal Pop                         Monophasic          52   15  Dist Post Tibial                   Monophasic          21    9  Prox  Ant  Tibial                  Monophasic          20    3  Dist  Ant   Tibial                  Monophasic          43   14     CONCLUSION: Impression: RIGHT LOWER LIMB: This resting evaluation shows evidence of a >75% stenosis within the mid superficial femoral artery  Ankle/Brachial index: unable to obtain due to patient's leg movements making it difficult to determine return of waveform Metatarsal pressure and great toe pressure were not obtained due to lack of a PPG waveform PVR/ PPG tracings are absent  LEFT LOWER LIMB: Ankle/Brachial index: 1 32 Metatarsal pressure and great toe pressure were not obtained due to lack of a PPG waveform PVR/ PPG tracings are absent  There is no previous study for comparison  Recommend repeat testing in 12 months as per protocol unless otherwise indicated  SIGNATURE: Electronically Signed by: Lou Gould MD, RPVI on 2020-07-17 09:17:44 PM    Vas Nestor & Waveform Analysis, Multiple Levels    Result Date: 7/22/2020  Narrative:  [facility] REPORT CLINICAL: Indications:  Physician wanted to re-evaluate bilateral NESTOR's secondary to patient undergoing angiogram of right SFA on 7/20/2020  Risk Factors The patient has history of HTN  Clinical Right Pressure:  112/ mm Hg, Left Pressure: unable to obtain  FINDINGS:  Segment       Rig            Left                          P  W           P  W          Ant  Tibial                  142  Triphasic  Post  Tibial  167  Biphasic       Triphasic  Ankle         167            142             Metatarsal    147             73             Great Toe                     51                CONCLUSION: RIGHT LOWER LIMB Ankle/Brachial Index: 1 49 which is in the unreliable range PPG/PVR Tracings are slightly dampened  Biphasic waveforms at the ankle  Metatarsal Pressure 147mmHg Great Toe Pressure: unable to obtain due to patient's foot movements LEFT LOWER LIMB Ankle/Brachial Index: 1 27 which is in the normal range PPG/PVR Tracings are slightly dampened  Triphasic waveforms at the ankle  Metatarsal Pressure 73mmHg Great Toe Pressure: 51mmHg, within the healing range      Ir Abdominal Angiography / Intervention    Result Date: 7/21/2020  Narrative: INDICATION: Right leg cellulitis, right leg skin ulceration, peripheral vascular disease with ultrasound findings of SFA stenosis  Probable mixed venous and arterial etiology  PROCEDURE: 1  Abdominal aortogram and right lower extremity arteriogram 2  Multifocal angioplasty of right SFA and popliteal arteries PREINTERVENTION FINDINGS: (Note, majority of study was done with carbon dioxide  Patient was unable to hold his breath with significant peristalsis and retained colonic contrast which limited evaluation) Celiac axis: Patent SMA: Not well visualized LYRIC: Not well visualized Renal arteries: Not well visualized Abdominal aorta: Patent, normal caliber Iliac arteries: Patent bilateral MARY, EIA and IIA RIGHT lower extremity: CFA: Patent Profunda femoris: Patent SFA: Proximal portion is patent, normal caliber  High-grade (greater than 80%) 1 cm focal stenosis mid SFA  Moderate grade tandem distal stenoses and in particular 60% stenosis at the abductor canal  Popliteal artery: Patent, noting 60% focal stenosis in the above-the-knee segment  Runoff: Peroneal runoff to the foot with distally reconstituted posterior tibial artery filling of the plantar arch  Chronically occluded anterior tibial artery  Proximal posterior tibial artery is patent and then occludes  LEFT lower extremity: CFA: Patent Profunda femoris: Proximal portion is patent SFA: Proximal portion is patent POST INTERVENTION FINDINGS: 1  Significant luminal gain of multifocal right SFA and popliteal artery stenoses following a combination of 6 mm and 7 mm POBA and DCB  2   Refractory proximal R SFA (30%)  stenosis  Considered stent placement; however, patient was quite uncooperative with movement and asked multiple times for the procedure to be terminated  3   No change in tibial runoff  4   Left common femoral artery access, successfully closed with a Vascade       Impression: Technically successful multifocal balloon angioplasty of multiple stenoses throughout the right superficial femoral and popliteal arteries  Patient has significant right tibial disease with in-line flow into the peroneal artery and distal reconstituted posterior tibial artery  Could consider tibial intervention in the future  Would recommend anesthesia support given patient inability to lay still  _______________________________________________________________ COMPARISON: CT abdomen pelvis 7/17/2020  Lower extremity arterial ultrasound  PROCEDURE DETAILS: Operators: Dr Bri Mclean, [address] [place] [address] attending, performed the procedure  Anesthesia: Conscious sedation was provided throughout a total intra-service time of 90 minutes during which the patient's hemodynamic parameters were continuously monitored by an independent trained radiology nurse  1% lidocaine was injected in the skin and subcutaneous tissues overlying the access site  Medications: 1% lidocaine, fentanyl, Versed, intravenous heparin Contrast: 70 mL of Visipaque, carbon dioxide Fluoroscopy time: 12 minutes Images: [id number] COMMENTS: Following the discussion of the risks, benefits and alternatives to the procedure, written informed consent was obtained from the patient  The patient was placed supine on the imaging table  A preprocedure timeout was performed per [facility] protocol  The left groin was prepped and draped in the usual sterile fashion  Using ultrasound guidance, the left common femoral artery was accessed in a retrograde fashion using a micropuncture set under fluoroscopic guidance  Ultrasound image saved to PACS  The micropuncture sheath was then exchanged for a 5 Vietnamese short sheath over a Bentson wire  Sheath was then attached to a continuous sidearm flush  A 4-Vietnamese Omni Flush catheter was advanced over the Bentson wire into the abdominal aorta above the renal arteries for abdominal aortogram using carbon dioxide   The catheter was repositioned at the level of aortic bifurcation and bilateral pelvic oblique arteriography was performed using carbon dioxide  A Bentson wire was next advanced into the right common femoral artery  The flush catheter was advanced into the right external iliac artery  Right lower extremity runoff was performed  INTERVENTION: The Bentson wire was advanced through the flush catheter into the right SFA  The flush catheter was removed over the wire  The 5-Yakut access sheath was then exchanged for a 6-Yakut Destination sheath  035 advantage Glidewire was advanced with an angled glide catheter into the below the knee popliteal artery  6 mm x 200 mm  balloon was then advanced and used for prolonged angioplasty of the entire left superficial femoral and above-the-knee popliteal arteries  Repeat arteriography demonstrated significant luminal gain with refractory proximal focal right SFA stenosis and focal above-the-knee segment popliteal artery stenosis  Repeat prolonged angioplasty of the aforementioned stenoses using a 6 mm x 4 cm was performed  Repeat arteriography demonstrated significant luminal gain of the popliteal artery stenosis  Prolonged 6 mm x 250 mm Inpact DCB of the mid/ distal right SFA and above-the-knee popliteal artery was then performed  Repeat arteriography demonstrated significant luminal gain  Attention was turned to refractory focal proximal/mid right SFA stenosis  7 mm x 4 cm prolonged angioplasty was then performed  Repeat arteriography demonstrated roughly 30% residual stenosis  At this time, the patient was requesting for us to terminate the procedure  Additionally, the patient could not lay still  I considered stent placement; however, I elected for DCB due to expediency  7 mm x 4 cm Inpact DCB of the refractory proximal/mid right SFA stenosis was performed  Repeat arteriography demonstrated additional luminal gain with roughly 30% residual stenosis   No intervention could be performed of the tibials given aforementioned patient issues  Left common femoral artery access was then closed with a Vascade  Workstation performed: [id number]     Counseling / Coordination of Care  Total floor / unit time spent today 75 minutes  Greater than 50% of total time was spent with the patient and / or family counseling and / or coordination of care  A description of the counseling / coordination of care:  Review of chart, discussion with primary team, bedside assessment of patient's symptoms and discussion regarding bone marrow biopsy  Emmett Copeland, BJ    Please note: This report has been generated by voice recognition software system  Therefore, there may be syntax, spelling and/or grammatical errors  Please call if you have any questions

## 2020-07-22 NOTE — TELEPHONE ENCOUNTER
Per s/o from Dr Mimi Colin, pt will need outpatient f/u once discharged from [address] [place] [address] Rd

## 2020-07-22 NOTE — NURSING NOTE
Pt refusing bed; slept for few hrly rounds in chair; refused elevation of BLE  Also no void and refused bladder scanning until just now-924ml  Now refusing straight catheterization but nurse continues to attempt persuading him  Pt now states "maybe within the hour I'll let you do that "  Pt is alert and oriented  Norco x2 overnight for c/o abd discomfort with relief  Nurse explained that bladder fullness contributes to abd discomfort and presents a risk of urinary tract infection

## 2020-07-22 NOTE — PROGRESS NOTES
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 + age-related nephron loss  - upon review medical records, no recent baseline creatinine available, prior creatinine 1 9 in 2 3 mg/dL in early 2019       Acute kidney injury (POA) on chronic kidney disease of stage III:  - acute kidney injury suspect prerenal secondary to anemia + hemodynamic perturbations with hypotension with Ace inhibitor and NSAID use +/- ATN +/- obstruction with BPH and urinary retention  - patient was admitted with a creatinine of 2 21 mg/dL  - patient's creatinine today is slightly elevated from yesterday at 1 86 mg/dL, however at baseline                - post contrast exposure on 7/20     - plan for possible OR Thursday or Friday per podiatry  If patient is for OR, patient will require pre and post hydration    - UA without proteinuria or hematuria  - CT scan with stable distal left ureteral dilatation which improved dilatation of bilateral extrarenal pelvis sees in no hydronephrosis  Likely secondary to a distal left ureteral stricture  - SPEP/UPEP and free light chain ratio pending    - hyperuricemia with most recent uric acid level 11 2      - currently on sodium bicarbonate infusion at 75 mL/hour  Will plan to discontinue at 1200 today  - continue to hold lisinopril + furosemide  - avoid NSAIDS, nephrotoxins, IV contrast if possible  - adjust medications to appropriate GFR   - avoid hypotension/ fluctuations in blood pressure to prevent decreased renal perfusion    - check BMP in a m    - check PVR with bladder scans  Maintain urinary retention protocol     - patient refused yesterday, however did straight-cath this a m    - await renal recovery  - monitor volume status with strict intake/output     - please perform daily weights       Blood pressure:  - blood pressure with fluctuations  - continue to hold lisinopril + furosemide as above  - optimize hemodynamics  - maintain MAP > 65mmHg  - avoid hypotension/ fluctuations in blood pressure       Volume status/CHF:  - clinically patient examines euvolemic   - most recent EF 50%; on furosemide 40 mg daily  - monitor for re-initiation of oral diuretics       Electrolytes:  - mild hyponatremia with most recent sodium 134, however corrects to 135 given hyperglycemia  - workup:                           - urine sodium 21                           - urine osm 414                            - cortisol random 27 6                            - uric acid 11 2                           - TSH 2 271                           - serum osm 304  - plan :                          - continue 1 5 L fluid restriction  - adjust fluids as above  - continue nutritional supplements       - will continue to monitor with daily BMP       Acid/base:  - most recent bicarbonate 21 with anion gap of 9     - adjust fluids as above  - monitor for need to initiate oral sodium bicarbonate supplements  - will continue to monitor with daily BMP       Anemia likely of chronic disease:  - most recent hemoglobin at 8 0 grams/ deciliter   - maintain hemoglobin greater than 8 grams/deciliter  - check stool Hemoccult  - SPEP/UPEP and free light chain ratio pending       Mineral bone disease of chronic kidney disease:  - most recent phosphorous acceptable at 3 5     - most recent magnesium stable at 2 3    - most recent PTH 47 3    - most recent vitamin d 42 3   - will continue to monitor       Other:   - post umbilical hernia repair on 07/21/2020; per General surgery     - abnormal CT scan of abdomen revealing thickening of the gastric antrum likely gastritis:  EGD/colonoscopy when clinically able; per GI    - atrial fibrillation      SUBJECTIVE:  Patient is sitting comfortably in chair  He denies nausea, vomiting, diarrhea  He denies shortness of breath or chest pain  He has some pain at his incision site      OBJECTIVE:  Current Weight: Weight - Scale: 63 5 kg (139 lb 15 9 oz)  Vitals:    07/22/20 0755   BP: 105/68   Pulse: 103   Resp: 20   Temp: 97 7 °F (36 5 °C)   SpO2: 100%       Intake/Output Summary (Last 24 hours) at 7/22/2020 0945  Last data filed at 7/22/2020 0692  Gross per 24 hour   Intake 1565 ml   Output 775 ml   Net 790 ml       General:  No acute distress  Skin:  Warm, no rash  Eyes:  Sclerae anicteric  ENT:  Moist lips and mucous membranes  Neck:  Supple trachea midline  Chest:  Clear breath sounds bilaterally   CVS:  Irregular rate, regular rhythm  Abdomen:  Distended, firm, hypoactive bowel sounds  Extremities:  +1 bilateral lower extremity edema, right leg dressing   Neuro:  Alert and oriented  Psych:  Appropriate affect      Medications:  Scheduled Meds:  Current Facility-Administered Medications:  acetaminophen 650 mg Oral Q6H PRN Taina Bolanos PA-C    aspirin 81 mg Oral Daily Brittani Bolanos PA-C    atorvastatin 40 mg Oral Daily With [employer] GERRI Bolanos    bacitracin 1 small application Topical BID Taina Bolanos PA-C    calcium carbonate 500 mg Oral Daily PRN Taina Bolanos PA-C    cefepime 1,000 mg Intravenous Q24H Brittani Bolanos PA-C Last Rate: Stopped (07/22/20 0100)   fentaNYL 25 mcg Intravenous Q5 Min PRN Armand Pickard MD    folic acid 1 mg Oral Daily Brittani Bolanos PA-C    HYDROcodone-acetaminophen 1 tablet Oral Q4H PRN Taina Bolanos PA-C    HYDROcodone-acetaminophen 2 tablet Oral Q4H PRN Taina Bolanos PA-C    HYDROmorphone 0 5 mg Intravenous Q10 Min PRN Armand Pickard MD    HYDROmorphone 0 5 mg Intravenous Q2H PRN FRANCHESCA Nugent-CHELLE    LORazepam 1 mg Oral Q8H PRN FRANCHESCA Nugent-CHELLE    pantoprazole 40 mg Intravenous Q12H [facility] Brittani Bolanos PA-C    polyethylene glycol 17 g Oral Daily Brittani Bolanos PA-C    sodium bicarbonate infusion 75 mL/hr Intravenous Continuous Desiree Kebede SRINIVASANDIANNA Last Rate: 75 mL/hr (07/22/20 0446)   tamsulosin 0 4 mg Oral Daily With Suzanne Bolanos PA-C    vancomycin 15 mg/kg Intravenous Q24H Brittani Bolanos PA-C Last Rate: Stopped (07/22/20 0300)       PRN Meds:   acetaminophen    calcium carbonate    fentaNYL    HYDROcodone-acetaminophen    HYDROcodone-acetaminophen    HYDROmorphone    HYDROmorphone    LORazepam    Continuous Infusions:  sodium bicarbonate infusion 75 mL/hr Last Rate: 75 mL/hr (07/22/20 0446)       Laboratory Results:  Results from last 7 days   Lab Units 07/22/20  0631 07/22/20  0034 07/21/20  1801 07/21/20  0839 07/21/20  2381 07/20/20  0446 07/19/20  0529 07/18/20  1550 07/18/20  0637  07/17/20  0816  07/16/20  2243   WBC Thousand/uL 5 90  --   --   --  6 30 6 00 7 51  --  5 91  --   --   --  7 48   HEMOGLOBIN g/dL 7 7* 7 7* 7 9*  --  8 7* 7 1* 8 2* 7 8* 8 3*  8 3*   < >  --    < > 6 7*   HEMATOCRIT % 23 0* 23 2* 23 9*  --  26 1* 21 7* 24 8* 23 3* 25 4*  25 8*   < >  --    < > 20 8*   PLATELETS Thousands/uL 46*  --   --   --  52* 72* 77*  --  84*  --  83*  --  104*   SODIUM mmol/L 134*  --   --  135* 134* 132* 134*  --  138  --   --   --  136   POTASSIUM mmol/L 4 6  --   --  4 9 4 8 4 6 4 3  --  4 3  --   --   --  4 6   CHLORIDE mmol/L 104  --   --  105 103 101 102  --  105  --   --   --  103   CO2 mmol/L 21  --   --  20* 20* 21 20*  --  22  --   --   --  20*   BUN mg/dL 40*  --   --  42* 42* 55* 59*  --  59*  --   --   --  80*   CREATININE mg/dL 1 86*  --   --  1 77* 1 82* 1 79* 2 21*  --  2 03*  --   --   --  2 18*   CALCIUM mg/dL 8 1*  --   --  8 5 8 9 8 1* 8 6  --  8 5  --   --   --  9 0   MAGNESIUM mg/dL  --   --   --  2 3  --   --   --   --   --   --   --   --   --    PHOSPHORUS mg/dL  --   --   --  3 5  --   --   --   --   --   --   --   --   --     < > = values in this interval not displayed

## 2020-07-22 NOTE — ASSESSMENT & PLAN NOTE
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

## 2020-07-22 NOTE — PROGRESS NOTES
Progress Note - VascularSurgery   Ramrio Azar 69 y o  male MRN: [id number]  Unit/Bed#: -01 Encounter: [id number]    Assessment/Plan:  PVD/PAD   Right lower extremity tissue loss POD #2 s/p right SFA PTA   -awaiting ABIs to reassess healing potential and determine need for further intervention   -at previous intervention they were unable to stent secondary patient movement and unable to perform tibial intervention  -continue statin and aspirin  -creatinine currently 1 86, platelets 46    Subjective/Objective     Subjective:  "My leg still isn't right"  Patient complains of some tingling in the right lower extremity and some generalized pain  Pain was slightly improved prior to intervention  He denies pain in left lower extremity  Left groin site well healed with minimal bruising  He underwent exploratory laparotomy, excision of infected mesh and repair of ventral hernia yesterday  He is up in a chair tolerating a diet this a m without any nausea or vomiting  Objective:     Blood pressure 105/68, pulse 103, temperature 97 7 °F (36 5 °C), temperature source Oral, resp  rate 20, height 5' 10" (1 778 m), weight 63 5 kg (139 lb 15 9 oz), SpO2 100 %  ,Body mass index is 20 09 kg/m²        Intake/Output Summary (Last 24 hours) at 7/22/2020 5537  Last data filed at 7/22/2020 8747  Gross per 24 hour   Intake 1565 ml   Output 775 ml   Net 790 ml       Invasive Devices     Peripheral Intravenous Line            Peripheral IV 07/20/20 Left Arm 2 days                Physical Exam: General appearance: WDWN  Lungs: clear to auscultation bilaterally, without rales, rhonchi, or wheezes  Heart:  Irregularly irregular  Abdomen: soft, generalized tenderness;  bowel sounds scattered; distended, midline incision dressing with some serosanguineous drainage  Extremities:   right lower extremity dressing in place, left lower extremity venodyne in place, motor sensation grossly intact, chronic venous stasis changes bilaterally, left groin dressing removed, access site intact soft, without hematoma, positive ecchymosis, pulse exam unchanged, 2+ femoral pulses bilaterally, 1+ right popliteal         Lab, Imaging and other studies:   Lab Results   Component Value Date    SODIUM 134 (L) 07/22/2020    K 4 6 07/22/2020     07/22/2020    CO2 21 07/22/2020    BUN 40 (H) 07/22/2020    CREATININE 1 86 (H) 07/22/2020    GLUC 183 (H) 07/22/2020    CALCIUM 8 1 (L) 07/22/2020       Lab Results   Component Value Date    WBC 5 90 07/22/2020    HGB 7 7 (L) 07/22/2020    HCT 23 0 (L) 07/22/2020    MCV 94 07/22/2020    PLT 46 (LL) 07/22/2020       No results displayed because visit has over 200 results          VTE Pharmacologic Prophylaxis: Sequential compression device (Venodyne) , ASA  VTE Mechanical Prophylaxis: sequential compression device

## 2020-07-22 NOTE — ASSESSMENT & PLAN NOTE
Exploratory laparotomy, excision of infected mesh and repair of ventral heria (N/A) on 07/21/2020  Wound care and dressing changes per surgery    Pain management  See above

## 2020-07-22 NOTE — ASSESSMENT & PLAN NOTE
Wt Readings from Last 3 Encounters:   07/22/20 63 5 kg (139 lb 15 9 oz)   06/21/19 68 3 kg (150 lb 9 6 oz)   06/19/19 67 6 kg (149 lb)     Echocardiogram on this admission shows ejection fraction of 50%  Patient's lungs are clear, has no JVD  Has chronic systolic CHF is compensated    Continue to hold lisinopril, Lasix and Toprol because of relative hypotension  Daily weight and I&Os

## 2020-07-22 NOTE — ASSESSMENT & PLAN NOTE
Patient has right lower leg wounds with cellulitis  Continue vancomycin and cefepime  Patient underwent angiogram   Podiatry is planning to to debridement on Thursday or Friday    Final culture results reviewed

## 2020-07-22 NOTE — PROGRESS NOTES
Patient requested attempting to void in urinal around approximately 1900  Unable to void  Nurse requested to bladder scan patient and attempt straight cath if neccessary  Patient refused, stated he would try to urinate again later  Patient asked again alter about bladder scan and straight cath, continues to refuse

## 2020-07-22 NOTE — PROGRESS NOTES
Vancomycin IV Pharmacy-to-Dose Consultation    Camilo Vieyra is a 69 y o  male who is currently receiving Vancomycin IV with management by the Pharmacy Consult service  Assessment/Plan:  The patient was reviewed  Renal function is stable and no signs or symptoms of nephrotoxicity and/or infusion reactions were documented in the chart  Based on todays assessment, continue current vancomycin (day # 6) dosing of 1000mg IV Q24H, with a plan for trough to be drawn at 0100 on 07/23/20  We will continue to follow the patients culture results and clinical progress daily      Gerda Gold, Pharmacist

## 2020-07-23 NOTE — WOUND OSTOMY CARE
Progress Note - Wound   Wayne Nuno 69 y o  male MRN: [id number]  Unit/Bed#: -01 Encounter: [id number]        Assessment:   Attempted to see patient for wound care follow-up  Patient very resistant  Sitting up in bedside couch  States he absolutely will not stand for assessment  Agreed to tilt his body to left for right buttock assessment  Continue to refuse assessment of heels and other wounds after rationale for regular skin/wound assessments provided  1   Present on admission scab to right buttocks--Unsure of original cause  Likely a healing pressure injury (unstageable at this time)  No jeff-wound induration or fluctuance  Very limited assessment with patient tilting to left  It appears part of the eschar/scab has unroofed  Pink, intact skin  2   Right lower extremity wounds--per podiatry  Patient for OR debridement of wounds 1/67/50   3   Umbilical wound--per surgical team   Status post excision of infected mesh and ventral hernia repair on 7/21/20  Patient states he "hates the air cushion    It does nothing "  When questioned further, patient states he uses foam donut at home  Educated patient why foam donut cushions are not used--they may cause more harm by putting increased pressure on good skin, instead of offloading pressure  Skin Care Plan:   1-Hydraguard lotion to bilateral sacrum, buttock and heels BID and PRN  2-Offloading air cushion in chair when out of bed  4-Moisturize skin daily with skin nourishing cream   5-Encourage/remind patient to turn/reposition every 2 hours when in bed and weight shift frequently while in chair for pressure re-distribution on skin  Provide assistance as needed      Wound care team will sign-off at this time  Please place re-consult if patient is agreeable to assessment and treatment plan        Benny LUQUE, RN, [employer]

## 2020-07-23 NOTE — ASSESSMENT & PLAN NOTE
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

## 2020-07-23 NOTE — ASSESSMENT & PLAN NOTE
Patient presented with severe symptomatic normocytic anemia 6 7 causing shortness of breath on exertion  He was taking NSAIDs for chronic right lower leg ulcers that were getting worse  Patient was transfused packed red blood cells during the hospital stay  Hemoglobin currently is 7 6 this morning  Monitor H&H and transfuse as needed  Continue Protonix b i d ,  Abnormal CT scan the abdomen pelvis showing thickening of the gastric antrum likely gastritis but unable to exclude gastric neoplasm  Patient received another 1 unit of packed red cell transfusion on July 20  GI follow-up needed  Patient will need eventually endoscopic workup  Refusing at this time and wants his abdominal issues to be addressed first   GI recommended Hematology evaluation  Hematology consult appreciated  Possible myelodysplastic syndrome  They recommended bone marrow biopsy  IR consult placed by them and forms are filled  Patient at this time refused bone marrow biopsy as he wants to focus on other medical problems    They recommended to transfuse to keep CBC above 8  Will order 1 unit of packed red cell transfusion today

## 2020-07-23 NOTE — PROGRESS NOTES
Vancomycin Assessment    Prachi Bernstein is a 69 y o  male who is currently receiving vancomycin 1000 mg q24 for skin-soft tissue infection     Relevant clinical data and objective history reviewed:  Creatinine   Date Value Ref Range Status   07/22/2020 1 86 (H) 0 60 - 1 30 mg/dL Final     Comment:     Standardized to IDMS reference method   07/21/2020 1 77 (H) 0 60 - 1 30 mg/dL Final     Comment:     Standardized to IDMS reference method   07/21/2020 1 82 (H) 0 60 - 1 30 mg/dL Final     Comment:     Standardized to IDMS reference method     BP 99/62   Pulse 83   Temp 97 9 °F (36 6 °C)   Resp 16   Ht 5' 10" (1 778 m)   Wt 63 5 kg (139 lb 15 9 oz)   SpO2 99%   BMI 20 09 kg/m²   I/O last 3 completed shifts: In: 8789 [P O :240; I V :1075; IV Piggyback:250]  Out: 775 [Urine:775]  Lab Results   Component Value Date/Time    BUN 40 (H) 07/22/2020 06:31 AM    BUN 53 (H) 12/14/2018 12:00 AM    WBC 5 90 07/22/2020 06:31 AM    HGB 8 0 (L) 07/23/2020 12:33 AM    HCT 24 3 (L) 07/23/2020 12:33 AM    MCV 94 07/22/2020 06:31 AM    PLT 46 (LL) 07/22/2020 06:31 AM     Temp Readings from Last 3 Encounters:   07/22/20 97 9 °F (36 6 °C)   06/21/19 97 7 °F (36 5 °C) (Tympanic)   04/01/19 98 4 °F (36 9 °C)     Vancomycin Days of Therapy: 7    Assessment/Plan  The patient is currently on vancomycin utilizing scheduled dosing  The patient is receiving 1000 mg q24 with the most recent vancomycin level being at steady-state and sub-therapeutic based on a goal of 15-20 (appropriate for most indications) ; therefore, after clinical evaluation will be changed to 1500 mg q24   Pharmacy will continue to follow closely for s/sx of nephrotoxicity, infusion reactions and appropriateness of therapy  BMP and CBC will be ordered per protocol  Plan for trough as patient approaches steady state, prior to the 4th  dose at approximately 1730 7/27   Pharmacy will continue to follow the patients culture results and clinical progress daily     Oscar Smallwood, Pharmacist

## 2020-07-23 NOTE — PHYSICAL THERAPY NOTE
PT eval     07/23/20 0919   Note Type   Note type Eval only   Pain Assessment   Pain Assessment Tool Pain Assessment not indicated - pt denies pain   Home Living   Type of Home House   Home Layout Two level; Able to live on main level with bedroom/bathroom  (2 amie with rail)   Home Equipment Cane;Walker   Prior Function   Level of [place] Independent with ADLs and functional mobility   Lives With Significant other   Receives Help From Family   ADL Assistance Independent   IADLs Needs assistance   Restrictions/Precautions   Weight Bearing Precautions Per Order No   Other Precautions Limb alert; Fall Risk   General   Additional Pertinent History underwent hernia repair 7/21 and scheduled for Rle wound debridement 7/24, pmh +anemia , cellulitis,infected hernia mesh   Family/Caregiver Present No   Cognition   Overall Cognitive Status WFL   Arousal/Participation Alert   Orientation Level Oriented X4   Following Commands Follows one step commands with increased time or repetition   RUE Assessment   RUE Assessment WFL   LUE Assessment   LUE Assessment WFL   RLE Assessment   RLE Assessment WFL  (wrapped with gauze some drainage noted)   LLE Assessment   LLE Assessment WFL   Coordination   Movements are Fluid and Coordinated 1   Proprioception   RLE Proprioception Grossly intact   LLE Proprioception Grossly Intact   Transfers   Sit to Stand 5  Supervision   Stand to Sit 5  Supervision   Stand pivot 5  Supervision   Additional items   (rw)   Ambulation/Elevation   Gait pattern Forward Flexion   Gait Assistance 5  Supervision   Assistive Device Rolling walker   Distance 25'x2   Balance   Static Sitting Good   Dynamic Sitting Good   Static Standing Good   Dynamic Standing Good   Ambulatory Fair +   Endurance Deficit   Endurance Deficit No   Activity Tolerance   Activity Tolerance Patient tolerated treatment well   Medical Staff Made Aware ARIE Hilton   Nurse Made Aware GAGE Mendez   Assessment   Prognosis Good   Problem List Decreased skin integrity   Assessment Pt presents se functional ambulator with rw, usually uses cane but has rw available  for debridement of R le tommorrow so instr to use rw for more support  Appears near baseline and appropriate for home d/c once medically cleared  REcommend home PT safety check at d/c   d/c PT at this time     Barriers to Discharge   (medical status)   Goals   Patient Goals get better   Plan   PT Frequency   (d/c PT)   Recommendation   PT Discharge Recommendation Home with skilled therapy   Equipment Recommended Jessy Socks  (states has own)   PT - OK to Discharge Yes   Modified [place] Scale   Modified [place] Scale 3   Channign Reyna, PT

## 2020-07-23 NOTE — APP STUDENT NOTE
OZZIE STUDENT  Inpatient Progress Note for TRAINING ONLY  Not Part of Legal Medical Record       Progress Note - Carmela Hurt 69 y o  male MRN: [id number]    Unit/Bed#: -Cassi Encounter: [id number]      Assessment/Plan:  Umbilical wound with infection, abscess, and protruding mesh  POD#2 s/p wound exploration, explantation of mesh, primary repair of umbilical hernia  · Minimal incision site pain, dressing intact and no local signs of infection  · Plan to remove marshal from wound and change dressing today  · Continue PO diet as tolerated  · Pain control as needed  · Continue antibiotics per Infectious Disease    Urinary retention  · Patient with continued urinary retention requiring catheterization yesterday  · Bladder scan this morning with 486mL, unable to urinate, and refused catheterization   · Continue monitoring and encourage catheterization as needed    Anemia  · S/p blood transfusions  · Hgb 7 2 today  · CTAP with antral thickening - gastritis vs  Neoplasm  · GI onboard - patient refusing EGD/colonoscopy until his leg and abdominal wounds are addressed  · Oncology/Hematology onboard - recommend bone marrow biopsy, patient refusing at this time  · Transfuse as needed for Hgb <8    Right lower extremity wounds/cellulitis   · Mixed venous statis and arterial wounds  · LEADs with >75% stenosis mid SFA NESTOR unable to be obtained secondary to absent PPG waveform  · S/p arteriogram with angioplasty of proximal/mid SFA, patient was not cooperative for stenting or further procedure to distal extremity  · Repeat ABIs showed improved blood flow   · Podiatry onboard - plan for wound debridement tomorrow   · Continue local wound care per podiatry  · Vascular follow-up    Other medical comorbid conditions  CHF, Persistent Afib, pacemaker, CKD, BPH, chronic ITP  · Chronic thrombocytopenia - transfuse platelets prior to surgery  · Cardiology onboard - monitor fluid status, patient refusing daily weights  · Nephrology onboard  · Medical management     Subjective:   Patient slept well and reports no overnight issues  He continues with urinary retention and was straight catheterized yesterday  Nursing reports bladder scan this morning was 486mL and patient was unable to void and refused cath  He is now agreeable to catheterization  He notes mild lower abdominal pain likely secondary to urinary retention and constipation  Denies any fever, chills, N/V/D, chest pain, shortness of breath  Objective:     Vitals: Blood pressure 100/60, pulse 80, temperature (!) 97 4 °F (36 3 °C), temperature source Oral, resp  rate 18, height 5' 10" (1 778 m), weight 63 5 kg (139 lb 15 9 oz), SpO2 100 %  ,Body mass index is 20 09 kg/m²  Intake/Output Summary (Last 24 hours) at 7/23/2020 0814  Last data filed at 7/23/2020 0050  Gross per 24 hour   Intake 225 ml   Output 1014 ml   Net -789 ml       Physical Exam:  Physical Exam   Constitutional: He is oriented to person, place, and time  He appears cachectic  HENT:   Head: Normocephalic and atraumatic  Neck: Neck supple  No JVD present  No tracheal deviation present  Cardiovascular: Normal rate  An irregularly irregular rhythm present  No murmur heard  Pulmonary/Chest: Effort normal and breath sounds normal  He has no wheezes  He has no rales  Abdominal: Soft  He exhibits distension  There is tenderness (mild lower abdominal tenderness secondary to bladder fullness and constipation)  Musculoskeletal: He exhibits no edema  Right lower extremity dressing intact   Neurological: He is alert and oriented to person, place, and time  Skin: Skin is warm and dry  Surgical incision site dressing intact without surrounding erythema or drainage       Invasive Devices     Peripheral Intravenous Line            Peripheral IV 07/22/20 Dorsal (posterior); Right Wrist less than 1 day                Lab, Imaging and other studies: I have personally reviewed pertinent reports      VTE Pharmacologic Prophylaxis: Heparin  VTE Mechanical Prophylaxis: sequential compression device

## 2020-07-23 NOTE — ASSESSMENT & PLAN NOTE
Patient has stage III chronic disease with creatinine around 2 0 at baseline  Patient's renal function is at baseline:  Creatinine is 1 80 today  Nephrology on board  Monitor renal function in a m

## 2020-07-23 NOTE — NURSING NOTE
Pt bladder scanned at 0530 for 486mL  Pt encouraged to void in the urinal and refused  Pt educated on risk for infection and putting extra pressure on abdomen by retaining urine  Pt still refused  Also refusing daily weight  WCTM

## 2020-07-23 NOTE — OCCUPATIONAL THERAPY NOTE
Occupational Therapy Evaluation     Patient Name: Desiree Parks  VTKPY'K Date: 7/23/2020  Problem List  Principal Problem:    Anemia, unspecified  Active Problems:    Peripheral vascular disease of lower extremity (HCC)    Other persistent atrial fibrillation (HCC)    Thrombocytopenia (HCC)    Chronic combined systolic and diastolic heart failure (HCC)    Cellulitis    Lactic acidosis    CKD (chronic kidney disease) stage 3, GFR 30-59 ml/min ([address] )    Umbilicus discharge    Benign prostatic hyperplasia with urinary retention    Severe protein-calorie malnutrition ([address] )    Open wound of umbilical region    Past Medical History  Past Medical History:   Diagnosis Date    Anemia     Atrial fibrillation (HCC)     Benign prostatic hyperplasia without urinary obstruction     Congestive heart failure with left ventricular diastolic dysfunction, chronic (HCC)     History of ITP     Hypertension     Lumbosacral disc herniation     Peripheral vascular disease of lower extremity (HCC)     Renal disorder     Subdural hematoma (HCC)      Past Surgical History  Past Surgical History:   Procedure Laterality Date    BACK SURGERY      AUSTYN HOLE FOR SUBDURAL HEMATOMA      CARDIAC PACEMAKER PLACEMENT      CLAVICLE SURGERY Left 2011    HERNIA REPAIR      IR ABDOMINAL ANGIOGRAPHY / INTERVENTION  7/20/2020    LAPAROTOMY N/A 7/21/2020    Procedure: LAPAROTOMY EXPLORATORY, excision of infected mesh and repair of ventral heria;   Surgeon: Krysta Portillo MD;  Location:  MAIN OR;  Service: General    PROSTATE SURGERY      VENTRAL HERNIA REPAIR N/A 7/21/2020    Procedure: REPAIR HERNIA VENTRAL - EXCSION OF INFECTED MESH;  Surgeon: Krysta Portillo MD;  Location:  MAIN OR;  Service: General         07/23/20 0920   Note Type   Note type Eval only   Restrictions/Precautions   Weight Bearing Precautions Per Order No   Other Precautions Fall Risk   Pain Assessment   Pain Assessment Tool Pain Assessment not indicated - pt denies pain   Home Living   Type of [address] Two level; Able to live on main level with bedroom/bathroom  (2 MARTY)   Bathroom Shower/Tub Tub/shower unit   Bathroom Toilet Standard   Bathroom Equipment Grab bars in shower   Bathroom Accessibility Accessible   Home Equipment Walker;Cane  (Primarily uses a cane)   Prior Function   Level of [place] Independent with ADLs and functional mobility   Lives With Significant other   ADL Assistance Independent   IADLs Independent   Comments Pt reports that he was able to drive and cook his owns meals   ADL   Eating Assistance 7  Independent   Grooming Assistance 7  Independent   UB Bathing Assistance 5  Supervision/Setup   LB [address] 5  Supervision/Setup   [address] 5  Supervision/Setup   LB Dressing Assistance 4  [address]  5  Supervision/Setup   Transfers   Sit to [address] to Sit 5  Supervision   Stand pivot 5  Supervision   Additional items Verbal cues  (RW)   Functional Mobility   Functional Mobility 5  Supervision   Additional Comments RW   Balance   Static Sitting Good   Dynamic Sitting Good   Static Standing Good   Dynamic Standing Fair +   Activity Tolerance   Activity Tolerance Patient tolerated treatment well   Medical Staff Made Aware PT Natali   Nurse Made Aware GAGE VILLALOBOS Assessment   RUE Assessment WFL   LUE Assessment   LUE Assessment WFL   Cognition   Overall Cognitive Status WFL   Arousal/Participation Alert   Attention Within functional limits   Orientation Level Oriented X4   Memory Within functional limits   Following Commands Follows one step commands with increased time or repetition   Comments Pt pleasant and agreeable to participate with therapy   Assessment   Assessment Pt is a 69 y o  male seen for OT evaluation at [facility], admitted 7/16/2020 w/ Anemia, unspecified  OT completed brief review of pt's medical and social history   Comorbidities affecting pt's functional performance at time of assessment include: peripheral vascular disease, thrombocytopenia, severe protein-calorie malnutrition, celluliis, osteomylelitis, and wound infection   Prior to admission, pt was living with significant other in house with 1st floor set-up and was independent with ADLs and relied on a cane for mobility  Upon evaluation, pt performs UB ADLs with supervision, LB ADLs with supervision-min A (pt with RLE dressing), and functional mobility with supervision with RW  Based on findings, pt is of low complexity  At this time, OT recommendations at time of discharge are home with family suppoort  No further acute OT needs indicated at this time - Recommend pt continue to be OOB for meals, ambulation to/from BR, perform self care tasks, and mobility in hallway with nursing  D/C from OT caseload with above recommendations     Goals   Patient Goals Pt wishes to get home   Plan   OT Frequency Eval only   Recommendation   OT Discharge Recommendation Home with skilled therapy  (Pt states that significant other can assist as needed )           Marc Lion, OTR/L

## 2020-07-23 NOTE — PROGRESS NOTES
Progress Note - General Surgery  Michael Calvo 69 y o  male MRN: [id number]  Unit/Bed#: -01 Encounter: [id number]    Assessment/Plan:  Umbilical wound infection with exposed mesh and abscess  POD#2 s/p wound exploration, explantation of mesh, primary repair of umbilical hernia  Minimal abdominal pain, most pain appears to be related to dilated bladder secondary to UR  Continue diet as tolerated  Constipated with some distention - has miralax and colace, can add suppository but unclear if patient will refuse - continue current regimen  Serafin removed  Continue abx per ID    Urinary retention  Patient reports intermittently self cathing at home however he is regularly refusing straight cath here, bladder scanned for 500cc this am and refusing straight cath - discussed importance of regularly emptying his bladder and not delaying catheterization to just 1-2 times a day  Continue bladder scanning and straight cath prn    Anemia  S/p transfusions  hgb continues to drift - down to 7 2 today  No apparent GI source however he has never had a colonoscopy and patient is refusing colonoscopy or EGD  Hematology on board and recommended bone marrow biopsy which patient also refused    RLE wounds with PVD  Appears mixed venous and arterial   With evidence of resting claudication and >75% stenosis on LEADS  S/p RLE angiogram with balloon angioplasty right SFA to <30% residual stenosis, patient too mobile for stent placement or tibial artery angioplasty  Repeat LEADs with improvement  For OR debridement of wounds tomorrow with podiatry    Chronic ITP  Platelets down to 39I today   ? platelet transfusion with planned debridement tomorrow  Hematology on board    Other medical comorbid conditions  CHF, Persistent Atrial fibrillation, pacemaker, CKD, BPH  Nephrology on board  Cardiology on board      Subjective/Objective   Subjective: Reports he is feeling good, tolerating diet, no bm despite miralax yesterday   Abdominal pain is localized to suprapubic region  Refusing straight cath, early daily weight, and other recommended medical treatments  Objective:     Blood pressure 100/60, pulse 80, temperature (!) 97 4 °F (36 3 °C), temperature source Oral, resp  rate 18, height 5' 10" (1 778 m), weight 63 5 kg (139 lb 15 9 oz), SpO2 100 %  ,Body mass index is 20 09 kg/m²  Intake/Output Summary (Last 24 hours) at 7/23/2020 1013  Last data filed at 7/23/2020 0050  Gross per 24 hour   Intake 225 ml   Output 1014 ml   Net -789 ml       Invasive Devices     Peripheral Intravenous Line            Peripheral IV 07/22/20 Dorsal (posterior); Right Wrist less than 1 day                Physical Exam: /60 (BP Location: Left arm)   Pulse 80   Temp (!) 97 4 °F (36 3 °C) (Oral)   Resp 18   Ht 5' 10" (1 778 m)   Wt 63 5 kg (139 lb 15 9 oz)   SpO2 100%   BMI 20 09 kg/m²   General appearance: alert and oriented, in no acute distress and sitting up on bedside couch  Lungs: clear to auscultation bilaterally and without wheezes, crackles, or rhonchi  Heart: irregularly irregular rhythm and S1, S2 normal  Abdomen: soft, mild incisional tenderness, staples intact without surrounding erythema, marshal removed  Tender to palp over suprapubic region, fullness in lower abdomen  Abdomen minimally distended       Lab, Imaging and other studies:  CBC:   Lab Results   Component Value Date    WBC 4 98 07/23/2020    HGB 7 2 (L) 07/23/2020    HCT 22 1 (L) 07/23/2020    MCV 95 07/23/2020    PLT 43 (LL) 07/23/2020    MCH 31 0 07/23/2020    MCHC 32 6 07/23/2020    RDW 17 3 (H) 07/23/2020    MPV 13 7 (H) 07/23/2020    NRBC 2 07/23/2020   , CMP:   Lab Results   Component Value Date    SODIUM 134 (L) 07/23/2020    K 4 6 07/23/2020     07/23/2020    CO2 21 07/23/2020    BUN 39 (H) 07/23/2020    CREATININE 1 80 (H) 07/23/2020    CALCIUM 8 2 (L) 07/23/2020    AST 24 07/23/2020    ALT 26 07/23/2020    ALKPHOS 81 07/23/2020    EGFR 37 07/23/2020     VTE Pharmacologic Prophylaxis: Reason for no pharmacologic prophylaxis thrombocytopenic  VTE Mechanical Prophylaxis: sequential compression device

## 2020-07-23 NOTE — PLAN OF CARE
Problem: Potential for Falls  Goal: Patient will remain free of falls  Description  INTERVENTIONS:  - Assess patient frequently for physical needs  -  Identify cognitive and physical deficits and behaviors that affect risk of falls  -  [place] fall precautions as indicated by assessment   - Educate patient/family on patient safety including physical limitations  - Instruct patient to call for assistance with activity based on assessment  - Modify environment to reduce risk of injury  - Consider OT/PT consult to assist with strengthening/mobility  Outcome: Progressing     Problem: Prexisting or High Potential for Compromised Skin Integrity  Goal: Skin integrity is maintained or improved  Description  INTERVENTIONS:  - Identify patients at risk for skin breakdown  - Assess and monitor skin integrity  - Assess and monitor nutrition and hydration status  - Monitor labs   - Assess for incontinence   - Turn and reposition patient  - Assist with mobility/ambulation  - Relieve pressure over bony prominences  - Avoid friction and shearing  - Provide appropriate hygiene as needed including keeping skin clean and dry  - Evaluate need for skin moisturizer/barrier cream  - Collaborate with interdisciplinary team   - Patient/family teaching  - Consider wound care consult   Outcome: Progressing     Problem: Nutrition/Hydration-ADULT  Goal: Nutrient/Hydration intake appropriate for improving, restoring or maintaining nutritional needs  Description  Monitor and assess patient's nutrition/hydration status for malnutrition  Collaborate with interdisciplinary team and initiate plan and interventions as ordered  Monitor patient's weight and dietary intake as ordered or per policy  Utilize nutrition screening tool and intervene as necessary  Determine patient's food preferences and provide high-protein, high-caloric foods as appropriate       INTERVENTIONS:  - Monitor oral intake, urinary output, labs, and treatment plans  - Assess nutrition and hydration status and recommend course of action  - Evaluate amount of meals eaten  - Assist patient with eating if necessary   - Allow adequate time for meals  - Recommend/ encourage appropriate diets, oral nutritional supplements, and vitamin/mineral supplements  - Order, calculate, and assess calorie counts as needed  - Recommend, monitor, and adjust tube feedings and TPN/PPN based on assessed needs  - Assess need for intravenous fluids  - Provide specific nutrition/hydration education as appropriate  - Include patient/family/caregiver in decisions related to nutrition  Outcome: Progressing     Problem: NEUROSENSORY - ADULT  Goal: Achieves maximal functionality and self care  Description  INTERVENTIONS  - Monitor swallowing and airway patency with patient fatigue and changes in neurological status  - Encourage and assist patient to increase activity and self care     - Encourage visually impaired, hearing impaired and aphasic patients to use assistive/communication devices  Outcome: Progressing     Problem: GASTROINTESTINAL - ADULT  Goal: Minimal or absence of nausea and/or vomiting  Description  INTERVENTIONS:  - Administer IV fluids if ordered to ensure adequate hydration  - Maintain NPO status until nausea and vomiting are resolved  - Nasogastric tube if ordered  - Administer ordered antiemetic medications as needed  - Provide nonpharmacologic comfort measures as appropriate  - Advance diet as tolerated, if ordered  - Consider nutrition services referral to assist patient with adequate nutrition and appropriate food choices  Outcome: Progressing  Goal: Maintains adequate nutritional intake  Description  INTERVENTIONS:  - Monitor percentage of each meal consumed  - Identify factors contributing to decreased intake, treat as appropriate  - Assist with meals as needed  - Monitor I&O, weight, and lab values if indicated  - Obtain nutrition services referral as needed  Outcome: Progressing     Problem: METABOLIC, FLUID AND ELECTROLYTES - ADULT  Goal: Electrolytes maintained within normal limits  Description  INTERVENTIONS:  - Monitor labs and assess patient for signs and symptoms of electrolyte imbalances  - Administer electrolyte replacement as ordered  - Monitor response to electrolyte replacements, including repeat lab results as appropriate  - Instruct patient on fluid and nutrition as appropriate  Outcome: Progressing  Goal: Fluid balance maintained  Description  INTERVENTIONS:  - Monitor labs   - Monitor I/O and WT  - Instruct patient on fluid and nutrition as appropriate  - Assess for signs & symptoms of volume excess or deficit  Outcome: Progressing     Problem: SKIN/TISSUE INTEGRITY - ADULT  Goal: Skin integrity remains intact  Description  INTERVENTIONS  - Identify patients at risk for skin breakdown  - Assess and monitor skin integrity  - Assess and monitor nutrition and hydration status  - Monitor labs (i e  albumin)  - Assess for incontinence   - Turn and reposition patient  - Assist with mobility/ambulation  - Relieve pressure over bony prominences  - Avoid friction and shearing  - Provide appropriate hygiene as needed including keeping skin clean and dry  - Evaluate need for skin moisturizer/barrier cream  - Collaborate with interdisciplinary team (i e  Nutrition, Rehabilitation, etc )   - Patient/family teaching  Outcome: Progressing  Goal: Incision(s), wounds(s) or drain site(s) healing without S/S of infection  Description  INTERVENTIONS  - Assess and document risk factors for skin impairment   - Assess and document dressing, incision, wound bed, drain sites and surrounding tissue  - Consider nutrition services referral as needed  - Oral mucous membranes remain intact  - Provide patient/ family education  Outcome: Progressing     Problem: HEMATOLOGIC - ADULT  Goal: Maintains hematologic stability  Description  INTERVENTIONS  - Assess for signs and symptoms of bleeding or hemorrhage  - Monitor labs  - Administer supportive blood products/factors as ordered and appropriate  Outcome: Progressing     Problem: MUSCULOSKELETAL - ADULT  Goal: Maintain or return mobility to safest level of function  Description  INTERVENTIONS:  - Assess patient's ability to carry out ADLs; assess patient's baseline for ADL function and identify physical deficits which impact ability to perform ADLs (bathing, care of mouth/teeth, toileting, grooming, dressing, etc )  - Assess/evaluate cause of self-care deficits   - Assess range of motion  - Assess patient's mobility  - Assess patient's need for assistive devices and provide as appropriate  - Encourage maximum independence but intervene and supervise when necessary  - Involve family in performance of ADLs  - Assess for home care needs following discharge   - Consider OT consult to assist with ADL evaluation and planning for discharge  - Provide patient education as appropriate  Outcome: Progressing     Problem: PAIN - ADULT  Goal: Verbalizes/displays adequate comfort level or baseline comfort level  Description  Interventions:  - Encourage patient to monitor pain and request assistance  - Assess pain using appropriate pain scale  - Administer analgesics based on type and severity of pain and evaluate response  - Implement non-pharmacological measures as appropriate and evaluate response  - Consider cultural and social influences on pain and pain management  - Notify physician/advanced practitioner if interventions unsuccessful or patient reports new pain  Outcome: Progressing     Problem: INFECTION - ADULT  Goal: Absence or prevention of progression during hospitalization  Description  INTERVENTIONS:  - Assess and monitor for signs and symptoms of infection  - Monitor lab/diagnostic results  - Monitor all insertion sites, i e  indwelling lines, tubes, and drains  - Monitor endotracheal if appropriate and nasal secretions for changes in amount and color  - [place] appropriate cooling/warming therapies per order  - Administer medications as ordered  - Instruct and encourage patient and family to use good hand hygiene technique  - Identify and instruct in appropriate isolation precautions for identified infection/condition  Outcome: Progressing  Goal: Absence of fever/infection during neutropenic period  Description  INTERVENTIONS:  - Monitor WBC    Outcome: Progressing     Problem: SAFETY ADULT  Goal: Patient will remain free of falls  Description  INTERVENTIONS:  - Assess patient frequently for physical needs  -  Identify cognitive and physical deficits and behaviors that affect risk of falls  -  [place] fall precautions as indicated by assessment   - Educate patient/family on patient safety including physical limitations  - Instruct patient to call for assistance with activity based on assessment  - Modify environment to reduce risk of injury  - Consider OT/PT consult to assist with strengthening/mobility  Outcome: Progressing     Problem: DISCHARGE PLANNING  Goal: Discharge to home or other facility with appropriate resources  Description  INTERVENTIONS:  - Identify barriers to discharge w/patient and caregiver  - Arrange for needed discharge resources and transportation as appropriate  - Identify discharge learning needs (meds, wound care, etc )  - Arrange for interpretive services to assist at discharge as needed  - Refer to Case Management Department for coordinating discharge planning if the patient needs post-hospital services based on physician/advanced practitioner order or complex needs related to functional status, cognitive ability, or social support system  Outcome: Progressing     Problem: Knowledge Deficit  Goal: Patient/family/caregiver demonstrates understanding of disease process, treatment plan, medications, and discharge instructions  Description  Complete learning assessment and assess knowledge base    Interventions:  - Provide teaching at level of understanding  - Provide teaching via preferred learning methods  Outcome: Progressing

## 2020-07-23 NOTE — PROGRESS NOTES
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 + age-related nephron loss  - upon review medical records, no recent baseline creatinine available, prior creatinine 1 9 in 2 3 mg/dL in early 2019       Acute kidney injury (POA) on chronic kidney disease of stage III:  - acute kidney injury suspect prerenal secondary to anemia + hemodynamic perturbations with hypotension with Ace inhibitor and NSAID use +/- ATN +/- obstruction with BPH and urinary retention  - patient was admitted with a creatinine of 2 21 mg/dL  - patient's creatinine today is stable at 1 80 mg/dL              - post contrast exposure on 7/20                - plan for possible OR Friday per podiatry at [facility] without proteinuria or hematuria  - CT scan with stable distal left ureteral dilatation which improved dilatation of bilateral extrarenal pelvis sees in no hydronephrosis   Likely secondary to a distal left ureteral stricture  - SPEP revealed monoclonal peak in the gamma region   - UPEP revealed selective proteinuria  No monoclonal bands noted  - Ig kappa 251 1, Ig lambda 23 5, kappa lambda ratio 10 69    - hematology following    - hyperuricemia with most recent uric acid level 11 2, however without gout symptoms  Will monitor      - will provide gentle IVF hydration pre and post procedure with sodium bicarbonate at 50 mL/hour  Order placed  - continue to hold lisinopril + furosemide    - avoid NSAIDS, nephrotoxins, IV contrast if possible  - adjust medications to appropriate GFR   - avoid hypotension/ fluctuations in blood pressure to prevent decreased renal perfusion    - check BMP in a m    - check PVR with bladder scans   Maintain urinary retention protocol                - patient initially refused, however education provided and patient will straight cath  - await renal recovery  - monitor volume status with strict intake/output  - please perform daily weights       Blood pressure:  - blood pressure with fluctuations    - continue to hold lisinopril + furosemide as above  - optimize hemodynamics  - maintain MAP > 65mmHg  - avoid hypotension/ fluctuations in blood pressure       Volume status/CHF:  - clinically patient examines euvolemic    - most recent EF 50%; on furosemide 40 mg daily as outpatient  - monitor for re-initiation of oral diuretics post- procedures       Electrolytes:  - mild hyponatremia with most recent sodium 134                - workup:                           - urine sodium 21                           - urine osm 414                            - cortisol random 27  6                            - uric acid 11 2                           - TSH 2 271                           - serum osm 304                - plan :                          - continue 1 5 L fluid restriction                            - adjust fluids as above  - continue nutritional supplements       - will continue to monitor with daily BMP       Acid/base:  - most recent bicarbonate 21 with anion gap of 9     - adjust fluids as above  - continue to monitor for need to initiate oral sodium bicarbonate supplements  - will continue to monitor with daily BMP       Anemia likely of chronic disease:  - most recent hemoglobin at 7 2 grams/ deciliter   - maintain hemoglobin greater than 8 grams/deciliter    - SPEP/UPEP and free light chain ratio as above     - hematology recommending bone marrow biopsy, however patient refusing at this time      Mineral bone disease of chronic kidney disease:  - most recent phosphorous acceptable at 3 5     - most recent magnesium stable at 2 4    - most recent PTH 47 3    - most recent vitamin d 42 3   - will continue to monitor       Other:   - post umbilical hernia repair on 07/21/2020; per General surgery    - abnormal CT scan of abdomen revealing thickening of the gastric antrum likely gastritis:  EGD/colonoscopy when clinically able; per GI    - atrial fibrillation     SUBJECTIVE:  Patient resting in chair  Patient denies shortness of breath, chest pain, nausea, vomiting, diarrhea  Patient does not have much of an appetite       OBJECTIVE:  Current Weight: Weight - Scale: 63 5 kg (139 lb 15 9 oz)  Vitals:    07/23/20 0722   BP: 100/60   Pulse: 80   Resp: 18   Temp: (!) 97 4 °F (36 3 °C)   SpO2: 100%       Intake/Output Summary (Last 24 hours) at 7/23/2020 0920  Last data filed at 7/23/2020 0050  Gross per 24 hour   Intake 225 ml   Output 1014 ml   Net -789 ml       General:  No acute distress  Skin:  Warm, no rash  Eyes:  Sclerae anicteric  ENT:  Moist lips and mucous membranes  Neck:  Supple with trachea midline  Chest:  Clear breath sounds bilaterally   CVS:  Regular rate regular rhythm  Abdomen:  Firm, distended, normoactive bowel sounds  Extremities:  Edema noted bilaterally, right leg wrapped   Neuro:  Alert and oriented  Psych:  Appropriate affect      Medications:  Scheduled Meds:  Current Facility-Administered Medications:  acetaminophen 650 mg Oral Q6H PRN Odean Jeannette Tracey-Jose PA-C    aspirin 81 mg Oral Daily Brittanicarli Tracey-FRANCHESCA Garcia-C    atorvastatin 40 mg Oral Daily With [employer] Kyung-Jose, PA-C    bacitracin 1 small application Topical BID Odean Lat Astanurag-Jose, PA-C    calcium carbonate 500 mg Oral Daily PRN Odean Jeannette Tracey-Jose PA-C    cefepime 1,000 mg Intravenous Q24H Brittani Tracey-Jose PA-C Last Rate: 1,000 mg (07/23/20 0022)   docusate sodium 100 mg Oral BID BJ Biswas    fentaNYL 25 mcg Intravenous Q5 Min PRN Lyla Cannon MD    folic acid 1 mg Oral Daily Brittani Teel-Jose, PA-C    HYDROcodone-acetaminophen 1 tablet Oral Q4H PRN Odean Lat Astl-Jose, PA-C    HYDROcodone-acetaminophen 2 tablet Oral Q4H PRN Odean Lather Tracey-Jose, GERRI    HYDROmorphone 0 5 mg Intravenous Q10 Min PRN Cherie Marin MD    HYDROmorphone 0 5 mg Intravenous Q2H PRN Moira Ahumada Astl-Reimer, PA-C    LORazepam 1 mg Oral Q8H PRN Moira Ahumada Astl-Reimer, PA-C    pantoprazole 40 mg Oral BID AC BJ Biswas    polyethylene glycol 17 g Oral BID BJ Robbins    tamsulosin 0 4 mg Oral Daily With [employer] GERRI oBlanos    [START ON 7/24/2020] vancomycin 1,500 mg Intravenous Q24H BJ Vidales        PRN Meds:   acetaminophen    calcium carbonate    fentaNYL    HYDROcodone-acetaminophen    HYDROcodone-acetaminophen    HYDROmorphone    HYDROmorphone    LORazepam    Continuous Infusions:     Laboratory Results:  Results from last 7 days   Lab Units 07/23/20  0624 07/23/20  0552 07/23/20  0033 07/22/20  1700 07/22/20  0941 07/22/20  0631 07/22/20  0034 07/21/20  1801 07/21/20  0839 07/21/20  8089 07/20/20  0446 07/19/20  0529  07/18/20  0637  07/17/20  0816  07/16/20  2243   WBC Thousand/uL 4 98  --   --   --   --  5 90  --   --   --  6 30 6 00 7 51  --  5 91  --   --   --  7 48   HEMOGLOBIN g/dL 7 2*  --  8 0* 7 6* 8 0* 7 7* 7 7* 7 9*  --  8 7* 7 1* 8 2*   < > 8 3*  8 3*   < >  --    < > 6 7*   HEMATOCRIT % 22 1*  --  24 3* 22 9* 24 0* 23 0* 23 2* 23 9*  --  26 1* 21 7* 24 8*   < > 25 4*  25 8*   < >  --    < > 20 8*   PLATELETS Thousands/uL 43*  --   --   --   --  46*  --   --   --  52* 72* 77*  --  84*  --  83*  --  104*   SODIUM mmol/L  --  134*  --   --   --  134*  --   --  135* 134* 132* 134*  --  138  --   --   --  136   POTASSIUM mmol/L  --  4 6  --   --   --  4 6  --   --  4 9 4 8 4 6 4 3  --  4 3  --   --   --  4 6   CHLORIDE mmol/L  --  104  --   --   --  104  --   --  105 103 101 102  --  105  --   --   --  103   CO2 mmol/L  --  21  --   --   --  21  --   --  20* 20* 21 20*  --  22  --   --   --  20*   BUN mg/dL  --  39*  --   --   --  40*  --   --  42* 42* 55* 59*  --  59*  --   --   --  80*   CREATININE mg/dL  --  1 80*  --   -- --  1 86*  --   --  1 77* 1 82* 1 79* 2 21*  --  2 03*  --   --   --  2 18*   CALCIUM mg/dL  --  8 2*  --   --   --  8 1*  --   --  8 5 8 9 8 1* 8 6  --  8 5  --   --   --  9 0   MAGNESIUM mg/dL  --  2 4  --   --   --   --   --   --  2 3  --   --   --   --   --   --   --   --   --    PHOSPHORUS mg/dL  --   --   --   --   --   --   --   --  3 5  --   --   --   --   --   --   --   --   --     < > = values in this interval not displayed

## 2020-07-23 NOTE — ASSESSMENT & PLAN NOTE
Patient has right lower leg wounds with cellulitis  Continue vancomycin and cefepime  Patient underwent angiogram   Podiatry is planning to to debridement in a m    Final culture results reviewed

## 2020-07-23 NOTE — PROGRESS NOTES
Progress note - Gastroenterology   Calvinjonah Hurt 69 y o  male MRN: [id number]  Unit/Bed#: -01 Encounter: [id number]    ASSESSMENT and PLAN    1  Anemia - no signs or symptoms of GI blood loss   Hemoglobin 6 7gms on admission  Hemoglobin up to 7 2 g today following transfusion with a total of 3 units of packed red blood cells since admission   Normocytic   Not iron, folate or vitamin B12 deficient   No overt GI  blood loss   Exclude chronic GI blood loss   May have underlying anemia of chronic disease or poor marrow function related to his malnutrition   Exclude MDS  Never had a colonoscopy      · Follow H&H and transfuse as needed  · Observe for any overt signs of GI blood loss  · Appreciate Hematology consult  Hematology advised a bone marrow biopsy but patient has refused  · Continue empiric PPI with Protonix 40 mg IV q 12 hours - will change to oral  · EGD when clinically stable in addition to colonoscopy after planned vascular procedures and umbilical hernia repair - patient refusing an upper and lower endoscopy until leg and abdominal wounds have been addressed (in vs out patient)     2  Abnormal CT scan the abdomen pelvis showing thickening of the gastric antrum likely gastritis but unable to exclude gastric neoplasm      - EGD as above - if/when patient agreeable  - Protonix 40 mg p o  B i d      3  Umbilical wound with abscess and protruding mesh   S/P surgical repair 7/21     2  Constipation  Remains constipated and reports that he has not had a bowel movement since admission despite the addition of MiraLax over the past 3 days  Likely worsening on pain medication  Offered to order rectal suppositories but he has refused  - Increase MiraLax to 17 g twice daily  - Colace 100 mg twice daily    Chief Complaint   Patient presents with    Wound Infection - Complicated     Pt with leg wounds x 2 months, here for dyspnea, weakness and feeling ill   +n/+v         SUBJECTIVE/HPI   Complains of constipation  Tolerating diet  Denies abdominal pain, nausea or vomiting  Abdominal and Right leg pain controlled with narcotics  /60 (BP Location: Left arm)   Pulse 80   Temp (!) 97 4 °F (36 3 °C) (Oral)   Resp 18   Ht 5' 10" (1 778 m)   Wt 63 5 kg (139 lb 15 9 oz)   SpO2 100%   BMI 20 09 kg/m²     PHYSICALEXAM  General appearance: alert, appears stated age and cooperative  Eyes: no icterus   Head: Normocephalic, without obvious abnormality, atraumatic  Lungs: clear to auscultation bilaterally  Heart: irregular rate and rhythm,   Abdomen: soft, dressing intact over umbilicus  Tenderness around site  No rebound or guarding     Extremities:  Right leg dressing intact      Lab Results   Component Value Date    GLUCOSE 127 11/20/2018    CALCIUM 8 2 (L) 07/23/2020    K 4 6 07/23/2020    CO2 21 07/23/2020     07/23/2020    BUN 39 (H) 07/23/2020    CREATININE 1 80 (H) 07/23/2020     Lab Results   Component Value Date    WBC 4 98 07/23/2020    HGB 7 2 (L) 07/23/2020    HCT 22 1 (L) 07/23/2020    MCV 95 07/23/2020    PLT 43 (LL) 07/23/2020     Lab Results   Component Value Date    ALT 26 07/23/2020    AST 24 07/23/2020    ALKPHOS 81 07/23/2020     No results found for: AMYLASE  Lab Results   Component Value Date    LIPASE 255 07/16/2020     Lab Results   Component Value Date    IRON 229 (H) 07/16/2020    TIBC 285 07/16/2020    FERRITIN 303 07/16/2020     Lab Results   Component Value Date    INR 1 46 (H) 07/16/2020

## 2020-07-23 NOTE — PROGRESS NOTES
Progress Note - Griselda Mendoza 1945, 69 y o  male MRN: [id number]    Unit/Bed#: -01 Encounter: [id number]    Primary Care Provider: Tarsha Washington DO   Date and time admitted to hospital: 7/16/2020 10:25 PM        Open wound of umbilical region  Assessment & Plan  Exploratory laparotomy, excision of infected mesh and repair of ventral heria (N/A) on 07/21/2020  Wound care and dressing changes per surgery  Pain management  See above    Severe protein-calorie malnutrition (HCC)  Assessment & Plan  Pt has Severe protein-calorie malnutrition, in the setting of chronically ill patient with history of noncompliance with care, as evidenced by BMI 19 17 with an 11 33% unplanned weight loss over the past year, multiple nonhealing wounds appearance of muscle wasting/fat loss and decreased mobility noted on nursing assessment, Findings: height 5' 10", weight 133 lb 9 6 oz, BMI 19 17 Screen: (+) Unplanned weight loss in the last three months; (+) Stage III-IV pressure ulcer or non-healing wound; (+) Appearance of muscle wasting or fat loss; Benign prostatic hyperplasia with urinary retention  Assessment & Plan  Patient self catheterizes at home when he feels he needs to for BPH with retention,   Patient had to be catheterized 6 times during this hospital stay  Continue straight cath q 8 hours because patient has urinary retention, will try to avoid placement of Abbott  Continue Flomax    Umbilicus discharge  Assessment & Plan  · Patient states ongoing since hernia surgery small bloody ooze  Scab is noted in the umbilicus with foul-smelling no drainage    · Patient underwent  EXPLORATORY laparotomy, excision of infected mesh and repair of ventral heria (N/A) on 07/21/2020  · Surgical follow-up  · Pain management p r n   · On IV antibiotics    CKD (chronic kidney disease) stage 3, GFR 30-59 ml/min ([facility])  Assessment & Plan  Patient has stage III chronic disease with creatinine around 2 0 at baseline  Patient's renal function is at baseline:  Creatinine is 1 80 today  Nephrology on board  Monitor renal function in a m  Lactic acidosis  Assessment & Plan  Lactic acidosis most likely multifactorial, patient has CHF, necrotic umbilical wound    Cellulitis  Assessment & Plan  Patient has right lower leg wounds with cellulitis  Continue vancomycin and cefepime  Patient underwent angiogram   Podiatry is planning to to debridement in a m  Final culture results reviewed    Chronic combined systolic and diastolic heart failure (HCC)  Assessment & Plan  Wt Readings from Last 3 Encounters:   07/22/20 63 5 kg (139 lb 15 9 oz)   06/21/19 68 3 kg (150 lb 9 6 oz)   06/19/19 67 6 kg (149 lb)     Echocardiogram on this admission shows ejection fraction of 50%  Patient's lungs are clear, has no JVD  Has chronic systolic CHF is compensated  Continue to hold lisinopril, Lasix and Toprol because of relative hypotension  Daily weight and I&Os    Thrombocytopenia ([address] )  Assessment & Plan  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 with platelet counts running between 60-115K  he denies signs of bleeding  Platelet count is 23S today at baseline  Hematology recommended transfusing platelets to keep the counts above 50K if he is going to require surgical intervention  Will order 1 unit of platelets    Other persistent atrial fibrillation [facility])  Assessment & Plan  Patient is status post ablation and pacemaker insertion  EKG showed paced rhythm  He has not  anticoagulation candidate due to ITP and thrombocytopenia    Peripheral vascular disease of lower extremity [facility])  Assessment & Plan  Patient has more than 75% stenosis of the right superficial femoral artery on arterial Doppler  Continue atorvastatin    Patient underwent right SFA PTA with single-vessel peroneal runoff on 07/20/2020  Vascular surgery follow-up noted  IR was unable to perform tibial intervention secondary to patient's inability to tolerate procedure  Continue on aspirin given recent intervention  Will discuss with GI to consider addition of Plavix   Post intervention ABIs were ordered  Vascular surgery and Podiatry follow-up  Continue vascular surgery follow-up    * Anemia, unspecified  Assessment & Plan  Patient presented with severe symptomatic normocytic anemia 6 7 causing shortness of breath on exertion  He was taking NSAIDs for chronic right lower leg ulcers that were getting worse  Patient was transfused packed red blood cells during the hospital stay  Hemoglobin currently is 7 6 this morning  Monitor H&H and transfuse as needed  Continue Protonix b i d ,  Abnormal CT scan the abdomen pelvis showing thickening of the gastric antrum likely gastritis but unable to exclude gastric neoplasm  Patient received another 1 unit of packed red cell transfusion on July 20  GI follow-up needed  Patient will need eventually endoscopic workup  Refusing at this time and wants his abdominal issues to be addressed first   GI recommended Hematology evaluation  Hematology consult appreciated  Possible myelodysplastic syndrome  They recommended bone marrow biopsy  IR consult placed by them and forms are filled  Patient at this time refused bone marrow biopsy as he wants to focus on other medical problems  They recommended to transfuse to keep CBC above 8  Will order 1 unit of packed red cell transfusion today          Labs & Imaging: I have personally reviewed pertinent reports  VTE Pharmacologic Prophylaxis: Reason for no pharmacologic prophylaxis Anemia and thrombocytopenia  VTE Mechanical Prophylaxis: sequential compression device    Code Status:   Level 1 - Full Code    Patient Centered Rounds: I have performed bedside rounds with nursing staff today  Discussions with Specialists or Other Care Team Provider:  GI, vascular, Renal    Education and Discussions with Family / Patient:  Patient states that he will update his family    Offered to call     Current Length of Stay: 6 day(s)    Current Patient Status: Inpatient   Certification Statement: The patient will continue to require additional inpatient hospital stay due to see my assessment and plan  Subjective:   Patient is seen and examined at bedside  Denies any new complaints  Afebrile  All other ROS are negative  Objective:    Vitals: Blood pressure 100/60, pulse 80, temperature (!) 97 4 °F (36 3 °C), temperature source Oral, resp  rate 18, height 5' 10" (1 778 m), weight 63 5 kg (139 lb 15 9 oz), SpO2 100 %  ,Body mass index is 20 09 kg/m²  SPO2 RA Rest      ED to Hosp-Admission (Current) from 7/16/2020 in [address] Med Surg [address]   SpO2  100 %   SpO2 Activity  At Rest   O2 Device  None (Room air)   O2 Flow Rate          I&O:     Intake/Output Summary (Last 24 hours) at 7/23/2020 1138  Last data filed at 7/23/2020 0050  Gross per 24 hour   Intake 225 ml   Output 1014 ml   Net -789 ml       Physical Exam:    General- Alert, sitting comfortably in chair  Not in any acute distress  HEENT- MIKI, EOM intact  Neck- Supple, No JVD  CVS- regular, S1 and S2 normal  Chest- Bilateral Air entry, No rhochi, crackles or wheezing present  Abdomen- soft, nontender, not distended, no guarding or rigidity, BS+  Extremities-  right lower extremity in a dressing which is C/D/I  Chronic venous stasis changes bilaterally  CNS-   Alert, awake and orientedx3  No focal deficits present  Invasive Devices     Peripheral Intravenous Line            Peripheral IV 07/22/20 Dorsal (posterior); Right Wrist less than 1 day                      Social History  reviewed  Family History   Problem Relation Age of Onset    Heart disease Mother     Heart disease Father     Hypertension Father     Diabetes Other     Depression Other     Cancer Sister     Depression Cousin     reviewed    Meds:  Current Facility-Administered Medications   Medication Dose Route Frequency Provider Last Rate Last Dose    acetaminophen (TYLENOL) tablet 650 mg  650 mg Oral Q6H PRN Brittani Tracey-Jose, PA-C   650 mg at 07/19/20 1312    aspirin chewable tablet 81 mg  81 mg Oral Daily Brittanicarli Tracey-Jose, PA-C   81 mg at 07/23/20 1004    atorvastatin (LIPITOR) tablet 40 mg  40 mg Oral Daily With [employer] Kyung-Jose, PA-C   40 mg at 07/22/20 1722    calcium carbonate (TUMS) chewable tablet 500 mg  500 mg Oral Daily PRN Moira Ahumada Astl-Reimer, PA-C        cefepime (MAXIPIME) IVPB (premix) 1,000 mg 50 mL  1,000 mg Intravenous Q24H Brittani Traecy-Jose, PA-C 100 mL/hr at 07/23/20 0022 1,000 mg at 07/23/20 0022    docusate sodium (COLACE) capsule 100 mg  100 mg Oral BID Consuella Dakins, CRNP   100 mg at 07/23/20 1004    fentaNYL (SUBLIMAZE) injection 25 mcg  25 mcg Intravenous Q5 Min PRN Cherie Marin MD   25 mcg at 12/59/57 2904    folic acid (FOLVITE) tablet 1 mg  1 mg Oral Daily Brittani Bolanos PA-C   1 mg at 07/23/20 1004    HYDROcodone-acetaminophen (NORCO) 5-325 mg per tablet 1 tablet  1 tablet Oral Q4H PRN Moira Ahumada Astl-Reimer, PA-C        HYDROcodone-acetaminophen (NORCO) 5-325 mg per tablet 2 tablet  2 tablet Oral Q4H PRN Moira Ahumada Astl-Reimer, PA-C   2 tablet at 07/23/20 0557    HYDROmorphone (DILAUDID) injection 0 5 mg  0 5 mg Intravenous Q10 Min PRN Cherie Marin MD        HYDROmorphone (DILAUDID) injection 0 5 mg  0 5 mg Intravenous Q2H PRN Moira Ahumada Astl-Reimer, PA-C        LORazepam (ATIVAN) tablet 1 mg  1 mg Oral Q8H PRN Brittani Tracey-Jose, PA-C   1 mg at 07/19/20 2227    pantoprazole (PROTONIX) EC tablet 40 mg  40 mg Oral BID BJ Gross        polyethylene glycol (MIRALAX) packet 17 g  17 g Oral BID Consuella Dakins, CRNP        [START ON 7/24/2020] sodium bicarbonate 75 mEq in sodium chloride 0 45 % 1,000 mL infusion  50 mL/hr Intravenous Continuous BJ Jones        tamsulosin (FLOMAX) capsule 0 4 mg  0 4 mg Oral Daily With [employer] GERRI Bolanos   0 4 mg at 07/22/20 4756  [START ON 7/24/2020] vancomycin (VANCOCIN) 1,500 mg in sodium chloride 0 9 % 250 mL IVPB  1,500 mg Intravenous Q24H BJ Hoskins          Medications Prior to Admission   Medication    Ascorbic Acid (VITAMIN C PO)    B Complex Vitamins (VITAMIN B COMPLEX PO)    calcium carbonate (TUMS) 500 mg chewable tablet    diazepam (VALIUM) 5 mg tablet    folic acid (FOLVITE) 1 mg tablet    furosemide (LASIX) 40 mg tablet    Lactobacillus (ACIDOPHILUS PO)    lisinopril (ZESTRIL) 5 mg tablet    Lycopene 10 MG CAPS    metoprolol succinate (TOPROL-XL) 100 mg 24 hr tablet    Misc Natural Products (SAW PALMETTO) CAPS    Multiple Vitamin (MULTI-DAY VITAMINS) TABS    acetaminophen (TYLENOL) 325 mg tablet    clobetasol (TEMOVATE) 0 05 % GEL    Ferrous Gluconate-C-Folic Acid (IRON-C PO)    tamsulosin (FLOMAX) 0 4 mg       Labs:  Results from last 7 days   Lab Units 07/23/20  1030 07/23/20  0624 07/23/20  0033  07/22/20  0631  07/21/20  0838   WBC Thousand/uL  --  4 98  --   --  5 90  --  6 30   HEMOGLOBIN g/dL 7 6* 7 2* 8 0*   < > 7 7*   < > 8 7*   HEMATOCRIT % 23 8* 22 1* 24 3*   < > 23 0*   < > 26 1*   PLATELETS Thousands/uL  --  43*  --   --  46*  --  52*   NEUTROS PCT %  --   --   --   --   --   --  85*   LYMPHS PCT %  --   --   --   --   --   --  9*   LYMPHO PCT %  --  7*  --   --  8*  --   --    MONOS PCT %  --   --   --   --   --   --  4   MONO PCT %  --  1*  --   --  3*  --   --    EOS PCT %  --  0  --   --  0  --  0    < > = values in this interval not displayed       Results from last 7 days   Lab Units 07/23/20  0552 07/22/20  0631 07/21/20  0839  07/16/20  2243   POTASSIUM mmol/L 4 6 4 6 4 9   < > 4 6   CHLORIDE mmol/L 104 104 105   < > 103   CO2 mmol/L 21 21 20*   < > 20*   BUN mg/dL 39* 40* 42*   < > 80*   CREATININE mg/dL 1 80* 1 86* 1 77*   < > 2 18*   CALCIUM mg/dL 8 2* 8 1* 8 5   < > 9 0   ALK PHOS U/L 81  --  100  --  87   ALT U/L 26  --  33  --  21   AST U/L 24  --  36  --  20    < > = values in this interval not displayed  Lab Results   Component Value Date    TROPONINI <0 02 07/16/2020    TROPONINI 0 02 11/23/2018    TROPONINI 0 03 11/23/2018    CKTOTAL 27 (L) 07/16/2020     Results from last 7 days   Lab Units 07/16/20  2248   INR  1 46*     Lab Results   Component Value Date    BLOODCX No Growth After 5 Days  07/16/2020    BLOODCX No Growth After 5 Days  07/16/2020    BLOODCX No Growth After 5 Days  05/02/2017    BLOODCX No Growth After 5 Days  05/02/2017    URINECX >100,000 cfu/ml Klebsiella oxytoca (A) 02/13/2019    URINECX >100,000 cfu/ml Klebsiella oxytoca (A) 01/07/2019    WOUNDCULT 4+ Growth of Enterobacter cloacae complex (A) 07/17/2020    WOUNDCULT 4+ Growth of Morganella morganii (A) 07/17/2020    WOUNDCULT 4+ Growth of Enterococcus faecalis (A) 07/17/2020    WOUNDCULT (A) 07/17/2020     4+ Growth of Alpha Hemolytic Streptococcus NOT Enterococcus         Imaging:  Results for orders placed during the hospital encounter of 07/16/20   XR chest portable    Narrative CHEST     INDICATION:   Acute Resp Failure  COMPARISON:  12/1/2018    EXAM PERFORMED/VIEWS:  XR CHEST PORTABLE      FINDINGS:  Left-sided chest wall pacemaker is identified  Pacemaker leads are intact  Cardiomediastinal silhouette appears unremarkable  The lungs are clear  No pneumothorax or pleural effusion  Stable bony structures  No acute osseous abnormality  Impression No acute cardiopulmonary disease  Workstation performed: [id number]       Results for orders placed during the hospital encounter of 11/22/18   XR chest 2 views    Narrative CHEST     INDICATION:   Patient s/p Pacemaker/ICD Insertion  COMPARISON:  11/30/2018  EXAM PERFORMED/VIEWS:  XR CHEST PA & LATERAL      FINDINGS:  Left-sided chest wall pacemaker is identified  Pacemaker leads are intact  Cardiomediastinal silhouette appears unremarkable  The lungs are clear  No pneumothorax   Unchanged small bilateral pleural effusions  Orthopedic hardware again seen in the left clavicle  Visualized osseous structures appear otherwise unremarkable for the patient's age  Impression No pneumothorax  Stable small bilateral pleural effusions  Workstation performed: [id number]         Last 24 Hours Medication List:     Current Facility-Administered Medications:  acetaminophen 650 mg Oral Q6H PRN Nitesh Oliveiral-Jose, PA-C    aspirin 81 mg Oral Daily Brittani Astl-Jose, PA-C    atorvastatin 40 mg Oral Daily With [employer] Kyung-FRANCHESCA Garcia-C    calcium carbonate 500 mg Oral Daily PRN Nitesh Choia Astl-Jose, PA-C    cefepime 1,000 mg Intravenous Q24H Brittani Tracey-Jose, PA-C Last Rate: 1,000 mg (07/23/20 0022)   docusate sodium 100 mg Oral BID BJ Biswas    fentaNYL 25 mcg Intravenous Q5 Min PRN Jorge Villalobos MD    folic acid 1 mg Oral Daily Brittani Tracey-Jose, PA-C    HYDROcodone-acetaminophen 1 tablet Oral Q4H PRN Nitesh Oliveiral-Jose, PA-C    HYDROcodone-acetaminophen 2 tablet Oral Q4H PRN Nitesh Oliveiral-Jose, PA-C    HYDROmorphone 0 5 mg Intravenous Q10 Min PRN Jorge Villalobos MD    HYDROmorphone 0 5 mg Intravenous Q2H PRN Nitesh Oliveiral-Jose, PA-C    LORazepam 1 mg Oral Q8H PRN Nitesh Oliveiral-Jose, PA-C    pantoprazole 40 mg Oral BID AC BJ Biswas    polyethylene glycol 17 g Oral BID BJ Ibanez    [START ON 7/24/2020] sodium bicarbonate infusion 50 mL/hr Intravenous Continuous BJ Jones    tamsulosin 0 4 mg Oral Daily With [employer] GERRI Bolanos    [START ON 7/24/2020] vancomycin 1,500 mg Intravenous Q24H BJ Dinero         Today, Patient Was Seen By: Bishop Divine MD    ** Please Note: Dictation voice to text software may have been used in the creation of this document   **

## 2020-07-24 NOTE — NURSING NOTE
Patient bladder scanned for 536  Patient encouraged to use the urinal and stated, "Leave me alone!"  Education provided on the effects of retaining urine  Patient still refusing straight cath at this time

## 2020-07-24 NOTE — ANESTHESIA PREPROCEDURE EVALUATION
Review of Systems/Medical History  Patient summary reviewed  Chart reviewed      Cardiovascular  Pacemaker/AICD, Hypertension , Valvular heart disease , aortic regurgitation, Dysrhythmias , atrial fibrillation, CHF compensated CHF, PVD, No DVT  No PE, No pulmonary hypertension Pulmonary       GI/Hepatic      Comment: Purulent d/c from umbilicus     Chronic kidney disease stage 3,   Comment: PALOMA on CKD has been improving     Endo/Other     GYN       Hematology  Anemia acute blood loss anemia,     Musculoskeletal       Neurology   Psychology           Physical Exam    Airway    Mallampati score: III  TM Distance: >3 FB  Neck ROM: full     Dental   Comment: Upper partial, upper dentures,     Cardiovascular  Rhythm: irregular, Rate: normal,     Pulmonary  Pulmonary exam normal Breath sounds clear to auscultation,     Other Findings        Anesthesia Plan  ASA Score- 3     Anesthesia Type- general with ASA Monitors  Additional Monitors:   Airway Plan: ETT and LMA  Comment: Benefits and risks of planned anesthetic discussed with patient,   Pt wants ga      Plan Factors-    Induction- intravenous  Postoperative Plan- Plan for postoperative opioid use  Informed Consent- Anesthetic plan and risks discussed with patient

## 2020-07-24 NOTE — PLAN OF CARE
Problem: Potential for Falls  Goal: Patient will remain free of falls  Description  INTERVENTIONS:  - Assess patient frequently for physical needs  -  Identify cognitive and physical deficits and behaviors that affect risk of falls  -  [place] fall precautions as indicated by assessment   - Educate patient/family on patient safety including physical limitations  - Instruct patient to call for assistance with activity based on assessment  - Modify environment to reduce risk of injury  - Consider OT/PT consult to assist with strengthening/mobility  Outcome: Progressing     Problem: Prexisting or High Potential for Compromised Skin Integrity  Goal: Skin integrity is maintained or improved  Description  INTERVENTIONS:  - Identify patients at risk for skin breakdown  - Assess and monitor skin integrity  - Assess and monitor nutrition and hydration status  - Monitor labs   - Assess for incontinence   - Turn and reposition patient  - Assist with mobility/ambulation  - Relieve pressure over bony prominences  - Avoid friction and shearing  - Provide appropriate hygiene as needed including keeping skin clean and dry  - Evaluate need for skin moisturizer/barrier cream  - Collaborate with interdisciplinary team   - Patient/family teaching  - Consider wound care consult   Outcome: Progressing     Problem: Nutrition/Hydration-ADULT  Goal: Nutrient/Hydration intake appropriate for improving, restoring or maintaining nutritional needs  Description  Monitor and assess patient's nutrition/hydration status for malnutrition  Collaborate with interdisciplinary team and initiate plan and interventions as ordered  Monitor patient's weight and dietary intake as ordered or per policy  Utilize nutrition screening tool and intervene as necessary  Determine patient's food preferences and provide high-protein, high-caloric foods as appropriate       INTERVENTIONS:  - Monitor oral intake, urinary output, labs, and treatment plans  - Assess nutrition and hydration status and recommend course of action  - Evaluate amount of meals eaten  - Assist patient with eating if necessary   - Allow adequate time for meals  - Recommend/ encourage appropriate diets, oral nutritional supplements, and vitamin/mineral supplements  - Order, calculate, and assess calorie counts as needed  - Recommend, monitor, and adjust tube feedings and TPN/PPN based on assessed needs  - Assess need for intravenous fluids  - Provide specific nutrition/hydration education as appropriate  - Include patient/family/caregiver in decisions related to nutrition  Outcome: Progressing     Problem: NEUROSENSORY - ADULT  Goal: Achieves maximal functionality and self care  Description  INTERVENTIONS  - Monitor swallowing and airway patency with patient fatigue and changes in neurological status  - Encourage and assist patient to increase activity and self care     - Encourage visually impaired, hearing impaired and aphasic patients to use assistive/communication devices  Outcome: Progressing     Problem: GASTROINTESTINAL - ADULT  Goal: Minimal or absence of nausea and/or vomiting  Description  INTERVENTIONS:  - Administer IV fluids if ordered to ensure adequate hydration  - Maintain NPO status until nausea and vomiting are resolved  - Nasogastric tube if ordered  - Administer ordered antiemetic medications as needed  - Provide nonpharmacologic comfort measures as appropriate  - Advance diet as tolerated, if ordered  - Consider nutrition services referral to assist patient with adequate nutrition and appropriate food choices  Outcome: Progressing  Goal: Maintains adequate nutritional intake  Description  INTERVENTIONS:  - Monitor percentage of each meal consumed  - Identify factors contributing to decreased intake, treat as appropriate  - Assist with meals as needed  - Monitor I&O, weight, and lab values if indicated  - Obtain nutrition services referral as needed  Outcome: Progressing     Problem: METABOLIC, FLUID AND ELECTROLYTES - ADULT  Goal: Electrolytes maintained within normal limits  Description  INTERVENTIONS:  - Monitor labs and assess patient for signs and symptoms of electrolyte imbalances  - Administer electrolyte replacement as ordered  - Monitor response to electrolyte replacements, including repeat lab results as appropriate  - Instruct patient on fluid and nutrition as appropriate  Outcome: Progressing  Goal: Fluid balance maintained  Description  INTERVENTIONS:  - Monitor labs   - Monitor I/O and WT  - Instruct patient on fluid and nutrition as appropriate  - Assess for signs & symptoms of volume excess or deficit  Outcome: Progressing     Problem: SKIN/TISSUE INTEGRITY - ADULT  Goal: Skin integrity remains intact  Description  INTERVENTIONS  - Identify patients at risk for skin breakdown  - Assess and monitor skin integrity  - Assess and monitor nutrition and hydration status  - Monitor labs (i e  albumin)  - Assess for incontinence   - Turn and reposition patient  - Assist with mobility/ambulation  - Relieve pressure over bony prominences  - Avoid friction and shearing  - Provide appropriate hygiene as needed including keeping skin clean and dry  - Evaluate need for skin moisturizer/barrier cream  - Collaborate with interdisciplinary team (i e  Nutrition, Rehabilitation, etc )   - Patient/family teaching  Outcome: Progressing  Goal: Incision(s), wounds(s) or drain site(s) healing without S/S of infection  Description  INTERVENTIONS  - Assess and document risk factors for skin impairment   - Assess and document dressing, incision, wound bed, drain sites and surrounding tissue  - Consider nutrition services referral as needed  - Oral mucous membranes remain intact  - Provide patient/ family education  Outcome: Progressing     Problem: HEMATOLOGIC - ADULT  Goal: Maintains hematologic stability  Description  INTERVENTIONS  - Assess for signs and symptoms of bleeding or hemorrhage  - Monitor labs  - Administer supportive blood products/factors as ordered and appropriate  Outcome: Progressing     Problem: MUSCULOSKELETAL - ADULT  Goal: Maintain or return mobility to safest level of function  Description  INTERVENTIONS:  - Assess patient's ability to carry out ADLs; assess patient's baseline for ADL function and identify physical deficits which impact ability to perform ADLs (bathing, care of mouth/teeth, toileting, grooming, dressing, etc )  - Assess/evaluate cause of self-care deficits   - Assess range of motion  - Assess patient's mobility  - Assess patient's need for assistive devices and provide as appropriate  - Encourage maximum independence but intervene and supervise when necessary  - Involve family in performance of ADLs  - Assess for home care needs following discharge   - Consider OT consult to assist with ADL evaluation and planning for discharge  - Provide patient education as appropriate  Outcome: Progressing     Problem: PAIN - ADULT  Goal: Verbalizes/displays adequate comfort level or baseline comfort level  Description  Interventions:  - Encourage patient to monitor pain and request assistance  - Assess pain using appropriate pain scale  - Administer analgesics based on type and severity of pain and evaluate response  - Implement non-pharmacological measures as appropriate and evaluate response  - Consider cultural and social influences on pain and pain management  - Notify physician/advanced practitioner if interventions unsuccessful or patient reports new pain  Outcome: Progressing     Problem: INFECTION - ADULT  Goal: Absence or prevention of progression during hospitalization  Description  INTERVENTIONS:  - Assess and monitor for signs and symptoms of infection  - Monitor lab/diagnostic results  - Monitor all insertion sites, i e  indwelling lines, tubes, and drains  - Monitor endotracheal if appropriate and nasal secretions for changes in amount and color  - [place]aria appropriate cooling/warming therapies per order  - Administer medications as ordered  - Instruct and encourage patient and family to use good hand hygiene technique  - Identify and instruct in appropriate isolation precautions for identified infection/condition  Outcome: Progressing  Goal: Absence of fever/infection during neutropenic period  Description  INTERVENTIONS:  - Monitor WBC    Outcome: Progressing     Problem: SAFETY ADULT  Goal: Patient will remain free of falls  Description  INTERVENTIONS:  - Assess patient frequently for physical needs  -  Identify cognitive and physical deficits and behaviors that affect risk of falls  -  [place] fall precautions as indicated by assessment   - Educate patient/family on patient safety including physical limitations  - Instruct patient to call for assistance with activity based on assessment  - Modify environment to reduce risk of injury  - Consider OT/PT consult to assist with strengthening/mobility  Outcome: Progressing     Problem: DISCHARGE PLANNING  Goal: Discharge to home or other facility with appropriate resources  Description  INTERVENTIONS:  - Identify barriers to discharge w/patient and caregiver  - Arrange for needed discharge resources and transportation as appropriate  - Identify discharge learning needs (meds, wound care, etc )  - Arrange for interpretive services to assist at discharge as needed  - Refer to Case Management Department for coordinating discharge planning if the patient needs post-hospital services based on physician/advanced practitioner order or complex needs related to functional status, cognitive ability, or social support system  Outcome: Progressing     Problem: Knowledge Deficit  Goal: Patient/family/caregiver demonstrates understanding of disease process, treatment plan, medications, and discharge instructions  Description  Complete learning assessment and assess knowledge base    Interventions:  - Provide teaching at level of understanding  - Provide teaching via preferred learning methods  Outcome: Progressing

## 2020-07-24 NOTE — ASSESSMENT & PLAN NOTE
Wt Readings from Last 3 Encounters:   07/24/20 65 7 kg (144 lb 13 5 oz)   06/21/19 68 3 kg (150 lb 9 6 oz)   06/19/19 67 6 kg (149 lb)     Echocardiogram on this admission shows ejection fraction of 50%  Patient's lungs are clear, has no JVD  Has chronic systolic CHF is compensated    Continue to hold lisinopril, Lasix and Toprol because of relative hypotension  Daily weight and I&Os

## 2020-07-24 NOTE — ASSESSMENT & PLAN NOTE
Patient has stage III chronic disease with creatinine around 2 0 at baseline  Patient's renal function is at baseline:  Creatinine is 1 82 today  Nephrology on board  Monitor renal function in a m

## 2020-07-24 NOTE — ASSESSMENT & PLAN NOTE
Patient presented with severe symptomatic normocytic anemia 6 7 causing shortness of breath on exertion  He was taking NSAIDs for chronic right lower leg ulcers that were getting worse  Patient was transfused packed red blood cells during the hospital stay  Hemoglobin currently is 7 6 this morning  Monitor H&H and transfuse as needed  Continue Protonix b i d ,  Abnormal CT scan the abdomen pelvis showing thickening of the gastric antrum likely gastritis but unable to exclude gastric neoplasm  Patient received another 1 unit of packed red cell transfusion on July 20  GI follow-up needed  Patient will need eventually endoscopic workup  Refusing at this time and wants his abdominal issues to be addressed first   GI recommended Hematology evaluation  Hematology consult appreciated  Possible myelodysplastic syndrome  They recommended bone marrow biopsy  IR consult placed by them and forms are filled  Patient at this time refused bone marrow biopsy as he wants to focus on other medical problems    They recommended to transfuse to keep CBC above 8  Patient received 1 unit of packed red cell transfusion on 07/23/2020  Hemoglobin this morning is 8 4

## 2020-07-24 NOTE — PROGRESS NOTES
Progress Note - General Surgery   Ramiro Doe 69 y o  male MRN: [id number]  Unit/Bed#: -01 Encounter: [id number]    Assessment/Plan:  Umbilical wound infection with exposed mesh and abscess  POD#3 s/p wound exploration, explantation of mesh, primary repair of umbilical hernia  Incision site clean with minimal pain,dressing changed today  Abdominal pain resolving with scheduled straight catheterizations and decreased urinary retention  Tolerated diet yesterday, NPO today for scheduled wound debridement per Podiatry today  Constipation - continue current bowel regimen  Continue antibiotics per ID  Pain control as needed    Urinary retention  Patient reports intermittently self cathing at home  Patient required multiple straight catheterizations yesterday and is now more compliant  Continue bladder scanning and straight cath as needed    Anemia  S/p multiple transfusions, last transfusion yesterday  Hemoglobin continues to drift - 8 4 today  GI on board - CTAP with antral thickening, gastritis vs  neoplasm, patient refusing EGD and colonoscopy at this time  Hematology on board - recommended bone marrow biopsy but patient currently refusing as well    Chronic ITP  Platelets at 53Y today s/p transfusion yesterday  Hematology on board  Continue to monitor    Right lower extremity wounds with PVD  Appears mixed venous and arterial  With evidence of resting claudication and >75% stenosis on LEADs  S/p RLE angiogram with balloon angioplasty right SFA to <30% residual stenosis, unable for stent placement or tibial artery angioplasty dur to patient movement  Repeat LEADs with improvement  OR debridement of wounds today with Podiatry   Vascular follow-up    Other medical comorbid conditions  CHF, persistent atrial fibrillation, pacemaker, CKD, BPH  Nephrology on board  Cardiology on board      Subjective/Objective   Chief Complaint:  Right leg pain    Subjective:  Decreased pain at umbilical incision site    Continues with right lower leg pain  NPO for surgery by podiatry today  He has been more compliant with straight catheterizations  Continues with constipation  Denies fever, chills, N/V/D, chest pain, shortness of breath  Able to tolerate diet yesterday  Objective:     Blood pressure 125/67, pulse 86, temperature (!) 97 3 °F (36 3 °C), resp  rate 16, height 5' 10" (1 778 m), weight 65 7 kg (144 lb 13 5 oz), SpO2 100 %  ,Body mass index is 20 78 kg/m²  Intake/Output Summary (Last 24 hours) at 7/24/2020 0800  Last data filed at 7/24/2020 6064  Gross per 24 hour   Intake 1410 ml   Output 750 ml   Net 660 ml       Invasive Devices     Peripheral Intravenous Line            Peripheral IV 07/22/20 Dorsal (posterior); Right Wrist 1 day                Physical Exam:  Physical Exam   Constitutional: He is oriented to person, place, and time  Vital signs are normal  He appears cachectic  No distress  HENT:   Head: Normocephalic and atraumatic  Neck: Neck supple  No JVD present  No tracheal deviation present  Cardiovascular: Normal rate  An irregularly irregular rhythm present  Pulmonary/Chest: Effort normal and breath sounds normal  He has no wheezes  He has no rales  Abdominal: Soft  Bowel sounds are normal  He exhibits no distension  There is no tenderness  Incision site clean, staples intact without surrounding erythema, dressing changed   Musculoskeletal: He exhibits no edema  Neurological: He is alert and oriented to person, place, and time  Skin: Skin is warm and dry  Right lower extremity with dressing intact       Lab, Imaging and other studies:  I have personally reviewed pertinent lab results    , CBC:   Lab Results   Component Value Date    WBC 4 11 (L) 07/24/2020    HGB 8 4 (L) 07/24/2020    HCT 25 9 (L) 07/24/2020    MCV 93 07/24/2020    PLT 69 (L) 07/24/2020    MCH 30 2 07/24/2020    MCHC 32 4 07/24/2020    RDW 17 1 (H) 07/24/2020    MPV 12 3 07/24/2020   , CMP:   Lab Results   Component Value Date    SODIUM 137 07/24/2020    K 4 9 07/24/2020     07/24/2020    CO2 24 07/24/2020    BUN 36 (H) 07/24/2020    CREATININE 1 82 (H) 07/24/2020    CALCIUM 8 4 07/24/2020    EGFR 36 07/24/2020     VTE Pharmacologic Prophylaxis: Reason for no pharmacologic prophylaxis Thrombocytopenia  VTE Mechanical Prophylaxis: sequential compression device     Yvette Bolanos PA-C

## 2020-07-24 NOTE — ASSESSMENT & PLAN NOTE
Patient has right lower leg wounds with cellulitis  Continue vancomycin and cefepime    Patient underwent angiogram   Podiatry is planning to to debridement this am  Final culture results reviewed

## 2020-07-24 NOTE — OP NOTE
OPERATIVE REPORT - Podiatry  PATIENT NAME: Jose Luis Bradley    :  1945  MRN: [id number]  Pt Location: [facility] OR ROOM 01    SURGERY DATE: 2020    Surgeon(s) and Role:     * Aylin Hicks DPM - Primary     * Kang Queen DPM - Assisting     Pre-op Diagnosis:  Atherosclerosis of artery of extremity with ulceration ([address] ) [I70 299, L97 909]  Wound of right lower extremity with fat layer exposed, initial encounter [S81 801A]  Cellulitis of right lower extremity    Post-Op Diagnosis Codes:     * Atherosclerosis of artery of extremity with ulceration ([address] ) [I70 299, L97 909]     * Wound of right lower extremity down to the level of deep fascia, initial encounter [S81 801A]     * Cellulitis of right lower extremity    Procedure(s) (LRB):  Extensive DEBRIDEMENT LOWER EXTREMITY ([address]) (Right) including skin, subcutaneous tissue, and deep fascia  Specimen(s):  ID Type Source Tests Collected by Time Destination   A : Right leg wound Wound Wound ANAEROBIC CULTURE AND GRAM STAIN, WOUND CULTURE Aylin Hicks DPM 2020 1328        Estimated Blood Loss:   Minimal    Drains:  * No LDAs found *    Anesthesia Type:   IV Sedation with Anesthesia with 19 ml of 1% Lidocaine and 0 5% Bupivacaine in a 1:1 mixture and 12 ml of 0 5% Bupivacaine  Hemostasis:  Anatomic dissection    Materials:  * No implants in log *    Operative Findings:  Pre-op: Wounds as below of the R LE without sinus tracking or undermining  Wound beds appears slough, eschar and fibrotic with moderate Serosanguinous exudate  Deepest tissue noted Subcutaneous  Malodor is noticed  Wound edges appears Attached  Donna-wound skin appears intact, erythematous and indurated  Probe to bone is,  negative   Signs of infection are present at this time       - Posterior calf: 6 5 x 5 0 x 0 4 cm  - Proximal medial le 5 x 6 0 x 0 2 cm  - Proximal medial posterior leg: 3 0 x 3 0 x 0 1 cm  - distal posterior medial le 5 x 1 5 x 0 2 cm  - distal medial le 0 x 8 0 x 0 2 cm  - medial forefoot:  1 0 x 1 0 x 0 1 cm      Post-op:   Wounds as below of the right LE without sinus tracking or undermining  Wound bed appears pink/red and granular with bleeding, viable tissue  Deepest tissue noted Subcutaneous  Malodor is not noticed  Wound edge appears Attached  Donna-wound skin appears intact, erythematous and indurated  Probe to bone is,  negative   Signs of infection are present at this time  Light bleeding from wounds post-debridement was noted  - Posterior calf: 7 0 x 5 0 x 0 5 cm  - Proximal medial le 5 x 6 0 x 0 5 cm  - Proximal medial posterior leg: 3 0 x 3 0 x 0 3 cm  - distal posterior medial le 5 x 1 5 x 0 5 cm  - distal medial le 0 x 8 0 x 0 4 cm  - medial forefoot:  1 0 x 1 0 x 0 4 cm    152 25 total sq cm debrided  Complications:   None    Procedure and Technique:     Under mild sedation, the patient was brought into the operating room and placed on the operating room table in the supine position  IV sedation was achieved by anesthesia team and a universal timeout was performed where all parties are in agreement of correct patient, correct procedure and correct site  The foot was then prepped and draped in the usual aseptic manner  Attention was directed to the right lower extremity  Aerobic and anaerobic cultures were taken from the proximal medial leg wound and passed off  Wounds as described above in operative findings were 100% excisionally debrided with Versajet and curette of all nonviable, devitalized,  fibrotic, slough, necrotic tissue w/ excision of skin and subcutaneous tissue to depth of deep fascia  Post debridement would beds appeared fresh, viable with light bleeding  Post debridement wound measurements (listed in operative findings) were taken are as stated above for a total of 152 25 sq cm debrided      All wounds were then cleansed with copious amounts of normal sterile saline mixed with bacitracin using a pulse lavage  Local anesthetic was then administered in a ring like fashion around all wounds using 19 mL of 1% lidocaine and 0 5% bupivacaine in a 1:1 mixture, and 12 mL of 0 5% bupivacaine  The right lower extremity was then cleansed and dried  All wounds were then dressed with Betadine-soaked adaptic and maxorb, DSD, ABD pads, and covered with Kerlix and Coban  The patient tolerated the procedure and anesthesia well without immediate complications and transferred to PACU with vital signs stable  As with many limb salvage procedures, we contemplate the possibility of performing further stages to this procedure  Procedures may include debridements, delayed closure, plastic surgery techniques, or more proximal amputations  This procedure may be considered part of a multi-staged limb salvage treatment plan  Dr Yessica Otto was present during the entire procedure and participated in all key aspects  SIGNATURE: Whit Díaz DPM  DATE: July 24, 2020  TIME: 2:50 PM      Portions of the record may have been created with voice recognition software  Occasional wrong word or "sound a like" substitutions may have occurred due to the inherent limitations of voice recognition software  Read the chart carefully and recognize, using context, where substitutions have occurred

## 2020-07-24 NOTE — PROGRESS NOTES
Progress note - Gastroenterology   Nancy Monge 69 y o  male MRN: [id number]  Unit/Bed#: -01 Encounter: [id number]    ASSESSMENT and PLAN    1  Anemia - no signs or symptoms of GI blood loss   Hemoglobin 6 7gms on admission  Hemoglobin up to 8 4 g today following transfusion with a total of 4 units of packed red blood cells since admission   Normocytic   Not iron, folate or vitamin B12 deficient   No overt GI  blood loss   Exclude chronic GI blood loss   May have underlying anemia of chronic disease or poor marrow function related to his malnutrition   Exclude MDS  Never had a colonoscopy       · Follow H&H and transfuse as needed  · Observe for any overt signs of GI blood loss  · Appreciate Hematology consult  Hematology advised a bone marrow biopsy but patient has refused  · Continue empiric PPI with Protonix 40 mg IV q 12 hours - will change to oral  · EGD when clinically stable in addition to colonoscopy after planned vascular procedures and umbilical hernia repair - patient refusing an upper and lower endoscopy until leg and abdominal wounds have been addressed (in vs out patient)     2  Abnormal CT scan the abdomen pelvis showing thickening of the gastric antrum likely gastritis but unable to exclude gastric neoplasm      - EGD as above - if/when patient agreeable - patient continues to refuse at this point  - Protonix 40 mg p o  B i d      3  Umbilical wound with abscess and protruding mesh   S/P surgical repair 7/21    4  Constipation  Remains constipated and reports that he has not had a bowel movement since admission despite the addition of MiraLax over the past 3 days  Likely worsening on pain medication    Offered to order rectal suppositories or enema but he has refused      - Increase MiraLax to 17 g twice daily  - Colace 100 mg twice daily    Chief Complaint   Patient presents with    Wound Infection - Complicated     Pt with leg wounds x 2 months, here for dyspnea, weakness and feeling ill   +n/+v  SUBJECTIVE/HPI   NPO  For I&D of right leg today  Continues to complain of right leg pain  Some abdominal pain around umbilical area  Controlled on narcotics  Denies nausea, vomiting, melena or rectal bleeding      /67   Pulse 86   Temp (!) 97 3 °F (36 3 °C)   Resp 16   Ht 5' 10" (1 778 m)   Wt 65 7 kg (144 lb 13 5 oz)   SpO2 100%   BMI 20 78 kg/m²     PHYSICALEXAM  General appearance: alert, appears stated age and cooperative  Eyes:  no icterus   Head: Normocephalic  Lungs: clear to auscultation bilaterally  Heart:  Irregular rate and rhythm, S1, S2 normal  Abdomen: soft, dressing over umbilical area, slightly tender to touch around dressing, no rebound or guarding,, bowel sounds normal; no masses,  no organomegaly  Extremities:  Right leg dressing intact  Neurologic: Grossly normal    Lab Results   Component Value Date    GLUCOSE 127 11/20/2018    CALCIUM 8 4 07/24/2020    K 4 9 07/24/2020    CO2 24 07/24/2020     07/24/2020    BUN 36 (H) 07/24/2020    CREATININE 1 82 (H) 07/24/2020     Lab Results   Component Value Date    WBC 4 11 (L) 07/24/2020    HGB 8 4 (L) 07/24/2020    HCT 25 9 (L) 07/24/2020    MCV 93 07/24/2020    PLT 69 (L) 07/24/2020     Lab Results   Component Value Date    ALT 26 07/23/2020    AST 24 07/23/2020    ALKPHOS 81 07/23/2020     No results found for: AMYLASE  Lab Results   Component Value Date    LIPASE 255 07/16/2020     Lab Results   Component Value Date    IRON 229 (H) 07/16/2020    TIBC 285 07/16/2020    FERRITIN 303 07/16/2020     Lab Results   Component Value Date    INR 1 46 (H) 07/16/2020

## 2020-07-24 NOTE — PROGRESS NOTES
Progress Note - Mile Guillen 1945, 69 y o  male MRN: [id number]    Unit/Bed#: -01 Encounter: [id number]    Primary Care Provider: Zeina Jacobson DO   Date and time admitted to hospital: 7/16/2020 10:25 PM        Open wound of umbilical region  Assessment & Plan  Exploratory laparotomy, excision of infected mesh and repair of ventral heria (N/A) on 07/21/2020  Wound care and dressing changes per surgery  Pain management  See above    Severe protein-calorie malnutrition (HCC)  Assessment & Plan  Pt has Severe protein-calorie malnutrition, in the setting of chronically ill patient with history of noncompliance with care, as evidenced by BMI 19 17 with an 11 33% unplanned weight loss over the past year, multiple nonhealing wounds appearance of muscle wasting/fat loss and decreased mobility noted on nursing assessment, Findings: height 5' 10", weight 133 lb 9 6 oz, BMI 19 17 Screen: (+) Unplanned weight loss in the last three months; (+) Stage III-IV pressure ulcer or non-healing wound; (+) Appearance of muscle wasting or fat loss; Benign prostatic hyperplasia with urinary retention  Assessment & Plan  Patient self catheterizes at home when he feels he needs to for BPH with retention,   Patient had to be catheterized 6 times during this hospital stay  Continue straight cath q 8 hours because patient has urinary retention, will try to avoid placement of Abbott  Continue Flomax    Umbilicus discharge  Assessment & Plan  · Patient states ongoing since hernia surgery small bloody ooze  Scab is noted in the umbilicus with foul-smelling no drainage    · Patient underwent  EXPLORATORY laparotomy, excision of infected mesh and repair of ventral heria (N/A) on 07/21/2020  · Surgical follow-up  · Pain management p r n   · On IV antibiotics    CKD (chronic kidney disease) stage 3, GFR 30-59 ml/min ([facility])  Assessment & Plan  Patient has stage III chronic disease with creatinine around 2 0 at baseline  Patient's renal function is at baseline:  Creatinine is 1 82 today  Nephrology on board  Monitor renal function in a m  Lactic acidosis  Assessment & Plan  Lactic acidosis most likely multifactorial, patient has CHF, necrotic umbilical wound    Cellulitis  Assessment & Plan  Patient has right lower leg wounds with cellulitis  Continue vancomycin and cefepime  Patient underwent angiogram   Podiatry is planning to to debridement this am  Final culture results reviewed    Chronic combined systolic and diastolic heart failure (HCC)  Assessment & Plan  Wt Readings from Last 3 Encounters:   07/24/20 65 7 kg (144 lb 13 5 oz)   06/21/19 68 3 kg (150 lb 9 6 oz)   06/19/19 67 6 kg (149 lb)     Echocardiogram on this admission shows ejection fraction of 50%  Patient's lungs are clear, has no JVD  Has chronic systolic CHF is compensated  Continue to hold lisinopril, Lasix and Toprol because of relative hypotension  Daily weight and I&Os    Thrombocytopenia ([facility] [address] )  Assessment & Plan  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 with platelet counts running between 60-115K  he denies signs of bleeding   Patient received 1 unit of platelets yesterday  Platelet count is 42F this a m  Hematology recommended transfusing platelets to keep the counts above 50K if he is going to require surgical intervention  Other persistent atrial fibrillation [facility])  Assessment & Plan  Patient is status post ablation and pacemaker insertion  EKG showed paced rhythm  He has not  anticoagulation candidate due to ITP and thrombocytopenia    Peripheral vascular disease of lower extremity [facility])  Assessment & Plan  Patient has more than 75% stenosis of the right superficial femoral artery on arterial Doppler  Continue atorvastatin    Patient underwent right SFA PTA with single-vessel peroneal runoff on 07/20/2020  Vascular surgery follow-up noted  IR was unable to perform tibial intervention secondary to patient's inability to tolerate procedure  Continue on aspirin given recent intervention  Will discuss with GI to consider addition of Plavix   Post intervention ABIs were ordered  Vascular surgery and Podiatry follow-up  Continue vascular surgery follow-up    * Anemia, unspecified  Assessment & Plan  Patient presented with severe symptomatic normocytic anemia 6 7 causing shortness of breath on exertion  He was taking NSAIDs for chronic right lower leg ulcers that were getting worse  Patient was transfused packed red blood cells during the hospital stay  Hemoglobin currently is 7 6 this morning  Monitor H&H and transfuse as needed  Continue Protonix b i d ,  Abnormal CT scan the abdomen pelvis showing thickening of the gastric antrum likely gastritis but unable to exclude gastric neoplasm  Patient received another 1 unit of packed red cell transfusion on July 20  GI follow-up needed  Patient will need eventually endoscopic workup  Refusing at this time and wants his abdominal issues to be addressed first   GI recommended Hematology evaluation  Hematology consult appreciated  Possible myelodysplastic syndrome  They recommended bone marrow biopsy  IR consult placed by them and forms are filled  Patient at this time refused bone marrow biopsy as he wants to focus on other medical problems  They recommended to transfuse to keep CBC above 8  Patient received 1 unit of packed red cell transfusion on 07/23/2020  Hemoglobin this morning is 8 4        Labs & Imaging: I have personally reviewed pertinent reports  VTE Pharmacologic Prophylaxis: Reason for no pharmacologic prophylaxis Anemia and thrombocytopenia  VTE Mechanical Prophylaxis: sequential compression device    Code Status:   Level 1 - Full Code    Patient Centered Rounds: I have performed bedside rounds with nursing staff today      Discussions with Specialists or Other Care Team Provider:  GI, Podiatry, Renal    Education and Discussions with Family / Patient:  Patient states he will update his family  Offered to call  Current Length of Stay: 7 day(s)    Current Patient Status: Inpatient   Certification Statement: The patient will continue to require additional inpatient hospital stay due to see my assessment and plan  Subjective:   Patient is seen and examined at bedside  Denies any new complaints  Patient is scheduled for debridement today  Afebrile  All other ROS are negative  Objective:    Vitals: Blood pressure 125/67, pulse 86, temperature (!) 97 3 °F (36 3 °C), resp  rate 16, height 5' 10" (1 778 m), weight 65 7 kg (144 lb 13 5 oz), SpO2 100 %  ,Body mass index is 20 78 kg/m²  SPO2 RA Rest      ED to Hosp-Admission (Current) from 7/16/2020 in [address] Med Surg [address]   SpO2  100 %   SpO2 Activity  At Rest   O2 Device  None (Room air)   O2 Flow Rate          I&O:     Intake/Output Summary (Last 24 hours) at 7/24/2020 1004  Last data filed at 7/24/2020 0607  Gross per 24 hour   Intake 1260 ml   Output 750 ml   Net 510 ml       Physical Exam:    General- Alert, sitting comfortably in chair  Not in any acute distress  HEENT- MIKI, EOM intact  CVS- regular, S1 and S2 normal   Chest- Bilateral Air entry, No rhochi, crackles or wheezing present  Abdomen- soft, nontender, not distended, no guarding or rigidity, BS+  Extremities-  right lower extremity in a dressing which is C/D/I  Chronic venous stasis changes bilaterally  CNS-   Alert, awake and orientedx3  No focal deficits present  Invasive Devices     Peripheral Intravenous Line            Peripheral IV 07/22/20 Dorsal (posterior); Right Wrist 1 day                      Social History  reviewed  Family History   Problem Relation Age of Onset    Heart disease Mother     Heart disease Father     Hypertension Father     Diabetes Other     Depression Other     Cancer Sister     Depression Cousin     reviewed    Meds:  Current Facility-Administered Medications   Medication Dose Route Frequency Provider Last Rate Last Dose    acetaminophen (TYLENOL) tablet 650 mg  650 mg Oral Q6H PRN Brittani Bolanos PA-C   650 mg at 07/19/20 1312    aspirin chewable tablet 81 mg  81 mg Oral Daily FRANCHESCA Nugent-C   81 mg at 07/23/20 1004    atorvastatin (LIPITOR) tablet 40 mg  40 mg Oral Daily With [employer] FRANCHESCA Bolanos-C   40 mg at 07/23/20 1601    bacitracin 100,000 Units, neomycin-polymyxin B (NEOSPORIN-) 1 mL in sodium chloride 0 9 % 1,000 mL irrigation bag   Irrigation Once Li Tripathi DPM        calcium carbonate (TUMS) chewable tablet 500 mg  500 mg Oral Daily PRN Paul Bolanos PA-C        cefepime (MAXIPIME) IVPB (premix) 1,000 mg 50 mL  1,000 mg Intravenous Q24H Brittani Bolanos PA-C 100 mL/hr at 07/24/20 0034 1,000 mg at 07/24/20 0034    diphenhydrAMINE (BENADRYL) injection 25 mg  25 mg Intravenous Q6H PRN Warren Stokes MD   25 mg at 07/23/20 1927    docusate sodium (COLACE) capsule 100 mg  100 mg Oral BID BJ Richardson   100 mg at 07/23/20 1722    fentaNYL (SUBLIMAZE) injection 25 mcg  25 mcg Intravenous Q5 Min PRN Luigi Sosa MD   25 mcg at 05/80/44 9168    folic acid (FOLVITE) tablet 1 mg  1 mg Oral Daily Brittani Bolanos PA-C   1 mg at 07/23/20 1004    HYDROcodone-acetaminophen (NORCO) 5-325 mg per tablet 1 tablet  1 tablet Oral Q4H PRN Paul Bolanos PA-C        HYDROcodone-acetaminophen (NORCO) 5-325 mg per tablet 2 tablet  2 tablet Oral Q4H PRN Paul Bolanos PA-C   2 tablet at 07/24/20 0543    HYDROmorphone (DILAUDID) injection 0 5 mg  0 5 mg Intravenous Q10 Min PRN Luigi Sosa MD        HYDROmorphone (DILAUDID) injection 0 5 mg  0 5 mg Intravenous Q2H PRN Brittani Bolanos PA-C        LORazepam (ATIVAN) tablet 1 mg  1 mg Oral Q8H PRN Brittani Bolanos PA-C   1 mg at 07/23/20 2049    pantoprazole (PROTONIX) EC tablet 40 mg  40 mg Oral Early Morning Andrez Iqbal MD   40 mg at 07/24/20 0512    polyethylene glycol (MIRALAX) packet 17 g  17 g Oral BID Irl JB Rosa   17 g at 07/23/20 1928    sodium bicarbonate 75 mEq in sodium chloride 0 45 % 1,000 mL infusion  50 mL/hr Intravenous Continuous BJ Jones 50 mL/hr at 07/24/20 0643 50 mL/hr at 07/24/20 0643    sodium chloride 0 9 % infusion  50 mL/hr Intravenous Continuous BJ Jones        tamsulosin (FLOMAX) capsule 0 4 mg  0 4 mg Oral Daily With [employer] GERRI Bolanos   0 4 mg at 07/23/20 1601    vancomycin (VANCOCIN) 1,500 mg in sodium chloride 0 9 % 250 mL IVPB  1,500 mg Intravenous Q24H BJ Hoskins          Medications Prior to Admission   Medication    Ascorbic Acid (VITAMIN C PO)    B Complex Vitamins (VITAMIN B COMPLEX PO)    calcium carbonate (TUMS) 500 mg chewable tablet    diazepam (VALIUM) 5 mg tablet    folic acid (FOLVITE) 1 mg tablet    furosemide (LASIX) 40 mg tablet    Lactobacillus (ACIDOPHILUS PO)    lisinopril (ZESTRIL) 5 mg tablet    Lycopene 10 MG CAPS    metoprolol succinate (TOPROL-XL) 100 mg 24 hr tablet    Misc Natural Products (SAW PALMETTO) CAPS    Multiple Vitamin (MULTI-DAY VITAMINS) TABS    acetaminophen (TYLENOL) 325 mg tablet    clobetasol (TEMOVATE) 0 05 % GEL    Ferrous Gluconate-C-Folic Acid (IRON-C PO)    tamsulosin (FLOMAX) 0 4 mg       Labs:  Results from last 7 days   Lab Units 07/24/20  0456 07/23/20  2030 07/23/20  1030 07/23/20  0624  07/22/20  0631  07/21/20  0838   WBC Thousand/uL 4 11*  --   --  4 98  --  5 90  --  6 30   HEMOGLOBIN g/dL 8 4* 9 3* 7 6* 7 2*   < > 7 7*   < > 8 7*   HEMATOCRIT % 25 9* 28 2* 23 8* 22 1*   < > 23 0*   < > 26 1*   PLATELETS Thousands/uL 69*  --   --  43*  --  46*  --  52*   NEUTROS PCT %  --   --   --   --   --   --   --  85*   LYMPHS PCT %  --   --   --   --   --   --   --  9*   LYMPHO PCT %  --   --   --  7*  --  8*  --   --    MONOS PCT %  --   --   --   --   --   --   --  4   MONO PCT %  --   --   --  1*  --  3*  --   --    EOS PCT %  --   --   --  0  --  0  --  0    < > = values in this interval not displayed  Results from last 7 days   Lab Units 07/24/20  0456 07/23/20  0552 07/22/20  0631 07/21/20  0839   POTASSIUM mmol/L 4 9 4 6 4 6 4 9   CHLORIDE mmol/L 105 104 104 105   CO2 mmol/L 24 21 21 20*   BUN mg/dL 36* 39* 40* 42*   CREATININE mg/dL 1 82* 1 80* 1 86* 1 77*   CALCIUM mg/dL 8 4 8 2* 8 1* 8 5   ALK PHOS U/L  --  81  --  100   ALT U/L  --  26  --  33   AST U/L  --  24  --  36     Lab Results   Component Value Date    TROPONINI <0 02 07/16/2020    TROPONINI 0 02 11/23/2018    TROPONINI 0 03 11/23/2018    CKTOTAL 27 (L) 07/16/2020         Lab Results   Component Value Date    BLOODCX No Growth After 5 Days  07/16/2020    BLOODCX No Growth After 5 Days  07/16/2020    BLOODCX No Growth After 5 Days  05/02/2017    BLOODCX No Growth After 5 Days  05/02/2017    URINECX >100,000 cfu/ml Klebsiella oxytoca (A) 02/13/2019    URINECX >100,000 cfu/ml Klebsiella oxytoca (A) 01/07/2019    WOUNDCULT 4+ Growth of Enterobacter cloacae complex (A) 07/17/2020    WOUNDCULT 4+ Growth of Morganella morganii (A) 07/17/2020    WOUNDCULT 4+ Growth of Enterococcus faecalis (A) 07/17/2020    WOUNDCULT (A) 07/17/2020     4+ Growth of Alpha Hemolytic Streptococcus NOT Enterococcus         Imaging:  Results for orders placed during the hospital encounter of 07/16/20   XR chest portable    Narrative CHEST     INDICATION:   Acute Resp Failure  COMPARISON:  12/1/2018    EXAM PERFORMED/VIEWS:  XR CHEST PORTABLE      FINDINGS:  Left-sided chest wall pacemaker is identified  Pacemaker leads are intact  Cardiomediastinal silhouette appears unremarkable  The lungs are clear  No pneumothorax or pleural effusion  Stable bony structures  No acute osseous abnormality  Impression No acute cardiopulmonary disease          Workstation performed: [id number]       Results for orders placed during the hospital encounter of 11/22/18   XR chest 2 views Narrative CHEST     INDICATION:   Patient s/p Pacemaker/ICD Insertion  COMPARISON:  11/30/2018  EXAM PERFORMED/VIEWS:  XR CHEST PA & LATERAL      FINDINGS:  Left-sided chest wall pacemaker is identified  Pacemaker leads are intact  Cardiomediastinal silhouette appears unremarkable  The lungs are clear  No pneumothorax  Unchanged small bilateral pleural effusions  Orthopedic hardware again seen in the left clavicle  Visualized osseous structures appear otherwise unremarkable for the patient's age  Impression No pneumothorax  Stable small bilateral pleural effusions          Workstation performed: [id number]         Last 24 Hours Medication List:     Current Facility-Administered Medications:  acetaminophen 650 mg Oral Q6H PRN Sergio Michael Astl-Jose, PA-C    aspirin 81 mg Oral Daily Brittani Astl-Jose, PA-C    atorvastatin 40 mg Oral Daily With [employer] Kyung-Jose, PA-C    bacitracin 100,000 units and neomycin-polymyxin B () 1 mL in sodium chloride 0 9% 1000 mL irrigation bag  Irrigation Once Velton Fresh, DPM    calcium carbonate 500 mg Oral Daily PRN Sergio Tracey-Jose, PA-C    cefepime 1,000 mg Intravenous Q24H Brittanicarli Tracey-Jose, PA-C Last Rate: 1,000 mg (07/24/20 0034)   diphenhydrAMINE 25 mg Intravenous Q6H PRN Shabbir Stewart MD    docusate sodium 100 mg Oral BID [facility]    fentaNYL 25 mcg Intravenous Q5 Min PRN Marko Banerjee MD    folic acid 1 mg Oral Daily Brittani Astl-Jose, PA-C    HYDROcodone-acetaminophen 1 tablet Oral Q4H PRN Sergio Michael Astl-Jose, PA-C    HYDROcodone-acetaminophen 2 tablet Oral Q4H PRN Sergio Michael Astl-Jose, PA-C    HYDROmorphone 0 5 mg Intravenous Q10 Min PRN Marko Banerjee MD    HYDROmorphone 0 5 mg Intravenous Q2H PRN Brittani Astl-Jose, PA-C    LORazepam 1 mg Oral Q8H PRN Brittani Astl-Jose, PA-C    pantoprazole 40 mg Oral Early Morning Palmira Andre MD    polyethylene glycol 17 g Oral BID Maria Isabel Lopez, BJ    sodium bicarbonate infusion 50 mL/hr Intravenous Continuous BJ Jones Last Rate: 50 mL/hr (07/24/20 0772)   sodium chloride 50 mL/hr Intravenous Continuous BJ Jones    tamsulosin 0 4 mg Oral Daily With [employer] GERRI Bolanos    vancomycin 1,500 mg Intravenous Q24H BJ Yu         Today, Patient Was Seen By: Kae King MD    ** Please Note: Dictation voice to text software may have been used in the creation of this document   **

## 2020-07-24 NOTE — PROGRESS NOTES
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 + arteriolar nephrosclerosis + age-related nephron loss  - upon review medical records, no recent baseline creatinine available, prior creatinine 1 9 in 2 3 mg/dL in early 2019       Acute kidney injury (POA) on chronic kidney disease of stage III:  - acute kidney injury suspect prerenal secondary to anemia + hemodynamic perturbations with hypotension with Ace inhibitor and NSAID use + ATN + obstruction with BPH and urinary retention + possibility of multiple myeloma  - patient was admitted with a creatinine of 2 21 mg/dL  - patient's creatinine today is stable at 1 82 mg/dL              - post contrast exposure on 7/20                - plan for OR today per podiatry at [place] [id number] without proteinuria or hematuria  - CT scan with stable distal left ureteral dilatation which improved dilatation of bilateral extrarenal pelvis sees in no hydronephrosis   Likely secondary to a distal left ureteral stricture  - SPEP revealed monoclonal peak in the gamma region   - UPEP revealed selective proteinuria  No monoclonal bands noted  - Ig kappa 251 1, Ig lambda 23 5, kappa lambda ratio 10 69               - Nephrologist spoke with patient regarding bone marrow biopsy; patient is agreeable in the near future as he understands the benefits of earlier treatment  - hematology following    - hyperuricemia with most recent uric acid level 11 2, however without gout symptoms  Will monitor      - currently on gentle IVF hydration pre procedure with sodium bicarbonate at 50 mL/hour  Post procedure, please provide gentle IVF with normal saline at 50 mL/hour for 4 hours  - continue to hold lisinopril + furosemide    - avoid NSAIDS, nephrotoxins, IV contrast if possible     - adjust medications to appropriate GFR   - avoid hypotension/ fluctuations in blood pressure to prevent decreased renal perfusion    - check BMP in a m    - continue intermittent straight catherizations  - await renal recovery  - monitor volume status with strict intake/output  - please perform daily weights       Blood pressure:  - blood pressure stabilizing    - continue to hold lisinopril + furosemide as above    - optimize hemodynamics  - maintain MAP > 65mmHg  - avoid hypotension/ fluctuations in blood pressure       Volume status/CHF:  - clinically patient examines euvolemic    - most recent EF 50%; on furosemide 40 mg daily as outpatient  - monitor for re-initiation of oral diuretics post- procedures       Electrolytes:  - mild hyponatremia improving with most recent sodium 137     - suspect prerenal but also multiple myeloma, however as serum osm suggestive of pseudohyponatremia which is also consistent with diagnosis of multiple myeloma                - workup:                           - urine sodium 21                           - urine osm 414                            - cortisol random 27  6                            - uric acid 11 2                           - TSH 2 271                           - serum osm 304                - plan :                          - continue 1 5 L fluid restriction                            - IBBEMU fluids as above                           - continue nutritional supplements when no longer NPO     - will continue to monitor with daily BMP       Acid/base:  - most recent bicarbonate 24 with anion gap of 8                - adjust fluids as above                - continue to monitor for need to initiate oral sodium bicarbonate supplements  - will continue to monitor with daily BMP       Anemia likely of chronic disease:  - most recent hemoglobin at 8 4 grams/ deciliter   - maintain hemoglobin greater than 8 grams/deciliter    - SPEP/UPEP and free light chain ratio as above     - hematology following       Mineral bone disease of chronic kidney disease:  - most recent phosphorous acceptable at 3 5     - most recent magnesium stable at 2 4    - most recent PTH 47 3    - most recent vitamin d 42 3   - will continue to monitor       Other:   - post umbilical hernia [id number] YB 66/21/4599; PZE General surgery    - abnormal CT scan of abdomen revealing thickening of the gastric antrum likely gastritis:  EGD/colonoscopy when clinically able; per GI    - atrial fibrillation    SUBJECTIVE:  Patient resting in sofa  Patient denies shortness of breath, chest pain, nausea, vomiting, diarrhea  Currently NPO secondary to procedure        OBJECTIVE:  Current Weight: Weight - Scale: 65 7 kg (144 lb 13 5 oz)  Vitals:    07/24/20 0740   BP: 125/67   Pulse: 86   Resp:    Temp: (!) 97 3 °F (36 3 °C)   SpO2: 100%       Intake/Output Summary (Last 24 hours) at 7/24/2020 0941  Last data filed at 7/24/2020 9128  Gross per 24 hour   Intake 1260 ml   Output 750 ml   Net 510 ml       General: no acute distress  Skin: warm, no rash   Eyes: sclarea anicteric   ENT: moist lips and mucous membranes   Neck: supple with trachea midline  Chest: clear breath sounds bilaterally    CVS: regular rate and rhythm   Abdomen: distended, firm, normoactive bowel sounds   Extremities: + 2 bilateral lower extremity edema   Neuro: alert and oriented   Psych: appropriate affect       Medications:  Scheduled Meds:  Current Facility-Administered Medications:  acetaminophen 650 mg Oral Q6H PRN Jadine Plane GERRI Bolanos    aspirin 81 mg Oral Daily Brittani Bolanos PA-C    atorvastatin 40 mg Oral Daily With [employer] GERRI Bolanos    bacitracin 100,000 units and neomycin-polymyxin B () 1 mL in sodium chloride 0 9% 1000 mL irrigation bag  Irrigation Once Svetlana Dunn DPM    calcium carbonate 500 mg Oral Daily PRN Jadine Plane GERRI Bolanos    cefepime 1,000 mg Intravenous Q24H Brittanicarli Bolanos PA-C Last Rate: 1,000 mg (07/24/20 8286)   diphenhydrAMINE 25 mg Intravenous Q6H PRN Juan Zafar MD    docusate sodium 100 mg Oral BID BJ Huggins    fentaNYL 25 mcg Intravenous Q5 Min PRN Marii Lo MD    folic acid 1 mg Oral Daily Brittani Bolanos PA-C    HYDROcodone-acetaminophen 1 tablet Oral Q4H PRN Amanda Bolanos, PA-CHELLE    HYDROcodone-acetaminophen 2 tablet Oral Q4H PRN Amanda Bolanos, PA-CHELLE    HYDROmorphone 0 5 mg Intravenous Q10 Min PRN Marii Lo MD    HYDROmorphone 0 5 mg Intravenous Q2H PRN Amanda Bolanos PA-C    LORazepam 1 mg Oral Q8H PRN Amanda Bolanos, PA-CHELLE    pantoprazole 40 mg Oral Early Morning Arthur Paul MD    polyethylene glycol 17 g Oral BID BJ Biswas    sodium bicarbonate infusion 50 mL/hr Intravenous Continuous BJ Jones Last Rate: 50 mL/hr (07/24/20 1583)   tamsulosin 0 4 mg Oral Daily With Maya Bolanos PA-C    vancomycin 1,500 mg Intravenous Q24H BJ Buckley        PRN Meds:   acetaminophen    calcium carbonate    diphenhydrAMINE    fentaNYL    HYDROcodone-acetaminophen    HYDROcodone-acetaminophen    HYDROmorphone    HYDROmorphone    LORazepam    Continuous Infusions:  sodium bicarbonate infusion 50 mL/hr Last Rate: 50 mL/hr (07/24/20 2922)       Laboratory Results:  Results from last 7 days   Lab Units 07/24/20  0456 07/23/20  2030 07/23/20  1030 07/23/20  0624 07/23/20  0552 07/23/20  0033 07/22/20  1700 07/22/20  0941 07/22/20  0631  07/21/20  0839 07/21/20  0838 07/20/20  0446 07/19/20  0529  07/18/20  0637   WBC Thousand/uL 4 11*  --   --  4 98  --   --   --   --  5 90  --   --  6 30 6 00 7 51  --  5 91   HEMOGLOBIN g/dL 8 4* 9 3* 7 6* 7 2*  --  8 0* 7 6* 8 0* 7 7*   < >  --  8 7* 7 1* 8 2*   < > 8 3*  8 3*   HEMATOCRIT % 25 9* 28 2* 23 8* 22 1*  --  24 3* 22 9* 24 0* 23 0*   < >  --  26 1* 21 7* 24 8*   < > 25 4*  25 8*   PLATELETS Thousands/uL 69*  --   --  43*  --   --   --   --  46*  --   --  52* 72* 77*  --  84* SODIUM mmol/L 137  --   --   --  134*  --   --   --  134*  --  135* 134* 132* 134*  --  138   POTASSIUM mmol/L 4 9  --   --   --  4 6  --   --   --  4 6  --  4 9 4 8 4 6 4 3  --  4 3   CHLORIDE mmol/L 105  --   --   --  104  --   --   --  104  --  105 103 101 102  --  105   CO2 mmol/L 24  --   --   --  21  --   --   --  21  --  20* 20* 21 20*  --  22   BUN mg/dL 36*  --   --   --  39*  --   --   --  40*  --  42* 42* 55* 59*  --  59*   CREATININE mg/dL 1 82*  --   --   --  1 80*  --   --   --  1 86*  --  1 77* 1 82* 1 79* 2 21*  --  2 03*   CALCIUM mg/dL 8 4  --   --   --  8 2*  --   --   --  8 1*  --  8 5 8 9 8 1* 8 6  --  8 5   MAGNESIUM mg/dL  --   --   --   --  2 4  --   --   --   --   --  2 3  --   --   --   --   --    PHOSPHORUS mg/dL  --   --   --   --   --   --   --   --   --   --  3 5  --   --   --   --   --     < > = values in this interval not displayed

## 2020-07-24 NOTE — ASSESSMENT & PLAN NOTE
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

## 2020-07-24 NOTE — PROGRESS NOTES
Vancomycin IV Pharmacy-to-Dose Consultation    Kamala Villalpando is a 69 y o  male who is currently receiving Vancomycin IV with management by the Pharmacy Consult service  Assessment/Plan:  The patient was reviewed  Renal function is stable (Scr=1 82) and no signs or symptoms of nephrotoxicity and/or infusion reactions were documented in the chart  Based on todays assessment, continue current vancomycin (day # 8) dosing of 1500 mg q24h, with a plan for trough to be drawn at 1730 on 7/27/20, prior to the 4th dose  We will continue to follow the patients culture results and clinical progress daily      Cristóbal Oseguera, Pharmacist

## 2020-07-25 NOTE — PROGRESS NOTES
Progress Note - General Surgery   Eunice Bobby 69 y o  male MRN: [id number]  Unit/Bed#: -01 Encounter: [id number]    Assessment/Plan:  Umbilical wound infection with exposed mesh and abscess  POD#4 s/p wound exploration, explantation of mesh, primary repair of umbilical hernia  Incision site clean with minimal pain  Change dressing daily, use dry guaze  Abdominal pain improving, tolerating diet  Constipation - continue current bowel regimen  Continue antibiotics per ID  Pain control as needed     Urinary retention  Patient reports intermittently self cathing at home  Continue bladder scanning and straight cath as needed     Anemia  S/p multiple transfusions  Continue to monitor  GI on board - CTAP with antral thickening, gastritis vs  neoplasm, patient refusing EGD and colonoscopy at this time  Hematology on board - recommended bone marrow biopsy but patient currently refusing as well     Chronic ITP  Platelets at 83H today s/p transfusion yesterday  Hematology on board  Continue to monitor     Right lower extremity wounds with PVD  Appears mixed venous and arterial  With evidence of resting claudication and >75% stenosis on LEADs  S/p RLE angiogram with balloon angioplasty right SFA to <30% residual stenosis, unable for stent placement or tibial artery angioplasty dur to patient movement  Repeat LEADs with improvement  OR debridement of wounds today with podiatry yesterday  Vascular follow-up  Wound care per Podiatry     Other medical comorbid conditions  CHF, persistent atrial fibrillation, pacemaker, CKD, BPH  Nephrology on board  Cardiology on board       Subjective/Objective   Chief Complaint:  Pain    Subjective:  Minimal incisional pain  Complains of pain in right lower extremity  Denies any fevers or chills  Tolerating diet  No nausea or vomiting  Objective:     Blood pressure 111/59, pulse 82, temperature 98 °F (36 7 °C), resp   rate 18, height 5' 10" (1 778 m), weight 65 9 kg (145 lb 4 5 oz), SpO2 100 %  ,Body mass index is 20 85 kg/m²  Intake/Output Summary (Last 24 hours) at 7/25/2020 1109  Last data filed at 7/25/2020 0855  Gross per 24 hour   Intake 370 ml   Output 1350 ml   Net -980 ml       Invasive Devices     Peripheral Intravenous Line            Peripheral IV 07/22/20 Dorsal (posterior); Right Wrist 2 days                Physical Exam:   General appearance: pale, alert and oriented, in no acute distress  Head: Normocephalic, without obvious abnormality, atraumatic, sclerae anicteric, mucous membranes moist  Neck: no JVD and supple, symmetrical, trachea midline  Lungs: clear to auscultation, no wheezes or rales  Heart:   Regular rate and rhythm, S1-S2 normal, no murmur  Abdomen:   Flat, soft, nondistended, mild tenderness over incision site, active bowel sounds  Extremities:   Right lower extremity dressing in place  Skin: Warm, dry; abdominal incision site clean, intact, minimal serous drainage  Nursing notes and vital signs reviewed      Lab, Imaging and other studies:  I have personally reviewed pertinent lab results    , CBC:   Lab Results   Component Value Date    WBC 3 33 (L) 07/25/2020    HGB 8 2 (L) 07/25/2020    HCT 25 1 (L) 07/25/2020    MCV 96 07/25/2020    PLT 56 (L) 07/25/2020    MCH 31 3 07/25/2020    MCHC 32 7 07/25/2020    RDW 16 6 (H) 07/25/2020    MPV 13 1 (H) 07/25/2020    NRBC 3 07/25/2020   , CMP:   Lab Results   Component Value Date    SODIUM 135 (L) 07/25/2020    K 4 6 07/25/2020     07/25/2020    CO2 24 07/25/2020    BUN 35 (H) 07/25/2020    CREATININE 1 69 (H) 07/25/2020    CALCIUM 8 2 (L) 07/25/2020    EGFR 40 07/25/2020     VTE Pharmacologic Prophylaxis:  Anemia, thrombocytopenia  VTE Mechanical Prophylaxis: sequential compression device     [place] Player GERRI Bolanos

## 2020-07-25 NOTE — ASSESSMENT & PLAN NOTE
Patient presented with severe symptomatic normocytic anemia 6 7 causing shortness of breath on exertion  He was taking NSAIDs for chronic right lower leg ulcers that were getting worse  Patient was transfused packed red blood cells during the hospital stay  Hemoglobin currently is 8 2 this morning  Monitor H&H and transfuse as needed  Continue Protonix b i d ,  Abnormal CT scan the abdomen pelvis showing thickening of the gastric antrum likely gastritis but unable to exclude gastric neoplasm  Patient received another 1 unit of packed red cell transfusion on July 20  GI follow-up needed  Patient will need eventually endoscopic workup  Refusing at this time and wants his abdominal issues to be addressed first   GI recommended Hematology evaluation  Hematology consult appreciated  Possible myelodysplastic syndrome/multiple myeloma  IgG kappa with a ratio of light chains over 10 which are suggestive of multiple myeloma  They recommended bone marrow biopsy  IR consult placed by them and forms are filled  Patient at this time refused bone marrow biopsy as he wants to focus on other medical problems    Will likely undergo bone marrow biopsy on Monday  They recommended to transfuse to keep CBC above 8  Patient received 1 unit of packed red cell transfusion on 07/23/2020  Monitor H&H  Discussed with patient regarding above and he understands the need for bone marrow biopsy and his overall prognosis

## 2020-07-25 NOTE — ASSESSMENT & PLAN NOTE
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

## 2020-07-25 NOTE — PROGRESS NOTES
Progress Note - Podiatry  Santiagoger Headings 69 y o  male MRN: [id number]  Unit/Bed#: -01 Encounter: [id number]    Assessment/Plan:  Cellulitis right leg  Skin ulceration right leg fat layer exposed  PVD/Atherosclerosis of native artery right leg with ulceration   -POD #1 S/P extensive debridement right leg wounds              -S/P repair umbilical hernia 2 days ago, progressing satisfactorily according to general surgery   -WBC 3 33, creatinine 1 69, BUN 35, hemoglobin 8 2, RBC 2 6 hematocrit 25 3, platelets 56   -patient was denying bone marrow biopsy, explained to patient the importance obtaining this information  -patient has agreed to go forward with bone marrow biopsy on this admission    -wound irrigated with 200 cc normal saline solution, Adaptic/ABD dressing applied to right leg    -minimal bleeding during procedure yesterday is concerning for healing potential     Lactic acidosis, necrotic umbilical wound, chronic kidney disease stage 3, anemia, and AFib              -managed per Internal Medicine, general surgery, and Nephrology    Subjective/Objective   Chief Complaint:   Chief Complaint   Patient presents with    Wound Infection - Complicated     Pt with leg wounds x 2 months, here for dyspnea, weakness and feeling ill   +n/+v  Subjective:  63-year-old white male seen resting in bed  Relates pain was not too bad last night  Denies any fever or shortness of breath  Blood pressure 113/51, pulse 87, temperature 98 °F (36 7 °C), resp  rate 16, height 5' 10" (1 778 m), weight 65 9 kg (145 lb 4 5 oz), SpO2 91 %  ,Body mass index is 20 85 kg/m²  Invasive Devices     Peripheral Intravenous Line            Peripheral IV 07/22/20 Dorsal (posterior); Right Wrist 2 days                Physical Exam:   General appearance: alert, cooperative and with pain right leg  Neuro/Vascular: Normal strength  Sensation and reflexes:  Grossly intact  Pulses: Right: DP 0/4, PT 0/4, Left: DP 0/4, PT 0/4  Capillary Filling:  3 Sec, Edema:  +1 right leg, none left  Vascular calcifications noted on tib-fib x-ray RLE  Arteriogram 7/20/2020:  · RLE: CFA: Patent, Profunda femoris: Patent   SFA: Proximal portion is patent, normal caliber  High-grade (greater than 80%) 1 cm focal stenosis mid SFA  Moderate grade tandem distal stenoses and in particular 60% stenosis at the abductor canal    Popliteal artery: Patent, noting 60% focal stenosis in the above-the-knee segment  Runoff: Peroneal runoff to the foot with distally reconstituted posterior tibial artery filling of the plantar arch  Chronically occluded anterior tibial artery  Proximal posterior tibial artery is patent and then occludes  · LLE:    CFA: Patent Profunda femoris: Proximal portion is patent   SFA: Proximal portion is patent  · POST INTERVENTION FINDINGS:   1  Significant luminal gain of multifocal right SFA and popliteal artery stenoses following a combination of 6 mm     and 7 mm POBA and DCB  2   Refractory proximal R SFA (30%)  stenosis  Considered stent placement; however, patient was quite     uncooperative with movement and asked multiple times for the procedure to be terminated  3   No change in tibial runoff  4   Left common femoral artery access, successfully closed with a Vascade  · IMPRESSION:  Technically successful multifocal balloon angioplasty of multiple stenoses throughout the right superficial femoral and popliteal arteries  Patient has significant right tibial disease with in-line flow into the peroneal artery and distal reconstituted posterior tibial artery  Could consider tibial intervention in the future  Would recommend anesthesia support given patient inability to lay still  Arterial duplex 7/21/2020:  · RIGHT LOWER LIMB   Ankle/Brachial Index: 1 49 which is in the unreliable range   PPG/PVR Tracings are slightly dampened  Biphasic waveforms at the ankle     Metatarsal Pressure 147mmHg   Great Toe Pressure: unable to obtain due to patient's foot movements  · LEFT LOWER LIMB   Ankle/Brachial Index: 1 27 which is in the normal range   PPG/PVR Tracings are slightly dampened  Triphasic waveforms at the ankle  Metatarsal Pressure 73mmHg   Great Toe Pressure: 51mmHg, within the healing range        Extremity: Several large ulcerations present on the medial, anterior, and posterior lateral aspect of the right lower leg with fat layer exposed  Wounds are predominantly pink/red postop day 1  Moderate serosanguineous/bloody drainage within bandage, wounds are 80-90% red/pink, localized erythema and calor appear to be resolving satisfactory  Labs and other studies:   CBC w/diff  Results from last 7 days   Lab Units 07/25/20  0607  07/23/20  0624  07/21/20  0838   WBC Thousand/uL 3 33*   < > 4 98   < > 6 30   HEMOGLOBIN g/dL 8 2*   < > 7 2*   < > 8 7*   HEMATOCRIT % 25 1*   < > 22 1*   < > 26 1*   PLATELETS Thousands/uL 56*   < > 43*   < > 52*   NEUTROS PCT %  --   --   --   --  85*   LYMPHS PCT %  --   --   --   --  9*   LYMPHO PCT %  --   --  7*   < >  --    MONOS PCT %  --   --   --   --  4   MONO PCT %  --   --  1*   < >  --    EOS PCT %  --   --  0   < > 0    < > = values in this interval not displayed  BMP  Results from last 7 days   Lab Units 07/25/20  0607   POTASSIUM mmol/L 4 6   CHLORIDE mmol/L 104   CO2 mmol/L 24   BUN mg/dL 35*   CREATININE mg/dL 1 69*   CALCIUM mg/dL 8 2*     CMP  Results from last 7 days   Lab Units 07/25/20  0607  07/23/20  0552   POTASSIUM mmol/L 4 6   < > 4 6   CHLORIDE mmol/L 104   < > 104   CO2 mmol/L 24   < > 21   BUN mg/dL 35*   < > 39*   CREATININE mg/dL 1 69*   < > 1 80*   CALCIUM mg/dL 8 2*   < > 8 2*   ALK PHOS U/L  --   --  81   ALT U/L  --   --  26   AST U/L  --   --  24    < > = values in this interval not displayed  @Culture@  Lab Results   Component Value Date    BLOODCX No Growth After 5 Days   07/16/2020    BLOODCX No Growth After 5 Days  07/16/2020    BLOODCX No Growth After 5 Days  05/02/2017    BLOODCX No Growth After 5 Days   05/02/2017    URINECX >100,000 cfu/ml Klebsiella oxytoca (A) 02/13/2019    URINECX >100,000 cfu/ml Klebsiella oxytoca (A) 01/07/2019         Current Facility-Administered Medications:     acetaminophen (TYLENOL) tablet 650 mg, 650 mg, Oral, Q6H PRN, Jerral Marjorie, DPM, 650 mg at 07/19/20 1312    aspirin chewable tablet 81 mg, 81 mg, Oral, Daily, Jerral Marjorie, DPM, 81 mg at 07/25/20 0825    atorvastatin (LIPITOR) tablet 40 mg, 40 mg, Oral, Daily With Kenton Lewis, DPM, 40 mg at 07/24/20 1603    bacitracin 100,000 Units, neomycin-polymyxin B (NEOSPORIN-) 1 mL in sodium chloride 0 9 % 1,000 mL irrigation bag, , Irrigation, Once, Jerral Marjorie, DPM    calcium carbonate (TUMS) chewable tablet 500 mg, 500 mg, Oral, Daily PRN, Jerral Marjorie, DPM    cefepime (MAXIPIME) IVPB (premix) 1,000 mg 50 mL, 1,000 mg, Intravenous, Q24H, Jerral Marjorie, DPM, Last Rate: 100 mL/hr at 07/25/20 0044, 1,000 mg at 07/25/20 0044    diphenhydrAMINE (BENADRYL) injection 25 mg, 25 mg, Intravenous, Q6H PRN, Jerral Marjorie, DPM, 25 mg at 07/23/20 1927    docusate sodium (COLACE) capsule 100 mg, 100 mg, Oral, BID, Jerral Marjorie, DPM, 100 mg at 43/48/52 7797    folic acid (FOLVITE) tablet 1 mg, 1 mg, Oral, Daily, Jerral Marjorie, DPM, 1 mg at 07/25/20 0825    HYDROcodone-acetaminophen (NORCO) 5-325 mg per tablet 1 tablet, 1 tablet, Oral, Q4H PRN, Jerral Marjorie, DPM, 1 tablet at 07/25/20 0738    HYDROcodone-acetaminophen (NORCO) 5-325 mg per tablet 2 tablet, 2 tablet, Oral, Q4H PRN, Jerral Marjorie, DPM, 2 tablet at 07/25/20 1203    HYDROmorphone (DILAUDID) injection 0 5 mg, 0 5 mg, Intravenous, Q2H PRN, Jerral Marjorie, DPM    LORazepam (ATIVAN) tablet 1 mg, 1 mg, Oral, Q8H PRN, Jerral Marjorie, DPM, 1 mg at 07/24/20 2015    pantoprazole (PROTONIX) EC tablet 40 mg, 40 mg, Oral, Early Morning, Jerral Marjorie, DPM, 40 mg at 07/25/20 0601    polyethylene glycol (MIRALAX) packet 17 g, 17 g, Oral, BID, Brando Rust DPM, 17 g at 07/25/20 0825    tamsulosin (FLOMAX) capsule 0 4 mg, 0 4 mg, Oral, Daily With Dinner, Brando Rust DPM, 0 4 mg at 07/24/20 1603    vancomycin (VANCOCIN) 1,500 mg in sodium chloride 0 9 % 250 mL IVPB, 1,500 mg, Intravenous, Q24H, Brando Rust DPM, Last Rate: 166 7 mL/hr at 07/24/20 1729, 1,500 mg at 07/24/20 1729    Imaging: I have personally reviewed pertinent films in PACS  EKG, Pathology, and Other Studies: I have personally reviewed pertinent reports    VTE Pharmacologic Prophylaxis: Heparin  VTE Mechanical Prophylaxis: reason for no mechanical VTE prophylaxis Wounds right lower extremity    Pedro Carson DPM

## 2020-07-25 NOTE — ASSESSMENT & PLAN NOTE
Patient has stage III chronic disease with creatinine around 2 0 at baseline  Patient's renal function is at baseline:  Creatinine is 1 69 today  Nephrology on board  Monitor renal function in a m

## 2020-07-25 NOTE — PROGRESS NOTES
NEPHROLOGY PROGRESS NOTE   Radha Bloom 69 y o  male MRN: [id number]  Unit/Bed#: -01 Encounter: [id number]      ASSESSMENT & PLAN:  1  Acute kidney injury on top of CKD stage 3  Admitted with a creatinine of 2 21, renal function improving with creatinine down to 1 69 today  Noted previous creatinine in the past in the mid 1s, the early this year once his creatinine was around 2 0  Suspected multifactorial in the setting of anemia, hypertension, prior NSAID and ACE inhibitor use, possible multiple myeloma  Renal function is stable  Awaiting bone marrow biopsy  Avoid hypotension  Follow serial labs  Avoid nephrotoxins including NSAIDs    2  Hyponatremia, stable at 135  Note that prior serum osmolality was elevated suggestive of pseudohyponatremia likely related with paraproteinemia/multiple myeloma    3  Hemodynamics, blood pressure stable, hold ACE-inhibitor  4  Pancytopenia, chronic ITP, Hematology on board, plan for bone marrow biopsy  5  Umbilical wound infection with exposed mesh an abscess status post wound exploration explantation of mesh and primary repair of umbilical hernia    Postoperative management per surgery team     6  Urinary retention, continue with straight cath as needed    My plan and recommendation were discussed with Dr Jen Shah from Internal Medicine team      SUBJECTIVE:  Patient seen, states that is feeling too tired to talk, does not want to answer questions, refused physical exam   Discussed with nurse taking care of patient    OBJECTIVE:  Current Weight: Weight - Scale: 65 9 kg (145 lb 4 5 oz)  Vitals:    07/25/20 1127   BP: 113/51   Pulse: 87   Resp: 16   Temp:    SpO2: 91%       Intake/Output Summary (Last 24 hours) at 7/25/2020 1404  Last data filed at 7/25/2020 1324  Gross per 24 hour   Intake 610 ml   Output 1350 ml   Net -740 ml     General: conscious, uncooperative, in not acute distress  Eyes:  Eyes are open, anicteric sclera  ENT: lips and mucous membranes moist  Neck: supple  Chest:  Patient refused physical exam  CVS:  Patient refused physical exam  Abdomen:  Patient refused physical exam  Extremities:  Patient refused physical exam  Skin: no visible rash  Neuro: awake, alert        Medications:    Current Facility-Administered Medications:     acetaminophen (TYLENOL) tablet 650 mg, 650 mg, Oral, Q6H PRN, Alexey Mettle, DPM, 650 mg at 07/19/20 1312    aspirin chewable tablet 81 mg, 81 mg, Oral, Daily, Alexey Mettle, DPM, 81 mg at 07/25/20 0825    atorvastatin (LIPITOR) tablet 40 mg, 40 mg, Oral, Daily With Dinner, Alexey Mettle, DPM, 40 mg at 07/24/20 1603    bacitracin 100,000 Units, neomycin-polymyxin B (NEOSPORIN-) 1 mL in sodium chloride 0 9 % 1,000 mL irrigation bag, , Irrigation, Once, Alexey Mettle, DPM    calcium carbonate (TUMS) chewable tablet 500 mg, 500 mg, Oral, Daily PRN, Alexey Mettle, DPM    cefepime (MAXIPIME) IVPB (premix) 1,000 mg 50 mL, 1,000 mg, Intravenous, Q24H, Alexey Mettle, DPM, Last Rate: 100 mL/hr at 07/25/20 0044, 1,000 mg at 07/25/20 0044    diphenhydrAMINE (BENADRYL) injection 25 mg, 25 mg, Intravenous, Q6H PRN, Alexey Mettle, DPM, 25 mg at 07/23/20 1927    docusate sodium (COLACE) capsule 100 mg, 100 mg, Oral, BID, Alexey Mettle, DPM, 100 mg at 97/22/22 2098    folic acid (FOLVITE) tablet 1 mg, 1 mg, Oral, Daily, Alexey Mettle, DPM, 1 mg at 07/25/20 0825    HYDROcodone-acetaminophen (NORCO) 5-325 mg per tablet 1 tablet, 1 tablet, Oral, Q4H PRN, Alexey Mettle, DPM, 1 tablet at 07/25/20 0738    HYDROcodone-acetaminophen (NORCO) 5-325 mg per tablet 2 tablet, 2 tablet, Oral, Q4H PRN, Alexey Mettle, DPM, 2 tablet at 07/25/20 1203    HYDROmorphone (DILAUDID) injection 0 5 mg, 0 5 mg, Intravenous, Q2H PRN, Alexey Hemanth, DPM    LORazepam (ATIVAN) tablet 1 mg, 1 mg, Oral, Q8H PRN, Alexey Mettle, DPM, 1 mg at 07/24/20 2015    pantoprazole (PROTONIX) EC tablet 40 mg, 40 mg, Oral, Early Morning, Alexey Mettle, DPM, 40 mg at 07/25/20 0601    polyethylene glycol (MIRALAX) packet 17 g, 17 g, Oral, BID, Ave Calderon, DPM, 17 g at 07/25/20 0825    tamsulosin (FLOMAX) capsule 0 4 mg, 0 4 mg, Oral, Daily With Dinner, Ave Calderon, DPM, 0 4 mg at 07/24/20 1603    vancomycin (VANCOCIN) 1,500 mg in sodium chloride 0 9 % 250 mL IVPB, 1,500 mg, Intravenous, Q24H, Ave Bone, DPM, Last Rate: 166 7 mL/hr at 07/24/20 1729, 1,500 mg at 07/24/20 1729    Invasive Devices:        Lab Results:   Results from last 7 days   Lab Units 07/25/20  0607 07/24/20  1719 07/24/20  0456  07/23/20  0624 07/23/20  0552  07/21/20  0839   WBC Thousand/uL 3 33*  --  4 11*  --  4 98  --    < >  --    HEMOGLOBIN g/dL 8 2* 8 2* 8 4*   < > 7 2*  --    < >  --    HEMATOCRIT % 25 1* 25 3* 25 9*   < > 22 1*  --    < >  --    PLATELETS Thousands/uL 56*  --  69*  --  43*  --    < >  --    SODIUM mmol/L 135*  --  137  --   --  134*   < > 135*   POTASSIUM mmol/L 4 6  --  4 9  --   --  4 6   < > 4 9   CHLORIDE mmol/L 104  --  105  --   --  104   < > 105   CO2 mmol/L 24  --  24  --   --  21   < > 20*   BUN mg/dL 35*  --  36*  --   --  39*   < > 42*   CREATININE mg/dL 1 69*  --  1 82*  --   --  1 80*   < > 1 77*   CALCIUM mg/dL 8 2*  --  8 4  --   --  8 2*   < > 8 5   MAGNESIUM mg/dL  --   --   --   --   --  2 4  --  2 3   PHOSPHORUS mg/dL  --   --   --   --   --   --   --  3 5   ALK PHOS U/L  --   --   --   --   --  81  --  100   ALT U/L  --   --   --   --   --  26  --  33   AST U/L  --   --   --   --   --  24  --  36    < > = values in this interval not displayed  Portions of the record may have been created with voice recognition software  Occasional wrong word or "sound a like" substitutions may have occurred due to the inherent limitations of voice recognition software  Read the chart carefully and recognize, using context, where substitutions have occurred  If you have any questions, please contact the dictating provider

## 2020-07-25 NOTE — ASSESSMENT & PLAN NOTE
Patient has right lower leg wounds with cellulitis  Continue vancomycin and cefepime    Patient underwent angiogram   Patient underwent debridement of right lower extremity on 07/24/2020  Follow-up OR wound cultures  Podiatry follow-up  Dressing changes per Podiatry

## 2020-07-25 NOTE — PROGRESS NOTES
Vancomycin IV Pharmacy-to-Dose Consultation    Tania Patel is a 69 y o  male who is currently receiving Vancomycin IV with management by the Pharmacy Consult service  Assessment/Plan:  The patient was reviewed  Renal function is stable and no signs or symptoms of nephrotoxicity and/or infusion reactions were documented in the chart  Based on todays assessment, continue current vancomycin (day # 9) dosing of 1500mg IV Q24H, with a plan for trough to be drawn at 1730 on 07/27/20  We will continue to follow the patients culture results and clinical progress daily      Laron Dunaway, Pharmacist

## 2020-07-25 NOTE — ASSESSMENT & PLAN NOTE
Wt Readings from Last 3 Encounters:   07/25/20 65 9 kg (145 lb 4 5 oz)   06/21/19 68 3 kg (150 lb 9 6 oz)   06/19/19 67 6 kg (149 lb)     Echocardiogram on this admission shows ejection fraction of 50%  Patient's lungs are clear, has no JVD  Has chronic systolic CHF is compensated    Continue to hold lisinopril, Lasix and Toprol because of relative hypotension  Daily weight and I&Os

## 2020-07-25 NOTE — PROGRESS NOTES
Progress Note - [facility] 1945, 69 y o  male MRN: [id number]    Unit/Bed#: -01 Encounter: [id number]    Primary Care Provider: Won Montanez DO   Date and time admitted to hospital: 7/16/2020 10:25 PM        Open wound of umbilical region  Assessment & Plan  Exploratory laparotomy, excision of infected mesh and repair of ventral heria (N/A) on 07/21/2020  Wound care and dressing changes per surgery  Pain management  See above    Severe protein-calorie malnutrition (HCC)  Assessment & Plan  Pt has Severe protein-calorie malnutrition, in the setting of chronically ill patient with history of noncompliance with care, as evidenced by BMI 19 17 with an 11 33% unplanned weight loss over the past year, multiple nonhealing wounds appearance of muscle wasting/fat loss and decreased mobility noted on nursing assessment, Findings: height 5' 10", weight 133 lb 9 6 oz, BMI 19 17 Screen: (+) Unplanned weight loss in the last three months; (+) Stage III-IV pressure ulcer or non-healing wound; (+) Appearance of muscle wasting or fat loss; Benign prostatic hyperplasia with urinary retention  Assessment & Plan  Patient self catheterizes at home when he feels he needs to for BPH with retention,   Patient had to be catheterized 6 times during this hospital stay  Continue straight cath q 8 hours because patient has urinary retention, will try to avoid placement of Abbott  Continue Flomax    Umbilicus discharge  Assessment & Plan  · Patient states ongoing since hernia surgery small bloody ooze  Scab is noted in the umbilicus with foul-smelling no drainage    · Patient underwent  EXPLORATORY laparotomy, excision of infected mesh and repair of ventral heria (N/A) on 07/21/2020  · Surgical follow-up  · Pain management p r n   · On IV antibiotics    CKD (chronic kidney disease) stage 3, GFR 30-59 ml/min ([facility])  Assessment & Plan  Patient has stage III chronic disease with creatinine around 2 0 at baseline  Patient's renal function is at baseline:  Creatinine is 1 69 today  Nephrology on board  Monitor renal function in a m  Lactic acidosis  Assessment & Plan  Lactic acidosis most likely multifactorial, patient has CHF, necrotic umbilical wound    Cellulitis  Assessment & Plan  Patient has right lower leg wounds with cellulitis  Continue vancomycin and cefepime  Patient underwent angiogram   Patient underwent debridement of right lower extremity on 07/24/2020  Follow-up OR wound cultures  Podiatry follow-up  Dressing changes per Podiatry    Chronic combined systolic and diastolic heart failure (HCC)  Assessment & Plan  Wt Readings from Last 3 Encounters:   07/25/20 65 9 kg (145 lb 4 5 oz)   06/21/19 68 3 kg (150 lb 9 6 oz)   06/19/19 67 6 kg (149 lb)     Echocardiogram on this admission shows ejection fraction of 50%  Patient's lungs are clear, has no JVD  Has chronic systolic CHF is compensated  Continue to hold lisinopril, Lasix and Toprol because of relative hypotension  Daily weight and I&Os    Thrombocytopenia ([address] )  Assessment & Plan  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 with platelet counts running between 60-115K  he denies signs of bleeding   Patient received 1 unit of platelets on 0/84  Platelet count is 21B this a m  Hematology recommended transfusing platelets to keep the counts above 50K if he is going to require surgical intervention  Other persistent atrial fibrillation [facility])  Assessment & Plan  Patient is status post ablation and pacemaker insertion  EKG showed paced rhythm  He has not  anticoagulation candidate due to ITP and thrombocytopenia    Peripheral vascular disease of lower extremity [facility])  Assessment & Plan  Patient has more than 75% stenosis of the right superficial femoral artery on arterial Doppler  Continue atorvastatin    Patient underwent right SFA PTA with single-vessel peroneal runoff on 07/20/2020  Vascular surgery follow-up noted  IR was unable to perform tibial intervention secondary to patient's inability to tolerate procedure  Continue on aspirin given recent intervention  Will discuss with GI to consider addition of Plavix   Post intervention ABIs were ordered  Vascular surgery and Podiatry follow-up  Continue vascular surgery follow-up    * Anemia, unspecified  Assessment & Plan  Patient presented with severe symptomatic normocytic anemia 6 7 causing shortness of breath on exertion  He was taking NSAIDs for chronic right lower leg ulcers that were getting worse  Patient was transfused packed red blood cells during the hospital stay  Hemoglobin currently is 8 2 this morning  Monitor H&H and transfuse as needed  Continue Protonix b i d ,  Abnormal CT scan the abdomen pelvis showing thickening of the gastric antrum likely gastritis but unable to exclude gastric neoplasm  Patient received another 1 unit of packed red cell transfusion on July 20  GI follow-up needed  Patient will need eventually endoscopic workup  Refusing at this time and wants his abdominal issues to be addressed first   GI recommended Hematology evaluation  Hematology consult appreciated  Possible myelodysplastic syndrome/multiple myeloma  IgG kappa with a ratio of light chains over 10 which are suggestive of multiple myeloma  They recommended bone marrow biopsy  IR consult placed by them and forms are filled  Patient at this time refused bone marrow biopsy as he wants to focus on other medical problems  Will likely undergo bone marrow biopsy on Monday  They recommended to transfuse to keep CBC above 8  Patient received 1 unit of packed red cell transfusion on 07/23/2020  Monitor H&H  Discussed with patient regarding above and he understands the need for bone marrow biopsy and his overall prognosis          Labs & Imaging: I have personally reviewed pertinent reports        VTE Pharmacologic Prophylaxis: Reason for no pharmacologic prophylaxis Anemia and thrombocytopenia  VTE Mechanical Prophylaxis: sequential compression device    Code Status:   Level 1 - Full Code    Patient Centered Rounds: I have performed bedside rounds with nursing staff today  Discussions with Specialists or Other Care Team Provider:  Podiatry, GI    Education and Discussions with Family / Patient:  Patient states that he will update his family  Continue to offer to call  Current Length of Stay: 8 day(s)    Current Patient Status: Inpatient   Certification Statement: The patient will continue to require additional inpatient hospital stay due to see my assessment and plan  Subjective:   Patient is seen and examined at bedside  No new complaints  Afebrile  All other ROS are negative  Objective:    Vitals: Blood pressure 110/57, pulse 85, temperature 98 °F (36 7 °C), resp  rate 16, height 5' 10" (1 778 m), weight 65 9 kg (145 lb 4 5 oz), SpO2 97 %  ,Body mass index is 20 85 kg/m²  SPO2 RA Rest      ED to Hosp-Admission (Current) from 7/16/2020 in [address] Med Surg [address]   SpO2  97 %   SpO2 Activity  At Rest   O2 Device  None (Room air)   O2 Flow Rate          I&O:     Intake/Output Summary (Last 24 hours) at 7/25/2020 0711  Last data filed at 7/24/2020 2008  Gross per 24 hour   Intake 250 ml   Output 650 ml   Net -400 ml       Physical Exam:    General- Alert, lying comfortably in bed  Not in any acute distress  HEENT- MIKI, EOM intact  CVS- regular, S1 and S2 normal  Chest- Bilateral Air entry, No rhochi, crackles or wheezing present  Abdomen- soft, nontender, not distended, no guarding or rigidity, BS+  Extremities-  right lower extremity in a dressing  Chronic Venous stasis changes bilaterally  CNS-   Alert, awake and orientedx3  No focal deficits present  Invasive Devices     Peripheral Intravenous Line            Peripheral IV 07/22/20 Dorsal (posterior); Right Wrist 2 days                      Social History  reviewed  Family History   Problem Relation Age of Onset  Heart disease Mother     Heart disease Father     Hypertension Father     Diabetes Other     Depression Other     Cancer Sister     Depression Cousin     reviewed    Meds:  Current Facility-Administered Medications   Medication Dose Route Frequency Provider Last Rate Last Dose    acetaminophen (TYLENOL) tablet 650 mg  650 mg Oral Q6H PRN Kang Bernice, DPM   650 mg at 07/19/20 1312    aspirin chewable tablet 81 mg  81 mg Oral Daily Kang Bernice, DPM   81 mg at 07/23/20 1004    atorvastatin (LIPITOR) tablet 40 mg  40 mg Oral Daily With [employer], DPM   40 mg at 07/24/20 1603    bacitracin 100,000 Units, neomycin-polymyxin B (NEOSPORIN-) 1 mL in sodium chloride 0 9 % 1,000 mL irrigation bag   Irrigation Once Kang Bernice, DPM        calcium carbonate (TUMS) chewable tablet 500 mg  500 mg Oral Daily PRN Kang Bernice, DPM        cefepime (MAXIPIME) IVPB (premix) 1,000 mg 50 mL  1,000 mg Intravenous Q24H Kang Bernice,  mL/hr at 07/25/20 0044 1,000 mg at 07/25/20 0044    diphenhydrAMINE (BENADRYL) injection 25 mg  25 mg Intravenous Q6H PRN Kang Bernice, DPM   25 mg at 07/23/20 1927    docusate sodium (COLACE) capsule 100 mg  100 mg Oral BID Kang Bernice, DPM   100 mg at 96/15/79 5395    folic acid (FOLVITE) tablet 1 mg  1 mg Oral Daily Kang Bernice, DPM   1 mg at 07/23/20 1004    HYDROcodone-acetaminophen (NORCO) 5-325 mg per tablet 1 tablet  1 tablet Oral Q4H PRN Kang Bernice, DPM   1 tablet at 07/24/20 2248    HYDROcodone-acetaminophen (NORCO) 5-325 mg per tablet 2 tablet  2 tablet Oral Q4H PRN Kang Bernice, DPM   2 tablet at 07/24/20 0543    HYDROmorphone (DILAUDID) injection 0 5 mg  0 5 mg Intravenous Q2H PRN Kang Bernice, DPM        LORazepam (ATIVAN) tablet 1 mg  1 mg Oral Q8H PRN Kang Ebrnice, DPM   1 mg at 07/24/20 2015    pantoprazole (PROTONIX) EC tablet 40 mg  40 mg Oral Early Morning Kang Queen, DPM   40 mg at 07/25/20 0601    polyethylene glycol (MIRALAX) packet 17 g  17 g Oral BID Velvet Peto Gabi Mcarthur, DPM   17 g at 07/23/20 1928    tamsulosin (FLOMAX) capsule 0 4 mg  0 4 mg Oral Daily With Evin Cuellar, DPM   0 4 mg at 07/24/20 1603    vancomycin (VANCOCIN) 1,500 mg in sodium chloride 0 9 % 250 mL IVPB  1,500 mg Intravenous Q24H Kang Queen,  7 mL/hr at 07/24/20 1729 1,500 mg at 07/24/20 1729      Medications Prior to Admission   Medication    Ascorbic Acid (VITAMIN C PO)    B Complex Vitamins (VITAMIN B COMPLEX PO)    calcium carbonate (TUMS) 500 mg chewable tablet    diazepam (VALIUM) 5 mg tablet    folic acid (FOLVITE) 1 mg tablet    furosemide (LASIX) 40 mg tablet    Lactobacillus (ACIDOPHILUS PO)    lisinopril (ZESTRIL) 5 mg tablet    Lycopene 10 MG CAPS    metoprolol succinate (TOPROL-XL) 100 mg 24 hr tablet    Misc Natural Products (SAW PALMETTO) CAPS    Multiple Vitamin (MULTI-DAY VITAMINS) TABS    acetaminophen (TYLENOL) 325 mg tablet    clobetasol (TEMOVATE) 0 05 % GEL    Ferrous Gluconate-C-Folic Acid (IRON-C PO)    tamsulosin (FLOMAX) 0 4 mg       Labs:  Results from last 7 days   Lab Units 07/25/20  0607 07/24/20  1719 07/24/20  0456  07/23/20  0624  07/22/20  0631  07/21/20  0838   WBC Thousand/uL 3 33*  --  4 11*  --  4 98  --  5 90  --  6 30   HEMOGLOBIN g/dL 8 2* 8 2* 8 4*   < > 7 2*   < > 7 7*   < > 8 7*   HEMATOCRIT % 25 1* 25 3* 25 9*   < > 22 1*   < > 23 0*   < > 26 1*   PLATELETS Thousands/uL 56*  --  69*  --  43*  --  46*  --  52*   NEUTROS PCT %  --   --   --   --   --   --   --   --  85*   LYMPHS PCT %  --   --   --   --   --   --   --   --  9*   LYMPHO PCT %  --   --   --   --  7*  --  8*  --   --    MONOS PCT %  --   --   --   --   --   --   --   --  4   MONO PCT %  --   --   --   --  1*  --  3*  --   --    EOS PCT %  --   --   --   --  0  --  0  --  0    < > = values in this interval not displayed       Results from last 7 days   Lab Units 07/25/20  0607 07/24/20  0456 07/23/20  0552  07/21/20  0839   POTASSIUM mmol/L 4 6 4 9 4 6   < > 4 9   CHLORIDE mmol/L 104 105 104   < > 105   CO2 mmol/L 24 24 21   < > 20*   BUN mg/dL 35* 36* 39*   < > 42*   CREATININE mg/dL 1 69* 1 82* 1 80*   < > 1 77*   CALCIUM mg/dL 8 2* 8 4 8 2*   < > 8 5   ALK PHOS U/L  --   --  81  --  100   ALT U/L  --   --  26  --  33   AST U/L  --   --  24  --  36    < > = values in this interval not displayed  Lab Results   Component Value Date    TROPONINI <0 02 07/16/2020    TROPONINI 0 02 11/23/2018    TROPONINI 0 03 11/23/2018    CKTOTAL 27 (L) 07/16/2020         Lab Results   Component Value Date    BLOODCX No Growth After 5 Days  07/16/2020    BLOODCX No Growth After 5 Days  07/16/2020    BLOODCX No Growth After 5 Days  05/02/2017    BLOODCX No Growth After 5 Days  05/02/2017    URINECX >100,000 cfu/ml Klebsiella oxytoca (A) 02/13/2019    URINECX >100,000 cfu/ml Klebsiella oxytoca (A) 01/07/2019    WOUNDCULT 4+ Growth of Enterobacter cloacae complex (A) 07/17/2020    WOUNDCULT 4+ Growth of Morganella morganii (A) 07/17/2020    WOUNDCULT 4+ Growth of Enterococcus faecalis (A) 07/17/2020    WOUNDCULT (A) 07/17/2020     4+ Growth of Alpha Hemolytic Streptococcus NOT Enterococcus         Imaging:  Results for orders placed during the hospital encounter of 07/16/20   XR chest portable    Narrative CHEST     INDICATION:   Acute Resp Failure  COMPARISON:  12/1/2018    EXAM PERFORMED/VIEWS:  XR CHEST PORTABLE      FINDINGS:  Left-sided chest wall pacemaker is identified  Pacemaker leads are intact  Cardiomediastinal silhouette appears unremarkable  The lungs are clear  No pneumothorax or pleural effusion  Stable bony structures  No acute osseous abnormality  Impression No acute cardiopulmonary disease  Workstation performed: [id number]       Results for orders placed during the hospital encounter of 11/22/18   XR chest 2 views    Narrative CHEST     INDICATION:   Patient s/p Pacemaker/ICD Insertion  COMPARISON:  11/30/2018      EXAM PERFORMED/VIEWS:  XR CHEST PA & LATERAL      FINDINGS:  Left-sided chest wall pacemaker is identified  Pacemaker leads are intact  Cardiomediastinal silhouette appears unremarkable  The lungs are clear  No pneumothorax  Unchanged small bilateral pleural effusions  Orthopedic hardware again seen in the left clavicle  Visualized osseous structures appear otherwise unremarkable for the patient's age  Impression No pneumothorax  Stable small bilateral pleural effusions  Workstation performed: [id number]         Last 24 Hours Medication List:     Current Facility-Administered Medications:  acetaminophen 650 mg Oral Q6H PRN Angel Spar, DPM    aspirin 81 mg Oral Daily Angel Spar, DPM    atorvastatin 40 mg Oral Daily With [employer], DPM    bacitracin 100,000 units and neomycin-polymyxin B () 1 mL in sodium chloride 0 9% 1000 mL irrigation bag  Irrigation Once Angel Spar, DPM    calcium carbonate 500 mg Oral Daily PRN Angel Spar, DPM    cefepime 1,000 mg Intravenous Q24H Angel Spar, DPM Last Rate: 1,000 mg (07/25/20 0044)   diphenhydrAMINE 25 mg Intravenous Q6H PRN Angel Spar, DPM    docusate sodium 100 mg Oral BID Angel Spar, DPM    folic acid 1 mg Oral Daily Angel Spar, DPM    HYDROcodone-acetaminophen 1 tablet Oral Q4H PRN Angel Spar, DPM    HYDROcodone-acetaminophen 2 tablet Oral Q4H PRN Angel Spar, DPM    HYDROmorphone 0 5 mg Intravenous Q2H PRN Angel Spar, DPM    LORazepam 1 mg Oral Q8H PRN Angel Spar, DPM    pantoprazole 40 mg Oral Early Morning Angel Spar, DPM    polyethylene glycol 17 g Oral BID Angel Spar, DPM    tamsulosin 0 4 mg Oral Daily With [employer], DPM    vancomycin 1,500 mg Intravenous Q24H Angel Spar, DPM Last Rate: 1,500 mg (07/24/20 1729)        Today, Patient Was Seen By: Omero Regalado MD    ** Please Note: Dictation voice to text software may have been used in the creation of this document   **

## 2020-07-25 NOTE — PLAN OF CARE
Problem: Potential for Falls  Goal: Patient will remain free of falls  Description  INTERVENTIONS:  - Assess patient frequently for physical needs  -  Identify cognitive and physical deficits and behaviors that affect risk of falls  -  [place] fall precautions as indicated by assessment   - Educate patient/family on patient safety including physical limitations  - Instruct patient to call for assistance with activity based on assessment  - Modify environment to reduce risk of injury  - Consider OT/PT consult to assist with strengthening/mobility  Outcome: Progressing     Problem: Prexisting or High Potential for Compromised Skin Integrity  Goal: Skin integrity is maintained or improved  Description  INTERVENTIONS:  - Identify patients at risk for skin breakdown  - Assess and monitor skin integrity  - Assess and monitor nutrition and hydration status  - Monitor labs   - Assess for incontinence   - Turn and reposition patient  - Assist with mobility/ambulation  - Relieve pressure over bony prominences  - Avoid friction and shearing  - Provide appropriate hygiene as needed including keeping skin clean and dry  - Evaluate need for skin moisturizer/barrier cream  - Collaborate with interdisciplinary team   - Patient/family teaching  - Consider wound care consult   Outcome: Progressing     Problem: Nutrition/Hydration-ADULT  Goal: Nutrient/Hydration intake appropriate for improving, restoring or maintaining nutritional needs  Description  Monitor and assess patient's nutrition/hydration status for malnutrition  Collaborate with interdisciplinary team and initiate plan and interventions as ordered  Monitor patient's weight and dietary intake as ordered or per policy  Utilize nutrition screening tool and intervene as necessary  Determine patient's food preferences and provide high-protein, high-caloric foods as appropriate       INTERVENTIONS:  - Monitor oral intake, urinary output, labs, and treatment plans  - Assess nutrition and hydration status and recommend course of action  - Evaluate amount of meals eaten  - Assist patient with eating if necessary   - Allow adequate time for meals  - Recommend/ encourage appropriate diets, oral nutritional supplements, and vitamin/mineral supplements  - Order, calculate, and assess calorie counts as needed  - Recommend, monitor, and adjust tube feedings and TPN/PPN based on assessed needs  - Assess need for intravenous fluids  - Provide specific nutrition/hydration education as appropriate  - Include patient/family/caregiver in decisions related to nutrition  Outcome: Progressing     Problem: NEUROSENSORY - ADULT  Goal: Achieves maximal functionality and self care  Description  INTERVENTIONS  - Monitor swallowing and airway patency with patient fatigue and changes in neurological status  - Encourage and assist patient to increase activity and self care     - Encourage visually impaired, hearing impaired and aphasic patients to use assistive/communication devices  Outcome: Progressing     Problem: GASTROINTESTINAL - ADULT  Goal: Minimal or absence of nausea and/or vomiting  Description  INTERVENTIONS:  - Administer IV fluids if ordered to ensure adequate hydration  - Maintain NPO status until nausea and vomiting are resolved  - Nasogastric tube if ordered  - Administer ordered antiemetic medications as needed  - Provide nonpharmacologic comfort measures as appropriate  - Advance diet as tolerated, if ordered  - Consider nutrition services referral to assist patient with adequate nutrition and appropriate food choices  Outcome: Progressing  Goal: Maintains adequate nutritional intake  Description  INTERVENTIONS:  - Monitor percentage of each meal consumed  - Identify factors contributing to decreased intake, treat as appropriate  - Assist with meals as needed  - Monitor I&O, weight, and lab values if indicated  - Obtain nutrition services referral as needed  Outcome: Progressing     Problem: METABOLIC, FLUID AND ELECTROLYTES - ADULT  Goal: Electrolytes maintained within normal limits  Description  INTERVENTIONS:  - Monitor labs and assess patient for signs and symptoms of electrolyte imbalances  - Administer electrolyte replacement as ordered  - Monitor response to electrolyte replacements, including repeat lab results as appropriate  - Instruct patient on fluid and nutrition as appropriate  Outcome: Progressing  Goal: Fluid balance maintained  Description  INTERVENTIONS:  - Monitor labs   - Monitor I/O and WT  - Instruct patient on fluid and nutrition as appropriate  - Assess for signs & symptoms of volume excess or deficit  Outcome: Progressing     Problem: SKIN/TISSUE INTEGRITY - ADULT  Goal: Skin integrity remains intact  Description  INTERVENTIONS  - Identify patients at risk for skin breakdown  - Assess and monitor skin integrity  - Assess and monitor nutrition and hydration status  - Monitor labs (i e  albumin)  - Assess for incontinence   - Turn and reposition patient  - Assist with mobility/ambulation  - Relieve pressure over bony prominences  - Avoid friction and shearing  - Provide appropriate hygiene as needed including keeping skin clean and dry  - Evaluate need for skin moisturizer/barrier cream  - Collaborate with interdisciplinary team (i e  Nutrition, Rehabilitation, etc )   - Patient/family teaching  Outcome: Progressing  Goal: Incision(s), wounds(s) or drain site(s) healing without S/S of infection  Description  INTERVENTIONS  - Assess and document risk factors for skin impairment   - Assess and document dressing, incision, wound bed, drain sites and surrounding tissue  - Consider nutrition services referral as needed  - Oral mucous membranes remain intact  - Provide patient/ family education  Outcome: Progressing     Problem: HEMATOLOGIC - ADULT  Goal: Maintains hematologic stability  Description  INTERVENTIONS  - Assess for signs and symptoms of bleeding or hemorrhage  - Monitor labs  - Administer supportive blood products/factors as ordered and appropriate  Outcome: Progressing     Problem: MUSCULOSKELETAL - ADULT  Goal: Maintain or return mobility to safest level of function  Description  INTERVENTIONS:  - Assess patient's ability to carry out ADLs; assess patient's baseline for ADL function and identify physical deficits which impact ability to perform ADLs (bathing, care of mouth/teeth, toileting, grooming, dressing, etc )  - Assess/evaluate cause of self-care deficits   - Assess range of motion  - Assess patient's mobility  - Assess patient's need for assistive devices and provide as appropriate  - Encourage maximum independence but intervene and supervise when necessary  - Involve family in performance of ADLs  - Assess for home care needs following discharge   - Consider OT consult to assist with ADL evaluation and planning for discharge  - Provide patient education as appropriate  Outcome: Progressing     Problem: PAIN - ADULT  Goal: Verbalizes/displays adequate comfort level or baseline comfort level  Description  Interventions:  - Encourage patient to monitor pain and request assistance  - Assess pain using appropriate pain scale  - Administer analgesics based on type and severity of pain and evaluate response  - Implement non-pharmacological measures as appropriate and evaluate response  - Consider cultural and social influences on pain and pain management  - Notify physician/advanced practitioner if interventions unsuccessful or patient reports new pain  Outcome: Progressing     Problem: INFECTION - ADULT  Goal: Absence or prevention of progression during hospitalization  Description  INTERVENTIONS:  - Assess and monitor for signs and symptoms of infection  - Monitor lab/diagnostic results  - Monitor all insertion sites, i e  indwelling lines, tubes, and drains  - Monitor endotracheal if appropriate and nasal secretions for changes in amount and color  - [place]mo appropriate cooling/warming therapies per order  - Administer medications as ordered  - Instruct and encourage patient and family to use good hand hygiene technique  - Identify and instruct in appropriate isolation precautions for identified infection/condition  Outcome: Progressing  Goal: Absence of fever/infection during neutropenic period  Description  INTERVENTIONS:  - Monitor WBC    Outcome: Progressing     Problem: SAFETY ADULT  Goal: Patient will remain free of falls  Description  INTERVENTIONS:  - Assess patient frequently for physical needs  -  Identify cognitive and physical deficits and behaviors that affect risk of falls  -  [place] fall precautions as indicated by assessment   - Educate patient/family on patient safety including physical limitations  - Instruct patient to call for assistance with activity based on assessment  - Modify environment to reduce risk of injury  - Consider OT/PT consult to assist with strengthening/mobility  Outcome: Progressing     Problem: DISCHARGE PLANNING  Goal: Discharge to home or other facility with appropriate resources  Description  INTERVENTIONS:  - Identify barriers to discharge w/patient and caregiver  - Arrange for needed discharge resources and transportation as appropriate  - Identify discharge learning needs (meds, wound care, etc )  - Arrange for interpretive services to assist at discharge as needed  - Refer to Case Management Department for coordinating discharge planning if the patient needs post-hospital services based on physician/advanced practitioner order or complex needs related to functional status, cognitive ability, or social support system  Outcome: Progressing     Problem: Knowledge Deficit  Goal: Patient/family/caregiver demonstrates understanding of disease process, treatment plan, medications, and discharge instructions  Description  Complete learning assessment and assess knowledge base    Interventions:  - Provide teaching at level of understanding  - Provide teaching via preferred learning methods  Outcome: Progressing

## 2020-07-26 NOTE — ASSESSMENT & PLAN NOTE
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

## 2020-07-26 NOTE — PROGRESS NOTES
Progress Note - Dmitry Conley 1945, 69 y o  male MRN: [id number]    Unit/Bed#: -01 Encounter: [id number]    Primary Care Provider: Theresa Lorenzo DO   Date and time admitted to hospital: 7/16/2020 10:25 PM        Open wound of umbilical region  Assessment & Plan  Exploratory laparotomy, excision of infected mesh and repair of ventral heria (N/A) on 07/21/2020  Wound care and dressing changes per surgery  Pain management  See above    Severe protein-calorie malnutrition (HCC)  Assessment & Plan  Pt has Severe protein-calorie malnutrition, in the setting of chronically ill patient with history of noncompliance with care, as evidenced by BMI 19 17 with an 11 33% unplanned weight loss over the past year, multiple nonhealing wounds appearance of muscle wasting/fat loss and decreased mobility noted on nursing assessment, Findings: height 5' 10", weight 133 lb 9 6 oz, BMI 19 17 Screen: (+) Unplanned weight loss in the last three months; (+) Stage III-IV pressure ulcer or non-healing wound; (+) Appearance of muscle wasting or fat loss; Benign prostatic hyperplasia with urinary retention  Assessment & Plan  Patient self catheterizes at home when he feels he needs to for BPH with retention,   Patient had to be catheterized 6 times during this hospital stay  Continue straight cath q 8 hours because patient has urinary retention, will try to avoid placement of Abbott  Continue Flomax    Umbilicus discharge  Assessment & Plan  · Patient states ongoing since hernia surgery small bloody ooze  Scab is noted in the umbilicus with foul-smelling no drainage    · Patient underwent  EXPLORATORY laparotomy, excision of infected mesh and repair of ventral heria (N/A) on 07/21/2020  · Surgical follow-up  · Pain management p r n   · On IV antibiotics    CKD (chronic kidney disease) stage 3, GFR 30-59 ml/min ([facility])  Assessment & Plan  Patient has stage III chronic disease with creatinine around 2 0 at baseline  Patient's renal function is at baseline:  Creatinine is 1 36 today  Nephrology on board  Monitor renal function in a m  Lactic acidosis  Assessment & Plan  Lactic acidosis most likely multifactorial, patient has CHF, necrotic umbilical wound    Cellulitis  Assessment & Plan  Patient has right lower leg wounds with cellulitis  Continue vancomycin and cefepime  Patient underwent angiogram   Patient underwent debridement of right lower extremity on 07/24/2020  Follow-up OR wound cultures  Podiatry follow-up  Dressing changes per Podiatry    Chronic combined systolic and diastolic heart failure (HCC)  Assessment & Plan  Wt Readings from Last 3 Encounters:   07/26/20 68 9 kg (151 lb 14 4 oz)   06/21/19 68 3 kg (150 lb 9 6 oz)   06/19/19 67 6 kg (149 lb)     Echocardiogram on this admission shows ejection fraction of 50%  Patient's lungs are clear, has no JVD  Has chronic systolic CHF is compensated  Continue to hold lisinopril, Lasix and Toprol because of relative hypotension  Daily weight and I&Os    Thrombocytopenia ([address] )  Assessment & Plan  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 with platelet counts running between 60-115K  he denies signs of bleeding   Patient received 1 unit of platelets on 0/40  Platelet count is 47K this a m  Hematology recommended transfusing platelets to keep the counts above 50K if he is going to require surgical intervention  Other persistent atrial fibrillation [facility])  Assessment & Plan  Patient is status post ablation and pacemaker insertion  EKG showed paced rhythm  He has not  anticoagulation candidate due to ITP and thrombocytopenia    Peripheral vascular disease of lower extremity [facility])  Assessment & Plan  Patient has more than 75% stenosis of the right superficial femoral artery on arterial Doppler  Continue atorvastatin    Patient underwent right SFA PTA with single-vessel peroneal runoff on 07/20/2020  Vascular surgery follow-up noted  IR was unable to perform tibial intervention secondary to patient's inability to tolerate procedure  Continue on aspirin given recent intervention  Will discuss with GI to consider addition of Plavix   Post intervention ABIs were ordered  Vascular surgery and Podiatry follow-up  Continue vascular surgery follow-up    * Anemia, unspecified  Assessment & Plan  Patient presented with severe symptomatic normocytic anemia 6 7 causing shortness of breath on exertion  He was taking NSAIDs for chronic right lower leg ulcers that were getting worse  Patient was transfused packed red blood cells during the hospital stay  Hemoglobin currently is 8 2 this morning  Monitor H&H and transfuse as needed  Continue Protonix b i d ,  Abnormal CT scan the abdomen pelvis showing thickening of the gastric antrum likely gastritis but unable to exclude gastric neoplasm  Patient received another 1 unit of packed red cell transfusion on July 20  GI follow-up needed  Patient will need eventually endoscopic workup  Refusing at this time and wants his abdominal issues to be addressed first   GI recommended Hematology evaluation  Hematology consult appreciated  Possible myelodysplastic syndrome/multiple myeloma  IgG kappa with a ratio of light chains over 10 which are suggestive of multiple myeloma  They recommended bone marrow biopsy  IR consult placed by them and forms are filled  Patient at this time refused bone marrow biopsy as he wants to focus on other medical problems  Agreeable to undergo bone marrow biopsy on Monday  Will place patient NPO after midnight  They recommended to transfuse to keep CBC above 8  Patient received 1 unit of packed red cell transfusion on 07/23/2020  Monitor H&H  Discussed with patient regarding above and he understands the need for bone marrow biopsy and his overall prognosis        Labs & Imaging: I have personally reviewed pertinent reports        VTE Pharmacologic Prophylaxis: Reason for no pharmacologic prophylaxis Thrombocytopenia and anemia  VTE Mechanical Prophylaxis: sequential compression device    Code Status:   Level 1 - Full Code    Patient Centered Rounds: I have performed bedside rounds with nursing staff today  Discussions with Specialists or Other Care Team Provider:  Podiatry    Education and Discussions with Family / Patient:  Patient states he will update his family  Offered to call  Current Length of Stay: 9 day(s)    Current Patient Status: Inpatient   Certification Statement: The patient will continue to require additional inpatient hospital stay due to see my assessment and plan  Subjective:   Patient is seen and examined at bedside  Denies any new complaints  Patient is agreeable for bone marrow biopsy tomorrow  Afebrile  All other ROS are negative  Objective:    Vitals: Blood pressure 127/68, pulse 80, temperature 98 °F (36 7 °C), resp  rate 16, height 5' 10" (1 778 m), weight 68 9 kg (151 lb 14 4 oz), SpO2 99 %  ,Body mass index is 21 79 kg/m²  SPO2 RA Rest      ED to Hosp-Admission (Current) from 7/16/2020 in [address] Med Surg Unit   SpO2  99 %   SpO2 Activity  At Rest   O2 Device  None (Room air)   O2 Flow Rate          I&O:     Intake/Output Summary (Last 24 hours) at 7/26/2020 1018  Last data filed at 7/26/2020 0855  Gross per 24 hour   Intake 420 ml   Output 625 ml   Net -205 ml       Physical Exam:    General- Alert, sitting comfortably in bed  Not in any acute distress  HEENT- MIKI, EOM intact  CVS- regular, S1 and S2 normal   Chest- Bilateral Air entry, No rhochi, crackles or wheezing present  Abdomen- soft, nontender, not distended, no guarding or rigidity, BS+  Extremities-  Right lower extremity in a dressing  Chronic venous stasis changes noted bilaterally LE  CNS-   Alert, awake and orientedx3  No focal deficits present      Invasive Devices     Peripheral Intravenous Line            Peripheral IV 07/25/20 Left Forearm less than 1 day Social History  reviewed  Family History   Problem Relation Age of Onset    Heart disease Mother     Heart disease Father     Hypertension Father     Diabetes Other     Depression Other     Cancer Sister     Depression Cousin     reviewed    Meds:  Current Facility-Administered Medications   Medication Dose Route Frequency Provider Last Rate Last Dose    acetaminophen (TYLENOL) tablet 650 mg  650 mg Oral Q6H PRN Elinda Coca, DPM   650 mg at 07/26/20 0201    aspirin chewable tablet 81 mg  81 mg Oral Daily Elinda Coca, DPM   81 mg at 07/26/20 4879    atorvastatin (LIPITOR) tablet 40 mg  40 mg Oral Daily With [employer], DPM   40 mg at 07/25/20 1607    bacitracin 100,000 Units, neomycin-polymyxin B (NEOSPORIN-) 1 mL in sodium chloride 0 9 % 1,000 mL irrigation bag   Irrigation Once Elinda Coca, DPM        calcium carbonate (TUMS) chewable tablet 500 mg  500 mg Oral Daily PRN Elinda Coca, DPM        cefepime (MAXIPIME) IVPB (premix) 1,000 mg 50 mL  1,000 mg Intravenous Q24H Elinda Coca, DPM   Stopped at 07/26/20 0100    diphenhydrAMINE (BENADRYL) injection 25 mg  25 mg Intravenous Q6H PRN Elinda Coca, DPM   25 mg at 07/23/20 1927    docusate sodium (COLACE) capsule 100 mg  100 mg Oral BID Elinda Coca, DPM   100 mg at 40/38/19 8540    folic acid (FOLVITE) tablet 1 mg  1 mg Oral Daily Elinda Coca, DPM   1 mg at 07/26/20 0842    HYDROcodone-acetaminophen (NORCO) 5-325 mg per tablet 1 tablet  1 tablet Oral Q4H PRN Elinda Coca, DPM   1 tablet at 07/25/20 2156    HYDROcodone-acetaminophen (NORCO) 5-325 mg per tablet 2 tablet  2 tablet Oral Q4H PRN Elinda Coca, DPM   2 tablet at 07/26/20 0842    HYDROmorphone (DILAUDID) injection 0 5 mg  0 5 mg Intravenous Q2H PRN Elinda Coca, DPM        LORazepam (ATIVAN) tablet 1 mg  1 mg Oral Q8H PRN Elinda Coca, DPM   1 mg at 07/24/20 2015    pantoprazole (PROTONIX) EC tablet 40 mg  40 mg Oral Early Morning Diana Crane DPM   40 mg at 07/26/20 0555    polyethylene glycol (MIRALAX) packet 17 g  17 g Oral BID Geeta Dumont DPM   17 g at 07/26/20 0842    tamsulosin (FLOMAX) capsule 0 4 mg  0 4 mg Oral Daily With Jennifer Carvalho DPM   0 4 mg at 07/25/20 1607    vancomycin (VANCOCIN) 1,500 mg in sodium chloride 0 9 % 250 mL IVPB  1,500 mg Intravenous Q24H Geeta Dumont DPREAGAN 166 7 mL/hr at 07/25/20 1800 1,500 mg at 07/25/20 1800      Medications Prior to Admission   Medication    Ascorbic Acid (VITAMIN C PO)    B Complex Vitamins (VITAMIN B COMPLEX PO)    calcium carbonate (TUMS) 500 mg chewable tablet    diazepam (VALIUM) 5 mg tablet    folic acid (FOLVITE) 1 mg tablet    furosemide (LASIX) 40 mg tablet    Lactobacillus (ACIDOPHILUS PO)    lisinopril (ZESTRIL) 5 mg tablet    Lycopene 10 MG CAPS    metoprolol succinate (TOPROL-XL) 100 mg 24 hr tablet    Misc Natural Products (SAW PALMETTO) CAPS    Multiple Vitamin (MULTI-DAY VITAMINS) TABS    acetaminophen (TYLENOL) 325 mg tablet    clobetasol (TEMOVATE) 0 05 % GEL    Ferrous Gluconate-C-Folic Acid (IRON-C PO)    tamsulosin (FLOMAX) 0 4 mg       Labs:  Results from last 7 days   Lab Units 07/26/20  0553 07/25/20  1715 07/25/20  0607  07/24/20  0456  07/23/20  0624  07/22/20  0631  07/21/20  0838   WBC Thousand/uL 3 23*  --  3 33*  --  4 11*  --  4 98  --  5 90  --  6 30   HEMOGLOBIN g/dL 8 2* 7 6* 8 2*   < > 8 4*   < > 7 2*   < > 7 7*   < > 8 7*   HEMATOCRIT % 25 0* 22 8* 25 1*   < > 25 9*   < > 22 1*   < > 23 0*   < > 26 1*   PLATELETS Thousands/uL 53*  --  56*  --  69*  --  43*  --  46*  --  52*   NEUTROS PCT %  --   --   --   --   --   --   --   --   --   --  85*   LYMPHS PCT %  --   --   --   --   --   --   --   --   --   --  9*   LYMPHO PCT % 14  --   --   --   --   --  7*  --  8*  --   --    MONOS PCT %  --   --   --   --   --   --   --   --   --   --  4   MONO PCT % 3*  --   --   --   --   --  1*  --  3*  --   --    EOS PCT % 0  --   --   --   --   --  0  --  0  --  0    < > = values in this interval not displayed  Results from last 7 days   Lab Units 07/26/20  0553 07/25/20  0607 07/24/20  0456 07/23/20  0552  07/21/20  0839   POTASSIUM mmol/L 4 8 4 6 4 9 4 6   < > 4 9   CHLORIDE mmol/L 106 104 105 104   < > 105   CO2 mmol/L 20* 24 24 21   < > 20*   BUN mg/dL 28* 35* 36* 39*   < > 42*   CREATININE mg/dL 1 36* 1 69* 1 82* 1 80*   < > 1 77*   CALCIUM mg/dL 8 2* 8 2* 8 4 8 2*   < > 8 5   ALK PHOS U/L  --   --   --  81  --  100   ALT U/L  --   --   --  26  --  33   AST U/L  --   --   --  24  --  36    < > = values in this interval not displayed  Lab Results   Component Value Date    TROPONINI <0 02 07/16/2020    TROPONINI 0 02 11/23/2018    TROPONINI 0 03 11/23/2018    CKTOTAL 27 (L) 07/16/2020         Lab Results   Component Value Date    BLOODCX No Growth After 5 Days  07/16/2020    BLOODCX No Growth After 5 Days  07/16/2020    BLOODCX No Growth After 5 Days  05/02/2017    BLOODCX No Growth After 5 Days  05/02/2017    URINECX >100,000 cfu/ml Klebsiella oxytoca (A) 02/13/2019    URINECX >100,000 cfu/ml Klebsiella oxytoca (A) 01/07/2019    WOUNDCULT 3+ Growth of Gram Negative Andrez (A) 07/24/2020    WOUNDCULT 4+ Growth of Enterobacter cloacae complex (A) 07/17/2020    WOUNDCULT 4+ Growth of Morganella morganii (A) 07/17/2020    WOUNDCULT 4+ Growth of Enterococcus faecalis (A) 07/17/2020    WOUNDCULT (A) 07/17/2020     4+ Growth of Alpha Hemolytic Streptococcus NOT Enterococcus         Imaging:  Results for orders placed during the hospital encounter of 07/16/20   XR chest portable    Narrative CHEST     INDICATION:   Acute Resp Failure  COMPARISON:  12/1/2018    EXAM PERFORMED/VIEWS:  XR CHEST PORTABLE      FINDINGS:  Left-sided chest wall pacemaker is identified  Pacemaker leads are intact  Cardiomediastinal silhouette appears unremarkable  The lungs are clear  No pneumothorax or pleural effusion  Stable bony structures  No acute osseous abnormality        Impression No acute cardiopulmonary disease  Workstation performed: [id number]       Results for orders placed during the hospital encounter of 11/22/18   XR chest 2 views    Narrative CHEST     INDICATION:   Patient s/p Pacemaker/ICD Insertion  COMPARISON:  11/30/2018  EXAM PERFORMED/VIEWS:  XR CHEST PA & LATERAL      FINDINGS:  Left-sided chest wall pacemaker is identified  Pacemaker leads are intact  Cardiomediastinal silhouette appears unremarkable  The lungs are clear  No pneumothorax  Unchanged small bilateral pleural effusions  Orthopedic hardware again seen in the left clavicle  Visualized osseous structures appear otherwise unremarkable for the patient's age  Impression No pneumothorax  Stable small bilateral pleural effusions          Workstation performed: [id number]         Last 24 Hours Medication List:     Current Facility-Administered Medications:  acetaminophen 650 mg Oral Q6H PRN Cassius Sherman DPM    aspirin 81 mg Oral Daily AMI MarcusM    atorvastatin 40 mg Oral Daily With [employer], DPM    bacitracin 100,000 units and neomycin-polymyxin B () 1 mL in sodium chloride 0 9% 1000 mL irrigation bag  Irrigation Once Cassius Sherman DPM    calcium carbonate 500 mg Oral Daily PRN Cassius Sherman DPM    cefepime 1,000 mg Intravenous Q24H Cassius Sherman DPM Last Rate: Stopped (07/26/20 0100)   diphenhydrAMINE 25 mg Intravenous Q6H PRN Cassius Sherman DPM    docusate sodium 100 mg Oral BID Cassius Sherman DPM    folic acid 1 mg Oral Daily Cassius Sherman DPM    HYDROcodone-acetaminophen 1 tablet Oral Q4H PRN Cassius Sherman DPM    HYDROcodone-acetaminophen 2 tablet Oral Q4H PRN Cassius Sherman DPM    HYDROmorphone 0 5 mg Intravenous Q2H PRN Cassius Sherman DPM    LORazepam 1 mg Oral Q8H PRN Cassius Sherman DPM    pantoprazole 40 mg Oral Early Morning Cassius Sherman DPM    polyethylene glycol 17 g Oral BID Cassius Sherman DPM    tamsulosin 0 4 mg Oral Daily With [employer], DPM    vancomycin 1,500 mg Intravenous Q24H Chloe Vlaverde Ava Hoffman DPM Last Rate: 1,500 mg (07/25/20 1800)        Today, Patient Was Seen By: Melinda Joya MD    ** Please Note: Dictation voice to text software may have been used in the creation of this document   **

## 2020-07-26 NOTE — ASSESSMENT & PLAN NOTE
Patient presented with severe symptomatic normocytic anemia 6 7 causing shortness of breath on exertion  He was taking NSAIDs for chronic right lower leg ulcers that were getting worse  Patient was transfused packed red blood cells during the hospital stay  Hemoglobin currently is 8 2 this morning  Monitor H&H and transfuse as needed  Continue Protonix b i d ,  Abnormal CT scan the abdomen pelvis showing thickening of the gastric antrum likely gastritis but unable to exclude gastric neoplasm  Patient received another 1 unit of packed red cell transfusion on July 20  GI follow-up needed  Patient will need eventually endoscopic workup  Refusing at this time and wants his abdominal issues to be addressed first   GI recommended Hematology evaluation  Hematology consult appreciated  Possible myelodysplastic syndrome/multiple myeloma  IgG kappa with a ratio of light chains over 10 which are suggestive of multiple myeloma  They recommended bone marrow biopsy  IR consult placed by them and forms are filled  Patient at this time refused bone marrow biopsy as he wants to focus on other medical problems  Agreeable to undergo bone marrow biopsy on Monday    Will place patient NPO after midnight  They recommended to transfuse to keep CBC above 8  Patient received 1 unit of packed red cell transfusion on 07/23/2020  Monitor H&H  Discussed with patient regarding above and he understands the need for bone marrow biopsy and his overall prognosis

## 2020-07-26 NOTE — NURSING NOTE
PCA attempted to bladder scan pt  Pt  refused bladder scan stating " I can't do it because of nerve pain"  Will reassess in a hour  WTCM

## 2020-07-26 NOTE — PROGRESS NOTES
Progress Note - Podiatry  Jose Luis Shoulder 69 y o  male MRN: [id number]  Unit/Bed#: -01 Encounter: [id number]    Assessment/Plan:  Cellulitis right leg  Skin ulceration right leg fat layer exposed  PVD/Atherosclerosis of native artery right leg with ulceration   -POD #2 S/P extensive debridement right leg wounds              -S/P repair umbilical hernia progressing satisfactorily per general surger   -patient scheduled for bone marrow biopsy tomorrow    -dressing intact to right leg, no strike through drainage noted  Next change scheduled for tomorrow    -minimal bleeding during procedure yesterday is concerning for healing potential   Lactic acidosis, necrotic umbilical wound, chronic kidney disease stage 3, anemia, and AFib              -managed per Internal Medicine, general surgery, and nephrology    Subjective/Objective   Chief Complaint:   Chief Complaint   Patient presents with    Wound Infection - Complicated     Pt with leg wounds x 2 months, here for dyspnea, weakness and feeling ill   +n/+v  Subjective:  72-year-old white male seen sitting on side of bed eating lunch  Relates much less pain compared to when he was admitted from his leg  Denies any fever or shortness of breath  Blood pressure 127/68, pulse 80, temperature 98 °F (36 7 °C), resp  rate 16, height 5' 10" (1 778 m), weight 68 9 kg (151 lb 14 4 oz), SpO2 99 %  ,Body mass index is 21 79 kg/m²      Invasive Devices     Peripheral Intravenous Line            Peripheral IV 07/25/20 Left Forearm less than 1 day                Physical Exam:   General appearance: alert, cooperative and with pain right leg  Neuro/Vascular: Normal strength  Sensation and reflexes:  Grossly intact  Pulses: Right: DP 0/4, PT 0/4, Left: DP 0/4, PT 0/4  Capillary Filling:  3 Sec, Edema:  +1 right leg, none left  Vascular calcifications noted on tib-fib x-ray RLE  Arteriogram 7/20/2020:  · RLE: CFA: Patent, Profunda femoris: Patent   SFA: Proximal portion is patent, normal caliber  High-grade (greater than 80%) 1 cm focal stenosis mid SFA  Moderate grade tandem distal stenoses and in particular 60% stenosis at the abductor canal    Popliteal artery: Patent, noting 60% focal stenosis in the above-the-knee segment  Runoff: Peroneal runoff to the foot with distally reconstituted posterior tibial artery filling of the plantar arch  Chronically occluded anterior tibial artery  Proximal posterior tibial artery is patent and then occludes  · LLE:    CFA: Patent Profunda femoris: Proximal portion is patent   SFA: Proximal portion is patent  · POST INTERVENTION FINDINGS:   1  Significant luminal gain of multifocal right SFA and popliteal artery stenoses following a combination of 6 mm     and 7 mm POBA and DCB  2   Refractory proximal R SFA (30%)  stenosis  Considered stent placement; however, patient was quite     uncooperative with movement and asked multiple times for the procedure to be terminated  3   No change in tibial runoff  4   Left common femoral artery access, successfully closed with a V[place]  · IMPRESSION:  Technically successful multifocal balloon angioplasty of multiple stenoses throughout the right superficial femoral and popliteal arteries  Patient has significant right tibial disease with in-line flow into the peroneal artery and distal reconstituted posterior tibial artery  Could consider tibial intervention in the future  Would recommend anesthesia support given patient inability to lay still  Arterial duplex 7/21/2020:  · RIGHT LOWER LIMB   Ankle/Brachial Index: 1 49 which is in the unreliable range   PPG/PVR Tracings are slightly dampened  Biphasic waveforms at the ankle  Metatarsal Pressure 147mmHg   Great Toe Pressure: unable to obtain due to patient's foot movements  · LEFT LOWER LIMB   Ankle/Brachial Index: 1 27 which is in the normal range   PPG/PVR Tracings are slightly dampened    Triphasic waveforms at the ankle    Metatarsal Pressure 73mmHg   Great Toe Pressure: 51mmHg, within the healing range        Extremity: Several large ulcerations present on the medial, anterior, and posterior lateral aspect of the right lower leg with fat layer exposed  Bandage intact and secure from application yesterday with no strike through drainage noted  Diminished erythema, calor, and edema of right leg noted  Labs and other studies:   CBC w/diff  Results from last 7 days   Lab Units 07/26/20  0553  07/21/20  0838   WBC Thousand/uL 3 23*   < > 6 30   HEMOGLOBIN g/dL 8 2*   < > 8 7*   HEMATOCRIT % 25 0*   < > 26 1*   PLATELETS Thousands/uL 53*   < > 52*   NEUTROS PCT %  --   --  85*   LYMPHS PCT %  --   --  9*   LYMPHO PCT % 14   < >  --    MONOS PCT %  --   --  4   MONO PCT % 3*   < >  --    EOS PCT % 0   < > 0    < > = values in this interval not displayed  BMP  Results from last 7 days   Lab Units 07/26/20  0553   POTASSIUM mmol/L 4 8   CHLORIDE mmol/L 106   CO2 mmol/L 20*   BUN mg/dL 28*   CREATININE mg/dL 1 36*   CALCIUM mg/dL 8 2*     CMP  Results from last 7 days   Lab Units 07/26/20  0553  07/23/20  0552   POTASSIUM mmol/L 4 8   < > 4 6   CHLORIDE mmol/L 106   < > 104   CO2 mmol/L 20*   < > 21   BUN mg/dL 28*   < > 39*   CREATININE mg/dL 1 36*   < > 1 80*   CALCIUM mg/dL 8 2*   < > 8 2*   ALK PHOS U/L  --   --  81   ALT U/L  --   --  26   AST U/L  --   --  24    < > = values in this interval not displayed  @Culture@  Lab Results   Component Value Date    BLOODCX No Growth After 5 Days  07/16/2020    BLOODCX No Growth After 5 Days  07/16/2020    BLOODCX No Growth After 5 Days  05/02/2017    BLOODCX No Growth After 5 Days   05/02/2017    URINECX >100,000 cfu/ml Klebsiella oxytoca (A) 02/13/2019    URINECX >100,000 cfu/ml Klebsiella oxytoca (A) 01/07/2019         Current Facility-Administered Medications:     acetaminophen (TYLENOL) tablet 650 mg, 650 mg, Oral, Q6H PRN, Arther Fees, DPM, 650 mg at 07/26/20 0201    aspirin chewable tablet 81 mg, 81 mg, Oral, Daily, Ave Bone, DPM, 81 mg at 07/26/20 2839    atorvastatin (LIPITOR) tablet 40 mg, 40 mg, Oral, Daily With Bryn Bull, DPM, 40 mg at 07/25/20 1607    bacitracin 100,000 Units, neomycin-polymyxin B (NEOSPORIN-) 1 mL in sodium chloride 0 9 % 1,000 mL irrigation bag, , Irrigation, Once, Ave Bone, DPM    calcium carbonate (TUMS) chewable tablet 500 mg, 500 mg, Oral, Daily PRN, Ave Bone, DPM    cefepime (MAXIPIME) IVPB (premix) 1,000 mg 50 mL, 1,000 mg, Intravenous, Q24H, Ave Bone, DPM, Stopped at 07/26/20 0100    diphenhydrAMINE (BENADRYL) injection 25 mg, 25 mg, Intravenous, Q6H PRN, Ave Bone, DPM, 25 mg at 07/23/20 1927    docusate sodium (COLACE) capsule 100 mg, 100 mg, Oral, BID, Ave Bone, DPM, 100 mg at 81/20/28 5323    folic acid (FOLVITE) tablet 1 mg, 1 mg, Oral, Daily, Ave Bone, DPM, 1 mg at 07/26/20 0842    HYDROcodone-acetaminophen (NORCO) 5-325 mg per tablet 1 tablet, 1 tablet, Oral, Q4H PRN, Ave Bone, DPM, 1 tablet at 07/25/20 2156    HYDROcodone-acetaminophen (NORCO) 5-325 mg per tablet 2 tablet, 2 tablet, Oral, Q4H PRN, Ave Bone, DPM, 2 tablet at 07/26/20 0842    HYDROmorphone (DILAUDID) injection 0 5 mg, 0 5 mg, Intravenous, Q2H PRN, Ave Bone, DPM    LORazepam (ATIVAN) tablet 1 mg, 1 mg, Oral, Q8H PRN, Ave Bone, DPM, 1 mg at 07/26/20 1234    pantoprazole (PROTONIX) EC tablet 40 mg, 40 mg, Oral, Early Morning, Ave Bone, DPM, 40 mg at 07/26/20 0540    polyethylene glycol (MIRALAX) packet 17 g, 17 g, Oral, BID, Ave Bone, DPM, 17 g at 07/26/20 1409    tamsulosin (FLOMAX) capsule 0 4 mg, 0 4 mg, Oral, Daily With Dinner, Ave Bone, DPM, 0 4 mg at 07/25/20 1607    vancomycin (VANCOCIN) 1,500 mg in sodium chloride 0 9 % 250 mL IVPB, 1,500 mg, Intravenous, Q24H, Ave Bone, DPM, Last Rate: 166 7 mL/hr at 07/25/20 1800, 1,500 mg at 07/25/20 1800    Imaging: I have personally reviewed pertinent films in PACS  EKG, Pathology, and Other Studies: I have personally reviewed pertinent reports    VTE Pharmacologic Prophylaxis: Heparin  VTE Mechanical Prophylaxis: reason for no mechanical VTE prophylaxis Wounds right lower extremity    Yony Arias DPM

## 2020-07-26 NOTE — ASSESSMENT & PLAN NOTE
Patient has stage III chronic disease with creatinine around 2 0 at baseline  Patient's renal function is at baseline:  Creatinine is 1 36 today  Nephrology on board  Monitor renal function in a m

## 2020-07-26 NOTE — PLAN OF CARE
Problem: Potential for Falls  Goal: Patient will remain free of falls  Description  INTERVENTIONS:  - Assess patient frequently for physical needs  -  Identify cognitive and physical deficits and behaviors that affect risk of falls  -  [place] fall precautions as indicated by assessment   - Educate patient/family on patient safety including physical limitations  - Instruct patient to call for assistance with activity based on assessment  - Modify environment to reduce risk of injury  - Consider OT/PT consult to assist with strengthening/mobility  Outcome: Progressing     Problem: Prexisting or High Potential for Compromised Skin Integrity  Goal: Skin integrity is maintained or improved  Description  INTERVENTIONS:  - Identify patients at risk for skin breakdown  - Assess and monitor skin integrity  - Assess and monitor nutrition and hydration status  - Monitor labs   - Assess for incontinence   - Turn and reposition patient  - Assist with mobility/ambulation  - Relieve pressure over bony prominences  - Avoid friction and shearing  - Provide appropriate hygiene as needed including keeping skin clean and dry  - Evaluate need for skin moisturizer/barrier cream  - Collaborate with interdisciplinary team   - Patient/family teaching  - Consider wound care consult   Outcome: Progressing     Problem: Nutrition/Hydration-ADULT  Goal: Nutrient/Hydration intake appropriate for improving, restoring or maintaining nutritional needs  Description  Monitor and assess patient's nutrition/hydration status for malnutrition  Collaborate with interdisciplinary team and initiate plan and interventions as ordered  Monitor patient's weight and dietary intake as ordered or per policy  Utilize nutrition screening tool and intervene as necessary  Determine patient's food preferences and provide high-protein, high-caloric foods as appropriate       INTERVENTIONS:  - Monitor oral intake, urinary output, labs, and treatment plans  - Assess nutrition and hydration status and recommend course of action  - Evaluate amount of meals eaten  - Assist patient with eating if necessary   - Allow adequate time for meals  - Recommend/ encourage appropriate diets, oral nutritional supplements, and vitamin/mineral supplements  - Order, calculate, and assess calorie counts as needed  - Recommend, monitor, and adjust tube feedings and TPN/PPN based on assessed needs  - Assess need for intravenous fluids  - Provide specific nutrition/hydration education as appropriate  - Include patient/family/caregiver in decisions related to nutrition  Outcome: Progressing     Problem: NEUROSENSORY - ADULT  Goal: Achieves maximal functionality and self care  Description  INTERVENTIONS  - Monitor swallowing and airway patency with patient fatigue and changes in neurological status  - Encourage and assist patient to increase activity and self care     - Encourage visually impaired, hearing impaired and aphasic patients to use assistive/communication devices  Outcome: Progressing     Problem: GASTROINTESTINAL - ADULT  Goal: Minimal or absence of nausea and/or vomiting  Description  INTERVENTIONS:  - Administer IV fluids if ordered to ensure adequate hydration  - Maintain NPO status until nausea and vomiting are resolved  - Nasogastric tube if ordered  - Administer ordered antiemetic medications as needed  - Provide nonpharmacologic comfort measures as appropriate  - Advance diet as tolerated, if ordered  - Consider nutrition services referral to assist patient with adequate nutrition and appropriate food choices  Outcome: Progressing  Goal: Maintains adequate nutritional intake  Description  INTERVENTIONS:  - Monitor percentage of each meal consumed  - Identify factors contributing to decreased intake, treat as appropriate  - Assist with meals as needed  - Monitor I&O, weight, and lab values if indicated  - Obtain nutrition services referral as needed  Outcome: Progressing     Problem: METABOLIC, FLUID AND ELECTROLYTES - ADULT  Goal: Electrolytes maintained within normal limits  Description  INTERVENTIONS:  - Monitor labs and assess patient for signs and symptoms of electrolyte imbalances  - Administer electrolyte replacement as ordered  - Monitor response to electrolyte replacements, including repeat lab results as appropriate  - Instruct patient on fluid and nutrition as appropriate  Outcome: Progressing  Goal: Fluid balance maintained  Description  INTERVENTIONS:  - Monitor labs   - Monitor I/O and WT  - Instruct patient on fluid and nutrition as appropriate  - Assess for signs & symptoms of volume excess or deficit  Outcome: Progressing     Problem: SKIN/TISSUE INTEGRITY - ADULT  Goal: Skin integrity remains intact  Description  INTERVENTIONS  - Identify patients at risk for skin breakdown  - Assess and monitor skin integrity  - Assess and monitor nutrition and hydration status  - Monitor labs (i e  albumin)  - Assess for incontinence   - Turn and reposition patient  - Assist with mobility/ambulation  - Relieve pressure over bony prominences  - Avoid friction and shearing  - Provide appropriate hygiene as needed including keeping skin clean and dry  - Evaluate need for skin moisturizer/barrier cream  - Collaborate with interdisciplinary team (i e  Nutrition, Rehabilitation, etc )   - Patient/family teaching  Outcome: Progressing  Goal: Incision(s), wounds(s) or drain site(s) healing without S/S of infection  Description  INTERVENTIONS  - Assess and document risk factors for skin impairment   - Assess and document dressing, incision, wound bed, drain sites and surrounding tissue  - Consider nutrition services referral as needed  - Oral mucous membranes remain intact  - Provide patient/ family education  Outcome: Progressing     Problem: HEMATOLOGIC - ADULT  Goal: Maintains hematologic stability  Description  INTERVENTIONS  - Assess for signs and symptoms of bleeding or hemorrhage  - Monitor labs  - Administer supportive blood products/factors as ordered and appropriate  Outcome: Progressing     Problem: MUSCULOSKELETAL - ADULT  Goal: Maintain or return mobility to safest level of function  Description  INTERVENTIONS:  - Assess patient's ability to carry out ADLs; assess patient's baseline for ADL function and identify physical deficits which impact ability to perform ADLs (bathing, care of mouth/teeth, toileting, grooming, dressing, etc )  - Assess/evaluate cause of self-care deficits   - Assess range of motion  - Assess patient's mobility  - Assess patient's need for assistive devices and provide as appropriate  - Encourage maximum independence but intervene and supervise when necessary  - Involve family in performance of ADLs  - Assess for home care needs following discharge   - Consider OT consult to assist with ADL evaluation and planning for discharge  - Provide patient education as appropriate  Outcome: Progressing     Problem: PAIN - ADULT  Goal: Verbalizes/displays adequate comfort level or baseline comfort level  Description  Interventions:  - Encourage patient to monitor pain and request assistance  - Assess pain using appropriate pain scale  - Administer analgesics based on type and severity of pain and evaluate response  - Implement non-pharmacological measures as appropriate and evaluate response  - Consider cultural and social influences on pain and pain management  - Notify physician/advanced practitioner if interventions unsuccessful or patient reports new pain  Outcome: Progressing     Problem: INFECTION - ADULT  Goal: Absence or prevention of progression during hospitalization  Description  INTERVENTIONS:  - Assess and monitor for signs and symptoms of infection  - Monitor lab/diagnostic results  - Monitor all insertion sites, i e  indwelling lines, tubes, and drains  - Monitor endotracheal if appropriate and nasal secretions for changes in amount and color  - [place] appropriate cooling/warming therapies per order  - Administer medications as ordered  - Instruct and encourage patient and family to use good hand hygiene technique  - Identify and instruct in appropriate isolation precautions for identified infection/condition  Outcome: Progressing  Goal: Absence of fever/infection during neutropenic period  Description  INTERVENTIONS:  - Monitor WBC    Outcome: Progressing     Problem: SAFETY ADULT  Goal: Patient will remain free of falls  Description  INTERVENTIONS:  - Assess patient frequently for physical needs  -  Identify cognitive and physical deficits and behaviors that affect risk of falls  -  [place] fall precautions as indicated by assessment   - Educate patient/family on patient safety including physical limitations  - Instruct patient to call for assistance with activity based on assessment  - Modify environment to reduce risk of injury  - Consider OT/PT consult to assist with strengthening/mobility  Outcome: Progressing     Problem: DISCHARGE PLANNING  Goal: Discharge to home or other facility with appropriate resources  Description  INTERVENTIONS:  - Identify barriers to discharge w/patient and caregiver  - Arrange for needed discharge resources and transportation as appropriate  - Identify discharge learning needs (meds, wound care, etc )  - Arrange for interpretive services to assist at discharge as needed  - Refer to Case Management Department for coordinating discharge planning if the patient needs post-hospital services based on physician/advanced practitioner order or complex needs related to functional status, cognitive ability, or social support system  Outcome: Progressing     Problem: Knowledge Deficit  Goal: Patient/family/caregiver demonstrates understanding of disease process, treatment plan, medications, and discharge instructions  Description  Complete learning assessment and assess knowledge base    Interventions:  - Provide teaching at level of understanding  - Provide teaching via preferred learning methods  Outcome: Progressing

## 2020-07-26 NOTE — PROGRESS NOTES
NEPHROLOGY PROGRESS NOTE   Odette Quinteros 69 y o  male MRN: [id number]  Unit/Bed#: -01 Encounter: [id number]      ASSESSMENT & PLAN:  1  Acute kidney injury on top of CKD stage 3  Admitted with a creatinine of 2 21, renal function improving with creatinine down to 1 36 today  Noted previous creatinine in the past in the mid 1s, the early this year once his creatinine was around 2 0  Suspected multifactorial in the setting of anemia, hypertension, prior NSAID and ACE inhibitor use, possible multiple myeloma  Awaiting bone marrow biopsy  Avoid hypotension  Follow serial labs  Avoid nephrotoxins including NSAIDs    2  Hyponatremia, stable at 135  Note that prior serum osmolality was elevated suggestive of pseudohyponatremia likely related with paraproteinemia/multiple myeloma    3  Hemodynamics, blood pressure stable, keep holding ACE-inhibitor  4  Pancytopenia, chronic ITP, Hematology on board, plan for bone marrow biopsy, patient agrees with go marrow biopsy tomorrow  5  Umbilical wound infection with exposed mesh an abscess status post wound exploration explantation of mesh and primary repair of umbilical hernia  Postoperative management per surgery team     6  Urinary retention, continue with straight cath as needed    My plan and recommendation were discussed with Dr Art Avila from Internal Medicine team      SUBJECTIVE:  Patient seen and examined, feeling better than yesterday  Denies any chest pain, shortness of breath, no nausea, no vomiting, no abdominal pain    Agreeable with bone marrow biopsy tomorrow    OBJECTIVE:  Current Weight: Weight - Scale: 68 9 kg (151 lb 14 4 oz)  Vitals:    07/26/20 0739   BP: 127/68   Pulse: 80   Resp: 16   Temp: 98 °F (36 7 °C)   SpO2: 99%       Intake/Output Summary (Last 24 hours) at 7/26/2020 1345  Last data filed at 7/26/2020 1300  Gross per 24 hour   Intake 300 ml   Output 625 ml   Net -325 ml     General: conscious, cooperative, in not acute distress  Eyes: conjunctivae pale, anicteric sclerae  ENT: lips and mucous membranes moist  Neck: supple, no JVD  Chest: clear breath sounds bilateral, no crackles, ronchus or wheezings  CVS: distinct S1 & S2, normal rate, regular rhythm  Abdomen: soft, non-tender, non-distended, normoactive bowel sounds  Extremities:  Minimal edema of both legs  Skin: no rash, chronic venous stasis changes in lower extremities, right lower extremity covered with dressing  Neuro: awake, alert, oriented        Medications:    Current Facility-Administered Medications:     acetaminophen (TYLENOL) tablet 650 mg, 650 mg, Oral, Q6H PRN, Redge Gavia, DPM, 650 mg at 07/26/20 0201    aspirin chewable tablet 81 mg, 81 mg, Oral, Daily, Redge Gavia, DPM, 81 mg at 07/26/20 8585    atorvastatin (LIPITOR) tablet 40 mg, 40 mg, Oral, Daily With Cleven Eth, DPM, 40 mg at 07/25/20 1607    bacitracin 100,000 Units, neomycin-polymyxin B (NEOSPORIN-) 1 mL in sodium chloride 0 9 % 1,000 mL irrigation bag, , Irrigation, Once, Redge Gavia, DPM    calcium carbonate (TUMS) chewable tablet 500 mg, 500 mg, Oral, Daily PRN, Redge Gavia, DPM    cefepime (MAXIPIME) IVPB (premix) 1,000 mg 50 mL, 1,000 mg, Intravenous, Q24H, Redge Gavia, DPM, Stopped at 07/26/20 0100    diphenhydrAMINE (BENADRYL) injection 25 mg, 25 mg, Intravenous, Q6H PRN, Redge Gavia, DPM, 25 mg at 07/23/20 1927    docusate sodium (COLACE) capsule 100 mg, 100 mg, Oral, BID, Redge Gavia, DPM, 100 mg at 67/58/62 9423    folic acid (FOLVITE) tablet 1 mg, 1 mg, Oral, Daily, Redge Gavia, DPM, 1 mg at 07/26/20 0842    HYDROcodone-acetaminophen (NORCO) 5-325 mg per tablet 1 tablet, 1 tablet, Oral, Q4H PRN, Redge Gavia, DPM, 1 tablet at 07/25/20 2156    HYDROcodone-acetaminophen (NORCO) 5-325 mg per tablet 2 tablet, 2 tablet, Oral, Q4H PRN, AMI EtienneM, 2 tablet at 07/26/20 0842    HYDROmorphone (DILAUDID) injection 0 5 mg, 0 5 mg, Intravenous, Q2H PRN, Whit Díaz DPM    LORazepam (ATIVAN) tablet 1 mg, 1 mg, Oral, Q8H PRN, Ave Bone, DPM, 1 mg at 07/26/20 1234    pantoprazole (PROTONIX) EC tablet 40 mg, 40 mg, Oral, Early Morning, Ave Bone, DPM, 40 mg at 07/26/20 0540    polyethylene glycol (MIRALAX) packet 17 g, 17 g, Oral, BID, Ave Bone, DPM, 17 g at 07/26/20 9684    tamsulosin (FLOMAX) capsule 0 4 mg, 0 4 mg, Oral, Daily With Bryn Bull, DPM, 0 4 mg at 07/25/20 1607    vancomycin (VANCOCIN) 1,500 mg in sodium chloride 0 9 % 250 mL IVPB, 1,500 mg, Intravenous, Q24H, Ave Bone, DPM, Last Rate: 166 7 mL/hr at 07/25/20 1800, 1,500 mg at 07/25/20 1800    Invasive Devices:        Lab Results:   Results from last 7 days   Lab Units 07/26/20  0553 07/25/20  1715 07/25/20  0607  07/24/20  0456  07/23/20  0552  07/21/20  0839   WBC Thousand/uL 3 23*  --  3 33*  --  4 11*   < >  --    < >  --    HEMOGLOBIN g/dL 8 2* 7 6* 8 2*   < > 8 4*   < >  --    < >  --    HEMATOCRIT % 25 0* 22 8* 25 1*   < > 25 9*   < >  --    < >  --    PLATELETS Thousands/uL 53*  --  56*  --  69*   < >  --    < >  --    SODIUM mmol/L 135*  --  135*  --  137  --  134*   < > 135*   POTASSIUM mmol/L 4 8  --  4 6  --  4 9  --  4 6   < > 4 9   CHLORIDE mmol/L 106  --  104  --  105  --  104   < > 105   CO2 mmol/L 20*  --  24  --  24  --  21   < > 20*   BUN mg/dL 28*  --  35*  --  36*  --  39*   < > 42*   CREATININE mg/dL 1 36*  --  1 69*  --  1 82*  --  1 80*   < > 1 77*   CALCIUM mg/dL 8 2*  --  8 2*  --  8 4  --  8 2*   < > 8 5   MAGNESIUM mg/dL  --   --   --   --   --   --  2 4  --  2 3   PHOSPHORUS mg/dL  --   --   --   --   --   --   --   --  3 5   ALK PHOS U/L  --   --   --   --   --   --  81  --  100   ALT U/L  --   --   --   --   --   --  26  --  33   AST U/L  --   --   --   --   --   --  24  --  36    < > = values in this interval not displayed  Portions of the record may have been created with voice recognition software   Occasional wrong word or "sound a like" substitutions may have occurred due to the inherent limitations of voice recognition software  Read the chart carefully and recognize, using context, where substitutions have occurred  If you have any questions, please contact the dictating provider

## 2020-07-26 NOTE — ASSESSMENT & PLAN NOTE
Wt Readings from Last 3 Encounters:   07/26/20 68 9 kg (151 lb 14 4 oz)   06/21/19 68 3 kg (150 lb 9 6 oz)   06/19/19 67 6 kg (149 lb)     Echocardiogram on this admission shows ejection fraction of 50%  Patient's lungs are clear, has no JVD  Has chronic systolic CHF is compensated    Continue to hold lisinopril, Lasix and Toprol because of relative hypotension  Daily weight and I&Os

## 2020-07-26 NOTE — PROGRESS NOTES
Progress note - Gastroenterology   Michael Calvo 69 y o  male MRN: [id number]  Unit/Bed#: -01 Encounter: [id number]    ASSESSMENT and PLAN    1  Anemia - no signs or symptoms of GI blood loss   Hemoglobin 6 7gms on admission, stable after transfusion  Iron panel consistent with chronic disease  Appreciate Hematology input, patient agrees to a bone marrow biopsy Monday  He continues to refuse EGD/colonoscopy     2  Abnormal CT   Gastric thickening consistent with gastritis, radiologist recommended direct visualization rule out malignancy  Patient is still hesitant to undergo EGD  Will readdress after bone marrow biopsy  Continue empiric PPI in the interim      3  Umbilical wound with abscess and protruding mesh   S/P surgical repair 7/21     4  Constipation  Continue MiraLax     Chief Complaint   Patient presents with    Wound Infection - Complicated     Pt with leg wounds x 2 months, here for dyspnea, weakness and feeling ill   +n/+v  SUBJECTIVE/HPI   Tolerating lunch without GI complaints  Denies nausea, vomiting or dysphagia      /68   Pulse 80   Temp 98 °F (36 7 °C)   Resp 16   Ht 5' 10" (1 778 m)   Wt 68 9 kg (151 lb 14 4 oz)   SpO2 99%   BMI 21 79 kg/m²     PHYSICALEXAM  General appearance: alert, appears stated age and cooperative  Eyes: PERLLA, EOMI, no icterus   Head: Normocephalic, without obvious abnormality, atraumatic  Lungs: clear to auscultation bilaterally  Heart: regular rate and rhythm, S1, S2 normal, no murmur, click, rub or gallop  Abdomen: soft, non-tender; bowel sounds normal; no masses,  no organomegaly  Extremities: extremities normal, atraumatic, no cyanosis or edema  Neurologic: Grossly normal    Lab Results   Component Value Date    GLUCOSE 127 11/20/2018    CALCIUM 8 2 (L) 07/26/2020    K 4 8 07/26/2020    CO2 20 (L) 07/26/2020     07/26/2020    BUN 28 (H) 07/26/2020    CREATININE 1 36 (H) 07/26/2020     Lab Results   Component Value Date    WBC 3 23 (L) 07/26/2020    HGB 8 2 (L) 07/26/2020    HCT 25 0 (L) 07/26/2020    MCV 95 07/26/2020    PLT 53 (L) 07/26/2020     Lab Results   Component Value Date    ALT 26 07/23/2020    AST 24 07/23/2020    ALKPHOS 81 07/23/2020     No results found for: AMYLASE  Lab Results   Component Value Date    LIPASE 255 07/16/2020     Lab Results   Component Value Date    IRON 229 (H) 07/16/2020    TIBC 285 07/16/2020    FERRITIN 303 07/16/2020     Lab Results   Component Value Date    INR 1 46 (H) 07/16/2020

## 2020-07-27 NOTE — PROGRESS NOTES
NEPHROLOGY PROGRESS NOTE   Angella Pearson 69 y o  male MRN: [id number]  Unit/Bed#: -01 Encounter: [id number]  Reason for Consult:  PALOMA on CKD 3    ASSESSMENT/PLAN:  PALOMA(POA) on CKD III:  Likely multifactorial with hypertension, anemia, NSAID use, Ace inhibition, +/- urinary retention, and possible multiple myeloma  -presented with creatinine of 2 21   -baseline creatinine previously in the mid 1s   -creatinine stable at 1 3   -for bone marrow biopsy later today   -continue to hold ACE-inhibitor  -UA was bland  -UPEP negative  -SPEP + for IgG kappa  -elevated FLC ratio  -CT shows small right renal cyst, bilateral extrarenal pelvis dilatation left greater than right  Negative for hydro   -continue to avoid episodes of hypotension, nephrotoxins, and IV contrast   -check bladder scan Q shift with urinary retention protocol   -I/O  Hyponatremia: (resolved) likely paraproteinemia   -will continue to monitor  Mild acidosis:  -will continue to monitor  Consider oral bicarbonate of bicarb level drops further  Hypertension: overall stable  -Ace inhibitor remains on hold   -avoid hypotension or high fluctuations in blood pressure   -recommend holding parameter on antihypertensive for systolic blood pressure less than 130  Umbilical wound section:  Exposed mesh in abscess  Status post wound exploration and primary repair of umbilical hernia  -surgery team following   -local dressing changes   -continues on antibiotics per ID  Urinary retention:  Reports intermittent self catheterization at home   -patient refusing intermittent catheterization here   -continue Q shift bladder scans with urinary retention protocol  Pancytopenia:  Status post several infusions   -for bone marrow biopsy today at 11:30 a m  Derrick Hawley -hematology and GI following   -refusing EGD/colonoscopy  Right lower extremity cellulitis/skin ulceration:  Postop day 3 of debridement   -abx per ID      Disposition:  Requiring additional stay due to medical needs  SUBJECTIVE:  The patient is sitting up in bed  He denies chest or shortness of breath  He states he overall feels weak  He is currently NPO for bone marrow biopsy later today      OBJECTIVE:  Current Weight: Weight - Scale: 64 4 kg (142 lb)  Vitals:    07/26/20 2310 07/26/20 2310 07/27/20 0600 07/27/20 0733   BP: 105/62 105/62  106/61   BP Location:  Right arm     Pulse: 85 81  82   Resp:  18  16   Temp: 98 3 °F (36 8 °C) 98 3 °F (36 8 °C)  97 7 °F (36 5 °C)   TempSrc:  Oral     SpO2: 100% 100%  99%   Weight:   64 4 kg (142 lb)    Height:           Intake/Output Summary (Last 24 hours) at 7/27/2020 1114  Last data filed at 7/27/2020 0418  Gross per 24 hour   Intake 120 ml   Output 650 ml   Net -530 ml     General: NAD  Skin: warm, dry, intact, no rash  HEENT: Moist mucous membranes, sclera anicteric, normocephalic, atraumatic  Neck: No apparent JVD appreciated  Chest: lung sounds clear B/L, on RA   CVS:Regular rate and rhythm, no murmer   Abdomen: Soft, round, non-tender, +BS  Extremities: No B/L LE edema present  Neuro: alert and oriented  Psych: appropriate mood and affect     Medications:    Current Facility-Administered Medications:     acetaminophen (TYLENOL) tablet 650 mg, 650 mg, Oral, Q6H PRN, Neida Edu, DPM, 650 mg at 07/26/20 0201    aspirin chewable tablet 81 mg, 81 mg, Oral, Daily, Neida Edu, DPM, Stopped at 07/27/20 0859    atorvastatin (LIPITOR) tablet 40 mg, 40 mg, Oral, Daily With Janelle Myriam, DPM, 40 mg at 07/26/20 1756    bacitracin 100,000 Units, neomycin-polymyxin B (NEOSPORIN-) 1 mL in sodium chloride 0 9 % 1,000 mL irrigation bag, , Irrigation, Once, Neida Edu, DPM    calcium carbonate (TUMS) chewable tablet 500 mg, 500 mg, Oral, Daily PRN, Neida Edu, DPM    cefepime (MAXIPIME) IVPB (premix) 1,000 mg 50 mL, 1,000 mg, Intravenous, Q24H, Neida Jones, DPM, Last Rate: 100 mL/hr at 07/26/20 2321, 1,000 mg at 07/26/20 2321   diphenhydrAMINE (BENADRYL) injection 25 mg, 25 mg, Intravenous, Q6H PRN, Umm Caraballo DPM, 25 mg at 07/23/20 1927    docusate sodium (COLACE) capsule 100 mg, 100 mg, Oral, BID, Umm Caraballo DPM, Stopped at 58/21/51 8747    folic acid (FOLVITE) tablet 1 mg, 1 mg, Oral, Daily, Umm Caraballo DPM, Stopped at 07/27/20 0900    HYDROcodone-acetaminophen (NORCO) 5-325 mg per tablet 1 tablet, 1 tablet, Oral, Q4H PRN, Umm Caraballo DPM, 1 tablet at 07/25/20 2156    HYDROcodone-acetaminophen (NORCO) 5-325 mg per tablet 2 tablet, 2 tablet, Oral, Q4H PRN, Umm Caraballo DPM, 2 tablet at 07/27/20 0905    HYDROmorphone (DILAUDID) injection 0 5 mg, 0 5 mg, Intravenous, Q2H PRN, Umm Caraballo DPM    LORazepam (ATIVAN) tablet 1 mg, 1 mg, Oral, Q8H PRN, Umm Caraballo DPM, 1 mg at 07/26/20 1234    pantoprazole (PROTONIX) EC tablet 40 mg, 40 mg, Oral, Early Morning, Umm Caraballo DPM, 40 mg at 07/26/20 0540    polyethylene glycol (MIRALAX) packet 17 g, 17 g, Oral, BID, Umm Caraballo DPM, Stopped at 07/27/20 0900    tamsulosin (FLOMAX) capsule 0 4 mg, 0 4 mg, Oral, Daily With Caitlin Paz DPM, 0 4 mg at 07/26/20 1757    vancomycin (VANCOCIN) 1,500 mg in sodium chloride 0 9 % 250 mL IVPB, 1,500 mg, Intravenous, Q24H, Umm Caraballo DPM, Last Rate: 166 7 mL/hr at 07/26/20 1756, 1,500 mg at 07/26/20 1756    Laboratory Results:  Results from last 7 days   Lab Units 07/27/20  0556 07/26/20  1840 07/26/20  0553  07/25/20  0607  07/23/20  0552  07/21/20  0839   WBC Thousand/uL 3 42*  --  3 23*  --  3 33*   < >  --    < >  --    HEMOGLOBIN g/dL 9 3* 8 5* 8 2*   < > 8 2*   < >  --    < >  --    HEMATOCRIT % 28 9* 25 8* 25 0*   < > 25 1*   < >  --    < >  --    PLATELETS Thousands/uL 59*  --  53*  --  56*   < >  --    < >  --    SODIUM mmol/L 137  --  135*  --  135*   < > 134*   < > 135*   POTASSIUM mmol/L 4 6  --  4 8  --  4 6   < > 4 6   < > 4 9   CHLORIDE mmol/L 106  --  106  --  104   < > 104   < > 105   CO2 mmol/L 20*  --  20*  --  24   < > 21 < > 20*   BUN mg/dL 27*  --  28*  --  35*   < > 39*   < > 42*   CREATININE mg/dL 1 31*  --  1 36*  --  1 69*   < > 1 80*   < > 1 77*   CALCIUM mg/dL 8 6  --  8 2*  --  8 2*   < > 8 2*   < > 8 5   MAGNESIUM mg/dL  --   --   --   --   --   --  2 4  --  2 3   PHOSPHORUS mg/dL  --   --   --   --   --   --   --   --  3 5   ALK PHOS U/L  --   --   --   --   --   --  81  --  100   ALT U/L  --   --   --   --   --   --  26  --  33   AST U/L  --   --   --   --   --   --  24  --  36    < > = values in this interval not displayed

## 2020-07-27 NOTE — PROGRESS NOTES
Progress Note - Podiatry  Fco Cronin 69 y o  male MRN: [id number]  Unit/Bed#: -01 Encounter: [id number]    Assessment/Plan:  Cellulitis right leg  Skin ulceration right leg fat layer exposed  PVD/Atherosclerosis of native artery right leg with ulceration   -POD #3 S/P extensive debridement right leg wounds              -S/P repair umbilical hernia progressing satisfactorily per general surgery   -patient scheduled for bone marrow biopsy today, and rescheduled for tomorrow       -dry sterile dressing with Adaptic/ABD applied right leg  Lactic acidosis, necrotic umbilical wound, chronic kidney disease stage 3, anemia, and AFib              -managed per Internal Medicine, general surgery, and nephrology    Subjective/Objective   Chief Complaint:   Chief Complaint   Patient presents with    Wound Infection - Complicated     Pt with leg wounds x 2 months, here for dyspnea, weakness and feeling ill   +n/+v  Subjective:  29-year-old white male seen resting in bed somewhat annoyed that his bone marrow biopsy has been postponed due to IR scheduling issues today  Relates much less pain compared to when he was admitted from his leg  Denies any fever or shortness of breath  Blood pressure 112/62, pulse 82, temperature 98 1 °F (36 7 °C), resp  rate 16, height 5' 10" (1 778 m), weight 64 4 kg (142 lb), SpO2 99 %  ,Body mass index is 20 37 kg/m²  Invasive Devices     Peripheral Intravenous Line            Peripheral IV 07/25/20 Left Forearm 2 days                Physical Exam:   General appearance: alert, cooperative and with pain right leg  Neuro/Vascular: Normal strength  Sensation and reflexes:  Grossly intact  Pulses: Right: DP 0/4, PT 0/4, Left: DP 0/4, PT 0/4  Capillary Filling:  3 Sec, Edema:  +1 right leg, none left  Vascular calcifications noted on tib-fib x-ray RLE  Arteriogram 7/20/2020:  · RLE: CFA: Patent, Profunda femoris: Patent   SFA: Proximal portion is patent, normal caliber  High-grade (greater than 80%) 1 cm focal stenosis mid SFA  Moderate grade tandem distal stenoses and in particular 60% stenosis at the abductor canal    Popliteal artery: Patent, noting 60% focal stenosis in the above-the-knee segment  Runoff: Peroneal runoff to the foot with distally reconstituted posterior tibial artery filling of the plantar arch  Chronically occluded anterior tibial artery  Proximal posterior tibial artery is patent and then occludes  · LLE:    CFA: Patent Profunda femoris: Proximal portion is patent   SFA: Proximal portion is patent  · POST INTERVENTION FINDINGS:   1  Significant luminal gain of multifocal right SFA and popliteal artery stenoses following a combination of 6 mm     and 7 mm POBA and DCB  2   Refractory proximal R SFA (30%)  stenosis  Considered stent placement; however, patient was quite     uncooperative with movement and asked multiple times for the procedure to be terminated  3   No change in tibial runoff  4   Left common femoral artery access, successfully closed with a Vascade  · IMPRESSION:  Technically successful multifocal balloon angioplasty of multiple stenoses throughout the right superficial femoral and popliteal arteries  Patient has significant right tibial disease with in-line flow into the peroneal artery and distal reconstituted posterior tibial artery  Could consider tibial intervention in the future  Would recommend anesthesia support given patient inability to lay still  Arterial duplex 7/21/2020:  · RIGHT LOWER LIMB   Ankle/Brachial Index: 1 49 which is in the unreliable range   PPG/PVR Tracings are slightly dampened  Biphasic waveforms at the ankle  Metatarsal Pressure 147mmHg   Great Toe Pressure: unable to obtain due to patient's foot movements  · LEFT LOWER LIMB   Ankle/Brachial Index: 1 27 which is in the normal range   PPG/PVR Tracings are slightly dampened  Triphasic waveforms at the ankle     Metatarsal Pressure 73mmHg   Great Toe Pressure: 51mmHg, within the healing range        Extremity: Several large ulcerations present on the medial, anterior, and posterior lateral aspect of the right lower leg with fat layer exposed  All wounds appear pink and healthy with no active necrosis noted  Only minimal strike through drainage noted on bandage today  Diminished erythema, calor, and edema of right leg noted  Labs and other studies:   CBC w/diff  Results from last 7 days   Lab Units 07/27/20  0556  07/21/20  0838   WBC Thousand/uL 3 42*   < > 6 30   HEMOGLOBIN g/dL 9 3*   < > 8 7*   HEMATOCRIT % 28 9*   < > 26 1*   PLATELETS Thousands/uL 59*   < > 52*   NEUTROS PCT %  --   --  85*   LYMPHS PCT %  --   --  9*   LYMPHO PCT % 12*   < >  --    MONOS PCT %  --   --  4   MONO PCT % 6   < >  --    EOS PCT % 0   < > 0    < > = values in this interval not displayed  BMP  Results from last 7 days   Lab Units 07/27/20  0556   POTASSIUM mmol/L 4 6   CHLORIDE mmol/L 106   CO2 mmol/L 20*   BUN mg/dL 27*   CREATININE mg/dL 1 31*   CALCIUM mg/dL 8 6     CMP  Results from last 7 days   Lab Units 07/27/20  0556  07/23/20  0552   POTASSIUM mmol/L 4 6   < > 4 6   CHLORIDE mmol/L 106   < > 104   CO2 mmol/L 20*   < > 21   BUN mg/dL 27*   < > 39*   CREATININE mg/dL 1 31*   < > 1 80*   CALCIUM mg/dL 8 6   < > 8 2*   ALK PHOS U/L  --   --  81   ALT U/L  --   --  26   AST U/L  --   --  24    < > = values in this interval not displayed  @Culture@  Lab Results   Component Value Date    BLOODCX No Growth After 5 Days  07/16/2020    BLOODCX No Growth After 5 Days  07/16/2020    BLOODCX No Growth After 5 Days  05/02/2017    BLOODCX No Growth After 5 Days   05/02/2017    URINECX >100,000 cfu/ml Klebsiella oxytoca (A) 02/13/2019    URINECX >100,000 cfu/ml Klebsiella oxytoca (A) 01/07/2019         Current Facility-Administered Medications:     acetaminophen (TYLENOL) tablet 650 mg, 650 mg, Oral, Q6H PRN, Manuela Fend, DPM, 650 mg at 07/26/20 0201    aspirin chewable tablet 81 mg, 81 mg, Oral, Daily, Arther Fees, DPM, Stopped at 07/27/20 0859    atorvastatin (LIPITOR) tablet 40 mg, 40 mg, Oral, Daily With Fabiana Crisostomo, DPM, 40 mg at 07/27/20 1604    bacitracin 100,000 Units, neomycin-polymyxin B (NEOSPORIN-) 1 mL in sodium chloride 0 9 % 1,000 mL irrigation bag, , Irrigation, Once, Vaibhav Fees, DPM    bisacodyl (DULCOLAX) EC tablet 5 mg, 5 mg, Oral, Daily PRN, Myriam Duarte,     calcium carbonate (TUMS) chewable tablet 500 mg, 500 mg, Oral, Daily PRN, Arthaneudy Fees, DPM    cefepime (MAXIPIME) IVPB (premix) 1,000 mg 50 mL, 1,000 mg, Intravenous, Q24H, Vaibhav Fees, DPM, Last Rate: 100 mL/hr at 07/26/20 2321, 1,000 mg at 07/26/20 2321    diphenhydrAMINE (BENADRYL) injection 25 mg, 25 mg, Intravenous, Q6H PRN, Arther Fees, DPM, 25 mg at 07/23/20 1927    docusate sodium (COLACE) capsule 100 mg, 100 mg, Oral, BID, Arther Fees, DPM, 100 mg at 50/73/47 2336    folic acid (FOLVITE) tablet 1 mg, 1 mg, Oral, Daily, Arther Fees, DPM, Stopped at 07/27/20 0900    HYDROcodone-acetaminophen (NORCO) 5-325 mg per tablet 1 tablet, 1 tablet, Oral, Q4H PRN, Arther Fees, DPM, 1 tablet at 07/25/20 2156    HYDROcodone-acetaminophen (NORCO) 5-325 mg per tablet 2 tablet, 2 tablet, Oral, Q4H PRN, Arther Fees, DPM, 2 tablet at 07/27/20 2020    HYDROmorphone (DILAUDID) injection 0 5 mg, 0 5 mg, Intravenous, Q2H PRN, Arther Fees, DPM    LORazepam (ATIVAN) tablet 1 mg, 1 mg, Oral, Q8H PRN, Arther Fees, DPM, 1 mg at 07/26/20 1234    pantoprazole (PROTONIX) EC tablet 40 mg, 40 mg, Oral, Early Morning, Arther Fees, DPM, 40 mg at 07/26/20 0540    polyethylene glycol (MIRALAX) packet 17 g, 17 g, Oral, BID, Arther Fees, DPM, 17 g at 07/27/20 1744    tamsulosin (FLOMAX) capsule 0 4 mg, 0 4 mg, Oral, Daily With Dinner, Arther Fees, DPM, 0 4 mg at 07/27/20 1604    vancomycin (VANCOCIN) 1,500 mg in sodium chloride 0 9 % 250 mL IVPB, 1,500 mg, Intravenous, Q24H, Arther Fees, DPM, Last Rate: 166 7 mL/hr at 07/27/20 1749, 1,500 mg at 07/27/20 1749    Imaging: I have personally reviewed pertinent films in PACS  EKG, Pathology, and Other Studies: I have personally reviewed pertinent reports    VTE Pharmacologic Prophylaxis: None  VTE Mechanical Prophylaxis: reason for no mechanical VTE prophylaxis Wounds right lower extremity    Dixie Benton DPM

## 2020-07-27 NOTE — PLAN OF CARE
Problem: Potential for Falls  Goal: Patient will remain free of falls  Description  INTERVENTIONS:  - Assess patient frequently for physical needs  -  Identify cognitive and physical deficits and behaviors that affect risk of falls  -  [place] fall precautions as indicated by assessment   - Educate patient/family on patient safety including physical limitations  - Instruct patient to call for assistance with activity based on assessment  - Modify environment to reduce risk of injury  - Consider OT/PT consult to assist with strengthening/mobility  Outcome: Progressing     Problem: Prexisting or High Potential for Compromised Skin Integrity  Goal: Skin integrity is maintained or improved  Description  INTERVENTIONS:  - Identify patients at risk for skin breakdown  - Assess and monitor skin integrity  - Assess and monitor nutrition and hydration status  - Monitor labs   - Assess for incontinence   - Turn and reposition patient  - Assist with mobility/ambulation  - Relieve pressure over bony prominences  - Avoid friction and shearing  - Provide appropriate hygiene as needed including keeping skin clean and dry  - Evaluate need for skin moisturizer/barrier cream  - Collaborate with interdisciplinary team   - Patient/family teaching  - Consider wound care consult   Outcome: Progressing     Problem: Nutrition/Hydration-ADULT  Goal: Nutrient/Hydration intake appropriate for improving, restoring or maintaining nutritional needs  Description  Monitor and assess patient's nutrition/hydration status for malnutrition  Collaborate with interdisciplinary team and initiate plan and interventions as ordered  Monitor patient's weight and dietary intake as ordered or per policy  Utilize nutrition screening tool and intervene as necessary  Determine patient's food preferences and provide high-protein, high-caloric foods as appropriate       INTERVENTIONS:  - Monitor oral intake, urinary output, labs, and treatment plans  - Assess nutrition and hydration status and recommend course of action  - Evaluate amount of meals eaten  - Assist patient with eating if necessary   - Allow adequate time for meals  - Recommend/ encourage appropriate diets, oral nutritional supplements, and vitamin/mineral supplements  - Order, calculate, and assess calorie counts as needed  - Recommend, monitor, and adjust tube feedings and TPN/PPN based on assessed needs  - Assess need for intravenous fluids  - Provide specific nutrition/hydration education as appropriate  - Include patient/family/caregiver in decisions related to nutrition  Outcome: Progressing     Problem: NEUROSENSORY - ADULT  Goal: Achieves maximal functionality and self care  Description  INTERVENTIONS  - Monitor swallowing and airway patency with patient fatigue and changes in neurological status  - Encourage and assist patient to increase activity and self care     - Encourage visually impaired, hearing impaired and aphasic patients to use assistive/communication devices  Outcome: Progressing     Problem: GASTROINTESTINAL - ADULT  Goal: Minimal or absence of nausea and/or vomiting  Description  INTERVENTIONS:  - Administer IV fluids if ordered to ensure adequate hydration  - Maintain NPO status until nausea and vomiting are resolved  - Nasogastric tube if ordered  - Administer ordered antiemetic medications as needed  - Provide nonpharmacologic comfort measures as appropriate  - Advance diet as tolerated, if ordered  - Consider nutrition services referral to assist patient with adequate nutrition and appropriate food choices  Outcome: Progressing  Goal: Maintains adequate nutritional intake  Description  INTERVENTIONS:  - Monitor percentage of each meal consumed  - Identify factors contributing to decreased intake, treat as appropriate  - Assist with meals as needed  - Monitor I&O, weight, and lab values if indicated  - Obtain nutrition services referral as needed  Outcome: Progressing     Problem: METABOLIC, FLUID AND ELECTROLYTES - ADULT  Goal: Electrolytes maintained within normal limits  Description  INTERVENTIONS:  - Monitor labs and assess patient for signs and symptoms of electrolyte imbalances  - Administer electrolyte replacement as ordered  - Monitor response to electrolyte replacements, including repeat lab results as appropriate  - Instruct patient on fluid and nutrition as appropriate  Outcome: Progressing  Goal: Fluid balance maintained  Description  INTERVENTIONS:  - Monitor labs   - Monitor I/O and WT  - Instruct patient on fluid and nutrition as appropriate  - Assess for signs & symptoms of volume excess or deficit  Outcome: Progressing     Problem: SKIN/TISSUE INTEGRITY - ADULT  Goal: Skin integrity remains intact  Description  INTERVENTIONS  - Identify patients at risk for skin breakdown  - Assess and monitor skin integrity  - Assess and monitor nutrition and hydration status  - Monitor labs (i e  albumin)  - Assess for incontinence   - Turn and reposition patient  - Assist with mobility/ambulation  - Relieve pressure over bony prominences  - Avoid friction and shearing  - Provide appropriate hygiene as needed including keeping skin clean and dry  - Evaluate need for skin moisturizer/barrier cream  - Collaborate with interdisciplinary team (i e  Nutrition, Rehabilitation, etc )   - Patient/family teaching  Outcome: Progressing  Goal: Incision(s), wounds(s) or drain site(s) healing without S/S of infection  Description  INTERVENTIONS  - Assess and document risk factors for skin impairment   - Assess and document dressing, incision, wound bed, drain sites and surrounding tissue  - Consider nutrition services referral as needed  - Oral mucous membranes remain intact  - Provide patient/ family education  Outcome: Progressing     Problem: HEMATOLOGIC - ADULT  Goal: Maintains hematologic stability  Description  INTERVENTIONS  - Assess for signs and symptoms of bleeding or hemorrhage  - Monitor labs  - Administer supportive blood products/factors as ordered and appropriate  Outcome: Progressing     Problem: MUSCULOSKELETAL - ADULT  Goal: Maintain or return mobility to safest level of function  Description  INTERVENTIONS:  - Assess patient's ability to carry out ADLs; assess patient's baseline for ADL function and identify physical deficits which impact ability to perform ADLs (bathing, care of mouth/teeth, toileting, grooming, dressing, etc )  - Assess/evaluate cause of self-care deficits   - Assess range of motion  - Assess patient's mobility  - Assess patient's need for assistive devices and provide as appropriate  - Encourage maximum independence but intervene and supervise when necessary  - Involve family in performance of ADLs  - Assess for home care needs following discharge   - Consider OT consult to assist with ADL evaluation and planning for discharge  - Provide patient education as appropriate  Outcome: Progressing     Problem: PAIN - ADULT  Goal: Verbalizes/displays adequate comfort level or baseline comfort level  Description  Interventions:  - Encourage patient to monitor pain and request assistance  - Assess pain using appropriate pain scale  - Administer analgesics based on type and severity of pain and evaluate response  - Implement non-pharmacological measures as appropriate and evaluate response  - Consider cultural and social influences on pain and pain management  - Notify physician/advanced practitioner if interventions unsuccessful or patient reports new pain  Outcome: Progressing     Problem: INFECTION - ADULT  Goal: Absence or prevention of progression during hospitalization  Description  INTERVENTIONS:  - Assess and monitor for signs and symptoms of infection  - Monitor lab/diagnostic results  - Monitor all insertion sites, i e  indwelling lines, tubes, and drains  - Monitor endotracheal if appropriate and nasal secretions for changes in amount and color  - [place] appropriate cooling/warming therapies per order  - Administer medications as ordered  - Instruct and encourage patient and family to use good hand hygiene technique  - Identify and instruct in appropriate isolation precautions for identified infection/condition  Outcome: Progressing  Goal: Absence of fever/infection during neutropenic period  Description  INTERVENTIONS:  - Monitor WBC    Outcome: Progressing     Problem: SAFETY ADULT  Goal: Patient will remain free of falls  Description  INTERVENTIONS:  - Assess patient frequently for physical needs  -  Identify cognitive and physical deficits and behaviors that affect risk of falls  -  [place] fall precautions as indicated by assessment   - Educate patient/family on patient safety including physical limitations  - Instruct patient to call for assistance with activity based on assessment  - Modify environment to reduce risk of injury  - Consider OT/PT consult to assist with strengthening/mobility  Outcome: Progressing     Problem: DISCHARGE PLANNING  Goal: Discharge to home or other facility with appropriate resources  Description  INTERVENTIONS:  - Identify barriers to discharge w/patient and caregiver  - Arrange for needed discharge resources and transportation as appropriate  - Identify discharge learning needs (meds, wound care, etc )  - Arrange for interpretive services to assist at discharge as needed  - Refer to Case Management Department for coordinating discharge planning if the patient needs post-hospital services based on physician/advanced practitioner order or complex needs related to functional status, cognitive ability, or social support system  Outcome: Progressing     Problem: Knowledge Deficit  Goal: Patient/family/caregiver demonstrates understanding of disease process, treatment plan, medications, and discharge instructions  Description  Complete learning assessment and assess knowledge base    Interventions:  - Provide teaching at level of understanding  - Provide teaching via preferred learning methods  Outcome: Progressing

## 2020-07-27 NOTE — ASSESSMENT & PLAN NOTE
POA, severe symptomatic normocytic anemia, HB 6 7 acausing shortness of breath on exertion secondary to ASA/NSAID use for chronic right lower leg ulcers that were getting worse  CT Abd thickening of the gastric antrum likely gastritis but unable to exclude gastric neoplasm  S/p 3iu prbcs  Hb 9 3  Monitor H&H and transfuse as needed  Continue Protonix b i d ,  Patient refusing endoscopic workup for gastric mass  GI On board, appreciate recs  GI recommended Hematology evaluation - for biopsy today  Hematology consult appreciated  Possible myelodysplastic syndrome/multiple myeloma  IgG kappa with a ratio of light chains over 10 which are suggestive of multiple myeloma  They recommended bone marrow biopsy  IR consult placed by them and forms are filled

## 2020-07-27 NOTE — SOCIAL WORK
LOS: 10 days  Continuing to follow patient  Patient requesting information on non skilled help at home  Given list of providers

## 2020-07-27 NOTE — PROGRESS NOTES
Progress note - Gastroenterology   Jose Cadet 69 y o  male MRN: [id number]  Unit/Bed#: -01 Encounter: [id number]    ASSESSMENT and PLAN    1  Anemia - no signs or symptoms of GI blood loss   Hemoglobin 6 7gms on admission, stable after transfusion  Iron panel consistent with chronic disease  bone marrow biopsy planned today  Again discussed endoscopic evaluation but he declines    2  Abnormal CT   Gastric thickening consistent with gastritis, radiologist recommended direct visualization rule out malignancy  discussed multiple times throughout hospitalization and he continues to refuse  Continue empiric PPI in the interim      3  Umbilical wound with abscess and protruding mesh   S/P surgical repair 7/21     4  Constipation  Persists despite MiraLax  Discussed treatment options, will add Dulcolax as needed     Chief Complaint   Patient presents with    Wound Infection - Complicated     Pt with leg wounds x 2 months, here for dyspnea, weakness and feeling ill   +n/+v  SUBJECTIVE/HPI   No new GI complaints  Tolerating diet  Still constipated  No fevers or chills      /61   Pulse 82   Temp 97 7 °F (36 5 °C)   Resp 16   Ht 5' 10" (1 778 m)   Wt 64 4 kg (142 lb)   SpO2 99%   BMI 20 37 kg/m²     PHYSICALEXAM  General appearance: alert, appears stated age and cooperative  Eyes: PERLLA, EOMI, no icterus   Head: Normocephalic, without obvious abnormality, atraumatic  Lungs: clear to auscultation bilaterally  Heart: regular rate and rhythm, S1, S2 normal, no murmur, click, rub or gallop  Abdomen: soft, non-tender; bowel sounds normal; no masses,  no organomegaly  Extremities: extremities normal, atraumatic, no cyanosis or edema  Neurologic: Grossly normal    Lab Results   Component Value Date    GLUCOSE 127 11/20/2018    CALCIUM 8 6 07/27/2020    K 4 6 07/27/2020    CO2 20 (L) 07/27/2020     07/27/2020    BUN 27 (H) 07/27/2020    CREATININE 1 31 (H) 07/27/2020     Lab Results Component Value Date    WBC 3 42 (L) 07/27/2020    HGB 9 3 (L) 07/27/2020    HCT 28 9 (L) 07/27/2020    MCV 97 07/27/2020    PLT 59 (L) 07/27/2020     Lab Results   Component Value Date    ALT 26 07/23/2020    AST 24 07/23/2020    ALKPHOS 81 07/23/2020     No results found for: AMYLASE  Lab Results   Component Value Date    LIPASE 255 07/16/2020     Lab Results   Component Value Date    IRON 229 (H) 07/16/2020    TIBC 285 07/16/2020    FERRITIN 303 07/16/2020     Lab Results   Component Value Date    INR 1 46 (H) 07/16/2020

## 2020-07-27 NOTE — PLAN OF CARE
Problem: NEUROSENSORY - ADULT  Goal: Achieves maximal functionality and self care  Description  INTERVENTIONS  - Monitor swallowing and airway patency with patient fatigue and changes in neurological status  - Encourage and assist patient to increase activity and self care  - Encourage visually impaired, hearing impaired and aphasic patients to use assistive/communication devices  Outcome: Not Progressing   PT currently NPO

## 2020-07-27 NOTE — ASSESSMENT & PLAN NOTE
Wt Readings from Last 3 Encounters:   07/27/20 64 4 kg (142 lb)   06/21/19 68 3 kg (150 lb 9 6 oz)   06/19/19 67 6 kg (149 lb)     Chronic CHF, not in acute exacerbation  Echocardiogram on this admission shows ejection fraction of 50%  Patient's lungs are clear, has no JVD    Continue to hold lisinopril, Lasix and Toprol because of relative hypotension  Daily weight and I&Os

## 2020-07-27 NOTE — ASSESSMENT & PLAN NOTE
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

## 2020-07-27 NOTE — ASSESSMENT & PLAN NOTE
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

## 2020-07-27 NOTE — PROGRESS NOTES
Vancomycin IV Pharmacy-to-Dose Consultation    Meenakshi Edwards is a 69 y o  male who is currently receiving Vancomycin IV with management by the Pharmacy Consult service  Assessment/Plan:  The patient was reviewed  Renal function is stable and no signs or symptoms of nephrotoxicity and/or infusion reactions were documented in the chart  Based on todays assessment, continue current vancomycin (day #11) dosing of 1500 mg Q24H, trough has been rescheduled to be drawn at 1730 on 7/28/20  We will continue to follow the patients culture results and clinical progress daily      [facility], Pharmacist

## 2020-07-27 NOTE — ASSESSMENT & PLAN NOTE
Patient has stage III chronic disease with creatinine around 2 0 at baseline  Patient's renal function is at baseline:  Creatinine is 1 31 today  Nephrology on board  Monitor renal function in a m

## 2020-07-27 NOTE — PROGRESS NOTES
Progress Note - General Surgery  Loyda Harding 69 y o  male MRN: [id number]  Unit/Bed#: -01 Encounter: [id number]    Assessment/Plan:  Umbilical wound infection with exposed mesh and abscess  POD#6 s/p wound exploration, explantation of mesh, primary repair of umbilical hernia  Incision site clean with minimal drainage on dressing, no pain  Change dressing daily using dry gauze  Abdominal pain improved, tolerating diet  Constipation continues however patient refusing suppository or enema  Continue antibiotics per ID  Pain control as needed     Urinary retention  Patient reports intermittently self-catheterizing at home, intermittently refusing this admission  Continue bladder scanning and straight catheterization as needed    Pancytopenia  S/p multiple infusions  Plan for bone marrow biopsy ? today  GI on board - CTAP with antral thickening, gastritis vs neoplasm, patient continues to refuse EGD/Colon at this time  Hematology on board    RLE wounds with PVD  Appears mixed venous and arterial  Resting claudication with >75% stenosis on LEADs - s/p RLE angiogram with balloon angioplasty and <30% residual  Would benefit from SFA stent and tibial angioplasty however patient was unable to tolerate during the initial angioplasty  S/p RLE debridement of wounds with podiatry - minimal bleeding at the time of the procedure    Other medical comorbid conditions  CHF, Persistent atrial fibrillation, pacemaker, CKD, BPH  Nephrology and cardiology on board    Subjective/Objective   Subjective: Feeling good, denies abdominal pain, is tolerating diet without n/v  Continued constipation without bowel movement in several days, refusing escalation of bowel regimen  Objective:     Blood pressure 106/61, pulse 82, temperature 97 7 °F (36 5 °C), resp  rate 16, height 5' 10" (1 778 m), weight 64 4 kg (142 lb), SpO2 99 %  ,Body mass index is 20 37 kg/m²        Intake/Output Summary (Last 24 hours) at 7/27/2020 0698  Last data filed at 7/27/2020 0418  Gross per 24 hour   Intake 300 ml   Output 650 ml   Net -350 ml       Invasive Devices     Peripheral Intravenous Line            Peripheral IV 07/25/20 Left Forearm 1 day                Physical Exam: /61   Pulse 82   Temp 97 7 °F (36 5 °C)   Resp 16   Ht 5' 10" (1 778 m)   Wt 64 4 kg (142 lb)   SpO2 99%   BMI 20 37 kg/m²   General appearance: alert and oriented, in no acute distress and sitting up on side of bed  Lungs: clear to auscultation bilaterally and without wheezes, crackles, or rhonchi  Heart: regular rate and rhythm, S1, S2 normal, no murmur, click, rub or gallop  Abdomen: soft, non-tender, minimal distention, active bowel sounds, umbilical wound with staples intact - Bloody ss drainage on dressing, no drainage from wound    Lab, Imaging and other studies:  CBC:   Lab Results   Component Value Date    WBC 3 42 (L) 07/27/2020    HGB 9 3 (L) 07/27/2020    HCT 28 9 (L) 07/27/2020    MCV 97 07/27/2020    PLT 59 (L) 07/27/2020    MCH 31 1 07/27/2020    MCHC 32 2 07/27/2020    RDW 16 8 (H) 07/27/2020    MPV 12 1 07/27/2020    NRBC 1 07/27/2020    NRBC 1 07/27/2020   , CMP:   Lab Results   Component Value Date    SODIUM 137 07/27/2020    K 4 6 07/27/2020     07/27/2020    CO2 20 (L) 07/27/2020    BUN 27 (H) 07/27/2020    CREATININE 1 31 (H) 07/27/2020    CALCIUM 8 6 07/27/2020    EGFR 54 07/27/2020     VTE Pharmacologic Prophylaxis: Reason for no pharmacologic prophylaxis thrombocytopenia  VTE Mechanical Prophylaxis: sequential compression device

## 2020-07-27 NOTE — PROGRESS NOTES
Progress Note - Camilo Vieyra 1945, 69 y o  male MRN: [id number]    Unit/Bed#: -01 Encounter: [id number]    Primary Care Provider: Ada Billy DO   Date and time admitted to hospital: 7/16/2020 10:25 PM        Open wound of umbilical region  Assessment & Plan  Exploratory laparotomy, excision of infected mesh and repair of ventral heria (N/A) on 07/21/2020  Wound care and dressing changes per surgery  Pain management  See above    Severe protein-calorie malnutrition (HCC)  Assessment & Plan  Pt has Severe protein-calorie malnutrition, in the setting of chronically ill patient with history of noncompliance with care, as evidenced by BMI 19 17 with an 11 33% unplanned weight loss over the past year, multiple nonhealing wounds appearance of muscle wasting/fat loss and decreased mobility noted on nursing assessment, Findings: height 5' 10", weight 133 lb 9 6 oz, BMI 19 17 Screen: (+) Unplanned weight loss in the last three months; (+) Stage III-IV pressure ulcer or non-healing wound; (+) Appearance of muscle wasting or fat loss; Benign prostatic hyperplasia with urinary retention  Assessment & Plan  Patient self catheterizes at home when he feels he needs to for BPH with retention,   Patient had to be catheterized 6 times during this hospital stay  Continue straight cath q 8 hours because patient has urinary retention, will try to avoid placement of Abbott  Continue Flomax    Umbilicus discharge  Assessment & Plan  · Patient states ongoing since hernia surgery small bloody ooze  Scab is noted in the umbilicus with foul-smelling no drainage    · Patient underwent  EXPLORATORY laparotomy, excision of infected mesh and repair of ventral heria (N/A) on 07/21/2020  · Surgical follow-up  · Pain management p r n   · On IV antibiotics    CKD (chronic kidney disease) stage 3, GFR 30-59 ml/min ([facility])  Assessment & Plan  Patient has stage III chronic disease with creatinine around 2 0 at baseline  Patient's renal function is at baseline:  Creatinine is 1 31 today  Nephrology on board  Monitor renal function in a m  Lactic acidosis  Assessment & Plan  Lactic acidosis most likely multifactorial, patient has CHF, necrotic umbilical wound    Cellulitis  Assessment & Plan  Patient has right lower leg wounds with cellulitis  Continue vancomycin and cefepime  Patient underwent angiogram   Patient underwent debridement of right lower extremity on 07/24/2020  Follow-up OR wound cultures  Podiatry follow-up  Dressing changes per Podiatry    Chronic combined systolic and diastolic heart failure (HCC)  Assessment & Plan  Wt Readings from Last 3 Encounters:   07/27/20 64 4 kg (142 lb)   06/21/19 68 3 kg (150 lb 9 6 oz)   06/19/19 67 6 kg (149 lb)     Chronic CHF, not in acute exacerbation  Echocardiogram on this admission shows ejection fraction of 50%  Patient's lungs are clear, has no JVD  Continue to hold lisinopril, Lasix and Toprol because of relative hypotension  Daily weight and I&Os    Thrombocytopenia ([address] )  Assessment & Plan  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 with platelet counts running between 60-115K  he denies signs of bleeding   Patient received 1 unit of platelets on 8/80  Plts 59 today  Hematology recommended transfusing platelets to keep the counts above 50K if he is going to require surgical intervention  Other persistent atrial fibrillation [facility])  Assessment & Plan  Patient is status post ablation and pacemaker insertion  EKG showed paced rhythm  He has not  anticoagulation candidate due to ITP and thrombocytopenia    Peripheral vascular disease of lower extremity [facility])  Assessment & Plan  Patient has more than 75% stenosis of the right superficial femoral artery on arterial Doppler  Continue atorvastatin    Patient underwent right SFA PTA with single-vessel peroneal runoff on 07/20/2020  IR was unable to perform tibial intervention secondary to patient's inability to tolerate procedure  Continue on aspirin given recent intervention   Post intervention ABIs  ordered  Vascular surgery and Podiatry on board    * Anemia, unspecified  Assessment & Plan  POA, severe symptomatic normocytic anemia, HB 6 7 acausing shortness of breath on exertion secondary to ASA/NSAID use for chronic right lower leg ulcers that were getting worse  CT Abd thickening of the gastric antrum likely gastritis but unable to exclude gastric neoplasm  S/p 3iu prbcs  Hb 9 3  Monitor H&H and transfuse as needed  Continue Protonix b i d ,  Patient refusing endoscopic workup for gastric mass  GI On board, appreciate recs  GI recommended Hematology evaluation - for biopsy today  Hematology consult appreciated  Possible myelodysplastic syndrome/multiple myeloma  IgG kappa with a ratio of light chains over 10 which are suggestive of multiple myeloma  They recommended bone marrow biopsy  IR consult placed by them and forms are filled  VTE Pharmacologic Prophylaxis:   Pharmacologic: Pharmacologic VTE Prophylaxis contraindicated due to anemia, thrombocytopenia  Mechanical VTE Prophylaxis in Place: Yes    Patient Centered Rounds: I have performed bedside rounds with nursing staff today  Discussions with Specialists or Other Care Team Provider: CM    Education and Discussions with Family / Patient: Patient does not want any family member called    Time Spent for Care: 30 minutes  More than 50% of total time spent on counseling and coordination of care as described above      Current Length of Stay: 10 day(s)    Current Patient Status: Inpatient   Certification Statement: The patient will continue to require additional inpatient hospital stay due to as above    Discharge Plan: 1-2 days    Code Status: Level 1 - Full Code      Subjective:   No overnight events, currently NPO for bone marrow biopsy    Objective:     Vitals:   Temp (24hrs), Av °F (36 7 °C), Min:97 7 °F (36 5 °C), Max:98 3 °F (36 8 °C)    Temp:  [97 7 °F (36 5 °C)-98 3 °F (36 8 °C)] 97 7 °F (36 5 °C)  HR:  [81-86] 82  Resp:  [16-18] 16  BP: (105-128)/(61-69) 106/61  SpO2:  [99 %-100 %] 99 %  Body mass index is 20 37 kg/m²  Input and Output Summary (last 24 hours): Intake/Output Summary (Last 24 hours) at 7/27/2020 1446  Last data filed at 7/27/2020 0418  Gross per 24 hour   Intake    Output 650 ml   Net -650 ml       Physical Exam:     Physical Exam   Constitutional: He is oriented to person, place, and time  No distress  Cardiovascular: Normal rate, regular rhythm and normal heart sounds  Exam reveals no friction rub  No murmur heard  Pulmonary/Chest: Effort normal and breath sounds normal  No stridor  No respiratory distress  He has no wheezes  Abdominal: Soft  Bowel sounds are normal  He exhibits distension  There is no tenderness  There is no guarding  Musculoskeletal: Normal range of motion  He exhibits no edema, tenderness or deformity  Neurological: He is alert and oriented to person, place, and time  He displays normal reflexes  No cranial nerve deficit  Coordination normal    Skin: Skin is warm and dry  Capillary refill takes less than 2 seconds  No rash noted  He is not diaphoretic  No erythema  No pallor  Psychiatric: He has a normal mood and affect  His speech is normal    Nursing note reviewed  Additional Data:     Labs:    Results from last 7 days   Lab Units 07/27/20  0556  07/22/20  0631  07/21/20  0838   WBC Thousand/uL 3 42*   < > 5 90  --  6 30   HEMOGLOBIN g/dL 9 3*   < > 7 7*   < > 8 7*   HEMATOCRIT % 28 9*   < > 23 0*   < > 26 1*   PLATELETS Thousands/uL 59*   < > 46*  --  52*   BANDS PCT %  --   --  2  --   --    NEUTROS PCT %  --   --   --   --  85*   LYMPHS PCT %  --   --   --   --  9*   LYMPHO PCT % 12*   < > 8*  --   --    MONOS PCT %  --   --   --   --  4   MONO PCT % 6   < > 3*  --   --    EOS PCT % 0   < > 0  --  0    < > = values in this interval not displayed       Results from last 7 days   Lab Units 07/27/20  0556  07/23/20  0552   SODIUM mmol/L 137   < > 134*   POTASSIUM mmol/L 4 6   < > 4 6   CHLORIDE mmol/L 106   < > 104   CO2 mmol/L 20*   < > 21   BUN mg/dL 27*   < > 39*   CREATININE mg/dL 1 31*   < > 1 80*   ANION GAP mmol/L 11   < > 9   CALCIUM mg/dL 8 6   < > 8 2*   ALBUMIN g/dL  --   --  2 3*   TOTAL BILIRUBIN mg/dL  --   --  0 50   ALK PHOS U/L  --   --  81   ALT U/L  --   --  26   AST U/L  --   --  24   GLUCOSE RANDOM mg/dL 110   < > 113    < > = values in this interval not displayed  * I Have Reviewed All Lab Data Listed Above  * Additional Pertinent Lab Tests Reviewed:  All Labs Within Last 24 Hours Reviewed    Imaging:    Imaging Reports Reviewed Today Include:   Imaging Personally Reviewed by Myself Includes:      Recent Cultures (last 7 days):     Results from last 7 days   Lab Units 07/24/20  1328   GRAM STAIN RESULT  Rare Polys*  2+ Gram negative rods*   WOUND CULTURE  3+ Growth of Myroides odoratimimus*  3+ Growth of Morganella morganii*  1+ Growth of        Last 24 Hours Medication List:     Current Facility-Administered Medications:  acetaminophen 650 mg Oral Q6H PRN Garnette Dynes, DPM    aspirin 81 mg Oral Daily Garnette Dynes, DPM    atorvastatin 40 mg Oral Daily With [employer], DPM    bacitracin 100,000 units and neomycin-polymyxin B () 1 mL in sodium chloride 0 9% 1000 mL irrigation bag  Irrigation Once Garnette Dynes, DPM    bisacodyl 5 mg Oral Daily PRN Jalaine Chimes, DO    calcium carbonate 500 mg Oral Daily PRN Garnette Dynes, DPM    cefepime 1,000 mg Intravenous Q24H Garnette Dynes, DPM Last Rate: 1,000 mg (07/26/20 5936)   diphenhydrAMINE 25 mg Intravenous Q6H PRN Garnette Dynes, DPM    docusate sodium 100 mg Oral BID Garnette Dynes, DPM    folic acid 1 mg Oral Daily Garnette Dynes, DPM    HYDROcodone-acetaminophen 1 tablet Oral Q4H PRN Garnette Dynes, DPM    HYDROcodone-acetaminophen 2 tablet Oral Q4H PRN Garnette Dynes, DPM    HYDROmorphone 0 5 mg Intravenous Q2H PRN Mellody Kathryn, DPM    LORazepam 1 mg Oral Q8H PRN Mellody Kathryn, DPM    pantoprazole 40 mg Oral Early Morning Mellody Kathryn, DPM    polyethylene glycol 17 g Oral BID Mellody Kathryn, DPM    tamsulosin 0 4 mg Oral Daily With Lv [employer]in, DPM    vancomycin 1,500 mg Intravenous Q24H Mellody Kathryn, DPM Last Rate: 1,500 mg (07/26/20 8691)        Today, Patient Was Seen By: Mary Jane Stafford MD    ** Please Note: Dictation voice to text software may have been used in the creation of this document   **

## 2020-07-28 PROBLEM — E87.2 LACTIC ACIDOSIS: Status: RESOLVED | Noted: 2020-01-01 | Resolved: 2020-01-01

## 2020-07-28 PROBLEM — D61.818 PANCYTOPENIA (HCC): Status: ACTIVE | Noted: 2020-01-01

## 2020-07-28 NOTE — ASSESSMENT & PLAN NOTE
Patient is status post ablation and pacemaker insertion  EKG showed paced rhythm    Monitor off anticoagulation due to ITP and thrombocytopenia

## 2020-07-28 NOTE — PROGRESS NOTES
Progress Note - Payam Nance 1945, 69 y o  male MRN: [id number]    Unit/Bed#: -01 Encounter: [id number]    Primary Care Provider: Mal Marley DO   Date and time admitted to hospital: 7/16/2020 36:89 PM        Umbilicus discharge  Assessment & Plan  · Umbilical wound infection with exposed mesh and abscess  Occurring since hernia surgery  · Patient underwent  EXPLORATORY laparotomy, explantation of infected mesh and repair of ventral heria (N/A) on 07/21/2020  · Post-op management per surgery  · Pain management p r n   · On IV antibiotics - ID consulted to help with duration and coverage  · Cultures growing multiple organisms    Cellulitis  Assessment & Plan  Patient has right lower leg wounds with cellulitis  On IV vancomycin and cefepime, started 7/17  Patient underwent angiogram   Patient underwent debridement of right lower extremity on 07/24/2020  OR wound cultures - growing morganella and multiple organisms- intermediate resistance to cefepime  Stop cefepime  Start IV zosyn  ID consulted to help with antibiotic coverage and duration  Podiatry follow-up  Dressing changes per Podiatry    Open wound of umbilical region  Assessment & Plan  Exploratory laparotomy, excision of infected mesh and repair of ventral heria (N/A) on 07/21/2020  Wound care and dressing changes per surgery    Pain management  See above    Severe protein-calorie malnutrition (HCC)  Assessment & Plan  Pt has Severe protein-calorie malnutrition, in the setting of chronically ill patient with history of noncompliance with care, as evidenced by BMI 19 17 with an 11 33% unplanned weight loss over the past year, multiple nonhealing wounds appearance of muscle wasting/fat loss and decreased mobility noted on nursing assessment, Findings: height 5' 10", weight 133 lb 9 6 oz, BMI 19 17 Screen: (+) Unplanned weight loss in the last three months; (+) Stage III-IV pressure ulcer or non-healing wound; (+) Appearance of muscle wasting or fat loss; Benign prostatic hyperplasia with urinary retention  Assessment & Plan  Patient self catheterizes at home when he feels he needs to for BPH with retention,   Patient had to be catheterized 6 times during this hospital stay  Continue straight cath q 8 hours because patient has urinary retention, he refuses Abbott placement  On flomax      CKD (chronic kidney disease) stage 3, GFR 30-59 ml/min ([facility])  Assessment & Plan  Patient has stage III chronic disease with creatinine around 2 0 at baseline  Patient's renal function is at baseline:  Creatinine is 1 40 today  Nephrology on board  Monitor renal function in a m  Chronic combined systolic and diastolic heart failure ([facility])  Assessment & Plan  Wt Readings from Last 3 Encounters:   07/28/20 64 6 kg (142 lb 6 7 oz)   06/21/19 68 3 kg (150 lb 9 6 oz)   06/19/19 67 6 kg (149 lb)     Chronic CHF, not in acute exacerbation  Echocardiogram on this admission shows ejection fraction of 50%  Patient's lungs are clear, has no JVD  Continue to hold lisinopril, Lasix and Toprol because of relative hypotension  Daily weight and I&Os    Thrombocytopenia [address] [address] )  Assessment & Plan  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 with platelet counts running between 60-115K  he denies signs of bleeding   Patient received 1 unit of platelets on 7/29  Plts 62 today  Hematology recommended transfusing platelets to keep the counts above 50K if he is going to require surgical intervention  Other persistent atrial fibrillation [facility])  Assessment & Plan  Patient is status post ablation and pacemaker insertion  EKG showed paced rhythm  Monitor off anticoagulation due to ITP and thrombocytopenia    Peripheral vascular disease of lower extremity [facility])  Assessment & Plan  Patient has more than 75% stenosis of the right superficial femoral artery on arterial Doppler  Continue atorvastatin    Patient underwent right SFA PTA with single-vessel peroneal runoff on 07/20/2020  IR was unable to perform tibial intervention secondary to patient's inability to tolerate procedure  Continue on aspirin given recent intervention   Post intervention ABIs  ordered  Vascular surgery and Podiatry on board    * Anemia, unspecified  Assessment & Plan  POA, severe symptomatic normocytic anemia, HB 6 7 acausing shortness of breath on exertion secondary to ASA/NSAID use for chronic right lower leg ulcers that were getting worse  CT Abd thickening of the gastric antrum likely gastritis but unable to exclude gastric neoplasm  S/p 3iu prbcs  Hb 9 3  Monitor H&H and transfuse as needed  Continue Protonix b i d ,  Patient refusing endoscopic workup for gastric mass  GI On board, appreciate recs  Patient unable to get IR bone marrow biopsy 7/27, biopsy scheduled for Thursday 7/30  Hematology consult appreciated  Possible myelodysplastic syndrome/multiple myeloma  IgG kappa with a ratio of light chains over 10 which are suggestive of multiple myeloma  Would need IR biopsy prior to discharge      Lactic acidosisresolved as of 7/28/2020  Assessment & Plan  Lactic acidosis most likely multifactorial, patient has CHF, necrotic umbilical wound      VTE Pharmacologic Prophylaxis:   Pharmacologic: Pharmacologic VTE Prophylaxis contraindicated due to thrombocytopenia  Mechanical VTE Prophylaxis in Place: Yes    Patient Centered Rounds: I have performed bedside rounds with nursing staff today  Discussions with Specialists or Other Care Team Provider: CM    Education and Discussions with Family / Patient: patient  He does not want his significant other called    Time Spent for Care: 30 minutes  More than 50% of total time spent on counseling and coordination of care as described above      Current Length of Stay: 11 day(s)    Current Patient Status: Inpatient   Certification Statement: The patient will continue to require additional inpatient hospital stay due to as above    Discharge Plan: 1- 2days, pending bone marrow biopsy    Code Status: Level 1 - Full Code      Subjective:   Overnight events  No complaints this morning    Objective:     Vitals:   Temp (24hrs), Av 3 °F (36 8 °C), Min:98 1 °F (36 7 °C), Max:98 5 °F (36 9 °C)    Temp:  [98 1 °F (36 7 °C)-98 5 °F (36 9 °C)] 98 5 °F (36 9 °C)  Resp:  [18] 18  BP: (112-137)/(60-62) 137/62  Body mass index is 20 43 kg/m²  Input and Output Summary (last 24 hours): Intake/Output Summary (Last 24 hours) at 2020 1736  Last data filed at 2020 1442  Gross per 24 hour   Intake 486 ml   Output 1525 ml   Net -1039 ml       Physical Exam:     Physical Exam   Constitutional: He is oriented to person, place, and time  No distress  Cardiovascular: Normal rate, regular rhythm and normal heart sounds  Pulmonary/Chest: Effort normal and breath sounds normal  No stridor  No respiratory distress  Abdominal: Soft  He exhibits distension  Musculoskeletal: Normal range of motion  He exhibits no edema, tenderness or deformity  Neurological: He is alert and oriented to person, place, and time  No cranial nerve deficit  Coordination normal    Skin: Skin is warm and dry  Capillary refill takes less than 2 seconds  No rash noted  He is not diaphoretic  No erythema  No pallor  Psychiatric: He has a normal mood and affect  His behavior is normal    Nursing note and vitals reviewed  Additional Data:     Labs:    Results from last 7 days   Lab Units 20  0537 20  0556  20  0631   WBC Thousand/uL 3 16* 3 42*   < > 5 90   HEMOGLOBIN g/dL 8 7* 9 3*   < > 7 7*   HEMATOCRIT % 27 1* 28 9*   < > 23 0*   PLATELETS Thousands/uL 62* 59*   < > 46*   BANDS PCT %  --   --   --  2   LYMPHO PCT %  --  12*   < > 8*   MONO PCT %  --  6   < > 3*   EOS PCT %  --  0   < > 0    < > = values in this interval not displayed       Results from last 7 days   Lab Units 20  0537  20  0552   SODIUM mmol/L 139   < > 134*   POTASSIUM mmol/L 4 3   < > 4 6 CHLORIDE mmol/L 106   < > 104   CO2 mmol/L 21   < > 21   BUN mg/dL 31*   < > 39*   CREATININE mg/dL 1 40*   < > 1 80*   ANION GAP mmol/L 12   < > 9   CALCIUM mg/dL 8 9   < > 8 2*   ALBUMIN g/dL  --   --  2 3*   TOTAL BILIRUBIN mg/dL  --   --  0 50   ALK PHOS U/L  --   --  81   ALT U/L  --   --  26   AST U/L  --   --  24   GLUCOSE RANDOM mg/dL 97   < > 113    < > = values in this interval not displayed  * I Have Reviewed All Lab Data Listed Above  * Additional Pertinent Lab Tests Reviewed:  All Labs Within Last 24 Hours Reviewed    Imaging:    Imaging Reports Reviewed Today Include:   Imaging Personally Reviewed by Myself Includes:      Recent Cultures (last 7 days):     Results from last 7 days   Lab Units 07/24/20  1328   GRAM STAIN RESULT  Rare Polys*  2+ Gram negative rods*   WOUND CULTURE  3+ Growth of Myroides odoratimimus*  3+ Growth of Morganella morganii*  1+ Growth of        Last 24 Hours Medication List:     Current Facility-Administered Medications:  acetaminophen 650 mg Oral Q6H PRN Umm Caraballo, DPM   aspirin 81 mg Oral Daily Umm Caraballo, DPM   atorvastatin 40 mg Oral Daily With Manuel Briggs DPM   bisacodyl 5 mg Oral Daily PRN Suyapa Wayne,    calcium carbonate 500 mg Oral Daily PRN Umm Caraballo, DPM   diphenhydrAMINE 25 mg Intravenous Q6H PRN Umm Caraballo, DPM   docusate sodium 100 mg Oral BID Umm Caraballo, DPM   folic acid 1 mg Oral Daily Umm Caraballo, DPM   HYDROcodone-acetaminophen 1 tablet Oral Q4H PRN Umm Caraballo, DPM   HYDROcodone-acetaminophen 2 tablet Oral Q4H PRN Umm Caraballo, DPM   HYDROmorphone 0 5 mg Intravenous Q2H PRN Umm Caraballo, DPM   LORazepam 1 mg Oral Q8H PRN Umm Caraballo, DPM   pantoprazole 40 mg Oral Early Morning Umm Caraballo, DPM   piperacillin-tazobactam 3 375 g Intravenous Q6H Waylon Salinas MD   polyethylene glycol 17 g Oral BID Umm Caraballo, DPM   simethicone 80 mg Oral Q6H PRN Waylon Salinas MD   tamsulosin 0 4 mg Oral Daily With [facility] James Rawls DPM        Today, Patient Was Seen By: Payam Jones MD    ** Please Note: Dictation voice to text software may have been used in the creation of this document   **

## 2020-07-28 NOTE — ASSESSMENT & PLAN NOTE
Patient self catheterizes at home when he feels he needs to for BPH with retention,   Patient had to be catheterized 6 times during this hospital stay    Continue straight cath q 8 hours because patient has urinary retention, he refuses Abbott placement  On flomax

## 2020-07-28 NOTE — PROGRESS NOTES
NEPHROLOGY PROGRESS NOTE   Berenice Finley 69 y o  male MRN: [id number]  Unit/Bed#: -01 Encounter: [id number]  Reason for Consult: PALOMA (POA) on CKD III    ASSESSMENT/PLAN:  PALOMA(POA) on CKD III:  Likely multifactorial with hypertension, anemia, NSAID use, Ace inhibition, +/- urinary retention, and possible multiple myeloma  (resolved)  -presented with creatinine of 2 21   -baseline creatinine previously in the mid 1s   -creatinine stable at 1 3   -for bone marrow biopsy on Thursday    -continue to hold ACE-inhibitor  -UA was bland  -UPEP negative  -SPEP + for IgG kappa  -elevated FLC ratio  -CT shows small right renal cyst, bilateral extrarenal pelvis dilatation left greater than right  Negative for hydro   -continue to avoid episodes of hypotension, nephrotoxins, and IV contrast   -Continue intermittent straights caths  -will need follow up as OP    -I/O      Hyponatremia: (resolved) likely paraproteinemia   -will continue to monitor       Mild acidosis: (resolved)  -will continue to monitor  Consider oral bicarbonate of bicarb level drops further      Hypertension: overall stable  -Ace inhibitor remains on hold   -avoid hypotension or high fluctuations in blood pressure   -recommend holding parameter on antihypertensive for systolic blood pressure less than 909       Umbilical wound:  Exposed mesh in abscess  Status post wound exploration and primary repair of umbilical hernia    -surgery team following   -local dressing changes   -continues on antibiotics per ID      Urinary retention:  Reports intermittent self catheterization at home   -continue Q shift bladder scans with urinary retention protocol   -continue intermittent straight catheterizations      Pancytopenia:  Status post several infusions   -for bone marrow biopsy on Thursday    -hematology and GI following   -refusing EGD/colonoscopy      Right lower extremity cellulitis/skin ulceration:  S/P debridement   -abx per ID     Disposition:  Requiring additional stay due to medical needs  SUBJECTIVE:  The patient is resting in his chair  He denies chest  Pain, SOB, N/V/D  He is having issues with urination  He states his appetite comes and goes  OBJECTIVE:  Current Weight: Weight - Scale: 64 6 kg (142 lb 6 7 oz)  Vitals:    07/27/20 1559 07/27/20 2234 07/28/20 0538 07/28/20 0743   BP: 107/61 112/62  112/60   BP Location:    Right arm   Pulse:       Resp:    18   Temp: (!) 97 4 °F (36 3 °C) 98 1 °F (36 7 °C)  98 2 °F (36 8 °C)   TempSrc:    Oral   SpO2:       Weight:   64 6 kg (142 lb 6 7 oz)    Height:           Intake/Output Summary (Last 24 hours) at 7/28/2020 1009  Last data filed at 7/28/2020 0841  Gross per 24 hour   Intake 236 ml   Output 525 ml   Net -289 ml     General: NAD  Skin: warm, dry, intact, no rash  HEENT: Moist mucous membranes, sclera anicteric, normocephalic, atraumatic  Neck: No apparent JVD appreciated  Chest: lung sounds clear B/L, on RA   CVS:Regular rate and rhythm, no murmer   Abdomen: Soft, round, non-tender, +BS  Extremities:  R L LE edema  Neuro: alert and oriented  Psych: appropriate mood and affect     Medications:    Current Facility-Administered Medications:     acetaminophen (TYLENOL) tablet 650 mg, 650 mg, Oral, Q6H PRN, Ave Bone, DPM, 650 mg at 07/26/20 0201    aspirin chewable tablet 81 mg, 81 mg, Oral, Daily, Ave Bone, DPM, 81 mg at 07/28/20 0856    atorvastatin (LIPITOR) tablet 40 mg, 40 mg, Oral, Daily With Bryn Pool, DPM, 40 mg at 07/27/20 1604    bacitracin 100,000 Units, neomycin-polymyxin B (NEOSPORIN-) 1 mL in sodium chloride 0 9 % 1,000 mL irrigation bag, , Irrigation, Once, Ave Bone, DPM    bisacodyl (DULCOLAX) EC tablet 5 mg, 5 mg, Oral, Daily PRN, Juan Antonio Viramontes DO    calcium carbonate (TUMS) chewable tablet 500 mg, 500 mg, Oral, Daily PRN, Ave Calderon, AMADOU    cefepime (MAXIPIME) IVPB (premix) 1,000 mg 50 mL, 1,000 mg, Intravenous, Q24H, Ave Calderon DPM, Last Rate: 100 mL/hr at 07/28/20 0027, 1,000 mg at 07/28/20 0027    diphenhydrAMINE (BENADRYL) injection 25 mg, 25 mg, Intravenous, Q6H PRN, Caty Hicks, DPM, 25 mg at 07/23/20 1927    docusate sodium (COLACE) capsule 100 mg, 100 mg, Oral, BID, Caty Hicks, DPM, 100 mg at 36/86/94 1046    folic acid (FOLVITE) tablet 1 mg, 1 mg, Oral, Daily, Caty Hicks, DPM, 1 mg at 07/28/20 0856    HYDROcodone-acetaminophen (NORCO) 5-325 mg per tablet 1 tablet, 1 tablet, Oral, Q4H PRN, Caty Hicks, DPM, 1 tablet at 07/25/20 2156    HYDROcodone-acetaminophen (NORCO) 5-325 mg per tablet 2 tablet, 2 tablet, Oral, Q4H PRN, Caty Hicks, DPM, 2 tablet at 07/28/20 0553    HYDROmorphone (DILAUDID) injection 0 5 mg, 0 5 mg, Intravenous, Q2H PRN, Caty Hicks DPREAGAN    LORazepam (ATIVAN) tablet 1 mg, 1 mg, Oral, Q8H PRN, Caty Hicks, DPM, 1 mg at 07/27/20 2303    pantoprazole (PROTONIX) EC tablet 40 mg, 40 mg, Oral, Early Morning, Caty Hicks, DPM, 40 mg at 07/28/20 0553    polyethylene glycol (MIRALAX) packet 17 g, 17 g, Oral, BID, Caty Hicks DPM, 17 g at 07/28/20 0856    tamsulosin (FLOMAX) capsule 0 4 mg, 0 4 mg, Oral, Daily With Munir Shultz DPREAGAN, 0 4 mg at 07/27/20 1604    vancomycin (VANCOCIN) 1,500 mg in sodium chloride 0 9 % 250 mL IVPB, 1,500 mg, Intravenous, Q24H, Caty Hicks DPREAGAN, Last Rate: 166 7 mL/hr at 07/27/20 1749, 1,500 mg at 07/27/20 1749    Laboratory Results:  Results from last 7 days   Lab Units 07/28/20  0537 07/27/20  0556 07/26/20  1840 07/26/20  0553  07/23/20  0552   WBC Thousand/uL 3 16* 3 42*  --  3 23*   < >  --    HEMOGLOBIN g/dL 8 7* 9 3* 8 5* 8 2*   < >  --    HEMATOCRIT % 27 1* 28 9* 25 8* 25 0*   < >  --    PLATELETS Thousands/uL 62* 59*  --  53*   < >  --    SODIUM mmol/L 139 137  --  135*   < > 134*   POTASSIUM mmol/L 4 3 4 6  --  4 8   < > 4 6   CHLORIDE mmol/L 106 106  --  106   < > 104   CO2 mmol/L 21 20*  --  20*   < > 21   BUN mg/dL 31* 27*  --  28*   < > 39*   CREATININE mg/dL 1 40* 1 31*  -- 1 36*   < > 1 80*   CALCIUM mg/dL 8 9 8 6  --  8 2*   < > 8 2*   MAGNESIUM mg/dL  --   --   --   --   --  2 4   ALK PHOS U/L  --   --   --   --   --  81   ALT U/L  --   --   --   --   --  26   AST U/L  --   --   --   --   --  24    < > = values in this interval not displayed

## 2020-07-28 NOTE — ASSESSMENT & PLAN NOTE
· Umbilical wound infection with exposed mesh and abscess  Occurring since hernia surgery  · Patient underwent  EXPLORATORY laparotomy, explantation of infected mesh and repair of ventral heria (N/A) on 07/21/2020  · Post-op management per surgery  · Pain management p r n   · On IV antibiotics - ID consulted to help with duration and coverage  · Cultures growing multiple organisms

## 2020-07-28 NOTE — PROGRESS NOTES
Vancomycin IV Pharmacy-to-Dose Consultation    Milagro Brian is a 69 y o  male who is no longer receiving Vancomycin IV  It has been discontinued by the provider  Patient refused blood draws for level checks despite multiple attempts of education regarding its importance by Nursing  Pharmacy will sign off with respect to Vancomycin dosing and management  The previously ordered Vancomycin trough and order for Pharmacy general consult have already been discontinued  Thank you for the opportunity to provide Pharmacy Consult services      Ian Garrett, Pharmacist

## 2020-07-28 NOTE — ASSESSMENT & PLAN NOTE
Patient has stage III chronic disease with creatinine around 2 0 at baseline  Patient's renal function is at baseline:  Creatinine is 1 40 today  Nephrology on board  Monitor renal function in a m

## 2020-07-28 NOTE — ASSESSMENT & PLAN NOTE
Patient has right lower leg wounds with cellulitis  On IV vancomycin and cefepime, started 7/17    Patient underwent angiogram   Patient underwent debridement of right lower extremity on 07/24/2020  OR wound cultures - growing morganella and multiple organisms- intermediate resistance to cefepime  Stop cefepime  Start IV zosyn  ID consulted to help with antibiotic coverage and duration  Podiatry follow-up  Dressing changes per Podiatry

## 2020-07-28 NOTE — PROGRESS NOTES
Progress note - Gastroenterology   Wayne Nuno 69 y o  male MRN: [id number]  Unit/Bed#: -01 Encounter: [id number]    ASSESSMENT and PLAN       1  Anemia    No signs of overt GI blood loss  Iron panel consistent with anemia of chronic disease  Bone marrow biopsy was ordered yesterday but not completed  Hgb 8 7 this AM      - continue to monitor for signs of overt GI blood loss   - continue to trend H&H    2  Abnormal CT  CT abdomen and pelvis showed gastric thickening which was consistent with gastritis  Radiology recommended direct visualization with EGD to rule out malignancy  Discussed with patient again today, he continues to decline endoscopic workup  - continue Protonix daily    3  Umbilical wound with abscess  Status post surgical repair on 07/21     - Surgery following  - continue IV Cefepime and Vanco     4  Constipation  Has not moved his bowels in the past several days  - continue MiraLax daily  - continue Dulcolax as needed  - encouraged increase fluid intake      Chief Complaint   Patient presents with    Wound Infection - Complicated     Pt with leg wounds x 2 months, here for dyspnea, weakness and feeling ill   +n/+v  SUBJECTIVE/HPI   Complaining of moderate pain at surgical site, rates pain 5/10 currently  Denies any fever, chills, heartburn, reflux, nausea, vomiting  He has not moved his bowels in the past few days       /60 (BP Location: Right arm)   Pulse 82   Temp 98 2 °F (36 8 °C) (Oral)   Resp 18   Ht 5' 10" (1 778 m)   Wt 64 6 kg (142 lb 6 7 oz)   SpO2 99%   BMI 20 43 kg/m²     PHYSICALEXAM  General appearance: alert, appears stated age and cooperative  Eyes: PERLLA, EOMI, no icterus   Head: Normocephalic, without obvious abnormality, atraumatic  Lungs: clear to auscultation bilaterally  Heart: regular rate and rhythm, S1, S2 normal, no murmur, click, rub or gallop  Abdomen: soft, +drsg to midline abdomen, mild drainage, mild diffuse abdominal ttp, no guarding or rebound, bowel sounds normal; no masses,  no organomegaly  Extremities: extremities normal, atraumatic, no cyanosis or edema, B/L ecchymosis to UE   Neurologic: Grossly normal    Lab Results   Component Value Date    GLUCOSE 127 11/20/2018    CALCIUM 8 9 07/28/2020    K 4 3 07/28/2020    CO2 21 07/28/2020     07/28/2020    BUN 31 (H) 07/28/2020    CREATININE 1 40 (H) 07/28/2020     Lab Results   Component Value Date    WBC 3 16 (L) 07/28/2020    HGB 8 7 (L) 07/28/2020    HCT 27 1 (L) 07/28/2020    MCV 96 07/28/2020    PLT 62 (L) 07/28/2020     Lab Results   Component Value Date    ALT 26 07/23/2020    AST 24 07/23/2020    ALKPHOS 81 07/23/2020     No results found for: AMYLASE  Lab Results   Component Value Date    LIPASE 255 07/16/2020     Lab Results   Component Value Date    IRON 229 (H) 07/16/2020    TIBC 285 07/16/2020    FERRITIN 303 07/16/2020     Lab Results   Component Value Date    INR 1 46 (H) 07/16/2020

## 2020-07-28 NOTE — PLAN OF CARE
Problem: Potential for Falls  Goal: Patient will remain free of falls  Description  INTERVENTIONS:  - Assess patient frequently for physical needs  -  Identify cognitive and physical deficits and behaviors that affect risk of falls  -  [place] fall precautions as indicated by assessment   - Educate patient/family on patient safety including physical limitations  - Instruct patient to call for assistance with activity based on assessment  - Modify environment to reduce risk of injury  - Consider OT/PT consult to assist with strengthening/mobility  Outcome: Progressing     Problem: Prexisting or High Potential for Compromised Skin Integrity  Goal: Skin integrity is maintained or improved  Description  INTERVENTIONS:  - Identify patients at risk for skin breakdown  - Assess and monitor skin integrity  - Assess and monitor nutrition and hydration status  - Monitor labs   - Assess for incontinence   - Turn and reposition patient  - Assist with mobility/ambulation  - Relieve pressure over bony prominences  - Avoid friction and shearing  - Provide appropriate hygiene as needed including keeping skin clean and dry  - Evaluate need for skin moisturizer/barrier cream  - Collaborate with interdisciplinary team   - Patient/family teaching  - Consider wound care consult   Outcome: Progressing     Problem: Nutrition/Hydration-ADULT  Goal: Nutrient/Hydration intake appropriate for improving, restoring or maintaining nutritional needs  Description  Monitor and assess patient's nutrition/hydration status for malnutrition  Collaborate with interdisciplinary team and initiate plan and interventions as ordered  Monitor patient's weight and dietary intake as ordered or per policy  Utilize nutrition screening tool and intervene as necessary  Determine patient's food preferences and provide high-protein, high-caloric foods as appropriate       INTERVENTIONS:  - Monitor oral intake, urinary output, labs, and treatment plans  - Assess nutrition and hydration status and recommend course of action  - Evaluate amount of meals eaten  - Assist patient with eating if necessary   - Allow adequate time for meals  - Recommend/ encourage appropriate diets, oral nutritional supplements, and vitamin/mineral supplements  - Order, calculate, and assess calorie counts as needed  - Recommend, monitor, and adjust tube feedings and TPN/PPN based on assessed needs  - Assess need for intravenous fluids  - Provide specific nutrition/hydration education as appropriate  - Include patient/family/caregiver in decisions related to nutrition  Outcome: Progressing     Problem: NEUROSENSORY - ADULT  Goal: Achieves maximal functionality and self care  Description  INTERVENTIONS  - Monitor swallowing and airway patency with patient fatigue and changes in neurological status  - Encourage and assist patient to increase activity and self care     - Encourage visually impaired, hearing impaired and aphasic patients to use assistive/communication devices  Outcome: Progressing     Problem: GASTROINTESTINAL - ADULT  Goal: Minimal or absence of nausea and/or vomiting  Description  INTERVENTIONS:  - Administer IV fluids if ordered to ensure adequate hydration  - Maintain NPO status until nausea and vomiting are resolved  - Nasogastric tube if ordered  - Administer ordered antiemetic medications as needed  - Provide nonpharmacologic comfort measures as appropriate  - Advance diet as tolerated, if ordered  - Consider nutrition services referral to assist patient with adequate nutrition and appropriate food choices  Outcome: Progressing  Goal: Maintains adequate nutritional intake  Description  INTERVENTIONS:  - Monitor percentage of each meal consumed  - Identify factors contributing to decreased intake, treat as appropriate  - Assist with meals as needed  - Monitor I&O, weight, and lab values if indicated  - Obtain nutrition services referral as needed  Outcome: Progressing     Problem: METABOLIC, FLUID AND ELECTROLYTES - ADULT  Goal: Electrolytes maintained within normal limits  Description  INTERVENTIONS:  - Monitor labs and assess patient for signs and symptoms of electrolyte imbalances  - Administer electrolyte replacement as ordered  - Monitor response to electrolyte replacements, including repeat lab results as appropriate  - Instruct patient on fluid and nutrition as appropriate  Outcome: Progressing  Goal: Fluid balance maintained  Description  INTERVENTIONS:  - Monitor labs   - Monitor I/O and WT  - Instruct patient on fluid and nutrition as appropriate  - Assess for signs & symptoms of volume excess or deficit  Outcome: Progressing     Problem: SKIN/TISSUE INTEGRITY - ADULT  Goal: Skin integrity remains intact  Description  INTERVENTIONS  - Identify patients at risk for skin breakdown  - Assess and monitor skin integrity  - Assess and monitor nutrition and hydration status  - Monitor labs (i e  albumin)  - Assess for incontinence   - Turn and reposition patient  - Assist with mobility/ambulation  - Relieve pressure over bony prominences  - Avoid friction and shearing  - Provide appropriate hygiene as needed including keeping skin clean and dry  - Evaluate need for skin moisturizer/barrier cream  - Collaborate with interdisciplinary team (i e  Nutrition, Rehabilitation, etc )   - Patient/family teaching  Outcome: Progressing  Goal: Incision(s), wounds(s) or drain site(s) healing without S/S of infection  Description  INTERVENTIONS  - Assess and document risk factors for skin impairment   - Assess and document dressing, incision, wound bed, drain sites and surrounding tissue  - Consider nutrition services referral as needed  - Oral mucous membranes remain intact  - Provide patient/ family education  Outcome: Progressing     Problem: HEMATOLOGIC - ADULT  Goal: Maintains hematologic stability  Description  INTERVENTIONS  - Assess for signs and symptoms of bleeding or hemorrhage  - Monitor labs  - Administer supportive blood products/factors as ordered and appropriate  Outcome: Progressing     Problem: MUSCULOSKELETAL - ADULT  Goal: Maintain or return mobility to safest level of function  Description  INTERVENTIONS:  - Assess patient's ability to carry out ADLs; assess patient's baseline for ADL function and identify physical deficits which impact ability to perform ADLs (bathing, care of mouth/teeth, toileting, grooming, dressing, etc )  - Assess/evaluate cause of self-care deficits   - Assess range of motion  - Assess patient's mobility  - Assess patient's need for assistive devices and provide as appropriate  - Encourage maximum independence but intervene and supervise when necessary  - Involve family in performance of ADLs  - Assess for home care needs following discharge   - Consider OT consult to assist with ADL evaluation and planning for discharge  - Provide patient education as appropriate  Outcome: Progressing     Problem: PAIN - ADULT  Goal: Verbalizes/displays adequate comfort level or baseline comfort level  Description  Interventions:  - Encourage patient to monitor pain and request assistance  - Assess pain using appropriate pain scale  - Administer analgesics based on type and severity of pain and evaluate response  - Implement non-pharmacological measures as appropriate and evaluate response  - Consider cultural and social influences on pain and pain management  - Notify physician/advanced practitioner if interventions unsuccessful or patient reports new pain  Outcome: Progressing     Problem: INFECTION - ADULT  Goal: Absence or prevention of progression during hospitalization  Description  INTERVENTIONS:  - Assess and monitor for signs and symptoms of infection  - Monitor lab/diagnostic results  - Monitor all insertion sites, i e  indwelling lines, tubes, and drains  - Monitor endotracheal if appropriate and nasal secretions for changes in amount and color  - Nettie appropriate cooling/warming therapies per order  - Administer medications as ordered  - Instruct and encourage patient and family to use good hand hygiene technique  - Identify and instruct in appropriate isolation precautions for identified infection/condition  Outcome: Progressing  Goal: Absence of fever/infection during neutropenic period  Description  INTERVENTIONS:  - Monitor WBC    Outcome: Progressing     Problem: SAFETY ADULT  Goal: Patient will remain free of falls  Description  INTERVENTIONS:  - Assess patient frequently for physical needs  -  Identify cognitive and physical deficits and behaviors that affect risk of falls  -  [place] fall precautions as indicated by assessment   - Educate patient/family on patient safety including physical limitations  - Instruct patient to call for assistance with activity based on assessment  - Modify environment to reduce risk of injury  - Consider OT/PT consult to assist with strengthening/mobility  Outcome: Progressing     Problem: DISCHARGE PLANNING  Goal: Discharge to home or other facility with appropriate resources  Description  INTERVENTIONS:  - Identify barriers to discharge w/patient and caregiver  - Arrange for needed discharge resources and transportation as appropriate  - Identify discharge learning needs (meds, wound care, etc )  - Arrange for interpretive services to assist at discharge as needed  - Refer to Case Management Department for coordinating discharge planning if the patient needs post-hospital services based on physician/advanced practitioner order or complex needs related to functional status, cognitive ability, or social support system  Outcome: Progressing     Problem: Knowledge Deficit  Goal: Patient/family/caregiver demonstrates understanding of disease process, treatment plan, medications, and discharge instructions  Description  Complete learning assessment and assess knowledge base    Interventions:  - Provide teaching at level of understanding  - Provide teaching via preferred learning methods  Outcome: Progressing

## 2020-07-28 NOTE — ASSESSMENT & PLAN NOTE
Wt Readings from Last 3 Encounters:   07/28/20 64 6 kg (142 lb 6 7 oz)   06/21/19 68 3 kg (150 lb 9 6 oz)   06/19/19 67 6 kg (149 lb)     Chronic CHF, not in acute exacerbation  Echocardiogram on this admission shows ejection fraction of 50%  Patient's lungs are clear, has no JVD    Continue to hold lisinopril, Lasix and Toprol because of relative hypotension  Daily weight and I&Os

## 2020-07-28 NOTE — ASSESSMENT & PLAN NOTE
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

## 2020-07-28 NOTE — PLAN OF CARE
Problem: Potential for Falls  Goal: Patient will remain free of falls  Description  INTERVENTIONS:  - Assess patient frequently for physical needs  -  Identify cognitive and physical deficits and behaviors that affect risk of falls  -  [place] fall precautions as indicated by assessment   - Educate patient/family on patient safety including physical limitations  - Instruct patient to call for assistance with activity based on assessment  - Modify environment to reduce risk of injury  - Consider OT/PT consult to assist with strengthening/mobility  Outcome: Progressing     Problem: Prexisting or High Potential for Compromised Skin Integrity  Goal: Skin integrity is maintained or improved  Description  INTERVENTIONS:  - Identify patients at risk for skin breakdown  - Assess and monitor skin integrity  - Assess and monitor nutrition and hydration status  - Monitor labs   - Assess for incontinence   - Turn and reposition patient  - Assist with mobility/ambulation  - Relieve pressure over bony prominences  - Avoid friction and shearing  - Provide appropriate hygiene as needed including keeping skin clean and dry  - Evaluate need for skin moisturizer/barrier cream  - Collaborate with interdisciplinary team   - Patient/family teaching  - Consider wound care consult   Outcome: Progressing     Problem: Nutrition/Hydration-ADULT  Goal: Nutrient/Hydration intake appropriate for improving, restoring or maintaining nutritional needs  Description  Monitor and assess patient's nutrition/hydration status for malnutrition  Collaborate with interdisciplinary team and initiate plan and interventions as ordered  Monitor patient's weight and dietary intake as ordered or per policy  Utilize nutrition screening tool and intervene as necessary  Determine patient's food preferences and provide high-protein, high-caloric foods as appropriate       INTERVENTIONS:  - Monitor oral intake, urinary output, labs, and treatment plans  - Assess nutrition and hydration status and recommend course of action  - Evaluate amount of meals eaten  - Assist patient with eating if necessary   - Allow adequate time for meals  - Recommend/ encourage appropriate diets, oral nutritional supplements, and vitamin/mineral supplements  - Order, calculate, and assess calorie counts as needed  - Recommend, monitor, and adjust tube feedings and TPN/PPN based on assessed needs  - Assess need for intravenous fluids  - Provide specific nutrition/hydration education as appropriate  - Include patient/family/caregiver in decisions related to nutrition  Outcome: Progressing     Problem: NEUROSENSORY - ADULT  Goal: Achieves maximal functionality and self care  Description  INTERVENTIONS  - Monitor swallowing and airway patency with patient fatigue and changes in neurological status  - Encourage and assist patient to increase activity and self care     - Encourage visually impaired, hearing impaired and aphasic patients to use assistive/communication devices  Outcome: Progressing     Problem: GASTROINTESTINAL - ADULT  Goal: Minimal or absence of nausea and/or vomiting  Description  INTERVENTIONS:  - Administer IV fluids if ordered to ensure adequate hydration  - Maintain NPO status until nausea and vomiting are resolved  - Nasogastric tube if ordered  - Administer ordered antiemetic medications as needed  - Provide nonpharmacologic comfort measures as appropriate  - Advance diet as tolerated, if ordered  - Consider nutrition services referral to assist patient with adequate nutrition and appropriate food choices  Outcome: Progressing  Goal: Maintains adequate nutritional intake  Description  INTERVENTIONS:  - Monitor percentage of each meal consumed  - Identify factors contributing to decreased intake, treat as appropriate  - Assist with meals as needed  - Monitor I&O, weight, and lab values if indicated  - Obtain nutrition services referral as needed  Outcome: Progressing     Problem: METABOLIC, FLUID AND ELECTROLYTES - ADULT  Goal: Electrolytes maintained within normal limits  Description  INTERVENTIONS:  - Monitor labs and assess patient for signs and symptoms of electrolyte imbalances  - Administer electrolyte replacement as ordered  - Monitor response to electrolyte replacements, including repeat lab results as appropriate  - Instruct patient on fluid and nutrition as appropriate  Outcome: Progressing  Goal: Fluid balance maintained  Description  INTERVENTIONS:  - Monitor labs   - Monitor I/O and WT  - Instruct patient on fluid and nutrition as appropriate  - Assess for signs & symptoms of volume excess or deficit  Outcome: Progressing     Problem: SKIN/TISSUE INTEGRITY - ADULT  Goal: Skin integrity remains intact  Description  INTERVENTIONS  - Identify patients at risk for skin breakdown  - Assess and monitor skin integrity  - Assess and monitor nutrition and hydration status  - Monitor labs (i e  albumin)  - Assess for incontinence   - Turn and reposition patient  - Assist with mobility/ambulation  - Relieve pressure over bony prominences  - Avoid friction and shearing  - Provide appropriate hygiene as needed including keeping skin clean and dry  - Evaluate need for skin moisturizer/barrier cream  - Collaborate with interdisciplinary team (i e  Nutrition, Rehabilitation, etc )   - Patient/family teaching  Outcome: Progressing  Goal: Incision(s), wounds(s) or drain site(s) healing without S/S of infection  Description  INTERVENTIONS  - Assess and document risk factors for skin impairment   - Assess and document dressing, incision, wound bed, drain sites and surrounding tissue  - Consider nutrition services referral as needed  - Oral mucous membranes remain intact  - Provide patient/ family education  Outcome: Progressing     Problem: HEMATOLOGIC - ADULT  Goal: Maintains hematologic stability  Description  INTERVENTIONS  - Assess for signs and symptoms of bleeding or hemorrhage  - Monitor labs  - Administer supportive blood products/factors as ordered and appropriate  Outcome: Progressing     Problem: MUSCULOSKELETAL - ADULT  Goal: Maintain or return mobility to safest level of function  Description  INTERVENTIONS:  - Assess patient's ability to carry out ADLs; assess patient's baseline for ADL function and identify physical deficits which impact ability to perform ADLs (bathing, care of mouth/teeth, toileting, grooming, dressing, etc )  - Assess/evaluate cause of self-care deficits   - Assess range of motion  - Assess patient's mobility  - Assess patient's need for assistive devices and provide as appropriate  - Encourage maximum independence but intervene and supervise when necessary  - Involve family in performance of ADLs  - Assess for home care needs following discharge   - Consider OT consult to assist with ADL evaluation and planning for discharge  - Provide patient education as appropriate  Outcome: Progressing     Problem: PAIN - ADULT  Goal: Verbalizes/displays adequate comfort level or baseline comfort level  Description  Interventions:  - Encourage patient to monitor pain and request assistance  - Assess pain using appropriate pain scale  - Administer analgesics based on type and severity of pain and evaluate response  - Implement non-pharmacological measures as appropriate and evaluate response  - Consider cultural and social influences on pain and pain management  - Notify physician/advanced practitioner if interventions unsuccessful or patient reports new pain  Outcome: Progressing     Problem: INFECTION - ADULT  Goal: Absence or prevention of progression during hospitalization  Description  INTERVENTIONS:  - Assess and monitor for signs and symptoms of infection  - Monitor lab/diagnostic results  - Monitor all insertion sites, i e  indwelling lines, tubes, and drains  - Monitor endotracheal if appropriate and nasal secretions for changes in amount and color  - [place] appropriate cooling/warming therapies per order  - Administer medications as ordered  - Instruct and encourage patient and family to use good hand hygiene technique  - Identify and instruct in appropriate isolation precautions for identified infection/condition  Outcome: Progressing  Goal: Absence of fever/infection during neutropenic period  Description  INTERVENTIONS:  - Monitor WBC    Outcome: Progressing     Problem: SAFETY ADULT  Goal: Patient will remain free of falls  Description  INTERVENTIONS:  - Assess patient frequently for physical needs  -  Identify cognitive and physical deficits and behaviors that affect risk of falls  -  [place] fall precautions as indicated by assessment   - Educate patient/family on patient safety including physical limitations  - Instruct patient to call for assistance with activity based on assessment  - Modify environment to reduce risk of injury  - Consider OT/PT consult to assist with strengthening/mobility  Outcome: Progressing     Problem: DISCHARGE PLANNING  Goal: Discharge to home or other facility with appropriate resources  Description  INTERVENTIONS:  - Identify barriers to discharge w/patient and caregiver  - Arrange for needed discharge resources and transportation as appropriate  - Identify discharge learning needs (meds, wound care, etc )  - Arrange for interpretive services to assist at discharge as needed  - Refer to Case Management Department for coordinating discharge planning if the patient needs post-hospital services based on physician/advanced practitioner order or complex needs related to functional status, cognitive ability, or social support system  Outcome: Progressing     Problem: Knowledge Deficit  Goal: Patient/family/caregiver demonstrates understanding of disease process, treatment plan, medications, and discharge instructions  Description  Complete learning assessment and assess knowledge base    Interventions:  - Provide teaching at level of understanding  - Provide teaching via preferred learning methods  Outcome: Progressing

## 2020-07-28 NOTE — NURSING NOTE
At present pt is refusing a vancomycin blood  After several attempts of explaining the importance and negative impacts of refusing vancomycin lab draw, the patient still refusing  MD Samantha Talamantes made aware and Pharmacy made aware

## 2020-07-28 NOTE — ASSESSMENT & PLAN NOTE
POA, severe symptomatic normocytic anemia, HB 6 7 acausing shortness of breath on exertion secondary to ASA/NSAID use for chronic right lower leg ulcers that were getting worse  CT Abd thickening of the gastric antrum likely gastritis but unable to exclude gastric neoplasm  S/p 3iu prbcs  Hb 9 3  Monitor H&H and transfuse as needed  Continue Protonix b i d ,  Patient refusing endoscopic workup for gastric mass  GI On board, appreciate recs  Patient unable to get IR bone marrow biopsy 7/27, biopsy scheduled for Thursday 7/30  Hematology consult appreciated  Possible myelodysplastic syndrome/multiple myeloma  IgG kappa with a ratio of light chains over 10 which are suggestive of multiple myeloma    Would need IR biopsy prior to discharge

## 2020-07-29 NOTE — ASSESSMENT & PLAN NOTE
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

## 2020-07-29 NOTE — ASSESSMENT & PLAN NOTE
Wt Readings from Last 3 Encounters:   07/29/20 65 kg (143 lb 4 8 oz)   06/21/19 68 3 kg (150 lb 9 6 oz)   06/19/19 67 6 kg (149 lb)     Chronic CHF, not in acute exacerbation  Echocardiogram on this admission shows ejection fraction of 50%  Patient's lungs are clear, has no JVD    Continue to hold lisinopril, Lasix and Toprol because of relative hypotension  Daily weight and I&Os

## 2020-07-29 NOTE — ASSESSMENT & PLAN NOTE
· POA, severe symptomatic normocytic anemia, HB 6 7 causing shortness of breath on exertion secondary to ASA/NSAID use for chronic right lower leg ulcers that were getting worse  · CT Abd thickening of the gastric antrum likely gastritis but unable to exclude gastric neoplasm  S/p 3iu prbcs  Hb 7 2  Monitor H&H and transfuse as needed  Continue Protonix b i d ,  Patient refusing endoscopic workup for gastric mass  GI On board, appreciate recs  Patient unable to get IR bone marrow biopsy 7/27, biopsy scheduled for Thursday 7/30  Hematology consult appreciated  Possible myelodysplastic syndrome/multiple myeloma  IgG kappa with a ratio of light chains over 10 which are suggestive of multiple myeloma    Would need IR biopsy prior to discharge

## 2020-07-29 NOTE — CONSULTS
Consultation - Infectious Disease   Mavis Melgar 69 y o  male MRN: [id number]  Unit/Bed#: -01 Encounter: [id number]      Assessment/Plan     Assessment:  53-year-old man with right lower extremity skin and soft tissue infection, infected abdominal mesh, CKD  Plan:  1) Abdominal mesh infection - fortunately, patient with sensitive organisms, display polymicrobial nature of wound  Given that the culture which is available is from wound drainage and not from the OR, organisms isolated may represent colonizers rather than true pathogens  Regardless, we will be able to cover for all cultured organisms relatively easily  Would treat to a total of 14 days from the date of surgery     ===> Will D/C PIPERACILLIN/TAZOBACTAM  ===> Will START AMOX/CLAV 875/125 PO BID and LEVOFLOXAVIN 750mg PO q48h until 8 AUGUST 2020  2) RLE SSTI - appreciate debridement by Podiatry  Organisms cultured from right lower extremity will be covered by above antibiotic regimen  3) CKD - Will dose abx for pt's renal function, and avoid nephrotoxic agents as able       ===> Discussed w/ 1' team   ===> Will follow     History of Present Illness   Physician Requesting Consult: Rebecca Nava MD    HPI:  Briefly, a 53-year-old man with a past medical history significant for CKD stage 3, atrial fibrillation post ablation and pacemaker placement, idiopathic thrombocytopenia, CHF, peripheral vascular disease, and neuropathy presented to the [address] [place] [address] emergency department on 7/17 with complaints of 2 days of worsening and severe dyspnea on exertion as well as nonbloody nonbilious vomiting and nausea, and worsening of right lower extremity ulcers  For admission he was found to have infected abdominal mesh at the site of a prior hernia repair, and this was excised on 07/21  Cultures from this wound taken prior to surgery grew sensitive strains of Morganella, Enterococcus faecalis, and Enterobacter cloacae    Patient has been doing well postoperatively, and has a well-healing midline surgical incision  Additionally, he was evaluated by Podiatry and vascular surgery for right lower extremity ulcers  Wound cultures from these taken on admission are growing sensitive strains of Klebsiella oxytoca, undifferentiated Enterococcus, Mycorodies odoraminus and Morganella morganii  Anaerobic cultures grew beta lactamase producing Bacteroides species  Patient was taken for operative debridement of these wounds on 07/24, after undergoing undergoing an angiogram on 07/20 to improve right lower extremity blood flow  CT scan of the abdomen and pelvis (independently viewed and reviewed by me) shows findings consistent with abdominal mesh infection  Chest x-ray done on admission (independently viewed reviewed by me) is unremarkable with exception of presence of a pacemaker  Blood cultures done on admission are without growth, and an x-ray of the right lower extremity open (independently viewed and reviewed by me) is unremarkable  Patient has been on broad antibiotics more less since admission (vancomycin and cefepime from 07/18 until 7/28, piperacillin/tazobactam since 07/28) and clinically is improving  ROS:  A complete review of systems was done and is negative except as per the HPI       Historical Information   Past Medical History:   Diagnosis Date    Anemia     Atrial fibrillation ([address] )     Benign prostatic hyperplasia without urinary obstruction     Congestive heart failure with left ventricular diastolic dysfunction, chronic (HCC)     History of ITP     Hypertension     Lumbosacral disc herniation     Peripheral vascular disease of lower extremity (HCC)     Renal disorder     Subdural hematoma (HCC)      Past Surgical History:   Procedure Laterality Date    BACK SURGERY      AUSTYN HOLE FOR SUBDURAL HEMATOMA      CARDIAC PACEMAKER PLACEMENT      CLAVICLE SURGERY Left 2011    HERNIA REPAIR      IR ABDOMINAL ANGIOGRAPHY / INTERVENTION  7/20/2020    LAPAROTOMY N/A 7/21/2020    Procedure: LAPAROTOMY EXPLORATORY, excision of infected mesh and repair of ventral heria; Surgeon: Manuel Elizondo MD;  Location:  MAIN OR;  Service: General    PROSTATE SURGERY      VENTRAL HERNIA REPAIR N/A 7/21/2020    Procedure: REPAIR HERNIA VENTRAL - EXCSION OF INFECTED MESH;  Surgeon: Manuel Elizondo MD;  Location:  MAIN OR;  Service: General    WOUND DEBRIDEMENT Right 7/24/2020    Procedure: DEBRIDEMENT LOWER EXTREMITY ([address] OUT); Surgeon: Shantel Ramsey DPM;  Location:  MAIN OR;  Service: Podiatry     Social History   Social History     Substance and Sexual Activity   Alcohol Use Not Currently    Alcohol/week: 0 0 standard drinks    Frequency: Never    Binge frequency: Never    Comment: Denied use     Social History     Substance and Sexual Activity   Drug Use Never    Comment: Denied use     E-Cigarette/Vaping     E-Cigarette/Vaping Substances    Nicotine No     THC No     CBD No     Flavoring No     Other No     Unknown No      Social History     Tobacco Use   Smoking Status Never Smoker   Smokeless Tobacco Never Used   Tobacco Comment    per [employer]-Former Smoker and Never Smoker-pls confirm     Family History: non-contributory    Meds/Allergies   all current active meds have been reviewed    No Known Allergies    Objective       Intake/Output Summary (Last 24 hours) at 7/29/2020 1709  Last data filed at 7/29/2020 1534  Gross per 24 hour   Intake 120 ml   Output 775 ml   Net -655 ml       Invasive Devices:   Peripheral IV 07/25/20 Left Forearm (Active)   Site Assessment Intact 7/29/2020  8:00 AM   Dressing Type Transparent 7/29/2020  8:00 AM   Line Status Flushed;Capped 7/29/2020  8:00 AM   Dressing Status Intact 7/29/2020  8:00 AM       Physical Exam  Gen: AAO, NAD  HENT: Normocephalic, atraumatic, MMM, no oropharyngeal exudate  Neck: Supple, trachea midline  CV: No m/r/g appreciated, regular rate  Pulm: No resp  Distress, lungs CTAB  Abd: S/NT/ND, no hepatomegaly appreciated, otherwise per HPI  Ext: No LE edema, intact radial pulses, RLE in surgical bandage  Lymph: No anterior cervical or supraclav  LAD appreciated  Skin: No rash on exposed skin, warm, dry  Psych: Nl  Mood, nl affect  Neuro: Oriented, UE  strength intact   Lab Results: I have personally reviewed pertinent labs    Imaging Studies: I have personally reviewed pertinent films in PACS  EKG, Pathology, and Other Studies: I have personally reviewed pertinent films in PACS

## 2020-07-29 NOTE — PROGRESS NOTES
Progress Note - Podiatry  Nikko Sims 69 y o  male MRN: [id number]  Unit/Bed#: -01 Encounter: [id number]    Assessment/Plan:  Cellulitis right leg  Skin ulceration right leg fat layer exposed  PVD/Atherosclerosis of native artery right leg with ulceration   -POD #5 S/P debridement multiple right leg wounds              -S/P repair umbilical hernia progressing satisfactorily per general surgery   -patient scheduled for bone marrow biopsy today rescheduled from this past Monday    -dry sterile dressing with Dermagran/ABD applied right leg    -patient dispensed shower boot which was brought from my office    -wounds have progressed satisfactorily and he is good for discharge after his bone marrow biopsy  Lactic acidosis, necrotic umbilical wound, chronic kidney disease stage 3, anemia, and AFib              -managed per Internal Medicine, general surgery, and nephrology    Subjective/Objective   Chief Complaint:   Chief Complaint   Patient presents with    Wound Infection - Complicated     Pt with leg wounds x 2 months, here for dyspnea, weakness and feeling ill   +n/+v  Subjective:  74-year-old white male seen today seated at bedside with legs dependent and head on tray with pillows  Patient advised that he is not to leave his legs dependent like that because it contributes to the swelling  Relates less pain compared to a few days ago  Denies any fever or shortness of breath  Blood pressure 102/54, pulse 82, temperature 97 9 °F (36 6 °C), temperature source Oral, resp  rate 18, height 5' 10" (1 778 m), weight 65 kg (143 lb 4 8 oz), SpO2 99 %  ,Body mass index is 20 56 kg/m²      Invasive Devices     Peripheral Intravenous Line            Peripheral IV 07/25/20 Left Forearm 3 days                Physical Exam:   General appearance: alert, cooperative and with pain right leg  Neuro/Vascular: Normal strength  Sensation and reflexes:  Grossly intact  Pulses: Right: DP 0/4, PT 0/4, Left: DP 0/4, PT 0/4  Capillary Filling:  3 Sec, Edema:  +1 right leg, none left  Vascular calcifications noted on tib-fib x-ray RLE  Arteriogram 7/20/2020:  · RLE: CFA: Patent, Profunda femoris: Patent   SFA: Proximal portion is patent, normal caliber  High-grade (greater than 80%) 1 cm focal stenosis mid SFA  Moderate grade tandem distal stenoses and in particular 60% stenosis at the abductor canal    Popliteal artery: Patent, noting 60% focal stenosis in the above-the-knee segment  Runoff: Peroneal runoff to the foot with distally reconstituted posterior tibial artery filling of the plantar arch  Chronically occluded anterior tibial artery  Proximal posterior tibial artery is patent and then occludes  · LLE:    CFA: Patent Profunda femoris: Proximal portion is patent   SFA: Proximal portion is patent  · POST INTERVENTION FINDINGS:   1  Significant luminal gain of multifocal right SFA and popliteal artery stenoses following a combination of 6 mm     and 7 mm POBA and DCB  2   Refractory proximal R SFA (30%)  stenosis  Considered stent placement; however, patient was quite     uncooperative with movement and asked multiple times for the procedure to be terminated  3   No change in tibial runoff  4   Left common femoral artery access, successfully closed with a Vascade  · IMPRESSION:  Technically successful multifocal balloon angioplasty of multiple stenoses throughout the right superficial femoral and popliteal arteries  Patient has significant right tibial disease with in-line flow into the peroneal artery and distal reconstituted posterior tibial artery  Could consider tibial intervention in the future  Would recommend anesthesia support given patient inability to lay still  Arterial duplex 7/21/2020:  · RIGHT LOWER LIMB   Ankle/Brachial Index: 1 49 which is in the unreliable range   PPG/PVR Tracings are slightly dampened  Biphasic waveforms at the ankle     Metatarsal Pressure 147mmHg   Great Toe Pressure: unable to obtain due to patient's foot movements  · LEFT LOWER LIMB   Ankle/Brachial Index: 1 27 which is in the normal range   PPG/PVR Tracings are slightly dampened  Triphasic waveforms at the ankle  Metatarsal Pressure 73mmHg   Great Toe Pressure: 51mmHg, within the healing range        Extremity: Several large ulcerations present on the medial, anterior, and posterior lateral aspect of the right lower leg with fat layer exposed  All wounds appear red/pink and healthy with no active necrosis noted  Only minimal strike through drainage noted on bandage today  Diminished erythema, calor, and edema of right leg noted  Cellulitis appears to have resolved completely  Labs and other studies:   CBC w/diff  Results from last 7 days   Lab Units 07/29/20  0528  07/27/20  0556   WBC Thousand/uL 2 94*   < > 3 42*   HEMOGLOBIN g/dL 7 2*   < > 9 3*   HEMATOCRIT % 21 9*   < > 28 9*   PLATELETS Thousands/uL 55*   < > 59*   LYMPHO PCT %  --   --  12*   MONO PCT %  --   --  6   EOS PCT %  --   --  0    < > = values in this interval not displayed  BMP  Results from last 7 days   Lab Units 07/29/20  0528   POTASSIUM mmol/L 4 3   CHLORIDE mmol/L 106   CO2 mmol/L 22   BUN mg/dL 31*   CREATININE mg/dL 1 62*   CALCIUM mg/dL 8 4     CMP  Results from last 7 days   Lab Units 07/29/20  0528  07/23/20  0552   POTASSIUM mmol/L 4 3   < > 4 6   CHLORIDE mmol/L 106   < > 104   CO2 mmol/L 22   < > 21   BUN mg/dL 31*   < > 39*   CREATININE mg/dL 1 62*   < > 1 80*   CALCIUM mg/dL 8 4   < > 8 2*   ALK PHOS U/L  --   --  81   ALT U/L  --   --  26   AST U/L  --   --  24    < > = values in this interval not displayed  @Culture@  Lab Results   Component Value Date    BLOODCX No Growth After 5 Days  07/16/2020    BLOODCX No Growth After 5 Days  07/16/2020    BLOODCX No Growth After 5 Days  05/02/2017    BLOODCX No Growth After 5 Days   05/02/2017    URINECX >100,000 cfu/ml Klebsiella oxytoca (A) 02/13/2019 URINECX >100,000 cfu/ml Klebsiella oxytoca (A) 01/07/2019         Current Facility-Administered Medications:     acetaminophen (TYLENOL) tablet 650 mg, 650 mg, Oral, Q6H PRN, Carlrainee Boer, DPM, 650 mg at 07/26/20 0201    aspirin chewable tablet 81 mg, 81 mg, Oral, Daily, Carleene Boer, DPM, 81 mg at 07/29/20 7732    atorvastatin (LIPITOR) tablet 40 mg, 40 mg, Oral, Daily With Daneil Shown, DPM, 40 mg at 07/28/20 1605    bisacodyl (DULCOLAX) EC tablet 5 mg, 5 mg, Oral, Daily PRN, Espiridion Naik, DO    calcium carbonate (TUMS) chewable tablet 500 mg, 500 mg, Oral, Daily PRN, Sravanie Boer, DPM    diphenhydrAMINE (BENADRYL) injection 25 mg, 25 mg, Intravenous, Q6H PRN, Sravanie Boer, DPM, 25 mg at 07/23/20 1927    docusate sodium (COLACE) capsule 100 mg, 100 mg, Oral, BID, Carlrainee Boer, DPM, 100 mg at 87/06/68 5503    folic acid (FOLVITE) tablet 1 mg, 1 mg, Oral, Daily, Carlrainee Boer, DPM, 1 mg at 07/29/20 0824    HYDROcodone-acetaminophen (NORCO) 5-325 mg per tablet 1 tablet, 1 tablet, Oral, Q4H PRN, Sravanie Boer, DPM, 1 tablet at 07/28/20 2344    HYDROcodone-acetaminophen (NORCO) 5-325 mg per tablet 2 tablet, 2 tablet, Oral, Q4H PRN, Sravanie Boer, DPM, 2 tablet at 07/29/20 1209    HYDROmorphone (DILAUDID) injection 0 5 mg, 0 5 mg, Intravenous, Q2H PRN, Sravanie Lucianr, DPM    LORazepam (ATIVAN) tablet 1 mg, 1 mg, Oral, Q8H PRN, Carleene Boer, DPM, 1 mg at 07/28/20 1525    pantoprazole (PROTONIX) EC tablet 40 mg, 40 mg, Oral, Early Morning, Carleene Boer, DPM, 40 mg at 07/29/20 0622    piperacillin-tazobactam (ZOSYN) 3 375 g in sodium chloride 0 9 % 100 mL IVPB, 3 375 g, Intravenous, Q6H, Vinod Del Real MD, Last Rate: 200 mL/hr at 07/29/20 1203, 3 375 g at 07/29/20 1203    polyethylene glycol (MIRALAX) packet 17 g, 17 g, Oral, BID, Ismael Reyes DPM, 17 g at 07/29/20 0824    simethicone (MYLICON) chewable tablet 80 mg, 80 mg, Oral, Q6H PRN, Vinod Del Real MD, 80 mg at 07/28/20 1638    tamsulosin (FLOMAX) capsule 0 4 mg, 0 4 mg, Oral, Daily With Torres Carrasco DPM, 0 4 mg at 07/28/20 1605    Imaging: I have personally reviewed pertinent films in PACS  EKG, Pathology, and Other Studies: I have personally reviewed pertinent reports    VTE Pharmacologic Prophylaxis: None  VTE Mechanical Prophylaxis: reason for no mechanical VTE prophylaxis Wounds right lower extremity    Jason Cola, DPM

## 2020-07-29 NOTE — PROGRESS NOTES
NEPHROLOGY PROGRESS NOTE   Meenakshi Edwards 69 y o  male MRN: [id number]  Unit/Bed#: -01 Encounter: [id number]  Reason for Consult: PALOMA (POA) on CKD III    ASSESSMENT/PLAN:  PALOMA(POA) on CKD III:  Likely multifactorial with hypertension, anemia, NSAID use, Ace inhibition, +/- urinary retention, and possible multiple myeloma  -presented with creatinine of 2 21   -baseline creatinine previously in the mid 1s   -creatinine increased today to 1 6  Continued urinary retention and decreasing hgb  -for bone marrow biopsy on Thursday    -continue to hold ACE-inhibitor  -UA was bland  -UPEP negative  -SPEP + for IgG kappa  -elevated FLC ratio  -CT shows small right renal cyst, bilateral extrarenal pelvis dilatation left greater than right   Negative for hydro   -continue to avoid episodes of hypotension, nephrotoxins, and IV contrast   -Continue intermittent straights caths  -will need follow up as OP    -I/O      Hyponatremia: (resolved) likely paraproteinemia   -will continue to monitor       Mild acidosis: (resolved)  -will continue to monitor   Consider oral bicarbonate of bicarb level drops further      Hypertension: overall stable  -Ace inhibitor remains on hold   -avoid hypotension or high fluctuations in blood pressure   -recommend holding parameter on antihypertensive for systolic blood pressure less than 782       Umbilical wound:  Exposed mesh in abscess   Status post wound exploration and primary repair of umbilical hernia  -surgery team following   -local dressing changes   -continues on antibiotics per ID      Urinary retention:  Reports intermittent self catheterization at home   -continue Q shift bladder scans with urinary retention protocol   -continue intermittent straight catheterizations  Recommend placing garrison, patient refusing   -consider urology consult       Pancytopenia:  Status post several infusions   -for bone marrow biopsy on Thursday    -hemoglobin trending down  Continue to monitor and transfuse as needed   -hematology and GI following   -refusing EGD/colonoscopy      Right lower extremity cellulitis/skin ulceration:  S/P debridement   -abx per ID  Disposition:  Requiring additional stay due to medical needs  SUBJECTIVE:  The patient is resting  He states he is fare today  He denies chest pain or SOB  He denies N/V/D  He continues to have urinary retnetion  He is refusing a garrison at this time       OBJECTIVE:  Current Weight: Weight - Scale: 65 kg (143 lb 4 8 oz)  Vitals:    07/28/20 2228 07/28/20 2311 07/29/20 0551 07/29/20 0726   BP: 114/66 116/68  102/54   BP Location:    Right arm   Pulse:       Resp:    18   Temp: 99 7 °F (37 6 °C)   97 9 °F (36 6 °C)   TempSrc:    Oral   SpO2:       Weight:   65 kg (143 lb 4 8 oz)    Height:           Intake/Output Summary (Last 24 hours) at 7/29/2020 1124  Last data filed at 7/29/2020 8104  Gross per 24 hour   Intake 250 ml   Output 1750 ml   Net -1500 ml     General: NAD  Skin: warm, dry, intact, no rash  HEENT: Moist mucous membranes, sclera anicteric, normocephalic, atraumatic  Neck: No apparent JVD appreciated  Chest: lung sounds clear B/L, on RA   CVS:Regular rate and rhythm, no murmer   Abdomen: Soft, round, non-tender, +BS  Extremities: RLE edema present, RLE wrapped  Neuro: alert and oriented  Psych: appropriate mood and affect     Medications:    Current Facility-Administered Medications:     acetaminophen (TYLENOL) tablet 650 mg, 650 mg, Oral, Q6H PRN, Sky Nair, DPM, 650 mg at 07/26/20 0201    aspirin chewable tablet 81 mg, 81 mg, Oral, Daily, Gareduardo Carreroes, DPM, 81 mg at 07/29/20 0824    atorvastatin (LIPITOR) tablet 40 mg, 40 mg, Oral, Daily With Tami Gilbert DPM, 40 mg at 07/28/20 1605    bisacodyl (DULCOLAX) EC tablet 5 mg, 5 mg, Oral, Daily PRN, Arvind Turner DO    calcium carbonate (TUMS) chewable tablet 500 mg, 500 mg, Oral, Daily PRN, Garnette Dynes, DPM    diphenhydrAMINE (BENADRYL) injection 25 mg, 25 mg, Intravenous, Q6H PRN, Jerral Marjorie, DPM, 25 mg at 07/23/20 1927    docusate sodium (COLACE) capsule 100 mg, 100 mg, Oral, BID, Jerral Marjorie, DPM, 100 mg at 19/68/70 4247    folic acid (FOLVITE) tablet 1 mg, 1 mg, Oral, Daily, Jerral Marjorie, DPM, 1 mg at 07/29/20 0824    HYDROcodone-acetaminophen (NORCO) 5-325 mg per tablet 1 tablet, 1 tablet, Oral, Q4H PRN, Jerral Marjorie, DPM, 1 tablet at 07/28/20 2344    HYDROcodone-acetaminophen (NORCO) 5-325 mg per tablet 2 tablet, 2 tablet, Oral, Q4H PRN, Jerral Marjorie, DPM, 2 tablet at 07/29/20 8602    HYDROmorphone (DILAUDID) injection 0 5 mg, 0 5 mg, Intravenous, Q2H PRN, Jerral Marjorie, DPM    LORazepam (ATIVAN) tablet 1 mg, 1 mg, Oral, Q8H PRN, Jerral Marjorie, DPM, 1 mg at 07/28/20 1525    pantoprazole (PROTONIX) EC tablet 40 mg, 40 mg, Oral, Early Morning, Jerral Marjorie, DPM, 40 mg at 07/29/20 0622    piperacillin-tazobactam (ZOSYN) 3 375 g in sodium chloride 0 9 % 100 mL IVPB, 3 375 g, Intravenous, Q6H, Padmini Chaidez MD, Last Rate: 200 mL/hr at 07/29/20 0620, 3 375 g at 07/29/20 0620    polyethylene glycol (MIRALAX) packet 17 g, 17 g, Oral, BID, Jerral Marjorie, DPM, 17 g at 07/29/20 0824    simethicone (MYLICON) chewable tablet 80 mg, 80 mg, Oral, Q6H PRN, Padmini Chaidez MD, 80 mg at 07/28/20 1638    tamsulosin (FLOMAX) capsule 0 4 mg, 0 4 mg, Oral, Daily With Kenton Lewis, DPM, 0 4 mg at 07/28/20 1605    Laboratory Results:  Results from last 7 days   Lab Units 07/29/20  0528 07/28/20  0537 07/27/20  0556  07/23/20  0552   WBC Thousand/uL 2 94* 3 16* 3 42*   < >  --    HEMOGLOBIN g/dL 7 2* 8 7* 9 3*   < >  --    HEMATOCRIT % 21 9* 27 1* 28 9*   < >  --    PLATELETS Thousands/uL 55* 62* 59*   < >  --    SODIUM mmol/L 138 139 137   < > 134*   POTASSIUM mmol/L 4 3 4 3 4 6   < > 4 6   CHLORIDE mmol/L 106 106 106   < > 104   CO2 mmol/L 22 21 20*   < > 21   BUN mg/dL 31* 31* 27*   < > 39*   CREATININE mg/dL 1 62* 1 40* 1 31*   < > 1 80*   CALCIUM mg/dL 8 4 8 9 8 6   < > 8 2*   MAGNESIUM mg/dL  --   --   --   --  2 4   ALK PHOS U/L  --   --   --   --  81   ALT U/L  --   --   --   --  26   AST U/L  --   --   --   --  24    < > = values in this interval not displayed

## 2020-07-29 NOTE — PROGRESS NOTES
Anemia of chronic disease    Patient declining endoscopic workup, will sign off, please re-consult if needed thanks

## 2020-07-29 NOTE — PROGRESS NOTES
Progress Note - Ryne Wilburn 1945, 69 y o  male MRN: [id number]    Unit/Bed#: -01 Encounter: [id number]    Primary Care Provider: Franklin Proctor,    Date and time admitted to hospital: 7/16/2020 83:16 PM        Umbilicus discharge  Assessment & Plan  · Umbilical wound infection with exposed mesh and abscess  Occurring since hernia surgery  · Patient underwent  EXPLORATORY laparotomy, explantation of infected mesh and repair of ventral heria (N/A) on 07/21/2020  · Post-op management per surgery  · Pain management p r n   · On IV antibiotics - ID consulted to help with duration and coverage  · Cultures growing multiple organisms    Cellulitis  Assessment & Plan  Patient has right lower leg wounds with cellulitis  On IV vancomycin and cefepime, started 7/17  Patient underwent angiogram   Patient underwent debridement of right lower extremity on 07/24/2020  OR wound cultures - growing morganella and multiple organisms- intermediate resistance to cefepime  Stop cefepime  IV zosyn started 7/28  ID consulted to help with antibiotic coverage and duration  Podiatry follow-up  Dressing changes per Podiatry    Pancytopenia [facility])  Assessment & Plan  For bone marrow biopsy 7/30    Open wound of umbilical region  Assessment & Plan  Exploratory laparotomy, excision of infected mesh and repair of ventral heria (N/A) on 07/21/2020  Wound care and dressing changes per surgery    Pain management  See above    Severe protein-calorie malnutrition (HCC)  Assessment & Plan  Pt has Severe protein-calorie malnutrition, in the setting of chronically ill patient with history of noncompliance with care, as evidenced by BMI 19 17 with an 11 33% unplanned weight loss over the past year, multiple nonhealing wounds appearance of muscle wasting/fat loss and decreased mobility noted on nursing assessment, Findings: height 5' 10", weight 133 lb 9 6 oz, BMI 19 17 Screen: (+) Unplanned weight loss in the last three months; (+) Stage III-IV pressure ulcer or non-healing wound; (+) Appearance of muscle wasting or fat loss; Benign prostatic hyperplasia with urinary retention  Assessment & Plan  Patient self catheterizes at home when he feels he needs to for BPH with retention,   Patient had to be catheterized 6 times during this hospital stay  Continue straight cath q 8 hours because patient has urinary retention, he refuses Abbott placement  On flomax      CKD (chronic kidney disease) stage 3, GFR 30-59 ml/min ([facility])  Assessment & Plan  Patient has stage III chronic disease with creatinine around 2 0 at baseline  Patient's renal function is at baseline:  Creatinine is 1 62 today  Nephrology on board  Monitor renal function in a m  Chronic combined systolic and diastolic heart failure ([facility])  Assessment & Plan  Wt Readings from Last 3 Encounters:   07/29/20 65 kg (143 lb 4 8 oz)   06/21/19 68 3 kg (150 lb 9 6 oz)   06/19/19 67 6 kg (149 lb)     Chronic CHF, not in acute exacerbation  Echocardiogram on this admission shows ejection fraction of 50%  Patient's lungs are clear, has no JVD  Continue to hold lisinopril, Lasix and Toprol because of relative hypotension  Daily weight and I&Os    Thrombocytopenia ([facility] [address] )  Assessment & Plan  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 with platelet counts running between 60-115K  he denies signs of bleeding   Patient received 1 unit of platelets on 2/67  Plts 62 today  Hematology recommended transfusing platelets to keep the counts above 50K if he is going to require surgical intervention  Other persistent atrial fibrillation [facility])  Assessment & Plan  Patient is status post ablation and pacemaker insertion  EKG showed paced rhythm    Monitor off anticoagulation due to ITP and thrombocytopenia    Peripheral vascular disease of lower extremity ([facility])  Assessment & Plan  PAD with more than 75% stenosis of the right superficial femoral artery on arterial Doppler  Continue atorvastatin  Patient underwent right SFA PTA with single-vessel peroneal runoff on 07/20/2020  IR was unable to perform tibial intervention secondary to patient's inability to tolerate procedure  Continue on aspirin given recent intervention   Post intervention ABIs  ordered  Vascular surgery and Podiatry on board    * Anemia, unspecified  Assessment & Plan  · POA, severe symptomatic normocytic anemia, HB 6 7 causing shortness of breath on exertion secondary to ASA/NSAID use for chronic right lower leg ulcers that were getting worse  · CT Abd thickening of the gastric antrum likely gastritis but unable to exclude gastric neoplasm  S/p 3iu prbcs  Hb 7 2  Monitor H&H and transfuse as needed  Continue Protonix b i d ,  Patient refusing endoscopic workup for gastric mass  GI On board, appreciate recs  Patient unable to get IR bone marrow biopsy 7/27, biopsy scheduled for Thursday 7/30  Hematology consult appreciated  Possible myelodysplastic syndrome/multiple myeloma  IgG kappa with a ratio of light chains over 10 which are suggestive of multiple myeloma  Would need IR biopsy prior to discharge        VTE Pharmacologic Prophylaxis:   Pharmacologic: Pharmacologic VTE Prophylaxis contraindicated due to pancytopenia  Mechanical VTE Prophylaxis in Place: Yes    Patient Centered Rounds: I have performed bedside rounds with nursing staff today  Discussions with Specialists or Other Care Team Provider: CM    Education and Discussions with Family / Patient: patient, did not want family called    Time Spent for Care: 30 minutes  More than 50% of total time spent on counseling and coordination of care as described above      Current Length of Stay: 12 day(s)    Current Patient Status: Inpatient   Certification Statement: The patient will continue to require additional inpatient hospital stay due to pending biopsy    Discharge Plan: pending biopsy    Code Status: Level 1 - Full Code      Subjective:   No overnight events, no complaints this am    Objective:     Vitals:   Temp (24hrs), Av °F (37 2 °C), Min:97 9 °F (36 6 °C), Max:99 7 °F (37 6 °C)    Temp:  [97 9 °F (36 6 °C)-99 7 °F (37 6 °C)] 97 9 °F (36 6 °C)  Resp:  [18] 18  BP: (102-137)/(54-68) 102/54  Body mass index is 20 56 kg/m²  Input and Output Summary (last 24 hours): Intake/Output Summary (Last 24 hours) at 2020 1351  Last data filed at 2020 0826  Gross per 24 hour   Intake 370 ml   Output 1750 ml   Net -1380 ml       Physical Exam:     Physical Exam   Constitutional: He is oriented to person, place, and time  No distress  Cardiovascular: Normal rate and regular rhythm  Pulmonary/Chest: Effort normal and breath sounds normal  No stridor  No respiratory distress  He has no wheezes  Abdominal: He exhibits distension  There is no tenderness  There is no guarding  Musculoskeletal: Normal range of motion  He exhibits edema (RLE )  He exhibits no tenderness or deformity  Right leg wrapped, dressing clean and dry   Neurological: He is alert and oriented to person, place, and time  He displays normal reflexes  No cranial nerve deficit  Coordination normal    Skin: Skin is warm and dry  No rash noted  He is not diaphoretic  No erythema  No pallor  Nursing note and vitals reviewed  ( Additional Data:     Labs:    Results from last 7 days   Lab Units 20  0528  20  0556   WBC Thousand/uL 2 94*   < > 3 42*   HEMOGLOBIN g/dL 7 2*   < > 9 3*   HEMATOCRIT % 21 9*   < > 28 9*   PLATELETS Thousands/uL 55*   < > 59*   LYMPHO PCT %  --   --  12*   MONO PCT %  --   --  6   EOS PCT %  --   --  0    < > = values in this interval not displayed       Results from last 7 days   Lab Units 20  0528  20  0552   SODIUM mmol/L 138   < > 134*   POTASSIUM mmol/L 4 3   < > 4 6   CHLORIDE mmol/L 106   < > 104   CO2 mmol/L 22   < > 21   BUN mg/dL 31*   < > 39*   CREATININE mg/dL 1 62*   < > 1 80*   ANION GAP mmol/L 10   < > 9   CALCIUM mg/dL 8 4   < > 8 2*   ALBUMIN g/dL  --   --  2 3*   TOTAL BILIRUBIN mg/dL  --   --  0 50   ALK PHOS U/L  --   --  81   ALT U/L  --   --  26   AST U/L  --   --  24   GLUCOSE RANDOM mg/dL 113   < > 113    < > = values in this interval not displayed  * I Have Reviewed All Lab Data Listed Above  * Additional Pertinent Lab Tests Reviewed:  All Labs Within Last 24 Hours Reviewed    Imaging:    Imaging Reports Reviewed Today Include:   Imaging Personally Reviewed by Myself Includes:      Recent Cultures (last 7 days):     Results from last 7 days   Lab Units 07/24/20  1328   GRAM STAIN RESULT  Rare Polys*  2+ Gram negative rods*   WOUND CULTURE  3+ Growth of Myroides odoratimimus*  3+ Growth of Morganella morganii*  1+ Growth of        Last 24 Hours Medication List:     Current Facility-Administered Medications:  acetaminophen 650 mg Oral Q6H PRN Dennie Lucia, DPM    aspirin 81 mg Oral Daily Dennie Lucia, DPM    atorvastatin 40 mg Oral Daily With Roberto Calixto, DPM    bisacodyl 5 mg Oral Daily PRN Anika Ally, DO    calcium carbonate 500 mg Oral Daily PRN Dennie Lucia, DPM    diphenhydrAMINE 25 mg Intravenous Q6H PRN Dennie Lucia, DPM    docusate sodium 100 mg Oral BID Dennie Lucia, DPM    folic acid 1 mg Oral Daily Dennie Lucia, DPM    HYDROcodone-acetaminophen 1 tablet Oral Q4H PRN Dennie Lucia, DPM    HYDROcodone-acetaminophen 2 tablet Oral Q4H PRN Dennie Lucia, DPM    HYDROmorphone 0 5 mg Intravenous Q2H PRN Dennie Lucia, DPM    LORazepam 1 mg Oral Q8H PRN Dennie Lucia, DPM    pantoprazole 40 mg Oral Early Morning Dennie Lucia, DPM    piperacillin-tazobactam 3 375 g Intravenous Q6H Lisette Tena MD Last Rate: 3 375 g (07/29/20 1203)   polyethylene glycol 17 g Oral BID Dennie Lucia, DPM    simethicone 80 mg Oral Q6H PRN Lisette Tena MD    tamsulosin 0 4 mg Oral Daily With Roberto Calixto DPM         Today, Patient Was Seen By: Lisette Tena MD    ** Please Note: Dictation voice to text software may have been used in the creation of this document   **

## 2020-07-29 NOTE — ASSESSMENT & PLAN NOTE
PAD with more than 75% stenosis of the right superficial femoral artery on arterial Doppler  Continue atorvastatin  Patient underwent right SFA PTA with single-vessel peroneal runoff on 07/20/2020  IR was unable to perform tibial intervention secondary to patient's inability to tolerate procedure  Continue on aspirin given recent intervention   Post intervention ABIs  ordered    Vascular surgery and Podiatry on board

## 2020-07-29 NOTE — ASSESSMENT & PLAN NOTE
Patient has stage III chronic disease with creatinine around 2 0 at baseline  Patient's renal function is at baseline:  Creatinine is 1 62 today  Nephrology on board  Monitor renal function in a m

## 2020-07-29 NOTE — ASSESSMENT & PLAN NOTE
Patient has right lower leg wounds with cellulitis  On IV vancomycin and cefepime, started 7/17    Patient underwent angiogram   Patient underwent debridement of right lower extremity on 07/24/2020  OR wound cultures - growing morganella and multiple organisms- intermediate resistance to cefepime  Stop cefepime  IV zosyn started 7/28  ID consulted to help with antibiotic coverage and duration  Podiatry follow-up  Dressing changes per Podiatry

## 2020-07-29 NOTE — PLAN OF CARE
Problem: Potential for Falls  Goal: Patient will remain free of falls  Description  INTERVENTIONS:  - Assess patient frequently for physical needs  -  Identify cognitive and physical deficits and behaviors that affect risk of falls  -  [place] fall precautions as indicated by assessment   - Educate patient/family on patient safety including physical limitations  - Instruct patient to call for assistance with activity based on assessment  - Modify environment to reduce risk of injury  - Consider OT/PT consult to assist with strengthening/mobility  Outcome: Progressing     Problem: Prexisting or High Potential for Compromised Skin Integrity  Goal: Skin integrity is maintained or improved  Description  INTERVENTIONS:  - Identify patients at risk for skin breakdown  - Assess and monitor skin integrity  - Assess and monitor nutrition and hydration status  - Monitor labs   - Assess for incontinence   - Turn and reposition patient  - Assist with mobility/ambulation  - Relieve pressure over bony prominences  - Avoid friction and shearing  - Provide appropriate hygiene as needed including keeping skin clean and dry  - Evaluate need for skin moisturizer/barrier cream  - Collaborate with interdisciplinary team   - Patient/family teaching  - Consider wound care consult   Outcome: Progressing     Problem: Nutrition/Hydration-ADULT  Goal: Nutrient/Hydration intake appropriate for improving, restoring or maintaining nutritional needs  Description  Monitor and assess patient's nutrition/hydration status for malnutrition  Collaborate with interdisciplinary team and initiate plan and interventions as ordered  Monitor patient's weight and dietary intake as ordered or per policy  Utilize nutrition screening tool and intervene as necessary  Determine patient's food preferences and provide high-protein, high-caloric foods as appropriate       INTERVENTIONS:  - Monitor oral intake, urinary output, labs, and treatment plans  - Assess nutrition and hydration status and recommend course of action  - Evaluate amount of meals eaten  - Assist patient with eating if necessary   - Allow adequate time for meals  - Recommend/ encourage appropriate diets, oral nutritional supplements, and vitamin/mineral supplements  - Order, calculate, and assess calorie counts as needed  - Recommend, monitor, and adjust tube feedings and TPN/PPN based on assessed needs  - Assess need for intravenous fluids  - Provide specific nutrition/hydration education as appropriate  - Include patient/family/caregiver in decisions related to nutrition  Outcome: Progressing     Problem: NEUROSENSORY - ADULT  Goal: Achieves maximal functionality and self care  Description  INTERVENTIONS  - Monitor swallowing and airway patency with patient fatigue and changes in neurological status  - Encourage and assist patient to increase activity and self care     - Encourage visually impaired, hearing impaired and aphasic patients to use assistive/communication devices  Outcome: Progressing     Problem: GASTROINTESTINAL - ADULT  Goal: Minimal or absence of nausea and/or vomiting  Description  INTERVENTIONS:  - Administer IV fluids if ordered to ensure adequate hydration  - Maintain NPO status until nausea and vomiting are resolved  - Nasogastric tube if ordered  - Administer ordered antiemetic medications as needed  - Provide nonpharmacologic comfort measures as appropriate  - Advance diet as tolerated, if ordered  - Consider nutrition services referral to assist patient with adequate nutrition and appropriate food choices  Outcome: Progressing  Goal: Maintains adequate nutritional intake  Description  INTERVENTIONS:  - Monitor percentage of each meal consumed  - Identify factors contributing to decreased intake, treat as appropriate  - Assist with meals as needed  - Monitor I&O, weight, and lab values if indicated  - Obtain nutrition services referral as needed  Outcome: Progressing     Problem: METABOLIC, FLUID AND ELECTROLYTES - ADULT  Goal: Electrolytes maintained within normal limits  Description  INTERVENTIONS:  - Monitor labs and assess patient for signs and symptoms of electrolyte imbalances  - Administer electrolyte replacement as ordered  - Monitor response to electrolyte replacements, including repeat lab results as appropriate  - Instruct patient on fluid and nutrition as appropriate  Outcome: Progressing  Goal: Fluid balance maintained  Description  INTERVENTIONS:  - Monitor labs   - Monitor I/O and WT  - Instruct patient on fluid and nutrition as appropriate  - Assess for signs & symptoms of volume excess or deficit  Outcome: Progressing     Problem: SKIN/TISSUE INTEGRITY - ADULT  Goal: Skin integrity remains intact  Description  INTERVENTIONS  - Identify patients at risk for skin breakdown  - Assess and monitor skin integrity  - Assess and monitor nutrition and hydration status  - Monitor labs (i e  albumin)  - Assess for incontinence   - Turn and reposition patient  - Assist with mobility/ambulation  - Relieve pressure over bony prominences  - Avoid friction and shearing  - Provide appropriate hygiene as needed including keeping skin clean and dry  - Evaluate need for skin moisturizer/barrier cream  - Collaborate with interdisciplinary team (i e  Nutrition, Rehabilitation, etc )   - Patient/family teaching  Outcome: Progressing  Goal: Incision(s), wounds(s) or drain site(s) healing without S/S of infection  Description  INTERVENTIONS  - Assess and document risk factors for skin impairment   - Assess and document dressing, incision, wound bed, drain sites and surrounding tissue  - Consider nutrition services referral as needed  - Oral mucous membranes remain intact  - Provide patient/ family education  Outcome: Progressing     Problem: HEMATOLOGIC - ADULT  Goal: Maintains hematologic stability  Description  INTERVENTIONS  - Assess for signs and symptoms of bleeding or hemorrhage  - Monitor labs  - Administer supportive blood products/factors as ordered and appropriate  Outcome: Progressing     Problem: MUSCULOSKELETAL - ADULT  Goal: Maintain or return mobility to safest level of function  Description  INTERVENTIONS:  - Assess patient's ability to carry out ADLs; assess patient's baseline for ADL function and identify physical deficits which impact ability to perform ADLs (bathing, care of mouth/teeth, toileting, grooming, dressing, etc )  - Assess/evaluate cause of self-care deficits   - Assess range of motion  - Assess patient's mobility  - Assess patient's need for assistive devices and provide as appropriate  - Encourage maximum independence but intervene and supervise when necessary  - Involve family in performance of ADLs  - Assess for home care needs following discharge   - Consider OT consult to assist with ADL evaluation and planning for discharge  - Provide patient education as appropriate  Outcome: Progressing     Problem: PAIN - ADULT  Goal: Verbalizes/displays adequate comfort level or baseline comfort level  Description  Interventions:  - Encourage patient to monitor pain and request assistance  - Assess pain using appropriate pain scale  - Administer analgesics based on type and severity of pain and evaluate response  - Implement non-pharmacological measures as appropriate and evaluate response  - Consider cultural and social influences on pain and pain management  - Notify physician/advanced practitioner if interventions unsuccessful or patient reports new pain  Outcome: Progressing     Problem: INFECTION - ADULT  Goal: Absence or prevention of progression during hospitalization  Description  INTERVENTIONS:  - Assess and monitor for signs and symptoms of infection  - Monitor lab/diagnostic results  - Monitor all insertion sites, i e  indwelling lines, tubes, and drains  - Monitor endotracheal if appropriate and nasal secretions for changes in amount and color  - [place] appropriate cooling/warming therapies per order  - Administer medications as ordered  - Instruct and encourage patient and family to use good hand hygiene technique  - Identify and instruct in appropriate isolation precautions for identified infection/condition  Outcome: Progressing  Goal: Absence of fever/infection during neutropenic period  Description  INTERVENTIONS:  - Monitor WBC    Outcome: Progressing     Problem: SAFETY ADULT  Goal: Patient will remain free of falls  Description  INTERVENTIONS:  - Assess patient frequently for physical needs  -  Identify cognitive and physical deficits and behaviors that affect risk of falls  -  [place] fall precautions as indicated by assessment   - Educate patient/family on patient safety including physical limitations  - Instruct patient to call for assistance with activity based on assessment  - Modify environment to reduce risk of injury  - Consider OT/PT consult to assist with strengthening/mobility  Outcome: Progressing     Problem: DISCHARGE PLANNING  Goal: Discharge to home or other facility with appropriate resources  Description  INTERVENTIONS:  - Identify barriers to discharge w/patient and caregiver  - Arrange for needed discharge resources and transportation as appropriate  - Identify discharge learning needs (meds, wound care, etc )  - Arrange for interpretive services to assist at discharge as needed  - Refer to Case Management Department for coordinating discharge planning if the patient needs post-hospital services based on physician/advanced practitioner order or complex needs related to functional status, cognitive ability, or social support system  Outcome: Progressing     Problem: Knowledge Deficit  Goal: Patient/family/caregiver demonstrates understanding of disease process, treatment plan, medications, and discharge instructions  Description  Complete learning assessment and assess knowledge base    Interventions:  - Provide teaching at level of understanding  - Provide teaching via preferred learning methods  Outcome: Progressing

## 2020-07-30 NOTE — ASSESSMENT & PLAN NOTE
· POA, severe symptomatic normocytic anemia, HB 6 7 causing shortness of breath on exertion secondary to ASA/NSAID use for chronic right lower leg ulcers that were getting worse  · CT Abd thickening of the gastric antrum likely gastritis but unable to exclude gastric neoplasm    S/p 3iu prbcs  Hb 6 7 this am  No overt signs of bleeding seen  2iu prbcs ordered  Monitor H&H and transfuse as needed, keep Hb > 7  Continue Protonix b i d ,  Patient refusing endoscopic workup for anemia  GI on board, appreciate recs  For IR bone marrow biopsy today - concern for possible myelodysplastic syndrome/multiple myeloma per hematology  Currently NPO

## 2020-07-30 NOTE — ASSESSMENT & PLAN NOTE
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 with platelet counts running between 60-115K  he denies signs of bleeding   Patient received 1 unit of platelets on 1/86  Plts 57  Hematology recommended transfusing platelets to keep the counts above 50K if he is going to require surgical intervention

## 2020-07-30 NOTE — PLAN OF CARE
Problem: Potential for Falls  Goal: Patient will remain free of falls  Description  INTERVENTIONS:  - Assess patient frequently for physical needs  -  Identify cognitive and physical deficits and behaviors that affect risk of falls    -  [place] fall precautions as indicated by assessment   - Educate patient/family on patient safety including physical limitations  - Instruct patient to call for assistance with activity based on assessment  - Modify environment to reduce risk of injury  - Consider OT/PT consult to assist with strengthening/mobility  Outcome: Progressing

## 2020-07-30 NOTE — ASSESSMENT & PLAN NOTE
PAD with more than 75% stenosis of the right superficial femoral artery on arterial Doppler  Continue atorvastatin and aspirin  Patient underwent right SFA PTA with single-vessel peroneal runoff on 07/20/2020  IR was unable to perform tibial intervention secondary to patient's inability to tolerate procedure     Appreciate recs from Vascular surgery and Podiatry

## 2020-07-30 NOTE — PLAN OF CARE
Problem: Potential for Falls  Goal: Patient will remain free of falls  Description  INTERVENTIONS:  - Assess patient frequently for physical needs  -  Identify cognitive and physical deficits and behaviors that affect risk of falls  -  [place] fall precautions as indicated by assessment   - Educate patient/family on patient safety including physical limitations  - Instruct patient to call for assistance with activity based on assessment  - Modify environment to reduce risk of injury  - Consider OT/PT consult to assist with strengthening/mobility  Outcome: Progressing     Problem: Prexisting or High Potential for Compromised Skin Integrity  Goal: Skin integrity is maintained or improved  Description  INTERVENTIONS:  - Identify patients at risk for skin breakdown  - Assess and monitor skin integrity  - Assess and monitor nutrition and hydration status  - Monitor labs   - Assess for incontinence   - Turn and reposition patient  - Assist with mobility/ambulation  - Relieve pressure over bony prominences  - Avoid friction and shearing  - Provide appropriate hygiene as needed including keeping skin clean and dry  - Evaluate need for skin moisturizer/barrier cream  - Collaborate with interdisciplinary team   - Patient/family teaching  - Consider wound care consult   Outcome: Progressing     Problem: Nutrition/Hydration-ADULT  Goal: Nutrient/Hydration intake appropriate for improving, restoring or maintaining nutritional needs  Description  Monitor and assess patient's nutrition/hydration status for malnutrition  Collaborate with interdisciplinary team and initiate plan and interventions as ordered  Monitor patient's weight and dietary intake as ordered or per policy  Utilize nutrition screening tool and intervene as necessary  Determine patient's food preferences and provide high-protein, high-caloric foods as appropriate       INTERVENTIONS:  - Monitor oral intake, urinary output, labs, and treatment plans  - Assess nutrition and hydration status and recommend course of action  - Evaluate amount of meals eaten  - Assist patient with eating if necessary   - Allow adequate time for meals  - Recommend/ encourage appropriate diets, oral nutritional supplements, and vitamin/mineral supplements  - Order, calculate, and assess calorie counts as needed  - Recommend, monitor, and adjust tube feedings and TPN/PPN based on assessed needs  - Assess need for intravenous fluids  - Provide specific nutrition/hydration education as appropriate  - Include patient/family/caregiver in decisions related to nutrition  Outcome: Progressing     Problem: NEUROSENSORY - ADULT  Goal: Achieves maximal functionality and self care  Description  INTERVENTIONS  - Monitor swallowing and airway patency with patient fatigue and changes in neurological status  - Encourage and assist patient to increase activity and self care     - Encourage visually impaired, hearing impaired and aphasic patients to use assistive/communication devices  Outcome: Progressing     Problem: GASTROINTESTINAL - ADULT  Goal: Minimal or absence of nausea and/or vomiting  Description  INTERVENTIONS:  - Administer IV fluids if ordered to ensure adequate hydration  - Maintain NPO status until nausea and vomiting are resolved  - Nasogastric tube if ordered  - Administer ordered antiemetic medications as needed  - Provide nonpharmacologic comfort measures as appropriate  - Advance diet as tolerated, if ordered  - Consider nutrition services referral to assist patient with adequate nutrition and appropriate food choices  Outcome: Progressing  Goal: Maintains adequate nutritional intake  Description  INTERVENTIONS:  - Monitor percentage of each meal consumed  - Identify factors contributing to decreased intake, treat as appropriate  - Assist with meals as needed  - Monitor I&O, weight, and lab values if indicated  - Obtain nutrition services referral as needed  Outcome: Progressing     Problem: METABOLIC, FLUID AND ELECTROLYTES - ADULT  Goal: Electrolytes maintained within normal limits  Description  INTERVENTIONS:  - Monitor labs and assess patient for signs and symptoms of electrolyte imbalances  - Administer electrolyte replacement as ordered  - Monitor response to electrolyte replacements, including repeat lab results as appropriate  - Instruct patient on fluid and nutrition as appropriate  Outcome: Progressing  Goal: Fluid balance maintained  Description  INTERVENTIONS:  - Monitor labs   - Monitor I/O and WT  - Instruct patient on fluid and nutrition as appropriate  - Assess for signs & symptoms of volume excess or deficit  Outcome: Progressing     Problem: SKIN/TISSUE INTEGRITY - ADULT  Goal: Skin integrity remains intact  Description  INTERVENTIONS  - Identify patients at risk for skin breakdown  - Assess and monitor skin integrity  - Assess and monitor nutrition and hydration status  - Monitor labs (i e  albumin)  - Assess for incontinence   - Turn and reposition patient  - Assist with mobility/ambulation  - Relieve pressure over bony prominences  - Avoid friction and shearing  - Provide appropriate hygiene as needed including keeping skin clean and dry  - Evaluate need for skin moisturizer/barrier cream  - Collaborate with interdisciplinary team (i e  Nutrition, Rehabilitation, etc )   - Patient/family teaching  Outcome: Progressing  Goal: Incision(s), wounds(s) or drain site(s) healing without S/S of infection  Description  INTERVENTIONS  - Assess and document risk factors for skin impairment   - Assess and document dressing, incision, wound bed, drain sites and surrounding tissue  - Consider nutrition services referral as needed  - Oral mucous membranes remain intact  - Provide patient/ family education  Outcome: Progressing     Problem: HEMATOLOGIC - ADULT  Goal: Maintains hematologic stability  Description  INTERVENTIONS  - Assess for signs and symptoms of bleeding or hemorrhage  - Monitor labs  - Administer supportive blood products/factors as ordered and appropriate  Outcome: Progressing     Problem: MUSCULOSKELETAL - ADULT  Goal: Maintain or return mobility to safest level of function  Description  INTERVENTIONS:  - Assess patient's ability to carry out ADLs; assess patient's baseline for ADL function and identify physical deficits which impact ability to perform ADLs (bathing, care of mouth/teeth, toileting, grooming, dressing, etc )  - Assess/evaluate cause of self-care deficits   - Assess range of motion  - Assess patient's mobility  - Assess patient's need for assistive devices and provide as appropriate  - Encourage maximum independence but intervene and supervise when necessary  - Involve family in performance of ADLs  - Assess for home care needs following discharge   - Consider OT consult to assist with ADL evaluation and planning for discharge  - Provide patient education as appropriate  Outcome: Progressing     Problem: PAIN - ADULT  Goal: Verbalizes/displays adequate comfort level or baseline comfort level  Description  Interventions:  - Encourage patient to monitor pain and request assistance  - Assess pain using appropriate pain scale  - Administer analgesics based on type and severity of pain and evaluate response  - Implement non-pharmacological measures as appropriate and evaluate response  - Consider cultural and social influences on pain and pain management  - Notify physician/advanced practitioner if interventions unsuccessful or patient reports new pain  Outcome: Progressing     Problem: INFECTION - ADULT  Goal: Absence or prevention of progression during hospitalization  Description  INTERVENTIONS:  - Assess and monitor for signs and symptoms of infection  - Monitor lab/diagnostic results  - Monitor all insertion sites, i e  indwelling lines, tubes, and drains  - Monitor endotracheal if appropriate and nasal secretions for changes in amount and color  - Reform appropriate cooling/warming therapies per order  - Administer medications as ordered  - Instruct and encourage patient and family to use good hand hygiene technique  - Identify and instruct in appropriate isolation precautions for identified infection/condition  Outcome: Progressing  Goal: Absence of fever/infection during neutropenic period  Description  INTERVENTIONS:  - Monitor WBC    Outcome: Progressing     Problem: SAFETY ADULT  Goal: Patient will remain free of falls  Description  INTERVENTIONS:  - Assess patient frequently for physical needs  -  Identify cognitive and physical deficits and behaviors that affect risk of falls  -  [place] fall precautions as indicated by assessment   - Educate patient/family on patient safety including physical limitations  - Instruct patient to call for assistance with activity based on assessment  - Modify environment to reduce risk of injury  - Consider OT/PT consult to assist with strengthening/mobility  Outcome: Progressing     Problem: DISCHARGE PLANNING  Goal: Discharge to home or other facility with appropriate resources  Description  INTERVENTIONS:  - Identify barriers to discharge w/patient and caregiver  - Arrange for needed discharge resources and transportation as appropriate  - Identify discharge learning needs (meds, wound care, etc )  - Arrange for interpretive services to assist at discharge as needed  - Refer to Case Management Department for coordinating discharge planning if the patient needs post-hospital services based on physician/advanced practitioner order or complex needs related to functional status, cognitive ability, or social support system  Outcome: Progressing     Problem: Knowledge Deficit  Goal: Patient/family/caregiver demonstrates understanding of disease process, treatment plan, medications, and discharge instructions  Description  Complete learning assessment and assess knowledge base    Interventions:  - Provide teaching at level of understanding  - Provide teaching via preferred learning methods  Outcome: Progressing

## 2020-07-30 NOTE — DISCHARGE INSTR - AVS FIRST PAGE
Wound care instructions:    · VNA Dressing change every other day  · Wash wounds with soap and water, apply Dermagran gauze to all wounds, cover with ABD(x5), Kerlix(x2), and 6' Ace wrap  · Pad calf area with pillows at all times when resting to avoid pressure on the posterior wound    · Questions call Dr Ramu Greenberg at [phone]

## 2020-07-30 NOTE — ASSESSMENT & PLAN NOTE
Patient has stage III chronic disease with creatinine around 2 0 at baseline  Patient's renal function is at baseline:  Creatinine is 1 75 today  Nephrology on board  Monitor renal function in a m

## 2020-07-30 NOTE — PROGRESS NOTES
Progress Note - Podiatry  Neelima Mckay 69 y o  male MRN: [id number]  Unit/Bed#: -01 Encounter: [id number]    Assessment/Plan:  Cellulitis right leg  Skin ulceration right leg fat layer exposed  PVD/Atherosclerosis of native artery right leg with ulceration   -POD #6 S/P debridement multiple right leg wounds              -S/P repair umbilical hernia progressing satisfactorily per general surgery   -Bone marrow biopsy completed earlier this morning    -okay for discharge tomorrow with VNA for local wound care his right leg    -discharge antibiotics per Infectious Disease  Lactic acidosis, necrotic umbilical wound, chronic kidney disease stage 3, anemia, and AFib              -managed per Internal Medicine, general surgery, and nephrology    Subjective/Objective   Chief Complaint:   Chief Complaint   Patient presents with    Wound Infection - Complicated     Pt with leg wounds x 2 months, here for dyspnea, weakness and feeling ill   +n/+v  Subjective:  72-year-old white male seen today resting in bed having just completed his bone marrow biopsy  Denies any new concerns of pain discomfort his right leg and overall it is much better  Denies any fever or shortness of breath  Blood pressure 112/57, pulse 84, temperature 97 6 °F (36 4 °C), temperature source Oral, resp  rate 18, height 5' 10" (1 778 m), weight 67 2 kg (148 lb 2 oz), SpO2 97 %  ,Body mass index is 21 25 kg/m²      Invasive Devices     Peripheral Intravenous Line            Peripheral IV 07/30/20 Left;Ventral (anterior) Forearm less than 1 day                Physical Exam:   General appearance: alert, cooperative and with pain right leg  Neuro/Vascular: Normal strength  Sensation and reflexes:  Grossly intact  Pulses: Right: DP 0/4, PT 0/4, Left: DP 0/4, PT 0/4  Capillary Filling:  3 Sec, Edema:  +1 right leg, none left  Vascular calcifications noted on tib-fib x-ray RLE  Arteriogram 7/20/2020:  · RLE: CFA: Patent, Profunda femoris: Patent   SFA: Proximal portion is patent, normal caliber  High-grade (greater than 80%) 1 cm focal stenosis mid SFA  Moderate grade tandem distal stenoses and in particular 60% stenosis at the abductor canal    Popliteal artery: Patent, noting 60% focal stenosis in the above-the-knee segment  Runoff: Peroneal runoff to the foot with distally reconstituted posterior tibial artery filling of the plantar arch  Chronically occluded anterior tibial artery  Proximal posterior tibial artery is patent and then occludes  · LLE:    CFA: Patent Profunda femoris: Proximal portion is patent   SFA: Proximal portion is patent  · POST INTERVENTION FINDINGS:   1  Significant luminal gain of multifocal right SFA and popliteal artery stenoses following a combination of 6 mm     and 7 mm POBA and DCB  2   Refractory proximal R SFA (30%)  stenosis  Considered stent placement; however, patient was quite     uncooperative with movement and asked multiple times for the procedure to be terminated  3   No change in tibial runoff  4   Left common femoral artery access, successfully closed with a [place]  · IMPRESSION:  Technically successful multifocal balloon angioplasty of multiple stenoses throughout the right superficial femoral and popliteal arteries  Patient has significant right tibial disease with in-line flow into the peroneal artery and distal reconstituted posterior tibial artery  Could consider tibial intervention in the future  Would recommend anesthesia support given patient inability to lay still  Arterial duplex 7/21/2020:  · RIGHT LOWER LIMB   Ankle/Brachial Index: 1 49 which is in the unreliable range   PPG/PVR Tracings are slightly dampened  Biphasic waveforms at the ankle  Metatarsal Pressure 147mmHg   Great Toe Pressure: unable to obtain due to patient's foot movements  · LEFT LOWER LIMB   Ankle/Brachial Index: 1 27 which is in the normal range   PPG/PVR Tracings are slightly dampened  Triphasic waveforms at the ankle  Metatarsal Pressure 73mmHg   Great Toe Pressure: 51mmHg, within the healing range        Extremity: Several large ulcerations present on the medial, anterior, and posterior lateral aspect of the right lower leg with fat layer exposed  All wounds appear red/pink and healthy with no active necrosis is noted yesterday  No strike through drainage noted on bandage today  Diminished erythema, calor, and edema of right leg noted  Cellulitis appears to have resolved completely  Labs and other studies:   CBC w/diff  Results from last 7 days   Lab Units 07/30/20  0527  07/27/20  0556   WBC Thousand/uL 2 20*   < > 3 42*   HEMOGLOBIN g/dL 6 7*   < > 9 3*   HEMATOCRIT % 20 7*   < > 28 9*   PLATELETS Thousands/uL 57*   < > 59*   LYMPHO PCT %  --   --  12*   MONO PCT %  --   --  6   EOS PCT %  --   --  0    < > = values in this interval not displayed  BMP  Results from last 7 days   Lab Units 07/30/20  0527   POTASSIUM mmol/L 4 4   CHLORIDE mmol/L 105   CO2 mmol/L 22   BUN mg/dL 32*   CREATININE mg/dL 1 75*   CALCIUM mg/dL 8 4     CMP  Results from last 7 days   Lab Units 07/30/20  0527   POTASSIUM mmol/L 4 4   CHLORIDE mmol/L 105   CO2 mmol/L 22   BUN mg/dL 32*   CREATININE mg/dL 1 75*   CALCIUM mg/dL 8 4       @Culture@  Lab Results   Component Value Date    BLOODCX No Growth After 5 Days  07/16/2020    BLOODCX No Growth After 5 Days  07/16/2020    BLOODCX No Growth After 5 Days  05/02/2017    BLOODCX No Growth After 5 Days   05/02/2017    URINECX >100,000 cfu/ml Klebsiella oxytoca (A) 02/13/2019    URINECX >100,000 cfu/ml Klebsiella oxytoca (A) 01/07/2019         Current Facility-Administered Medications:     acetaminophen (TYLENOL) tablet 650 mg, 650 mg, Oral, Q6H PRN, Sky Nair DPM, 650 mg at 07/26/20 0201    amoxicillin-clavulanate (AUGMENTIN) 875-125 mg per tablet 1 tablet, 1 tablet, Oral, Q12H [address], Jesse Harding MD, 1 tablet at 07/30/20 0843    aspirin chewable tablet 81 mg, 81 mg, Oral, Daily, Kang Bernice, DPM, 81 mg at 07/30/20 0843    atorvastatin (LIPITOR) tablet 40 mg, 40 mg, Oral, Daily With AMI MccordM, 40 mg at 07/29/20 1722    bisacodyl (DULCOLAX) EC tablet 5 mg, 5 mg, Oral, Daily PRN, Martha Jimenez DO    calcium carbonate (TUMS) chewable tablet 500 mg, 500 mg, Oral, Daily PRN, Kang Bernice, DPM    diphenhydrAMINE (BENADRYL) injection 50 mg, 50 mg, Intravenous, Q6H PRN, Tamiko Marie MD    docusate sodium (COLACE) capsule 100 mg, 100 mg, Oral, BID, Kang Bernice, DPM, 100 mg at 61/56/93 4600    folic acid (FOLVITE) tablet 1 mg, 1 mg, Oral, Daily, Kang Bernice, DPM, 1 mg at 07/30/20 0843    HYDROcodone-acetaminophen (NORCO) 5-325 mg per tablet 1 tablet, 1 tablet, Oral, Q4H PRN, Kang Bernice, DPM, 1 tablet at 07/28/20 2344    HYDROcodone-acetaminophen (NORCO) 5-325 mg per tablet 2 tablet, 2 tablet, Oral, Q4H PRN, Kang Bernice, DPM, 2 tablet at 07/30/20 0504    HYDROmorphone (DILAUDID) injection 0 5 mg, 0 5 mg, Intravenous, Q2H PRN, Kang Bernice, DPM, 0 5 mg at 07/29/20 1326    levofloxacin (LEVAQUIN) tablet 750 mg, 750 mg, Oral, Every Other Day, Milan Escamilla MD, 750 mg at 07/29/20 1744    LORazepam (ATIVAN) tablet 1 mg, 1 mg, Oral, Q8H PRN, Kang Bernice, DPM, 1 mg at 07/30/20 0843    pantoprazole (PROTONIX) EC tablet 40 mg, 40 mg, Oral, Early Morning, Kang Bernice, DPM, 40 mg at 07/30/20 0505    polyethylene glycol (MIRALAX) packet 17 g, 17 g, Oral, BID, Kang Bernice, DPM, 17 g at 07/29/20 1744    simethicone (MYLICON) chewable tablet 80 mg, 80 mg, Oral, Q6H PRN, Tamiko Marie MD, 80 mg at 07/28/20 1638    tamsulosin (FLOMAX) capsule 0 4 mg, 0 4 mg, Oral, Daily With Dinner, Kang Queen DPM, 0 4 mg at 07/29/20 1722    Imaging: I have personally reviewed pertinent films in PACS  EKG, Pathology, and Other Studies: I have personally reviewed pertinent reports    VTE Pharmacologic Prophylaxis: None  VTE Mechanical Prophylaxis: reason for no mechanical VTE prophylaxis Wounds right lower extremity    Li Tripathi, DPM

## 2020-07-30 NOTE — BRIEF OP NOTE (RAD/CATH)
IR BONE MARROW BIOPSY/ASPIRATION Procedure Note    PATIENT NAME: Truong Hamm  : 1945  MRN: [id number]    Pre-op Diagnosis:   1  Osteomyelitis ([facility] [address] )    2  Cellulitis    3  Wound of right lower extremity, initial encounter    4  Anemia    5  Shortness of breath    6  Umbilicus discharge    7  Chronic combined systolic and diastolic heart failure ([facility] [address] )    8  Atherosclerosis of artery of extremity with ulceration ([facility] [address] )    9  Peripheral vascular disease of lower extremity ([facility] [address] )    10  CKD (chronic kidney disease) stage 3, GFR 30-59 ml/min ([facility])    11  Anemia, unspecified type      Post-op Diagnosis:   1  Osteomyelitis ([facility] [address] )    2  Cellulitis    3  Wound of right lower extremity, initial encounter    4  Anemia    5  Shortness of breath    6  Umbilicus discharge    7  Chronic combined systolic and diastolic heart failure ([facility] [address] )    8  Atherosclerosis of artery of extremity with ulceration ([facility] [address] )    9  Peripheral vascular disease of lower extremity ([facility] [address] )    10  CKD (chronic kidney disease) stage 3, GFR 30-59 ml/min ([facility])    11   Anemia, unspecified type        Surgeon:   Duy Shelton MD  Assistants:     No qualified resident was available, Resident is only observing    Estimated Blood Loss:  Minimal    Findings:  Using CT guidance bone marrow biopsy needle placed in the posterior right iliac bone and 7 cc of marrow blood aspirated along with single core biopsy and given to pathologist     Specimens:  Bone marrow aspirate and core biopsy deemed adequate by the pathologist     Complications:  None    Anesthesia: Conscious sedation    Duy Shelton MD     Date: 2020  Time: 11:46 AM

## 2020-07-30 NOTE — ASSESSMENT & PLAN NOTE
Patient has right lower leg wounds with cellulitis  On IV vancomycin and cefepime, started 7/17    Patient underwent angiogram   Patient underwent debridement of right lower extremity on 07/24/2020  OR wound cultures - growing morganella and multiple organisms- intermediate resistance to cefepime  Stop cefepime  IV zosyn started 7/28, discontinued by ID 7/29  Transitioned to oral antibiotics by ID - augmentin and levofloxacin till 8/8/20  Appreciate ID recs  Podiatry follow-up  Dressing changes per Podiatry

## 2020-07-30 NOTE — PROGRESS NOTES
NEPHROLOGY PROGRESS NOTE   Ryne Wilburn 69 y o  male MRN: [id number]  Unit/Bed#: -01 Encounter: [id number]  Reason for Consult: PALOMA (POA) on CKD III    ASSESSMENT/PLAN:  PALOMA(POA) on CKD III:  Likely multifactorial with hypertension, anemia, NSAID use, Ace inhibition, +/- urinary retention, and possible multiple myeloma    -presented with creatinine of 2 21   -baseline creatinine previously in the mid 1s   -creatinine increased today to 1 7  Continued urinary retention and decreasing hgb    -possible ascites with worsening abdominal distention  -for bone marrow biopsy today    -continue to hold ACE-inhibitor  -UA was bland  -UPEP negative  -SPEP + for IgG kappa  -elevated FLC ratio     -CT shows small right renal cyst, bilateral extrarenal pelvis dilatation left greater than right   Negative for hydro   -continue to avoid episodes of hypotension, nephrotoxins, and IV contrast   -Continue intermittent straights caths    -will need follow up as OP    -I/O      Hyponatremia: (resolved) likely paraproteinemia   -will continue to monitor       Mild acidosis: (resolved)  -will continue to monitor   Consider oral bicarbonate of bicarb level drops further      Hypertension: overall stable  -Ace inhibitor remains on hold   -avoid hypotension or high fluctuations in blood pressure   -recommend holding parameter on antihypertensive for systolic blood pressure less than 374       Umbilical wound:  Exposed mesh in abscess   Status post wound exploration and primary repair of umbilical hernia  -surgery team following   -local dressing changes   -continues on antibiotics per ID      Urinary retention:  Reports intermittent self catheterization at home   -continue Q shift bladder scans with urinary retention protocol   -continue intermittent straight catheterizations   Recommend placing garrison, patient refusing   -consider urology consult       Pancytopenia:  Status post several infusions   -for bone marrow biopsy on Thursday    -hemoglobin trending down  Continue to monitor and transfuse as needed   -hematology and GI following   -refusing EGD/colonoscopy  -checking occult stools       Right lower extremity cellulitis/skin ulceration:  S/P debridement   -abx per ID  Disposition: okay to discharge from renal when medically cleared  SUBJECTIVE:  The patient is currently off of the unit for bone marrow biopsy  OBJECTIVE:  Current Weight: Weight - Scale: 67 2 kg (148 lb 2 oz)  Vitals:    07/29/20 1607 07/29/20 2337 07/30/20 0537 07/30/20 0709   BP:  115/57  112/57   BP Location:    Right arm   Pulse: 84 80  76   Resp:    18   Temp:  97 5 °F (36 4 °C)  98 °F (36 7 °C)   TempSrc:    Oral   SpO2: 99% 100%  99%   Weight:   67 2 kg (148 lb 2 oz)    Height:           Intake/Output Summary (Last 24 hours) at 7/30/2020 1121  Last data filed at 7/30/2020 0530  Gross per 24 hour   Intake    Output 1225 ml   Net -1225 ml     Unable to obtain physical examination due to patient being off of the unit for biospy       Medications:    Current Facility-Administered Medications:     acetaminophen (TYLENOL) tablet 650 mg, 650 mg, Oral, Q6H PRN, Kang Bernice, DPM, 650 mg at 07/26/20 0201    amoxicillin-clavulanate (AUGMENTIN) 875-125 mg per tablet 1 tablet, 1 tablet, Oral, Q12H [address], Milan Escamilla MD, 1 tablet at 07/30/20 9915    aspirin chewable tablet 81 mg, 81 mg, Oral, Daily, Kang Bernice, DPM, 81 mg at 07/30/20 0843    atorvastatin (LIPITOR) tablet 40 mg, 40 mg, Oral, Daily With Evin Cuellar, DPM, 40 mg at 07/29/20 1722    bisacodyl (DULCOLAX) EC tablet 5 mg, 5 mg, Oral, Daily PRN, Martha Jimenez DO    calcium carbonate (TUMS) chewable tablet 500 mg, 500 mg, Oral, Daily PRN, Kang Bernice, DPM    diphenhydrAMINE (BENADRYL) injection 50 mg, 50 mg, Intravenous, Q6H PRN, Tamiko Decant, MD    docusate sodium (COLACE) capsule 100 mg, 100 mg, Oral, BID, Kang Queen DPM, 100 mg at 51/74/08 6700    folic acid (Jasbir Fast) tablet 1 mg, 1 mg, Oral, Daily, Michelle Day DPM, 1 mg at 07/30/20 0843    HYDROcodone-acetaminophen (NORCO) 5-325 mg per tablet 1 tablet, 1 tablet, Oral, Q4H PRN, Michelle Day DPM, 1 tablet at 07/28/20 2344    HYDROcodone-acetaminophen (NORCO) 5-325 mg per tablet 2 tablet, 2 tablet, Oral, Q4H PRN, Michelle Day DPM, 2 tablet at 07/30/20 0504    HYDROmorphone (DILAUDID) injection 0 5 mg, 0 5 mg, Intravenous, Q2H PRN, Michelle Day DPM, 0 5 mg at 07/29/20 1326    levofloxacin (LEVAQUIN) tablet 750 mg, 750 mg, Oral, Every Other Day, Stephanie Lafleur MD, 750 mg at 07/29/20 1744    LORazepam (ATIVAN) tablet 1 mg, 1 mg, Oral, Q8H PRN, Michelle Day DPM, 1 mg at 07/30/20 0843    pantoprazole (PROTONIX) EC tablet 40 mg, 40 mg, Oral, Early Morning, Michelle Day DPREAGAN, 40 mg at 07/30/20 0505    polyethylene glycol (MIRALAX) packet 17 g, 17 g, Oral, BID, Michelle Day DPM, 17 g at 07/29/20 1744    simethicone (MYLICON) chewable tablet 80 mg, 80 mg, Oral, Q6H PRN, Ashley Adams MD, 80 mg at 07/28/20 1638    tamsulosin (FLOMAX) capsule 0 4 mg, 0 4 mg, Oral, Daily With Alvarez Hollingsworth, DPM, 0 4 mg at 07/29/20 1722    Laboratory Results:  Results from last 7 days   Lab Units 07/30/20  0527 07/29/20  0528 07/28/20  0537   WBC Thousand/uL 2 20* 2 94* 3 16*   HEMOGLOBIN g/dL 6 7* 7 2* 8 7*   HEMATOCRIT % 20 7* 21 9* 27 1*   PLATELETS Thousands/uL 57* 55* 62*   SODIUM mmol/L 138 138 139   POTASSIUM mmol/L 4 4 4 3 4 3   CHLORIDE mmol/L 105 106 106   CO2 mmol/L 22 22 21   BUN mg/dL 32* 31* 31*   CREATININE mg/dL 1 75* 1 62* 1 40*   CALCIUM mg/dL 8 4 8 4 8 9

## 2020-07-30 NOTE — PROGRESS NOTES
Progress Note - Mile Guillen 1945, 69 y o  male MRN: [id number]    Unit/Bed#: -01 Encounter: [id number]    Primary Care Provider: Zeina Jacobson DO   Date and time admitted to hospital: 7/16/2020 77:39 PM        Umbilicus discharge  Assessment & Plan  · Umbilical wound infection with exposed mesh and abscess  Occurring since hernia surgery  · Patient underwent  EXPLORATORY laparotomy, explantation of infected mesh and repair of ventral heria (N/A) on 07/21/2020  · Post-op management per surgery  · Pain management p r n   · On IV antibiotics - ID consulted to help with duration and coverage  · Cultures growing multiple organisms  · ID recommends amoxicilin/clavilunate 875/125mg PO BID and levofloxacin 750mg q48hrs till 8/8/20  · Discharge planning after bone marrow biopsy    Cellulitis  Assessment & Plan  Patient has right lower leg wounds with cellulitis  On IV vancomycin and cefepime, started 7/17  Patient underwent angiogram   Patient underwent debridement of right lower extremity on 07/24/2020  OR wound cultures - growing morganella and multiple organisms- intermediate resistance to cefepime  Stop cefepime  IV zosyn started 7/28, discontinued by ID 7/29  Transitioned to oral antibiotics by ID - augmentin and levofloxacin till 8/8/20  Appreciate ID recs  Podiatry follow-up  Dressing changes per Podiatry    Pancytopenia [facility])  Assessment & Plan  For bone marrow biopsy 7/30    Open wound of umbilical region  Assessment & Plan  Exploratory laparotomy, excision of infected mesh and repair of ventral heria (N/A) on 07/21/2020  Wound care and dressing changes per surgery    Pain management  See above    Severe protein-calorie malnutrition (HCC)  Assessment & Plan  Pt has Severe protein-calorie malnutrition, in the setting of chronically ill patient with history of noncompliance with care, as evidenced by BMI 19 17 with an 11 33% unplanned weight loss over the past year, multiple nonhealing wounds appearance of muscle wasting/fat loss and decreased mobility noted on nursing assessment, Findings: height 5' 10", weight 133 lb 9 6 oz, BMI 19 17 Screen: (+) Unplanned weight loss in the last three months; (+) Stage III-IV pressure ulcer or non-healing wound; (+) Appearance of muscle wasting or fat loss; Benign prostatic hyperplasia with urinary retention  Assessment & Plan  Patient self catheterizes at home when he feels he needs to for BPH with retention,   Patient had to be catheterized 6 times during this hospital stay  Continue straight cath q 8 hours because patient has urinary retention, he refuses Abbott placement  On flomax      CKD (chronic kidney disease) stage 3, GFR 30-59 ml/min ([facility])  Assessment & Plan  Patient has stage III chronic disease with creatinine around 2 0 at baseline  Patient's renal function is at baseline:  Creatinine is 1 75 today  Nephrology on board  Monitor renal function in a m  Chronic combined systolic and diastolic heart failure ([facility])  Assessment & Plan  Wt Readings from Last 3 Encounters:   07/29/20 65 kg (143 lb 4 8 oz)   06/21/19 68 3 kg (150 lb 9 6 oz)   06/19/19 67 6 kg (149 lb)     Chronic CHF, not in acute exacerbation  Echocardiogram on this admission shows ejection fraction of 50%  Patient's lungs are clear, has no JVD  Continue to hold lisinopril, Lasix and Toprol because of relative hypotension  Daily weight and I&Os    Thrombocytopenia ([facility] [address] )  Assessment & Plan  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 with platelet counts running between 60-115K  he denies signs of bleeding   Patient received 1 unit of platelets on 0/60  Plts 57  Hematology recommended transfusing platelets to keep the counts above 50K if he is going to require surgical intervention  Other persistent atrial fibrillation [facility])  Assessment & Plan  Patient is status post ablation and pacemaker insertion  EKG showed paced rhythm    Monitor off anticoagulation due to ITP and thrombocytopenia    Peripheral vascular disease of lower extremity (HCC)  Assessment & Plan  PAD with more than 75% stenosis of the right superficial femoral artery on arterial Doppler  Continue atorvastatin and aspirin  Patient underwent right SFA PTA with single-vessel peroneal runoff on 07/20/2020  IR was unable to perform tibial intervention secondary to patient's inability to tolerate procedure  Appreciate recs from Vascular surgery and Podiatry    * Anemia, unspecified  Assessment & Plan  · POA, severe symptomatic normocytic anemia, HB 6 7 causing shortness of breath on exertion secondary to ASA/NSAID use for chronic right lower leg ulcers that were getting worse  · CT Abd thickening of the gastric antrum likely gastritis but unable to exclude gastric neoplasm  S/p 3iu prbcs  Hb 6 7 this am  No overt signs of bleeding seen  2iu prbcs ordered  Monitor H&H and transfuse as needed, keep Hb > 7  Continue Protonix b i d ,  Patient refusing endoscopic workup for anemia  GI on board, appreciate recs  For IR bone marrow biopsy today - concern for possible myelodysplastic syndrome/multiple myeloma per hematology  Currently NPO      VTE Pharmacologic Prophylaxis:   Pharmacologic: Pharmacologic VTE Prophylaxis contraindicated due to pantocytopenia  Mechanical VTE Prophylaxis in Place: Yes    Patient Centered Rounds: I have performed bedside rounds with nursing staff today  Discussions with Specialists or Other Care Team Provider: Nephro, IR    Education and Discussions with Family / Patient: patient did not want family called    Time Spent for Care: 30 minutes  More than 50% of total time spent on counseling and coordination of care as described above      Current Length of Stay: 13 day(s)    Current Patient Status: Inpatient   Certification Statement: The patient will continue to require additional inpatient hospital stay due to as above, bone marrow biopsy today    Discharge Plan: pendng bone marrow biopsy    Code Status: Level 1 - Full Code      Subjective:   Hb dropped to 6 7 overnight, patient asymptomatic    Objective:     Vitals:   Temp (24hrs), Av 9 °F (36 6 °C), Min:97 5 °F (36 4 °C), Max:98 1 °F (36 7 °C)    Temp:  [97 5 °F (36 4 °C)-98 1 °F (36 7 °C)] 98 °F (36 7 °C)  HR:  [76-84] 76  Resp:  [18] 18  BP: (102-119)/(54-61) 112/57  SpO2:  [99 %-100 %] 99 %  Body mass index is 21 25 kg/m²  Input and Output Summary (last 24 hours): Intake/Output Summary (Last 24 hours) at 2020 9157  Last data filed at 2020 0530  Gross per 24 hour   Intake 120 ml   Output 1825 ml   Net -1705 ml       Physical Exam:     Physical Exam   Constitutional: He is oriented to person, place, and time  No distress  Malnourished with temporal muscle wasting, orbital hollowing and prominent ribs and clavicles   Cardiovascular: Normal rate, regular rhythm and normal heart sounds  Pulmonary/Chest: Effort normal and breath sounds normal  No respiratory distress  Abdominal: Soft  Bowel sounds are normal  He exhibits distension  There is no tenderness  Musculoskeletal: Normal range of motion  He exhibits edema (right LE)  He exhibits no tenderness or deformity  Neurological: He is alert and oriented to person, place, and time  No cranial nerve deficit  Skin: Skin is warm and dry  No rash noted  He is not diaphoretic  No erythema  Nursing note and vitals reviewed  Additional Data:     Labs:    Results from last 7 days   Lab Units 20  0527  20  0556   WBC Thousand/uL 2 20*   < > 3 42*   HEMOGLOBIN g/dL 6 7*   < > 9 3*   HEMATOCRIT % 20 7*   < > 28 9*   PLATELETS Thousands/uL 57*   < > 59*   LYMPHO PCT %  --   --  12*   MONO PCT %  --   --  6   EOS PCT %  --   --  0    < > = values in this interval not displayed       Results from last 7 days   Lab Units 20  0527   SODIUM mmol/L 138   POTASSIUM mmol/L 4 4   CHLORIDE mmol/L 105   CO2 mmol/L 22   BUN mg/dL 32*   CREATININE mg/dL 1 75* ANION GAP mmol/L 11   CALCIUM mg/dL 8 4   GLUCOSE RANDOM mg/dL 97                           * I Have Reviewed All Lab Data Listed Above  * Additional Pertinent Lab Tests Reviewed: All Labs Within Last 24 Hours Reviewed    Imaging:    Imaging Reports Reviewed Today Include:   Imaging Personally Reviewed by Myself Includes:      Recent Cultures (last 7 days):     Results from last 7 days   Lab Units 07/24/20  1328   GRAM STAIN RESULT  Rare Polys*  2+ Gram negative rods*   WOUND CULTURE  3+ Growth of Myroides odoratimimus*  3+ Growth of Morganella morganii*  1+ Growth of        Last 24 Hours Medication List:     Current Facility-Administered Medications:  acetaminophen 650 mg Oral Q6H PRN Neida Edu, DPM   amoxicillin-clavulanate 1 tablet Oral Q12H [address] Emily Ribeiro MD   aspirin 81 mg Oral Daily Neida Edu, DPM   atorvastatin 40 mg Oral Daily With Jack Cai, DPM   bisacodyl 5 mg Oral Daily PRN Narvaez Shadow, DO   calcium carbonate 500 mg Oral Daily PRN Neida Edu, DPM   diphenhydrAMINE 25 mg Intravenous Q6H PRN Neida Edu, DPM   docusate sodium 100 mg Oral BID Neida Edu, DPM   folic acid 1 mg Oral Daily Neida Edu, DPM   HYDROcodone-acetaminophen 1 tablet Oral Q4H PRN Neida Edu, DPM   HYDROcodone-acetaminophen 2 tablet Oral Q4H PRN Neida Edu, DPM   HYDROmorphone 0 5 mg Intravenous Q2H PRN Neida Edu, DPM   levofloxacin 750 mg Oral Every Other Day Emily Ribeiro MD   LORazepam 1 mg Oral Q8H PRN Neida Edu, DPM   pantoprazole 40 mg Oral Early Morning Neida Edu, DPM   polyethylene glycol 17 g Oral BID Neida Edu, DPM   simethicone 80 mg Oral Q6H PRN Michelle Alarcon MD   tamsulosin 0 4 mg Oral Daily With Jack Cai, DPM        Today, Patient Was Seen By: Michelle Alarcon MD    ** Please Note: Dictation voice to text software may have been used in the creation of this document   **

## 2020-07-30 NOTE — ASSESSMENT & PLAN NOTE
· Umbilical wound infection with exposed mesh and abscess  Occurring since hernia surgery  · Patient underwent  EXPLORATORY laparotomy, explantation of infected mesh and repair of ventral heria (N/A) on 07/21/2020  · Post-op management per surgery  · Pain management p r n   · On IV antibiotics - ID consulted to help with duration and coverage  · Cultures growing multiple organisms  · ID recommends amoxicilin/clavilunate 875/125mg PO BID and levofloxacin 750mg q48hrs till 8/8/20  · Discharge planning after bone marrow biopsy

## 2020-07-31 NOTE — DISCHARGE SUMMARY
Discharge- [facility] [place] 1945, 69 y o  male MRN: [id number]    Unit/Bed#: -01 Encounter: [id number]    Primary Care Provider: oWn Montanez DO   Date and time admitted to hospital: 7/16/2020 77:41 PM        Umbilicus discharge  Assessment & Plan  · Umbilical wound infection with exposed mesh and abscess  Occurring since hernia surgery  · Patient underwent  EXPLORATORY laparotomy, explantation of infected mesh and repair of ventral heria (N/A) on 07/21/2020  · Post-op management per surgery  · Pain management p r n   · On IV antibiotics - ID consulted to help with duration and coverage  · Cultures growing multiple organisms  · ID recommends amoxicilin/clavilunate 875/125mg PO BID and levofloxacin 750mg q48hrs till 8/8/20  · Discharge planning after bone marrow biopsy  · Medically stable for discharge today    Cellulitis  Assessment & Plan  Patient has right lower leg wounds with cellulitis  On IV vancomycin and cefepime, started 7/17  Patient underwent angiogram   Patient underwent debridement of right lower extremity on 07/24/2020  OR wound cultures - growing morganella and multiple organisms- intermediate resistance to cefepime  S/p IV cefepime  IV zosyn started 7/28, discontinued by ID 7/29  Transitioned to oral antibiotics by ID - augmentin and levofloxacin till 8/8/20  Appreciate ID recs  Podiatry follow-up  Dressing changes per Podiatry    Pancytopenia [facility])  Assessment & Plan  s/p bone marrow biopsy 7/30 - to discuss results with his PCP    Open wound of umbilical region  Assessment & Plan  Exploratory laparotomy, excision of infected mesh and repair of ventral heria (N/A) on 07/21/2020  Wound care and dressing changes per surgery    Pain management  See above    Severe protein-calorie malnutrition (HCC)  Assessment & Plan  Pt has Severe protein-calorie malnutrition, in the setting of chronically ill patient with history of noncompliance with care, as evidenced by BMI 19 17 with an 11 33% unplanned weight loss over the past year, multiple nonhealing wounds appearance of muscle wasting/fat loss and decreased mobility noted on nursing assessment, Findings: height 5' 10", weight 133 lb 9 6 oz, BMI 19 17 Screen: (+) Unplanned weight loss in the last three months; (+) Stage III-IV pressure ulcer or non-healing wound; (+) Appearance of muscle wasting or fat loss; Benign prostatic hyperplasia with urinary retention  Assessment & Plan  Patient self catheterizes at home when he feels he needs to for BPH with retention,   Patient had to be catheterized 6 times during this hospital stay  Continue straight cath q 8 hours because patient has urinary retention, he refuses Abbott placement  On flomax      CKD (chronic kidney disease) stage 3, GFR 30-59 ml/min ([facility])  Assessment & Plan  Patient has stage III chronic disease with creatinine around 2 0 at baseline  Patient's renal function is at baseline:  Creatinine is 1 55 today  Nephrology on board  Lisinopril held given relative hypotension, continue to hold on discharge    Chronic combined systolic and diastolic heart failure ([facility])  Assessment & Plan  Wt Readings from Last 3 Encounters:   07/31/20 70 kg (154 lb 5 2 oz)   06/21/19 68 3 kg (150 lb 9 6 oz)   06/19/19 67 6 kg (149 lb)     Chronic CHF, not in acute exacerbation  Echocardiogram on this admission shows ejection fraction of 50%  Patient's lungs are clear, has no JVD    Reduce 100mg Toprol to 25mg on discharge because of relative hypotension noted while hospitalized  Daily weight and I&Os  Would need BB and ACEI given combined systolic and diastolic CHF  Reduce toprol to 25mg daily as above, and lisinopril to 2 5mg daily    Thrombocytopenia ([facility] [address] )  Assessment & Plan  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 with platelet counts running between 60-115K  he denies signs of bleeding   S/p 1 unit of platelets on 8/57  Plts level Stable 71, today  Hematology recommended transfusing platelets to keep the counts above 50K if he is going to require surgical intervention  Other persistent atrial fibrillation [facility])  Assessment & Plan  Patient is status post ablation and pacemaker insertion  EKG showed paced rhythm  Monitor off anticoagulation due to ITP and thrombocytopenia  Reduce toprol to 25mg daily given relative hypotension    Peripheral vascular disease of lower extremity (HCC)  Assessment & Plan  PAD with more than 75% stenosis of the right superficial femoral artery on arterial Doppler  Continue atorvastatin and aspirin  Patient underwent right SFA PTA with single-vessel peroneal runoff on 07/20/2020  IR was unable to perform tibial intervention secondary to patient's inability to tolerate procedure  Appreciate recs from Vascular surgery and Podiatry    * Anemia, unspecified  Assessment & Plan  · POA, severe symptomatic normocytic anemia, HB 6 7 causing shortness of breath on exertion secondary to ASA/NSAID use for chronic right lower leg ulcers that were getting worse  · CT Abd thickening of the gastric antrum likely gastritis but unable to exclude gastric neoplasm    · DC NSAIDS  · Continue aspirin  S/p 5iu prbcs total this hospitalization  Hb 9 8 this am  No overt signs of bleeding seen  S/p IR bone marrow biopsy 7/30/20, to follow up results outpatient  Encouraged to follow up regularly with his PCP  Continue Protonix daily  Patient refusing endoscopic workup for anemia  GI on board, appreciate recs  concern for possible myelodysplastic syndrome/multiple myeloma per hematology, to f/u biopsy results outpatient - discuss with PCP          Discharging Physician / Practitioner: Travis Davis MD  PCP: Roldan Pelayo DO  Admission Date:   Admission Orders (From admission, onward)     Ordered        07/17/20 0024  Inpatient Admission (expected length of stay for this patient Order details is greater than two midnights)  Once                   Discharge Date: 07/31/20    Resolved Problems  Date Reviewed: 7/31/2020          Resolved    Lactic acidosis 7/28/2020     Resolved by  Antoinette Wade MD          Consultations During Hospital Stay:  · Cardiology  · Nephrology  · Infectious disease  · Podiatry  · Interventional radiology  · Surgery  · Oncology  · Vascular surgery  · Wound care  · Gastroenterology    Procedures Performed:   · IR bone marrow biopsy  · 7/20/20 IR Right leg angiogram - High grade >80% stenosis of R SFA (patient refused stenting or further procedure)  · 7/21/2020 Surgical explantation of infected abdominal wall mesh and repair of umbilical hernia  · 4/98/2419 Debridement of right leg ulcers/wounds    Significant Findings / Test Results:   · 7/17/20 CT abd/pelvis     IMPRESSION:     Degraded by respiratory motion      1  Small blind-ending fat and fluid-containing collection at the umbilicus communicating with the skin surface  This does not appear to communicate with adjacent opacified small bowel loops      2   Stable distal left ureteral dilatation with improved dilation of bilateral extra renal pelves and no hydronephrosis  This is likely secondary to a distal left ureteral stricture      3  Thickening of the gastric antrum suggestive of gastritis though a mass is difficult to entirely exclude  Recommend nonemergent endoscopic or upper GI evaluation      4  Cholelithiasis      5   Splenomegaly of uncertain etiology, though only mildly increased from the prior study from 2011  Incidental Findings:   · None     Test Results Pending at Discharge (will require follow up):    · None     Outpatient Tests Requested:  · CBC check within 3 days    Complications:  None    Reason for Admission: Dyspnea on exertion    Hospital Course:     Shantelle Rankin is a 69 y o  male patient with PMH of combined systolic and diastolic CHF, Madeline, PVD, CKD 3-4, HTN,  who originally presented to the hospital on 7/16/2020 due to dyspnea on exertion, abdominal pain and was found to have right lower extremity wounds which have been worsening over the past 2 months  He was also found to be anemic, Hb 6 7 and also complained of chronic umlbilical wound drainage  He had hernia repair carried out more than 7 years ago, and never followed up  On presentation, patient required multidisciplinary and prolonged care given poor personal care and poor outpatient follow up with his PCP/doctors  He was seen by cardiology for CHF, Afib, h/o AVN ablation and BI V PPM, they recommended holding eliquis given anemia and concern for GI bleed, He stabilized from cardiology standpoint,    He was seen by Surgery and underwent surgical explantation of infected mesh and hernia repair, he stabilized and is to follow up outpatient  His right lower extremity PVD ulcers was managed by podiatry, vascular surgery and IR  Angiogram showed severe vascular stenosis but patient declined stent placement and underwent debridement, he is to follow up with podiatry and wound care center  He was also noted to be in CKD 3, no recent cr baseline found, he was advised to stop NSAIDS and follow up with nephrology outpatient  He was seen by GI given anemia but declined EGD/colonoscopy workup  Thus he was evaluated by hematology and underwent bone marrow biopsy 7/30 and is to discuss results with his PCP next week  Please see above list of diagnoses and related plan for additional information  Condition at Discharge: stable     Discharge Day Visit / Exam:     Subjective:  No overnight events  Tolerated his bone marrow biopsy well and slept so well, he did not want to go home   No new complaints this am    Vitals: Blood Pressure: 119/61 (07/31/20 0724)  Pulse: 85 (07/31/20 0724)  Temperature: 98 2 °F (36 8 °C) (07/31/20 0724)  Temp Source: Oral (07/31/20 0724)  Respirations: 16 (07/30/20 2000)  Height: 5' 10" (177 8 cm) (07/17/20 0600)  Weight - Scale: 70 kg (154 lb 5 2 oz) (07/31/20 0600)  SpO2: 98 % (07/31/20 9276)  Exam:   Physical Exam   Constitutional: He is oriented to person, place, and time  No distress  Cachetic, chronically ill looking   Cardiovascular: Normal rate, regular rhythm and intact distal pulses  Exam reveals no friction rub  Murmur heard  Pulmonary/Chest: Effort normal and breath sounds normal  No stridor  No respiratory distress  He has no wheezes  Abdominal: Soft  Bowel sounds are normal  He exhibits distension  There is no tenderness  There is no guarding  Musculoskeletal: He exhibits edema (right lower extremity swelling comapred to the left)  He exhibits no tenderness or deformity  Right knee: He exhibits decreased range of motion  Left knee: Normal    Neurological: He is alert and oriented to person, place, and time  He displays normal reflexes  No cranial nerve deficit  Coordination normal    Skin: Skin is warm and dry  Capillary refill takes less than 2 seconds  Rash (right lower extremity rash, leg wrapped, dressing clean and dry) noted  He is not diaphoretic  No erythema  No pallor  Psychiatric: He has a normal mood and affect  His behavior is normal    Nursing note and vitals reviewed  Discussion with Family: Patient did not want his girlfriend called    Discharge instructions/Information to patient and family:   See after visit summary for information provided to patient and family  Provisions for Follow-Up Care:  See after visit summary for information related to follow-up care and any pertinent home health orders  Disposition:     Home with [facility] (Reminder: Complete face to face encounter)    For Discharges to [facility]:   · Not Applicable to this Patient - Not Applicable to this Patient    Planned Readmission: No     Discharge Statement:  I spent 45 minutes discharging the patient  This time was spent on the day of discharge  I had direct contact with the patient on the day of discharge   Greater than 50% of the total time was spent examining patient, answering all patient questions, arranging and discussing plan of care with patient as well as directly providing post-discharge instructions  Additional time then spent on discharge activities  Discharge Medications:  See after visit summary for reconciled discharge medications provided to patient and family        ** Please Note: This note has been constructed using a voice recognition system **

## 2020-07-31 NOTE — NURSING NOTE
Patient bladder scan was 0 several times this evening  Switched bladder scan to different one but then the patient said no "I just urinated a good amount, I already went" Encouraged patient to get back into bed so we can scan and check, but patient refused   Reminded patient that he hasn't been cathed for a 2nd time today yet, but he insisted that he is fine and wishes to not be cathed or scanned at this time

## 2020-07-31 NOTE — SOCIAL WORK
LOS: 14  As per Dr Evon Farrell, patient for discharge today to home with [facility] following for PT and SN  Met with patient and he feels ready for discharge  He will return home with his SO, Sang Barragan  He is requesting WC van to transport  Made aware of out of pocket cost  Arranged [place] [facility] van to transport at 12:45  Notified patient and Stephanie Tyler, his nurse of transport time  Patient informed this CM he would leave a message for Sang Barragan as she sleeps late

## 2020-07-31 NOTE — ASSESSMENT & PLAN NOTE
· Umbilical wound infection with exposed mesh and abscess  Occurring since hernia surgery  · Patient underwent  EXPLORATORY laparotomy, explantation of infected mesh and repair of ventral heria (N/A) on 07/21/2020  · Post-op management per surgery  · Pain management p r n   · On IV antibiotics - ID consulted to help with duration and coverage  · Cultures growing multiple organisms  · ID recommends amoxicilin/clavilunate 875/125mg PO BID and levofloxacin 750mg q48hrs till 8/8/20  · Discharge planning after bone marrow biopsy  · Medically stable for discharge today

## 2020-07-31 NOTE — PROGRESS NOTES
NEPHROLOGY PROGRESS NOTE   Carmela Hurt 69 y o  male MRN: [id number]  Unit/Bed#: -01 Encounter: [id number]  Reason for Consult: PALOMA (POA) on CKD III    ASSESSMENT/PLAN:  PALOMA(POA) on CKD III:  Likely multifactorial with hypertension, anemia, NSAID use, Ace inhibition, +/- urinary retention, and possible multiple myeloma    -presented with creatinine of 2 21   -baseline creatinine previously in the mid 1s   -creatinine increased today to 1 5  Continued urinary retention and decreasing hgb    -possible ascites with worsening abdominal distention  -S/P bone marrow biopsy    -continue to hold ACE-inhibitor  -UA was bland  -UPEP negative  -SPEP + for IgG kappa  -elevated FLC ratio     -CT shows small right renal cyst, bilateral extrarenal pelvis dilatation left greater than right   Negative for hydro   -continue to avoid episodes of hypotension, nephrotoxins, and IV contrast   -Continue intermittent straights caths    -will need follow up as OP    -I/O      Hyponatremia: (resolved) likely paraproteinemia   -will continue to monitor       Mild acidosis: (resolved)  -will continue to monitor   Consider oral bicarbonate of bicarb level drops further      Hypertension: overall stable  -Ace inhibitor remains on hold   -avoid hypotension or high fluctuations in blood pressure   -recommend holding parameter on antihypertensive for systolic blood pressure less than 930       Umbilical wound:  Exposed mesh in abscess   Status post wound exploration and primary repair of umbilical hernia  -surgery team following   -local dressing changes   -continues on antibiotics per ID, on PO until August 8th        Urinary retention:  Reports intermittent self catheterization at home   -continue Q shift bladder scans with urinary retention protocol   -continue intermittent straight catheterizations  Recommend placing garrison, patient refusing   -consider urology consult       Pancytopenia:  Status post several infusions   -for bone marrow biopsy on Thursday    -hemoglobin trending down   Continue to monitor and transfuse as needed   -hematology and GI following   -refusing EGD/colonoscopy  -checking occult stools       Right lower extremity cellulitis/skin ulceration:  S/P debridement   -abx per ID  Disposition:  Okay to discharge from renal  Will arrange OP follow up with repeat labs  SUBJECTIVE:  The patient denies chest pain or SOB  He denies N,V, D  He admits to urinating more on his on without catheterizing himself       OBJECTIVE:  Current Weight: Weight - Scale: 70 kg (154 lb 5 2 oz)  Vitals:    07/30/20 2246 07/31/20 0600 07/31/20 0724 07/31/20 0815   BP: 122/64  119/61    BP Location:   Left arm    Pulse: 90  85    Resp:       Temp:   98 2 °F (36 8 °C)    TempSrc:   Oral    SpO2: 97%  96% 98%   Weight:  70 kg (154 lb 5 2 oz)     Height:           Intake/Output Summary (Last 24 hours) at 7/31/2020 1141  Last data filed at 7/31/2020 8937  Gross per 24 hour   Intake 1025 ml   Output 1005 ml   Net 20 ml     General: NAD  Skin: warm, dry, intact, no rash  HEENT: Moist mucous membranes, sclera anicteric, normocephalic, atraumatic  Neck: No apparent JVD appreciated  Chest: lung sounds clear B/L, on RA   CVS:Regular rate and rhythm, no murmer   Abdomen: Soft, round, non-tender, +BS  Extremities: B/L LE edema present, R>L  Neuro: alert and oriented  Psych: appropriate mood and affect     Medications:    Current Facility-Administered Medications:     acetaminophen (TYLENOL) tablet 650 mg, 650 mg, Oral, Q6H PRN, Piper Boop, DPM, 650 mg at 07/26/20 0201    amoxicillin-clavulanate (AUGMENTIN) 875-125 mg per tablet 1 tablet, 1 tablet, Oral, Q12H [facility], Ching Angela MD, 1 tablet at 07/31/20 0815    aspirin chewable tablet 81 mg, 81 mg, Oral, Daily, Piper Boop, DPM, 81 mg at 07/31/20 0815    atorvastatin (LIPITOR) tablet 40 mg, 40 mg, Oral, Daily With Excell [place], DPM, 40 mg at 07/30/20 3056    bisacodyl (DULCOLAX) EC tablet 5 mg, 5 mg, Oral, Daily PRN, Ludy Bright DO    calcium carbonate (TUMS) chewable tablet 500 mg, 500 mg, Oral, Daily PRN, Alondra Vidales DPM    diphenhydrAMINE (BENADRYL) injection 50 mg, 50 mg, Intravenous, Q6H PRN, Meri Dunbar MD, 50 mg at 07/30/20 1651    docusate sodium (COLACE) capsule 100 mg, 100 mg, Oral, BID, Alondra Vidales DPM, 100 mg at 34/39/95 9756    folic acid (FOLVITE) tablet 1 mg, 1 mg, Oral, Daily, Alondra Vidales DPM, 1 mg at 07/31/20 0815    HYDROcodone-acetaminophen (NORCO) 5-325 mg per tablet 1 tablet, 1 tablet, Oral, Q4H PRN, Alondra Vidales DPM, 1 tablet at 07/28/20 2344    HYDROcodone-acetaminophen (NORCO) 5-325 mg per tablet 2 tablet, 2 tablet, Oral, Q4H PRN, Alondra Vidales DPM, 2 tablet at 07/31/20 1012    HYDROmorphone (DILAUDID) injection 0 5 mg, 0 5 mg, Intravenous, Q2H PRN, Alondra Vidales DPM, 0 5 mg at 07/29/20 1326    levofloxacin (LEVAQUIN) tablet 750 mg, 750 mg, Oral, Every Other Day, Kristian Louis MD, 750 mg at 07/31/20 0815    LORazepam (ATIVAN) tablet 1 mg, 1 mg, Oral, Q8H PRN, Alondra Vidales DPM, 1 mg at 07/31/20 1012    pantoprazole (PROTONIX) EC tablet 40 mg, 40 mg, Oral, Early Morning, Alondra Vidales DPM, 40 mg at 07/31/20 0553    polyethylene glycol (MIRALAX) packet 17 g, 17 g, Oral, BID, Alondra Vidales DPM, 17 g at 07/31/20 0814    simethicone (MYLICON) chewable tablet 80 mg, 80 mg, Oral, Q6H PRN, Meri Dunbar MD, 80 mg at 07/28/20 1638    tamsulosin (FLOMAX) capsule 0 4 mg, 0 4 mg, Oral, Daily With Acey Martina, DPM, 0 4 mg at 07/30/20 1656    Laboratory Results:  Results from last 7 days   Lab Units 07/31/20  0556 07/30/20  0527 07/29/20  0528   WBC Thousand/uL 4 87 2 20* 2 94*   HEMOGLOBIN g/dL 9 8* 6 7* 7 2*   HEMATOCRIT % 30 2* 20 7* 21 9*   PLATELETS Thousands/uL 71* 57* 55*   SODIUM mmol/L 137 138 138   POTASSIUM mmol/L 4 3 4 4 4 3   CHLORIDE mmol/L 106 105 106   CO2 mmol/L 22 22 22   BUN mg/dL 27* 32* 31*   CREATININE mg/dL 1 55* 1 75* 1 62*   CALCIUM mg/dL 8 6 8 4 8 4

## 2020-07-31 NOTE — DISCHARGE INSTRUCTIONS
Wound care instructions:    · VNA Dressing change every other day  · Wash wounds with soap and water, apply Dermagran gauze to all wounds, cover with ABD(x5), Kerlix(x2), and 6' Ace wrap  · Pad calf area with pillows at all times when resting to avoid pressure on the posterior wound  · Questions call Dr Magana Handler at [phone]        Bone Marrow Biopsy     WHAT YOU NEED TO KNOW:   A bone marrow biopsy is a procedure to remove a small amount of bone marrow from your bone  Bone marrow is the soft tissue inside your bone that helps to make blood cells  The sample is tested for disease or infection  DISCHARGE INSTRUCTIONS:     1  Limit your activities day of biopsy as directed by your doctor  2  Use medication as ordered  3  Return to your normal diet  Small sips of flat soda will help with nausea  4  Remove band-aid or dressing 24 hours after procedure  Contact Interventional Radiology at [phone] Arlene PATIENTS: Contact Interventional Radiology at [phone]) Meño Sensor PATIENTS: Contact Interventional Radiology at [phone]) if:    1  Difficulty breathing, nausea or vomiting  2  Chills or fever above 101 F     3  Pain at biopsy site not relieved by medication  4  Develop any redness, swelling, heat, unusual drainage, heavy bruising or bleeding from biopsy site  ARTERIOGRAM    WHAT YOU SHOULD KNOW:   An angiogram is a procedure to look at arteries in your body  Arteries are the blood vessels that carry blood from your heart to your body  AFTER YOU LEAVE:     Self-care:   · Limit activity: Rest for the remainder of the day of your procedure  Have some one with you until the next morning  Keep your arm or leg straight as much as possible  Rest as much as possible, sitting lying or reclining  Walk only to go to the bathroom, to bed or to eat  If the angiogram catheter was put in your leg, use the stairs as little as possible  No driving  · Keep your wound clean and dry   You may shower 24 hours after your procedure  The bandage you have on should fall off in 2-3 days  If there is any drainage from the puncture site, you should put on a clean bandage  · Watch for bleeding and bruising: It is normal to have a bruise and soreness where the angiogram catheter went in  · Diet:   · You may resume your regular diet, Sips of flat soda will help with mild nausea  · Drink more liquids than usual for the next 24 hours      · IMMEDIATELY Contact Interventional Radiology at [phone] Arlene PATIENTS: Contact Interventional Radiology at [phone]) Aleksandar Joel PATIENTS: Contact Interventional Radiology at [phone]) if any of the following occur:  · If your bruise gets larger or if you notice any active bleeding  APPLY DIRECT PRESSURE TO THE BLEEDING SITE  · If you notice increased swelling or have increased pain at the puncture site   · If you have any numbness or pain in the extremity of the puncture site   · If that extremity seems cold or pale  · You have fever greater than 101  · Persistent nausea or vomitting    Follow up with your primary healthcare provider  as directed: Write down your questions so you remember to ask them during your visits   [facility] Instructions                  Dr Chidi CORADO     1  General: Eura Waterford will feel pulling sensations around the wound or funny aches and pains around the incisions  This is normal  Even minor surgery is a change in your body and this is your bodys way of reaction to it  If you have had abdominal surgery, it may help to support the incision with a small pillow or blanket for comfort when moving or coughing  2  Wound care:    Bandage/Dressing -cover incision site on abdomen with dry gauze only as needed for comfort  No salves or lotions to incision site  3  Water: You may shower over the abdominal wounds  Do not bathe or use a pool or hot tub until cleared by the physician   You may shower right over the incisions and rinse wound with soapy water but do not scrub incisions  Pat dry when you are done  4  Activity: You may go up and down stairs, walk as much as you are comfortable, but walk at least 3 times each day  If you have had abdominal surgery, do not lift anything heavier than 15 pounds for at least 2 weeks, unless cleared by the doctor  5  Diet: You may resume a regular diet  If you had a same-day surgery or overnight stay surgery, you may wish to eat lightly for a few days: soups, crackers, and sandwiches  You may resume a regular diet when ready  6  Medications: Resume all of your previous medications, unless told otherwise by the doctor  Avoid aspirin products for 2-3 days after the date of surgery  You may, at that time, began to take them again  Tylenol and Ibuprofen are always fine, unless you are taking any narcotic pain medication containing Tylenol (such as Percocet, Darvocet, Vicodin, or anything containing acetaminophen)  Do not take Tylenol if you're taking these medications  You do not need to take the narcotic pain medications unless you are having significant pain and discomfort  7  Driving: You will need someone to drive you home on the day of surgery  Do not drive or make any important decisions while on narcotic pain medication or 24 hours and after anesthesia or sedation for surgery  Generally, you may drive when your off all narcotic pain medications  8  Upset Stomach: You may take Maalox, Tums, or similar items for an upset stomach  If your narcotic pain medication causes an upset stomach, do not take it on an empty stomach  Try taking it with at least some crackers or toast      9  Constipation: Patients often experienced constipation after surgery   You may take over-the-counter medication for this, such as Metamucil, Senokot, Dulcolax, milk of magnesia, etc  You may take a suppository unless you have had anorectal surgery such as a procedure on your hemorrhoids  If you experience significant nausea or vomiting after abdominal surgery, call the office before trying any of these medications  10  Call the office: If you are experiencing any of the following, fevers above 101 5°, significant nausea or vomiting, if the wound develops drainage and/or is excessive redness around the wound, or if you have significant diarrhea or other worsening symptoms  11  Pain: You may be given a prescription for pain  This will be given to the hospital, the day of surgery  12  Sexual Activity: You may resume sexual activity when you feel ready and comfortable and your incision is sealed and healed without apparent infection risk  13  Urination: If you haven't urinated in 6 hours, go directly to the ER for evaluation for urinary retention  14  Follow-up in 2 weeks  [facility]  Phone: [phone]      Acute Kidney Injury (PALOMA)  You have been diagnosed with Acute Kidney Injury (PALOMA)  The following information has been developed to provide you with information about PALOMA and treatment  What is Acute Kidney Injury (PALOMA)? PALOMA occurs when kidney function decreases over a short period of time  This condition causes a buildup of waste products in the blood and can cause fluid to build up causing swelling in the legs and shortness of breath  Sometimes called Acute Kidney Failure or Acute Renal Failure, PALOMA is often reversible if it is found and treated quickly  How do you know if you have PALOMA? PALOMA is diagnosed by assessing kidney function  This is done by obtaining a blood test to measure the blood level of creatinine  Decreased urine output can sometimes also indicate PALOMA  Who is at risk for PALOMA?   PALOMA can happen to anyone but usually happens to people who are already sick and may be in the hospital  People are at higher risk for PALOMA if they have any of the following:   age 72 years or older   high or low blood pressure   underlying kidney disease (e g , Chronic Kidney Disease (CKD)   peripheral vascular disease (hardening of arteries)   chronic diseases such as liver disease, heart disease and diabetes   a single kidney    What are the symptoms of PALOMA? You may or may not have the symptoms to suggest you have kidney injury until the PALOMA has progressed  Some of the symptoms are listed below:   Not making enough urine   Increased swelling in legs    Feeling tired   Trouble breathing or shortness of breath   Nausea   New or worsening confusion    What causes PALOMA? The causes are divided into three categories:   Not enough blood flowing to the kidneys (e g , low blood pressure, bleeding, diarrhea, dehydration)   Injury directly to the kidneys (e g , blood clots, severe infections such as sepsis, medicine toxicity, IV contrast dye used for cardiac catheterization or CT scans)   Blockage to the tubes (ureters) that drain the urine from the kidneys (e g , enlarged prostate, kidney stones, blood clots)    What is the treatment for PALOMA? The treatment for PALOMA depends on correcting what caused it  Treatment usually involves removing the cause and measures to prevent further injury to the kidneys  This may require the use of intravenous fluids or medications and/or temporary dialysis  Dialysis is a process using a machine that does the job of the kidneys to remove waste and help correct the electrolyte and fluid balance while the kidneys are recovering  If dialysis is needed to treat PALOMA, the doctor will assess daily to see if the kidneys are showing signs of recovery  The daily assessments determine how long dialysis needs to continue  Depending on the cause and the extent of damage, an episode of PALOMA may resolve in a few days to several weeks to several months  What are the long term effects of having an episode of PALOMA?    People who have one episode of PALOMA are at an increased risk of having another episode of PALOMA as well as other health problems such as kidney disease, stroke, and heart disease   In a small number of people who had unrecognized kidney disease, an PALOMA episode may result in Chronic Kidney Disease (CKD) which requires lifelong monitoring and treatment  How do you prevent future episodes of PALOMA?  Make sure to follow up with the kidney doctor after hospital discharge and obtain blood work to reassess and monitor kidney function   If you have diabetes, keep your blood sugar in goal range and keep appointments with your diabetes specialist    Maxi No If you have high blood pressure, have your blood pressure checked regularly to make sure it is in target range  o If you take blood pressure medicine called ACEIs or ARBs (e g , Lisinopril, Enalapril, Diovan, Losartan), your doctor may tell you to skip a dose or two if you have severe dehydration and your blood pressure is running low   Avoid using medicines such as NSAIDs (Nonsteroidal Anti-inflammatory Drugs) and Torres-2 Inhibitors (a type of NSAID) that may be harmful to kidney function  These may include the medicines listed in the table that follows  Examples of NSAIDS and Torres-2 Inhibitors   Talk to your doctor or healthcare provider before stopping any medicine ordered for you  Celecoxib (CELEBREX) Ketoprofen (ORUDIS, ORUVAIL)   Diclofenac (VOLTAREN, CATAFLAM) Ketorolac (TORADOL)   Diflunisal (DOLOBID) Meloxicam (MOBIC)   Etodolac (LODINE) Nabumetone (RELAFEN)   Fenoprofen (NALFON) Naproxen (ALEVE, NAPROSYN,    NAPRELAN, ANAPROX)   Flurbiprofen (ANSAID) Oxaprozin (DAYPRO)   Ibuprofen (MOTRIN, ADVIL) Piroxicam (FELDENE)   Indomethacin (INDOCIN) Sulindac (CLINORIL)    Tolmetin (Chantel Luis)     Is there a special diet for people with PALOMA? People with PALOMA and/or other kidney disease often have high potassium and phosphorus levels in their blood   To protect the kidneys from further injury and to avoid complications, most doctors recommend following a healthy diet choosing foods low potassium and low phosphorus   Limiting dietary potassium to 2 5 grams/day and phosphorus to 800 milligrams/day is recommended   A dietitian can help you with learning more about this type of diet   The tables on the following page may help you to choose lower potassium and phosphorus foods  The following websites are also good sources of information:  [place] at  org/nutrition/Kidney-Disease-Stages-1-4   [facility]  https://www niddk nih gov/health-information/kidney-disease/chronic-kidney-disease-ckd/eating-nutrition  https://[facility]/health/articles/15641-renal-diet-basics  [employer]    If you have any questions or concerns about your condition, please contact your doctor or healthcare provider  These tables may help you to choose lower potassium and phosphorus foods    AVOID these higher phosphorus* foods: CHOOSE these lower phosphorus* foods:   Milk, pudding , yogurt made from animals and from many soy varieties Rice milk (unfortified), nondairy creamer   Hard cheeses, ricotta, cottage cheese, fat-free cream cheese Regular and low-fat cream cheese   Ice cream, frozen yogurt Sherbet, frozen fruit pops, sorbet   Soups made with milk, dried peas, beans, lentils or other high phosphorus ingredients Soups made with broth, are water-based, or other lower phosphorus ingredients   Whole grains, including whole-grain breads, crackers, cereal, rice and pasta, corn tortillas Refined grains, including white bread, crackers, cereals, rice and pasta   Quick breads, biscuits, cornbread, muffins, pancakes, waffles, granola, wheat germ Homemade refined (white) dinner rolls, bagels, English muffins, sugar cookies, shortbread cookies, trell food cake   Dried peas (split, black-eyed), beans (black, garbanzo, lima, kidney, navy, boles), lentils Green peas (canned, frozen), green beans, wax beans   Organ meats, walleye, [place], sardines Lean beef, pork, lamb, poultry, other fish   Nuts and seeds Popcorn   Peanut butter, other nut butters; tofu, veggie or soy burgers Jam, jelly, honey   Chocolate, including chocolate drinks Carob (chocolate-flavored) candy, hard candy,  gumdrops   Abril, pepper-type sodas, flavored chisholm, bottled teas, beer Lemon-lime soda, ginger ale or root beer, plain water, cream soda, grape soda   *Always read labels and avoid foods with ingredients containing "phos"  AVOID these higher potassium foods: CHOOSE these lower potassium foods:      Milk (fat free, low fat, whole, buttermilk, Soy), yogurt    Regular and low-fat cream cheese      Beans (white, [place]), [place] sprouts, spinach Swiss       chard, broccoli, avocado, artichoke, potatoes, sweet      potatoes, tomatoes/tomato sauce, beet greens      Green beans, alfalfa sprouts, bamboo shoots (canned),       cabbage, carrots, cauliflower, corn, cucumber, eggplant,      endive, lettuce, mushrooms, onions, radishes,  watercress,       water chestnuts (canned), rice, peas      Halibut, tuna, cod, snapper, tuna fish, turkey    Egg, lean beef, pork, lamb, shellfish, chicken       Banana, papaya, orange, cantaloupe, dates, raisins and      other dried fruit, pomegranate, avocado      Apple, applesauce, blackberries, raspberries, pears,     watermelon, cucumbers, blueberries, cranberries,       peaches      Almonds, peanuts, hazelnuts, [place], cashew, mixed,      seeds (sunflower, pumpkin)     Homemade refined (white) dinner rolls, bagels,      English muffins, flour tortilla, crackers, eric      crackers, popcorn, pretzels, spaghetti or macaroni,       hummus      Tomato or vegetable juice, prune juice    Papaya, hanane, or pear nectar, cranberry juice cocktail      Molasses

## 2020-07-31 NOTE — ASSESSMENT & PLAN NOTE
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

## 2020-07-31 NOTE — ASSESSMENT & PLAN NOTE
· POA, severe symptomatic normocytic anemia, HB 6 7 causing shortness of breath on exertion secondary to ASA/NSAID use for chronic right lower leg ulcers that were getting worse  · CT Abd thickening of the gastric antrum likely gastritis but unable to exclude gastric neoplasm    · DC NSAIDS  · Continue aspirin  S/p 5iu prbcs total this hospitalization  Hb 9 8 this am  No overt signs of bleeding seen  S/p IR bone marrow biopsy 7/30/20, to follow up results outpatient  Encouraged to follow up regularly with his PCP  Continue Protonix daily  Patient refusing endoscopic workup for anemia  GI on board, appreciate recs  concern for possible myelodysplastic syndrome/multiple myeloma per hematology, to f/u biopsy results outpatient - discuss with PCP

## 2020-07-31 NOTE — ASSESSMENT & PLAN NOTE
Wt Readings from Last 3 Encounters:   07/31/20 70 kg (154 lb 5 2 oz)   06/21/19 68 3 kg (150 lb 9 6 oz)   06/19/19 67 6 kg (149 lb)     Chronic CHF, not in acute exacerbation  Echocardiogram on this admission shows ejection fraction of 50%  Patient's lungs are clear, has no JVD    Reduce 100mg Toprol to 25mg on discharge because of relative hypotension noted while hospitalized  Daily weight and I&Os  Would need BB and ACEI given combined systolic and diastolic CHF  Reduce toprol to 25mg daily as above, and lisinopril to 2 5mg daily

## 2020-07-31 NOTE — ASSESSMENT & PLAN NOTE
Patient has right lower leg wounds with cellulitis  On IV vancomycin and cefepime, started 7/17    Patient underwent angiogram   Patient underwent debridement of right lower extremity on 07/24/2020  OR wound cultures - growing morganella and multiple organisms- intermediate resistance to cefepime  S/p IV cefepime  IV zosyn started 7/28, discontinued by ID 7/29  Transitioned to oral antibiotics by ID - augmentin and levofloxacin till 8/8/20  Appreciate ID recs  Podiatry follow-up  Dressing changes per Podiatry

## 2020-07-31 NOTE — ASSESSMENT & PLAN NOTE
Patient has stage III chronic disease with creatinine around 2 0 at baseline    Patient's renal function is at baseline:  Creatinine is 1 55 today  Nephrology on board  Lisinopril held given relative hypotension, continue to hold on discharge

## 2020-07-31 NOTE — OP NOTE
Ventral Herniorrhaphy Procedure Note    Name: Jose Luis Bradley   : 1945  MRN: [id number]  Date: 2020      Indications: Chronic infected ventral hernia repair with exposed mesh    Pre-operative Diagnosis: Chronic infected ventral hernia repair with exposed mesh    Post-operative Diagnosis: Same  Procedure(s) (LRB):  LAPAROTOMY EXPLORATORY, excision of infected mesh and repair of ventral heria (N/A)  REPAIR HERNIA VENTRAL - EXCSION OF INFECTED MESH (N/A)    Surgeon: Chelsea Cordon MD    Assistants: Lorena Camilo PA, no qualified resident available  PA was medically necessary for the surgical safety of the case, including suturing, retraction, hemostasis  Anesthesia: General endotracheal anesthesia        Procedure Details   The patient was seen in the Holding Room  The risks, benefits, complications, treatment options, and expected outcomes were discussed with the patient  The possibilities of reaction to medication, pulmonary aspiration, perforation of viscus, bleeding, recurrent infection, the need for additional procedures,The risk of bowel injury, failure to diagnose a condition, and creating a complication requiring transfusion or operation were discussed with the patient  The patient concurred with the proposed plan, giving informed consent  The site of surgery properly noted/marked  The patient was taken to Operating Room and identified as Jose Luis Shoulder  Staff verified patient name, , and procedure  A Time Out was held and the above information confirmed  The patient was placed supine  After establishing general anesthesia, the abdomen was prepped and draped in standard fashion  Local anesthesia was used at the incision  vertical midline incision elliptical incision was made to excised the previous scar including the umbilicus  Dissection was carried down to the the fascia and mobilized from surrounding structures    Intact fascia was identified circumferentially around the defect and infected mesh  The fascia was incised around the previous mesh excising all the infected mesh and associated fascia  The defect was then closed primarily given the active infection  Extensive dissection was carried out to lyse adhesions anteriorly and posteriorly to the fascia  Flaps anterior to the fascia were also created in order to place the suture  The midline fascia was closed using figure of eight #1 Prolene sutures  This was reinforced using a barbed non absorbable suture in running fashion  The wound was irrigated  The subcutaneous layers were closed, attaching the subcutaneous tissues to the fascia, using interrupted 2-0 Vicryl suture, and then 3-0 Vicryl suture  The skin was closed using staples with betadine soaked marshal  Drain was not placed in the subcutaneous tissues and brought out through separate stab incision, and then secured using a nylon suture  The wound was dressed and the patient tolerated the procedure well  An abdominal binder was placed at the conclusion of the procedure  Instrument, sponge, and needle counts were correct prior to closure and at the conclusion of the case  This text is generated with voice recognition software  There may be translation, syntax,  or grammatical errors  If you have any questions, please contact the dictating provider  Findings:  Chronic infected ventral hernia repair with exposed mesh  ventral hernia defect    Estimated Blood Loss:  Minimal                      Specimens: infected mesh was sent to pathology if excised  Order Name Source Comment Collection Info Order Time   TISSUE EXAM Wound  Collected By: Sharlene Coker MD 7/21/2020  2:29 PM                       Complications:  None; patient tolerated the procedure well  Disposition: PACU            Condition: stable    Attending Attestation: I was present for the entire procedure       Signature:   Sharlene Coker MD  Date: 7/31/2020 Time: 11:23 AM

## 2020-07-31 NOTE — ASSESSMENT & PLAN NOTE
Patient is status post ablation and pacemaker insertion  EKG showed paced rhythm    Monitor off anticoagulation due to ITP and thrombocytopenia  Reduce toprol to 25mg daily given relative hypotension

## 2020-08-03 PROBLEM — L97.912 LEG ULCER, RIGHT, WITH FAT LAYER EXPOSED (HCC): Status: ACTIVE | Noted: 2020-01-01

## 2020-08-03 NOTE — ASSESSMENT & PLAN NOTE
Peripheral arterial disease with mixed arteriovenous insufficiency and right leg/foot ulcerations  Now s/p right leg angiogram and right SFA PTA  Additional intervention was not done due to patient intolerance  However, significant improvement noted in post-intervention NESTOR with NESTOR 1 49 (unreliable) but biphasic waveforms and MTP 147mmHg (previously monophasic and unobtainable) GTP could not be obtained due to foot movements  Ongoing wound care per visiting nurse and podiatry  Has follow-up appointment with Dr Eloisa Sawant who debrided his right leg ulcerations on Wednesday  Unable to make in office appointment today due to abdominal incision drainage and need for urgent follow-up with Dr Boogie Cluster  Will obtain right lower extremity arterial duplex in 2 weeks with follow-up in the office within 1 month to re-assess wounds  May need repeat angiogram under anesthesia if wounds continue to worsen without improvement  Continue ASA and statin  Advised to notify the office if he notices worsening of the right leg wounds or significant progression of pain

## 2020-08-03 NOTE — PROGRESS NOTES
Virtual Brief Visit    Assessment/Plan:    Problem List Items Addressed This Visit        Cardiovascular and Mediastinum    Peripheral vascular disease of lower extremity (HCC) (Chronic)     Peripheral arterial disease with mixed arteriovenous insufficiency and right leg/foot ulcerations  Now s/p right leg angiogram and right SFA PTA  Additional intervention was not done due to patient intolerance  However, significant improvement noted in post-intervention NESTOR with NESTOR 1 49 (unreliable) but biphasic waveforms and MTP 147mmHg (previously monophasic and unobtainable) GTP could not be obtained due to foot movements  Ongoing wound care per visiting nurse and podiatry  Has follow-up appointment with Dr Taty Sky who debrided his right leg ulcerations on Wednesday  Unable to make in office appointment today due to abdominal incision drainage and need for urgent follow-up with Dr Bhavna York  Will obtain right lower extremity arterial duplex in 2 weeks with follow-up in the office within 1 month to re-assess wounds  May need repeat angiogram under anesthesia if wounds continue to worsen without improvement  Continue ASA and statin  Other    Open wound of umbilical region     Infected mesh s/p ex-lap, mesh excision and primary ventral hernia repair  Has had copious amounts of drainage from midline incision and will be following up with his general surgeon this afternoon  Leg ulcer, right, with fat layer exposed ([address] )     Right leg ulcerations in setting of mixed arteriovenous insufficiency  Arterial insufficiency improved since right leg angiogram with SFA PTA  Local wound care per visiting nurses and podiatry's instructions  Follow-up with Dr Taty Sky this Wednesday             Other Visit Diagnoses     PAD (peripheral artery disease) ([facility] [address] )    -  Primary    Relevant Orders    VAS lower limb arterial duplex, limited/unilateral                Reason for visit is   Chief Complaint   Patient presents with    Virtual Brief Visit        Encounter provider Cesar Hayden MD    Provider located at [place] [address] [address]  [facility]  [address] [facility] [address]  [phone]    Recent Visits  No visits were found meeting these conditions  Showing recent visits within past 7 days and meeting all other requirements     Today's Visits  Date Type Provider Dept   08/03/20 Telemedicine Cesar Hayden MD [facility]   Showing today's visits and meeting all other requirements     Future Appointments  No visits were found meeting these conditions  Showing future appointments within next 150 days and meeting all other requirements        After connecting through telephone and patient was informed that this is not a secure, HIPAA-complaint platform  He agrees to proceed  , the patient was identified by name and date of birth  Gloria Blocker was informed that this is a telemedicine visit and that the visit is being conducted through telephone and patient was informed that this is not a secure, HIPAA-complaint platform  He agrees to proceed     My office door was closed  No one else was in the room  He acknowledged consent and understanding of privacy and security of the platform  The patient has agreed to participate and understands he can discontinue the visit at any time  Patient is aware this is a billable service  It was my intent to perform this visit via video technology but the patient was not able to do a video connection so the visit was completed via audio telephone only  Janine Lemos is a 69 y o  male with HTN, CHF, Afib, infected umbilical hernia mesh repair s/p exlap, mesh excision and ventral hernia repair, CKD stage 3, PAD with right leg ulcerations s/p right leg angiogram and SFA PTA  He presents for follow-up after recent hospital discharge   He could not come into the office today since he has had copious amounts of abdominal incisional drainage and has urgent follow-up scheduled this afternoon with his general surgeon  He denies any fevers or chills  He has had visiting nurses come twice a week to dress his right leg wounds  He has a follow-up with Dr Maricruz Acosta this Wednesday to assess his progress  He reports occasional pain of the right leg but it is improved since before his angiogram  He is unsure if there has been much improvement in the right leg wounds  Past Medical History:   Diagnosis Date    Anemia     Atrial fibrillation (HCC)     Benign prostatic hyperplasia without urinary obstruction     Congestive heart failure with left ventricular diastolic dysfunction, chronic (HCC)     History of ITP     Hypertension     Lumbosacral disc herniation     Peripheral vascular disease of lower extremity (HCC)     Renal disorder     Subdural hematoma (HCC)        Past Surgical History:   Procedure Laterality Date    BACK SURGERY      AUSTYN HOLE FOR SUBDURAL HEMATOMA      CARDIAC PACEMAKER PLACEMENT      CLAVICLE SURGERY Left 2011    HERNIA REPAIR      IR ABDOMINAL ANGIOGRAPHY / INTERVENTION  7/20/2020    IR BONE MARROW BIOPSY/ASPIRATION  7/30/2020    LAPAROTOMY N/A 7/21/2020    Procedure: LAPAROTOMY EXPLORATORY, excision of infected mesh and repair of ventral heria; Surgeon: Marisel Askew MD;  Location:  MAIN OR;  Service: General    PROSTATE SURGERY      VENTRAL HERNIA REPAIR N/A 7/21/2020    Procedure: REPAIR HERNIA VENTRAL - EXCSION OF INFECTED MESH;  Surgeon: Marisel Askew MD;  Location:  MAIN OR;  Service: General    WOUND DEBRIDEMENT Right 7/24/2020    Procedure: DEBRIDEMENT LOWER EXTREMITY ([address] OUT);   Surgeon: Li Tripathi DPM;  Location:  MAIN OR;  Service: Podiatry       Current Outpatient Medications   Medication Sig Dispense Refill    acetaminophen (TYLENOL) 325 mg tablet Take 2 tablets (650 mg total) by mouth every 6 (six) hours as needed for mild pain 30 tablet 0    amoxicillin-clavulanate (AUGMENTIN) 875-125 mg per tablet Take 1 tablet by mouth every 12 (twelve) hours for 8 days 16 tablet 0    Ascorbic Acid (VITAMIN C PO) Take by mouth      atorvastatin (LIPITOR) 40 mg tablet Take 1 tablet (40 mg total) by mouth daily with dinner 30 tablet 0    B Complex Vitamins (VITAMIN B COMPLEX PO) Take by mouth      calcium carbonate (TUMS) 500 mg chewable tablet Chew 1 tablet (500 mg total) daily as needed for indigestion or heartburn 30 tablet 0    folic acid (FOLVITE) 1 mg tablet Take 1 mg by mouth daily      HYDROcodone-acetaminophen (NORCO) 5-325 mg per tablet Take 1 tablet by mouth every 4 (four) hours as needed for pain for up to 5 daysMax Daily Amount: 6 tablets 20 tablet 0    Lactobacillus (ACIDOPHILUS PO) Take by mouth      levofloxacin (LEVAQUIN) 750 mg tablet Take 1 tablet (750 mg total) by mouth every other day for 6 days 3 tablet 0    lisinopril (ZESTRIL) 5 mg tablet Take 0 5 tablets (2 5 mg total) by mouth daily 30 tablet 0    metoprolol succinate (TOPROL-XL) 25 mg 24 hr tablet Take 1 tablet (25 mg total) by mouth daily 30 tablet 0    Multiple Vitamin (MULTI-DAY VITAMINS) TABS Take by mouth      pantoprazole (PROTONIX) 40 mg tablet Take 1 tablet (40 mg total) by mouth daily in the early morning 30 tablet 0    tamsulosin (FLOMAX) 0 4 mg Take 1 capsule (0 4 mg total) by mouth daily with dinner for 30 days 30 capsule 0    aspirin 81 mg chewable tablet Chew 1 tablet (81 mg total) daily (Patient not taking: Reported on 8/3/2020) 30 tablet 0    diazepam (VALIUM) 5 mg tablet Take 5 mg by mouth every 12 (twelve) hours as needed for anxiety      Ferrous Gluconate-C-Folic Acid (IRON-C PO) Take by mouth      Lycopene 10 MG CAPS Take by mouth      Misc Natural Products (SAW PALMETTO) CAPS Take by mouth       No current facility-administered medications for this visit  No Known Allergies    Review of Systems   Constitutional: Negative  HENT: Negative  Eyes: Negative  Respiratory: Negative  Cardiovascular: Positive for leg swelling  Right leg swelling   Gastrointestinal:        Drainage from abdominal wounds   Endocrine: Negative  Genitourinary: Negative  Musculoskeletal: Negative  Right leg pain   Skin: Positive for wound  Right leg ulcerations  Drainage from abdominal incision   Allergic/Immunologic: Negative  Neurological: Negative  Hematological: Negative  Psychiatric/Behavioral: Negative  All other systems reviewed and are negative  Vitals:    08/03/20 1210   BP: 122/81   BP Location: Right arm   Patient Position: Sitting   Cuff Size: Adult   Pulse: 84   Temp: 98 4 °F (36 9 °C)   TempSrc: Oral   Weight: 68 kg (150 lb)   Height: 5' 10"         I spent 15 minutes with patient today in which greater than 50% of the time was spent in counseling/coordination of care regarding right leg wounds and arterial insufficiency    VIRTUAL VISIT DISCLAIMER    Prachi Thompson acknowledges that he has consented to an online visit or consultation  He understands that the online visit is based solely on information provided by him, and that, in the absence of a face-to-face physical evaluation by the physician, the diagnosis he receives is both limited and provisional in terms of accuracy and completeness  This is not intended to replace a full medical face-to-face evaluation by the physician  Jose Cadet understands and accepts these terms

## 2020-08-03 NOTE — PATIENT INSTRUCTIONS
Cardiovascular and Mediastinum      Peripheral vascular disease of lower extremity (HCC) (Chronic)       Peripheral arterial disease with mixed arteriovenous insufficiency and right leg/foot ulcerations  Now s/p right leg angiogram and right SFA PTA  Additional intervention was not done due to patient intolerance  However, significant improvement noted in post-intervention NESTOR with NESTOR 1 49 (unreliable) but biphasic waveforms and MTP 147mmHg (previously monophasic and unobtainable) GTP could not be obtained due to foot movements       Ongoing wound care per visiting nurse and podiatry  Has follow-up appointment with Dr Petar Turpin who debrided his right leg ulcerations on Wednesday  Unable to make in office appointment today due to abdominal incision drainage and need for urgent follow-up with Dr Channing Rojas       Will obtain right lower extremity arterial duplex in 2 weeks with follow-up in the office within 1 month to re-assess wounds  May need repeat angiogram under anesthesia if wounds continue to worsen without improvement  Continue ASA and statin                     Other     Open wound of umbilical region       Infected mesh s/p ex-lap, mesh excision and primary ventral hernia repair  Has had copious amounts of drainage from midline incision and will be following up with his general surgeon this afternoon            Leg ulcer, right, with fat layer exposed ([address] )       Right leg ulcerations in setting of mixed arteriovenous insufficiency  Arterial insufficiency improved since right leg angiogram with SFA PTA  Local wound care per visiting nurses and podiatry's instructions  Follow-up with Dr Petar Turpin this Wednesday

## 2020-08-03 NOTE — PROGRESS NOTES
Assessment/Plan:   Nikko Sims is a 69 y o male who is here for Post-op    Plan: Abdominal wall dehiscence s/p excision of previous infected mesh - discussed needs operative exploration with repair of dehiscence     Preoperative Clearance: None    HPI:  Nikko Sims is a 69 y o male who was referred for evaluation of Post-op    Currently having lots of increased drainage and pain  ROS:  General ROS: negative  negative for - chills, fatigue, fever or night sweats, weight loss  Respiratory ROS: no cough, shortness of breath, or wheezing  Cardiovascular ROS: no chest pain or dyspnea on exertion  Genito-Urinary ROS: no dysuria, trouble voiding, or hematuria  Musculoskeletal ROS: negative for - gait disturbance, joint pain or muscle pain  Neurological ROS: no TIA or stroke symptoms  GI: abd pain and drainage    [unfilled]  Patient has no known allergies      Current Outpatient Medications:     acetaminophen (TYLENOL) 325 mg tablet, Take 2 tablets (650 mg total) by mouth every 6 (six) hours as needed for mild pain, Disp: 30 tablet, Rfl: 0    amoxicillin-clavulanate (AUGMENTIN) 875-125 mg per tablet, Take 1 tablet by mouth every 12 (twelve) hours for 8 days, Disp: 16 tablet, Rfl: 0    Ascorbic Acid (VITAMIN C PO), Take by mouth, Disp: , Rfl:     aspirin 81 mg chewable tablet, Chew 1 tablet (81 mg total) daily (Patient not taking: Reported on 8/3/2020), Disp: 30 tablet, Rfl: 0    atorvastatin (LIPITOR) 40 mg tablet, Take 1 tablet (40 mg total) by mouth daily with dinner, Disp: 30 tablet, Rfl: 0    B Complex Vitamins (VITAMIN B COMPLEX PO), Take by mouth, Disp: , Rfl:     calcium carbonate (TUMS) 500 mg chewable tablet, Chew 1 tablet (500 mg total) daily as needed for indigestion or heartburn, Disp: 30 tablet, Rfl: 0    diazepam (VALIUM) 5 mg tablet, Take 5 mg by mouth every 12 (twelve) hours as needed for anxiety, Disp: , Rfl:     Ferrous Gluconate-C-Folic Acid (IRON-C PO), Take by mouth, Disp: , Rfl:     folic acid (FOLVITE) 1 mg tablet, Take 1 mg by mouth daily, Disp: , Rfl:     HYDROcodone-acetaminophen (NORCO) 5-325 mg per tablet, Take 1 tablet by mouth every 4 (four) hours as needed for pain for up to 5 daysMax Daily Amount: 6 tablets, Disp: 20 tablet, Rfl: 0    Lactobacillus (ACIDOPHILUS PO), Take by mouth, Disp: , Rfl:     levofloxacin (LEVAQUIN) 750 mg tablet, Take 1 tablet (750 mg total) by mouth every other day for 6 days, Disp: 3 tablet, Rfl: 0    lisinopril (ZESTRIL) 5 mg tablet, Take 0 5 tablets (2 5 mg total) by mouth daily, Disp: 30 tablet, Rfl: 0    Lycopene 10 MG CAPS, Take by mouth, Disp: , Rfl:     metoprolol succinate (TOPROL-XL) 25 mg 24 hr tablet, Take 1 tablet (25 mg total) by mouth daily, Disp: 30 tablet, Rfl: 0    Misc Natural Products (SAW PALMETTO) CAPS, Take by mouth, Disp: , Rfl:     Multiple Vitamin (MULTI-DAY VITAMINS) TABS, Take by mouth, Disp: , Rfl:     pantoprazole (PROTONIX) 40 mg tablet, Take 1 tablet (40 mg total) by mouth daily in the early morning, Disp: 30 tablet, Rfl: 0    tamsulosin (FLOMAX) 0 4 mg, Take 1 capsule (0 4 mg total) by mouth daily with dinner for 30 days, Disp: 30 capsule, Rfl: 0  Past Medical History:   Diagnosis Date    Anemia     Atrial fibrillation (HCC)     Benign prostatic hyperplasia without urinary obstruction     Congestive heart failure with left ventricular diastolic dysfunction, chronic (HCC)     History of ITP     Hypertension     Lumbosacral disc herniation     Peripheral vascular disease of lower extremity (HCC)     Renal disorder     Subdural hematoma (HCC)      Past Surgical History:   Procedure Laterality Date    BACK SURGERY      AUSTYN HOLE FOR SUBDURAL HEMATOMA      CARDIAC PACEMAKER PLACEMENT      CLAVICLE SURGERY Left 2011    HERNIA REPAIR      IR ABDOMINAL ANGIOGRAPHY / INTERVENTION  7/20/2020    IR BONE MARROW BIOPSY/ASPIRATION  7/30/2020    LAPAROTOMY N/A 7/21/2020    Procedure: LAPAROTOMY EXPLORATORY, excision of infected mesh and repair of ventral heria; Surgeon: Paddy Ortiz MD;  Location: [facility] OR;  Service: General    PROSTATE SURGERY      VENTRAL HERNIA REPAIR N/A 7/21/2020    Procedure: REPAIR HERNIA VENTRAL - EXCSION OF INFECTED MESH;  Surgeon: Paddy Ortiz MD;  Location: [facility];  Service: General    WOUND DEBRIDEMENT Right 7/24/2020    Procedure: DEBRIDEMENT LOWER EXTREMITY ([facility] OUT); Surgeon: Tony Zamora DPM;  Location: [facility];  Service: Podiatry     Family History   Problem Relation Age of Onset    Heart disease Mother     Heart disease Father     Hypertension Father     Diabetes Other     Depression Other     Cancer Sister     Depression Cousin       reports that he has never smoked  He has never used smokeless tobacco  He reports previous alcohol use  He reports that he does not use drugs  Labs:   Lab Results   Component Value Date    WBC 4 87 07/31/2020    WBC 2 20 (L) 07/30/2020    WBC 2 94 (L) 07/29/2020    HGB 9 8 (L) 07/31/2020    HGB 6 7 (LL) 07/30/2020    HGB 7 2 (L) 07/29/2020    PLT 71 (L) 07/31/2020    PLT 57 (L) 07/30/2020    PLT 55 (L) 07/29/2020     Lab Results   Component Value Date    ALT 26 07/23/2020    ALT 33 07/21/2020    ALT 21 07/16/2020    AST 24 07/23/2020    AST 36 07/21/2020    AST 20 07/16/2020     This SmartLink has not been configured with any valid records         PHYSICAL EXAM  General Appearance:    Alert, cooperative, no distress,    Head:    Normocephalic without obvious abnormality   Eyes:    PERRL, conjunctiva/corneas clear, EOM's intact        Neck:   Supple, no adenopathy, no JVD   Back:     Symmetric, no spinal or CVA tenderness   Lungs:     Clear to auscultation bilaterally, no wheezing or rhonchi   Heart:    Regular rate and rhythm, S1 and S2 normal, no murmur   Abdomen:     Soft +BS ND TTP staples with what appears to be dehiscence with small bowel visible   Extremities:   Extremities normal  No clubbing, cyanosis or edema   Psych:   Normal Affect, AOx3  Neurologic:  Skin:   CNII-XII intact  Strength symmetric, speech intact    Warm, dry, intact, no visible rashes or lesions         Physical Exam              Some portions of this record may have been generated with voice recognition software  There may be translation, syntax,  or grammatical errors  Occasional wrong word or "sound-a-like" substitutions may have occurred due to the inherent limitations of the voice recognition software  Read the chart carefully and recognize, using context, where substitutions may have occurred  If you have any questions, please contact the dictating provider for clarification or correction, as needed  This encounter has been coded by a non-certified coder

## 2020-08-03 NOTE — ASSESSMENT & PLAN NOTE
Infected mesh s/p ex-lap, mesh excision and primary ventral hernia repair  Has had copious amounts of drainage from midline incision and will be following up with his general surgeon this afternoon

## 2020-08-03 NOTE — ASSESSMENT & PLAN NOTE
Right leg ulcerations in setting of mixed arteriovenous insufficiency  Arterial insufficiency improved since right leg angiogram with SFA PTA  Local wound care per visiting nurses and podiatry's instructions  Follow-up with Dr Kerman Gaucher this Wednesday

## 2020-08-03 NOTE — TELEPHONE ENCOUNTER
Patient had telephone visit with Fabián Loo today  Left message for patient to schedule EVELIN and return office visit in approx 4 weeks

## 2020-08-03 NOTE — H&P (VIEW-ONLY)
Assessment/Plan:   Mile Guillen is a 69 y o male who is here for Post-op    Plan: Abdominal wall dehiscence s/p excision of previous infected mesh - discussed needs operative exploration with repair of dehiscence     Preoperative Clearance: None    HPI:  Mile Guillen is a 69 y o male who was referred for evaluation of Post-op    Currently having lots of increased drainage and pain  ROS:  General ROS: negative  negative for - chills, fatigue, fever or night sweats, weight loss  Respiratory ROS: no cough, shortness of breath, or wheezing  Cardiovascular ROS: no chest pain or dyspnea on exertion  Genito-Urinary ROS: no dysuria, trouble voiding, or hematuria  Musculoskeletal ROS: negative for - gait disturbance, joint pain or muscle pain  Neurological ROS: no TIA or stroke symptoms  GI: abd pain and drainage    [unfilled]  Patient has no known allergies      Current Outpatient Medications:     acetaminophen (TYLENOL) 325 mg tablet, Take 2 tablets (650 mg total) by mouth every 6 (six) hours as needed for mild pain, Disp: 30 tablet, Rfl: 0    amoxicillin-clavulanate (AUGMENTIN) 875-125 mg per tablet, Take 1 tablet by mouth every 12 (twelve) hours for 8 days, Disp: 16 tablet, Rfl: 0    Ascorbic Acid (VITAMIN C PO), Take by mouth, Disp: , Rfl:     aspirin 81 mg chewable tablet, Chew 1 tablet (81 mg total) daily (Patient not taking: Reported on 8/3/2020), Disp: 30 tablet, Rfl: 0    atorvastatin (LIPITOR) 40 mg tablet, Take 1 tablet (40 mg total) by mouth daily with dinner, Disp: 30 tablet, Rfl: 0    B Complex Vitamins (VITAMIN B COMPLEX PO), Take by mouth, Disp: , Rfl:     calcium carbonate (TUMS) 500 mg chewable tablet, Chew 1 tablet (500 mg total) daily as needed for indigestion or heartburn, Disp: 30 tablet, Rfl: 0    diazepam (VALIUM) 5 mg tablet, Take 5 mg by mouth every 12 (twelve) hours as needed for anxiety, Disp: , Rfl:     Ferrous Gluconate-C-Folic Acid (IRON-C PO), Take by mouth, Disp: , Rfl:     folic acid (FOLVITE) 1 mg tablet, Take 1 mg by mouth daily, Disp: , Rfl:     HYDROcodone-acetaminophen (NORCO) 5-325 mg per tablet, Take 1 tablet by mouth every 4 (four) hours as needed for pain for up to 5 daysMax Daily Amount: 6 tablets, Disp: 20 tablet, Rfl: 0    Lactobacillus (ACIDOPHILUS PO), Take by mouth, Disp: , Rfl:     levofloxacin (LEVAQUIN) 750 mg tablet, Take 1 tablet (750 mg total) by mouth every other day for 6 days, Disp: 3 tablet, Rfl: 0    lisinopril (ZESTRIL) 5 mg tablet, Take 0 5 tablets (2 5 mg total) by mouth daily, Disp: 30 tablet, Rfl: 0    Lycopene 10 MG CAPS, Take by mouth, Disp: , Rfl:     metoprolol succinate (TOPROL-XL) 25 mg 24 hr tablet, Take 1 tablet (25 mg total) by mouth daily, Disp: 30 tablet, Rfl: 0    Misc Natural Products (SAW PALMETTO) CAPS, Take by mouth, Disp: , Rfl:     Multiple Vitamin (MULTI-DAY VITAMINS) TABS, Take by mouth, Disp: , Rfl:     pantoprazole (PROTONIX) 40 mg tablet, Take 1 tablet (40 mg total) by mouth daily in the early morning, Disp: 30 tablet, Rfl: 0    tamsulosin (FLOMAX) 0 4 mg, Take 1 capsule (0 4 mg total) by mouth daily with dinner for 30 days, Disp: 30 capsule, Rfl: 0  Past Medical History:   Diagnosis Date    Anemia     Atrial fibrillation (HCC)     Benign prostatic hyperplasia without urinary obstruction     Congestive heart failure with left ventricular diastolic dysfunction, chronic (HCC)     History of ITP     Hypertension     Lumbosacral disc herniation     Peripheral vascular disease of lower extremity (HCC)     Renal disorder     Subdural hematoma (HCC)      Past Surgical History:   Procedure Laterality Date    BACK SURGERY      AUSTYN HOLE FOR SUBDURAL HEMATOMA      CARDIAC PACEMAKER PLACEMENT      CLAVICLE SURGERY Left 2011    HERNIA REPAIR      IR ABDOMINAL ANGIOGRAPHY / INTERVENTION  7/20/2020    IR BONE MARROW BIOPSY/ASPIRATION  7/30/2020    LAPAROTOMY N/A 7/21/2020    Procedure: LAPAROTOMY EXPLORATORY, excision of infected mesh and repair of ventral heria; Surgeon: Enrique Mendez MD;  Location: [facility];  Service: General    PROSTATE SURGERY      VENTRAL HERNIA REPAIR N/A 7/21/2020    Procedure: REPAIR HERNIA VENTRAL - EXCSION OF INFECTED MESH;  Surgeon: Enrique Mendez MD;  Location: [facility];  Service: General    WOUND DEBRIDEMENT Right 7/24/2020    Procedure: DEBRIDEMENT LOWER EXTREMITY ([facility]); Surgeon: Lo Olivera DPM;  Location: [facility];  Service: Podiatry     Family History   Problem Relation Age of Onset    Heart disease Mother     Heart disease Father     Hypertension Father     Diabetes Other     Depression Other     Cancer Sister     Depression Cousin       reports that he has never smoked  He has never used smokeless tobacco  He reports previous alcohol use  He reports that he does not use drugs  Labs:   Lab Results   Component Value Date    WBC 4 87 07/31/2020    WBC 2 20 (L) 07/30/2020    WBC 2 94 (L) 07/29/2020    HGB 9 8 (L) 07/31/2020    HGB 6 7 (LL) 07/30/2020    HGB 7 2 (L) 07/29/2020    PLT 71 (L) 07/31/2020    PLT 57 (L) 07/30/2020    PLT 55 (L) 07/29/2020     Lab Results   Component Value Date    ALT 26 07/23/2020    ALT 33 07/21/2020    ALT 21 07/16/2020    AST 24 07/23/2020    AST 36 07/21/2020    AST 20 07/16/2020     This SmartLink has not been configured with any valid records         PHYSICAL EXAM  General Appearance:    Alert, cooperative, no distress,    Head:    Normocephalic without obvious abnormality   Eyes:    PERRL, conjunctiva/corneas clear, EOM's intact        Neck:   Supple, no adenopathy, no JVD   Back:     Symmetric, no spinal or CVA tenderness   Lungs:     Clear to auscultation bilaterally, no wheezing or rhonchi   Heart:    Regular rate and rhythm, S1 and S2 normal, no murmur   Abdomen:     Soft +BS ND TTP staples with what appears to be dehiscence with small bowel visible   Extremities:   Extremities normal  No clubbing, cyanosis or edema   Psych:   Normal Affect, AOx3  Neurologic:  Skin:   CNII-XII intact  Strength symmetric, speech intact    Warm, dry, intact, no visible rashes or lesions         Physical Exam              Some portions of this record may have been generated with voice recognition software  There may be translation, syntax,  or grammatical errors  Occasional wrong word or "sound-a-like" substitutions may have occurred due to the inherent limitations of the voice recognition software  Read the chart carefully and recognize, using context, where substitutions may have occurred  If you have any questions, please contact the dictating provider for clarification or correction, as needed  This encounter has been coded by a non-certified coder

## 2020-08-03 NOTE — ANESTHESIA PREPROCEDURE EVALUATION
Review of Systems/Medical History  Patient summary reviewed  Chart reviewed  Cardiovascular  Pacemaker/AICD, Hypertension , Valvular heart disease , aortic regurgitation, Dysrhythmias , atrial fibrillation, CHF compensated CHF, PVD, No DVT  No PE, No pulmonary hypertension Pulmonary       GI/Hepatic      Comment: Purulent d/c from umbilicus     Chronic kidney disease stage 3,   Comment: PALOMA on CKD has been improving     Endo/Other     GYN       Hematology  Anemia acute blood loss anemia,     Musculoskeletal       Neurology   Psychology           Physical Exam    Airway    Mallampati score: III  TM Distance: >3 FB  Neck ROM: full     Dental   Comment: Upper partial, upper dentures,     Cardiovascular  Rhythm: irregular, Rate: normal,     Pulmonary  Pulmonary exam normal Breath sounds clear to auscultation,     Other Findings        Anesthesia Plan  ASA Score- 3     Anesthesia Type- general with ASA Monitors  Additional Monitors:   Airway Plan: ETT  Comment: Benefits and risks of planned anesthetic discussed with patient,   Pt wants ga      Plan Factors-    Chart reviewed  Patient summary reviewed  Induction- intravenous  Postoperative Plan- Plan for postoperative opioid use  Informed Consent- Anesthetic plan and risks discussed with patient

## 2020-08-03 NOTE — TELEPHONE ENCOUNTER
[phone]/[id number] Jay/MARCELINO 1945/SX /Patient called complaining of pain where his incision is located/Patient stated he removed his bandage and he is having excessive drainage with pain and is unsure what to do

## 2020-08-03 NOTE — ANESTHESIA POSTPROCEDURE EVALUATION
Post-Op Assessment Note    CV Status:  Stable  Pain Score: 8    Pain management: adequate     Mental Status:  Alert and awake   Hydration Status:  Euvolemic and stable   PONV Controlled:  None   Airway Patency:  Patent      Post Op Vitals Reviewed: Yes      Staff: Anesthesiologist         No complications documented      BP      Temp      Pulse     Resp      SpO2

## 2020-08-03 NOTE — LETTER
August 3, 2020     [place], [address] [place] [address]    Patient: Neelima Mckay   YOB: 1945   Date of Visit: 8/3/2020       Dear Dr Fletcher Schaumann: Thank you for referring Raleigh Flint to me for evaluation  Below are the relevant portions of my assessment and plan of care  Cardiovascular and Mediastinum      Peripheral vascular disease of lower extremity (HCC) (Chronic)       Peripheral arterial disease with mixed arteriovenous insufficiency and right leg/foot ulcerations  Now s/p right leg angiogram and right SFA PTA  Additional intervention was not done due to patient intolerance  However, significant improvement noted in post-intervention NESTOR with NESTOR 1 49 (unreliable) but biphasic waveforms and MTP 147mmHg (previously monophasic and unobtainable) GTP could not be obtained due to foot movements       Ongoing wound care per visiting nurse and podiatry  Has follow-up appointment with Dr Petar Turpin who debrided his right leg ulcerations on Wednesday  Unable to make in office appointment today due to abdominal incision drainage and need for urgent follow-up with Dr Channing Rojas       Will obtain right lower extremity arterial duplex in 2 weeks with follow-up in the office within 1 month to re-assess wounds  May need repeat angiogram under anesthesia if wounds continue to worsen without improvement  Continue ASA and statin                     Other     Open wound of umbilical region       Infected mesh s/p ex-lap, mesh excision and primary ventral hernia repair  Has had copious amounts of drainage from midline incision and will be following up with his general surgeon this afternoon            Leg ulcer, right, with fat layer exposed ([address] )       Right leg ulcerations in setting of mixed arteriovenous insufficiency  Arterial insufficiency improved since right leg angiogram with SFA PTA  Local wound care per visiting nurses and podiatry's instructions  Follow-up with Dr Dave Sawant this Wednesday  If you have questions, please do not hesitate to call me  I look forward to following Prachi along with you           Sincerely,        Dylan Burleson MD        CC: Bee Rider MD

## 2020-08-04 NOTE — SOCIAL WORK
LOS: 17 hours  Pt is not a documented bundle  Pt is a 30 day readmission  Pt reports his wound dehisced and he came back to the ED  Pt reports he was able to obtain his medications since last admission and had [facility] upon discharge  Discussed role of case management, discharge planning, identifying help at home and discharge preference  Pt confirms he lives with his significant other in [place], steps through front door, no steps through back door  Pt reports he is independent with adls and sometimes uses walker for ambulation  Pt confirms he uses [address] in [place], has prescription plan and is able to afford medications  Pt reports his PCP is Dr Bambi Marinelli in [place]  Pt reports he is unsure if he has POA or living will but will go to a  if he needs POA or living will  Pt reports his significant other will help him at home if needed  Pt denies hx of SNF and mental health and drug and alcohol treatment  Pt reports he has [facility] for SN/PT  Pt reports he is unsure if he will need transport home  Pt is choosing to resume services through [facility]  Referral to be sent to [facility] for SN/PT  CM to follow for wound vac need

## 2020-08-04 NOTE — ASSESSMENT & PLAN NOTE
Wt Readings from Last 3 Encounters:   08/03/20 66 2 kg (146 lb)   08/03/20 68 kg (150 lb)   07/31/20 70 kg (154 lb 5 2 oz)     -cont statin  -based on current VS cont to hold BB

## 2020-08-04 NOTE — PROGRESS NOTES
Progress Note - Angella Pearson 1945, 69 y o  male MRN: [id number]    Unit/Bed#: -01 Encounter: [id number]    Primary Care Provider: Ann Hill DO   Date and time admitted to hospital: 8/3/2020  4:15 PM        Open abdominal wall wound  Assessment & Plan  -Postop day 1 status post repair of abdominal dehiscence, surgery primary and is managing      Severe protein-calorie malnutrition ([facility] )  Assessment & Plan  Malnutrition Findings:           BMI Findings: There is no height or weight on file to calculate BMI  Advance diet as able postop    CKD (chronic kidney disease) stage 3, GFR 30-59 ml/min ([facility])  Assessment & Plan  Creatinine at baseline, trend daily postop  Close monitoring of I/O status    Status post biventricular pacemaker  Assessment & Plan  -noted      Anemia, unspecified  Assessment & Plan  Anemia due to chronic disease, hemoglobin at baseline  Trend daily    Chronic combined systolic and diastolic heart failure ([facility])  Assessment & Plan  Wt Readings from Last 3 Encounters:   08/03/20 66 2 kg (146 lb)   08/03/20 68 kg (150 lb)   07/31/20 70 kg (154 lb 5 2 oz)     -cont statin  -based on current VS cont to hold BB        Dilated cardiomyopathy ([facility] )  Assessment & Plan  -see above    Thrombocytopenia ([facility])  Assessment & Plan  -Platelets at baseline    Essential hypertension  Assessment & Plan  -cont to hold antihypertensives based on current VS    Peripheral vascular disease of lower extremity [facility])  Assessment & Plan  Follows with vascular surgery, Podiatry and Wound Care Dr Caio Cook  Plan for outpatient follow-up      VTE Pharmacologic Prophylaxis:   Pharmacologic: Heparin  Mechanical VTE Prophylaxis in Place: Yes    Patient Centered Rounds: I have performed bedside rounds with nursing staff today  Time Spent for Care: 20 minutes  More than 50% of total time spent on counseling and coordination of care as described above      Current Length of Stay: 0 day(s)    Current Patient Status: Inpatient   Certification Statement: The patient will continue to require additional inpatient hospital stay due to abd wound    Discharge Plan: as per surgery    Code Status: Prior      Subjective:   Patient reports abdominal pain  He has had flatus  Objective:     Vitals:   Temp (24hrs), Av 8 °F (37 1 °C), Min:97 3 °F (36 3 °C), Max:99 6 °F (37 6 °C)    Temp:  [97 3 °F (36 3 °C)-99 6 °F (37 6 °C)] 98 °F (36 7 °C)  HR:  [66-84] 83  Resp:  [15-18] 16  BP: (111-150)/(61-74) 119/64  SpO2:  [98 %-100 %] 100 %  There is no height or weight on file to calculate BMI  Input and Output Summary (last 24 hours): Intake/Output Summary (Last 24 hours) at 2020 1234  Last data filed at 2020 8527  Gross per 24 hour   Intake 1810 ml   Output 1120 ml   Net 690 ml       Physical Exam:     Physical Exam  Gen: NAD, AAOx3, appears chronically ill  Eyes: EOMI, PERRLA, no scleral icterus  ENMT:  Oropharynx clear of erythema or exudates, no nasal discharge, no otic discharge, moist mucous membranes  Neck:  Supple  Cardiovascular:  Regular rate and rhythm, normal S1-S2, no murmurs, rubs, or gallops  Lungs:  Clear to auscultation bilaterally, no wheezes, or rales, or rhonchi  Abdomen:  Absent bowel sounds, soft, diffuse TTP, moderate distention, wound vac in place  Skin:  RLE C/D/I dressing   LLE chronic venous stasis dermatitis  Neuro: Cranial nerves 2-12 are intact, non-focal, moves all 4 extremities      Additional Data:     Labs:    Results from last 7 days   Lab Units 20  0507   WBC Thousand/uL 5 27   HEMOGLOBIN g/dL 8 5*   HEMATOCRIT % 25 7*   PLATELETS Thousands/uL 68*   BANDS PCT % 1   LYMPHO PCT % 5*   MONO PCT % 4   EOS PCT % 0     Results from last 7 days   Lab Units 20  0507   SODIUM mmol/L 138   POTASSIUM mmol/L 4 4   CHLORIDE mmol/L 107   CO2 mmol/L 23   BUN mg/dL 25   CREATININE mg/dL 1 42*   ANION GAP mmol/L 8   CALCIUM mg/dL 7 9*   ALBUMIN g/dL 1 8* TOTAL BILIRUBIN mg/dL 0 70   ALK PHOS U/L 78   ALT U/L 16   AST U/L 14   GLUCOSE RANDOM mg/dL 113     Results from last 7 days   Lab Units 08/03/20  1732   INR  1 57*                       * I Have Reviewed All Lab Data Listed Above  * Additional Pertinent Lab Tests Reviewed: Alicja Rios Admission Reviewed      Recent Cultures (last 7 days):           Last 24 Hours Medication List:   acetaminophen, 650 mg, Oral, Q6H PRN, Vicky Haste, PA-C  atorvastatin, 40 mg, Oral, Daily With Saba Kenney PA-C  diazepam, 5 mg, Oral, Q12H PRN, Kaye Mercedes, CRNP  heparin (porcine), 5,000 Units, Subcutaneous, Q8H [facility], Vicky Haste, PA-C  HYDROcodone-acetaminophen, 1 tablet, Oral, Q6H PRN, Vicky Haste, PA-C  HYDROmorphone, 0 5 mg, Intravenous, Q3H PRN, Vicky Haste, PA-C  lactated ringers, 1,000 mL, Intravenous, Once PRN, Vicky Haste, PA-C    And  lactated ringers, 1,000 mL, Intravenous, Once PRN, Vicky Haste, PA-C  lactated ringers, 125 mL/hr, Intravenous, Continuous, Vicky Haste, PA-C, Last Rate: 125 mL/hr (08/04/20 0317)  morphine injection, 4 mg, Intravenous, Q4H PRN, Vicky Haste, PA-C  pneumococcal 23-valent polysaccharide vaccine, 0 5 mL, Subcutaneous, Prior to discharge, Omero Regalado MD  sodium chloride, 1,000 mL, Intravenous, Once PRN, Vicky Haste, PA-C    And  sodium chloride, 1,000 mL, Intravenous, Once PRN, Vicky Haste, PA-C         Today, Patient Was Seen By: Shabnam Beard MD    ** Please Note: Dictation voice to text software may have been used in the creation of this document   **

## 2020-08-04 NOTE — ASSESSMENT & PLAN NOTE
Wt Readings from Last 3 Encounters:   08/03/20 66 2 kg (146 lb)   08/03/20 68 kg (150 lb)   07/31/20 70 kg (154 lb 5 2 oz)     Continue home statin  Holding home metoprolol postop, restart is able  Currently on IV fluids postop, consider stopping in the morning

## 2020-08-04 NOTE — UTILIZATION REVIEW
Initial Clinical Review    ADMITTED OUTPATIENT  NO CHARGE BED on  8/3  @  0809    CHANGED to INPT status,  medsurg LOC   on  8/4  @  1009  Due to need for wound care,  Electrolyte repletion, and improvement in po intake       Inpatient Admission  Once      Transfer Service: Surgery-General       Question  Answer  Comment    Admitting Physician  Obdulio Bailey     Level of Care  Med Surg     Estimated length of stay  More than 2 Midnights     Certification  I certify that inpatient services are medically necessary for this patient for a duration of greater than two midnights  See H&P and MD Progress Notes for additional information about the patient's course of treatment  Elective   OUTPATIENT  surgical procedure  Age/Sex: 69 y o  male  Surgery Date:  8/3   Procedure:  Repair of abd wall wound/dehissence w/mesh, placement of drain, placement of wound vac  Anesthesia: general   Operative Findings:  Poor healing tissue with dehiscence of prior abdominal wound   Exposed small bowel  Bowel viable without injury  No evidence of purulence or infection     POSTOP:   SBP lo 100's -  HOLD home antihypertensives  CKD 3 - at baseline  Currently on     Norco, Tylenol, morphine and Dilaudid for pain control    POD#1 Progress Note:   Wound VAC to be changed tomorrow ([facility] schedule)  -LINDEN drain serosanguinous   -Consider advancing diet this afternoon if tolerating clears   -Hgb 8 2 this AM- trend   Creatinine 1 72 on admission, 1 42 this AM;  Cont IVF  Mg lo @  1 2 - repletion ordered  Add protein shakes to diet once tolerating po         Vital Signs: /64   Pulse 83   Temp 98 °F (36 7 °C)   Resp 16   SpO2 100%   08/04/20 07:24:18   98 °F (36 7 °C)   83   16   119/64   82   99 %      Diet:  Has not yet taken PO;  Start w/clears and advance as tolerated   Mobility: ambulate tid   DVT Prophylaxis: SCD's   Medications/Pain Control:  See below      Scheduled Medications:  atorvastatin, 40 mg, Oral, Daily With Dinner  heparin (porcine), 5,000 Units, Subcutaneous, Q8H [address]  magnesium sulfate, 2 g, Intravenous, Once  (8/4)     Continuous IV Infusions:  lactated ringers, 125 mL/hr, Intravenous, Continuous      PRN Meds:  acetaminophen, 650 mg, Oral, Q6H PRN  Given 8/4  @  0814  diazepam, 5 mg, Oral, Q12H PRN   Given 8/4  @  0009  HYDROcodone-acetaminophen, 1 tablet, Oral, Q6H PRN                                          Given @  0410  HYDROmorphone, 0 5 mg, Intravenous, Q3H PRN                                           Given @  1943 & 0314  morphine injection, 4 mg, Intravenous, Q4H PRN                                           Given @  2022 &  0139  pneumococcal 23-valent polysaccharide vaccine, 0 5 mL, Subcutaneous, Prior to discharge      Network Utilization Review Department  [email or web address]  ATTENTION: Please call with any questions or concerns to [phone] and carefully listen to the prompts so that you are directed to the right person  All voicemails are confidential   Pito Stauffer all requests for admission clinical reviews, approved or denied determinations and any other requests to dedicated fax number below belonging to the campus where the patient is receiving treatment   List of dedicated fax numbers for the Facilities:  [address] DENIALS (Administrative/Medical Necessity) [phone]   [address] (Maternity/NICU/Pediatrics) [phone]   Gay Saenz [phone]   Shalini Ruelas [phone]   Ethel Macedo [phone]   [address] [place] [address]  [phone]   [facility] [address] [phone]   [facility]  [phone]   [address] [facility]  24[address] [facility] [address] [facility] [phone]

## 2020-08-04 NOTE — PROGRESS NOTES
Patient refusing to answer admission navigator questions at this time  Stated "ask me later"  Refusing complete head to toe assessment  Allowed RN to listen to Hear and abdomen

## 2020-08-04 NOTE — ASSESSMENT & PLAN NOTE
Postop day 0 status post repair of abdominal dehiscence,, surgery primary and is managing  Currently on   Norco, Tylenol, morphine and Dilaudid for pain control  On exam patient 8/10 abdominal pain and nurse was about to give morphine  Continue routine abdominal exams as permitted by patient  Patient did not allow me to examine him this evening

## 2020-08-04 NOTE — PROGRESS NOTES
Pastoral Care Progress Note    2020  Patient: Shantelle Headings : 1945  Admission Date & Time: 8/3/2020 1615  MRN: [id number] [facility]: [id number]                     Chaplaincy Interventions Utilized:   Empowerment: Clarified, confirmed, or reviewed information from treatment team , Encouraged focus on present and Encouraged self-care    Exploration: Explored emotional needs & resources, Explored relational needs & resources and Explored spiritual needs & resources        Relationship Building: Listened empathically        Chaplaincy Outcomes Achieved:  Expressed gratitude and Verbally processed emotions          Spiritual Coping Strategies Utilized:   No spiritual coping       [id number] Affiliation None   Spiritual Beliefs/Perceptions   God's Role in Disease   (Pt agnostic)   Stress Factors   Patient Stress Factors Health changes; Other (Comment)  (Discouraged by need for readmit )   Coping Responses   Patient Coping Open/discussion   Plan of Care   Assessment Completed by: Unit visit

## 2020-08-04 NOTE — ASSESSMENT & PLAN NOTE
Follows with vascular surgery, Podiatry and [address]  [facility]    Plan for outpatient follow-up

## 2020-08-04 NOTE — PLAN OF CARE
Problem: Prexisting or High Potential for Compromised Skin Integrity  Goal: Skin integrity is maintained or improved  Description: INTERVENTIONS:  - Identify patients at risk for skin breakdown  - Assess and monitor skin integrity  - Assess and monitor nutrition and hydration status  - Monitor labs   - Assess for incontinence   - Turn and reposition patient  - Assist with mobility/ambulation  - Relieve pressure over bony prominences  - Avoid friction and shearing  - Provide appropriate hygiene as needed including keeping skin clean and dry  - Evaluate need for skin moisturizer/barrier cream  - Collaborate with interdisciplinary team   - Patient/family teaching  - Consider wound care consult   Outcome: Progressing     Problem: PAIN - ADULT  Goal: Verbalizes/displays adequate comfort level or baseline comfort level  Description: Interventions:  - Encourage patient to monitor pain and request assistance  - Assess pain using appropriate pain scale  - Administer analgesics based on type and severity of pain and evaluate response  - Implement non-pharmacological measures as appropriate and evaluate response  - Consider cultural and social influences on pain and pain management  - Notify physician/advanced practitioner if interventions unsuccessful or patient reports new pain  Outcome: Progressing     Problem: INFECTION - ADULT  Goal: Absence or prevention of progression during hospitalization  Description: INTERVENTIONS:  - Assess and monitor for signs and symptoms of infection  - Monitor lab/diagnostic results  - Monitor all insertion sites, i e  indwelling lines, tubes, and drains  - Monitor endotracheal if appropriate and nasal secretions for changes in amount and color  - [place] appropriate cooling/warming therapies per order  - Administer medications as ordered  - Instruct and encourage patient and family to use good hand hygiene technique  - Identify and instruct in appropriate isolation precautions for identified infection/condition  Outcome: Progressing     Problem: SAFETY ADULT  Goal: Patient will remain free of falls  Description: INTERVENTIONS:  - Assess patient frequently for physical needs  -  Identify cognitive and physical deficits and behaviors that affect risk of falls    -  [place] fall precautions as indicated by assessment   - Educate patient/family on patient safety including physical limitations  - Instruct patient to call for assistance with activity based on assessment  - Modify environment to reduce risk of injury  - Consider OT/PT consult to assist with strengthening/mobility  Outcome: Progressing  Goal: Maintain or return to baseline ADL function  Description: INTERVENTIONS:  -  Assess patient's ability to carry out ADLs; assess patient's baseline for ADL function and identify physical deficits which impact ability to perform ADLs (bathing, care of mouth/teeth, toileting, grooming, dressing, etc )  - Assess/evaluate cause of self-care deficits   - Assess range of motion  - Assess patient's mobility; develop plan if impaired  - Assess patient's need for assistive devices and provide as appropriate  - Encourage maximum independence but intervene and supervise when necessary  - Involve family in performance of ADLs  - Assess for home care needs following discharge   - Consider OT consult to assist with ADL evaluation and planning for discharge  - Provide patient education as appropriate  Outcome: Progressing  Goal: Maintain or return mobility status to optimal level  Description: INTERVENTIONS:  - Assess patient's baseline mobility status (ambulation, transfers, stairs, etc )    - Identify cognitive and physical deficits and behaviors that affect mobility  - Identify mobility aids required to assist with transfers and/or ambulation (gait belt, sit-to-stand, lift, walker, cane, etc )  - [place] fall precautions as indicated by assessment  - Record patient progress and toleration of activity level on Mobility SBAR; progress patient to next Phase/Stage  - Instruct patient to call for assistance with activity based on assessment  - Consider rehabilitation consult to assist with strengthening/weightbearing, etc   Outcome: Progressing     Problem: DISCHARGE PLANNING  Goal: Discharge to home or other facility with appropriate resources  Description: INTERVENTIONS:  - Identify barriers to discharge w/patient and caregiver  - Arrange for needed discharge resources and transportation as appropriate  - Identify discharge learning needs (meds, wound care, etc )  - Arrange for interpretive services to assist at discharge as needed  - Refer to Case Management Department for coordinating discharge planning if the patient needs post-hospital services based on physician/advanced practitioner order or complex needs related to functional status, cognitive ability, or social support system  Outcome: Progressing     Problem: Knowledge Deficit  Goal: Patient/family/caregiver demonstrates understanding of disease process, treatment plan, medications, and discharge instructions  Description: Complete learning assessment and assess knowledge base  Interventions:  - Provide teaching at level of understanding  - Provide teaching via preferred learning methods  Outcome: Progressing     Problem: Potential for Falls  Goal: Patient will remain free of falls  Description: INTERVENTIONS:  - Assess patient frequently for physical needs  -  Identify cognitive and physical deficits and behaviors that affect risk of falls    -  [place] fall precautions as indicated by assessment   - Educate patient/family on patient safety including physical limitations  - Instruct patient to call for assistance with activity based on assessment  - Modify environment to reduce risk of injury  - Consider OT/PT consult to assist with strengthening/mobility  Outcome: Progressing

## 2020-08-04 NOTE — INTERIM OP NOTE
REPAIR OF ABDOMINAL WALL WOUND/DEHISSENCE with mesh, placement drain, placement vac  Postoperative Note  PATIENT NAME: Camilo Vieyra  : 1945  MRN: [id number]  [facility] OR ROOM 02    Surgery Date: 8/3/2020    Preop Diagnosis:  Open abdominal wall wound [S31 109A]    Post-Op Diagnosis Codes:     * Open abdominal wall wound [S31 109A]    Procedure(s) (LRB):  REPAIR OF ABDOMINAL WALL WOUND/DEHISSENCE with mesh, placement drain, placement vac (N/A)    Surgeon(s) and Role:     * Tamiko Mir MD - Primary     * Yael Andrade PA-C - Assisting    Specimens:  * No specimens in log *    Estimated Blood Loss:   Minimal    Anesthesia Type:   General     Findings:   Poor healing tissue with dehiscence of prior abdominal wound   Exposed small bowel  Bowel viable without injury  No evidence of purulence or infection    Complications:   None    SIGNATURE: Clarissa Burks PA-C   DATE: 2020   TIME: 8:11 AM

## 2020-08-04 NOTE — ASSESSMENT & PLAN NOTE
Holding home metoprolol due to postop day 0, resume is able  Currently on IV fluids postop, will try to keep patient euvolemic    Consider stopping the morning pending labs

## 2020-08-04 NOTE — PLAN OF CARE
Problem: Prexisting or High Potential for Compromised Skin Integrity  Goal: Skin integrity is maintained or improved  Description: INTERVENTIONS:  - Identify patients at risk for skin breakdown  - Assess and monitor skin integrity  - Assess and monitor nutrition and hydration status  - Monitor labs   - Assess for incontinence   - Turn and reposition patient  - Assist with mobility/ambulation  - Relieve pressure over bony prominences  - Avoid friction and shearing  - Provide appropriate hygiene as needed including keeping skin clean and dry  - Evaluate need for skin moisturizer/barrier cream  - Collaborate with interdisciplinary team   - Patient/family teaching  - Consider wound care consult   Outcome: Progressing     Problem: PAIN - ADULT  Goal: Verbalizes/displays adequate comfort level or baseline comfort level  Description: Interventions:  - Encourage patient to monitor pain and request assistance  - Assess pain using appropriate pain scale  - Administer analgesics based on type and severity of pain and evaluate response  - Implement non-pharmacological measures as appropriate and evaluate response  - Consider cultural and social influences on pain and pain management  - Notify physician/advanced practitioner if interventions unsuccessful or patient reports new pain  Outcome: Progressing     Problem: INFECTION - ADULT  Goal: Absence or prevention of progression during hospitalization  Description: INTERVENTIONS:  - Assess and monitor for signs and symptoms of infection  - Monitor lab/diagnostic results  - Monitor all insertion sites, i e  indwelling lines, tubes, and drains  - Monitor endotracheal if appropriate and nasal secretions for changes in amount and color  - [place] appropriate cooling/warming therapies per order  - Administer medications as ordered  - Instruct and encourage patient and family to use good hand hygiene technique  - Identify and instruct in appropriate isolation precautions for identified infection/condition  Outcome: Progressing     Problem: SAFETY ADULT  Goal: Patient will remain free of falls  Description: INTERVENTIONS:  - Assess patient frequently for physical needs  -  Identify cognitive and physical deficits and behaviors that affect risk of falls    -  [place] fall precautions as indicated by assessment   - Educate patient/family on patient safety including physical limitations  - Instruct patient to call for assistance with activity based on assessment  - Modify environment to reduce risk of injury  - Consider OT/PT consult to assist with strengthening/mobility  Outcome: Progressing  Goal: Maintain or return to baseline ADL function  Description: INTERVENTIONS:  -  Assess patient's ability to carry out ADLs; assess patient's baseline for ADL function and identify physical deficits which impact ability to perform ADLs (bathing, care of mouth/teeth, toileting, grooming, dressing, etc )  - Assess/evaluate cause of self-care deficits   - Assess range of motion  - Assess patient's mobility; develop plan if impaired  - Assess patient's need for assistive devices and provide as appropriate  - Encourage maximum independence but intervene and supervise when necessary  - Involve family in performance of ADLs  - Assess for home care needs following discharge   - Consider OT consult to assist with ADL evaluation and planning for discharge  - Provide patient education as appropriate  Outcome: Progressing  Goal: Maintain or return mobility status to optimal level  Description: INTERVENTIONS:  - Assess patient's baseline mobility status (ambulation, transfers, stairs, etc )    - Identify cognitive and physical deficits and behaviors that affect mobility  - Identify mobility aids required to assist with transfers and/or ambulation (gait belt, sit-to-stand, lift, walker, cane, etc )  - [place] fall precautions as indicated by assessment  - Record patient progress and toleration of activity level on Mobility SBAR; progress patient to next Phase/Stage  - Instruct patient to call for assistance with activity based on assessment  - Consider rehabilitation consult to assist with strengthening/weightbearing, etc   Outcome: Progressing     Problem: DISCHARGE PLANNING  Goal: Discharge to home or other facility with appropriate resources  Description: INTERVENTIONS:  - Identify barriers to discharge w/patient and caregiver  - Arrange for needed discharge resources and transportation as appropriate  - Identify discharge learning needs (meds, wound care, etc )  - Arrange for interpretive services to assist at discharge as needed  - Refer to Case Management Department for coordinating discharge planning if the patient needs post-hospital services based on physician/advanced practitioner order or complex needs related to functional status, cognitive ability, or social support system  Outcome: Progressing     Problem: Knowledge Deficit  Goal: Patient/family/caregiver demonstrates understanding of disease process, treatment plan, medications, and discharge instructions  Description: Complete learning assessment and assess knowledge base    Interventions:  - Provide teaching at level of understanding  - Provide teaching via preferred learning methods  Outcome: Progressing

## 2020-08-04 NOTE — PROGRESS NOTES
Progress Note - General Surgery   Fco Cronin 69 y o  male MRN: [id number]  Unit/Bed#: -01 Encounter: [id number]    Assessment:  69year old male POD#1 s/p repair of abdominal wall wound dehiscence with mesh, LINDEN drain placement and wound VAC placement     Plan:    1  Abdominal wound dehiscence   -S/p excision of previous infected mesh, with wound dehiscence, now POD#1 s/p repair with Phasix mesh, LINDEN drain and wound VAC placement   -Patient doing well this morning  Some abdominal soreness and "spasms" overnight  -Wound VAC to be changed tomorrow ([facility] schedule)  -LINDEN drain serosanguinous   -Document I/Os   -Ambulate patient  -Pain regimen as needed   -Consider advancing diet this afternoon if tolerating clears   -Hgb 8 2 this AM- trend     2  CKD  -Creatinine 1 72 on admission, 1 42 this AM  -Continue fluid hydration  -Avoid nephrotoxins, hypotension  -Trend renal indices     3  Hypomagnesemia   -Mag low this AM at 1 2   -Mag sulfate ordered this AM 2g   -Follow up AM labs tomorrow     4  PAD with ulceration   -S/p right angiogram with SFA PTA  -Bruising of left groin noted, stable   -Wounds to the lower extremity, will consult podiatry for wound care   -Continue ASA 81 mg     5  Severe protein calorie malnutrition  -Follow up pre albumin   -Will add protein shakes to diet once tolerating   -SLIM consult placed     Subjective/Objective     Subjective: Patient doing well this morning  No acute events overnight other than patient reporting some "abdominal muscle spasms" Reports some abdominal soreness near incision  Denies nausea or vomiting  Has not tried to eat yet but states he has an appetite  Objective:     Blood pressure 119/64, pulse 83, temperature 98 °F (36 7 °C), resp  rate 16, SpO2 99 %  ,There is no height or weight on file to calculate BMI        Intake/Output Summary (Last 24 hours) at 8/4/2020 0819  Last data filed at 8/4/2020 6701  Gross per 24 hour   Intake 1810 ml   Output 1120 ml   Net 690 ml       Invasive Devices     Peripheral Intravenous Line            Peripheral IV 08/03/20 Left Antecubital less than 1 day          Drain            Closed/Suction Drain Right Abdomen Bulb 10 Fr  less than 1 day    Negative Pressure Wound Therapy (V A C ) Abdomen Mid less than 1 day                Physical Exam:  General: Alert and oriented, in no acute distress  HEENT: Normocephalic, atraumatic  Eyes: No scleral icterus present  Neck: Supple, no JVD, trachea midline  Cardiovascular: Regular rate and rhythm  No murmurs, rubs or gallops  Pulmonary: Clear to auscultation bilaterally  Normal effort  No wheezes, rales or rhonchi  Abdomen: Soft, minimally tender to palpation  Wound VAC in place with seal intact  Minimal serosanguinous output from VAC  LINDEN drain with serosanguinous output  Extremities: Wounds to the lower extremities  Bruising noted to the left groin  Skin: No rashes or lesions  No jaundice     Neurologic: Grossly normal      Lab, Imaging and other studies:  CBC:   Lab Results   Component Value Date    WBC 5 27 08/04/2020    HGB 8 5 (L) 08/04/2020    HCT 25 7 (L) 08/04/2020    MCV 92 08/04/2020    PLT 68 (L) 08/04/2020    MCH 30 2 08/04/2020    MCHC 33 1 08/04/2020    RDW 16 3 (H) 08/04/2020    MPV 12 0 08/04/2020    NRBC 1 08/04/2020    NRBC 1 08/04/2020   , CMP:   Lab Results   Component Value Date    SODIUM 138 08/04/2020    K 4 4 08/04/2020     08/04/2020    CO2 23 08/04/2020    BUN 25 08/04/2020    CREATININE 1 42 (H) 08/04/2020    CALCIUM 7 9 (L) 08/04/2020    AST 14 08/04/2020    ALT 16 08/04/2020    ALKPHOS 78 08/04/2020    EGFR 49 08/04/2020     VTE Pharmacologic Prophylaxis: Sequential compression device (Venodyne)   VTE Mechanical Prophylaxis: sequential compression device

## 2020-08-04 NOTE — PLAN OF CARE
Problem: Prexisting or High Potential for Compromised Skin Integrity  Goal: Skin integrity is maintained or improved  Description: INTERVENTIONS:  - Identify patients at risk for skin breakdown  - Assess and monitor skin integrity  - Assess and monitor nutrition and hydration status  - Monitor labs   - Assess for incontinence   - Turn and reposition patient  - Assist with mobility/ambulation  - Relieve pressure over bony prominences  - Avoid friction and shearing  - Provide appropriate hygiene as needed including keeping skin clean and dry  - Evaluate need for skin moisturizer/barrier cream  - Collaborate with interdisciplinary team   - Patient/family teaching  - Consider wound care consult   Outcome: Progressing     Problem: PAIN - ADULT  Goal: Verbalizes/displays adequate comfort level or baseline comfort level  Description: Interventions:  - Encourage patient to monitor pain and request assistance  - Assess pain using appropriate pain scale  - Administer analgesics based on type and severity of pain and evaluate response  - Implement non-pharmacological measures as appropriate and evaluate response  - Consider cultural and social influences on pain and pain management  - Notify physician/advanced practitioner if interventions unsuccessful or patient reports new pain  Outcome: Progressing     Problem: INFECTION - ADULT  Goal: Absence or prevention of progression during hospitalization  Description: INTERVENTIONS:  - Assess and monitor for signs and symptoms of infection  - Monitor lab/diagnostic results  - Monitor all insertion sites, i e  indwelling lines, tubes, and drains  - Monitor endotracheal if appropriate and nasal secretions for changes in amount and color  - [place] appropriate cooling/warming therapies per order  - Administer medications as ordered  - Instruct and encourage patient and family to use good hand hygiene technique  - Identify and instruct in appropriate isolation precautions for identified infection/condition  Outcome: Progressing     Problem: SAFETY ADULT  Goal: Patient will remain free of falls  Description: INTERVENTIONS:  - Assess patient frequently for physical needs  -  Identify cognitive and physical deficits and behaviors that affect risk of falls    -  [place] fall precautions as indicated by assessment   - Educate patient/family on patient safety including physical limitations  - Instruct patient to call for assistance with activity based on assessment  - Modify environment to reduce risk of injury  - Consider OT/PT consult to assist with strengthening/mobility  Outcome: Progressing  Goal: Maintain or return to baseline ADL function  Description: INTERVENTIONS:  -  Assess patient's ability to carry out ADLs; assess patient's baseline for ADL function and identify physical deficits which impact ability to perform ADLs (bathing, care of mouth/teeth, toileting, grooming, dressing, etc )  - Assess/evaluate cause of self-care deficits   - Assess range of motion  - Assess patient's mobility; develop plan if impaired  - Assess patient's need for assistive devices and provide as appropriate  - Encourage maximum independence but intervene and supervise when necessary  - Involve family in performance of ADLs  - Assess for home care needs following discharge   - Consider OT consult to assist with ADL evaluation and planning for discharge  - Provide patient education as appropriate  Outcome: Progressing  Goal: Maintain or return mobility status to optimal level  Description: INTERVENTIONS:  - Assess patient's baseline mobility status (ambulation, transfers, stairs, etc )    - Identify cognitive and physical deficits and behaviors that affect mobility  - Identify mobility aids required to assist with transfers and/or ambulation (gait belt, sit-to-stand, lift, walker, cane, etc )  - [place] fall precautions as indicated by assessment  - Record patient progress and toleration of activity level on Mobility SBAR; progress patient to next Phase/Stage  - Instruct patient to call for assistance with activity based on assessment  - Consider rehabilitation consult to assist with strengthening/weightbearing, etc   Outcome: Progressing

## 2020-08-04 NOTE — CONSULTS
Consult- Neelima Mckay 1945, 69 y o  male MRN: [id number]    Unit/Bed#: -01 Encounter: [id number]    Primary Care Provider: Maite Foley DO   Date and time admitted to hospital: 8/3/2020  4:15 PM      Consults    Hypertension  Assessment & Plan  · Patient seen postop, blood pressure low 100s  Hold home antihypertensives and resume is able    CKD (chronic kidney disease) stage 3, GFR 30-59 ml/min ([facility])  Assessment & Plan  Creatinine at baseline, trend daily postop  Close monitoring of I/O status    Anemia, unspecified  Assessment & Plan  Anemia due to chronic disease, hemoglobin at baseline  Trend daily    Dilated cardiomyopathy ([facility] )  Assessment & Plan  Holding home metoprolol due to postop day 0, resume is able  Currently on IV fluids postop, will try to keep patient euvolemic  Consider stopping the morning pending labs    Chronic combined systolic and diastolic heart failure ([facility])  Assessment & Plan  Wt Readings from Last 3 Encounters:   08/03/20 66 2 kg (146 lb)   08/03/20 68 kg (150 lb)   07/31/20 70 kg (154 lb 5 2 oz)     Continue home statin  Holding home metoprolol postop, restart is able  Currently on IV fluids postop, consider stopping in the morning        Thrombocytopenia ([facility])  Assessment & Plan  Platelets at baseline    Open abdominal wall wound  Assessment & Plan  Postop day 0 status post repair of abdominal dehiscence,, surgery primary and is managing  Currently on   Norco, Tylenol, morphine and Dilaudid for pain control  On exam patient 8/10 abdominal pain and nurse was about to give morphine  Continue routine abdominal exams as permitted by patient  Patient did not allow me to examine him this evening  Severe protein-calorie malnutrition ([facility] [address] )  Assessment & Plan  Malnutrition Findings:           BMI Findings: There is no height or weight on file to calculate BMI     Advance diet as able postop    Peripheral vascular disease of lower extremity [facility])  Assessment & Plan  Follows with vascular surgery, Podiatry and Wound Care Dr Yessica Otto  Plan for outpatient follow-up      VTE Prophylaxis: Heparin  / sequential compression device   Code Status:  No code status and chart  Patient did not want me to ask him questions on my exam   POLST: POLST form is not discussed and not completed at this time  Total Time for Visit, including Counseling / Coordination of Care: 45 minutes  Greater than 50% of this total time spent on direct patient counseling and coordination of care  Chief Complaint:   Postop abdominal wall dehiscence repair    History of Present Illness:    Shantelle Rankin is a 69 y o  male who presents for elective surgery to repair his abdominal wall dehiscence  Patient with history of ventral hernia status post repair July which tend dehisced  Patient had previously undergone excision of the previous infected mesh  He continued with a large amount of drainage from midline incision and went to OR today with Dr Crystal Kay for repair of abdominal dehiscence  Hospitalist consult to postop  Patient with past medical history of SDH, P 82, PVD with right lower extremity and left lower extremity wounds, lumbosacral disc herniation, hypertension, CHF with LV dysfunction, BPH, AFib, and anemia, permanent pacemaker  Patient seen postoperatively and MS room  Upon my introduction of self fetal me to leave him alone, do not talk to him, and refused to allow me to examine him      Review of Systems:    Review of Systems   Unable to perform ROS: Other   Patient refused    Past Medical and Surgical History:     Past Medical History:   Diagnosis Date    Anemia     Atrial fibrillation ([address] )     Benign prostatic hyperplasia without urinary obstruction     Congestive heart failure with left ventricular diastolic dysfunction, chronic (HCC)     History of ITP     Hypertension     Lumbosacral disc herniation     Peripheral vascular disease of lower extremity ([address] )     Renal disorder     Subdural hematoma (HCC)        Past Surgical History:   Procedure Laterality Date    BACK SURGERY      AUSTYN HOLE FOR SUBDURAL HEMATOMA      CARDIAC PACEMAKER PLACEMENT      CLAVICLE SURGERY Left 2011    HERNIA REPAIR      IR ABDOMINAL ANGIOGRAPHY / INTERVENTION  7/20/2020    IR BONE MARROW BIOPSY/ASPIRATION  7/30/2020    LAPAROTOMY N/A 7/21/2020    Procedure: LAPAROTOMY EXPLORATORY, excision of infected mesh and repair of ventral heria; Surgeon: Juan José Parks MD;  Location: [facility] MAIN OR;  Service: General    PROSTATE SURGERY      VENTRAL HERNIA REPAIR N/A 7/21/2020    Procedure: REPAIR HERNIA VENTRAL - EXCSION OF INFECTED MESH;  Surgeon: Juan José Parks MD;  Location: [facility] MAIN OR;  Service: General    WOUND DEBRIDEMENT Right 7/24/2020    Procedure: DEBRIDEMENT LOWER EXTREMITY ([address]); Surgeon: Barbara Montanez DPM;  Location: [facility] MAIN OR;  Service: Podiatry       Meds/Allergies:    Prior to Admission medications    Medication Sig Start Date End Date Taking?  Authorizing Provider   acetaminophen (TYLENOL) 325 mg tablet Take 2 tablets (650 mg total) by mouth every 6 (six) hours as needed for mild pain 12/7/18   Gunner Pompa MD   amoxicillin-clavulanate (AUGMENTIN) 875-125 mg per tablet Take 1 tablet by mouth every 12 (twelve) hours for 8 days 7/31/20 8/8/20  Chayo Ortega MD   Ascorbic Acid (VITAMIN C PO) Take by mouth    Historical Provider, MD   aspirin 81 mg chewable tablet Chew 1 tablet (81 mg total) daily  Patient not taking: Reported on 8/3/2020 8/1/20 8/31/20  Chayo Ortega MD   atorvastatin (LIPITOR) 40 mg tablet Take 1 tablet (40 mg total) by mouth daily with dinner 7/31/20 8/30/20  Chayo Ortega MD   B Complex Vitamins (VITAMIN B COMPLEX PO) Take by mouth    Historical Provider, MD   calcium carbonate (TUMS) 500 mg chewable tablet Chew 1 tablet (500 mg total) daily as needed for indigestion or heartburn 12/7/18   Carol Leigh Jiménez MD   diazepam (VALIUM) 5 mg tablet Take 5 mg by mouth every 12 (twelve) hours as needed for anxiety    Historical Provider, MD   Ferrous Gluconate-C-Folic Acid (IRON-C PO) Take by mouth    Historical Provider, MD   folic acid (FOLVITE) 1 mg tablet Take 1 mg by mouth daily    Historical Provider, MD   HYDROcodone-acetaminophen (NORCO) 5-325 mg per tablet Take 1 tablet by mouth every 4 (four) hours as needed for pain for up to 5 daysMax Daily Amount: 6 tablets 7/31/20 8/5/20  Clark Hernandez MD   Lactobacillus (ACIDOPHILUS PO) Take by mouth    Historical Provider, MD   levofloxacin (LEVAQUIN) 750 mg tablet Take 1 tablet (750 mg total) by mouth every other day for 6 days 8/2/20 8/8/20  Clark Hernandez MD   lisinopril (ZESTRIL) 5 mg tablet Take 0 5 tablets (2 5 mg total) by mouth daily 7/31/20   Clark Hernandez MD   Lycopene 10 MG CAPS Take by mouth    Historical Provider, MD   metoprolol succinate (TOPROL-XL) 25 mg 24 hr tablet Take 1 tablet (25 mg total) by mouth daily 7/31/20 8/30/20  Clark Hernandez MD   Misc Natural Products (SAW PALMETTO) CAPS Take by mouth    Historical Provider, MD   Multiple Vitamin (MULTI-DAY VITAMINS) TABS Take by mouth    Historical Provider, MD   pantoprazole (PROTONIX) 40 mg tablet Take 1 tablet (40 mg total) by mouth daily in the early morning 8/1/20 8/31/20  Clark Hernandez MD   tamsulosin (FLOMAX) 0 4 mg Take 1 capsule (0 4 mg total) by mouth daily with dinner for 30 days 12/7/18 8/3/20  Nam Cantu MD   clobetasol (TEMOVATE) 0 05 % GEL Apply topically as needed   4/26/16 8/3/20  Historical Provider, MD     Patient refused questioning    Allergies: No Known Allergies    Social History:     Marital Status: Single   Occupation:  Unknown patient refused questioning  Patient Pre-hospital Living Situation:  Unknown patient refused questioning  Patient Pre-hospital Level of Mobility:  Unknown patient refused question  Patient Pre-hospital Diet Restrictions:  Unknown patient refused questioning  Substance Use History:   Social History     Substance and Sexual Activity   Alcohol Use Not Currently    Alcohol/week: 0 0 standard drinks    Frequency: Never    Binge frequency: Never    Comment: Denied use     Social History     Tobacco Use   Smoking Status Never Smoker   Smokeless Tobacco Never Used   Tobacco Comment    per [employer]-Former Smoker and Never Smoker-pls confirm     Social History     Substance and Sexual Activity   Drug Use Never    Comment: Denied use       Family History:    Under and patient refused questioning    Physical Exam:     Vitals:   Blood Pressure: 112/62 (08/03/20 2134)  Pulse: 82 (08/03/20 2134)  Temperature: 98 5 °F (36 9 °C) (08/03/20 2134)  Temp Source: Oral (08/03/20 2106)  Respirations: 15 (08/03/20 1926)  SpO2: 100 % (08/03/20 2134)    Physical Exam   Nursing note and vitals reviewed  Patient refused to allow me to examine him  Patient lying in bed on 2 L nasal cannula with SpO2 95% and heart rate 82 beats per minute  No sign of acute distress    Additional Data:     Lab Results: I have personally reviewed pertinent reports  Results from last 7 days   Lab Units 08/03/20  1658   WBC Thousand/uL 6 61   HEMOGLOBIN g/dL 10 3*   HEMATOCRIT % 31 3*   PLATELETS Thousands/uL 86*   LYMPHO PCT % 7*   MONO PCT % 3*   EOS PCT % 0     Results from last 7 days   Lab Units 08/03/20  1658   SODIUM mmol/L 136   POTASSIUM mmol/L 4 4   CHLORIDE mmol/L 104   CO2 mmol/L 24   BUN mg/dL 29*   CREATININE mg/dL 1 72*   ANION GAP mmol/L 8   CALCIUM mg/dL 8 5   GLUCOSE RANDOM mg/dL 96     Results from last 7 days   Lab Units 08/03/20  1732   INR  1 57*       [employer] / [employer] Records Reviewed: Yes     ** Please Note: This note has been constructed using a voice recognition system   **

## 2020-08-04 NOTE — NURSING NOTE
Pt did not want his R leg dressing changed, pt stated "give me a break Dr Yessica Otto said he would do it tomorrow"

## 2020-08-04 NOTE — TELEPHONE ENCOUNTER
Pt is still admitted     ----- Message from Ruby Mujica sent at 7/31/2020 11:48 AM EDT -----  Please schedule patient for [facility] follow up with repeat labs prior to appointment   Thanks

## 2020-08-04 NOTE — ASSESSMENT & PLAN NOTE
Malnutrition Findings:           BMI Findings: There is no height or weight on file to calculate BMI     Advance diet as able postop

## 2020-08-04 NOTE — PROGRESS NOTES
Pt known to  from recent admission  Pt oriented to pastoral care and receptive to   Pt is coping acceptably  No despondency  Reports feeling some discouragement but overall coping well  08/04/20 [id number] Affiliation None   Spiritual Beliefs/Perceptions   God's Role in Disease   (Pt agnostic)   Stress Factors   Patient Stress Factors Health changes; Other (Comment)  (Discouraged by need for readmit )   Coping Responses   Patient Coping Open/discussion   Plan of Care   Assessment Completed by: Unit visit

## 2020-08-05 NOTE — PROGRESS NOTES
Progress Note - General Surgery  Neelima Mckay 69 y o  male MRN: [id number]  Unit/Bed#: -01 Encounter: [id number]    Assessment/Plan:  Infected/exposed necrotic umbilical hernia mesh s/p excision of mesh and primary repair of hernia with subsequent wound dehiscence  POD#2 s/p exploration of umbilical hernia wound, repair of umbilical hernia with Phasix mesh and application of wound vac  Wound vac changed today with incident  Tolerating diet, advance to clears tx and cx for lunch and to house at dinner if tolerating  PT/OT consult  Discharge planning rehab? Vs home with VNA    CKD  At baseline now  Continue hydration as needed  Monitor    Severe protein calorie malnutrition  Prealbumin 10 8  BMI 20 9  Continue protein supplementation     Hypomagnesemia  Recheck mag   Replace as needed    PAD with ulceration  S/p angiogram with SFA PTA   With continued lower extremity wounds - plans per podiatry  Continue asa    Subjective/Objective   Subjective: Doing well, pain controlled, tolerating liquid diet without n/v  Last bm was 2 days ago  No abdominal pain  Objective:     Blood pressure 122/70, pulse 85, temperature 97 5 °F (36 4 °C), temperature source Tympanic, resp  rate 18, SpO2 94 %  ,There is no height or weight on file to calculate BMI        Intake/Output Summary (Last 24 hours) at 8/5/2020 1037  Last data filed at 8/5/2020 0553  Gross per 24 hour   Intake 360 ml   Output 860 ml   Net -500 ml       Invasive Devices     Peripheral Intravenous Line            Peripheral IV 08/03/20 Left Antecubital 1 day          Drain            Closed/Suction Drain Right Abdomen Bulb 10 Fr  1 day    Negative Pressure Wound Therapy (V A C ) Abdomen Mid 1 day                Physical Exam: /70   Pulse 85   Temp 97 5 °F (36 4 °C) (Tympanic)   Resp 18   SpO2 94%   General appearance: alert and oriented, in no acute distress and laying flat in bed  Lungs: clear to auscultation bilaterally and without wheezes, crackles, or rhonchi  Heart: regular rate and rhythm, S1, S2 normal, no murmur, click, rub or gallop  Abdomen: soft, incisional tenderness, distention with some tympany, wound vac replaced at visit with clean wound bed and minimal bleeding      Lab, Imaging and other studies:CBC: No results found for: WBC, HGB, HCT, MCV, PLT, ADJUSTEDWBC, MCH, MCHC, RDW, MPV, NRBC, CMP: No results found for: SODIUM, K, CL, CO2, ANIONGAP, BUN, CREATININE, GLUCOSE, CALCIUM, AST, ALT, ALKPHOS, PROT, BILITOT, EGFR  VTE Pharmacologic Prophylaxis: Heparin  VTE Mechanical Prophylaxis: sequential compression device

## 2020-08-05 NOTE — ASSESSMENT & PLAN NOTE
Wt Readings from Last 3 Encounters:   08/03/20 66 2 kg (146 lb)   08/03/20 68 kg (150 lb)   07/31/20 70 kg (154 lb 5 2 oz)     -cont statin  -restart BB

## 2020-08-05 NOTE — OP NOTE
OPERATIVE REPORT  PATIENT NAME: Milagro Brian    :  1945  MRN: [id number]  Pt Location: [facility] OR ROOM 02    SURGERY DATE: 8/3/2020    Surgeon(s) and Role:     * Narda Tomas MD - Primary     * Yoseph Rao PA-C - Assisting    Preop Diagnosis:  Open abdominal wall wound [S31 109A]    Post-Op Diagnosis Codes:     * Open abdominal wall wound [S31 109A]    Procedure(s) (LRB):  REPAIR OF ABDOMINAL WALL WOUND/DEHISSENCE with mesh, placement drain, placement vac (N/A)    Specimen(s):  * No specimens in log *    Estimated Blood Loss:   Minimal    Drains:  Closed/Suction Drain Right Abdomen Bulb 10 Fr  (Active)   Site Description Unable to view 20 184   Dressing Status Clean;Dry; Intact 20 0553   Drainage Appearance Serosanguineous 20 0553   Status To bulb suction 20 0553   Output (mL) 10 mL 20 2240   Number of days: 2       Negative Pressure Wound Therapy (V A C ) Abdomen Mid (Active)   Black foam- # applied 1 20 0930   Cycle Continuous; On 20 0930   Target Pressure (mmHg) 125 20 0930   Dressing Status Clean;Dry; Intact 20 0930   Output (mL) 50 mL 20 0553   Number of days: 2       Anesthesia Type:   General    Operative Indications:  Open abdominal wall wound [S31 109A]      Operative Findings:  Abdominal wall dehiscence     Complications:   None    Procedure and Technique:  After the patient was taken to the operating room and placed on the operating room table in supine position  A time out was performed where the name of the patient and intended procedure were confirmed  The abdomen was prepped and draped in the usual fashion  The previous incision was opened by removing the staples  It could be seen there was an dehiscence of the abdominal fascia  The fascia was opened by removing the sutures  The abdomen was inspected an no injury to the bowel was noted  The fascial edges were trimmed using cautery   The posterior fascia was entered with cautery creating a retrorectus plan  The posterior fascia was closed using a running PDS sutures  A Phasix mesh was placed in the retrorectus position an secured using interrupted sutures  A drain was placed in the retrorectus plane and secured in the right abdomen with nylon sutures  The anterior fascia was closed using interrupted prolene sutures   A Vac was then placed in the subcutaneous tissues using a black sponge using continuous suction at 125mmHg   I was present for the entire procedure    Patient Disposition:  PACU     SIGNATURE: Juan José Parks MD  DATE: August 5, 2020  TIME: 3:15 PM

## 2020-08-05 NOTE — PROGRESS NOTES
Progress Note - Podiatry  Meenakshi Edwards 69 y o  male MRN: [id number]  Unit/Bed#: -01 Encounter: [id number]    Assessment/Plan:  Skin ulceration right leg with fat layer exposed(Multiple)  PVD/Atherosclerosis of native artery right leg with ulceration              -Dressing applied with Dermagran, ABD, Kerlix, and 6" Ace wrap applied to all wounds right leg    -upon discharge follow up at [address] should continue with the same dressing changes every other day upon discharge  Open abdominal wall wound               -POD #1 S/P  Excision of infected abdominal wall mesh   Abdominal wall dehiscence,Thrombocytopenia, chronic kidney disease, hypo magnesium, malnutrition              - managed per General surgery,  and Internal Medicine    Subjective/Objective   Chief Complaint: No chief complaint on file  Subjective:  82-year-old white male seen resting in bed, request pain medication prior to bandage change on his leg  Denies any new pain or shortness of breath since he was last seen yesterday  Relates abdominal pain is lessening  Blood pressure 122/70, pulse 80, temperature 98 2 °F (36 8 °C), resp  rate 18, SpO2 95 %  ,There is no height or weight on file to calculate BMI      Invasive Devices     Peripheral Intravenous Line            Peripheral IV 08/03/20 Left Antecubital 2 days          Drain            Closed/Suction Drain Right Abdomen Bulb 10 Fr  1 day    Negative Pressure Wound Therapy (V A C ) Abdomen Mid 1 day                Physical Exam:   General appearance: alert, cooperative and no distress  Neuro/Vascular: Normal strength  Sensation and reflexes:  Grossly intact  Pulses: Right: DP 0/4, PT 0/4, Left: DP 0/4, PT 0/4  Capillary Filling:  3 Sec, Edema:  +1 right leg, none left  Vascular calcifications noted on tib-fib x-ray RLE  Ulcers/Wounds:  Multiple ulcerations present on the posterior medial, anterior medial, and posterior lateral right lower leg with fat layer exposed  Small wound also present on the right medial midfoot  All wounds are completely granular, healthy, with no infection  No active necrosis noted  Dressing intact with no strike through drainage noted  Overall his wounds have made excellent progress over the past 2 weeks since his wound debridement done on previous hospital admission  Labs and other studies:   CBC w/diff  Results from last 7 days   Lab Units 08/05/20  1107 08/04/20  0507   WBC Thousand/uL 6 16 5 27   HEMOGLOBIN g/dL 9 1* 8 5*   HEMATOCRIT % 27 6* 25 7*   PLATELETS Thousands/uL 71* 68*   LYMPHO PCT %  --  5*   MONO PCT %  --  4   EOS PCT %  --  0     BMP  Results from last 7 days   Lab Units 08/05/20  1107   POTASSIUM mmol/L 4 5   CHLORIDE mmol/L 104   CO2 mmol/L 24   BUN mg/dL 24   CREATININE mg/dL 1 39*   CALCIUM mg/dL 8 4     CMP  Results from last 7 days   Lab Units 08/05/20  1107 08/04/20  0507   POTASSIUM mmol/L 4 5 4 4   CHLORIDE mmol/L 104 107   CO2 mmol/L 24 23   BUN mg/dL 24 25   CREATININE mg/dL 1 39* 1 42*   CALCIUM mg/dL 8 4 7 9*   ALK PHOS U/L  --  78   ALT U/L  --  16   AST U/L  --  14       @Culture@  Lab Results   Component Value Date    BLOODCX No Growth After 5 Days  07/16/2020    BLOODCX No Growth After 5 Days  07/16/2020    BLOODCX No Growth After 5 Days  05/02/2017    BLOODCX No Growth After 5 Days   05/02/2017    URINECX >100,000 cfu/ml Klebsiella oxytoca (A) 02/13/2019    URINECX >100,000 cfu/ml Klebsiella oxytoca (A) 01/07/2019         Current Facility-Administered Medications:     acetaminophen (TYLENOL) tablet 650 mg, 650 mg, Oral, Q6H PRN, Ebenezer Duffy PA-C, 650 mg at 08/04/20 2550    atorvastatin (LIPITOR) tablet 40 mg, 40 mg, Oral, Daily With Leo Hamm PA-C, 40 mg at 08/04/20 1652    heparin (porcine) subcutaneous injection 5,000 Units, 5,000 Units, Subcutaneous, Q8H [address], 5,000 Units at 08/05/20 1413 **AND** [CANCELED] Platelet count, , , Once, Sonny Church PA-C    HYDROcodone-acetaminophen [facility]) 5-325 mg per tablet 1 tablet, 1 tablet, Oral, Q6H PRN, Sonny Church PA-C, 1 tablet at 08/05/20 1413    HYDROmorphone (DILAUDID) injection 0 5 mg, 0 5 mg, Intravenous, Q3H PRN, Sonny Church PA-C, 0 5 mg at 08/04/20 2220    LORazepam (ATIVAN) tablet 0 5 mg, 0 5 mg, Oral, Q8H PRN, Tai Penaloza MD, 0 5 mg at 08/05/20 1008    metoprolol succinate (TOPROL-XL) 24 hr tablet 25 mg, 25 mg, Oral, Daily, Tai Penaloza MD, 25 mg at 08/05/20 3810    morphine (PF) 4 mg/mL injection 4 mg, 4 mg, Intravenous, Q4H PRN, Sonny Church PA-C, 4 mg at 08/04/20 0139    pneumococcal 23-valent polysaccharide vaccine (PNEUMOVAX-23) injection 0 5 mL, 0 5 mL, Subcutaneous, Prior to discharge, Viktoriya Kaufman MD    Imaging: I have personally reviewed pertinent films in PACS  EKG, Pathology, and Other Studies: I have personally reviewed pertinent reports    VTE Pharmacologic Prophylaxis: Heparin  VTE Mechanical Prophylaxis: reason for no mechanical VTE prophylaxis Multiple wounds on lower leg    Obed Resendiz DPM

## 2020-08-05 NOTE — CONSULTS
Consult - Podiatry   Katlin Mcarthur 69 y o  male MRN: [id number]  Unit/Bed#: -01 Encounter: [id number]    Assessment/Plan     Skin ulceration right leg with fat layer exposed(Multiple)  PVD/Atherosclerosis of native artery right leg with ulceration   - Dermagran gauze to all wounds on right leg every other day  - Patient requests no bandage change today due to his recent abdominal surgery and discomfort associated  - Cancel wound care appointment for tomorrow  Open abdominal wall wound    -S/P  Excision of infected abdominal wall mesh earlier today  Abdominal wall dehiscence,Thrombocytopenia, chronic kidney disease, hypo magnesium, malnutrition   - managed per General surgery,  and Internal Medicine    History of Present Illness     HPI:  Katlin Mcarthur is a 69 y o  male who presents with  Abdominal wound dehiscence   Podiatry consult requested to evaluate  Right lower extremity wounds  Patient is known to me from previous admission and was scheduled to follow up tomorrow at the [place] [id number]  Patient requests his bandage not be changed today due to his recent abdominal surgery earlier today and associated discomfort  Patient denies any shortness of breath or new concerns with his right leg  Relates visiting nurse stated that one of the wounds was covering over  Consults  Review of Systems   Constitutional: Negative  HENT: Negative  Eyes: Negative  Respiratory: Negative  Cardiovascular:   History of cardiovascular disease    Gastrointestinal: Negative      Musculoskeletal:  Antalgic gait secondary to right lower extremity wounds   Skin: multiple ulcerations right leg   Neurological:  negative        Historical Information   Past Medical History:   Diagnosis Date    Anemia     Atrial fibrillation (HCC)     Benign prostatic hyperplasia without urinary obstruction     Congestive heart failure with left ventricular diastolic dysfunction, chronic ([address] )     History of ITP     Hypertension     Lumbosacral disc herniation     Peripheral vascular disease of lower extremity (HCC)     Renal disorder     Subdural hematoma (HCC)      Past Surgical History:   Procedure Laterality Date    BACK SURGERY      AUSTYN HOLE FOR SUBDURAL HEMATOMA      CARDIAC PACEMAKER PLACEMENT      CLAVICLE SURGERY Left 2011    HERNIA REPAIR      IR ABDOMINAL ANGIOGRAPHY / INTERVENTION  7/20/2020    IR BONE MARROW BIOPSY/ASPIRATION  7/30/2020    LAPAROTOMY N/A 7/21/2020    Procedure: LAPAROTOMY EXPLORATORY, excision of infected mesh and repair of ventral heria; Surgeon: Germania Ybarra MD;  Location: [facility];  Service: General    MD EXPLORATORY OF ABDOMEN N/A 8/3/2020    Procedure: REPAIR OF ABDOMINAL WALL WOUND/DEHISSENCE with mesh, placement drain, placement vac;  Surgeon: Germania Ybarra MD;  Location: [facility];  Service: General    PROSTATE SURGERY      VENTRAL HERNIA REPAIR N/A 7/21/2020    Procedure: REPAIR HERNIA VENTRAL - EXCSION OF INFECTED MESH;  Surgeon: Germania Ybarra MD;  Location: [facility];  Service: General    WOUND DEBRIDEMENT Right 7/24/2020    Procedure: DEBRIDEMENT LOWER EXTREMITY ([address] OUT);   Surgeon: Deniz Angel DPM;  Location: [facility];  Service: Podiatry     Social History   Social History     Substance and Sexual Activity   Alcohol Use Not Currently    Alcohol/week: 0 0 standard drinks    Frequency: Never    Binge frequency: Never    Comment: Denied use     Social History     Substance and Sexual Activity   Drug Use Never    Comment: Denied use     Social History     Tobacco Use   Smoking Status Never Smoker   Smokeless Tobacco Never Used   Tobacco Comment    per [employer]-Former Smoker and Never Smoker-pls confirm     Family History:   Family History   Problem Relation Age of Onset    Heart disease Mother     Heart disease Father     Hypertension Father     Diabetes Other     Depression Other     Cancer Sister     Depression Cousin        Meds/Allergies   Medications Prior to Admission   Medication    acetaminophen (TYLENOL) 325 mg tablet    amoxicillin-clavulanate (AUGMENTIN) 875-125 mg per tablet    Ascorbic Acid (VITAMIN C PO)    aspirin 81 mg chewable tablet    atorvastatin (LIPITOR) 40 mg tablet    B Complex Vitamins (VITAMIN B COMPLEX PO)    calcium carbonate (TUMS) 500 mg chewable tablet    diazepam (VALIUM) 5 mg tablet    Ferrous Gluconate-C-Folic Acid (IRON-C PO)    folic acid (FOLVITE) 1 mg tablet    HYDROcodone-acetaminophen (NORCO) 5-325 mg per tablet    Lactobacillus (ACIDOPHILUS PO)    levofloxacin (LEVAQUIN) 750 mg tablet    lisinopril (ZESTRIL) 5 mg tablet    Lycopene 10 MG CAPS    metoprolol succinate (TOPROL-XL) 25 mg 24 hr tablet    Misc Natural Products (SAW PALMETTO) CAPS    Multiple Vitamin (MULTI-DAY VITAMINS) TABS    pantoprazole (PROTONIX) 40 mg tablet    tamsulosin (FLOMAX) 0 4 mg     No Known Allergies    Objective   First Vitals:   Blood Pressure: 138/74 (08/03/20 1703)  Pulse: 82 (08/03/20 1703)  Temperature: 99 6 °F (37 6 °C) (08/03/20 1703)  Temp Source: Temporal (08/03/20 1703)  Respirations: 18 (08/03/20 1703)  SpO2: 98 % (08/03/20 1703)    Current Vitals:   Blood Pressure: 121/73 (08/04/20 1631)  Pulse: 95 (08/04/20 1631)  Temperature: 97 6 °F (36 4 °C) (08/04/20 1631)  Temp Source: Oral (08/04/20 1631)  Respirations: 18 (08/04/20 1631)  SpO2: 93 % (08/04/20 1631)        /73 (BP Location: Right arm)   Pulse 95   Temp 97 6 °F (36 4 °C) (Oral)   Resp 18   SpO2 93%     General Appearance:    Alert, cooperative, no distress   Head:    Normocephalic, without obvious abnormality, atraumatic   Eyes:    PERRL, conjunctiva/corneas clear, EOM's intact            Nose:   Moist mucous membranes, no drainage or sinus tenderness   Throat:   No tenderness, no exudates   Neck:   Supple, symmetrical, trachea midline, no JVD   Back:     Symmetric, no CVA tenderness   Lungs:     Respirations unlabored   Chest wall:    No tenderness or deformity   Heart:    Rate and rhythm noted on last vitals  Abdomen:     Tender with surgical dressing present  Lower Ext/Ortho:  no gross deformities noted of either lower extremity  Neuro/Vasc: CNII-XII intact  Normal strength  Sensation and reflexes:  Grossly intact  Pulses: Right: DP 0/4, PT 0/4, Left: DP 0/4, PT 0/4  Capillary Filling:  3 Sec, Edema:  +1 right leg, none left  Vascular calcifications noted on tib-fib x-ray RLE  Arteriogram 7/20/2020:  · RLE: CFA: Patent, Profunda femoris: Patent  SFA: Proximal portion is patent, normal caliber        High-grade (greater than 80%) 1 cm focal stenosis mid SFA     Moderate grade tandem distal stenoses and in particular 60% stenosis at the abductor canal  Popliteal artery: Patent, noting 60% focal stenosis in the above-the-knee segment  Runoff: Peroneal runoff to the foot with distally reconstituted posterior tibial artery filling of the plantar arch     Chronically occluded anterior tibial artery   Proximal posterior tibial artery is patent and then occludes  · LLE:            CFA: Patent Profunda femoris: Proximal portion is patent           SFA: Proximal portion is patent  · POST INTERVENTION FINDINGS:              1   Significant luminal gain of multifocal right SFA                             andpopliteal artery stenoses following a                                      combination of 6 mm and 7 mm POBA and DCB  2   Refractory proximal R SFA (30%) stenosis  Considered stent; however, patient was uncooperative with movement and asked multiple times for the procedure to be terminated  3   No change in tibial runoff  4   Left common femoral artery access, successfully closed with a Vascade  · IMPRESSION:  Technically successful multifocal balloon angioplasty of multiple stenoses throughout the right superficial femoral and popliteal arteries    Patient has significant right tibial disease with in-line flow into the peroneal artery and distal reconstituted posterior tibial artery   Could consider tibial intervention in the future   Would recommend anesthesia support given patient inability to lay still      Arterial duplex 7/21/2020:  · RIGHT LOWER LIMB              Ankle/Brachial Index: 1 49 which is in the unreliable range              PPG/PVR Tracings are slightly dampened   Biphasic waveforms at the ankle  Metatarsal Pressure 147mmHg              Great Toe Pressure: unable to obtain due to patient's foot movements  · LEFT LOWER LIMB              Ankle/Brachial Index: 1 27 which is in the normal range              PPG/PVR Tracings are slightly dampened   Triphasic waveforms at the ankle  Metatarsal Pressure 73mmHg              Great Toe Pressure: 51mmHg, within the healing range  Skin: Texture, Tone and Turgor: Diminished, Digital Hair:  none  Atrophic Changes:  moderate   Lesions:  none  Nail Pathology:  Multiple dystrophic  Ulcers/Wounds:  multiple ulcerations present on the medial, anterior, and posterior lateral aspect of the right lower leg with fat layer exposed  Dressing intact with no strike through drainage noted  No evidence of infection                   Lab Results:   CBC w/diff  Results from last 7 days   Lab Units 08/04/20  0507   WBC Thousand/uL 5 27   HEMOGLOBIN g/dL 8 5*   HEMATOCRIT % 25 7*   PLATELETS Thousands/uL 68*   LYMPHO PCT % 5*   MONO PCT % 4   EOS PCT % 0     BMP  Results from last 7 days   Lab Units 08/04/20  0507   POTASSIUM mmol/L 4 4   CHLORIDE mmol/L 107   CO2 mmol/L 23   BUN mg/dL 25   CREATININE mg/dL 1 42*   CALCIUM mg/dL 7 9*     CMP  Results from last 7 days   Lab Units 08/04/20  0507   POTASSIUM mmol/L 4 4   CHLORIDE mmol/L 107   CO2 mmol/L 23   BUN mg/dL 25   CREATININE mg/dL 1 42*   CALCIUM mg/dL 7 9*   ALK PHOS U/L 78   ALT U/L 16   AST U/L 14     @Culture@  Lab Results   Component Value Date    BLOODCX No Growth After 5 Days  07/16/2020    BLOODCX No Growth After 5 Days  07/16/2020    BLOODCX No Growth After 5 Days  05/02/2017    BLOODCX No Growth After 5 Days  05/02/2017    URINECX >100,000 cfu/ml Klebsiella oxytoca (A) 02/13/2019    URINECX >100,000 cfu/ml Klebsiella oxytoca (A) 01/07/2019         Imaging: I have personally reviewed pertinent films in PACS      EKG, Pathology, and Other Studies: I have personally reviewed pertinent reports  Code Status: Prior    Please Note: Voice to text dictation software was used in the creation of this document         Jason Arevalo DPM

## 2020-08-05 NOTE — PLAN OF CARE
Problem: PHYSICAL THERAPY ADULT  Goal: Performs mobility at highest level of function for planned discharge setting  See evaluation for individualized goals  Description: Treatment/Interventions: ADL retraining, Functional transfer training, Gait training, Bed mobility, Spoke to case management  Equipment Recommended: Blanche Dickens       See flowsheet documentation for full assessment, interventions and recommendations  Outcome: Progressing  Note: Prognosis: Good     Assessment: Pt able to mobilize with rw in room, encoruaged to sitoob in chair  States he is awaiting Dr Gwen Bass to redress his R le  REturned to bed wiht needs in reach  VAc dresing intast  Appears near usual functional level  REcommend home PT at d/c as vac is new and rw pafirly new device for home safety  Hopeful for diet advancement  Will follow for 1 additional session for confidence with rw and vac  See goals  Barriers to Discharge: (medical clearance)     PT Discharge Recommendation: Home with skilled therapy     PT - OK to Discharge: Yes    See flowsheet documentation for full assessment

## 2020-08-05 NOTE — SOCIAL WORK
Continue to follow  CM notified by surgical PA that Pt will need wound vac upon discharge  Met with Pt,  CM informed Pt that he will be discharged with wound vac  Pt is still in agreement for [facility] upon discharge  Updated [facility] that Pt will be going home with wound vac  Spoke with PT, Pt can return home with home PT  Will need to fax [employer] wound vac paperwork to [facility] once completed by surgery  CM to follow

## 2020-08-05 NOTE — PLAN OF CARE
Problem: Prexisting or High Potential for Compromised Skin Integrity  Goal: Skin integrity is maintained or improved  Description: INTERVENTIONS:  - Identify patients at risk for skin breakdown  - Assess and monitor skin integrity  - Assess and monitor nutrition and hydration status  - Monitor labs   - Assess for incontinence   - Turn and reposition patient  - Assist with mobility/ambulation  - Relieve pressure over bony prominences  - Avoid friction and shearing  - Provide appropriate hygiene as needed including keeping skin clean and dry  - Evaluate need for skin moisturizer/barrier cream  - Collaborate with interdisciplinary team   - Patient/family teaching  - Consider wound care consult   Outcome: Progressing     Problem: PAIN - ADULT  Goal: Verbalizes/displays adequate comfort level or baseline comfort level  Description: Interventions:  - Encourage patient to monitor pain and request assistance  - Assess pain using appropriate pain scale  - Administer analgesics based on type and severity of pain and evaluate response  - Implement non-pharmacological measures as appropriate and evaluate response  - Consider cultural and social influences on pain and pain management  - Notify physician/advanced practitioner if interventions unsuccessful or patient reports new pain  Outcome: Progressing     Problem: INFECTION - ADULT  Goal: Absence or prevention of progression during hospitalization  Description: INTERVENTIONS:  - Assess and monitor for signs and symptoms of infection  - Monitor lab/diagnostic results  - Monitor all insertion sites, i e  indwelling lines, tubes, and drains  - Monitor endotracheal if appropriate and nasal secretions for changes in amount and color  - [place] appropriate cooling/warming therapies per order  - Administer medications as ordered  - Instruct and encourage patient and family to use good hand hygiene technique  - Identify and instruct in appropriate isolation precautions for identified infection/condition  Outcome: Progressing     Problem: SAFETY ADULT  Goal: Patient will remain free of falls  Description: INTERVENTIONS:  - Assess patient frequently for physical needs  -  Identify cognitive and physical deficits and behaviors that affect risk of falls    -  [place] fall precautions as indicated by assessment   - Educate patient/family on patient safety including physical limitations  - Instruct patient to call for assistance with activity based on assessment  - Modify environment to reduce risk of injury  - Consider OT/PT consult to assist with strengthening/mobility  Outcome: Progressing  Goal: Maintain or return to baseline ADL function  Description: INTERVENTIONS:  -  Assess patient's ability to carry out ADLs; assess patient's baseline for ADL function and identify physical deficits which impact ability to perform ADLs (bathing, care of mouth/teeth, toileting, grooming, dressing, etc )  - Assess/evaluate cause of self-care deficits   - Assess range of motion  - Assess patient's mobility; develop plan if impaired  - Assess patient's need for assistive devices and provide as appropriate  - Encourage maximum independence but intervene and supervise when necessary  - Involve family in performance of ADLs  - Assess for home care needs following discharge   - Consider OT consult to assist with ADL evaluation and planning for discharge  - Provide patient education as appropriate  Outcome: Progressing  Goal: Maintain or return mobility status to optimal level  Description: INTERVENTIONS:  - Assess patient's baseline mobility status (ambulation, transfers, stairs, etc )    - Identify cognitive and physical deficits and behaviors that affect mobility  - Identify mobility aids required to assist with transfers and/or ambulation (gait belt, sit-to-stand, lift, walker, cane, etc )  - [place] fall precautions as indicated by assessment  - Record patient progress and toleration of activity level on Mobility SBAR; progress patient to next Phase/Stage  - Instruct patient to call for assistance with activity based on assessment  - Consider rehabilitation consult to assist with strengthening/weightbearing, etc   Outcome: Progressing     Problem: DISCHARGE PLANNING  Goal: Discharge to home or other facility with appropriate resources  Description: INTERVENTIONS:  - Identify barriers to discharge w/patient and caregiver  - Arrange for needed discharge resources and transportation as appropriate  - Identify discharge learning needs (meds, wound care, etc )  - Arrange for interpretive services to assist at discharge as needed  - Refer to Case Management Department for coordinating discharge planning if the patient needs post-hospital services based on physician/advanced practitioner order or complex needs related to functional status, cognitive ability, or social support system  Outcome: Progressing     Problem: Knowledge Deficit  Goal: Patient/family/caregiver demonstrates understanding of disease process, treatment plan, medications, and discharge instructions  Description: Complete learning assessment and assess knowledge base  Interventions:  - Provide teaching at level of understanding  - Provide teaching via preferred learning methods  Outcome: Progressing     Problem: Potential for Falls  Goal: Patient will remain free of falls  Description: INTERVENTIONS:  - Assess patient frequently for physical needs  -  Identify cognitive and physical deficits and behaviors that affect risk of falls    -  [place] fall precautions as indicated by assessment   - Educate patient/family on patient safety including physical limitations  - Instruct patient to call for assistance with activity based on assessment  - Modify environment to reduce risk of injury  - Consider OT/PT consult to assist with strengthening/mobility  Outcome: Progressing

## 2020-08-05 NOTE — PHYSICAL THERAPY NOTE
PT eval     08/05/20 1625   Note Type   Note type Eval only   Home Living   Home Equipment Mariano Harrison  (has RW )   Prior Function   Level of [place] Needs assistance with ADLs and functional mobility   Lives With Significant other   Receives Help From Family   ADL Assistance Needs assistance   IADLs Needs assistance   Vocational Retired   Comments was recent adm for ventral hernia infection adn repair, returns with wound dehisence  underwent repair and wound vac application, ongoing   General   Additional Pertinent History LE wounds, ventral hernia infection and repair x2 HTN, CKD, pacer   Family/Caregiver Present No   Cognition   Arousal/Participation Alert   Orientation Level Oriented X4   Following Commands Follows one step commands with increased time or repetition   RUE Assessment   RUE Assessment WFL   LUE Assessment   LUE Assessment WFL   RLE Assessment   RLE Assessment WFL   LLE Assessment   LLE Assessment WFL  (venous stasis darkening)   Coordination   Movements are Fluid and Coordinated 1   Proprioception   RLE Proprioception Grossly intact   LLE Proprioception Grossly Intact   Bed Mobility   Rolling R 7  Independent   Rolling L 7  Independent   Supine to Sit 5  Supervision   Additional items HOB elevated; Bedrails; Increased time required;Verbal cues   Sit to Supine 5  Supervision   Additional items LE management;Verbal cues   Transfers   Sit to Stand 5  Supervision   Additional items Assist x 1   Stand to Sit 5  Supervision   Stand pivot 5  Supervision   Additional items   (rw)   Ambulation/Elevation   Gait pattern WNL   Gait Assistance 5  Supervision   Additional items   (assist for vac tubing)   Assistive Device Rolling walker   Distance 30'   Balance   Static Sitting Good   Dynamic Sitting Fair   Static Standing Good   Dynamic Standing Good   Ambulatory Good   Endurance Deficit   Endurance Deficit No   Activity Tolerance   Activity Tolerance Patient tolerated treatment well   Nurse Made Aware GAGE [place]   Assessment   Prognosis Good   Assessment Pt able to mobilize with rw in room, encoruaged to sitoob in chair  States he is awaiting Dr Yany Coates to redress his R le  REturned to bed wiht needs in reach  VAc dresing intast  Appears near usual functional level  REcommend home PT at d/c as vac is new and rw pafirly new device for home safety  Hopeful for diet advancement  Will follow for 1 additional session for confidence with rw and vac   See goals   Barriers to Discharge   (medical clearance)   Goals   Patient Goals get better go home   STG Expiration Date 08/10/20   Short Term Goal #1 1) safe idn trnasfers with rw 2) safe idn abm with rw adn vac 50' level #) safe supine<>sit via logroll from flat bed   Plan   Treatment/Interventions ADL retraining;Functional transfer training;Gait training;Bed mobility;Spoke to case management   PT Frequency 5x/wk   Recommendation   PT Discharge Recommendation Home with skilled therapy   Equipment Recommended Walker   PT - OK to Discharge Yes   Additional Comments   (with medical clearance)   Modified Crawford Scale   Modified Crawford Scale 3   Natali Adkins, PT

## 2020-08-05 NOTE — NURSING NOTE
Pt slept during more than half of hrly rounds overnight and wakes very easily  Total of 50ml sero sang drainage noted in VAC canister  VSS

## 2020-08-05 NOTE — PROGRESS NOTES
Progress Note - Eunice Bobby 1945, 69 y o  male MRN: [id number]    Unit/Bed#: -01 Encounter: [id number]    Primary Care Provider: Deedee Roper DO   Date and time admitted to hospital: 8/3/2020  4:15 PM        Open abdominal wall wound  Assessment & Plan  -8/3/20: repair of abdominal dehiscence  -surgery primary and is managing      Severe protein-calorie malnutrition ([facility] )  Assessment & Plan  Malnutrition Findings:           BMI Findings: There is no height or weight on file to calculate BMI  Advance diet as able postop    CKD (chronic kidney disease) stage 3, GFR 30-59 ml/min ([facility])  Assessment & Plan  -Creatinine at baseline, trend daily postop      Status post biventricular pacemaker  Assessment & Plan  -noted      Anemia, unspecified  Assessment & Plan  -Anemia due to chronic disease, hemoglobin at baseline   -Trend daily    Chronic combined systolic and diastolic heart failure ([facility])  Assessment & Plan  Wt Readings from Last 3 Encounters:   08/03/20 66 2 kg (146 lb)   08/03/20 68 kg (150 lb)   07/31/20 70 kg (154 lb 5 2 oz)     -cont statin  -restart BB        Thrombocytopenia (HCC)  Assessment & Plan  -Platelets at baseline    Essential hypertension  Assessment & Plan  -restart BB    Peripheral vascular disease of lower extremity ([facility] )  Assessment & Plan  Follows with vascular surgery, Podiatry and Wound Care Dr Ramon Wood  Plan for outpatient follow-up      VTE Pharmacologic Prophylaxis:   Pharmacologic: Heparin  Mechanical VTE Prophylaxis in Place: No    Patient Centered Rounds: I have performed bedside rounds with nursing staff today  Discussions with Specialists or Other Care Team Provider: Surgery    Time Spent for Care: 20 minutes  More than 50% of total time spent on counseling and coordination of care as described above      Current Length of Stay: 1 day(s)    Current Patient Status: Inpatient   Certification Statement: The patient will continue to require additional inpatient hospital stay due to abd wound    Discharge Plan: as per surgery    Code Status: Prior      Subjective:   No new complaints  Mild abd pain  Objective:     Vitals:   Temp (24hrs), Av 8 °F (36 6 °C), Min:97 5 °F (36 4 °C), Max:98 2 °F (36 8 °C)    Temp:  [97 5 °F (36 4 °C)-98 2 °F (36 8 °C)] 97 5 °F (36 4 °C)  HR:  [82-95] 82  Resp:  [18] 18  BP: (121-124)/(72-73) 124/72  SpO2:  [93 %-95 %] 94 %  There is no height or weight on file to calculate BMI  Input and Output Summary (last 24 hours): Intake/Output Summary (Last 24 hours) at 2020 0916  Last data filed at 2020 0553  Gross per 24 hour   Intake 360 ml   Output 860 ml   Net -500 ml       Physical Exam:     Physical Exam  Gen: remains NAD, AAOx3, appears chronically ill  Eyes: EOMI, PERRLA, no scleral icterus  ENMT:  no nasal discharge, no otic discharge, moist mucous membranes  Neck:  Supple  Cardiovascular:  remains regular rate and rhythm, normal S1-S2, no murmurs, rubs, or gallops  Lungs:  remains clear to auscultation bilaterally, no wheezes, or rales, or rhonchi  Abdomen:  Absent bowel sounds, soft, diffuse TTP (slightly improved from yesterday), moderate distention, wound vac in place  Skin:  RLE C/D/I dressing   LLE chronic venous stasis dermatitis  Neuro: Cranial nerves 2-12 are intact, non-focal, moves all 4 extremities       Additional Data:     Labs:    Results from last 7 days   Lab Units 20  0507   WBC Thousand/uL 5 27   HEMOGLOBIN g/dL 8 5*   HEMATOCRIT % 25 7*   PLATELETS Thousands/uL 68*   BANDS PCT % 1   LYMPHO PCT % 5*   MONO PCT % 4   EOS PCT % 0     Results from last 7 days   Lab Units 20  0507   SODIUM mmol/L 138   POTASSIUM mmol/L 4 4   CHLORIDE mmol/L 107   CO2 mmol/L 23   BUN mg/dL 25   CREATININE mg/dL 1 42*   ANION GAP mmol/L 8   CALCIUM mg/dL 7 9*   ALBUMIN g/dL 1 8*   TOTAL BILIRUBIN mg/dL 0 70   ALK PHOS U/L 78   ALT U/L 16   AST U/L 14   GLUCOSE RANDOM mg/dL 113     Results from last 7 days   Lab Units 08/03/20  1732   INR  1 57*                       * I Have Reviewed All Lab Data Listed Above  * Additional Pertinent Lab Tests Reviewed: [address] Admission Reviewed    Recent Cultures (last 7 days):           Last 24 Hours Medication List:   acetaminophen, 650 mg, Oral, Q6H PRN, Idamae Leap, PA-C  atorvastatin, 40 mg, Oral, Daily With Charley Baca, PA-C  diazepam, 5 mg, Oral, Q12H PRN, Mal BJ Grullon  heparin (porcine), 5,000 Units, Subcutaneous, Q8H [facility], Idamae Leap, PA-C  HYDROcodone-acetaminophen, 1 tablet, Oral, Q6H PRN, Idamae Leap, PA-C  HYDROmorphone, 0 5 mg, Intravenous, Q3H PRN, Idamae Leap, PA-C  metoprolol succinate, 25 mg, Oral, Daily, Raina Mcarthur MD  morphine injection, 4 mg, Intravenous, Q4H PRN, Idamae Leap, PA-C  pneumococcal 23-valent polysaccharide vaccine, 0 5 mL, Subcutaneous, Prior to discharge, Rush Bautista MD         Today, Patient Was Seen By: Raina Mcarthur MD    ** Please Note: Dictation voice to text software may have been used in the creation of this document   **

## 2020-08-06 NOTE — MALNUTRITION/BMI
This medical record reflects one or more clinical indicators suggestive of malnutrition and/or morbid obesity  Malnutrition Findings:   Malnutrition type: Chronic illness  Degree of Malnutrition: Other severe protein calorie malnutrition  Malnutrition Characteristics: Fat loss, Muscle loss(Pt presents with severe protein calorie malnutrition as evideinced by orbital hollowing with depressions, temple hollowing and depression and prominent clavicle protrusion  Treat with diet and supplements QID )    BMI Findings: There is no height or weight on file to calculate BMI  See Nutrition note dated 08/06/2020 for additional details  Completed nutrition assessment is viewable in the nutrition documentation

## 2020-08-06 NOTE — PROGRESS NOTES
Pastoral Care Progress Note    2020  Patient: Kamala Villalpando : 1945  Admission Date & Time: 8/3/2020 1615  MRN: [id number] [facility]: [id number]                     Chaplaincy Interventions Utilized:   Empowerment: Encouraged focus on present and Provided education regarding spiritual practice(s)    Exploration: Explored spiritual needs & resources        Relationship Building: Cultivated a relationship of care and support and Listened empathically    Ritual: Provided Cheondoism resources            Chaplaincy Outcomes Achieved:  Progressed toward meaning    Spiritual Coping Strategies Utilized:   Connectedness and Spiritual meaning       20 1000   Stress Factors   Patient Stress Factors None identified   Coping Responses   Patient Coping Accepting;Open/discussion   Plan of Care   Assessment Completed by: Other (Comment)  (Unit visit   Follow up )

## 2020-08-06 NOTE — PHYSICAL THERAPY NOTE
PT tx     08/06/20 1135   Pain Assessment   Pain Assessment Tool Pain Assessment not indicated - pt denies pain   Pain Score No Pain   Restrictions/Precautions   Weight Bearing Precautions Per Order No   Other Precautions   (R le wounds, abdom wound vac present)   General   Chart Reviewed Yes   Additional Pertinent History slowly advanceing diet   Cognition   Arousal/Participation Alert   Attention Within functional limits   Orientation Level Oriented X4   Memory Decreased recall of precautions   Following Commands Follows one step commands with increased time or repetition   Comments needs encouragement to increase activity   Subjective   Subjective Pt agrees to mobilize   Bed Mobility   Rolling R 7  Independent   Supine to Sit 5  Supervision   Additional items Assist x 1; Increased time required;HOB elevated  (assist for lines)   Transfers   Sit to Stand 5  Supervision   Additional items Verbal cues;Armrests; Bedrails;Assist x 1  (vac on front of walker)   Stand to Sit 5  Supervision   Additional items Assist x 1;Bedrails; Increased time required;Verbal cues   Stand pivot 4  Minimal assistance   Additional items Assist x 1; Increased time required;Verbal cues  (rw assist for wound vac)   Ambulation/Elevation   Gait pattern Improper Weight shift;Narrow MADHU;Shuffling; Inconsistent darleen; Foward flexed; Short stride;Decreased R stance   Gait Assistance 4  Minimal assist   Additional items Assist x 1   Assistive Device Rolling walker   Distance 15' 10'   Balance   Static Sitting Good   Dynamic Sitting Fair   Static Standing Fair +   Dynamic Standing Fair +   Ambulatory Fair +   Endurance Deficit   Endurance Deficit No   Activity Tolerance   Activity Tolerance Patient tolerated treatment well   Nurse Made Aware GAGE Hampton   Assessment   Prognosis Good   Problem List Decreased endurance   Assessment Pt sat up and expectorated thick brown mucus, able to continue and amb with rw  States limits WB on R le but ok with RW use  Encouraged pt to limit  bedrest to enhance healing and prevent complications    Oob in recliner after session      Barriers to Discharge   (medical clearance)   Goals   Patient Goals get better go home   Plan   Treatment/Interventions ADL retraining;Functional transfer training;Gait training;Spoke to nursing;Spoke to case management;OT   Progress Progressing toward goals   PT Frequency 2-3x/wk   Recommendation   PT Discharge Recommendation Home with skilled therapy   Equipment Recommended Walker   PT - OK to Discharge No   Additional Comments   (needs to advance diet/medical clearance)   Chau Wheeler, PT

## 2020-08-06 NOTE — OCCUPATIONAL THERAPY NOTE
Occupational Therapy Evaluation     Patient Name: Camilo Vieyra  TYFXEUmairA Date: 8/6/2020  Problem List  Principal Problem:    Open abdominal wall wound  Active Problems:    Peripheral vascular disease of lower extremity (HCC)    Essential hypertension    Thrombocytopenia (HCC)    Chronic combined systolic and diastolic heart failure (HCC)    Anemia, unspecified    Status post biventricular pacemaker    CKD (chronic kidney disease) stage 3, GFR 30-59 ml/min (HCC)    Severe protein-calorie malnutrition ([address] )    Past Medical History  Past Medical History:   Diagnosis Date    Anemia     Atrial fibrillation (HCC)     Benign prostatic hyperplasia without urinary obstruction     Congestive heart failure with left ventricular diastolic dysfunction, chronic (HCC)     History of ITP     Hypertension     Lumbosacral disc herniation     Peripheral vascular disease of lower extremity (HCC)     Renal disorder     Subdural hematoma (HCC)      Past Surgical History  Past Surgical History:   Procedure Laterality Date    BACK SURGERY      AUSTYN HOLE FOR SUBDURAL HEMATOMA      CARDIAC PACEMAKER PLACEMENT      CLAVICLE SURGERY Left 2011    HERNIA REPAIR      IR ABDOMINAL ANGIOGRAPHY / INTERVENTION  7/20/2020    IR BONE MARROW BIOPSY/ASPIRATION  7/30/2020    LAPAROTOMY N/A 7/21/2020    Procedure: LAPAROTOMY EXPLORATORY, excision of infected mesh and repair of ventral heria;   Surgeon: Tamiko Mir MD;  Location: [facility];  Service: General    LA EXPLORATORY OF ABDOMEN N/A 8/3/2020    Procedure: REPAIR OF ABDOMINAL WALL WOUND/DEHISSENCE with mesh, placement drain, placement vac;  Surgeon: Tamiko Mir MD;  Location: [facility];  Service: General    PROSTATE SURGERY      VENTRAL HERNIA REPAIR N/A 7/21/2020    Procedure: REPAIR HERNIA VENTRAL - EXCSION OF INFECTED MESH;  Surgeon: Tamiko Mir MD;  Location: [facility];  Service: General    WOUND DEBRIDEMENT Right 7/24/2020    Procedure: DEBRIDEMENT LOWER EXTREMITY [facility] OUT); Surgeon: Devin Wagoner DPM;  Location: [facility];  Service: Podiatry         08/06/20 1140   Note Type   Note type Eval only   Restrictions/Precautions   Weight Bearing Precautions Per Order No   Other Precautions Fall Risk  (+ wound vac)   Pain Assessment   Pain Assessment Tool John-Baker FACES   John-Baker FACES Pain Rating 2   Pain Location/Orientation Location: Abdomen   Home Living   Type of [address] Two level;Stairs to enter with rails   Bathroom Shower/Tub Tub/shower unit   Bathroom Toilet Standard   Bathroom Equipment Shower chair;Grab bars around toilet   [facility]   Prior Function   Level of [address] with ADLs and functional mobility   Lives With Significant other   Receives Help From Family   ADL Assistance Independent   IADLs Needs assistance   Falls in the last 6 months 0   Vocational Retired   Psychosocial   Psychosocial ([address]) [address]   Patient Behaviors/Mood Cooperative   ADL   Eating Assistance 5  Supervision/Setup   Eating Deficit Setup   Grooming Assistance 5  Supervision/Setup   Grooming Deficit Setup   UB Bathing Assistance 5  Supervision/Setup   LB Bathing Assistance 3  Moderate Assistance   UB Dressing Assistance 5  Supervision/Setup   LB Dressing Assistance 3  Moderate [address]  5  Supervision/Setup   Bed Mobility   Supine to Sit 5  Supervision   Additional items [facility] elevated;Verbal cues; Bedrails   Additional Comments Pt remained seated in recliner by end of session   Transfers   Sit to Stand 5  Supervision   Additional items Verbal cues;Armrests   Stand to Sit 5  Supervision   Additional items Verbal cues;Armrests   Stand pivot 4  Minimal assistance   Additional items Assist x 1;Verbal cues  (for line mgmt, RW)   Activity Tolerance   Activity Tolerance Patient tolerated treatment well   Medical Staff Made Aware PT Vivian Hunter RN [facility]   RUE Assessment RUE Assessment WFL   LUE Assessment   LUE Assessment WFL   Cognition   Overall Cognitive Status WFL   Arousal/Participation Alert   Attention Within functional limits   Orientation Level Oriented X4   Memory Within functional limits   Following Commands Follows one step commands without difficulty   Assessment   Limitation Decreased ADL status; Decreased self-care trans;Decreased high-level ADLs; Decreased endurance   Prognosis Good   Assessment Pt is a 69 y o  male seen for OT evaluation at [facility], admitted 8/3/2020 w/ Open abdominal wall wound  Pt now s/p excision of mesh and primary repair of hernia with subsequent wound dehiscence; s/p exploration and repair of umbilical hernia with Phasix mesh and application of wound vac  OT completed extensive review of pt's medical and social history  Comorbidities affecting pt's functional performance at time of assessment include: peripheral vascular disease, essential HTN, severe protein-calorie malnutrition, PPM, RLE ulcer, and pancytopenia  Personal factors affecting pt at time of IE include:steps to enter environment, difficulty performing ADLS, difficulty performing IADLS  and decreased functional mobility  Pt with active OT orders  Prior to admission, pt was living with significant other in house with 1st floor set-up  Pt was I w/  ADLS and required assist with IADLS  Pt also occasionally used a RW for mobility  Upon evaluation: Pt requires supervision for bed mobility, supervision-min A x 1 for functional mobility/transfers, superivsion for [facility] ADLs and supervision-mod  A for LB ADLS 2* the following deficits impacting occupational performance: decreased strength, decreased balance and decreased tolerance  Pt to benefit from continued skilled OT tx while in the hospital to address deficits as defined above and maximize level of functional independence w ADL's and functional mobility   Occupational Performance areas to address include: bathing/shower, toilet hygiene, dressing, functional mobility and community mobility  Based on findings, pt is of high complexity  At this time, OT recommendations at time of discharge are home OT  Pt would also benefit from long handled dressing equipement to improve ADL independence  However, pt endorsed that he would consider at later time  Goals   Patient Goals Pt wishes to regain functional mobiility   Plan   Treatment Interventions ADL retraining;Functional transfer training; Endurance training;Patient/family training;Equipment evaluation/education; Compensatory technique education; Activityengagement   Goal Expiration Date 08/16/20   OT Treatment Day 0   OT Frequency 2-3x/wk   Recommendation   OT Discharge Recommendation Home with skilled therapy   Equipment Recommended   (long handled equipment)       Pt will achieve the following goals within 10 days  *Pt will complete [facility] bathing and dressing with mod I     *Pt will complete LB bathing and dressing with mod I      * Pt will complete toileting w/ mod I w/ G hygiene/thoroughness using DME PRN    *Pt will complete bed mobility with mod I, with bed flat and no side rail to prep for purposeful tasks    *Pt will perform functional transfers with on/off all surfaces with mod I using DME as needed w/ G balance/safety  *Pt will increase standing tolerance to 5 minutes in order to complete sinkside ADL task  *Pt will identify 3-5 fall risks during ADL routine to ensure home safety upon discharge  *Pt will improve functional mobility during ADL/IADL/leisure tasks to mod I using DME as needed w/ G balance/safety        *Pt will demonstrate G carryover of pt/caregiver education and training as appropriate w/ mod I w/o cues w/ good tolerance            Lida Ford OTR/STAN

## 2020-08-06 NOTE — PLAN OF CARE
Problem: Prexisting or High Potential for Compromised Skin Integrity  Goal: Skin integrity is maintained or improved  Description: INTERVENTIONS:  - Identify patients at risk for skin breakdown  - Assess and monitor skin integrity  - Assess and monitor nutrition and hydration status  - Monitor labs   - Assess for incontinence   - Turn and reposition patient  - Assist with mobility/ambulation  - Relieve pressure over bony prominences  - Avoid friction and shearing  - Provide appropriate hygiene as needed including keeping skin clean and dry  - Evaluate need for skin moisturizer/barrier cream  - Collaborate with interdisciplinary team   - Patient/family teaching  - Consider wound care consult   Outcome: Progressing

## 2020-08-06 NOTE — PROGRESS NOTES
Progress Note - Camilo Vieyra 1945, 69 y o  male MRN: [id number]    Unit/Bed#: -01 Encounter: [id number]    Primary Care Provider: Ada Billy DO   Date and time admitted to hospital: 8/3/2020  4:15 PM        Severe protein-calorie malnutrition [facility])  Assessment & Plan  Malnutrition Findings:           BMI Findings: There is no height or weight on file to calculate BMI  Advance diet as able postop    CKD (chronic kidney disease) stage 3, GFR 30-59 ml/min ([facility])  Assessment & Plan  -Creatinine at baseline, trend daily postop      Status post biventricular pacemaker  Assessment & Plan  -noted      Anemia, unspecified  Assessment & Plan  -Anemia due to chronic disease, hemoglobin at baseline   -Trend daily    Chronic combined systolic and diastolic heart failure ([facility])  Assessment & Plan  Wt Readings from Last 3 Encounters:   08/03/20 66 2 kg (146 lb)   08/03/20 68 kg (150 lb)   07/31/20 70 kg (154 lb 5 2 oz)     -cont statin/BB        Thrombocytopenia ([facility])  Assessment & Plan  -Platelets at baseline    Essential hypertension  Assessment & Plan  -cont BB    Peripheral vascular disease of lower extremity ([facility] [address] )  Assessment & Plan  Follows with vascular surgery, Podiatry and Wound Care Dr Viet Ryan  Plan for outpatient follow-up    * Open abdominal wall wound  Assessment & Plan  -8/3/20: exploration and repair of umbilical hernia with Phasix mesh and application of wound vac  -now with post-op ileus  -surgery primary and is managing        VTE Pharmacologic Prophylaxis:   Pharmacologic: Enoxaparin (Lovenox)  Mechanical VTE Prophylaxis in Place: Yes    Patient Centered Rounds: I have performed bedside rounds with nursing staff today  Discussions with Specialists or Other Care Team Provider: Surgery    Time Spent for Care: 20 minutes  More than 50% of total time spent on counseling and coordination of care as described above      Current Length of Stay: 2 day(s)    Current Patient Status: Inpatient   Certification Statement: The patient will continue to require additional inpatient hospital stay due to ileus/abd wound    Discharge Plan: 2-5 days    Code Status: Prior      Subjective:   Patient reports worsening abdominal Riley pain  He is passing flatus  Objective:     Vitals:   Temp (24hrs), Av 2 °F (36 8 °C), Min:98 °F (36 7 °C), Max:98 3 °F (36 8 °C)    Temp:  [98 °F (36 7 °C)-98 3 °F (36 8 °C)] 98 °F (36 7 °C)  HR:  [80-82] 82  Resp:  [17-18] 17  BP: (119-136)/(68-73) 136/73  SpO2:  [87 %-95 %] 90 %  There is no height or weight on file to calculate BMI  Input and Output Summary (last 24 hours): Intake/Output Summary (Last 24 hours) at 2020 1014  Last data filed at 2020 2101  Gross per 24 hour   Intake 300 ml   Output 1900 ml   Net -1600 ml       Physical Exam:     Physical Exam  Gen: continues to remain NAD, AAOx3, appears chronically ill  Eyes: EOMI, PERRLA, no scleral icterus  ENMT:  no nasal discharge, no otic discharge, moist mucous membranes  Neck:  Supple  Cardiovascular: continues to remain regular rate and rhythm, normal S1-S2, no murmurs, rubs, or gallops  Lungs:   continues to remain clear to auscultation bilaterally, no wheezes, or rales, or rhonchi  Abdomen:  hypoactive bowel sounds, soft, diffuse TTP, moderate distention  (slightly worse than yesterday), wound vac in place  Skin:  RLE C/D/I dressing   LLE chronic venous stasis dermatitis  Neuro: Cranial nerves 2-12 are intact, non-focal, moves all 4 extremities    Additional Data:     Labs:    Results from last 7 days   Lab Units 20  1107 20  0507   WBC Thousand/uL 6 16 5 27   HEMOGLOBIN g/dL 9 1* 8 5*   HEMATOCRIT % 27 6* 25 7*   PLATELETS Thousands/uL 71* 68*   BANDS PCT %  --  1   LYMPHO PCT %  --  5*   MONO PCT %  --  4   EOS PCT %  --  0     Results from last 7 days   Lab Units 20  0511  20  0507   SODIUM mmol/L 137   < > 138   POTASSIUM mmol/L 4 4   < > 4 4 CHLORIDE mmol/L 106   < > 107   CO2 mmol/L 23   < > 23   BUN mg/dL 22   < > 25   CREATININE mg/dL 1 33*   < > 1 42*   ANION GAP mmol/L 8   < > 8   CALCIUM mg/dL 8 0*   < > 7 9*   ALBUMIN g/dL  --   --  1 8*   TOTAL BILIRUBIN mg/dL  --   --  0 70   ALK PHOS U/L  --   --  78   ALT U/L  --   --  16   AST U/L  --   --  14   GLUCOSE RANDOM mg/dL 111   < > 113    < > = values in this interval not displayed  Results from last 7 days   Lab Units 08/03/20  1732   INR  1 57*                       * I Have Reviewed All Lab Data Listed Above  * Additional Pertinent Lab Tests Reviewed: [address] Admission Reviewed    Recent Cultures (last 7 days):           Last 24 Hours Medication List:   acetaminophen, 650 mg, Oral, Q6H PRN, Elijah Huffman PA-C  atorvastatin, 40 mg, Oral, Daily With Jonel Mir PA-C  heparin (porcine), 5,000 Units, Subcutaneous, Q8H [address], Elijah Huffman PA-C  HYDROcodone-acetaminophen, 1 tablet, Oral, Q6H PRN, Elijah Huffman PA-C  LORazepam, 0 5 mg, Oral, Q8H PRN, Amy Canales MD  metoprolol succinate, 25 mg, Oral, Daily, Amy Canales MD  pneumococcal 23-valent polysaccharide vaccine, 0 5 mL, Subcutaneous, Prior to discharge, Vineet Sanford MD         Today, Patient Was Seen By: Amy Canales MD    ** Please Note: Dictation voice to text software may have been used in the creation of this document   **

## 2020-08-06 NOTE — PLAN OF CARE
Problem: PHYSICAL THERAPY ADULT  Goal: Performs mobility at highest level of function for planned discharge setting  See evaluation for individualized goals  Description: Treatment/Interventions: ADL retraining, Functional transfer training, Gait training, Bed mobility, Spoke to case management  Equipment Recommended: Kenji Palacio       See flowsheet documentation for full assessment, interventions and recommendations  Outcome: Progressing  Note: Prognosis: Good  Problem List: Decreased endurance  Assessment: Pt sat up and expectorated thick brown mucus, able to continue and amb with rw  States limits WB on R le but ok with RW use  Encouraged pt to limit  bedrest to enhance healing and prevent complications    Oob in recliner after session  Barriers to Discharge: (medical clearance)     PT Discharge Recommendation: Home with skilled therapy     PT - OK to Discharge: No    See flowsheet documentation for full assessment

## 2020-08-06 NOTE — PROGRESS NOTES
Pt in good spirits  Reports that he is coping well  Pt enjoys engaging in conversation about spiritual/Adventism matters  Says little about home or family  08/06/20 1000   Stress Factors   Patient Stress Factors None identified   Coping Responses   Patient Coping Accepting;Open/discussion   Plan of Care   Assessment Completed by: Other (Comment)  (Unit visit   Follow up )

## 2020-08-06 NOTE — ASSESSMENT & PLAN NOTE
Follows with vascular surgery, Podiatry and [address] [facility]  Dr Hollis Duran    Plan for outpatient follow-up

## 2020-08-06 NOTE — ASSESSMENT & PLAN NOTE
-8/3/20: exploration and repair of umbilical hernia with Phasix mesh and application of wound vac  -now with post-op ileus  -surgery primary and is managing

## 2020-08-06 NOTE — NUTRITION
08/06/20 1419   Assessment   Timepoint Nutrition Review  (MD consult for malnturition)   Adequacy of Intake   Nutrition Modality PO  (Clear Liquids, Crackers and Toast)   Intake Meals 50-75%   Estimated Calorie Intake 25-50%   Estimated Protein Intake  25-50%   Estimated Fluid Intake 50-75%   Estimated calorie intake compared to estimated need Tolerating clears but suboptimal to meet estimated needs  Nutrition Prognosis   Nutrition Concerns   (skin care plan: skin ulcer R tibia with fat layer exposed)   Comorbid Concerns   (per chart review: POD#2 s/p repair of abdominal wall wound dehiscence with mesh, LINDEN drain placement and wound VAC placement, post op ileus,HTN, cardiomyopathy, PVD)   Nutrition Considerations   (diet education: pt disinterested )   PES Statement   Problem Continue previous diagnosis   Patient Nutrition Goals   Goal increase Kcal/Pro;skin integrity improved   Goal Status not met;extended   Timeframe to complete goal by next f/u   Recommendations/Interventions   Summary Pt with no current weights since admit, unable to assess wt loss or gain at this time  Discussed obtaining a wt CNA  Visited pt x 2 today and pt not amenable to any discussion with RD about current po intake, weights or po intake at home PTA  Asked me "what do you want now"  and "now is not on good time" after attempt to discuss nutrition issues with pt  Pt known to us from recent admit where po intake was osorio during hospitalization  Malnutrition/BMI Present Yes   Malnutrition type Chronic illness   Degree of Malnutrition Other severe protein calorie malnutrition   Malnutrition Characteristics Fat loss;Muscle loss  (Pt presents with severe protein calorie malnutrition as evideinced by orbital hollowing with depressions, temple hollowing and depression and prominent clavicle protrusion  Treat with diet and supplements QID )   Interventions Diet: continued as ordered; Supplement adjust   Nutrition Recommendations Continue diet as ordered; Other (specify)  (Per RD protocol will add Boyd BID and Ensure Clears TID    Ensure Max as ordered once on full liquids )   Nutrition Complexity Risk   Nutrition complexity level High risk

## 2020-08-06 NOTE — ASSESSMENT & PLAN NOTE
Wt Readings from Last 3 Encounters:   08/03/20 66 2 kg (146 lb)   08/03/20 68 kg (150 lb)   07/31/20 70 kg (154 lb 5 2 oz)     -cont statin/BB

## 2020-08-06 NOTE — NUTRITION
08/06/20 1419   Assessment   Timepoint Nutrition Review  (MD consult for malnturition)   Adequacy of Intake   Nutrition Modality PO  (Clear Liquids, Crackers and Toast)   Intake Meals 50-75%   Estimated Calorie Intake 25-50%   Estimated Protein Intake  25-50%   Estimated Fluid Intake 50-75%   Estimated calorie intake compared to estimated need Tolerating clears but suboptimal to meet estimated needs  Nutrition Prognosis   Nutrition Concerns   (skin care plan: skin ulcer R tibia with fat layer exposed)   Comorbid Concerns   (per chart review: POD#2 s/p repair of abdominal wall wound dehiscence with mesh, LINDEN drain placement and wound VAC placement, post op ileus,HTN, cardiomyopathy, PVD)   Nutrition Considerations   (diet education: pt disinterested )   PES Statement   Problem Continue previous diagnosis   Patient Nutrition Goals   Goal increase Kcal/Pro;skin integrity improved   Goal Status not met;extended   Timeframe to complete goal by next f/u   Recommendations/Interventions   Summary Pt with no current weights since admit, unable to assess wt loss or gain at this time  Discussed obtaining a wt CNA  Visited pt x 2 today and pt not amenable to any discussion with RD about current po intake, weights or po intake at home PTA  Asked me "what do you want now"  and "now is not on good time" after attempt to discuss nutrition issues with pt  Pt known to us from recent admit where po intake was osorio during hospitalization  Malnutrition/BMI Present Yes   Malnutrition type Chronic illness   Degree of Malnutrition Other severe protein calorie malnutrition   Malnutrition Characteristics Fat loss;Muscle loss  (Pt presents with severe protein calorie malnutrition as evideinced by orbital hollowing with depressions, temple hollowing and depression and prominent clavicle protrusion  Treat with diet and supplements QID )   Interventions Diet: continued as ordered; Supplement adjust   Nutrition Recommendations Continue diet as ordered; Other (specify)  (Per RD protocol is NO  Please change to yes, so RD can adjust supplements, Please add Boyd BID and Ensure Clears TID while pt on Clear LIquid diet   Ensure Max as ordered once on full liquids )   Nutrition Complexity Risk   Nutrition complexity level High risk   Nutrition review: 08/09/20   Follow up date 08/09/20  (po intake, supplement acceptance, diet hx, wt hx)

## 2020-08-06 NOTE — PLAN OF CARE
Problem: Prexisting or High Potential for Compromised Skin Integrity  Goal: Skin integrity is maintained or improved  Description: INTERVENTIONS:  - Identify patients at risk for skin breakdown  - Assess and monitor skin integrity  - Assess and monitor nutrition and hydration status  - Monitor labs   - Assess for incontinence   - Turn and reposition patient  - Assist with mobility/ambulation  - Relieve pressure over bony prominences  - Avoid friction and shearing  - Provide appropriate hygiene as needed including keeping skin clean and dry  - Evaluate need for skin moisturizer/barrier cream  - Collaborate with interdisciplinary team   - Patient/family teaching  - Consider wound care consult   Outcome: Progressing     Problem: PAIN - ADULT  Goal: Verbalizes/displays adequate comfort level or baseline comfort level  Description: Interventions:  - Encourage patient to monitor pain and request assistance  - Assess pain using appropriate pain scale  - Administer analgesics based on type and severity of pain and evaluate response  - Implement non-pharmacological measures as appropriate and evaluate response  - Consider cultural and social influences on pain and pain management  - Notify physician/advanced practitioner if interventions unsuccessful or patient reports new pain  Outcome: Progressing     Problem: INFECTION - ADULT  Goal: Absence or prevention of progression during hospitalization  Description: INTERVENTIONS:  - Assess and monitor for signs and symptoms of infection  - Monitor lab/diagnostic results  - Monitor all insertion sites, i e  indwelling lines, tubes, and drains  - Monitor endotracheal if appropriate and nasal secretions for changes in amount and color  - [place] appropriate cooling/warming therapies per order  - Administer medications as ordered  - Instruct and encourage patient and family to use good hand hygiene technique  - Identify and instruct in appropriate isolation precautions for identified infection/condition  Outcome: Progressing     Problem: SAFETY ADULT  Goal: Patient will remain free of falls  Description: INTERVENTIONS:  - Assess patient frequently for physical needs  -  Identify cognitive and physical deficits and behaviors that affect risk of falls    -  [place] fall precautions as indicated by assessment   - Educate patient/family on patient safety including physical limitations  - Instruct patient to call for assistance with activity based on assessment  - Modify environment to reduce risk of injury  - Consider OT/PT consult to assist with strengthening/mobility  Outcome: Progressing  Goal: Maintain or return to baseline ADL function  Description: INTERVENTIONS:  -  Assess patient's ability to carry out ADLs; assess patient's baseline for ADL function and identify physical deficits which impact ability to perform ADLs (bathing, care of mouth/teeth, toileting, grooming, dressing, etc )  - Assess/evaluate cause of self-care deficits   - Assess range of motion  - Assess patient's mobility; develop plan if impaired  - Assess patient's need for assistive devices and provide as appropriate  - Encourage maximum independence but intervene and supervise when necessary  - Involve family in performance of ADLs  - Assess for home care needs following discharge   - Consider OT consult to assist with ADL evaluation and planning for discharge  - Provide patient education as appropriate  Outcome: Progressing  Goal: Maintain or return mobility status to optimal level  Description: INTERVENTIONS:  - Assess patient's baseline mobility status (ambulation, transfers, stairs, etc )    - Identify cognitive and physical deficits and behaviors that affect mobility  - Identify mobility aids required to assist with transfers and/or ambulation (gait belt, sit-to-stand, lift, walker, cane, etc )  - [place] fall precautions as indicated by assessment  - Record patient progress and toleration of activity level on Mobility SBAR; progress patient to next Phase/Stage  - Instruct patient to call for assistance with activity based on assessment  - Consider rehabilitation consult to assist with strengthening/weightbearing, etc   Outcome: Progressing     Problem: DISCHARGE PLANNING  Goal: Discharge to home or other facility with appropriate resources  Description: INTERVENTIONS:  - Identify barriers to discharge w/patient and caregiver  - Arrange for needed discharge resources and transportation as appropriate  - Identify discharge learning needs (meds, wound care, etc )  - Arrange for interpretive services to assist at discharge as needed  - Refer to Case Management Department for coordinating discharge planning if the patient needs post-hospital services based on physician/advanced practitioner order or complex needs related to functional status, cognitive ability, or social support system  Outcome: Progressing     Problem: Knowledge Deficit  Goal: Patient/family/caregiver demonstrates understanding of disease process, treatment plan, medications, and discharge instructions  Description: Complete learning assessment and assess knowledge base  Interventions:  - Provide teaching at level of understanding  - Provide teaching via preferred learning methods  Outcome: Progressing     Problem: Potential for Falls  Goal: Patient will remain free of falls  Description: INTERVENTIONS:  - Assess patient frequently for physical needs  -  Identify cognitive and physical deficits and behaviors that affect risk of falls    -  [place] fall precautions as indicated by assessment   - Educate patient/family on patient safety including physical limitations  - Instruct patient to call for assistance with activity based on assessment  - Modify environment to reduce risk of injury  - Consider OT/PT consult to assist with strengthening/mobility  Outcome: Progressing

## 2020-08-06 NOTE — PLAN OF CARE
Problem: OCCUPATIONAL THERAPY ADULT  Goal: Performs self-care activities at highest level of function for planned discharge setting  See evaluation for individualized goals  Description: Treatment Interventions: ADL retraining, Functional transfer training, Endurance training, Patient/family training, Equipment evaluation/education, Compensatory technique education, Activityengagement  Equipment Recommended: (long handled equipment)       See flowsheet documentation for full assessment, interventions and recommendations  Note: Limitation: Decreased ADL status, Decreased self-care trans, Decreased high-level ADLs, Decreased endurance  Prognosis: Good  Assessment: Pt is a 69 y o  male seen for OT evaluation at [facility], admitted 8/3/2020 w/ Open abdominal wall wound  Pt now s/p excision of mesh and primary repair of hernia with subsequent wound dehiscence; s/p exploration and repair of umbilical hernia with Phasix mesh and application of wound vac  OT completed extensive review of pt's medical and social history  Comorbidities affecting pt's functional performance at time of assessment include: peripheral vascular disease, essential HTN, severe protein-calorie malnutrition, PPM, RLE ulcer, and pancytopenia  Personal factors affecting pt at time of IE include:steps to enter environment, difficulty performing ADLS, difficulty performing IADLS  and decreased functional mobility  Pt with active OT orders  Prior to admission, pt was living with significant other in house with 1st floor set-up  Pt was I w/  ADLS and required assist with IADLS  Pt also occasionally used a RW for mobility  Upon evaluation: Pt requires supervision for bed mobility, supervision-min A x 1 for functional mobility/transfers, superivsion for UB ADLs and supervision-mod  A for LB ADLS 2* the following deficits impacting occupational performance: decreased strength, decreased balance and decreased tolerance   Pt to benefit from continued skilled OT tx while in the hospital to address deficits as defined above and maximize level of functional independence w ADL's and functional mobility  Occupational Performance areas to address include: bathing/shower, toilet hygiene, dressing, functional mobility and community mobility  Based on findings, pt is of high complexity  At this time, OT recommendations at time of discharge are home OT  Pt would also benefit from long handled dressing equipement to improve ADL independence  However, pt endorsed that he would consider at later time        OT Discharge Recommendation: Home with skilled therapy

## 2020-08-06 NOTE — PROGRESS NOTES
Progress Note - General Surgery  Ryne Wilburn 69 y o  male MRN: [id number]  Unit/Bed#: -01 Encounter: [id number]    Assessment/Plan:  Infected/exposed necrotic umbilical hernia mesh s/p excision of mesh and primary repair of hernia with subsequent wound dehiscence   POD#2 s/p exploration and repair of umbilical hernia with Phasix mesh and application of wound vac  With post operative ileus - continue clears  Needs to ambulate the hallways with wound vac on walker  PT/OT on board - okay for ambulation in halls  Pain management with Norco and tylenol - continue will d/c Dilaudid and Morphine    Post operative Ileus   Tolerating clear liquids however more distention today and now tender to palp   Monitor abdominal exam, if no improvement may nee to back off diet further  OOB/AMB - long discussion with patient that he needs to get out of bed and ambulate the hallways, wound vac canister can go on his walker or he can have assistance to walk  PT/OT on board and patient needs to ambulate  Severe protein calorie malnutrition  Significant contribution to dehiscence and poor wound healing  Boost supplements ordered  Continue to monitor    Urinary Retention   Self catheterized at home  conitnue bladder scanning and prn straight cath  Ditropan for bladder spasms    CKD - resolved  At baseline  Monitor    PAD with lower extremity ulceration  S/p angiogram with SFA PTA and need for tibial PTA under anesthesia  Continue would lower extremity wound care per podiatry  Continue asa    Subjective/Objective   Subjective: Reports pain is controlled with Norco alone however he is using it every 6 hours  Denies nausea but no bowel movements since surgery  Has not ambulated beyond the bedside, hesitant to use walker with wound vac  Discussed importance of ambulating to resolve ileus  Objective:     Blood pressure 136/73, pulse 82, temperature 98 °F (36 7 °C), temperature source Oral, resp  rate 17, SpO2 90 %  ,There is no height or weight on file to calculate BMI  Intake/Output Summary (Last 24 hours) at 8/6/2020 0913  Last data filed at 8/5/2020 2101  Gross per 24 hour   Intake 300 ml   Output 1900 ml   Net -1600 ml       Invasive Devices     Peripheral Intravenous Line            Peripheral IV 08/03/20 Left Antecubital 2 days          Drain            Closed/Suction Drain Right Abdomen Bulb 10 Fr  2 days    Negative Pressure Wound Therapy (V A C ) Abdomen Mid 2 days                Physical Exam: /73 (BP Location: Right arm)   Pulse 82   Temp 98 °F (36 7 °C) (Oral)   Resp 17   SpO2 90%   General appearance: alert and oriented, in no acute distress  Lungs: clear to auscultation bilaterally and without wheezes, crackles, or rhonchi  Heart: regular rate and rhythm, S1, S2 normal, no murmur, click, rub or gallop  Abdomen: soft, distended and tymanic, tender to palp diffusely, umbilical wound vac in place with seal intact      Lab, Imaging and other studies:  CBC:   Lab Results   Component Value Date    WBC 6 16 08/05/2020    HGB 9 1 (L) 08/05/2020    HCT 27 6 (L) 08/05/2020    MCV 92 08/05/2020    PLT 71 (L) 08/05/2020    MCH 30 3 08/05/2020    MCHC 33 0 08/05/2020    RDW 16 3 (H) 08/05/2020    MPV 12 5 08/05/2020   , CMP:   Lab Results   Component Value Date    SODIUM 137 08/06/2020    K 4 4 08/06/2020     08/06/2020    CO2 23 08/06/2020    BUN 22 08/06/2020    CREATININE 1 33 (H) 08/06/2020    CALCIUM 8 0 (L) 08/06/2020    EGFR 53 08/06/2020     VTE Pharmacologic Prophylaxis: Heparin  VTE Mechanical Prophylaxis: sequential compression device

## 2020-08-07 NOTE — SOCIAL WORK
Continue to follow  CM notified that Pt will need transport home  Met with Pt  Pt informed CM that [employer] dropped off wound vac but that he does not have a way to get the [employer] wound vac box home  Pt reports he needs transport home and his SO's family members cannot transport him  Pt reports he thought he had his house keys but he does not and his SO will not be home until 9pm tonight  Call placed to [facility], spoke with Fernando Silva, BLS(bill for wheelchair) will transport Pt home this evening at 8:30pm  [facility] not able to accommodate [employer] wound vac box  Call placed to  service, spoke with Paddy Chavez,  service to deliver Pt's [employer] wound vac box to his house tomorrow  Paddy Chavez directed CM to fill out form on [employer] for special  services  Paddy Chavez informed CM that the [employer] would vac box will need to be picked up from hospital storeroom  [facility] to 1953 meets in [place]  Pt, Pt's nurse(Kaelyn and Heavenly) informed of  service to deliver Pt's [employer] wound vac box to Pt's home residence on 8/8  Pt's nurse(Kaelyn) and hospital supervisor informed that Pt's [employer] wound vac box will need to be in hospital storeroom in order for the  to pick it up Saturday morning(8/8)  Met with Pt  Pt informed of transport time of 8:30pm tonight and informed of wheelchair [employer]  CM informed Pt to expect a call from the  service tomorrow morning to deliver the [employer] wound vac box  Pt expressed understanding  Pt's nurse(Kaelyn) informed of transport time tonight to Pt's home residence

## 2020-08-07 NOTE — PROGRESS NOTES
Progress Note - Podiatry  Eunice Bobby 69 y o  male MRN: [id number]  Unit/Bed#: -01 Encounter: [id number]    Assessment/Plan:  Skin ulceration right leg with fat layer exposed(Multiple)  PVD/Atherosclerosis of native artery right leg with ulceration              -continue with Dermagran, ABD, Kerlix, and 6" Ace wrap applied to all wounds right leg per VNA  -upon discharge follow up at [place] [address] next Wednesday in the afternoon myself   -[facility] scheduled see patient tomorrow after discharge this evening  Open abdominal wall wound               -PO S/P  Excision of infected abdominal wall mesh   -VAC dressing intact and functioning  Abdominal wall dehiscence,Thrombocytopenia, chronic kidney disease, hypo magnesium, malnutrition              - managed per General surgery,  and Internal Medicine    Subjective/Objective   Chief Complaint: No chief complaint on file  Subjective:  68-year-old white male seen resting in bed relating no acute concerns regarding his leg  Relates minimal pain  Denies any new pain or shortness of breath  Relates abdominal pain is lessening  Blood pressure 128/69, pulse 72, temperature 98 3 °F (36 8 °C), temperature source Oral, resp  rate 18, SpO2 95 %  ,There is no height or weight on file to calculate BMI  Invasive Devices     Drain            Closed/Suction Drain Right Abdomen Bulb 10 Fr  3 days    Negative Pressure Wound Therapy (V A C ) Abdomen Mid 3 days                Physical Exam:   General appearance: alert, cooperative and no distress  Neuro/Vascular: Normal strength  Sensation and reflexes:  Grossly intact  Pulses: Right: DP 0/4, PT 0/4, Left: DP 0/4, PT 0/4  Capillary Filling:  3 Sec, Edema:  +1 right leg, none left  Vascular calcifications noted on tib-fib x-ray RLE  Ulcers/Wounds:  Multiple ulcerations present on the 2 right leg right leg (posterior medial, anterior medial, and posterior lateral) with fat layer exposed    Small wound also present on the right medial midfoot  All wounds are completely granular, healthy, with no infection  No active necrosis noted  Dressing intact with no strike through drainage noted  Overall his wounds continue to progress nicely  Labs and other studies:   CBC w/diff  Results from last 7 days   Lab Units 08/07/20  0545   WBC Thousand/uL 6 32   HEMOGLOBIN g/dL 8 2*   HEMATOCRIT % 24 6*   PLATELETS Thousands/uL 77*   LYMPHO PCT % 11*   MONO PCT % 3*   EOS PCT % 0     BMP  Results from last 7 days   Lab Units 08/07/20  0545   POTASSIUM mmol/L 4 4   CHLORIDE mmol/L 104   CO2 mmol/L 22   BUN mg/dL 22   CREATININE mg/dL 1 20   CALCIUM mg/dL 7 9*     CMP  Results from last 7 days   Lab Units 08/07/20  0545  08/04/20  0507   POTASSIUM mmol/L 4 4   < > 4 4   CHLORIDE mmol/L 104   < > 107   CO2 mmol/L 22   < > 23   BUN mg/dL 22   < > 25   CREATININE mg/dL 1 20   < > 1 42*   CALCIUM mg/dL 7 9*   < > 7 9*   ALK PHOS U/L  --   --  78   ALT U/L  --   --  16   AST U/L  --   --  14    < > = values in this interval not displayed  @Culture@  Lab Results   Component Value Date    BLOODCX No Growth After 5 Days  07/16/2020    BLOODCX No Growth After 5 Days  07/16/2020    BLOODCX No Growth After 5 Days  05/02/2017    BLOODCX No Growth After 5 Days   05/02/2017    URINECX >100,000 cfu/ml Klebsiella oxytoca (A) 02/13/2019    URINECX >100,000 cfu/ml Klebsiella oxytoca (A) 01/07/2019         Current Facility-Administered Medications:     acetaminophen (TYLENOL) tablet 650 mg, 650 mg, Oral, Q6H PRN, Raghu Merida PA-C, 650 mg at 08/04/20 8635    atorvastatin (LIPITOR) tablet 40 mg, 40 mg, Oral, Daily With Eugenia Berger PA-C 40 mg at 08/07/20 1618    heparin (porcine) subcutaneous injection 5,000 Units, 5,000 Units, Subcutaneous, Q8H [address], 5,000 Units at 08/07/20 1504 **AND** [CANCELED] Platelet count, , , Once, Raghu Merida PA-C    HYDROcodone-acetaminophen (NORCO) 5-325 mg per tablet 1 tablet, 1 tablet, Oral, Q6H PRN, Bronson Montoya PA-C, 1 tablet at 08/07/20 1618    LORazepam (ATIVAN) tablet 0 5 mg, 0 5 mg, Oral, Q8H PRN, Dario Anton MD, 0 5 mg at 08/07/20 1618    metoprolol succinate (TOPROL-XL) 24 hr tablet 25 mg, 25 mg, Oral, Daily, Dario Anton MD, 25 mg at 08/07/20 0840    pneumococcal 23-valent polysaccharide vaccine (PNEUMOVAX-23) injection 0 5 mL, 0 5 mL, Subcutaneous, Prior to discharge, Jerrod Varela MD    Imaging: I have personally reviewed pertinent films in PACS  EKG, Pathology, and Other Studies: I have personally reviewed pertinent reports    VTE Pharmacologic Prophylaxis: Heparin  VTE Mechanical Prophylaxis: reason for no mechanical VTE prophylaxis Multiple wounds on lower leg    Shantel Ramsey DPM

## 2020-08-07 NOTE — DISCHARGE INSTR - AVS FIRST PAGE
Wound care/VNA  · Dermagran, ABD, Kerlix, and 6" Ace wrap applied to all wounds right leg every other day per VNA  · Please schedule follow-up appointment with Dr Berkley Braxton at wound care on Wednesday afternoon August 12th    · If you have any questions please contact Dr Berkley Braxton at [phone]

## 2020-08-07 NOTE — DISCHARGE INSTRUCTIONS
Wound care/VNA  · Dermagran, ABD, Kerlix, and 6" Ace wrap applied to all wounds right leg every other day per VNA  · Please schedule follow-up appointment with Dr Marco Angela at wound care on Wednesday afternoon August 12th  · If you have any questions please contact Dr Marco Angela at [phone]    Umbilical wound:  Change wound vac Monday, Wednesday, and Fridays  1 black sponge to fit wound  Wound vac set to 125mmhg continuous suciton  [place] Drain to be emptied daily or more frequently as needed for output, record output volume and color and bring that record to your next office visit  Javi Momin Instructions  Dr Enrique CORADO     1  General: Rafa Seats will feel pulling sensations around the wound and/or aches and pains around the incisions  This is normal  Even minor surgery is a change in your body and this is your bodys way of reaction to it  If you have had abdominal surgery, it may help to support the incision with a small pillow or blanket for comfort when moving or coughing  2  Wound care:   Keep wound vac in place at all times, change sponge every Monday, Wednesday, and Friday  3  Water: You may shower over the wound, unless there are drain tubes left in place  Do not bathe or use a pool or hot tub until cleared by the physician  You may shower right over the staples, glue or Steri-Strips and rinse wound with soapy water but do not scrub incision pat dry when you are done  4  Activity: You may go up and down stairs, walk as much as you are comfortable, but walk at least 3 times each day  If you have had abdominal or hernia surgery, do not lift anything heavier than 15 pounds for at least 2 weeks and nothing more than 25 pounds for weeks 3 and 4, unless cleared by the doctor  5  Diet: You may resume a regular diet  If you had a same-day surgery or overnight stay surgery, you may wish to eat lightly for a few days: soups, crackers, and sandwiches   You may resume a regular diet when ready  6  Medications: Resume all of your previous medications, unless told otherwise by the doctor  Avoid aspirin products for 3-5 days after the date of surgery  You may, at that time, began to take them again  Tylenol and ibuoprofen is always fine, unless you are taking any narcotic pain medication containing Tylenol (such as Percocet, Darvocet, Vicodin, or anything containing acetaminophen)  Do not take Tylenol if you're taking these medications  You do not need to take the narcotic pain medications unless you are having significant pain and discomfort  7  Driving: He will need someone to drive you home on the day of surgery  Do not drive or make any important decisions while on narcotic pain medication or 24 hours and after anesthesia or sedation for surgery  Generally, you may drive when your off all narcotic pain medications  8  Upset Stomach: You may take Maalox, Tums, or similar items for an upset stomach  If your narcotic pain medication causes an upset stomach, do not take it on an empty stomach  Try taking it with at least some crackers or toast      9  Constipation: Patients often experienced constipation after surgery  You may take over-the-counter medication for this, such as Metamucil, Senokot, Dulcolax, milk of magnesia, etc  You may take a suppository unless you have had anorectal surgery such as a procedure on your hemorrhoids  If you experience significant nausea or vomiting after abdominal surgery, call the office before trying any of these medications  10  Call the office: If you are experiencing any of the following, fevers above 101 5°, significant nausea or vomiting, if the wound develops drainage and/or is excessive redness around the wound, or if you have significant diarrhea or other worsening symptoms  11  Pain: You may be given a prescription for pain  This will be given to the hospital, the day of surgery      12  Sexual Activity: You may resume sexual activity when you feel ready and comfortable and your incision is sealed and healed without apparent infection risk      [facility]  Phone: [phone]

## 2020-08-07 NOTE — PROGRESS NOTES
Progress Note - General Surgery   Mile Guillen 69 y o  male MRN: [id number]  Unit/Bed#: -01 Encounter: [id number]    Assessment:  Infected/exposed necrotic umbilical hernia mesh status post excision of mesh and primary repair of hernia with subsequent wound dehiscence  Postop day 3  Status post exploration and repair of umbilical hernia with phasix mesh and application of wound VAC   Postoperative ileus -improved  Urinary retention  PAD with lower extremity ulceration   Severe protein calorie malnutrition    Plan:  Advanced diet  Needs to ambulate with walker  Pain control  Boost supplements  Straight cath p r n  Lower extremity wound care per Podiatry  Discharged home today  [facility] dressing care/management  Back dressing changed today  Discharge home today    Subjective/Objective     Subjective:  Patient is feeling better, tolerates p o  well  Using pain medication with good pain relief  Minimal ambulation  Blood pressure 135/72, pulse 77, temperature 98 2 °F (36 8 °C), resp  rate 18, SpO2 95 %  ,There is no height or weight on file to calculate BMI        Intake/Output Summary (Last 24 hours) at 8/7/2020 0936  Last data filed at 8/7/2020 0601  Gross per 24 hour   Intake 460 ml   Output 1160 ml   Net -700 ml       Invasive Devices     Peripheral Intravenous Line            Peripheral IV 08/03/20 Left Antecubital 3 days          Drain            Closed/Suction Drain Right Abdomen Bulb 10 Fr  3 days    Negative Pressure Wound Therapy (V A C ) Abdomen Mid 3 days                Physical Exam: General appearance: alert and oriented, in no acute distress  Lungs: clear to auscultation bilaterally  Heart: regular rate and rhythm, S1, S2 normal, no murmur, click, rub or gallop  Abdomen: Soft, minimally distended, umbilical wound VAC in place with still intact, drain functioning  to palpation diffusely  Neurologic: Grossly normal    VTE Pharmacologic Prophylaxis: Heparin  VTE Mechanical Prophylaxis: sequential compression device

## 2020-08-08 NOTE — NURSING NOTE
Pt  D/C to home  All belongings secured with patient  Security notified of patients Wallet  Pt  PIV D/C  Pt  With Right LINDEN drain intact and to suction  Pt  Left leg with portable wound VAC intact  Pt  VAC supplies to be sent via  tomorrow as per CM  Pt  Awaiting transport team piclup and is in NAD and no change in previous Assessments

## 2020-08-08 NOTE — PLAN OF CARE
Problem: Prexisting or High Potential for Compromised Skin Integrity  Goal: Skin integrity is maintained or improved  Description: INTERVENTIONS:  - Identify patients at risk for skin breakdown  - Assess and monitor skin integrity  - Assess and monitor nutrition and hydration status  - Monitor labs   - Assess for incontinence   - Turn and reposition patient  - Assist with mobility/ambulation  - Relieve pressure over bony prominences  - Avoid friction and shearing  - Provide appropriate hygiene as needed including keeping skin clean and dry  - Evaluate need for skin moisturizer/barrier cream  - Collaborate with interdisciplinary team   - Patient/family teaching  - Consider wound care consult   Outcome: Adequate for Discharge     Problem: PAIN - ADULT  Goal: Verbalizes/displays adequate comfort level or baseline comfort level  Description: Interventions:  - Encourage patient to monitor pain and request assistance  - Assess pain using appropriate pain scale  - Administer analgesics based on type and severity of pain and evaluate response  - Implement non-pharmacological measures as appropriate and evaluate response  - Consider cultural and social influences on pain and pain management  - Notify physician/advanced practitioner if interventions unsuccessful or patient reports new pain  Outcome: Adequate for Discharge     Problem: INFECTION - ADULT  Goal: Absence or prevention of progression during hospitalization  Description: INTERVENTIONS:  - Assess and monitor for signs and symptoms of infection  - Monitor lab/diagnostic results  - Monitor all insertion sites, i e  indwelling lines, tubes, and drains  - Monitor endotracheal if appropriate and nasal secretions for changes in amount and color  - [place] appropriate cooling/warming therapies per order  - Administer medications as ordered  - Instruct and encourage patient and family to use good hand hygiene technique  - Identify and instruct in appropriate isolation precautions for identified infection/condition  Outcome: Adequate for Discharge     Problem: SAFETY ADULT  Goal: Patient will remain free of falls  Description: INTERVENTIONS:  - Assess patient frequently for physical needs  -  Identify cognitive and physical deficits and behaviors that affect risk of falls    -  [place] fall precautions as indicated by assessment   - Educate patient/family on patient safety including physical limitations  - Instruct patient to call for assistance with activity based on assessment  - Modify environment to reduce risk of injury  - Consider OT/PT consult to assist with strengthening/mobility  Outcome: Adequate for Discharge  Goal: Maintain or return to baseline ADL function  Description: INTERVENTIONS:  -  Assess patient's ability to carry out ADLs; assess patient's baseline for ADL function and identify physical deficits which impact ability to perform ADLs (bathing, care of mouth/teeth, toileting, grooming, dressing, etc )  - Assess/evaluate cause of self-care deficits   - Assess range of motion  - Assess patient's mobility; develop plan if impaired  - Assess patient's need for assistive devices and provide as appropriate  - Encourage maximum independence but intervene and supervise when necessary  - Involve family in performance of ADLs  - Assess for home care needs following discharge   - Consider OT consult to assist with ADL evaluation and planning for discharge  - Provide patient education as appropriate  Outcome: Adequate for Discharge  Goal: Maintain or return mobility status to optimal level  Description: INTERVENTIONS:  - Assess patient's baseline mobility status (ambulation, transfers, stairs, etc )    - Identify cognitive and physical deficits and behaviors that affect mobility  - Identify mobility aids required to assist with transfers and/or ambulation (gait belt, sit-to-stand, lift, walker, cane, etc )  - [place] fall precautions as indicated by assessment  - Record patient progress and toleration of activity level on Mobility SBAR; progress patient to next Phase/Stage  - Instruct patient to call for assistance with activity based on assessment  - Consider rehabilitation consult to assist with strengthening/weightbearing, etc   Outcome: Adequate for Discharge

## 2020-08-09 NOTE — ED NOTES
Drainage bag changed from standard drainage bag to leg bag per patient's request      Rob Davis RN  08/09/20 7869

## 2020-08-09 NOTE — ED NOTES
Called [facility] for patient transportation home  [facility] has no wheel chair van available at this time  Contacted [facility] and they have no wheel chair van available until 1500 on 8/9/2020       Kinza Chan RN  08/09/20 0157

## 2020-08-09 NOTE — ED NOTES
Attempted contacting [facility] to arrange for transport home, but was unsuccessful in reaching staff       Sunday GAGE Doe  08/09/20 3573

## 2020-08-09 NOTE — ED NOTES
Patient informed there is no transportation currently available  Patient requested that a staff member who lives near [place] give him a ride home in the morning  Patient informed that staff does not provide transportation to patients in their personal vehicles       Farzaneh Marcelo RN  08/09/20 2759

## 2020-08-09 NOTE — ED NOTES
Attempted to contact [facility] for transport update, but unable to reach anyone  Will call again       Mark Mars RN  08/09/20 6998

## 2020-08-09 NOTE — ED PROVIDER NOTES
History  Chief Complaint   Patient presents with    Urinary Retention     pt states he was unable to urinate since this morning  68 yo M with complicated PMH (see excerpt from d/c note below) BIBA for evaluation of inability to urinate for about 12 hours  Has had this issue in past as well, typically after surgeries  He was just hospitalized, d/c home 2 days ago for repair of abdominal wound  He reports they have been intermittently straight cath-ing him while in the hospital  It "typically takes a few days" for him to urinate normally again  Has NPWT in place, has VNA  He is eating "ok" no N/V  No fevers  Has some fullness in bladder region, but no new or worse pain since his surgery  He also has RLE chronic wounds, which do cause him pain, but it is manageable, and not new or worse compared to chronic  He has dressings in place on RLE, and is scheduled to see wound care this Wednesday  From d/c note:  53-year-old male with known history of PAD, PVD, lower extremity wounds, CHF, BPH, AFib, anemia, permanent pacemaker, was admitted in the hospital for elective surgery to repair his abdominal wall dehiscence  Patient had a history of ventral hernia status post repair in July and wound dehiscence  History of previously excision of infected mesh  Patient underwent repair of abdominal wall wound dehiscence with mesh, drain placement, VAC placement on 8/3/2020        History provided by:  Patient and medical records   used: No    Difficulty Urinating   Presenting symptoms: no dysuria and no swelling    Relieved by:  Nothing  Worsened by:  Nothing  Ineffective treatments:  None tried  Associated symptoms: urinary retention    Associated symptoms: no abdominal pain, no diarrhea, no fever, no flank pain, no groin pain, no hematuria, no nausea, no penile redness, no penile swelling, no scrotal swelling, no urinary frequency, no urinary incontinence and no vomiting    Risk factors: urinary catheter        Prior to Admission Medications   Prescriptions Last Dose Informant Patient Reported? Taking?    Ascorbic Acid (VITAMIN C PO)  Self Yes No   Sig: Take by mouth   B Complex Vitamins (VITAMIN B COMPLEX PO)  Self Yes No   Sig: Take by mouth   Ferrous Gluconate-C-Folic Acid (IRON-C PO)  Self Yes No   Sig: Take by mouth   Lactobacillus (ACIDOPHILUS PO)  Self Yes No   Sig: Take by mouth   Lycopene 10 MG CAPS  Self Yes No   Sig: Take by mouth   Misc Natural Products (SAW PALMETTO) CAPS  Self Yes No   Sig: Take by mouth   Multiple Vitamin (MULTI-DAY VITAMINS) TABS  Self Yes No   Sig: Take by mouth   acetaminophen (TYLENOL) 325 mg tablet  Self No No   Sig: Take 2 tablets (650 mg total) by mouth every 6 (six) hours as needed for mild pain   amoxicillin-clavulanate (AUGMENTIN) 875-125 mg per tablet  Self No No   Sig: Take 1 tablet by mouth every 12 (twelve) hours for 8 days   aspirin 81 mg chewable tablet  Self No No   Sig: Chew 1 tablet (81 mg total) daily   Patient not taking: Reported on 8/3/2020   atorvastatin (LIPITOR) 40 mg tablet  Self No No   Sig: Take 1 tablet (40 mg total) by mouth daily with dinner   calcium carbonate (TUMS) 500 mg chewable tablet  Self No No   Sig: Chew 1 tablet (500 mg total) daily as needed for indigestion or heartburn   diazepam (VALIUM) 5 mg tablet  Self Yes No   Sig: Take 5 mg by mouth every 12 (twelve) hours as needed for anxiety   folic acid (FOLVITE) 1 mg tablet  Self Yes No   Sig: Take 1 mg by mouth daily   levofloxacin (LEVAQUIN) 750 mg tablet  Self No No   Sig: Take 1 tablet (750 mg total) by mouth every other day for 6 days   lisinopril (ZESTRIL) 5 mg tablet  Self No No   Sig: Take 0 5 tablets (2 5 mg total) by mouth daily   metoprolol succinate (TOPROL-XL) 25 mg 24 hr tablet  Self No No   Sig: Take 1 tablet (25 mg total) by mouth daily   oxyCODONE-acetaminophen (PERCOCET) 5-325 mg per tablet   No No   Sig: Take 1 tablet by mouth every 4 (four) hours as needed for moderate pain for up to 10 daysMax Daily Amount: 6 tablets   pantoprazole (PROTONIX) 40 mg tablet  Self No No   Sig: Take 1 tablet (40 mg total) by mouth daily in the early morning   tamsulosin (FLOMAX) 0 4 mg  Self No No   Sig: Take 1 capsule (0 4 mg total) by mouth daily with dinner for 30 days      Facility-Administered Medications: None       Past Medical History:   Diagnosis Date    Anemia     Atrial fibrillation (HCC)     Benign prostatic hyperplasia without urinary obstruction     Congestive heart failure with left ventricular diastolic dysfunction, chronic (HCC)     History of ITP     Hypertension     Lumbosacral disc herniation     Peripheral vascular disease of lower extremity (HCC)     Renal disorder     Subdural hematoma (HCC)        Past Surgical History:   Procedure Laterality Date    BACK SURGERY      AUSTYN HOLE FOR SUBDURAL HEMATOMA      CARDIAC PACEMAKER PLACEMENT      CLAVICLE SURGERY Left 2011    HERNIA REPAIR      IR ABDOMINAL ANGIOGRAPHY / INTERVENTION  7/20/2020    IR BONE MARROW BIOPSY/ASPIRATION  7/30/2020    LAPAROTOMY N/A 7/21/2020    Procedure: LAPAROTOMY EXPLORATORY, excision of infected mesh and repair of ventral heria; Surgeon: Mariam Mcgee MD;  Location: [facility];  Service: General    MT EXPLORATORY OF ABDOMEN N/A 8/3/2020    Procedure: REPAIR OF ABDOMINAL WALL WOUND/DEHISSENCE with mesh, placement drain, placement vac;  Surgeon: Mariam Mcgee MD;  Location: [facility];  Service: General    PROSTATE SURGERY      VENTRAL HERNIA REPAIR N/A 7/21/2020    Procedure: REPAIR HERNIA VENTRAL - EXCSION OF INFECTED MESH;  Surgeon: Mariam Mcgee MD;  Location: [facility];  Service: General    WOUND DEBRIDEMENT Right 7/24/2020    Procedure: DEBRIDEMENT LOWER EXTREMITY ([facility] OUT);   Surgeon: Erick Caraballo DPM;  Location: [facility];  Service: Podiatry       Family History   Problem Relation Age of Onset    Heart disease Mother     Heart disease Father    Christina Hypertension Father     Diabetes Other     Depression Other     Cancer Sister     Depression Cousin      I have reviewed and agree with the history as documented  E-Cigarette/Vaping    E-Cigarette Use Never User      E-Cigarette/Vaping Substances    Nicotine No     THC No     CBD No     Flavoring No     Other No     Unknown No      Social History     Tobacco Use    Smoking status: Never Smoker    Smokeless tobacco: Never Used    Tobacco comment: per [employer]-Former Smoker and Never Smoker-pls confirm   Substance Use Topics    Alcohol use: Not Currently     Alcohol/week: 0 0 standard drinks     Frequency: Never     Binge frequency: Never     Comment: Denied use    Drug use: Never     Comment: Denied use       Review of Systems   Constitutional: Negative for chills, diaphoresis, fatigue, fever and unexpected weight change  HENT: Negative for congestion, ear pain, rhinorrhea, sore throat, trouble swallowing and voice change  Eyes: Negative for pain and visual disturbance  Respiratory: Negative for cough, chest tightness and shortness of breath  Cardiovascular: Negative for chest pain, palpitations and leg swelling  Gastrointestinal: Negative for abdominal pain, blood in stool, constipation, diarrhea, nausea and vomiting  Genitourinary: Positive for difficulty urinating  Negative for bladder incontinence, dysuria, flank pain, frequency, hematuria, penile swelling and scrotal swelling  Musculoskeletal: Negative for arthralgias, back pain and neck pain  Skin: Positive for wound  Negative for rash  Neurological: Negative for dizziness, syncope, light-headedness and headaches  Psychiatric/Behavioral: Negative for confusion and suicidal ideas  The patient is not nervous/anxious  Physical Exam  Physical Exam  Constitutional:       General: He is not in acute distress  Appearance: He is well-developed  He is ill-appearing (chronically)   He is not toxic-appearing or diaphoretic  Comments: cachectic   HENT:      Head: Normocephalic and atraumatic  Right Ear: External ear normal       Left Ear: External ear normal       Nose: Nose normal    Eyes:      General: No scleral icterus  Right eye: No discharge  Left eye: No discharge  Conjunctiva/sclera: Conjunctivae normal       Pupils: Pupils are equal, round, and reactive to light  Neck:      Musculoskeletal: Normal range of motion and neck supple  Vascular: No JVD  Trachea: No tracheal deviation  Cardiovascular:      Rate and Rhythm: Normal rate and regular rhythm  Heart sounds: Normal heart sounds  No murmur  No friction rub  No gallop  Comments: Pacemaker, no surrounding erythema  Pulmonary:      Effort: Pulmonary effort is normal  No respiratory distress  Breath sounds: Normal breath sounds  No stridor  No wheezing or rales  Chest:      Chest wall: No tenderness  Abdominal:      General: Bowel sounds are normal  There is distension (suprapubic)  Palpations: Abdomen is soft  Tenderness: There is no abdominal tenderness  There is no guarding or rebound  Comments: NPWT in place  abd soft and nontender  Dressings c/d/i  Musculoskeletal: Normal range of motion  General: No tenderness or deformity  Lymphadenopathy:      Cervical: No cervical adenopathy  Skin:     General: Skin is warm and dry  Findings: No rash  Comments: Dressings in place RLE, c/d/i   Neurological:      Mental Status: He is alert and oriented to person, place, and time  Cranial Nerves: No cranial nerve deficit  Sensory: No sensory deficit        Coordination: Coordination normal    Psychiatric:         Behavior: Behavior normal          Vital Signs  ED Triage Vitals [08/08/20 2342]   Temperature Pulse Respirations Blood Pressure SpO2   98 2 °F (36 8 °C) 89 18 140/66 98 %      Temp Source Heart Rate Source Patient Position - Orthostatic VS BP Location FiO2 (%)   Temporal Monitor Lying Right arm --      Pain Score       7           Vitals:    08/08/20 2342   BP: 140/66   Pulse: 89   Patient Position - Orthostatic VS: Lying         Visual Acuity      ED Medications  Medications   LORazepam (ATIVAN) tablet 0 5 mg (0 5 mg Oral Given 8/9/20 0047)       Diagnostic Studies  Results Reviewed     Procedure Component Value Units Date/Time    Urine Microscopic [[id number]]  (Abnormal) Collected:  08/09/20 0042    Lab Status:  Final result Specimen:  Urine, Indwelling Garrison Catheter Updated:  08/09/20 0116     RBC, UA 0-1 /hpf      WBC, UA 0-1 /hpf      Epithelial Cells None Seen /hpf      Bacteria, UA Occasional /hpf     UA w Reflex to Microscopic w Reflex to Culture [[id number]]  (Abnormal) Collected:  08/09/20 0042    Lab Status:  Final result Specimen:  Urine, Indwelling Garrison Catheter Updated:  08/09/20 0115     Color, UA Yellow     Clarity, UA Clear     Specific Barnwell, UA 1 020     pH, UA 6 0     Leukocytes, UA Negative     Nitrite, UA Negative     Protein, UA Trace mg/dl      Glucose, UA Negative mg/dl      Ketones, UA Negative mg/dl      Urobilinogen, UA 1 0 E U /dl      Bilirubin, UA Negative     Blood, UA Negative                 No orders to display              Procedures  Procedures         ED Course  ED Course as of Aug 09 0132   Sun Aug 09, 2020   0041 Pt in no distress  Chronically ill appearing  Retaining approx 1 L urine  Garrison placed in ED        0129 U/a not entirely convincing for UTI, and just finished prolonged course of abx with augmentin and levaquin  Will hold off until culture  Will d/c home with garrison, f/u with surgery, wound care as planned, and urology  RTED instructions given                                                  MDM  Number of Diagnoses or Management Options  Urinary retention: new and requires workup     Amount and/or Complexity of Data Reviewed  Clinical lab tests: ordered and reviewed  Review and summarize past medical records: yes    Risk of Complications, Morbidity, and/or Mortality  Presenting problems: low  Diagnostic procedures: low  Management options: low    Patient Progress  Patient progress: improved        Disposition  Final diagnoses:   Urinary retention     Time reflects when diagnosis was documented in both MDM as applicable and the Disposition within this note     Time User Action Codes Description Comment    8/9/2020  1:30 AM Trinity Emmett Loza [R33 [id number]] Urinary retention       ED Disposition     ED Disposition Condition Date/Time Comment    Discharge Stable Sun Aug 9, 2020  1:30 AM Wil Rudolph discharge to home/self care  Follow-up Information     Follow up With Specialties Details Why Contact Info Additional [address] [facility],  Family Medicine Schedule an appointment as soon as possible for a visit   [address] [facility] [phone]        [address] Emergency Department Emergency Medicine  If symptoms worsen [address] [place], [address]  [phone]  ED, [address] [facility], [address]    [facility] Urology Schedule an appointment as soon as possible for a visit  for follow up of urinary retention and garrison catheter  [address] [address] [address] [facility] [address]  704  [facility] [address], [address] [phone]          Patient's Medications   Discharge Prescriptions    No medications on file     No discharge procedures on file      PDMP Review     None          ED Provider  Electronically Signed by           Vi Belcher MD  08/09/20 6997

## 2020-08-10 NOTE — SOCIAL WORK
Entry for today, 8/10  CM received notification from  service([place])this afternoon  that  service for Saturday morning 8/8/20 to deliver [employer] wound vac box to Pt's residence was cancelled by night supervisor(Ritu) on Friday night(8/9)  CM spoke with dayshift nurse supervisor today, 8/10  Dayshift nurse supervisor informed CM that she TT night supervisor from Friday and was informed that ambulance who picked up Pt also transport [employer] wound vac box to Pt's home on Friday night

## 2020-08-11 NOTE — TELEPHONE ENCOUNTER
Shanitaethan Peterson    ED Visit Information     Ed visit date: 8/8/2020  Diagnosis Description:   Urinary retention     In Network? Yes [address]  Discharge status: Home  Discharged with meds ? No  Number of ED visits to date: 1  ED Severity:N/a     Outreach Information    Outreach successful: Yes 1  Date letter mailed:n/a  Date Finalized:8/11/2020    Care Coordination    Follow up appointment with pcp: no declined  Transportation issues ? NA    [employer] Outreach    Outreach type:  7 Day Outreach  Emergent necessity warranted by diagnosis:  No  [facility] PCP:  Yes  Transportation:  Ambulance Transport  08/11/2020 10:46 AM Phone ([place]) Henok Matos (Self) [phone] (M)   Call Complete  Personal communication with patient regarding recent ED visit on 8/11/2020 for Urinary retention  Patient was discharged without medication and advised to follow up with urology  Patient stated that he has a garrison catheter in place  Patient stated that he is feeling well  He declined to scheduled a follow up appt with pcp at this time  Patient stated that he will schedule a follow up appt with urology  Patient does not meet OPCM  Patient declined other follow up questions

## 2020-08-12 PROBLEM — I70.232 ATHEROSCLEROSIS OF NATIVE ARTERY OF RIGHT LOWER EXTREMITY WITH ULCERATION OF CALF (HCC): Status: ACTIVE | Noted: 2020-01-01

## 2020-08-12 NOTE — PROGRESS NOTES
Patient ID: Bereniec Finley is a 69 y o  male Date of Birth 1945     Chief Complaint  Chief Complaint   Patient presents with    New Patient Visit     Initial visit for right lower leg venous/arterial wounds  Had been in the hospital for leg and abdomen infections,  Dressing and ace wrap intact on right lower leg, reports SL VNA has been coming out three times a week to change VAC and to change the right lower leg dressing  Allergies  Patient has no known allergies  Assessment:    No problem-specific Assessment & Plan notes found for this encounter  Diagnoses and all orders for this visit:    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)  -         Peripheral vascular disease of lower extremity (HCC)  -     lidocaine (XYLOCAINE) 4 % topical solution 5 mL  -     Wound cleansing and dressings; Future  -     Wound compression and edema control; Future    Lower limb ulcer, calf, right, with fat layer exposed ([address] )    Open wound of umbilical region, initial encounter   -managed per general surgery  Atherosclerosis of native artery of right lower extremity with rest pain (HCC)  -     oxyCODONE-acetaminophen (PERCOCET) 5-325 mg per tablet; Take 1 tablet by mouth every 4 (four) hours as needed for moderate pain for up to 10 daysMax Daily Amount: 6 tablets  -     naloxone (NARCAN) 4 mg/0 1 mL nasal spray; Administer 1 spray into a nostril  If breathing does not return to normal or if breathing difficulty resumes after 2-3 minutes, give another dose in the other nostril using a new spray              Procedures    Plan:     Wound 07/17/20 Pretibial Right (Active)       Wound 07/17/20 Foot Anterior;Right (Active)       Wound 08/12/20 Tibial Posterior;Right (Active)       Subjective:           70-year-old white male presents for initial wound care follow-up status post discharge from [facility]   Patient underwent extensive debridement of right leg on 7/24/2020  Patient underwent arteriogram on 7/20/2020 to address 75% blockage of right SFA  Since then patient has been in and out of the hospital due to multiple comorbidities  Currently has VAC dressing placed for abdominal wound  Denies any new problems with his feet  The following portions of the patient's history were reviewed and updated as appropriate: allergies, current medications, past family history, past medical history, past social history, past surgical history and problem list     Review of Systems   Constitutional: Positive for fever  Negative for activity change, appetite change, chills and fatigue  HENT: Negative for congestion, ear pain, hearing loss and nosebleeds  Eyes: Negative for pain, discharge, redness and visual disturbance  Respiratory: Negative for cough, chest tightness and shortness of breath  Cardiovascular: Negative for chest pain, palpitations and leg swelling  Gastrointestinal: Negative for abdominal pain, constipation, nausea and vomiting  Endocrine: Negative for cold intolerance, heat intolerance and polydipsia  Genitourinary: Positive for difficulty urinating  Musculoskeletal: Negative for arthralgias, back pain, gait problem and joint swelling  Skin: Positive for wound (Multiple ulcerations right leg)  Negative for color change, pallor and rash  Allergic/Immunologic: Negative for environmental allergies, food allergies and immunocompromised state  Neurological: Negative for dizziness, weakness, numbness and headaches  Hematological: Negative for adenopathy  Does not bruise/bleed easily  Psychiatric/Behavioral: Negative for agitation, behavioral problems, decreased concentration, hallucinations and suicidal ideas  Objective:        /60   Pulse 68   Temp 98 6 °F (37 °C)   Resp 20   Ht 5' 10" (1 778 m)   Wt 68 kg (150 lb)   BMI 21 52 kg/m²     Physical Exam  HENT:      Head: Normocephalic        Nose: Nose normal  Mouth/Throat:      Mouth: Mucous membranes are moist    Eyes:      Extraocular Movements: Extraocular movements intact  Pupils: Pupils are equal, round, and reactive to light  Neck:      Musculoskeletal: Normal range of motion  Cardiovascular:      Pulses:           Dorsalis pedis pulses are 0 on the right side and 0 on the left side  Posterior tibial pulses are 0 on the right side and 0 on the left side  Abdominal:      Tenderness: There is abdominal tenderness  Hernia: A hernia is present  Comments: Surgical VAC dressing in place status post debridement and exploration of dehisced surgical wound  General surgery currently following  Musculoskeletal:      Right foot: Decreased range of motion  Left foot: Decreased range of motion  Feet:      Right foot:      Protective Sensation: 10 sites tested  10 sites sensed  Toenail Condition: Right toenails are abnormally thick  Fungal disease present  Left foot:      Protective Sensation: 10 sites tested  10 sites sensed  Toenail Condition: Left toenails are abnormally thick  Fungal disease present  Comments: Texture tone and turgor are diminished with moderate atrophic changes noted of bilateral feet  No digital hair noted  Consistent with vascular disease present  Skin:     General: Skin is cool  Capillary Refill: Capillary refill takes 2 to 3 seconds  Coloration: Skin is ashen and pale  Findings: Wound (Right lower leg multiple ulcerations) present  Comments: Multiple large subcutaneous depth ulcerations right leg, all wounds are granular/healthy with no active necrosis noted  Overall good progress  No debridement necessary  Neurological:      Mental Status: He is alert and oriented to person, place, and time  Gait: Gait abnormal (Antalgic)     Psychiatric:         Mood and Affect: Mood normal          Arteriogram 7/20/2020:  · RLE: CFA: Patent, Profunda femoris: Patent SFA: Proximal portion is patent, normal caliber   High-grade (greater than 80%) 1 cm focal stenosis mid SFA                 Moderate grade tandem distal stenoses and in particular 60% stenosis at the abductor canal               Popliteal artery: Patent, noting 60% focal stenosis in the above-the-knee segment  Runoff: Peroneal runoff to the foot with distally reconstituted posterior tibial artery filling of the plantar arch                 Chronically occluded anterior tibial artery   Proximal posterior tibial artery is patent and then occludes  · LLE:               CFA: Patent Profunda femoris: Proximal portion is patent              SFA: Proximal portion is patent  · POST INTERVENTION FINDINGS:              1   Significant luminal gain of multifocal right SFA and popliteal artery stenoses following a combination of 6 mm and 7 mm POBA and DCB  2   Refractory proximal R SFA (30%)  stenosis   Considered stent placement; however, patient was quite uncooperative with movement and asked multiple times for the procedure to be terminated  3   No change in tibial runoff  4   Left common femoral artery access, successfully closed with a Vascade  · IMPRESSION:  Technically successful multifocal balloon angioplasty of multiple stenoses throughout the right superficial femoral and popliteal arteries  Patient has significant right tibial disease with in-line flow into the peroneal artery and distal reconstituted posterior tibial artery   Could consider tibial intervention in the future   Would recommend anesthesia support given patient inability to lay still      Arterial duplex 7/21/2020:  · RIGHT LOWER LIMB              Ankle/Brachial Index: 1 49 which is in the unreliable range              PPG/PVR Tracings are slightly dampened   Biphasic waveforms at the ankle                Metatarsal Pressure 147mmHg              Great Toe Pressure: unable to obtain due to patient's foot movements  · LEFT LOWER LIMB              Ankle/Brachial Index: 1 27 which is in the normal range              PPG/PVR Tracings are slightly dampened   Triphasic waveforms at the ankle  Metatarsal Pressure 73mmHg              Great Toe Pressure: 51mmHg, within the healing range         Wound Instructions:  Orders Placed This Encounter   Procedures    Wound cleansing and dressings     Right foot and right lower leg wounds  Wash your hands with soap and water  Remove old dressing, discard into plastic bag and place in trash  Cleanse the wound with soap and water prior to applying a clean dressing  Do not use tissue or cotton balls  Do not scrub the wound  Pat dry using gauze  Shower no     Apply dermagran gauze to the  wound  Cover with **abds*  Secure with kerlix  Change dressing three times per weekElastic Tubular Stocking    Tubular elastic bandage:  Tubigrip F Apply from base of toes to behind the knee  Apply in AM, may remove for sleep  Avoid prolonged standing in one place  Elevate leg(s) above the level of the heart when sitting or as much as possible  Done today       Standing Status:   Future     Standing Expiration Date:   8/12/2021    Wound compression and edema control     Standing Status:   Future     Standing Expiration Date:   8/12/2021

## 2020-08-12 NOTE — TELEPHONE ENCOUNTER
Patient called  He said that he doesn't wish to schedule a follow up right now  He said that he has enough appts with other providers at this time  He will call us to schedule something when he is finished all his other appts

## 2020-08-12 NOTE — PATIENT INSTRUCTIONS
Orders Placed This Encounter   Procedures    Wound cleansing and dressings     Right foot and right lower leg wounds  Wash your hands with soap and water  Remove old dressing, discard into plastic bag and place in trash  Cleanse the wound with soap and water prior to applying a clean dressing  Do not use tissue or cotton balls  Do not scrub the wound  Pat dry using gauze  Shower no     Apply dermagran gauze to the  wound  Cover with **abds*  Secure with kerlix  Change dressing three times per weekElastic Tubular Stocking    Tubular elastic bandage:  Tubigrip F Apply from base of toes to behind the knee  Apply in AM, may remove for sleep  Avoid prolonged standing in one place  Elevate leg(s) above the level of the heart when sitting or as much as possible  Done today       Standing Status:   Future     Standing Expiration Date:   8/12/2021    Wound compression and edema control     Standing Status:   Future     Standing Expiration Date:   8/12/2021

## 2020-08-12 NOTE — TELEPHONE ENCOUNTER
Left a message to schedule a hospital follow up     ----- Message from Ruby Mujica sent at 7/31/2020 11:48 AM EDT -----  Please schedule patient for [facility] follow up with repeat labs prior to appointment   Thanks

## 2020-08-17 NOTE — TELEPHONE ENCOUNTER
Two day post op call  Spoke to patient  Patient is doing well  Pain well controlled, patient ambulating well  No questions or concerns  Reminded patient of post op appointment and to call if there are any questions or concerns prior to appointment  Told patient we would call on Friday to confirm appointment for Monday

## 2020-08-18 NOTE — TELEPHONE ENCOUNTER
Spoke with the patient and he stated that he will call us to schedule his appointment he does now wish to schedule anything at this time

## 2020-08-21 NOTE — PROGRESS NOTES
Assessment/Plan:   Ryne Wilburn is a 69 y o male who comes in today for postoperative check after multiple abdominal surgeries  Patient was admitted in July for sepsis and found to have a necrotic umbilical wound with exposed mesh from previous umbilical hernia repair  He underwent surgical procedure for explantation of mesh and repair of umbilical hernia  He was also found to have multiple open infected lower extremity wounds  He underwent lower extremity arteriogram with intervention and debridement of these wounds on that admission  Patient was discharged to home and presented for office follow-up any 03/2020  He was found to have abdominal wall dehiscence with exposed bowel  He was taken back to the operating room and underwent repair of abdominal wall wound dehiscence with mesh, placement of drain, VAC dressing  Patient presents today 2 weeks postop  He has been monitored at home by VNA nurses  They have been changing his VAC dressing  He has done nothing with his abdominal drain  States he has been doing well  Complains of some discomfort at umbilical wound site  He denies any fevers or chills  No nausea or vomiting  No difficulty with bowel movements  States his appetite has been poor  He has apparently lost 10 lb since his previous discharge  He is following in the [address] [place] [address] for his leg wounds  Patient's incisional drain was clotted off and removed in the office  VAC dressing was removed  He has a very superficial approximate 4 cm long by 0 5 cm wide by 0 2 cm deep incisional wound that is clean with good granulation tissue  His abdomen is benign with good bowel sounds  His right leg wound boot is in place  His left leg has venous stasis changes without open wounds  At this point the patient does not require wound VAC  I have placed Maxorb dressing with 4 x 4 gauze at the wound site    I have provided instructions for his visiting nurses to continue these dressing changes daily  Patient may shower over the wound site  His drain site should be covered with dry gauze until the skin edges are closed  Otherwise he should eat small frequent high-protein meals for optimal healing  He should use nutritional supplementation to provide extra calories  He should continue his follow-up in the [address] [place] [address] for his lower extremities  He should follow up with vascular surgery regarding his PAD  Patient will return in 2 weeks for re-evaluation  He will call with any questions or concerns prior to that time  HPI:  Angella Pearson is a 69 y o male who comes in today for postoperative check after recent umbilical hernia repair x2 as above  Currently doing well without problems, no fever or chills,no nausea and no vomiting  Reports he continues to have a poor appetite  States he has lost 10 lb since his previous discharge  Otherwise he complains only minimal pain at his umbilical wound site  He states his wound is healing well  Visiting nurses have been changing his VAC dressing  He has an incisional drain in place  He states he was not directed to do anything with this drain  He states it is not draining anything  He is following with the wound care center for his lower extremity wounds  He states he does have follow-up with vascular surgery regarding recent procedure  ROS:  General ROS: negative for - chills, fatigue, fever or night sweats, weight loss  Respiratory ROS: no cough, shortness of breath, or wheezing  Cardiovascular ROS: no chest pain or dyspnea on exertion  Abdomen ROS:  No pain, nausea, vomiting, change in bowel habits  Genito-Urinary ROS: no dysuria, trouble voiding, or hematuria  Musculoskeletal ROS: negative for - gait disturbance, joint pain or muscle pain  Neurological ROS: no TIA or stroke symptoms  Skin:  Incisional and leg wounds    ALLERGIES  Patient has no known allergies      Current Outpatient Medications:    acetaminophen (TYLENOL) 325 mg tablet, Take 2 tablets (650 mg total) by mouth every 6 (six) hours as needed for mild pain, Disp: 30 tablet, Rfl: 0    Ascorbic Acid (VITAMIN C PO), Take by mouth, Disp: , Rfl:     aspirin 81 mg chewable tablet, Chew 1 tablet (81 mg total) daily (Patient not taking: Reported on 8/3/2020), Disp: 30 tablet, Rfl: 0    atorvastatin (LIPITOR) 40 mg tablet, Take 1 tablet (40 mg total) by mouth daily with dinner, Disp: 30 tablet, Rfl: 0    B Complex Vitamins (VITAMIN B COMPLEX PO), Take by mouth, Disp: , Rfl:     calcium carbonate (TUMS) 500 mg chewable tablet, Chew 1 tablet (500 mg total) daily as needed for indigestion or heartburn, Disp: 30 tablet, Rfl: 0    diazepam (VALIUM) 5 mg tablet, Take 5 mg by mouth every 12 (twelve) hours as needed for anxiety, Disp: , Rfl:     Ferrous Gluconate-C-Folic Acid (IRON-C PO), Take by mouth, Disp: , Rfl:     folic acid (FOLVITE) 1 mg tablet, Take 1 mg by mouth daily, Disp: , Rfl:     Lactobacillus (ACIDOPHILUS PO), Take by mouth, Disp: , Rfl:     lisinopril (ZESTRIL) 5 mg tablet, Take 0 5 tablets (2 5 mg total) by mouth daily, Disp: 30 tablet, Rfl: 0    Lycopene 10 MG CAPS, Take by mouth, Disp: , Rfl:     metoprolol succinate (TOPROL-XL) 25 mg 24 hr tablet, Take 1 tablet (25 mg total) by mouth daily, Disp: 30 tablet, Rfl: 0    Misc Natural Products (SAW PALMETTO) CAPS, Take by mouth, Disp: , Rfl:     Multiple Vitamin (MULTI-DAY VITAMINS) TABS, Take by mouth, Disp: , Rfl:     naloxone (NARCAN) 4 mg/0 1 mL nasal spray, Administer 1 spray into a nostril   If breathing does not return to normal or if breathing difficulty resumes after 2-3 minutes, give another dose in the other nostril using a new spray , Disp: 1 each, Rfl: 1    oxyCODONE-acetaminophen (PERCOCET) 5-325 mg per tablet, Take 1 tablet by mouth every 4 (four) hours as needed for moderate pain for up to 10 daysMax Daily Amount: 6 tablets, Disp: 30 tablet, Rfl: 0    pantoprazole (PROTONIX) 40 mg tablet, Take 1 tablet (40 mg total) by mouth daily in the early morning, Disp: 30 tablet, Rfl: 0    tamsulosin (FLOMAX) 0 4 mg, Take 1 capsule (0 4 mg total) by mouth daily with dinner for 30 days, Disp: 30 capsule, Rfl: 0  Past Medical History:   Diagnosis Date    Anemia     Atrial fibrillation (HCC)     Benign prostatic hyperplasia without urinary obstruction     Congestive heart failure with left ventricular diastolic dysfunction, chronic (HCC)     History of ITP     Hypertension     Lumbosacral disc herniation     Peripheral vascular disease of lower extremity (HCC)     Renal disorder     Subdural hematoma (HCC)      Past Surgical History:   Procedure Laterality Date    BACK SURGERY      AUSTYN HOLE FOR SUBDURAL HEMATOMA      CARDIAC PACEMAKER PLACEMENT      CLAVICLE SURGERY Left 2011    HERNIA REPAIR      IR ABDOMINAL ANGIOGRAPHY / INTERVENTION  7/20/2020    IR BONE MARROW BIOPSY/ASPIRATION  7/30/2020    LAPAROTOMY N/A 7/21/2020    Procedure: LAPAROTOMY EXPLORATORY, excision of infected mesh and repair of ventral heria; Surgeon: Slick Fraga MD;  Location: [facility];  Service: General    DC EXPLORATORY OF ABDOMEN N/A 8/3/2020    Procedure: REPAIR OF ABDOMINAL WALL WOUND/DEHISSENCE with mesh, placement drain, placement vac;  Surgeon: Slick Fraga MD;  Location: [facility];  Service: General    PROSTATE SURGERY      VENTRAL HERNIA REPAIR N/A 7/21/2020    Procedure: REPAIR HERNIA VENTRAL - EXCSION OF INFECTED MESH;  Surgeon: Slick Fraga MD;  Location: [facility];  Service: General    WOUND DEBRIDEMENT Right 7/24/2020    Procedure: DEBRIDEMENT LOWER EXTREMITY ([address] OUT);   Surgeon: Yony Arias DPM;  Location: [facility];  Service: Podiatry     Family History   Problem Relation Age of Onset    Heart disease Mother     Heart disease Father     Hypertension Father     Diabetes Other     Depression Other     Cancer Sister     Depression Cousin reports that he has never smoked  He has never used smokeless tobacco  He reports previous alcohol use  He reports that he does not use drugs  PHYSICAL EXAM    Vitals:    08/21/20 0937   BP: 138/84   Pulse: 89   Resp: 16   Temp: (!) 97 2 °F (36 2 °C)       General:  Thin, cachectic male who appears older than stated age, cooperative, no distress  Abdominal:  Flat, soft, nontender, active bowel sounds  COR:  Regular rate and rhythm  Resp:  Distant breath sounds, clear, no wheezes  Incision:  Midline incision healing well, clean with good granulation tissue  Measures approximately 4 x 0 5 x 0 2 cm  No surrounding erythema, induration, purulent drainage, or other signs of wound infection  LE:  Right and a boot in place    Left lower extremity with venous stasis changes, no open wounds    Fleet More GERRI Bolanos

## 2020-08-21 NOTE — PATIENT INSTRUCTIONS
Wound care    Incisional drain has been removed  It was clotted off  Cover drain site with dry gauze until there is no drainage and skin is closed  This dressing should be changed daily  VAC dressing has been discontinued  Patient should continue daily wound care with Maxorb and dry sterile 4 x 4 dressing  Cut a small piece of Maxorb to fit an open wound  Cover with 4 x 4 dressing  This should be drainage daily  Patient can shower over the wound in between dressing changes  Wound VAC should be sent back to company per company instructions  Also monitor patient's weight  States he has lost 10 lb  Instructed him to eat small frequent high-protein meals and to use nutritional supplementation for adequate nutrition  Explained that nutritional will with wound healing  Patient should continue to refrain from strenuous exercise or lifting anything greater than 20 lbs  Patient should continue wound care follow-up for leg wounds  He should continue follow-up with vascular surgery for arterial disease  Plan discussed with patient  All questions answered  Please call the office with any questions or concerns

## 2020-08-24 NOTE — TELEPHONE ENCOUNTER
lisinopril (ZESTRIL) 5 mg tablet [[id number]    Patient called, he needs Rx sent to the [facility] in [place]  Thank you  He had it filled in the hospital, but hasn't had an opportunity to f/u up with us yet, do to other doctor appts

## 2020-08-26 NOTE — PATIENT INSTRUCTIONS
Orders Placed This Encounter   Procedures    Wound cleansing and dressings     Right lower leg wounds and dorsal foot    Wash your hands with soap and water  Remove old dressing, discard into plastic bag and place in trash  Cleanse the wound with NSS prior to applying a clean dressing  Do not use tissue or cotton balls  Do not scrub the wound  Pat dry using gauze  Shower with protection  Apply Dermagran gauze to the wound  Cover with ABD  Secure with roll gauze and tape  Change dressing 3 times weekly  This was done today  Standing Status:   Future     Standing Expiration Date:   8/26/2021    Wound compression and edema control     Elastic Tubular Stocking Size F Right Leg    Tubular elastic bandage: Apply from base of toes to behind the knee  Apply in AM, may remove for sleep  Avoid prolonged standing in one place  Elevate leg(s) above the level of the heart when sitting or as much as possible       Standing Status:   Future     Standing Expiration Date:   8/26/2021    Wound home care     Continue VNA 2-3 times weekly     Standing Status:   Future     Standing Expiration Date:   8/26/2021

## 2020-08-26 NOTE — PROGRESS NOTES
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)  -     lidocaine (XYLOCAINE) 4 % topical solution 5 mL  - continue with Dermagran gauze, ABD, and Tubigrip F every other day  -     Wound cleansing and dressings; Future  -     Wound compression and edema control; Future  -     Wound home care; Future  - depending on progress will consider referral to plastic surgery for possible split-thickness skin graft  - continue with VNA at home doing bandage changes 2-3 times per week    2  Atherosclerosis of native artery of right lower extremity with rest pain [facility])           - recommend patient follow up with vascular surgery as scheduled  - (PERCOCET) 5-325 mg per tablet; Take 1 tablet po QD at bedtime, Max Daily Amount: 1 tablet    3  Chronic thrombocytopenia   - follow-up with hematology Dr Ramiro Hernandez as instructed to review bone marrow biopsy findings          Procedures         Wound 07/17/20 Pretibial Distal;Right (Active)       Wound 07/17/20 Foot Anterior;Right (Active)       Wound 08/12/20 Tibial Posterior;Right (Active)       Subjective:       28-year-old white male presents for follow-up evaluation of multiple ulcerations right leg  Relates good progress with his leg wounds and with his abdominal wound having the wound VAC removed and is now packing at home  Denies any fever shortness of breath        The following portions of the patient's history were reviewed and updated as appropriate: allergies, current medications, past family history, past medical history, past social history, past surgical history and problem list     Review of Systems   Constitutional: Negative for activity change, appetite change, chills and fatigue  HENT: Negative for congestion, ear pain, hearing loss and nosebleeds  Eyes: Negative for pain, discharge, redness and visual disturbance  Respiratory: Negative for cough, chest tightness and shortness of breath  Cardiovascular: Negative for chest pain, palpitations and leg swelling  Gastrointestinal: Negative for abdominal pain, constipation, nausea and vomiting  Abdominal wound managed per general surgery  Endocrine: Negative for cold intolerance, heat intolerance and polydipsia  Genitourinary: Negative for difficulty urinating  Musculoskeletal: Negative for arthralgias, back pain, gait problem and joint swelling  Skin: Positive for wound (Multiple wounds/ulcers right leg)  Negative for color change, pallor and rash  Allergic/Immunologic: Negative for environmental allergies, food allergies and immunocompromised state  Neurological: Negative for dizziness, weakness, numbness and headaches  Hematological: Negative for adenopathy  Does not bruise/bleed easily  Psychiatric/Behavioral: Negative for agitation, behavioral problems, confusion, decreased concentration, hallucinations and suicidal ideas  Objective:       Wound 07/17/20 Pretibial Distal;Right (Active)   Wound Image Images linked 08/26/20 1440   Wound Description Beefy red;Drainage; Epithelialization;Slough 08/26/20 1440   Donna-wound Assessment Intact; Hyperpigmented 08/26/20 1440   Wound Length (cm) 15 cm 08/26/20 1440   Wound Width (cm) 12 5 cm 08/26/20 1440   Wound Depth (cm) 0 3 cm 08/26/20 1440   Wound Surface Area (cm^2) 187 5 cm^2 08/26/20 1440   Wound Volume (cm^3) 56 25 cm^3 08/26/20 1440   Calculated Wound Volume (cm^3) 56 25 cm^3 08/26/20 1440   Change in Wound Size % 6 95 08/26/20 1440   Drainage Amount Large 08/26/20 1440   Drainage Description Serosanguineous 08/26/20 1440   Non-staged Wound Description Full thickness 08/26/20 1440       Wound 07/17/20 Foot Anterior;Right (Active)   Wound Image Images linked 08/26/20 1441   Wound Description Pink;Slough 08/26/20 1441   Donna-wound Assessment Intact 08/26/20 1441   Wound Length (cm) 0 7 cm 08/26/20 1441   Wound Width (cm) 0 3 cm 08/26/20 1441   Wound Depth (cm) 0 2 cm 08/26/20 1441   Wound Surface Area (cm^2) 0 21 cm^2 08/26/20 1441   Wound Volume (cm^3) 0 04 cm^3 08/26/20 1441   Calculated Wound Volume (cm^3) 0 04 cm^3 08/26/20 1441   Change in Wound Size % 60 08/26/20 1441   Drainage Amount Small 08/26/20 1441   Drainage Description Serous 08/26/20 1441   Non-staged Wound Description Full thickness 08/26/20 1441   Dressing Status Intact 08/26/20 1441       Wound 08/12/20 Tibial Posterior;Right (Active)   Wound Image Images linked 08/26/20 1442   Wound Description Granulation tissue 08/26/20 1442   Donna-wound Assessment Dry;Hyperpigmented 08/26/20 1442   Wound Length (cm) 4 cm 08/26/20 1442   Wound Width (cm) 2 8 cm 08/26/20 1442   Wound Depth (cm) 0 1 cm 08/26/20 1442   Wound Surface Area (cm^2) 11 2 cm^2 08/26/20 1442   Wound Volume (cm^3) 1 12 cm^3 08/26/20 1442   Calculated Wound Volume (cm^3) 1 12 cm^3 08/26/20 1442   Change in Wound Size % 84 44 08/26/20 1442   Non-staged Wound Description Full thickness 08/26/20 1442   Dressing Status Intact 08/26/20 1442       /58 (BP Location: Left arm)   Pulse 80   Temp 98 5 °F (36 9 °C) (Temporal)   Resp 20     Physical Exam  Constitutional:       Appearance: Normal appearance  HENT:      Head: Normocephalic  Right Ear: Tympanic membrane normal       Left Ear: Tympanic membrane normal       Nose: No congestion  Mouth/Throat:      Mouth: Mucous membranes are moist       Pharynx: No oropharyngeal exudate or posterior oropharyngeal erythema  Eyes:      Extraocular Movements: Extraocular movements intact  Conjunctiva/sclera: Conjunctivae normal       Pupils: Pupils are equal, round, and reactive to light  Cardiovascular:      Rate and Rhythm: Normal rate and regular rhythm        Pulses: Normal pulses  Pulmonary:      Effort: Pulmonary effort is normal    Abdominal:      Palpations: Abdomen is soft  Comments: Abdominal wound with surgical dressing intact  VAC dressing has been discontinued  Skin:     General: Skin is warm and dry  Capillary Refill: Capillary refill takes 2 to 3 seconds  Coloration: Skin is not pale  Findings: No bruising, erythema, lesion or rash  Comments: Right leg:  Multiple ulcerations right leg, subcutaneous depth, % granular, view skin noted forming around the periphery, no evidence of infection, moderate serosanguineous drainage noted  Continues to have pain at night in his leg which is relieved somewhat with hanging his leg over the edge of the bed  Previously underwent arteriogram with intervention of 75% blockage of his SFA  Neurological:      General: No focal deficit present  Mental Status: He is alert  Cranial Nerves: No cranial nerve deficit  Sensory: No sensory deficit  Motor: No weakness  Gait: Gait normal       Deep Tendon Reflexes: Reflexes normal    Psychiatric:         Mood and Affect: Mood normal          Behavior: Behavior normal          Judgment: Judgment normal      Vascular:    Arteriogram performed on 7/20/2020   POST INTERVENTION FINDINGS:  1  Significant luminal gain of multifocal right SFA and popliteal artery stenoses following a combination of 6 mm and 7 mm POBA and DCB  2   Refractory proximal R SFA (30%)  stenosis  Considered stent placement; however, patient was quite uncooperative with movement and asked multiple times for the procedure to be terminated  3   No change in tibial runoff  4   Left common femoral artery access, successfully closed with a Vascade      IMPRESSION:  Technically successful multifocal balloon angioplasty of multiple stenoses throughout the right superficial femoral and popliteal arteries    Patient has significant right tibial disease with in-line flow into the peroneal artery and distal reconstituted posterior tibial artery  Could consider tibial intervention in the future  Would recommend anesthesia support given patient inability to lay still  Wound Instructions:  Orders Placed This Encounter   Procedures    Wound cleansing and dressings     Right lower leg wounds and dorsal foot    Wash your hands with soap and water  Remove old dressing, discard into plastic bag and place in trash  Cleanse the wound with NSS prior to applying a clean dressing  Do not use tissue or cotton balls  Do not scrub the wound  Pat dry using gauze  Shower with protection  Apply Dermagran gauze to the wound  Cover with ABD  Secure with roll gauze and tape  Change dressing 3 times weekly  This was done today  Standing Status:   Future     Standing Expiration Date:   8/26/2021    Wound compression and edema control     Elastic Tubular Stocking Size F Right Leg    Tubular elastic bandage: Apply from base of toes to behind the knee  Apply in AM, may remove for sleep  Avoid prolonged standing in one place  Elevate leg(s) above the level of the heart when sitting or as much as possible       Standing Status:   Future     Standing Expiration Date:   8/26/2021    Wound home care     Continue VNA 2-3 times weekly     Standing Status:   Future     Standing Expiration Date:   8/26/2021

## 2020-08-26 NOTE — TELEPHONE ENCOUNTER
Called patient to schedule EVELIN and return office visit and got no answer unable to leave VM sending letter in the mail to address on file

## 2020-08-27 NOTE — TELEPHONE ENCOUNTER
Triage New patient  S/P Hospitalization/ER Urinary Retention  Previous seen with Dr Humble Jerome in 2019  [facility] for testing  Prefers the [facility] office  Please call at [phone]  Thank you

## 2020-08-28 NOTE — TELEPHONE ENCOUNTER
Patient has seen Dr Gianni Forrester and Dr Nicole Crenshaw in the past Patient was having difficulty with urination  Nba Mclaughlin He was taught [facility] 2/4/19    Scheduled for CI teaching again and garrison removal on 9/9 as he has another doctors appointment  around the same time and transportation is difficult

## 2020-09-09 PROBLEM — I70.221 ATHEROSCLEROSIS OF NATIVE ARTERY OF RIGHT LOWER EXTREMITY WITH REST PAIN (HCC): Status: ACTIVE | Noted: 2020-01-01

## 2020-09-09 PROBLEM — L89.610 PRESSURE INJURY OF RIGHT HEEL, UNSTAGEABLE (HCC): Status: ACTIVE | Noted: 2020-01-01

## 2020-09-09 NOTE — TELEPHONE ENCOUNTER
Patient has not been seen since 2019  Thirty days with 1 refill sent to his pharmacy, however will need to be scheduled for follow-up for reassessment  Thank you

## 2020-09-09 NOTE — TELEPHONE ENCOUNTER
Called and [facility] for the patient to call us back so we can reschedule his appt

## 2020-09-09 NOTE — PROGRESS NOTES
9/9/2020    Dearl Juan is a 69 y o  male  [id number]    Diagnosis:  Chief Complaint     Urinary Retention; cic teaching          Patient presents for garrison removal and Clean intermittent catheterization teaching managed by Dr Lara Blow:  · Patient provided with catheter samples and a copy of written instructions  · Patient will catheterize 3 x daily or every 6 hours  · Catheter ordered submitted today to [facility] for size 14F  · Patient will Follow up 4 months as transportation is an issue and he just had foot surgery  · Patient knows to call the office with any concerns, problems with supplies or difficulty passing catheter  ·        Vitals:    09/09/20 1259   BP: 90/60   BP Location: Right arm   Patient Position: Sitting   Pulse: 60   Temp: (!) 97 3 °F (36 3 °C)   TempSrc: Temporal   Weight: 60 kg (132 lb 4 8 oz)   Height: 5' 10" (1 778 m)         Teaching:    Patient was given instructions and then was able to perform CIC  He was seen By dr Piotr Lee in the past and Dr Breanne Abreu and had done CIC  He was able to demonstrated well after 18f catheter was removed    Patient was given 14f and 16 f catheter to trial    Karen Crowder RN

## 2020-09-09 NOTE — TELEPHONE ENCOUNTER
Patient is calling in to inform that he will not be able to make his hosp follow up appt on 09/11 with Dr Davies due to transportation issues

## 2020-09-09 NOTE — PATIENT INSTRUCTIONS
Orders Placed This Encounter   Procedures    Wound cleansing and dressings     Wash your hands with soap and water  Remove old dressing, discard into plastic bag and place in trash  Cleanse the wound with normal saline prior to applying a clean dressing  Do not use tissue or cotton balls  Do not scrub the wound  Pat dry using gauze  Apply dermagran to the leg wounds  Cover with ABD  Secure with kristin and tape  Change dressing 3x week  Protect right heel with dry sterile dressing  These treatments were completed today in wound center  Standing Status:   Future     Standing Expiration Date:   9/9/2021    Wound compression and edema control     Elastic Tubular Stocking size F    Tubular elastic bandage: Apply from base of toes to behind the knee  Apply in AM, may remove for sleep  Avoid prolonged standing in one place  Elevate leg(s) above the level of the heart when sitting or as much as possible  Standing Status:   Future     Standing Expiration Date:   9/9/2021    Wound off loading     Off-loading Instructions:    Wear off-loading device as directed by your physician  Put on immediately when rising in the morning and remove when going to bed  Keep pressure off right heel    Elevate heel off bed with pillows     Standing Status:   Future     Standing Expiration Date:   9/9/2021

## 2020-09-10 NOTE — TELEPHONE ENCOUNTER
Received a phone call from [address] reporting the patient catheterized himself approximately 1 hour ago and was noted to have hematuria after removal of catheter  Reports that currently the hematuria is mostly resolved and is currently wearing a pad  He denies any pain or discomfort  He denies sensation of fullness in his bladder  Advised health call nurse to advise patient to report to the nearest emergency department for increasing hematuria, clots, inability to urinate or reintroduced catheter into bladder with self catheterization  Clinical team, please call patient to check on status of hematuria and patient is status in a m     Thank you

## 2020-09-10 NOTE — TELEPHONE ENCOUNTER
Called and LM for Prachi to see if his bleeding had resolved  Explained that because he has BPH he may have some bleeding

## 2020-09-10 NOTE — TELEPHONE ENCOUNTER
Spoke with Joanne and he stated that the bleeding has resolved and he is hydrating with water  [facility] called and he will ask for samples as he is doing CIC

## 2020-09-10 NOTE — TELEPHONE ENCOUNTER
Discussed with amelia about follow up and if patient is doing well with with CIC he can keep appointment in January and will fill flomax as needed

## 2020-09-10 NOTE — TELEPHONE ENCOUNTER
Duplicate encounter  See encounter from [address] 9/9/2020  Will contact patient today to see how he is doing

## 2020-09-10 NOTE — TELEPHONE ENCOUNTER
Regarding: bleeding from penis  ----- Message from [facility] sent at 9/9/2020  8:12 PM EDT -----  " My penis is bleeding from catheter being inserted   My blood is dripping out"

## 2020-09-10 NOTE — TELEPHONE ENCOUNTER
Reason for Disposition   Bleeding around catheter (e g , from penis or female urethra)    Additional Information   Negative: Patient sounds very sick or weak to the triager    Answer Assessment - Initial Assessment Questions  1  SYMPTOMS: "What symptoms are you concerned about?"      Patient self catheterized first time today since his garrison catheter was removed today  Patient stated that instead of urine, the pure red blood was coming out  Patient did the self catheterization at [address]  Today   2  ONSET:  "When did the symptoms start?"      At 1930 today   3  FEVER: "Is there a fever?" If so, ask: "What is the temperature, how was it measured, and when did it start?"      No   4  ABDOMINAL PAIN: "Is there any abdominal pain?" (e g , Scale 1-10; or mild, moderate, severe)      Patient denies abdominal pain   5  URINE COLOR: "What color is the urine?"  "Is there blood present in the urine?" (e g , clear, yellow, cloudy, tea-colored, blood streaks, bright red)      No urine since the garrison catheter was removed at 1330 today   7   OTHER SYMPTOMS: "Do you have any other symptoms?" (e g , back pain, bad urine odor)        Patient denies any other symptoms    Protocols used: URINARY CATHETER SYMPTOMS AND QUESTIONS-ADULT-AH

## 2020-09-10 NOTE — TELEPHONE ENCOUNTER
Per LEXA Corea, if patient is unable to urinate, or catheterize himself in order to urinate or having blood clots, patient has to go to ER  If able to pass urine and to empty the bladder tonight, to call urology office in a morning to follow up  Patient is agreeable to advise, verbalized understanding

## 2020-09-11 NOTE — PROGRESS NOTES
Medication sent to wrong pharmacy in [facility] in [place], resent to correct pharmacy [facility]"

## 2020-09-11 NOTE — PATIENT INSTRUCTIONS
Incision site is healing well  There was approximate 1 x 0 5 superficial area with granulation tissue that remains unhealed  Today we removed some of the granulation tissue with silver nitrate  This skin will slough off  The remaining incisional wound should be covered with silver gel ointment (can be obtained at any pharmacy over the counter, you may need to asked the pharmacist for assistance) daily and dry sterile dressing  Otherwise shower daily over the wound area with soap and water  Return in 2 weeks for re-evaluation  Feel that your wound should be fully healed at that time  Continue follow-up in the [address] [place] [address] for leg wounds  Continue small frequent meals with high-protein for continued healing and improved nutrition

## 2020-09-23 NOTE — PATIENT INSTRUCTIONS
Orders Placed This Encounter   Procedures    Wound cleansing and dressings     Right lower leg and foot wound    Wash your hands with soap and water  Remove old dressing, discard into plastic bag and place in trash  Cleanse the wound with dakins with each change prior to applying a clean dressing  Do not use tissue or cotton balls  Do not scrub the wound  Pat dry using gauze  Shower yes     Apply maxsorb to the  wound  Cover with abds  Secure with kerlix and tape  Change dressing three times per week  Elastic Tubular Stocking F    Tubular elastic bandage: Apply from base of toes to behind the knee  Apply in AM, may remove for sleep  Avoid prolonged standing in one place  Elevate leg(s) above the level of the heart when sitting or as much as possible     Done today     Standing Status:   Future     Standing Expiration Date:   9/23/2021

## 2020-09-23 NOTE — PROGRESS NOTES
Patient ID: Payam Nance is a 69 y o  male Date of Birth 1945     Chief Complaint  Chief Complaint   Patient presents with    Follow Up Wound Care Visit     Right lower leg wounds, dressing intact on arrival        Allergies  Patient has no known allergies  Assessment/Plan:    1  Lower limb ulcer, calf, right, with fat layer exposed ([address] )  -     Wound cleansing and dressings  - discontinue Dermagran, initiate Maxorb Ag every other day with ABDs and Tubigrip  -     Lidocaine (XYLOCAINE) 4 % topical solution 5 mL  -     Wound culture and Gram stain  -     levofloxacin (LEVAQUIN) 500 mg tablet; Take 1 tablet (500 mg total) by mouth every 24 hours for 7 days  -     sodium hypochlorite (DAKIN'S HALF-STRENGTH) external solution; Apply 1 application topically once for 1 dose Cleanse leg/wounds with each bandage change    2  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 verbal  Consent given by: patient  Risks discussed?  procedural risks discussed  Immediately prior to the procedure a time out was called  Performed by: physician  Debridement type: surgical  Level of debridement: subcutaneous tissue  Pain control: lidocaine 4%  Post-debridement measurements  Length (cm): 12 6  Width (cm): 6 2  Depth (cm): 0 2  Percent debrided: 100%  Surface Area (cm^2): 78 12  Area debrided (cm^2): 78 12  Volume (cm^3): 15 62  Tissue and other material debrided: subcutaneous tissue  Devitalized tissue debrided: biofilm, fibrin and slough  Instrument(s) utilized: curette  Bleeding: medium  Hemostasis obtained with: pressure  Procedural pain (0-10): 3  Post-procedural pain: 3   Response to treatment: procedure was tolerated well           Wound 07/17/20 Pretibial Distal;Right (Active)   Wound Image Images linked 09/23/20 1529   Wound Length (cm) 12 cm 09/23/20 1528   Wound Width (cm) 6 2 cm 09/23/20 1528   Wound Depth (cm) 0 1 cm 09/23/20 1528   Wound Surface Area (cm^2) 74 4 cm^2 09/23/20 1528   Wound Volume (cm^3) 7 44 cm^3 09/23/20 1528   Calculated Wound Volume (cm^3) 7 44 cm^3 09/23/20 1528   Change in Wound Size % 87 69 09/23/20 1528   Drainage Amount Moderate 09/23/20 1528   Non-staged Wound Description Full thickness 09/23/20 1528   Dressing Status Intact 09/23/20 1528       Wound 08/12/20 Tibial Posterior;Right (Active)   Wound Image Images linked 09/23/20 1532   Wound Description Beefy red 09/23/20 1532   Wound Length (cm) 2 cm 09/23/20 1532   Wound Width (cm) 2 cm 09/23/20 1532   Wound Depth (cm) 0 1 cm 09/23/20 1532   Wound Surface Area (cm^2) 4 cm^2 09/23/20 1532   Wound Volume (cm^3) 0 4 cm^3 09/23/20 1532   Calculated Wound Volume (cm^3) 0 4 cm^3 09/23/20 1532   Change in Wound Size % 94 44 09/23/20 1532   Drainage Amount Large 09/23/20 1532   Non-staged Wound Description Full thickness 09/23/20 1532   Dressing Status Intact 09/23/20 1532       Wound 09/09/20 Pressure Injury Heel Right (Active)   Wound Image Images linked 09/23/20 1533   Wound Description Pink 09/23/20 1533   Donna-wound Assessment Intact 09/23/20 1533   Wound Length (cm) 2 1 cm 09/23/20 1533   Wound Width (cm) 1 cm 09/23/20 1533   Wound Depth (cm) 0 1 cm 09/23/20 1533   Wound Surface Area (cm^2) 2 1 cm^2 09/23/20 1533   Wound Volume (cm^3) 0 21 cm^3 09/23/20 1533   Calculated Wound Volume (cm^3) 0 21 cm^3 09/23/20 1533   Drainage Amount Moderate 09/23/20 1533   Drainage Description Serosanguineous 09/23/20 1533   Non-staged Wound Description Full thickness 09/23/20 1533   Dressing Status Intact 09/23/20 1533       Subjective:           82-year-old white male presents for follow-up evaluation of multiple ulcerations right leg and new pressure wound on back of heel    Relates his leg wounds are now draining more soaking through his bandages  Reports that the previously noted scab on the back of his heel has fallen off and it looks good  Denies any fever shortness of breath  Denies any new pain        The following portions of the patient's history were reviewed and updated as appropriate: allergies, current medications, past family history, past medical history, past social history, past surgical history and problem list     Review of Systems      Objective:       Wound 07/17/20 Pretibial Distal;Right (Active)   Wound Image Images linked 09/23/20 1529   Wound Length (cm) 12 cm 09/23/20 1528   Wound Width (cm) 6 2 cm 09/23/20 1528   Wound Depth (cm) 0 1 cm 09/23/20 1528   Wound Surface Area (cm^2) 74 4 cm^2 09/23/20 1528   Wound Volume (cm^3) 7 44 cm^3 09/23/20 1528   Calculated Wound Volume (cm^3) 7 44 cm^3 09/23/20 1528   Change in Wound Size % 87 69 09/23/20 1528   Drainage Amount Moderate 09/23/20 1528   Non-staged Wound Description Full thickness 09/23/20 1528   Dressing Status Intact 09/23/20 1528       Wound 08/12/20 Tibial Posterior;Right (Active)   Wound Image Images linked 09/23/20 1532   Wound Description Beefy red 09/23/20 1532   Wound Length (cm) 2 cm 09/23/20 1532   Wound Width (cm) 2 cm 09/23/20 1532   Wound Depth (cm) 0 1 cm 09/23/20 1532   Wound Surface Area (cm^2) 4 cm^2 09/23/20 1532   Wound Volume (cm^3) 0 4 cm^3 09/23/20 1532   Calculated Wound Volume (cm^3) 0 4 cm^3 09/23/20 1532   Change in Wound Size % 94 44 09/23/20 1532   Drainage Amount Large 09/23/20 1532   Non-staged Wound Description Full thickness 09/23/20 1532   Dressing Status Intact 09/23/20 1532       Wound 09/09/20 Pressure Injury Heel Right (Active)   Wound Image Images linked 09/23/20 1533   Wound Description Pink 09/23/20 1533   Donna-wound Assessment Intact 09/23/20 1533   Wound Length (cm) 2 1 cm 09/23/20 1533   Wound Width (cm) 1 cm 09/23/20 1533   Wound Depth (cm) 0 1 cm 09/23/20 1533   Wound Surface Area (cm^2) 2 1 cm^2 09/23/20 1533   Wound Volume (cm^3) 0 21 cm^3 09/23/20 1533   Calculated Wound Volume (cm^3) 0 21 cm^3 09/23/20 1533   Drainage Amount Moderate 09/23/20 1533   Drainage Description Serosanguineous 09/23/20 1533   Non-staged Wound Description Full thickness 09/23/20 1533   Dressing Status Intact 09/23/20 1533       /60 (BP Location: Left arm, Patient Position: Sitting, Cuff Size: Adult)   Pulse 56   Temp 98 1 °F (36 7 °C) (Temporal)   Resp 16     Physical Exam  Constitutional:       Appearance: Normal appearance  HENT:      Head: Normocephalic  Right Ear: Tympanic membrane normal       Left Ear: Tympanic membrane normal       Nose: No congestion  Mouth/Throat:      Mouth: Mucous membranes are moist       Pharynx: No oropharyngeal exudate or posterior oropharyngeal erythema  Eyes:      Extraocular Movements: Extraocular movements intact  Conjunctiva/sclera: Conjunctivae normal       Pupils: Pupils are equal, round, and reactive to light  Cardiovascular:      Rate and Rhythm: Normal rate and regular rhythm  Pulses:           Dorsalis pedis pulses are 1+ on the right side and 1+ on the left side  Posterior tibial pulses are 2+ on the right side and 2+ on the left side  Pulmonary:      Effort: Pulmonary effort is normal    Abdominal:      Palpations: Abdomen is soft  Comments: Abdominal wound with surgical dressing intact  VAC dressing has been discontinued  Musculoskeletal:      Right foot: Decreased range of motion  No deformity, bunion, Charcot foot, foot drop or prominent metatarsal heads  Left foot: Decreased range of motion  No deformity, bunion, Charcot foot, foot drop or prominent metatarsal heads  Feet:    Feet:      Right foot:      Protective Sensation: 10 sites tested  10 sites sensed        Skin integrity: Ulcer (Stable eschar posterior right heel is less painful, portion of eschar she had over the past 2 weeks, no drainage, margins are lifting, no erythema, no evidence of infection, overall wound is smaller and improved ), skin breakdown and dry skin present  No blister, erythema, warmth, callus or fissure  Toenail Condition: Right toenails are abnormally thick  Fungal disease present  Left foot:      Protective Sensation: 10 sites tested  10 sites sensed  Skin integrity: Dry skin present  No ulcer, blister, skin breakdown, erythema, warmth, callus or fissure  Toenail Condition: Left toenails are abnormally thick  Fungal disease present  Skin:     General: Skin is warm and dry  Capillary Refill: Capillary refill takes 2 to 3 seconds  Coloration: Skin is not pale  Findings: No bruising, erythema, lesion or rash  Comments: Right leg:  Multiple ulcerations right leg, subcutaneous depth, 80% granular, increasing new skin noted forming around the periphery with significant reduction in size on the posterior wound, anterior wounds are showing signs of increasing slough and necrosis with significantly increased drainage, no evidence of infection(calor and erythema absent), moderate/heavy serosanguineous drainage noted  Less pain at night in his leg which is relieved somewhat with hanging his leg over the edge of the bed  Previously underwent arteriogram with intervention of 75% blockage of his SFA  Neurological:      General: No focal deficit present  Mental Status: He is alert  Cranial Nerves: No cranial nerve deficit  Sensory: No sensory deficit  Motor: No weakness  Gait: Gait normal       Deep Tendon Reflexes: Reflexes normal    Psychiatric:         Mood and Affect: Mood normal          Behavior: Behavior normal          Judgment: Judgment normal            Wound Instructions:  Orders Placed This Encounter   Procedures    Wound cleansing and dressings     Right lower leg and foot wound    Wash your hands with soap and water  Remove old dressing, discard into plastic bag and place in trash    Cleanse the wound with dakins with each change prior to applying a clean dressing  Do not use tissue or cotton balls  Do not scrub the wound  Pat dry using gauze  Shower yes     Apply maxsorb to the  wound  Cover with abds  Secure with kerlix and tape  Change dressing three times per week  Elastic Tubular Stocking F    Tubular elastic bandage: Apply from base of toes to behind the knee  Apply in AM, may remove for sleep  Avoid prolonged standing in one place  Elevate leg(s) above the level of the heart when sitting or as much as possible     Done today     Standing Status:   Future     Standing Expiration Date:   9/23/2021    Wound culture and Gram stain

## 2020-10-14 PROBLEM — S81.802A OPEN WOUND OF LEFT LOWER LEG: Status: ACTIVE | Noted: 2020-01-01

## 2020-11-16 PROBLEM — L97.519 RIGHT FOOT ULCER (HCC): Chronic | Status: ACTIVE | Noted: 2020-01-01

## 2020-11-17 PROBLEM — D69.6 THROMBOCYTOPENIA (HCC): Chronic | Status: RESOLVED | Noted: 2017-05-04 | Resolved: 2020-01-01

## 2020-11-17 PROBLEM — J90 PLEURAL EFFUSION ON RIGHT: Status: ACTIVE | Noted: 2020-01-01

## 2020-11-17 PROBLEM — E87.2 ACIDOSIS: Status: ACTIVE | Noted: 2020-01-01

## 2020-11-17 PROBLEM — E87.1 HYPONATREMIA: Status: ACTIVE | Noted: 2020-01-01

## 2020-11-18 PROBLEM — E87.1 HYPONATREMIA: Status: RESOLVED | Noted: 2020-01-01 | Resolved: 2020-01-01

## 2020-11-19 PROBLEM — C90.00 MULTIPLE MYELOMA NOT HAVING ACHIEVED REMISSION (HCC): Status: ACTIVE | Noted: 2020-01-01

## 2020-11-22 PROBLEM — B95.62 MRSA CELLULITIS OF RIGHT FOOT: Status: ACTIVE | Noted: 2020-01-01

## 2020-11-22 PROBLEM — L03.115 MRSA CELLULITIS OF RIGHT FOOT: Status: ACTIVE | Noted: 2020-01-01

## 2020-12-17 NOTE — PROGRESS NOTES
Offered to ambulate patient in morning  PT stated " I can't walk now; I am not wake " Will attempt to walk in the afternoon  PRN Medication Documentation    Specific patient behavior that led to need for PRN medication: left arm pain  Staff interventions attempted prior to PRN being given: rest  PRN medication given: Tylenol 650 mg po prn  Patient response/effectiveness of PRN medication: 10:15 no complaints of pain.

## 2020-12-23 PROBLEM — L97.319 VENOUS ULCER OF ANKLE, RIGHT (HCC): Status: ACTIVE | Noted: 2020-01-01

## 2020-12-23 PROBLEM — I83.013 VENOUS ULCER OF ANKLE, RIGHT (HCC): Status: ACTIVE | Noted: 2020-01-01

## 2021-01-01 ENCOUNTER — APPOINTMENT (INPATIENT)
Dept: VASCULAR ULTRASOUND | Facility: HOSPITAL | Age: 76
DRG: 871 | End: 2021-01-01
Payer: MEDICARE

## 2021-01-01 ENCOUNTER — OFFICE VISIT (OUTPATIENT)
Dept: WOUND CARE | Facility: HOSPITAL | Age: 76
End: 2021-01-01
Payer: MEDICARE

## 2021-01-01 ENCOUNTER — TELEMEDICINE (OUTPATIENT)
Dept: VASCULAR SURGERY | Facility: CLINIC | Age: 76
End: 2021-01-01
Payer: MEDICARE

## 2021-01-01 ENCOUNTER — APPOINTMENT (INPATIENT)
Dept: CT IMAGING | Facility: HOSPITAL | Age: 76
DRG: 871 | End: 2021-01-01
Payer: MEDICARE

## 2021-01-01 ENCOUNTER — APPOINTMENT (INPATIENT)
Dept: RADIOLOGY | Facility: HOSPITAL | Age: 76
DRG: 871 | End: 2021-01-01
Payer: MEDICARE

## 2021-01-01 ENCOUNTER — OFFICE VISIT (OUTPATIENT)
Dept: FAMILY MEDICINE CLINIC | Facility: CLINIC | Age: 76
End: 2021-01-01
Payer: MEDICARE

## 2021-01-01 ENCOUNTER — TELEPHONE (OUTPATIENT)
Dept: FAMILY MEDICINE CLINIC | Facility: CLINIC | Age: 76
End: 2021-01-01

## 2021-01-01 ENCOUNTER — HOSPITAL ENCOUNTER (INPATIENT)
Facility: HOSPITAL | Age: 76
LOS: 2 days | DRG: 871 | End: 2021-03-18
Attending: INTERNAL MEDICINE | Admitting: INTERNAL MEDICINE
Payer: MEDICARE

## 2021-01-01 ENCOUNTER — IN-CLINIC DEVICE VISIT (OUTPATIENT)
Dept: CARDIOLOGY CLINIC | Facility: CLINIC | Age: 76
End: 2021-01-01
Payer: MEDICARE

## 2021-01-01 ENCOUNTER — HOSPITAL ENCOUNTER (EMERGENCY)
Facility: HOSPITAL | Age: 76
End: 2021-03-16
Attending: EMERGENCY MEDICINE
Payer: MEDICARE

## 2021-01-01 ENCOUNTER — APPOINTMENT (INPATIENT)
Dept: ULTRASOUND IMAGING | Facility: HOSPITAL | Age: 76
DRG: 871 | End: 2021-01-01
Payer: MEDICARE

## 2021-01-01 ENCOUNTER — APPOINTMENT (EMERGENCY)
Dept: RADIOLOGY | Facility: HOSPITAL | Age: 76
End: 2021-01-01
Payer: MEDICARE

## 2021-01-01 ENCOUNTER — APPOINTMENT (INPATIENT)
Dept: NON INVASIVE DIAGNOSTICS | Facility: HOSPITAL | Age: 76
DRG: 871 | End: 2021-01-01
Payer: MEDICARE

## 2021-01-01 ENCOUNTER — APPOINTMENT (EMERGENCY)
Dept: CT IMAGING | Facility: HOSPITAL | Age: 76
End: 2021-01-01
Payer: MEDICARE

## 2021-01-01 ENCOUNTER — HOSPITAL ENCOUNTER (OUTPATIENT)
Dept: NON INVASIVE DIAGNOSTICS | Facility: HOSPITAL | Age: 76
Discharge: HOME | End: 2021-01-21
Attending: SURGERY
Payer: MEDICARE

## 2021-01-01 VITALS
HEART RATE: 72 BPM | TEMPERATURE: 98.1 F | SYSTOLIC BLOOD PRESSURE: 150 MMHG | RESPIRATION RATE: 18 BRPM | DIASTOLIC BLOOD PRESSURE: 78 MMHG

## 2021-01-01 VITALS
OXYGEN SATURATION: 100 % | SYSTOLIC BLOOD PRESSURE: 94 MMHG | WEIGHT: 154 LBS | RESPIRATION RATE: 40 BRPM | BODY MASS INDEX: 22.1 KG/M2 | HEART RATE: 80 BPM | DIASTOLIC BLOOD PRESSURE: 43 MMHG | TEMPERATURE: 97.3 F

## 2021-01-01 VITALS
SYSTOLIC BLOOD PRESSURE: 120 MMHG | RESPIRATION RATE: 18 BRPM | TEMPERATURE: 97.7 F | DIASTOLIC BLOOD PRESSURE: 70 MMHG | HEART RATE: 72 BPM

## 2021-01-01 VITALS
HEART RATE: 96 BPM | TEMPERATURE: 98.8 F | OXYGEN SATURATION: 99 % | DIASTOLIC BLOOD PRESSURE: 72 MMHG | BODY MASS INDEX: 19.61 KG/M2 | HEIGHT: 70 IN | SYSTOLIC BLOOD PRESSURE: 120 MMHG | WEIGHT: 137 LBS

## 2021-01-01 VITALS
SYSTOLIC BLOOD PRESSURE: 124 MMHG | TEMPERATURE: 99.2 F | DIASTOLIC BLOOD PRESSURE: 78 MMHG | HEART RATE: 80 BPM | RESPIRATION RATE: 18 BRPM

## 2021-01-01 VITALS
RESPIRATION RATE: 18 BRPM | SYSTOLIC BLOOD PRESSURE: 130 MMHG | TEMPERATURE: 98 F | HEART RATE: 72 BPM | DIASTOLIC BLOOD PRESSURE: 72 MMHG

## 2021-01-01 VITALS — HEART RATE: 68 BPM | TEMPERATURE: 98.7 F | RESPIRATION RATE: 20 BRPM

## 2021-01-01 VITALS
SYSTOLIC BLOOD PRESSURE: 112 MMHG | TEMPERATURE: 98.5 F | DIASTOLIC BLOOD PRESSURE: 62 MMHG | HEART RATE: 80 BPM | RESPIRATION RATE: 18 BRPM

## 2021-01-01 VITALS
HEIGHT: 70 IN | HEART RATE: 73 BPM | SYSTOLIC BLOOD PRESSURE: 120 MMHG | RESPIRATION RATE: 16 BRPM | TEMPERATURE: 98.7 F | OXYGEN SATURATION: 100 % | WEIGHT: 154.2 LBS | BODY MASS INDEX: 22.07 KG/M2 | DIASTOLIC BLOOD PRESSURE: 74 MMHG

## 2021-01-01 VITALS
DIASTOLIC BLOOD PRESSURE: 72 MMHG | BODY MASS INDEX: 20.3 KG/M2 | SYSTOLIC BLOOD PRESSURE: 128 MMHG | WEIGHT: 141.8 LBS | HEIGHT: 70 IN

## 2021-01-01 VITALS
HEIGHT: 70 IN | OXYGEN SATURATION: 91 % | TEMPERATURE: 96.1 F | SYSTOLIC BLOOD PRESSURE: 99 MMHG | RESPIRATION RATE: 28 BRPM | BODY MASS INDEX: 20.77 KG/M2 | DIASTOLIC BLOOD PRESSURE: 60 MMHG | WEIGHT: 145.06 LBS | HEART RATE: 84 BPM

## 2021-01-01 VITALS
TEMPERATURE: 98.2 F | RESPIRATION RATE: 18 BRPM | SYSTOLIC BLOOD PRESSURE: 118 MMHG | DIASTOLIC BLOOD PRESSURE: 72 MMHG | HEART RATE: 90 BPM

## 2021-01-01 DIAGNOSIS — I70.232 ATHEROSCLEROSIS OF NATIVE ARTERY OF RIGHT LOWER EXTREMITY WITH ULCERATION OF CALF (HCC): Chronic | ICD-10-CM

## 2021-01-01 DIAGNOSIS — S71.101A OPEN WOUND OF RIGHT THIGH, INITIAL ENCOUNTER: Primary | ICD-10-CM

## 2021-01-01 DIAGNOSIS — Z23 ENCOUNTER FOR IMMUNIZATION: ICD-10-CM

## 2021-01-01 DIAGNOSIS — M79.89 LEG SWELLING: Primary | ICD-10-CM

## 2021-01-01 DIAGNOSIS — K21.9 GASTROESOPHAGEAL REFLUX DISEASE: ICD-10-CM

## 2021-01-01 DIAGNOSIS — L97.312 NON-PRESSURE CHRONIC ULCER OF RIGHT ANKLE WITH FAT LAYER EXPOSED (HCC): ICD-10-CM

## 2021-01-01 DIAGNOSIS — I73.9 PAD (PERIPHERAL ARTERY DISEASE) (HCC): ICD-10-CM

## 2021-01-01 DIAGNOSIS — L03.116 CELLULITIS OF LEFT LEG WITHOUT FOOT: ICD-10-CM

## 2021-01-01 DIAGNOSIS — Z95.0 PRESENCE OF CARDIAC PACEMAKER: Primary | ICD-10-CM

## 2021-01-01 DIAGNOSIS — N17.0 ACUTE RENAL FAILURE WITH ACUTE TUBULAR NECROSIS SUPERIMPOSED ON STAGE 3 CHRONIC KIDNEY DISEASE, UNSPECIFIED WHETHER STAGE 3A OR 3B CKD (HCC): ICD-10-CM

## 2021-01-01 DIAGNOSIS — J96.01 ACUTE RESPIRATORY FAILURE WITH HYPOXIA (HCC): ICD-10-CM

## 2021-01-01 DIAGNOSIS — I10 ESSENTIAL HYPERTENSION: Primary | ICD-10-CM

## 2021-01-01 DIAGNOSIS — G93.40 ACUTE ENCEPHALOPATHY: Primary | ICD-10-CM

## 2021-01-01 DIAGNOSIS — L97.312 NON-PRESSURE CHRONIC ULCER OF RIGHT ANKLE WITH FAT LAYER EXPOSED (HCC): Primary | ICD-10-CM

## 2021-01-01 DIAGNOSIS — E87.5 HYPERKALEMIA: ICD-10-CM

## 2021-01-01 DIAGNOSIS — L03.116 CELLULITIS AND ABSCESS OF LEFT LOWER EXTREMITY: Primary | ICD-10-CM

## 2021-01-01 DIAGNOSIS — I50.42 CHRONIC COMBINED SYSTOLIC (CONGESTIVE) AND DIASTOLIC (CONGESTIVE) HEART FAILURE (HCC): ICD-10-CM

## 2021-01-01 DIAGNOSIS — C90.00 MULTIPLE MYELOMA NOT HAVING ACHIEVED REMISSION (HCC): ICD-10-CM

## 2021-01-01 DIAGNOSIS — I50.9 CHF (CONGESTIVE HEART FAILURE) (HCC): ICD-10-CM

## 2021-01-01 DIAGNOSIS — R06.89 ACUTE RESPIRATORY INSUFFICIENCY: ICD-10-CM

## 2021-01-01 DIAGNOSIS — M86.9 OSTEOMYELITIS OF RIGHT FOOT, UNSPECIFIED TYPE (HCC): ICD-10-CM

## 2021-01-01 DIAGNOSIS — N17.9 ACUTE RENAL FAILURE (ARF) (HCC): ICD-10-CM

## 2021-01-01 DIAGNOSIS — L97.912 LEG ULCER, RIGHT, WITH FAT LAYER EXPOSED (HCC): ICD-10-CM

## 2021-01-01 DIAGNOSIS — I50.42 CHRONIC COMBINED SYSTOLIC AND DIASTOLIC HEART FAILURE (HCC): Chronic | ICD-10-CM

## 2021-01-01 DIAGNOSIS — N18.30 ACUTE RENAL FAILURE WITH ACUTE TUBULAR NECROSIS SUPERIMPOSED ON STAGE 3 CHRONIC KIDNEY DISEASE, UNSPECIFIED WHETHER STAGE 3A OR 3B CKD (HCC): ICD-10-CM

## 2021-01-01 DIAGNOSIS — A41.9 SEPTIC SHOCK (HCC): Primary | ICD-10-CM

## 2021-01-01 DIAGNOSIS — I70.221 ATHEROSCLEROSIS OF NATIVE ARTERY OF RIGHT LOWER EXTREMITY WITH REST PAIN (HCC): ICD-10-CM

## 2021-01-01 DIAGNOSIS — E86.0 SEVERE DEHYDRATION: ICD-10-CM

## 2021-01-01 DIAGNOSIS — L97.212 LOWER LIMB ULCER, CALF, RIGHT, WITH FAT LAYER EXPOSED (HCC): ICD-10-CM

## 2021-01-01 DIAGNOSIS — I73.9 PERIPHERAL VASCULAR DISEASE OF LOWER EXTREMITY (HCC): Chronic | ICD-10-CM

## 2021-01-01 DIAGNOSIS — I70.233 ATHEROSCLEROSIS OF NATIVE ARTERY OF RIGHT LOWER EXTREMITY WITH ULCERATION OF ANKLE (HCC): ICD-10-CM

## 2021-01-01 DIAGNOSIS — E16.2 HYPOGLYCEMIA: ICD-10-CM

## 2021-01-01 DIAGNOSIS — L97.319 VENOUS ULCER OF ANKLE, RIGHT (HCC): Primary | ICD-10-CM

## 2021-01-01 DIAGNOSIS — I70.90 ARTERIAL OCCLUSION: ICD-10-CM

## 2021-01-01 DIAGNOSIS — R65.21 SEPTIC SHOCK (HCC): Primary | ICD-10-CM

## 2021-01-01 DIAGNOSIS — I48.19 OTHER PERSISTENT ATRIAL FIBRILLATION (HCC): ICD-10-CM

## 2021-01-01 DIAGNOSIS — B35.3 TINEA PEDIS OF BOTH FEET: ICD-10-CM

## 2021-01-01 DIAGNOSIS — I83.013 VENOUS ULCER OF ANKLE, RIGHT (HCC): Primary | ICD-10-CM

## 2021-01-01 DIAGNOSIS — I70.232 ATHEROSCLEROSIS OF NATIVE ARTERY OF RIGHT LOWER EXTREMITY WITH ULCERATION OF CALF (HCC): ICD-10-CM

## 2021-01-01 DIAGNOSIS — D61.818 OTHER PANCYTOPENIA (HCC): ICD-10-CM

## 2021-01-01 DIAGNOSIS — I70.233 ATHEROSCLEROSIS OF NATIVE ARTERY OF RIGHT LOWER EXTREMITY WITH ULCERATION OF ANKLE (HCC): Primary | ICD-10-CM

## 2021-01-01 DIAGNOSIS — E43 SEVERE PROTEIN-CALORIE MALNUTRITION (HCC): ICD-10-CM

## 2021-01-01 DIAGNOSIS — L89.610 PRESSURE INJURY OF RIGHT HEEL, UNSTAGEABLE (HCC): ICD-10-CM

## 2021-01-01 DIAGNOSIS — D46.Z OTHER MYELODYSPLASTIC SYNDROMES (HCC): ICD-10-CM

## 2021-01-01 DIAGNOSIS — L97.511 RIGHT FOOT ULCER, LIMITED TO BREAKDOWN OF SKIN (HCC): ICD-10-CM

## 2021-01-01 DIAGNOSIS — L02.416 CELLULITIS AND ABSCESS OF LEFT LOWER EXTREMITY: Primary | ICD-10-CM

## 2021-01-01 DIAGNOSIS — R60.0 FACIAL EDEMA: Primary | ICD-10-CM

## 2021-01-01 LAB
ABO GROUP BLD BPU: NORMAL
ABO GROUP BLD: NORMAL
ACANTHOCYTES BLD QL SMEAR: PRESENT
ALBUMIN SERPL BCP-MCNC: 1.7 G/DL (ref 3.5–5)
ALBUMIN SERPL BCP-MCNC: 2.5 G/DL (ref 3.5–5)
ALP SERPL-CCNC: 215 U/L (ref 46–116)
ALP SERPL-CCNC: 232 U/L (ref 46–116)
ALT SERPL W P-5'-P-CCNC: 128 U/L (ref 12–78)
ALT SERPL W P-5'-P-CCNC: 190 U/L (ref 12–78)
ANION GAP SERPL CALCULATED.3IONS-SCNC: 23 MMOL/L (ref 4–13)
ANION GAP SERPL CALCULATED.3IONS-SCNC: 23 MMOL/L (ref 4–13)
ANION GAP SERPL CALCULATED.3IONS-SCNC: 26 MMOL/L (ref 4–13)
ANION GAP SERPL CALCULATED.3IONS-SCNC: 27 MMOL/L (ref 4–13)
ANION GAP SERPL CALCULATED.3IONS-SCNC: 27 MMOL/L (ref 4–13)
ANION GAP SERPL CALCULATED.3IONS-SCNC: 28 MMOL/L (ref 4–13)
ANION GAP SERPL CALCULATED.3IONS-SCNC: 29 MMOL/L (ref 4–13)
ANION GAP SERPL CALCULATED.3IONS-SCNC: 30 MMOL/L (ref 4–13)
ANISOCYTOSIS BLD QL SMEAR: PRESENT
APTT PPP: 42 SECONDS (ref 23–37)
ARTERIAL PATENCY WRIST A: ABNORMAL
AST SERPL W P-5'-P-CCNC: 208 U/L (ref 5–45)
AST SERPL W P-5'-P-CCNC: 548 U/L (ref 5–45)
ATRIAL RATE: 169 BPM
ATRIAL RATE: 93 BPM
BACTERIA UR QL AUTO: ABNORMAL /HPF
BACTERIA UR QL AUTO: ABNORMAL /HPF
BASE EX.OXY STD BLDV CALC-SCNC: 71.4 % (ref 60–80)
BASE EX.OXY STD BLDV CALC-SCNC: 82.1 % (ref 60–80)
BASE EXCESS BLDA CALC-SCNC: -14.7 MMOL/L
BASE EXCESS BLDA CALC-SCNC: -16.9 MMOL/L
BASE EXCESS BLDA CALC-SCNC: -16.9 MMOL/L
BASE EXCESS BLDA CALC-SCNC: -17 MMOL/L
BASE EXCESS BLDA CALC-SCNC: -25 MMOL/L (ref -2–3)
BASE EXCESS BLDA CALC-SCNC: -9.6 MMOL/L
BASE EXCESS BLDA CALC-SCNC: -9.9 MMOL/L
BASE EXCESS BLDV CALC-SCNC: -17.9 MMOL/L
BASE EXCESS BLDV CALC-SCNC: -8.6 MMOL/L
BASOPHILS # BLD MANUAL: 0 THOUSAND/UL (ref 0–0.1)
BASOPHILS NFR MAR MANUAL: 0 % (ref 0–1)
BILIRUB DIRECT SERPL-MCNC: 1.52 MG/DL (ref 0–0.2)
BILIRUB SERPL-MCNC: 1.7 MG/DL (ref 0.2–1)
BILIRUB SERPL-MCNC: 2.14 MG/DL (ref 0.2–1)
BILIRUB UR QL STRIP: NEGATIVE
BILIRUB UR QL STRIP: NEGATIVE
BLD GP AB SCN SERPL QL: NEGATIVE
BODY TEMPERATURE: 96.4 DEGREES FEHRENHEIT
BPU ID: NORMAL
BUN SERPL-MCNC: 100 MG/DL (ref 5–25)
BUN SERPL-MCNC: 104 MG/DL (ref 5–25)
BUN SERPL-MCNC: 115 MG/DL (ref 5–25)
BUN SERPL-MCNC: 116 MG/DL (ref 5–25)
BUN SERPL-MCNC: 117 MG/DL (ref 5–25)
BUN SERPL-MCNC: 118 MG/DL (ref 5–25)
BUN SERPL-MCNC: 43 MG/DL (ref 5–25)
BUN SERPL-MCNC: 54 MG/DL (ref 5–25)
BUN SERPL-MCNC: 59 MG/DL (ref 5–25)
BUN SERPL-MCNC: 74 MG/DL (ref 5–25)
BUN SERPL-MCNC: 99 MG/DL (ref 5–25)
BUN SERPL-MCNC: 99 MG/DL (ref 5–25)
BURR CELLS BLD QL SMEAR: PRESENT
BURR CELLS BLD QL SMEAR: PRESENT
CA-I BLD-SCNC: 0.77 MMOL/L (ref 1.12–1.32)
CA-I BLD-SCNC: 0.79 MMOL/L (ref 1.12–1.32)
CA-I BLD-SCNC: 0.88 MMOL/L (ref 1.12–1.32)
CA-I BLD-SCNC: 0.91 MMOL/L (ref 1.12–1.32)
CA-I BLD-SCNC: 0.92 MMOL/L (ref 1.12–1.32)
CA-I BLD-SCNC: 0.93 MMOL/L (ref 1.12–1.32)
CA-I BLD-SCNC: 0.95 MMOL/L (ref 1.12–1.32)
CA-I BLD-SCNC: 1.03 MMOL/L (ref 1.12–1.32)
CALCIUM ALBUM COR SERPL-MCNC: 9.2 MG/DL (ref 8.3–10.1)
CALCIUM SERPL-MCNC: 6.8 MG/DL (ref 8.3–10.1)
CALCIUM SERPL-MCNC: 6.9 MG/DL (ref 8.3–10.1)
CALCIUM SERPL-MCNC: 6.9 MG/DL (ref 8.3–10.1)
CALCIUM SERPL-MCNC: 7.1 MG/DL (ref 8.3–10.1)
CALCIUM SERPL-MCNC: 7.2 MG/DL (ref 8.3–10.1)
CALCIUM SERPL-MCNC: 7.6 MG/DL (ref 8.3–10.1)
CALCIUM SERPL-MCNC: 7.8 MG/DL (ref 8.3–10.1)
CALCIUM SERPL-MCNC: 7.8 MG/DL (ref 8.3–10.1)
CALCIUM SERPL-MCNC: 7.9 MG/DL (ref 8.3–10.1)
CALCIUM SERPL-MCNC: 8 MG/DL (ref 8.3–10.1)
CALCIUM SERPL-MCNC: 8.1 MG/DL (ref 8.3–10.1)
CALCIUM SERPL-MCNC: 8.6 MG/DL (ref 8.3–10.1)
CHLORIDE SERPL-SCNC: 101 MMOL/L (ref 100–108)
CHLORIDE SERPL-SCNC: 95 MMOL/L (ref 100–108)
CHLORIDE SERPL-SCNC: 96 MMOL/L (ref 100–108)
CHLORIDE SERPL-SCNC: 97 MMOL/L (ref 100–108)
CHLORIDE SERPL-SCNC: 99 MMOL/L (ref 100–108)
CHLORIDE SERPL-SCNC: 99 MMOL/L (ref 100–108)
CK MB SERPL-MCNC: 124.6 NG/ML (ref 0–5)
CK MB SERPL-MCNC: 129.8 NG/ML (ref 0–5)
CK MB SERPL-MCNC: 2.1 % (ref 0–2.5)
CK MB SERPL-MCNC: 2.9 % (ref 0–2.5)
CK MB SERPL-MCNC: 234.5 NG/ML (ref 0–5)
CK MB SERPL-MCNC: 6.8 % (ref 0–2.5)
CK SERPL-CCNC: 1828 U/L (ref 39–308)
CK SERPL-CCNC: 4464 U/L (ref 39–308)
CK SERPL-CCNC: ABNORMAL U/L (ref 39–308)
CLARITY UR: ABNORMAL
CLARITY UR: CLEAR
CO2 SERPL-SCNC: 10 MMOL/L (ref 21–32)
CO2 SERPL-SCNC: 11 MMOL/L (ref 21–32)
CO2 SERPL-SCNC: 12 MMOL/L (ref 21–32)
CO2 SERPL-SCNC: 13 MMOL/L (ref 21–32)
CO2 SERPL-SCNC: 19 MMOL/L (ref 21–32)
CO2 SERPL-SCNC: 19 MMOL/L (ref 21–32)
CO2 SERPL-SCNC: 6 MMOL/L (ref 21–32)
CO2 SERPL-SCNC: 8 MMOL/L (ref 21–32)
CO2 SERPL-SCNC: 8 MMOL/L (ref 21–32)
COLOR UR: ABNORMAL
COLOR UR: YELLOW
CORTIS SERPL-MCNC: 100.1 UG/DL
CREAT SERPL-MCNC: 2.42 MG/DL (ref 0.6–1.3)
CREAT SERPL-MCNC: 3.04 MG/DL (ref 0.6–1.3)
CREAT SERPL-MCNC: 3.12 MG/DL (ref 0.6–1.3)
CREAT SERPL-MCNC: 3.75 MG/DL (ref 0.6–1.3)
CREAT SERPL-MCNC: 5 MG/DL (ref 0.6–1.3)
CREAT SERPL-MCNC: 5.07 MG/DL (ref 0.6–1.3)
CREAT SERPL-MCNC: 5.1 MG/DL (ref 0.6–1.3)
CREAT SERPL-MCNC: 5.13 MG/DL (ref 0.6–1.3)
CREAT SERPL-MCNC: 5.95 MG/DL (ref 0.6–1.3)
CREAT SERPL-MCNC: 6.15 MG/DL (ref 0.6–1.3)
CREAT SERPL-MCNC: 6.33 MG/DL (ref 0.6–1.3)
CREAT SERPL-MCNC: 6.58 MG/DL (ref 0.6–1.3)
CROSSMATCH: NORMAL
DACRYOCYTES BLD QL SMEAR: PRESENT
EOSINOPHIL # BLD MANUAL: 0 THOUSAND/UL (ref 0–0.4)
EOSINOPHIL NFR BLD MANUAL: 0 % (ref 0–6)
ERYTHROCYTE [DISTWIDTH] IN BLOOD BY AUTOMATED COUNT: 19.2 % (ref 11.6–15.1)
ERYTHROCYTE [DISTWIDTH] IN BLOOD BY AUTOMATED COUNT: 19.4 % (ref 11.6–15.1)
ERYTHROCYTE [DISTWIDTH] IN BLOOD BY AUTOMATED COUNT: 19.4 % (ref 11.6–15.1)
FIBRINOGEN PPP-MCNC: 346 MG/DL (ref 227–495)
FIO2 GAS DIL.REBREATH: 100 L
FIO2 GAS DIL.REBREATH: 75 L
FLUAV RNA RESP QL NAA+PROBE: NEGATIVE
FLUBV RNA RESP QL NAA+PROBE: NEGATIVE
G6PD BLD QN: 758 U/10E12 RBC (ref 127–427)
GFR SERPL CREATININE-BSD FRML MDRD: 10 ML/MIN/1.73SQ M
GFR SERPL CREATININE-BSD FRML MDRD: 11 ML/MIN/1.73SQ M
GFR SERPL CREATININE-BSD FRML MDRD: 15 ML/MIN/1.73SQ M
GFR SERPL CREATININE-BSD FRML MDRD: 19 ML/MIN/1.73SQ M
GFR SERPL CREATININE-BSD FRML MDRD: 19 ML/MIN/1.73SQ M
GFR SERPL CREATININE-BSD FRML MDRD: 26 ML/MIN/1.73SQ M
GFR SERPL CREATININE-BSD FRML MDRD: 8 ML/MIN/1.73SQ M
GFR SERPL CREATININE-BSD FRML MDRD: 9 ML/MIN/1.73SQ M
GIANT PLATELETS BLD QL SMEAR: PRESENT
GIANT PLATELETS BLD QL SMEAR: PRESENT
GLUCOSE SERPL-MCNC: 109 MG/DL (ref 65–140)
GLUCOSE SERPL-MCNC: 110 MG/DL (ref 65–140)
GLUCOSE SERPL-MCNC: 110 MG/DL (ref 65–140)
GLUCOSE SERPL-MCNC: 112 MG/DL (ref 65–140)
GLUCOSE SERPL-MCNC: 116 MG/DL (ref 65–140)
GLUCOSE SERPL-MCNC: 120 MG/DL (ref 65–140)
GLUCOSE SERPL-MCNC: 120 MG/DL (ref 65–140)
GLUCOSE SERPL-MCNC: 122 MG/DL (ref 65–140)
GLUCOSE SERPL-MCNC: 124 MG/DL (ref 65–140)
GLUCOSE SERPL-MCNC: 127 MG/DL (ref 65–140)
GLUCOSE SERPL-MCNC: 127 MG/DL (ref 65–140)
GLUCOSE SERPL-MCNC: 130 MG/DL (ref 65–140)
GLUCOSE SERPL-MCNC: 131 MG/DL (ref 65–140)
GLUCOSE SERPL-MCNC: 148 MG/DL (ref 65–140)
GLUCOSE SERPL-MCNC: 159 MG/DL (ref 65–140)
GLUCOSE SERPL-MCNC: 182 MG/DL (ref 65–140)
GLUCOSE SERPL-MCNC: 27 MG/DL (ref 65–140)
GLUCOSE SERPL-MCNC: 54 MG/DL (ref 65–140)
GLUCOSE SERPL-MCNC: 73 MG/DL (ref 65–140)
GLUCOSE SERPL-MCNC: 75 MG/DL (ref 65–140)
GLUCOSE UR STRIP-MCNC: NEGATIVE MG/DL
GLUCOSE UR STRIP-MCNC: NEGATIVE MG/DL
HCO3 BLDA-SCNC: 11.2 MMOL/L (ref 22–28)
HCO3 BLDA-SCNC: 13.1 MMOL/L (ref 22–28)
HCO3 BLDA-SCNC: 14.4 MMOL/L (ref 22–28)
HCO3 BLDA-SCNC: 4.9 MMOL/L (ref 22–28)
HCO3 BLDA-SCNC: 7.6 MMOL/L (ref 22–28)
HCO3 BLDA-SCNC: 9.3 MMOL/L (ref 22–28)
HCO3 BLDA-SCNC: 9.8 MMOL/L (ref 22–28)
HCO3 BLDV-SCNC: 15.7 MMOL/L (ref 24–30)
HCO3 BLDV-SCNC: 8.6 MMOL/L (ref 24–30)
HCT VFR BLD AUTO: 21.7 % (ref 36.5–49.3)
HCT VFR BLD AUTO: 23.2 % (ref 36.5–49.3)
HCT VFR BLD AUTO: 27.7 % (ref 36.5–49.3)
HCT VFR BLD AUTO: 37.4 % (ref 36.5–49.3)
HCT VFR BLD CALC: 24 % (ref 36.5–49.3)
HCT VFR BLD CALC: 27 % (ref 36.5–49.3)
HFNC FLOW LPM: 45
HGB BLD-MCNC: 11.3 G/DL (ref 12–17)
HGB BLD-MCNC: 6.9 G/DL (ref 12–17)
HGB BLD-MCNC: 7.7 G/DL (ref 12–17)
HGB BLD-MCNC: 8.5 G/DL (ref 12–17)
HGB BLDA-MCNC: 8.2 G/DL (ref 12–17)
HGB BLDA-MCNC: 9.2 G/DL (ref 12–17)
HGB UR QL STRIP.AUTO: ABNORMAL
HGB UR QL STRIP.AUTO: ABNORMAL
HOROWITZ INDEX BLDA+IHG-RTO: 50 MM[HG]
HYALINE CASTS #/AREA URNS LPF: ABNORMAL /LPF
INR PPP: 2.65 (ref 0.84–1.19)
KETONES UR STRIP-MCNC: NEGATIVE MG/DL
KETONES UR STRIP-MCNC: NEGATIVE MG/DL
LACTATE SERPL-SCNC: 10.8 MMOL/L (ref 0.5–2)
LACTATE SERPL-SCNC: 10.8 MMOL/L (ref 0.5–2)
LACTATE SERPL-SCNC: 11.5 MMOL/L (ref 0.5–2)
LACTATE SERPL-SCNC: 17.5 MMOL/L (ref 0.5–2)
LACTATE SERPL-SCNC: 19.1 MMOL/L (ref 0.5–2)
LACTATE SERPL-SCNC: 19.8 MMOL/L (ref 0.5–2)
LACTATE SERPL-SCNC: 19.9 MMOL/L (ref 0.5–2)
LACTATE SERPL-SCNC: 20.8 MMOL/L (ref 0.5–2)
LACTATE SERPL-SCNC: 7.9 MMOL/L (ref 0.5–2)
LACTATE SERPL-SCNC: 9 MMOL/L (ref 0.5–2)
LACTATE SERPL-SCNC: 9.6 MMOL/L (ref 0.5–2)
LACTATE SERPL-SCNC: 9.6 MMOL/L (ref 0.5–2)
LDH SERPL-CCNC: 861 U/L (ref 81–234)
LEUKOCYTE ESTERASE UR QL STRIP: ABNORMAL
LEUKOCYTE ESTERASE UR QL STRIP: ABNORMAL
LG PLATELETS BLD QL SMEAR: PRESENT
LG PLATELETS BLD QL SMEAR: PRESENT
LIPASE SERPL-CCNC: 156 U/L (ref 73–393)
LYMPHOCYTES # BLD AUTO: 0 % (ref 14–44)
LYMPHOCYTES # BLD AUTO: 0 THOUSAND/UL (ref 0.6–4.47)
LYMPHOCYTES # BLD AUTO: 0.72 THOUSAND/UL (ref 0.6–4.47)
LYMPHOCYTES # BLD AUTO: 1 % (ref 14–44)
LYMPHOCYTES # BLD AUTO: 2 % (ref 14–44)
LYMPHOCYTES # BLD AUTO: 2.06 THOUSAND/UL (ref 0.6–4.47)
MAGNESIUM SERPL-MCNC: 2.3 MG/DL (ref 1.6–2.6)
MAGNESIUM SERPL-MCNC: 2.4 MG/DL (ref 1.6–2.6)
MAGNESIUM SERPL-MCNC: 2.4 MG/DL (ref 1.6–2.6)
MAGNESIUM SERPL-MCNC: 2.5 MG/DL (ref 1.6–2.6)
MAGNESIUM SERPL-MCNC: 2.6 MG/DL (ref 1.6–2.6)
MAGNESIUM SERPL-MCNC: 2.7 MG/DL (ref 1.6–2.6)
MAGNESIUM SERPL-MCNC: 2.7 MG/DL (ref 1.6–2.6)
MAGNESIUM SERPL-MCNC: 2.8 MG/DL (ref 1.6–2.6)
MAGNESIUM SERPL-MCNC: 3.3 MG/DL (ref 1.6–2.6)
MCH RBC QN AUTO: 30.2 PG (ref 26.8–34.3)
MCH RBC QN AUTO: 30.9 PG (ref 26.8–34.3)
MCH RBC QN AUTO: 31.2 PG (ref 26.8–34.3)
MCHC RBC AUTO-ENTMCNC: 30.2 G/DL (ref 31.4–37.4)
MCHC RBC AUTO-ENTMCNC: 30.7 G/DL (ref 31.4–37.4)
MCHC RBC AUTO-ENTMCNC: 33.2 G/DL (ref 31.4–37.4)
MCV RBC AUTO: 103 FL (ref 82–98)
MCV RBC AUTO: 93 FL (ref 82–98)
MCV RBC AUTO: 99 FL (ref 82–98)
METAMYELOCYTES NFR BLD MANUAL: 1 % (ref 0–1)
MONOCYTES # BLD AUTO: 0.72 THOUSAND/UL (ref 0–1.22)
MONOCYTES # BLD AUTO: 3.07 THOUSAND/UL (ref 0–1.22)
MONOCYTES # BLD AUTO: 6.18 THOUSAND/UL (ref 0–1.22)
MONOCYTES NFR BLD: 1 % (ref 4–12)
MONOCYTES NFR BLD: 6 % (ref 4–12)
MONOCYTES NFR BLD: 6 % (ref 4–12)
MRSA NOSE QL CULT: NORMAL
MUCOUS THREADS UR QL AUTO: ABNORMAL
NEUTROPHILS # BLD MANUAL: 48.1 THOUSAND/UL (ref 1.85–7.62)
NEUTROPHILS # BLD MANUAL: 70.94 THOUSAND/UL (ref 1.85–7.62)
NEUTROPHILS # BLD MANUAL: 93.7 THOUSAND/UL (ref 1.85–7.62)
NEUTS BAND NFR BLD MANUAL: 13 % (ref 0–8)
NEUTS BAND NFR BLD MANUAL: 4 % (ref 0–8)
NEUTS SEG NFR BLD AUTO: 81 % (ref 43–75)
NEUTS SEG NFR BLD AUTO: 91 % (ref 43–75)
NEUTS SEG NFR BLD AUTO: 94 % (ref 43–75)
NITRITE UR QL STRIP: NEGATIVE
NITRITE UR QL STRIP: NEGATIVE
NON VENT HFNC FIO2: 50
NON VENT TYPE HFNC: ABNORMAL
NON-SQ EPI CELLS URNS QL MICRO: ABNORMAL /HPF
NON-SQ EPI CELLS URNS QL MICRO: ABNORMAL /HPF
NRBC BLD AUTO-RTO: 3 /100 WBC (ref 0–2)
NRBC BLD AUTO-RTO: 4 /100 WBC (ref 0–2)
NRBC BLD AUTO-RTO: 4 /100 WBCS
NRBC BLD AUTO-RTO: 4 /100 WBCS
NRBC BLD AUTO-RTO: 5 /100 WBCS
NRBC BLD AUTO-RTO: 6 /100 WBC (ref 0–2)
NT-PROBNP SERPL-MCNC: ABNORMAL PG/ML
O2 CT BLDA-SCNC: 10.7 ML/DL (ref 16–23)
O2 CT BLDA-SCNC: 11.8 ML/DL (ref 16–23)
O2 CT BLDA-SCNC: 11.9 ML/DL (ref 16–23)
O2 CT BLDA-SCNC: 12 ML/DL (ref 16–23)
O2 CT BLDA-SCNC: 12.6 ML/DL (ref 16–23)
O2 CT BLDA-SCNC: 13.1 ML/DL (ref 16–23)
O2 CT BLDV-SCNC: 10.4 ML/DL
O2 CT BLDV-SCNC: 8.5 ML/DL
OTHER STN SPEC: ABNORMAL
OTHER STN SPEC: ABNORMAL
OVALOCYTES BLD QL SMEAR: PRESENT
OXYHGB MFR BLDA: 96.7 % (ref 94–97)
OXYHGB MFR BLDA: 97.5 % (ref 94–97)
OXYHGB MFR BLDA: 98 % (ref 94–97)
OXYHGB MFR BLDA: 98.3 % (ref 94–97)
OXYHGB MFR BLDA: 98.7 % (ref 94–97)
OXYHGB MFR BLDA: 98.8 % (ref 94–97)
PCO2 BLD: 19.7 MM HG (ref 36–44)
PCO2 BLD: 347 MM HG
PCO2 BLD: 5 MMOL/L (ref 21–32)
PCO2 BLD: <17 MM HG (ref 36–44)
PCO2 BLDA: 15.8 MM HG (ref 36–44)
PCO2 BLDA: 18.1 MM HG
PCO2 BLDA: 20.6 MM HG (ref 36–44)
PCO2 BLDA: 23.5 MM HG (ref 36–44)
PCO2 BLDA: 25.3 MM HG (ref 36–44)
PCO2 BLDA: 25.9 MM HG (ref 36–44)
PCO2 BLDA: 26.9 MM HG (ref 36–44)
PCO2 BLDV: 23.1 MM HG (ref 42–50)
PCO2 BLDV: 27.9 MM HG (ref 42–50)
PEEP RESPIRATORY: 6 CM[H2O]
PH BLD: 7 [PH] (ref 7.35–7.45)
PH BLD: 7.02 [PH]
PH BLD: 7.25 [PH] (ref 7.35–7.45)
PH BLDA: 7.18 [PH] (ref 7.35–7.45)
PH BLDA: 7.21 [PH] (ref 7.35–7.45)
PH BLDA: 7.25 [PH] (ref 7.35–7.45)
PH BLDA: 7.3 [PH] (ref 7.35–7.45)
PH BLDA: 7.37 [PH] (ref 7.35–7.45)
PH BLDA: 7.42 [PH] (ref 7.35–7.45)
PH BLDV: 7.19 [PH] (ref 7.3–7.4)
PH BLDV: 7.37 [PH] (ref 7.3–7.4)
PH UR STRIP.AUTO: 5 [PH]
PH UR STRIP.AUTO: 5.5 [PH]
PHOSPHATE SERPL-MCNC: 10 MG/DL (ref 2.3–4.1)
PHOSPHATE SERPL-MCNC: 10.2 MG/DL (ref 2.3–4.1)
PHOSPHATE SERPL-MCNC: 13.9 MG/DL (ref 2.3–4.1)
PHOSPHATE SERPL-MCNC: 7.5 MG/DL (ref 2.3–4.1)
PHOSPHATE SERPL-MCNC: 7.7 MG/DL (ref 2.3–4.1)
PHOSPHATE SERPL-MCNC: 8.7 MG/DL (ref 2.3–4.1)
PHOSPHATE SERPL-MCNC: 9.6 MG/DL (ref 2.3–4.1)
PHOSPHATE SERPL-MCNC: 9.7 MG/DL (ref 2.3–4.1)
PLATELET # BLD AUTO: 116 THOUSANDS/UL (ref 149–390)
PLATELET # BLD AUTO: 180 THOUSANDS/UL (ref 149–390)
PLATELET # BLD AUTO: 82 THOUSANDS/UL (ref 149–390)
PLATELET BLD QL SMEAR: ABNORMAL
PLATELET BLD QL SMEAR: ABNORMAL
PLATELET BLD QL SMEAR: ADEQUATE
PMV BLD AUTO: 12.2 FL (ref 8.9–12.7)
PMV BLD AUTO: 12.5 FL (ref 8.9–12.7)
PO2 BLD: 209 MM HG (ref 75–129)
PO2 BLD: 358 MM HG (ref 75–129)
PO2 BLDA: 142.6 MM HG (ref 75–129)
PO2 BLDA: 183.1 MM HG (ref 75–129)
PO2 BLDA: 192.4 MM HG (ref 75–129)
PO2 BLDA: 197.3 MM HG (ref 75–129)
PO2 BLDA: 228.1 MM HG (ref 75–129)
PO2 BLDA: 429.7 MM HG (ref 75–129)
PO2 BLDV: 43.5 MM HG (ref 35–45)
PO2 BLDV: 61.8 MM HG (ref 35–45)
POIKILOCYTOSIS BLD QL SMEAR: PRESENT
POIKILOCYTOSIS BLD QL SMEAR: PRESENT
POLYCHROMASIA BLD QL SMEAR: PRESENT
POTASSIUM BLD-SCNC: 6.9 MMOL/L (ref 3.5–5.3)
POTASSIUM BLD-SCNC: >8 MMOL/L (ref 3.5–5.3)
POTASSIUM SERPL-SCNC: 5.4 MMOL/L (ref 3.5–5.3)
POTASSIUM SERPL-SCNC: 5.4 MMOL/L (ref 3.5–5.3)
POTASSIUM SERPL-SCNC: 5.6 MMOL/L (ref 3.5–5.3)
POTASSIUM SERPL-SCNC: 6.4 MMOL/L (ref 3.5–5.3)
POTASSIUM SERPL-SCNC: 6.7 MMOL/L (ref 3.5–5.3)
POTASSIUM SERPL-SCNC: 7 MMOL/L (ref 3.5–5.3)
POTASSIUM SERPL-SCNC: 7.4 MMOL/L (ref 3.5–5.3)
POTASSIUM SERPL-SCNC: 7.6 MMOL/L (ref 3.5–5.3)
POTASSIUM SERPL-SCNC: 7.7 MMOL/L (ref 3.5–5.3)
POTASSIUM SERPL-SCNC: 8.2 MMOL/L (ref 3.5–5.3)
POTASSIUM SERPL-SCNC: 8.4 MMOL/L (ref 3.5–5.3)
POTASSIUM SERPL-SCNC: 8.7 MMOL/L (ref 3.5–5.3)
PROCALCITONIN SERPL-MCNC: 102.24 NG/ML
PROCALCITONIN SERPL-MCNC: 30.37 NG/ML
PROCALCITONIN SERPL-MCNC: 41.52 NG/ML
PROT SERPL-MCNC: 4.4 G/DL (ref 6.4–8.2)
PROT SERPL-MCNC: 6.8 G/DL (ref 6.4–8.2)
PROT UR STRIP-MCNC: ABNORMAL MG/DL
PROT UR STRIP-MCNC: ABNORMAL MG/DL
PROTHROMBIN TIME: 28.1 SECONDS (ref 11.6–14.5)
QRS AXIS: 248 DEGREES
QRS AXIS: 266 DEGREES
QRSD INTERVAL: 144 MS
QRSD INTERVAL: 162 MS
QT INTERVAL: 188 MS
QT INTERVAL: 202 MS
QTC INTERVAL: 327 MS
QTC INTERVAL: 338 MS
RBC # BLD AUTO: 2.49 MILLION/UL (ref 3.88–5.62)
RBC # BLD AUTO: 2.64 X10E6/UL (ref 4.14–5.8)
RBC # BLD AUTO: 2.81 MILLION/UL (ref 3.88–5.62)
RBC # BLD AUTO: 3.62 MILLION/UL (ref 3.88–5.62)
RBC #/AREA URNS AUTO: ABNORMAL /HPF
RBC #/AREA URNS AUTO: ABNORMAL /HPF
RBC MORPH BLD: PRESENT
RH BLD: NEGATIVE
RSV RNA RESP QL NAA+PROBE: NEGATIVE
SAMPLE SITE: ABNORMAL
SAO2 % BLD FROM PO2: 100 % (ref 60–85)
SARS-COV-2 RNA RESP QL NAA+PROBE: NEGATIVE
SCHISTOCYTES BLD QL SMEAR: PRESENT
SIMV VENT INSPIRED AIR FIO2: 50
SIMV VENT INSPIRED AIR FIO2: 50
SIMV VENT PEEP: 6
SIMV VENT PEEP: 6
SIMV VENT TIDAL VOLUME: 450
SIMV VENT TIDAL VOLUME: 450
SIMV VENT: ABNORMAL
SIMV VENT: ABNORMAL
SODIUM BLD-SCNC: 131 MMOL/L (ref 136–145)
SODIUM BLD-SCNC: 131 MMOL/L (ref 136–145)
SODIUM SERPL-SCNC: 135 MMOL/L (ref 136–145)
SODIUM SERPL-SCNC: 136 MMOL/L (ref 136–145)
SODIUM SERPL-SCNC: 137 MMOL/L (ref 136–145)
SODIUM SERPL-SCNC: 138 MMOL/L (ref 136–145)
SODIUM SERPL-SCNC: 138 MMOL/L (ref 136–145)
SODIUM SERPL-SCNC: 141 MMOL/L (ref 136–145)
SODIUM SERPL-SCNC: 141 MMOL/L (ref 136–145)
SP GR UR STRIP.AUTO: 1.02 (ref 1–1.03)
SP GR UR STRIP.AUTO: 1.02 (ref 1–1.03)
SPECIMEN EXPIRATION DATE: NORMAL
SPECIMEN SOURCE: ABNORMAL
T WAVE AXIS: 0 DEGREES
T WAVE AXIS: 66 DEGREES
TOTAL CELLS COUNTED SPEC: 100
TOXIC GRANULES BLD QL SMEAR: PRESENT
UNIT DISPENSE STATUS: NORMAL
UNIT PRODUCT CODE: NORMAL
UNIT RH: NORMAL
URATE SERPL-MCNC: 16.4 MG/DL (ref 4.2–8)
URATE SERPL-MCNC: 3.3 MG/DL (ref 4.2–8)
URATE SERPL-MCNC: 5.3 MG/DL (ref 4.2–8)
UROBILINOGEN UR QL STRIP.AUTO: 0.2 E.U./DL
UROBILINOGEN UR QL STRIP.AUTO: 0.2 E.U./DL
VENT AC: 28
VENT SIMV: 28
VENT SIMV: 28
VENT- AC: AC
VENT- AC: AC
VENTRICULAR RATE: 169 BPM
VENTRICULAR RATE: 182 BPM
VT SETTING VENT: 450 ML
WBC # BLD AUTO: 102.97 THOUSAND/UL (ref 4.31–10.16)
WBC # BLD AUTO: 51.17 THOUSAND/UL (ref 4.31–10.16)
WBC # BLD AUTO: 72.39 THOUSAND/UL (ref 4.31–10.16)
WBC #/AREA URNS AUTO: ABNORMAL /HPF
WBC #/AREA URNS AUTO: ABNORMAL /HPF

## 2021-01-01 PROCEDURE — 83735 ASSAY OF MAGNESIUM: CPT | Performed by: PHYSICIAN ASSISTANT

## 2021-01-01 PROCEDURE — 80048 BASIC METABOLIC PNL TOTAL CA: CPT | Performed by: PHYSICIAN ASSISTANT

## 2021-01-01 PROCEDURE — 99441 PR PHYS/QHP TELEPHONE EVALUATION 5-10 MIN: CPT | Performed by: SURGERY

## 2021-01-01 PROCEDURE — 84100 ASSAY OF PHOSPHORUS: CPT | Performed by: INTERNAL MEDICINE

## 2021-01-01 PROCEDURE — 11045 DBRDMT SUBQ TISS EACH ADDL: CPT | Performed by: FAMILY MEDICINE

## 2021-01-01 PROCEDURE — NC001 PR NO CHARGE: Performed by: INTERNAL MEDICINE

## 2021-01-01 PROCEDURE — 99238 HOSP IP/OBS DSCHRG MGMT 30/<: CPT | Performed by: NURSE PRACTITIONER

## 2021-01-01 PROCEDURE — 84132 ASSAY OF SERUM POTASSIUM: CPT

## 2021-01-01 PROCEDURE — 82948 REAGENT STRIP/BLOOD GLUCOSE: CPT

## 2021-01-01 PROCEDURE — 93005 ELECTROCARDIOGRAM TRACING: CPT

## 2021-01-01 PROCEDURE — 83605 ASSAY OF LACTIC ACID: CPT | Performed by: NURSE PRACTITIONER

## 2021-01-01 PROCEDURE — 96375 TX/PRO/DX INJ NEW DRUG ADDON: CPT

## 2021-01-01 PROCEDURE — 90945 DIALYSIS ONE EVALUATION: CPT | Performed by: INTERNAL MEDICINE

## 2021-01-01 PROCEDURE — 71275 CT ANGIOGRAPHY CHEST: CPT

## 2021-01-01 PROCEDURE — 4A133B1 MONITORING OF ARTERIAL PRESSURE, PERIPHERAL, PERCUTANEOUS APPROACH: ICD-10-PCS | Performed by: INTERNAL MEDICINE

## 2021-01-01 PROCEDURE — 85018 HEMOGLOBIN: CPT | Performed by: NURSE PRACTITIONER

## 2021-01-01 PROCEDURE — 82947 ASSAY GLUCOSE BLOOD QUANT: CPT

## 2021-01-01 PROCEDURE — 85014 HEMATOCRIT: CPT

## 2021-01-01 PROCEDURE — 82805 BLOOD GASES W/O2 SATURATION: CPT | Performed by: NURSE PRACTITIONER

## 2021-01-01 PROCEDURE — 87040 BLOOD CULTURE FOR BACTERIA: CPT | Performed by: NURSE PRACTITIONER

## 2021-01-01 PROCEDURE — 76937 US GUIDE VASCULAR ACCESS: CPT | Performed by: NURSE PRACTITIONER

## 2021-01-01 PROCEDURE — 83605 ASSAY OF LACTIC ACID: CPT | Performed by: PHYSICIAN ASSISTANT

## 2021-01-01 PROCEDURE — 36556 INSERT NON-TUNNEL CV CATH: CPT | Performed by: NURSE PRACTITIONER

## 2021-01-01 PROCEDURE — 85027 COMPLETE CBC AUTOMATED: CPT | Performed by: NURSE PRACTITIONER

## 2021-01-01 PROCEDURE — 83735 ASSAY OF MAGNESIUM: CPT | Performed by: INTERNAL MEDICINE

## 2021-01-01 PROCEDURE — 84145 PROCALCITONIN (PCT): CPT | Performed by: NURSE PRACTITIONER

## 2021-01-01 PROCEDURE — 73700 CT LOWER EXTREMITY W/O DYE: CPT

## 2021-01-01 PROCEDURE — NC001 PR NO CHARGE: Performed by: NURSE PRACTITIONER

## 2021-01-01 PROCEDURE — 83735 ASSAY OF MAGNESIUM: CPT | Performed by: NURSE PRACTITIONER

## 2021-01-01 PROCEDURE — 99213 OFFICE O/P EST LOW 20 MIN: CPT | Performed by: FAMILY MEDICINE

## 2021-01-01 PROCEDURE — 87040 BLOOD CULTURE FOR BACTERIA: CPT | Performed by: PHYSICIAN ASSISTANT

## 2021-01-01 PROCEDURE — 4A133J1 MONITORING OF ARTERIAL PULSE, PERIPHERAL, PERCUTANEOUS APPROACH: ICD-10-PCS | Performed by: INTERNAL MEDICINE

## 2021-01-01 PROCEDURE — 72192 CT PELVIS W/O DYE: CPT

## 2021-01-01 PROCEDURE — 11042 DBRDMT SUBQ TIS 1ST 20SQCM/<: CPT | Performed by: PODIATRIST

## 2021-01-01 PROCEDURE — 82553 CREATINE MB FRACTION: CPT | Performed by: INTERNAL MEDICINE

## 2021-01-01 PROCEDURE — 82550 ASSAY OF CK (CPK): CPT | Performed by: NURSE PRACTITIONER

## 2021-01-01 PROCEDURE — 96365 THER/PROPH/DIAG IV INF INIT: CPT

## 2021-01-01 PROCEDURE — 31500 INSERT EMERGENCY AIRWAY: CPT | Performed by: INTERNAL MEDICINE

## 2021-01-01 PROCEDURE — 93922 UPR/L XTREMITY ART 2 LEVELS: CPT | Performed by: SURGERY

## 2021-01-01 PROCEDURE — 82550 ASSAY OF CK (CPK): CPT | Performed by: INTERNAL MEDICINE

## 2021-01-01 PROCEDURE — 70450 CT HEAD/BRAIN W/O DYE: CPT

## 2021-01-01 PROCEDURE — 93926 LOWER EXTREMITY STUDY: CPT

## 2021-01-01 PROCEDURE — 99291 CRITICAL CARE FIRST HOUR: CPT | Performed by: INTERNAL MEDICINE

## 2021-01-01 PROCEDURE — 94150 VITAL CAPACITY TEST: CPT

## 2021-01-01 PROCEDURE — 85007 BL SMEAR W/DIFF WBC COUNT: CPT | Performed by: NURSE PRACTITIONER

## 2021-01-01 PROCEDURE — 90945 DIALYSIS ONE EVALUATION: CPT

## 2021-01-01 PROCEDURE — 30233N1 TRANSFUSION OF NONAUTOLOGOUS RED BLOOD CELLS INTO PERIPHERAL VEIN, PERCUTANEOUS APPROACH: ICD-10-PCS | Performed by: INTERNAL MEDICINE

## 2021-01-01 PROCEDURE — 36600 WITHDRAWAL OF ARTERIAL BLOOD: CPT

## 2021-01-01 PROCEDURE — 96361 HYDRATE IV INFUSION ADD-ON: CPT

## 2021-01-01 PROCEDURE — 82553 CREATINE MB FRACTION: CPT | Performed by: EMERGENCY MEDICINE

## 2021-01-01 PROCEDURE — 82805 BLOOD GASES W/O2 SATURATION: CPT | Performed by: INTERNAL MEDICINE

## 2021-01-01 PROCEDURE — 82955 ASSAY OF G6PD ENZYME: CPT | Performed by: NURSE PRACTITIONER

## 2021-01-01 PROCEDURE — 93978 VASCULAR STUDY: CPT | Performed by: SURGERY

## 2021-01-01 PROCEDURE — 11042 DBRDMT SUBQ TIS 1ST 20SQCM/<: CPT | Performed by: FAMILY MEDICINE

## 2021-01-01 PROCEDURE — 82803 BLOOD GASES ANY COMBINATION: CPT

## 2021-01-01 PROCEDURE — 5A1D90Z PERFORMANCE OF URINARY FILTRATION, CONTINUOUS, GREATER THAN 18 HOURS PER DAY: ICD-10-PCS | Performed by: INTERNAL MEDICINE

## 2021-01-01 PROCEDURE — 84295 ASSAY OF SERUM SODIUM: CPT

## 2021-01-01 PROCEDURE — 93970 EXTREMITY STUDY: CPT

## 2021-01-01 PROCEDURE — 71045 X-RAY EXAM CHEST 1 VIEW: CPT

## 2021-01-01 PROCEDURE — 84145 PROCALCITONIN (PCT): CPT | Performed by: INTERNAL MEDICINE

## 2021-01-01 PROCEDURE — 85384 FIBRINOGEN ACTIVITY: CPT | Performed by: INTERNAL MEDICINE

## 2021-01-01 PROCEDURE — 82330 ASSAY OF CALCIUM: CPT | Performed by: INTERNAL MEDICINE

## 2021-01-01 PROCEDURE — 82550 ASSAY OF CK (CPK): CPT | Performed by: EMERGENCY MEDICINE

## 2021-01-01 PROCEDURE — 93926 LOWER EXTREMITY STUDY: CPT | Performed by: SURGERY

## 2021-01-01 PROCEDURE — 36556 INSERT NON-TUNNEL CV CATH: CPT | Performed by: INTERNAL MEDICINE

## 2021-01-01 PROCEDURE — G1004 CDSM NDSC: HCPCS

## 2021-01-01 PROCEDURE — 74177 CT ABD & PELVIS W/CONTRAST: CPT

## 2021-01-01 PROCEDURE — 93978 VASCULAR STUDY: CPT

## 2021-01-01 PROCEDURE — 93970 EXTREMITY STUDY: CPT | Performed by: SURGERY

## 2021-01-01 PROCEDURE — 86850 RBC ANTIBODY SCREEN: CPT | Performed by: NURSE PRACTITIONER

## 2021-01-01 PROCEDURE — 86900 BLOOD TYPING SEROLOGIC ABO: CPT | Performed by: NURSE PRACTITIONER

## 2021-01-01 PROCEDURE — 82330 ASSAY OF CALCIUM: CPT

## 2021-01-01 PROCEDURE — 85610 PROTHROMBIN TIME: CPT | Performed by: PHYSICIAN ASSISTANT

## 2021-01-01 PROCEDURE — 80048 BASIC METABOLIC PNL TOTAL CA: CPT | Performed by: NURSE PRACTITIONER

## 2021-01-01 PROCEDURE — 36620 INSERTION CATHETER ARTERY: CPT | Performed by: NURSE PRACTITIONER

## 2021-01-01 PROCEDURE — 02HV33Z INSERTION OF INFUSION DEVICE INTO SUPERIOR VENA CAVA, PERCUTANEOUS APPROACH: ICD-10-PCS | Performed by: INTERNAL MEDICINE

## 2021-01-01 PROCEDURE — 96368 THER/DIAG CONCURRENT INF: CPT

## 2021-01-01 PROCEDURE — 80048 BASIC METABOLIC PNL TOTAL CA: CPT | Performed by: INTERNAL MEDICINE

## 2021-01-01 PROCEDURE — 80053 COMPREHEN METABOLIC PANEL: CPT | Performed by: PHYSICIAN ASSISTANT

## 2021-01-01 PROCEDURE — P9016 RBC LEUKOCYTES REDUCED: HCPCS

## 2021-01-01 PROCEDURE — 85027 COMPLETE CBC AUTOMATED: CPT | Performed by: PHYSICIAN ASSISTANT

## 2021-01-01 PROCEDURE — 84550 ASSAY OF BLOOD/URIC ACID: CPT | Performed by: INTERNAL MEDICINE

## 2021-01-01 PROCEDURE — 97597 DBRDMT OPN WND 1ST 20 CM/<: CPT | Performed by: NURSE PRACTITIONER

## 2021-01-01 PROCEDURE — 94002 VENT MGMT INPAT INIT DAY: CPT

## 2021-01-01 PROCEDURE — 94762 N-INVAS EAR/PLS OXIMTRY CONT: CPT

## 2021-01-01 PROCEDURE — 82553 CREATINE MB FRACTION: CPT | Performed by: NURSE PRACTITIONER

## 2021-01-01 PROCEDURE — 87147 CULTURE TYPE IMMUNOLOGIC: CPT | Performed by: NURSE PRACTITIONER

## 2021-01-01 PROCEDURE — 85007 BL SMEAR W/DIFF WBC COUNT: CPT | Performed by: PHYSICIAN ASSISTANT

## 2021-01-01 PROCEDURE — 88184 FLOWCYTOMETRY/ TC 1 MARKER: CPT | Performed by: NURSE PRACTITIONER

## 2021-01-01 PROCEDURE — 76705 ECHO EXAM OF ABDOMEN: CPT

## 2021-01-01 PROCEDURE — 99214 OFFICE O/P EST MOD 30 MIN: CPT | Performed by: NURSE PRACTITIONER

## 2021-01-01 PROCEDURE — 36415 COLL VENOUS BLD VENIPUNCTURE: CPT | Performed by: PHYSICIAN ASSISTANT

## 2021-01-01 PROCEDURE — 87081 CULTURE SCREEN ONLY: CPT | Performed by: NURSE PRACTITIONER

## 2021-01-01 PROCEDURE — 94760 N-INVAS EAR/PLS OXIMETRY 1: CPT

## 2021-01-01 PROCEDURE — 76937 US GUIDE VASCULAR ACCESS: CPT | Performed by: INTERNAL MEDICINE

## 2021-01-01 PROCEDURE — 99232 SBSQ HOSP IP/OBS MODERATE 35: CPT | Performed by: SURGERY

## 2021-01-01 PROCEDURE — 84145 PROCALCITONIN (PCT): CPT | Performed by: PHYSICIAN ASSISTANT

## 2021-01-01 PROCEDURE — 03HY32Z INSERTION OF MONITORING DEVICE INTO UPPER ARTERY, PERCUTANEOUS APPROACH: ICD-10-PCS | Performed by: INTERNAL MEDICINE

## 2021-01-01 PROCEDURE — 83690 ASSAY OF LIPASE: CPT | Performed by: PHYSICIAN ASSISTANT

## 2021-01-01 PROCEDURE — 87147 CULTURE TYPE IMMUNOLOGIC: CPT | Performed by: PHYSICIAN ASSISTANT

## 2021-01-01 PROCEDURE — 99284 EMERGENCY DEPT VISIT MOD MDM: CPT | Performed by: PHYSICIAN ASSISTANT

## 2021-01-01 PROCEDURE — C9113 INJ PANTOPRAZOLE SODIUM, VIA: HCPCS | Performed by: NURSE PRACTITIONER

## 2021-01-01 PROCEDURE — 5A1935Z RESPIRATORY VENTILATION, LESS THAN 24 CONSECUTIVE HOURS: ICD-10-PCS | Performed by: INTERNAL MEDICINE

## 2021-01-01 PROCEDURE — 86901 BLOOD TYPING SEROLOGIC RH(D): CPT | Performed by: NURSE PRACTITIONER

## 2021-01-01 PROCEDURE — 81001 URINALYSIS AUTO W/SCOPE: CPT | Performed by: NURSE PRACTITIONER

## 2021-01-01 PROCEDURE — 0BH18EZ INSERTION OF ENDOTRACHEAL AIRWAY INTO TRACHEA, VIA NATURAL OR ARTIFICIAL OPENING ENDOSCOPIC: ICD-10-PCS | Performed by: INTERNAL MEDICINE

## 2021-01-01 PROCEDURE — 82533 TOTAL CORTISOL: CPT | Performed by: NURSE PRACTITIONER

## 2021-01-01 PROCEDURE — 99214 OFFICE O/P EST MOD 30 MIN: CPT | Performed by: FAMILY MEDICINE

## 2021-01-01 PROCEDURE — 81001 URINALYSIS AUTO W/SCOPE: CPT | Performed by: PHYSICIAN ASSISTANT

## 2021-01-01 PROCEDURE — 93010 ELECTROCARDIOGRAM REPORT: CPT | Performed by: INTERNAL MEDICINE

## 2021-01-01 PROCEDURE — 99285 EMERGENCY DEPT VISIT HI MDM: CPT | Performed by: INTERNAL MEDICINE

## 2021-01-01 PROCEDURE — 0241U HB NFCT DS VIR RESP RNA 4 TRGT: CPT | Performed by: PHYSICIAN ASSISTANT

## 2021-01-01 PROCEDURE — 99291 CRITICAL CARE FIRST HOUR: CPT | Performed by: NURSE PRACTITIONER

## 2021-01-01 PROCEDURE — 82330 ASSAY OF CALCIUM: CPT | Performed by: NURSE PRACTITIONER

## 2021-01-01 PROCEDURE — 83880 ASSAY OF NATRIURETIC PEPTIDE: CPT | Performed by: PHYSICIAN ASSISTANT

## 2021-01-01 PROCEDURE — 85730 THROMBOPLASTIN TIME PARTIAL: CPT | Performed by: PHYSICIAN ASSISTANT

## 2021-01-01 PROCEDURE — 86920 COMPATIBILITY TEST SPIN: CPT

## 2021-01-01 PROCEDURE — 85014 HEMATOCRIT: CPT | Performed by: NURSE PRACTITIONER

## 2021-01-01 PROCEDURE — G0390 TRAUMA RESPONS W/HOSP CRITI: HCPCS

## 2021-01-01 PROCEDURE — 93280 PM DEVICE PROGR EVAL DUAL: CPT | Performed by: INTERNAL MEDICINE

## 2021-01-01 PROCEDURE — 96376 TX/PRO/DX INJ SAME DRUG ADON: CPT

## 2021-01-01 PROCEDURE — 80076 HEPATIC FUNCTION PANEL: CPT | Performed by: NURSE PRACTITIONER

## 2021-01-01 PROCEDURE — 83615 LACTATE (LD) (LDH) ENZYME: CPT | Performed by: INTERNAL MEDICINE

## 2021-01-01 PROCEDURE — 88185 FLOWCYTOMETRY/TC ADD-ON: CPT

## 2021-01-01 PROCEDURE — 96366 THER/PROPH/DIAG IV INF ADDON: CPT

## 2021-01-01 PROCEDURE — 99291 CRITICAL CARE FIRST HOUR: CPT

## 2021-01-01 RX ORDER — ETOMIDATE 2 MG/ML
20 INJECTION INTRAVENOUS ONCE
Status: COMPLETED | OUTPATIENT
Start: 2021-01-01 | End: 2021-01-01

## 2021-01-01 RX ORDER — FENTANYL CITRATE 50 UG/ML
50 INJECTION, SOLUTION INTRAMUSCULAR; INTRAVENOUS
Status: DISCONTINUED | OUTPATIENT
Start: 2021-01-01 | End: 2021-01-01 | Stop reason: HOSPADM

## 2021-01-01 RX ORDER — LIDOCAINE HYDROCHLORIDE 40 MG/ML
5 SOLUTION TOPICAL ONCE
Status: COMPLETED | OUTPATIENT
Start: 2021-01-01 | End: 2021-01-01

## 2021-01-01 RX ORDER — CALCIUM CHLORIDE 100 MG/ML
1 INJECTION INTRAVENOUS; INTRAVENTRICULAR ONCE
Status: COMPLETED | OUTPATIENT
Start: 2021-01-01 | End: 2021-01-01

## 2021-01-01 RX ORDER — OXYCODONE HYDROCHLORIDE AND ACETAMINOPHEN 5; 325 MG/1; MG/1
1 TABLET ORAL EVERY 12 HOURS PRN
Qty: 30 TABLET | Refills: 0 | Status: SHIPPED | OUTPATIENT
Start: 2021-01-01 | End: 2021-01-01 | Stop reason: SDUPTHER

## 2021-01-01 RX ORDER — METHYLPREDNISOLONE SODIUM SUCCINATE 125 MG/2ML
125 INJECTION, POWDER, LYOPHILIZED, FOR SOLUTION INTRAMUSCULAR; INTRAVENOUS ONCE
Status: COMPLETED | OUTPATIENT
Start: 2021-01-01 | End: 2021-01-01

## 2021-01-01 RX ORDER — DEXMEDETOMIDINE 100 UG/ML
.1-1.5 INJECTION, SOLUTION, CONCENTRATE INTRAVENOUS
Status: DISCONTINUED | OUTPATIENT
Start: 2021-01-01 | End: 2021-01-01

## 2021-01-01 RX ORDER — CALCIUM GLUCONATE 20 MG/ML
1 INJECTION, SOLUTION INTRAVENOUS ONCE
Status: COMPLETED | OUTPATIENT
Start: 2021-01-01 | End: 2021-01-01

## 2021-01-01 RX ORDER — VANCOMYCIN HYDROCHLORIDE 1 G/200ML
15 INJECTION, SOLUTION INTRAVENOUS DAILY PRN
Status: DISCONTINUED | OUTPATIENT
Start: 2021-01-01 | End: 2021-01-01

## 2021-01-01 RX ORDER — CHLORHEXIDINE GLUCONATE 0.12 MG/ML
15 RINSE ORAL EVERY 12 HOURS SCHEDULED
Status: DISCONTINUED | OUTPATIENT
Start: 2021-01-01 | End: 2021-01-01 | Stop reason: HOSPADM

## 2021-01-01 RX ORDER — SODIUM CHLORIDE, SODIUM GLUCONATE, SODIUM ACETATE, POTASSIUM CHLORIDE, MAGNESIUM CHLORIDE, SODIUM PHOSPHATE, DIBASIC, AND POTASSIUM PHOSPHATE .53; .5; .37; .037; .03; .012; .00082 G/100ML; G/100ML; G/100ML; G/100ML; G/100ML; G/100ML; G/100ML
500 INJECTION, SOLUTION INTRAVENOUS ONCE
Status: COMPLETED | OUTPATIENT
Start: 2021-01-01 | End: 2021-01-01

## 2021-01-01 RX ORDER — ATORVASTATIN CALCIUM 40 MG/1
40 TABLET, FILM COATED ORAL
Status: DISCONTINUED | OUTPATIENT
Start: 2021-01-01 | End: 2021-01-01

## 2021-01-01 RX ORDER — CALCIUM GLUCONATE 20 MG/ML
2 INJECTION, SOLUTION INTRAVENOUS ONCE
Status: COMPLETED | OUTPATIENT
Start: 2021-01-01 | End: 2021-01-01

## 2021-01-01 RX ORDER — CLINDAMYCIN PHOSPHATE 900 MG/50ML
900 INJECTION INTRAVENOUS EVERY 8 HOURS
Status: DISCONTINUED | OUTPATIENT
Start: 2021-01-01 | End: 2021-01-01 | Stop reason: HOSPADM

## 2021-01-01 RX ORDER — FAMOTIDINE 20 MG/1
20 TABLET, FILM COATED ORAL 2 TIMES DAILY
Qty: 14 TABLET | Refills: 0 | Status: SHIPPED | OUTPATIENT
Start: 2021-01-01 | End: 2021-01-01 | Stop reason: HOSPADM

## 2021-01-01 RX ORDER — CLOTRIMAZOLE AND BETAMETHASONE DIPROPIONATE 10; .64 MG/G; MG/G
CREAM TOPICAL 2 TIMES DAILY
Status: DISCONTINUED | OUTPATIENT
Start: 2021-01-01 | End: 2021-01-01 | Stop reason: HOSPADM

## 2021-01-01 RX ORDER — VANCOMYCIN HYDROCHLORIDE 1 G/200ML
15 INJECTION, SOLUTION INTRAVENOUS ONCE
Status: COMPLETED | OUTPATIENT
Start: 2021-01-01 | End: 2021-01-01

## 2021-01-01 RX ORDER — SODIUM CHLORIDE, SODIUM GLUCONATE, SODIUM ACETATE, POTASSIUM CHLORIDE, MAGNESIUM CHLORIDE, SODIUM PHOSPHATE, DIBASIC, AND POTASSIUM PHOSPHATE .53; .5; .37; .037; .03; .012; .00082 G/100ML; G/100ML; G/100ML; G/100ML; G/100ML; G/100ML; G/100ML
1000 INJECTION, SOLUTION INTRAVENOUS ONCE
Status: COMPLETED | OUTPATIENT
Start: 2021-01-01 | End: 2021-01-01

## 2021-01-01 RX ORDER — PANTOPRAZOLE SODIUM 40 MG/1
40 INJECTION, POWDER, FOR SOLUTION INTRAVENOUS
Status: DISCONTINUED | OUTPATIENT
Start: 2021-01-01 | End: 2021-01-01 | Stop reason: HOSPADM

## 2021-01-01 RX ORDER — LISINOPRIL 5 MG/1
5 TABLET ORAL DAILY
Qty: 30 TABLET | Refills: 3 | Status: SHIPPED | OUTPATIENT
Start: 2021-01-01 | End: 2021-01-01 | Stop reason: HOSPADM

## 2021-01-01 RX ORDER — METOPROLOL SUCCINATE 25 MG/1
25 TABLET, EXTENDED RELEASE ORAL DAILY
Qty: 90 TABLET | Refills: 1 | Status: SHIPPED | OUTPATIENT
Start: 2021-01-01 | End: 2021-01-01 | Stop reason: HOSPADM

## 2021-01-01 RX ORDER — PANTOPRAZOLE SODIUM 40 MG/1
40 TABLET, DELAYED RELEASE ORAL
Qty: 90 TABLET | Refills: 1 | Status: SHIPPED | OUTPATIENT
Start: 2021-01-01 | End: 2021-01-01 | Stop reason: HOSPADM

## 2021-01-01 RX ORDER — CLOTRIMAZOLE AND BETAMETHASONE DIPROPIONATE 10; .64 MG/G; MG/G
CREAM TOPICAL 2 TIMES DAILY
Qty: 30 G | Refills: 0 | Status: SHIPPED | OUTPATIENT
Start: 2021-01-01 | End: 2021-01-01 | Stop reason: HOSPADM

## 2021-01-01 RX ORDER — FOLIC ACID 1 MG/1
1 TABLET ORAL DAILY
Status: DISCONTINUED | OUTPATIENT
Start: 2021-01-01 | End: 2021-01-01 | Stop reason: HOSPADM

## 2021-01-01 RX ORDER — DIPHENHYDRAMINE HYDROCHLORIDE 50 MG/ML
25 INJECTION INTRAMUSCULAR; INTRAVENOUS ONCE
Status: COMPLETED | OUTPATIENT
Start: 2021-01-01 | End: 2021-01-01

## 2021-01-01 RX ORDER — LIDOCAINE HYDROCHLORIDE 10 MG/ML
5 INJECTION, SOLUTION EPIDURAL; INFILTRATION; INTRACAUDAL; PERINEURAL ONCE
Status: COMPLETED | OUTPATIENT
Start: 2021-01-01 | End: 2021-01-01

## 2021-01-01 RX ORDER — HEPARIN SODIUM 5000 [USP'U]/ML
5000 INJECTION, SOLUTION INTRAVENOUS; SUBCUTANEOUS EVERY 8 HOURS SCHEDULED
Status: DISCONTINUED | OUTPATIENT
Start: 2021-01-01 | End: 2021-01-01 | Stop reason: HOSPADM

## 2021-01-01 RX ORDER — VECURONIUM BROMIDE 1 MG/ML
10 INJECTION, POWDER, LYOPHILIZED, FOR SOLUTION INTRAVENOUS ONCE
Status: COMPLETED | OUTPATIENT
Start: 2021-01-01 | End: 2021-01-01

## 2021-01-01 RX ORDER — OXYCODONE HYDROCHLORIDE AND ACETAMINOPHEN 5; 325 MG/1; MG/1
1 TABLET ORAL EVERY 12 HOURS PRN
Qty: 30 TABLET | Refills: 0 | Status: SHIPPED | OUTPATIENT
Start: 2021-01-01 | End: 2021-01-01 | Stop reason: HOSPADM

## 2021-01-01 RX ORDER — PREDNISONE 10 MG/1
10 TABLET ORAL DAILY
Status: DISCONTINUED | OUTPATIENT
Start: 2021-01-01 | End: 2021-01-01

## 2021-01-01 RX ORDER — CLINDAMYCIN HYDROCHLORIDE 300 MG/1
300 CAPSULE ORAL 3 TIMES DAILY
Qty: 21 CAPSULE | Refills: 0 | Status: SHIPPED | OUTPATIENT
Start: 2021-01-01 | End: 2021-01-01

## 2021-01-01 RX ORDER — PREDNISONE 10 MG/1
10 TABLET ORAL DAILY
Qty: 32 TABLET | Refills: 0 | Status: SHIPPED | OUTPATIENT
Start: 2021-01-01 | End: 2021-01-01 | Stop reason: HOSPADM

## 2021-01-01 RX ORDER — SODIUM CHLORIDE, SODIUM GLUCONATE, SODIUM ACETATE, POTASSIUM CHLORIDE, MAGNESIUM CHLORIDE, SODIUM PHOSPHATE, DIBASIC, AND POTASSIUM PHOSPHATE .53; .5; .37; .037; .03; .012; .00082 G/100ML; G/100ML; G/100ML; G/100ML; G/100ML; G/100ML; G/100ML
1100 INJECTION, SOLUTION INTRAVENOUS ONCE
Status: DISCONTINUED | OUTPATIENT
Start: 2021-01-01 | End: 2021-01-01

## 2021-01-01 RX ORDER — METOPROLOL SUCCINATE 25 MG/1
TABLET, EXTENDED RELEASE ORAL
Qty: 90 TABLET | Refills: 1 | OUTPATIENT
Start: 2021-01-01

## 2021-01-01 RX ORDER — PANTOPRAZOLE SODIUM 40 MG/1
TABLET, DELAYED RELEASE ORAL
Qty: 90 TABLET | Refills: 1 | OUTPATIENT
Start: 2021-01-01

## 2021-01-01 RX ORDER — ATORVASTATIN CALCIUM 40 MG/1
40 TABLET, FILM COATED ORAL
Qty: 90 TABLET | Refills: 1 | Status: SHIPPED | OUTPATIENT
Start: 2021-01-01 | End: 2021-01-01 | Stop reason: HOSPADM

## 2021-01-01 RX ORDER — PROPOFOL 10 MG/ML
5-50 INJECTION, EMULSION INTRAVENOUS
Status: DISCONTINUED | OUTPATIENT
Start: 2021-01-01 | End: 2021-01-01 | Stop reason: HOSPADM

## 2021-01-01 RX ORDER — VECURONIUM BROMIDE 1 MG/ML
7 INJECTION, POWDER, LYOPHILIZED, FOR SOLUTION INTRAVENOUS ONCE
Status: DISCONTINUED | OUTPATIENT
Start: 2021-01-01 | End: 2021-01-01

## 2021-01-01 RX ORDER — DEXTROSE MONOHYDRATE 25 G/50ML
50 INJECTION, SOLUTION INTRAVENOUS ONCE
Status: COMPLETED | OUTPATIENT
Start: 2021-01-01 | End: 2021-01-01

## 2021-01-01 RX ORDER — CLINDAMYCIN PHOSPHATE 600 MG/50ML
600 INJECTION INTRAVENOUS EVERY 8 HOURS
Status: DISCONTINUED | OUTPATIENT
Start: 2021-01-01 | End: 2021-01-01

## 2021-01-01 RX ADMIN — NOREPINEPHRINE BITARTRATE 5 MCG/MIN: 1 INJECTION INTRAVENOUS at 00:34

## 2021-01-01 RX ADMIN — Medication 50 MEQ: at 08:14

## 2021-01-01 RX ADMIN — METRONIDAZOLE 500 MG: 500 INJECTION, SOLUTION INTRAVENOUS at 05:20

## 2021-01-01 RX ADMIN — NOREPINEPHRINE BITARTRATE 30 MCG/MIN: 1 INJECTION INTRAVENOUS at 08:40

## 2021-01-01 RX ADMIN — SODIUM CHLORIDE 1000 ML: 0.9 INJECTION, SOLUTION INTRAVENOUS at 17:41

## 2021-01-01 RX ADMIN — CHLORHEXIDINE GLUCONATE 0.12% ORAL RINSE 15 ML: 1.2 LIQUID ORAL at 22:10

## 2021-01-01 RX ADMIN — VASOPRESSIN 0.04 UNITS/MIN: 20 INJECTION INTRAVENOUS at 00:15

## 2021-01-01 RX ADMIN — SODIUM BICARBONATE 250 ML/HR: 84 INJECTION, SOLUTION INTRAVENOUS at 04:23

## 2021-01-01 RX ADMIN — LIDOCAINE HYDROCHLORIDE 5 ML: 40 SOLUTION TOPICAL at 15:29

## 2021-01-01 RX ADMIN — VASOPRESSIN 0.04 UNITS/MIN: 20 INJECTION INTRAVENOUS at 07:40

## 2021-01-01 RX ADMIN — SODIUM BICARBONATE 50 MEQ: 84 INJECTION, SOLUTION INTRAVENOUS at 08:14

## 2021-01-01 RX ADMIN — SODIUM CHLORIDE 4.5 MG: 9 INJECTION, SOLUTION INTRAVENOUS at 23:15

## 2021-01-01 RX ADMIN — Medication 0.2 MCG/KG/HR: at 06:46

## 2021-01-01 RX ADMIN — SODIUM CHLORIDE, SODIUM GLUCONATE, SODIUM ACETATE, POTASSIUM CHLORIDE, MAGNESIUM CHLORIDE, SODIUM PHOSPHATE, DIBASIC, AND POTASSIUM PHOSPHATE 1100 ML: .53; .5; .37; .037; .03; .012; .00082 INJECTION, SOLUTION INTRAVENOUS at 19:15

## 2021-01-01 RX ADMIN — CALCIUM GLUCONATE 1 G: 20 INJECTION, SOLUTION INTRAVENOUS at 23:15

## 2021-01-01 RX ADMIN — CLINDAMYCIN IN 5 PERCENT DEXTROSE 900 MG: 18 INJECTION, SOLUTION INTRAVENOUS at 11:40

## 2021-01-01 RX ADMIN — LIDOCAINE HYDROCHLORIDE 5 ML: 40 SOLUTION TOPICAL at 14:14

## 2021-01-01 RX ADMIN — CLINDAMYCIN IN 5 PERCENT DEXTROSE 900 MG: 18 INJECTION, SOLUTION INTRAVENOUS at 19:51

## 2021-01-01 RX ADMIN — METHYLPREDNISOLONE SODIUM SUCCINATE 125 MG: 125 INJECTION, POWDER, FOR SOLUTION INTRAMUSCULAR; INTRAVENOUS at 14:36

## 2021-01-01 RX ADMIN — SODIUM CHLORIDE, SODIUM GLUCONATE, SODIUM ACETATE, POTASSIUM CHLORIDE, MAGNESIUM CHLORIDE, SODIUM PHOSPHATE, DIBASIC, AND POTASSIUM PHOSPHATE 500 ML: .53; .5; .37; .037; .03; .012; .00082 INJECTION, SOLUTION INTRAVENOUS at 02:16

## 2021-01-01 RX ADMIN — DEXTROSE MONOHYDRATE 50 ML: 500 INJECTION PARENTERAL at 18:29

## 2021-01-01 RX ADMIN — HEPARIN SODIUM 5000 UNITS: 5000 INJECTION INTRAVENOUS; SUBCUTANEOUS at 22:11

## 2021-01-01 RX ADMIN — HEPARIN SODIUM 5000 UNITS: 5000 INJECTION INTRAVENOUS; SUBCUTANEOUS at 23:15

## 2021-01-01 RX ADMIN — SODIUM CHLORIDE 1.5 MG: 9 INJECTION, SOLUTION INTRAVENOUS at 02:23

## 2021-01-01 RX ADMIN — FENTANYL CITRATE 50 MCG: 50 INJECTION INTRAMUSCULAR; INTRAVENOUS at 23:49

## 2021-01-01 RX ADMIN — DIPHENHYDRAMINE HYDROCHLORIDE 25 MG: 50 INJECTION, SOLUTION INTRAMUSCULAR; INTRAVENOUS at 14:45

## 2021-01-01 RX ADMIN — SODIUM BICARBONATE 50 MEQ: 84 INJECTION, SOLUTION INTRAVENOUS at 08:17

## 2021-01-01 RX ADMIN — CALCIUM CHLORIDE 1 G: 100 INJECTION PARENTERAL at 22:11

## 2021-01-01 RX ADMIN — SODIUM BICARBONATE 250 ML/HR: 84 INJECTION, SOLUTION INTRAVENOUS at 01:03

## 2021-01-01 RX ADMIN — CALCIUM GLUCONATE 2 G: 20 INJECTION, SOLUTION INTRAVENOUS at 05:15

## 2021-01-01 RX ADMIN — FENTANYL CITRATE 50 MCG: 50 INJECTION INTRAMUSCULAR; INTRAVENOUS at 11:11

## 2021-01-01 RX ADMIN — LIDOCAINE HYDROCHLORIDE 5 ML: 10 INJECTION, SOLUTION EPIDURAL; INFILTRATION; INTRACAUDAL at 02:34

## 2021-01-01 RX ADMIN — SODIUM BICARBONATE 250 ML/HR: 84 INJECTION, SOLUTION INTRAVENOUS at 23:10

## 2021-01-01 RX ADMIN — CALCIUM GLUCONATE 2 G: 20 INJECTION, SOLUTION INTRAVENOUS at 18:54

## 2021-01-01 RX ADMIN — Medication 20000 ML: at 16:48

## 2021-01-01 RX ADMIN — SODIUM CHLORIDE, SODIUM GLUCONATE, SODIUM ACETATE, POTASSIUM CHLORIDE, MAGNESIUM CHLORIDE, SODIUM PHOSPHATE, DIBASIC, AND POTASSIUM PHOSPHATE 1000 ML: .53; .5; .37; .037; .03; .012; .00082 INJECTION, SOLUTION INTRAVENOUS at 18:12

## 2021-01-01 RX ADMIN — EPINEPHRINE 2 MCG/MIN: 1 INJECTION, SOLUTION, CONCENTRATE INTRAVENOUS at 12:36

## 2021-01-01 RX ADMIN — SODIUM BICARBONATE 50 MEQ: 84 INJECTION, SOLUTION INTRAVENOUS at 19:14

## 2021-01-01 RX ADMIN — CALCIUM GLUCONATE 3 G: 98 INJECTION, SOLUTION INTRAVENOUS at 10:11

## 2021-01-01 RX ADMIN — NOREPINEPHRINE BITARTRATE 30 MCG/MIN: 1 INJECTION INTRAVENOUS at 22:49

## 2021-01-01 RX ADMIN — LIDOCAINE HYDROCHLORIDE 5 ML: 40 SOLUTION TOPICAL at 15:41

## 2021-01-01 RX ADMIN — HEPARIN SODIUM 5000 UNITS: 5000 INJECTION INTRAVENOUS; SUBCUTANEOUS at 15:54

## 2021-01-01 RX ADMIN — HEPARIN SODIUM 5000 UNITS: 5000 INJECTION INTRAVENOUS; SUBCUTANEOUS at 05:16

## 2021-01-01 RX ADMIN — CALCIUM GLUCONATE 1 G: 20 INJECTION, SOLUTION INTRAVENOUS at 23:39

## 2021-01-01 RX ADMIN — NOREPINEPHRINE BITARTRATE 30 MCG/MIN: 1 INJECTION INTRAVENOUS at 01:20

## 2021-01-01 RX ADMIN — VASOPRESSIN 0.04 UNITS/MIN: 20 INJECTION INTRAVENOUS at 14:31

## 2021-01-01 RX ADMIN — PIPERACILLIN AND TAZOBACTAM 3.38 G: 3; .375 INJECTION, POWDER, LYOPHILIZED, FOR SOLUTION INTRAVENOUS at 18:35

## 2021-01-01 RX ADMIN — SODIUM BICARBONATE 250 ML/HR: 84 INJECTION, SOLUTION INTRAVENOUS at 08:38

## 2021-01-01 RX ADMIN — METRONIDAZOLE 500 MG: 500 INJECTION, SOLUTION INTRAVENOUS at 23:32

## 2021-01-01 RX ADMIN — SODIUM BICARBONATE 500 ML/HR: 84 INJECTION, SOLUTION INTRAVENOUS at 19:22

## 2021-01-01 RX ADMIN — CALCIUM GLUCONATE 3 G: 98 INJECTION, SOLUTION INTRAVENOUS at 18:02

## 2021-01-01 RX ADMIN — CALCIUM GLUCONATE 1 G: 20 INJECTION, SOLUTION INTRAVENOUS at 06:11

## 2021-01-01 RX ADMIN — SODIUM CHLORIDE, SODIUM GLUCONATE, SODIUM ACETATE, POTASSIUM CHLORIDE, MAGNESIUM CHLORIDE, SODIUM PHOSPHATE, DIBASIC, AND POTASSIUM PHOSPHATE 1000 ML: .53; .5; .37; .037; .03; .012; .00082 INJECTION, SOLUTION INTRAVENOUS at 00:33

## 2021-01-01 RX ADMIN — METRONIDAZOLE 500 MG: 500 INJECTION, SOLUTION INTRAVENOUS at 22:11

## 2021-01-01 RX ADMIN — CALCIUM GLUCONATE 3 G: 98 INJECTION, SOLUTION INTRAVENOUS at 13:39

## 2021-01-01 RX ADMIN — LIDOCAINE HYDROCHLORIDE 5 ML: 40 SOLUTION TOPICAL at 14:50

## 2021-01-01 RX ADMIN — METRONIDAZOLE 500 MG: 500 INJECTION, SOLUTION INTRAVENOUS at 15:49

## 2021-01-01 RX ADMIN — CHLORHEXIDINE GLUCONATE 0.12% ORAL RINSE 15 ML: 1.2 LIQUID ORAL at 12:51

## 2021-01-01 RX ADMIN — Medication 20000 ML: at 00:53

## 2021-01-01 RX ADMIN — CALCIUM GLUCONATE 2 G: 20 INJECTION, SOLUTION INTRAVENOUS at 00:52

## 2021-01-01 RX ADMIN — PANTOPRAZOLE SODIUM 40 MG: 40 INJECTION, POWDER, FOR SOLUTION INTRAVENOUS at 09:56

## 2021-01-01 RX ADMIN — Medication 50 MEQ: at 19:14

## 2021-01-01 RX ADMIN — CEFEPIME HYDROCHLORIDE 1000 MG: 1 INJECTION, POWDER, FOR SOLUTION INTRAMUSCULAR; INTRAVENOUS at 02:22

## 2021-01-01 RX ADMIN — VANCOMYCIN HYDROCHLORIDE 1500 MG: 5 INJECTION, POWDER, LYOPHILIZED, FOR SOLUTION INTRAVENOUS at 20:56

## 2021-01-01 RX ADMIN — FAMOTIDINE 20 MG: 10 INJECTION, SOLUTION INTRAVENOUS at 14:44

## 2021-01-01 RX ADMIN — INSULIN HUMAN 10 UNITS: 100 INJECTION, SOLUTION PARENTERAL at 18:45

## 2021-01-01 RX ADMIN — NOREPINEPHRINE BITARTRATE 30 MCG/MIN: 1 INJECTION INTRAVENOUS at 20:33

## 2021-01-01 RX ADMIN — LIDOCAINE HYDROCHLORIDE 5 ML: 40 SOLUTION TOPICAL at 15:23

## 2021-01-01 RX ADMIN — PROPOFOL 20 MCG/KG/MIN: 10 INJECTION, EMULSION INTRAVENOUS at 07:52

## 2021-01-01 RX ADMIN — LIDOCAINE HYDROCHLORIDE 5 ML: 40 SOLUTION TOPICAL at 15:31

## 2021-01-01 RX ADMIN — VECURONIUM BROMIDE 10 MG: 1 INJECTION, POWDER, LYOPHILIZED, FOR SOLUTION INTRAVENOUS at 07:49

## 2021-01-01 RX ADMIN — ETOMIDATE 20 MG: 2 INJECTION INTRAVENOUS at 07:49

## 2021-01-01 RX ADMIN — LIDOCAINE HYDROCHLORIDE 5 ML: 40 SOLUTION TOPICAL at 12:19

## 2021-01-01 RX ADMIN — VANCOMYCIN HYDROCHLORIDE 1000 MG: 1 INJECTION, SOLUTION INTRAVENOUS at 19:45

## 2021-01-01 RX ADMIN — CEFEPIME HYDROCHLORIDE 2000 MG: 2 INJECTION, POWDER, FOR SOLUTION INTRAVENOUS at 15:01

## 2021-01-01 RX ADMIN — NOREPINEPHRINE BITARTRATE 29 MCG/MIN: 1 INJECTION INTRAVENOUS at 15:01

## 2021-01-01 RX ADMIN — IOHEXOL 100 ML: 350 INJECTION, SOLUTION INTRAVENOUS at 17:56

## 2021-01-06 NOTE — PROGRESS NOTES
Results for orders placed or performed in visit on 01/06/21   Cardiac EP device report    Narrative    MDT/BI-V PPM (VVIR 80 PPPM) - ACTIVE SYSTEM IS MRI CONDITIONAL  DEVICE INTERROGATED IN THE [place] OFFICE: BATTERY VOLTAGE ADEQUATE (8 1 YRS)  BVP 99 9% ( 80 3% & VSR PACE 19 6% - VVIR 80)  ALL LEAD PARAMETERS WITHIN NORMAL LIMITS  NO NEW HIGH RATE EPISODES SINCE LAST REMOTE REPORTED (12/28/20)  EF 50% (7/17/20 ECHO)  S/P AVN ABLATION & PT ON METOPROLOL SUCC, NO A/C DUE TO PMX SUBDURAL HEMATOMA  PT REFUSED WATCHMAN DEVICE  OPTI-VOL FLUID THRESHOLD WAS CROSSED BUT NOW WNL  NO PROGRAMMING CHANGES MADE TO DEVICE PARAMETERS  NORMAL DEVICE FUNCTION     EBS

## 2021-01-12 NOTE — TELEPHONE ENCOUNTER
Please refill   Lisinopril 10 mg (I didn't see in his chart)    atorvastatin (LIPITOR) 40 mg tablet [[id number]] [facility]    Phone:  [phone]   Fax:  [phone]   Address:  [address] [place] [address] [place] [address]   EUN #:  --

## 2021-01-13 NOTE — PROGRESS NOTES
Patient ID: Artie Montoya is a 70 y o  male Date of Birth 1945     Chief Complaint  No chief complaint on file  Allergies  Blood-group specific substance    Assessment/Plan:    1  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 ([facility] )  2  Non-pressure chronic ulcer of right ankle with fat layer exposed ([facility] )  -     lidocaine (XYLOCAINE) 4 % topical solution 5 mL  -     Debridement  -     Wound cleansing and dressings; Future  - follow-up 2 weeks  - call if any signs of infection are noted  3  Atherosclerosis of native artery of right lower extremity with rest pain ([facility] )   -continue with pain medications as needed for evening pain only  4  Tinea pedis of both feet  -     clotrimazole-betamethasone (LOTRISONE) 1-0 05 % cream; Apply topically 2 (two) times a day      Debridement   Universal Protocol:  Consent given by: patient  Time out: Immediately prior to procedure a "time out" was called to verify the correct patient, procedure, equipment, support staff and site/side marked as required  Patient identity confirmed: verbally with patient      Performed by: physician  Debridement type: surgical  Level of debridement: subcutaneous tissue  Pain control: lidocaine 4%  Post-debridement measurements  Length (cm): 3 5  Width (cm): 2 7  Depth (cm): 0 2  Percent debrided: 100%  Surface Area (cm^2): 9 45  Area debrided (cm^2):  9 45  Volume (cm^3): 1 89  Tissue and other material debrided: subcutaneous tissue  Devitalized tissue debrided: biofilm, necrotic debris and slough  Instrument(s) utilized: curette and blade  Bleeding: medium  Hemostasis obtained with: pressure  Procedural pain (0-10): 4  Post-procedural pain: 3   Response to treatment: procedure was tolerated well           Wound 07/17/20 Pretibial Distal;Right (Active)   Wound Image Images linked 01/13/21 1457   Donna-wound Assessment Excoriated 01/13/21 1457   Wound Length (cm) 3 5 cm 01/13/21 1457   Wound Width (cm) 2 7 cm 01/13/21 1457   Wound Depth (cm) 0 1 cm 01/13/21 1457   Wound Surface Area (cm^2) 9 45 cm^2 01/13/21 1457   Wound Volume (cm^3) 0 95 cm^3 01/13/21 1457   Calculated Wound Volume (cm^3) 0 95 cm^3 01/13/21 1457   Change in Wound Size % 98 43 01/13/21 1457   Drainage Description Serosanguineous 01/13/21 1457   Non-staged Wound Description Full thickness 01/13/21 1457       [REMOVED] Wound 09/09/20 Pressure Injury Heel Right (Removed)   Wound Image Images linked 01/13/21 1459   Wound Description Epithelialization 01/13/21 1459   Wound Length (cm) 0 cm 01/13/21 1459   Wound Width (cm) 0 cm 01/13/21 1459   Wound Depth (cm) 0 cm 01/13/21 1459   Wound Surface Area (cm^2) 0 cm^2 01/13/21 1459   Wound Volume (cm^3) 0 cm^3 01/13/21 1459   Calculated Wound Volume (cm^3) 0 cm^3 01/13/21 1459       [REMOVED] Wound 12/30/20 Arterial Ulcer Foot Right;Lateral (Removed)   Wound Image Images linked 01/13/21 1456   Wound Length (cm) 0 cm 01/13/21 1456   Wound Width (cm) 0 cm 01/13/21 1456   Wound Depth (cm) 0 cm 01/13/21 1456   Wound Surface Area (cm^2) 0 cm^2 01/13/21 1456   Wound Volume (cm^3) 0 cm^3 01/13/21 1456   Calculated Wound Volume (cm^3) 0 cm^3 01/13/21 1456   Change in Wound Size % 100 01/13/21 1456   Drainage Amount None 01/13/21 1456       Wound 07/17/20 Pretibial Distal;Right (Active)   Date First Assessed/Time First Assessed: 07/17/20 0302   Pre-Existing Wound: Yes  Primary Wound Type: Arterial Ulcer  Location: Pretibial  Wound Location Orientation: Distal;Right       [REMOVED] Wound 09/09/20 Pressure Injury Heel Right (Removed)   Resolved Date/Resolved Time: 01/13/21 1501  Date First Assessed: 09/09/20   Primary Wound Type: Pressure Injury  Location: Heel  Wound Location Orientation: Right  Wound Outcome: Healed       [REMOVED] Wound 12/30/20 Arterial Ulcer Foot Right;Lateral (Removed)   Resolved Date/Resolved Time: 01/13/21 1456  Date First Assessed/Time First Assessed: 12/30/20 1453   Pre-Existing Wound:  Yes Primary Wound Type: Arterial Ulcer  Location: Foot  Wound Location Orientation: Right;Lateral  Dressing Status: Intact       Subjective:           68-year-old white male presents for follow-up care of chronic ulceration of his right ankle  Relates seeing vascular surgery and has been scheduled for another Doppler for next week  Denies any fever shortness of breath  Patient requests refill of his cream for athlete's foot  Relates his pain at night in his legs is unchanged but is managed well with Percocet  The following portions of the patient's history were reviewed and updated as appropriate: allergies, current medications, past family history, past medical history, past social history, past surgical history and problem list     Review of Systems   Constitutional: Negative for activity change, appetite change, chills and fatigue  HENT: Negative for congestion, ear pain, hearing loss and nosebleeds  Eyes: Negative for pain, discharge, redness and visual disturbance  Respiratory: Negative for cough, chest tightness and shortness of breath  Cardiovascular: Negative for chest pain, palpitations and leg swelling  Gastrointestinal: Negative for abdominal pain, constipation, nausea and vomiting  Abdominal wound managed per general surgery  Endocrine: Negative for cold intolerance, heat intolerance and polydipsia  Genitourinary: Negative for difficulty urinating  Musculoskeletal: Negative for arthralgias, back pain, gait problem and joint swelling  Skin: Positive for wound (Wound right ankle, and right heel has closed)  Negative for color change, pallor and rash  Allergic/Immunologic: Negative for environmental allergies, food allergies and immunocompromised state  Neurological: Negative for dizziness, weakness, numbness and headaches  Hematological: Negative for adenopathy  Does not bruise/bleed easily     Psychiatric/Behavioral: Negative for agitation, behavioral problems, confusion, decreased concentration, hallucinations and suicidal ideas  Objective:       Wound 07/17/20 Pretibial Distal;Right (Active)   Wound Image Images linked 01/13/21 1457   Donna-wound Assessment Excoriated 01/13/21 1457   Wound Length (cm) 3 5 cm 01/13/21 1457   Wound Width (cm) 2 7 cm 01/13/21 1457   Wound Depth (cm) 0 1 cm 01/13/21 1457   Wound Surface Area (cm^2) 9 45 cm^2 01/13/21 1457   Wound Volume (cm^3) 0 95 cm^3 01/13/21 1457   Calculated Wound Volume (cm^3) 0 95 cm^3 01/13/21 1457   Change in Wound Size % 98 43 01/13/21 1457   Drainage Description Serosanguineous 01/13/21 1457   Non-staged Wound Description Full thickness 01/13/21 1457       [REMOVED] Wound 09/09/20 Pressure Injury Heel Right (Removed)   Wound Image Images linked 01/13/21 1459   Wound Description Epithelialization 01/13/21 1459   Wound Length (cm) 0 cm 01/13/21 1459   Wound Width (cm) 0 cm 01/13/21 1459   Wound Depth (cm) 0 cm 01/13/21 1459   Wound Surface Area (cm^2) 0 cm^2 01/13/21 1459   Wound Volume (cm^3) 0 cm^3 01/13/21 1459   Calculated Wound Volume (cm^3) 0 cm^3 01/13/21 1459       [REMOVED] Wound 12/30/20 Arterial Ulcer Foot Right;Lateral (Removed)   Wound Image Images linked 01/13/21 1456   Wound Length (cm) 0 cm 01/13/21 1456   Wound Width (cm) 0 cm 01/13/21 1456   Wound Depth (cm) 0 cm 01/13/21 1456   Wound Surface Area (cm^2) 0 cm^2 01/13/21 1456   Wound Volume (cm^3) 0 cm^3 01/13/21 1456   Calculated Wound Volume (cm^3) 0 cm^3 01/13/21 1456   Change in Wound Size % 100 01/13/21 1456   Drainage Amount None 01/13/21 1456       There were no vitals taken for this visit  Physical Exam  Constitutional:       Appearance: Normal appearance  HENT:      Head: Normocephalic  Right Ear: Tympanic membrane normal       Left Ear: Tympanic membrane normal       Nose: No congestion  Mouth/Throat:      Mouth: Mucous membranes are moist       Pharynx: No oropharyngeal exudate or posterior oropharyngeal erythema  Eyes:      Extraocular Movements: Extraocular movements intact  Conjunctiva/sclera: Conjunctivae normal       Pupils: Pupils are equal, round, and reactive to light  Cardiovascular:      Rate and Rhythm: Normal rate and regular rhythm  Pulses:           Dorsalis pedis pulses are 0 on the right side and 0 on the left side  Posterior tibial pulses are 0 on the right side and 0 on the left side  Pulmonary:      Effort: Pulmonary effort is normal    Abdominal:      Palpations: Abdomen is soft  Comments: Abdominal wound with surgical dressing intact  VAC dressing has been discontinued  Musculoskeletal:         General: Tenderness ( continues to have rest pain at night which requires pain medication to sleep  Patient relates pain present night lays down and improves with walking ) present  Right foot: Decreased range of motion  No deformity, bunion, Charcot foot, foot drop or prominent metatarsal heads  Left foot: Decreased range of motion  No deformity, bunion, Charcot foot, foot drop or prominent metatarsal heads  Feet:    Feet:      Right foot:      Protective Sensation: 10 sites tested  10 sites sensed  Skin integrity: Ulcer (  Posterior right heel has closed ), skin breakdown and dry skin present  No blister, erythema, warmth, callus or fissure  Toenail Condition: Right toenails are abnormally thick  Fungal disease present  Left foot:      Protective Sensation: 10 sites tested  10 sites sensed  Skin integrity: Dry skin present  No ulcer, blister, skin breakdown, erythema, warmth, callus or fissure  Toenail Condition: Left toenails are abnormally thick  Fungal disease present  Comments: Continues to have moderate pain in his legs at night which keeps him from going to sleep  Relates some relief with leaving his legs hanging over the side of his bed  Pain is predominantly right leg  Skin:     General: Skin is warm and dry        Capillary Refill: Capillary refill takes 2 to 3 seconds  Coloration: Skin is not pale  Findings: Rash (Located right foot interdigital 1st through 4th interspaces, slight maceration noted , overall improved ) and wound (Right medial ankle:  Diminished in size overall, subcutaneous depth, mild/moderate serosanguineous drainage, no infection noted, 90% red  Some superficial slough biofilm noted ) present  No bruising, erythema or lesion  Rash is crusting, macular and scaling  Neurological:      General: No focal deficit present  Mental Status: He is alert  Cranial Nerves: No cranial nerve deficit  Sensory: No sensory deficit  Motor: No weakness  Gait: Gait normal       Deep Tendon Reflexes: Reflexes normal    Psychiatric:         Mood and Affect: Mood normal          Behavior: Behavior normal          Judgment: Judgment normal            Wound Instructions:  Orders Placed This Encounter   Procedures    Wound cleansing and dressings     Right lower leg wound    Wash your hands with soap and water  Remove old dressing, discard into plastic bag and place in trash  Cleanse the wound with saline or soap and water prior to applying a clean dressing  Do not use tissue or cotton balls  Do not scrub the wound  Pat dry using gauze  Shower no     Apply acticoat to the leg wound  Cover with abd  Secure with kerlix  Change dressing Friday and Monday  Alginate between toes  Medicated cream as prescribed to right lower leg skin  Ace wraps to right lower leg  Elastic Tubular Stocking or ace wraps as tolerated    Tubular elastic bandage: Apply from base of toes to behind the knee  Apply in AM, may remove for sleep  Avoid prolonged standing in one place  Elevate leg(s) above the level of the heart when sitting or as much as possible  Done today     Standing Status:   Future     Standing Expiration Date:   1/13/2022        Diagnosis ICD-10-CM Associated Orders   1   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

## 2021-01-13 NOTE — PATIENT INSTRUCTIONS
Orders Placed This Encounter   Procedures    Wound cleansing and dressings     Right lower leg wound    Wash your hands with soap and water  Remove old dressing, discard into plastic bag and place in trash  Cleanse the wound with saline or soap and water prior to applying a clean dressing  Do not use tissue or cotton balls  Do not scrub the wound  Pat dry using gauze  Shower no     Apply acticoat to the leg wound  Cover with abd  Secure with kerlix  Change dressing Friday and Monday  Alginate between toes  Medicated cream as prescribed to right lower leg skin  Ace wraps to right lower leg  Elastic Tubular Stocking or ace wraps as tolerated    Tubular elastic bandage: Apply from base of toes to behind the knee  Apply in AM, may remove for sleep  Avoid prolonged standing in one place  Elevate leg(s) above the level of the heart when sitting or as much as possible       Done today     Standing Status:   Future     Standing Expiration Date:   1/13/2022

## 2021-01-13 NOTE — TELEPHONE ENCOUNTER
Patient called, he said that he takes   Lisinopril 5 mg daily  He said that he usually has to cut it in half  I don't see this in his records, but he said that he does take this Rx  He uses [facility] in [place]  Thank you

## 2021-01-13 NOTE — TELEPHONE ENCOUNTER
Patient is requesting a refill for Lisinopril 5 mg to the [facility], [place]  This was last prescribed by Dr Brandon Ryan on 10/08/2020 for 2 5mg PO QD, but was stopped when the patient was at [facility] on 11/16/2020  Routing to a provider for review and to advise on wether or not a refill is appropriate for this patient

## 2021-01-14 PROBLEM — M86.9 OSTEOMYELITIS OF RIGHT FOOT (HCC): Status: ACTIVE | Noted: 2021-01-01

## 2021-01-14 PROBLEM — D46.Z OTHER MYELODYSPLASTIC SYNDROMES (HCC): Status: ACTIVE | Noted: 2021-01-01

## 2021-01-14 NOTE — PROGRESS NOTES
Assessment/Plan:     Diagnoses and all orders for this visit:    Cellulitis and abscess of left lower extremity  Patient was taking  Keflex he states he did not help  Will switch to clindamycin     Other myelodysplastic syndromes ([facility] )   patient instructed to followup with hematology/oncology    Pressure injury of right heel, unstageable ([facility] )  Patient is following with Podiatry and Wound Care  Multiple myeloma not having achieved remission ([facility] )   patient struck to follow-up with Hematology-Oncology  Other pancytopenia ([facility] )   patient instructed to follow-up with Hematology-Oncology  Severe protein-calorie malnutrition ([facility] )   this is currently stable  Osteomyelitis of right foot, unspecified type [facility])   patient follows with Podiatry  Chronic combined systolic (congestive) and diastolic (congestive) heart failure ([facility] )   this is currently stable     Acute respiratory failure with hypoxia ([place] [address] )   this is been currently stable since his hospitalization  Other persistent atrial fibrillation ([facility] )    Cellulitis of left leg without foot  -     clindamycin (CLEOCIN) 300 MG capsule; Take 1 capsule (300 mg total) by mouth 3 (three) times a day for 7 days            Subjective:     Chief Complaint   Patient presents with    Rash     Redness back of right leg above the bandage  Patient ID: Brigette Wallace is a 70 y o  male  Patient presents today with pain and redness on the back of his right leg   Patient has been going to wound care was seen there yesterday   Redness is spreading and getting worse  Becoming more tender         The following portions of the patient's history were reviewed and updated as appropriate: allergies, current medications, past family history, past medical history, past social history, past surgical history and problem list     Review of Systems   Constitutional: Negative  HENT: Negative  Eyes: Negative  Respiratory: Negative  Cardiovascular: Negative  Gastrointestinal: Negative  Endocrine: Negative  Genitourinary: Negative  Musculoskeletal: Negative  Skin: Positive for rash  Allergic/Immunologic: Negative  Neurological: Negative  Hematological: Negative  Psychiatric/Behavioral: Negative  All other systems reviewed and are negative  Objective:    Vitals:    01/14/21 1520   BP: 120/72   BP Location: Right arm   Patient Position: Sitting   Cuff Size: Standard   Pulse: 96   Temp: 98 8 °F (37 1 °C)   TempSrc: Tympanic   SpO2: 99%   Weight: 62 1 kg (137 lb)   Height: 5' 10" (1 778 m)          Physical Exam  Vitals signs and nursing note reviewed  Constitutional:       General: He is not in acute distress  Appearance: He is well-developed  HENT:      Head: Normocephalic  Right Ear: External ear normal       Left Ear: External ear normal       Nose: Nose normal    Eyes:      General:         Right eye: No discharge  Left eye: No discharge  Conjunctiva/sclera: Conjunctivae normal       Pupils: Pupils are equal, round, and reactive to light  Neck:      Musculoskeletal: Normal range of motion  Cardiovascular:      Rate and Rhythm: Normal rate and regular rhythm  Heart sounds: Normal heart sounds  Pulmonary:      Effort: Pulmonary effort is normal       Breath sounds: Normal breath sounds  Abdominal:      General: Bowel sounds are normal  There is no distension  Palpations: Abdomen is soft  Tenderness: There is no abdominal tenderness  Musculoskeletal: Normal range of motion  Skin:     General: Skin is warm and dry  Findings: No rash  Comments: He has red tender area to back of right leg consistent with cellulitis   Neurological:      Mental Status: He is alert and oriented to person, place, and time  Cranial Nerves: No cranial nerve deficit  Psychiatric:         Behavior: Behavior normal          Thought Content:  Thought content normal          Judgment: Judgment normal

## 2021-01-27 NOTE — PATIENT INSTRUCTIONS
Orders Placed This Encounter   Procedures    Wound cleansing and dressings     Wound cleansing and dressings       Right lower leg wound     Wash your hands with soap and water  Remove old dressing, discard into plastic bag and place in trash  Cleanse the wound with saline or soap and water prior to applying a clean dressing  Do not use tissue or cotton balls  Do not scrub the wound  Pat dry using gauze  Shower no      Apply acticoat 3 to the leg wound  Cover with abd  Secure with kerlix  Change dressing Friday and Monday  Alginate between toes  Medicated cream as prescribed to right lower leg skin      Ace wraps to right lower leg      Elastic Tubular Stocking or ace wraps as tolerated     Tubular elastic bandage: Apply from base of toes to behind the knee  Apply in AM, may remove for sleep      Avoid prolonged standing in one place      Elevate leg(s) above the level of the heart when sitting or as much as possible       Standing Status:   Future     Standing Expiration Date:   1/27/2022

## 2021-01-27 NOTE — PROGRESS NOTES
Patient ID: Laura Nguyễn is a 70 y o  male Date of Birth 1945     Chief Complaint  Chief Complaint   Patient presents with    Follow Up Wound Care Visit     RLE wound        Allergies  Blood-group specific substance    Assessment/Plan:  1  Non-pressure chronic ulcer of right ankle with fat layer exposed ([address] )  2  Atherosclerosis of native artery of RLE with ulceration of ankle (HCC)  -     Cancel: Wound cleansing and dressings; Future  -     lidocaine (XYLOCAINE) 4 % topical solution 5 mL  -     continue with local wound care at home as ordered  3  Atherosclerosis of native artery of right lower extremity with rest pain (HCC)  -     Discontinue: oxyCODONE-acetaminophen (PERCOCET) 5-325 mg per tablet; Take 1 tablet by mouth every 12 (twelve) hours as needed for moderate painMax Daily Amount: 2 tablets  Reordered through [facility] in [place]  Debridement   Universal Protocol:  Consent: Verbal consent obtained  Written consent obtained  Consent given by: patient  Time out: Immediately prior to procedure a "time out" was called to verify the correct patient, procedure, equipment, support staff and site/side marked as required  Patient identity confirmed: verbally with patient      Performed by: physician  Debridement type: surgical  Level of debridement: subcutaneous tissue  Pain control: lidocaine 4%  Post-debridement measurements  Length (cm): 2 5  Width (cm): 2  Depth (cm): 0 2  Percent debrided: 100%  Surface Area (cm^2): 5  Area debrided (cm^2): 5  Volume (cm^3):  1  Tissue and other material debrided: subcutaneous tissue  Devitalized tissue debrided: biofilm, necrotic debris, slough and eschar  Instrument(s) utilized: nippers, curette and blade  Bleeding: medium  Hemostasis obtained with: pressure  Procedural pain (0-10): 3  Post-procedural pain: 3   Response to treatment: procedure was tolerated well          Plan:     Wound 07/17/20 Pretibial Distal;Right (Active)   Wound Image Images linked 01/27/21 1446   Wound Description Dark edges;Brown;Granulation tissue 01/27/21 1411   Donna-wound Assessment Dry 01/27/21 1411   Wound Length (cm) 2 5 cm 01/27/21 1411   Wound Width (cm) 2 cm 01/27/21 1411   Wound Depth (cm) 0 2 cm 01/27/21 1411   Wound Surface Area (cm^2) 5 cm^2 01/27/21 1411   Wound Volume (cm^3) 1 cm^3 01/27/21 1411   Calculated Wound Volume (cm^3) 1 cm^3 01/27/21 1411   Change in Wound Size % 98 35 01/27/21 1411   Drainage Amount Moderate 01/27/21 1411   Drainage Description Serosanguineous 01/27/21 1411   Non-staged Wound Description Full thickness 01/27/21 1411   Treatments Cleansed 01/27/21 1411   Dressing Changed Changed 01/27/21 1411   Patient Tolerance Tolerated well 01/27/21 1411   Dressing Status Removed 01/27/21 1411       Wound 07/17/20 Pretibial Distal;Right (Active)   Date First Assessed/Time First Assessed: 07/17/20 0302   Pre-Existing Wound: Yes  Primary Wound Type: Arterial Ulcer  Location: Pretibial  Wound Location Orientation: Distal;Right       [REMOVED] Negative Pressure Wound Therapy (V A C ) Abdomen (Removed)   Removal Date: 08/26/20  Placement Date/Time: 08/03/20 1821   Inserted by: Dr Jimmie Turcios  Location: (c) Abdomen       [REMOVED] Wound 07/17/20 Arterial Ulcer Foot Anterior;Right (Removed)   Resolved Date: 09/09/20  Date First Assessed/Time First Assessed: 07/17/20 0304   Pre-Existing Wound: Yes  Primary Wound Type: Arterial Ulcer  Location: Foot  Wound Location Orientation: Anterior;Right  Wound Outcome: Healed       [REMOVED] Wound 07/17/20 Abdomen Left; Lower;Medial (Removed)   Resolved Date: 11/16/20  Date First Assessed/Time First Assessed: 07/17/20 0305   Pre-Existing Wound: Yes  Location: Abdomen  Wound Location Orientation: Left; Lower;Medial       [REMOVED] Wound 07/17/20 Buttocks Right (Removed)   Resolved Date: 11/16/20  Date First Assessed/Time First Assessed: 07/17/20 1428   Pre-Existing Wound: Yes  Location: Buttocks  Wound Location Orientation: Right  Wound Description (Comments): Scab       [REMOVED] Wound 07/20/20 Catheter entry/exit site Groin Left (Removed)   Resolved Date: 09/09/20  Date First Assessed/Time First Assessed: 07/20/20 1407   Traumatic Wound Type: Catheter entry/exit site  Location: Groin  Wound Location Orientation: Left       [REMOVED] Wound 07/21/20 Abdomen N/A (Removed)   Resolved Date: 09/09/20  Date First Assessed/Time First Assessed: 07/21/20 1444   Location: Abdomen  Wound Location Orientation: N/A  Wound Description (Comments): 1/4" x3" betadine soaked telfa marshal x2, gauze, tegaderm       [REMOVED] Wound 07/30/20 Catheter entry/exit site Back Left; Lower (Removed)   Resolved Date: 11/16/20  Date First Assessed/Time First Assessed: 07/30/20 1102   Traumatic Wound Type: Catheter entry/exit site  Location: Back  Wound Location Orientation: Left; Lower  Wound Description (Comments): Bone Marrow biopsy catheter entry       [REMOVED] Wound 08/03/20 Abdomen N/A (Removed)   Resolved Date: 09/09/20  Date First Assessed/Time First Assessed: 08/03/20 1739   Location: Abdomen  Wound Location Orientation: N/A  Wound Description (Comments): Closed suction drain right abdomen with sterile dressingNegative pressure wound vac mid           [REMOVED] Wound 08/12/20 Arterial Ulcer Tibial Posterior;Right (Removed)   Resolved Date: 12/02/20  Date First Assessed/Time First Assessed: 08/12/20 1614   Pre-Existing Wound: Yes  Primary Wound Type: Arterial Ulcer  Location: Tibial  Wound Location Orientation: Posterior;Right  Dressing Status: Intact  Wound Outcome: Healed       [REMOVED] Wound 09/09/20 Pressure Injury Heel Right (Removed)   Resolved Date/Resolved Time: 01/13/21 1501  Date First Assessed: 09/09/20   Primary Wound Type: Pressure Injury  Location: Heel  Wound Location Orientation: Right  Wound Outcome: Healed       [REMOVED] Wound 10/14/20 Arterial Ulcer Pretibial Left (Removed)   Resolved Date/Resolved Time: 10/21/20 1411  Date First Assessed/Time First Assessed: 10/14/20 1453   Primary Wound Type: Arterial Ulcer  Location: Pretibial  Wound Location Orientation: Left  Wound Outcome: Healed       [REMOVED] Wound 11/19/20 Leg Right (Removed)   Resolved Date: 12/02/20  Date First Assessed/Time First Assessed: 11/19/20 1453   Location: Leg  Wound Location Orientation: Right  Wound Description (Comments): Three Wounds Debrided And Dressed  Incision's 1st Dressing: DRESSING OIL EMULSION 3 X 8 I    [REMOVED] Wound 12/30/20 Arterial Ulcer Foot Right;Lateral (Removed)   Resolved Date/Resolved Time: 01/13/21 1456  Date First Assessed/Time First Assessed: 12/30/20 1453   Pre-Existing Wound: Yes  Primary Wound Type: Arterial Ulcer  Location: Foot  Wound Location Orientation: Right;Lateral  Dressing Status: Intact       Subjective:           79-year-old white male presents for follow-up care of chronic ulceration of his right ankle  Relates his pain at night in his legs is unchanged but is managed well with Percocet  Requests additional refill Percocet  Relates good progress with diminishing size ankle  Denies any fever or shortness of breath  The following portions of the patient's history were reviewed and updated as appropriate: allergies, current medications, past family history, past medical history, past social history, past surgical history and problem list     Review of Systems   Constitutional: Negative for activity change, appetite change, chills and fatigue  Pain in right leg at night  HENT: Negative for congestion, ear pain, hearing loss and nosebleeds  Eyes: Negative for pain, discharge, redness and visual disturbance  Respiratory: Negative for cough, chest tightness and shortness of breath  Cardiovascular: Negative for chest pain, palpitations and leg swelling  Gastrointestinal: Negative for abdominal pain, constipation, nausea and vomiting  Abdominal wound managed per general surgery  Endocrine: Negative for cold intolerance, heat intolerance and polydipsia  Genitourinary: Negative for difficulty urinating  Musculoskeletal: Positive for gait problem  Negative for arthralgias, back pain and joint swelling  Skin: Positive for wound (Wound right ankle, and right heel has closed)  Negative for color change, pallor and rash  Allergic/Immunologic: Negative for environmental allergies, food allergies and immunocompromised state  Neurological: Negative for dizziness, weakness, numbness and headaches  Hematological: Negative for adenopathy  Does not bruise/bleed easily  Psychiatric/Behavioral: Negative for agitation, behavioral problems, confusion, decreased concentration, hallucinations and suicidal ideas  Objective:       Wound 07/17/20 Pretibial Distal;Right (Active)   Wound Image Images linked 01/27/21 1446   Wound Description Dark edges;Brown;Granulation tissue 01/27/21 1411   Donna-wound Assessment Dry 01/27/21 1411   Wound Length (cm) 2 5 cm 01/27/21 1411   Wound Width (cm) 2 cm 01/27/21 1411   Wound Depth (cm) 0 2 cm 01/27/21 1411   Wound Surface Area (cm^2) 5 cm^2 01/27/21 1411   Wound Volume (cm^3) 1 cm^3 01/27/21 1411   Calculated Wound Volume (cm^3) 1 cm^3 01/27/21 1411   Change in Wound Size % 98 35 01/27/21 1411   Drainage Amount Moderate 01/27/21 1411   Drainage Description Serosanguineous 01/27/21 1411   Non-staged Wound Description Full thickness 01/27/21 1411   Treatments Cleansed 01/27/21 1411   Dressing Changed Changed 01/27/21 1411   Patient Tolerance Tolerated well 01/27/21 1411   Dressing Status Removed 01/27/21 1411       /72   Pulse 90   Temp 98 2 °F (36 8 °C)   Resp 18     Physical Exam  Constitutional:       Appearance: Normal appearance  HENT:      Head: Normocephalic  Right Ear: Tympanic membrane normal       Left Ear: Tympanic membrane normal       Nose: No congestion        Mouth/Throat:      Mouth: Mucous membranes are moist  Pharynx: No oropharyngeal exudate or posterior oropharyngeal erythema  Eyes:      Extraocular Movements: Extraocular movements intact  Conjunctiva/sclera: Conjunctivae normal       Pupils: Pupils are equal, round, and reactive to light  Cardiovascular:      Rate and Rhythm: Normal rate and regular rhythm  Pulses:           Dorsalis pedis pulses are 0 on the right side and 0 on the left side  Posterior tibial pulses are 0 on the right side and 0 on the left side  Pulmonary:      Effort: Pulmonary effort is normal    Abdominal:      Palpations: Abdomen is soft  Comments: Abdominal wound with surgical dressing intact  VAC dressing has been discontinued  Musculoskeletal:         General: Tenderness ( continues to have pain at night which requires medication to sleep  Patient relates pain present @night when he lays down and improves with walking ) present  Right foot: Decreased range of motion  No deformity, bunion, Charcot foot, foot drop or prominent metatarsal heads  Left foot: Decreased range of motion  No deformity, bunion, Charcot foot, foot drop or prominent metatarsal heads  Feet:    Feet:      Right foot:      Protective Sensation: 10 sites tested  10 sites sensed  Skin integrity: Ulcer (  Posterior right heel has closed ), skin breakdown and dry skin present  No blister, erythema, warmth, callus or fissure  Toenail Condition: Right toenails are abnormally thick  Fungal disease present  Left foot:      Protective Sensation: 10 sites tested  10 sites sensed  Skin integrity: Dry skin present  No ulcer, blister, skin breakdown, erythema, warmth, callus or fissure  Toenail Condition: Left toenails are abnormally thick  Fungal disease present  Comments: Continues to have moderate pain in his legs at night which keeps him from going to sleep  Relates some relief with leaving his legs hanging over the side of his bed    Pain is predominantly right leg  Skin:     General: Skin is warm and dry  Capillary Refill: Capillary refill takes 2 to 3 seconds  Coloration: Skin is not pale  Findings: Rash (Located right foot interdigital 1st through 4th interspaces, slight maceration noted , overall improved ) and wound (Right medial ankle:  Diminishing size, overall improved, SQ depth, mild/moderate S/S drainage, no infection, 85-90% red  Superficial slough/biofilm noted ) present  No bruising, erythema or lesion  Rash is crusting, macular and scaling  Neurological:      General: No focal deficit present  Mental Status: He is alert  Cranial Nerves: No cranial nerve deficit  Sensory: No sensory deficit  Motor: No weakness  Gait: Gait normal       Deep Tendon Reflexes: Reflexes normal    Psychiatric:         Mood and Affect: Mood normal          Behavior: Behavior normal          Judgment: Judgment normal            Wound Instructions:  Orders Placed This Encounter   Procedures    Wound cleansing and dressings     Wound cleansing and dressings       Right lower leg wound     Wash your hands with soap and water  Remove old dressing, discard into plastic bag and place in trash  Cleanse the wound with saline or soap and water prior to applying a clean dressing  Do not use tissue or cotton balls  Do not scrub the wound  Pat dry using gauze  Shower no      Apply acticoat 3 to the leg wound  Cover with abd  Secure with kerlix  Change dressing Friday and Monday  Alginate between toes  Medicated cream as prescribed to right lower leg skin      Ace wraps to right lower leg      Elastic Tubular Stocking or ace wraps as tolerated     Tubular elastic bandage: Apply from base of toes to behind the knee  Apply in AM, may remove for sleep      Avoid prolonged standing in one place      Elevate leg(s) above the level of the heart when sitting or as much as possible       Standing Status:   Future     Standing Expiration Date: 1/27/2022        Diagnosis ICD-10-CM Associated Orders   1  Lower limb ulcer, calf, right, with fat layer exposed ([facility])  L97 [id number] Wound cleansing and dressings     lidocaine (XYLOCAINE) 4 % topical solution 5 mL   2  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 ([facility] )  I70 233    3   Atherosclerosis of native artery of right lower extremity with rest pain ([facility] )  I70 221

## 2021-01-29 NOTE — PATIENT INSTRUCTIONS
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)        History of PAD with slow healing venous ulcers of the right leg, s/p right leg angiogram with SFA/popliteal angioplasty 7/20/20  Had residual tibial disease which could not be fully treated due to patient movement  His right leg venous ulcer continues to improve      Lower extremity arterial duplex shows right NESTOR 1 2, <50% mid-SFA stenosis, /GTP46  The left NESTOR is unreliable but MTP 80/GTP59     -We discussed the pathophysiology of arterial occlusive disease, available treatment options and indications for treatment   -Continue medical optimization  Recommend ASA and statin    -Wounds appear to be healing with local wound care, no plans for intervention at this time  -Continue local wound care recommendations per podiatry and close follow-up  -Follow-up in 3 months to re-evaluate symptoms  Advised earlier follow-up if any concerns with wound healing                    Musculoskeletal and Integument     Venous ulcer of ankle, right (HCC) - Primary       Right venous ankle wound appears to be healing based on imaging in media  Last seen 2 days ago with Dr Shawn Rawls  Mr Casarez Alu affirms that his leg is slowly healing and appears healthy   He has no new wounds or new complaints      -Follow-up in 3 months for wound check  -Continue close follow-up with podiatry for continued local wound care  -Advised to call the office to schedule earlier appointment if any concerns that wound is deteriorating/now healing to discuss potential angiogram

## 2021-01-29 NOTE — ASSESSMENT & PLAN NOTE
Right venous ankle wound appears to be healing based on imaging in media  Last seen 2 days ago with Dr Sylvia Amaro  Mr  Moris Hidalgo affirms that his leg is slowly healing and appears healthy   He has no new wounds or new complaints     -Follow-up in 3 months for wound check  -Continue close follow-up with podiatry for continued local wound care  -Advised to call the office to schedule earlier appointment if any concerns that wound is deteriorating/now healing to discuss potential angiogram

## 2021-01-29 NOTE — ASSESSMENT & PLAN NOTE
History of PAD with slow healing venous ulcers of the right leg, s/p right leg angiogram with SFA/popliteal angioplasty 7/20/20  Had residual tibial disease which could not be fully treated due to patient movement  His right leg venous ulcer continues to improve  Lower extremity arterial duplex shows right NESTOR 1 2, <50% mid-SFA stenosis, /GTP46  The left NESTOR is unreliable but MTP 80/GTP59    -We discussed the pathophysiology of arterial occlusive disease, available treatment options and indications for treatment   -Continue medical optimization  Recommend ASA and statin    -Wounds appear to be healing with local wound care, no plans for intervention at this time  -Continue local wound care recommendations per podiatry and close follow-up  -Follow-up in 3 months to re-evaluate symptoms  Advised earlier follow-up if any concerns with wound healing

## 2021-01-29 NOTE — PROGRESS NOTES
Virtual Brief Visit    Assessment/Plan:    Problem List Items Addressed This Visit        Cardiovascular and Mediastinum    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)     History of PAD with slow healing venous ulcers of the right leg, s/p right leg angiogram with SFA/popliteal angioplasty 7/20/20  Had residual tibial disease which could not be fully treated due to patient movement  His right leg venous ulcer continues to improve  Lower extremity arterial duplex shows right NESTOR 1 2, <50% mid-SFA stenosis, /GTP46  The left NESTOR is unreliable but MTP 80/GTP59    -We discussed the pathophysiology of arterial occlusive disease, available treatment options and indications for treatment   -Continue medical optimization  Recommend ASA and statin    -Wounds appear to be healing with local wound care, no plans for intervention at this time  -Continue local wound care recommendations per podiatry and close follow-up  -Follow-up in 3 months to re-evaluate symptoms  Advised earlier follow-up if any concerns with wound healing  Musculoskeletal and Integument    Venous ulcer of ankle, right (HCC) - Primary     Right venous ankle wound appears to be healing based on imaging in media  Last seen 2 days ago with Dr Kandis Wynn  Mr  Pierre Son affirms that his leg is slowly healing and appears healthy   He has no new wounds or new complaints     -Follow-up in 3 months for wound check  -Continue close follow-up with podiatry for continued local wound care  -Advised to call the office to schedule earlier appointment if any concerns that wound is deteriorating/now healing to discuss potential angiogram                      Reason for visit is   Chief Complaint   Patient presents with   [address] Visit        Encounter provider Priscila Jay MD    Provider located at [address] [id number]    [facility] Visits  Date Type Provider Dept   01/28/21 Telemedicine Cortez Lundberg MD [phone] recent visits within past 7 days and meeting all other requirements     Future Appointments  No visits were found meeting these conditions  Showing future appointments within next 150 days and meeting all other requirements        After connecting through telephone and patient was informed that this is not a secure, HIPAA-compliant platform  He agrees to proceed  , the patient was identified by name and date of birth  Natalie Mccall was informed that this is a telemedicine visit and that the visit is being conducted through telephone and patient was informed that this is not a secure, HIPAA-compliant platform  He agrees to proceed     My office door was closed  No one else was in the room  He acknowledged consent and understanding of privacy and security of the platform  The patient has agreed to participate and understands he can discontinue the visit at any time  Patient is aware this is a billable service  It was my intent to perform this visit via video technology but the patient was not able to do a video connection so the visit was completed via audio telephone only  Melony Hodgkins is a 70 y o  male with HTN, HLD, BPH, ITP, hx SDH, DDD, atrial fibrillation, PPM, CKD III (creat 0 8-2 5), umbilical abdominal wall abscess w/exposed mesh and PAD w/R leg arteriovenous insufficiency ulceration s/p right SFA/popliteal PTA with single vessel peroneal runoff 7/20/20  He continues to receive local wound care/serial debridement with Dr Bronwyn Baez  He is doing well and states that his right leg ulcer is healing  He denies any fevers or chills       Past Medical History:   Diagnosis Date    Anemia     Atrial fibrillation (HCC)     Benign prostatic hyperplasia without urinary obstruction     Congestive heart failure with left ventricular diastolic dysfunction, chronic ([facility] [address] )     History of ITP     Hypertension     Lumbosacral disc herniation     Peripheral vascular disease of lower extremity (HCC)     Renal disorder     Subdural hematoma (HCC)        Past Surgical History:   Procedure Laterality Date    BACK SURGERY      AUSTYN HOLE FOR SUBDURAL HEMATOMA      CARDIAC PACEMAKER PLACEMENT      CLAVICLE SURGERY Left 2011    HERNIA REPAIR      IR AORTAGRAM WITH RUN-OFF  7/20/2020    IR BIOPSY BONE MARROW  7/30/2020    LAPAROTOMY N/A 7/21/2020    Procedure: LAPAROTOMY EXPLORATORY, excision of infected mesh and repair of ventral heria; Surgeon: Patrizia Sheth MD;  Location: [facility]IN OR;  Service: General    CA EXPLORATORY OF ABDOMEN N/A 8/3/2020    Procedure: REPAIR OF ABDOMINAL WALL WOUND/DEHISSENCE with mesh, placement drain, placement vac;  Surgeon: Patrizia Sheth MD;  Location: [facility];  Service: General    PROSTATE SURGERY      VENTRAL HERNIA REPAIR N/A 7/21/2020    Procedure: REPAIR HERNIA VENTRAL - EXCSION OF INFECTED MESH;  Surgeon: Patrizia Sheth MD;  Location: [facility];  Service: General    WOUND DEBRIDEMENT Right 7/24/2020    Procedure: DEBRIDEMENT LOWER EXTREMITY ([address]);   Surgeon: Stacy Bone DPM;  Location: [facility];  Service: Podiatry    WOUND DEBRIDEMENT Right 11/19/2020    Procedure: DEBRIDEMENT WOUND ([address]) right lower extremity;  Surgeon: Kamila Elizalde DPM;  Location: [facility];  Service: Podiatry       Current Outpatient Medications   Medication Sig Dispense Refill    acetaminophen (TYLENOL) 325 mg tablet Take 2 tablets (650 mg total) by mouth every 6 (six) hours as needed for mild pain 30 tablet 0    Ascorbic Acid (VITAMIN C PO) Take by mouth      atorvastatin (LIPITOR) 40 mg tablet Take 1 tablet (40 mg total) by mouth daily with dinner 90 tablet 1    B Complex Vitamins (VITAMIN B COMPLEX PO) Take by mouth      diazepam (VALIUM) 5 mg tablet Take 5 mg by mouth every 12 (twelve) hours as needed for anxiety      Ferrous Gluconate-C-Folic Acid (IRON-C PO) Take by mouth      folic acid (FOLVITE) 1 mg tablet Take 1 mg by mouth daily      lisinopril (ZESTRIL) 5 mg tablet Take 1 tablet (5 mg total) by mouth daily 30 tablet 3    metoprolol succinate (TOPROL-XL) 25 mg 24 hr tablet Take 1 tablet (25 mg total) by mouth daily 90 tablet 1    Multiple Vitamin (MULTI-DAY VITAMINS) TABS Take by mouth      oxyCODONE-acetaminophen (PERCOCET) 5-325 mg per tablet Take 1 tablet by mouth every 12 (twelve) hours as needed for moderate painMax Daily Amount: 2 tablets 30 tablet 0    pantoprazole (PROTONIX) 40 mg tablet Take 1 tablet (40 mg total) by mouth daily in the early morning 90 tablet 1    tamsulosin (FLOMAX) 0 4 mg Take 1 capsule (0 4 mg total) by mouth daily with dinner 90 capsule 1    clotrimazole-betamethasone (LOTRISONE) 1-0 05 % cream Apply topically 2 (two) times a day (Patient not taking: Reported on 1/14/2021) 30 g 0     No current facility-administered medications for this visit  Allergies   Allergen Reactions    Blood-Group Specific Substance      Needed benadryl with blood transfusion because of reaction       Review of Systems   Constitutional: Negative  HENT: Positive for rhinorrhea  Eyes: Positive for itching  Respiratory: Negative  Cardiovascular: Negative  Gastrointestinal: Negative  Endocrine: Negative  Genitourinary: Negative  Musculoskeletal: Positive for arthralgias  Skin: Positive for wound  Allergic/Immunologic: Positive for environmental allergies  Neurological: Negative  Hematological: Bruises/bleeds easily  Psychiatric/Behavioral: The patient is nervous/anxious          Vitals:    01/28/21 1531   BP: 128/72   BP Location: Left arm   Patient Position: Sitting   Cuff Size: Standard   Weight: 64 3 kg (141 lb 12 8 oz)   Height: 5' 10" (1 778 m)         I spent 10 minutes with patient today in which greater than 50% of the time was spent in counseling/coordination of care regarding right leg wound    VIRTUAL VISIT DISCLAIMER    Prachi Roberts acknowledges that he has consented to an online visit or consultation  He understands that the online visit is based solely on information provided by him, and that, in the absence of a face-to-face physical evaluation by the physician, the diagnosis he receives is both limited and provisional in terms of accuracy and completeness  This is not intended to replace a full medical face-to-face evaluation by the physician  Sherif Lay understands and accepts these terms

## 2021-02-10 NOTE — PROGRESS NOTES
Patient ID: Jolanta Browne is a 70 y o  male Date of Birth 1945     Chief Complaint  Chief Complaint   Patient presents with    Follow Up Wound Care Visit     Right lower leg wound       Allergies  Blood-group specific substance    Assessment/Plan:    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 (HCC)  Non-pressure chronic ulcer of right ankle with fat layer exposed ([address] )  -     Cancel: Wound cleansing and dressings;  Continue with Acticoat 3 right leg  -     lidocaine (XYLOCAINE) 4 % topical solution 5 mL  -     Cancel: Wound cleansing and dressings; Future  -     Wound home care; Follow-up 2 weeks  Right foot ulcer, limited to breakdown of skin (HCC)  -     Cancel: Wound cleansing and dressings; Future  -     lidocaine (XYLOCAINE) 4 % topical solution 5 mL  -     Cancel: Wound cleansing and dressings; Future  -     Wound home care;  Utilize clotrimazole/betamethasone cream    Atherosclerosis of native artery of right lower extremity with rest pain (HCC)  -     Rx: oxyCODONE-acetaminophen (PERCOCET) 5-325 mg per tablet; Take 1 tablet by mouth every 12 (twelve) hours as needed for moderate painMax Daily Amount: 2 tablets          Debridement   Universal Protocol:  Consent: Verbal consent obtained  Written consent obtained  Risks and benefits: risks, benefits and alternatives were discussed  Consent given by: patient, spouse, guardian and healthcare agent  Time out: Immediately prior to procedure a "time out" was called to verify the correct patient, procedure, equipment, support staff and site/side marked as required      Performed by: physician  Debridement type: surgical  Level of debridement: subcutaneous tissue  Pain control: lidocaine 4%  Post-debridement measurements  Length (cm): 2 4  Width (cm): 1 7  Depth (cm): 0 2  Percent debrided: 100%  Surface Area (cm^2): 4 08  Area debrided (cm^2): 4 08  Volume (cm^3): 0 82  Tissue and other material debrided: subcutaneous tissue  Devitalized tissue debrided: biofilm and necrotic debris  Instrument(s) utilized: nippers, curette and blade  Bleeding: medium  Hemostasis obtained with: pressure  Procedural pain (0-10): 3  Post-procedural pain: 3   Response to treatment: procedure was tolerated well          Plan:     Wound 07/17/20 Pretibial Distal;Right (Active)   Wound Image Images linked 02/10/21 1435   Wound Description Granulation tissue; Epithelialization;Pink 02/10/21 1435   Donna-wound Assessment Dry 02/10/21 1435   Wound Length (cm) 2 4 cm 02/10/21 1435   Wound Width (cm) 1 7 cm 02/10/21 1435   Wound Depth (cm) 0 2 cm 02/10/21 1435   Wound Surface Area (cm^2) 4 08 cm^2 02/10/21 1435   Wound Volume (cm^3) 0 82 cm^3 02/10/21 1435   Calculated Wound Volume (cm^3) 0 82 cm^3 02/10/21 1435   Change in Wound Size % 98 64 02/10/21 1435   Drainage Amount Moderate 02/10/21 1435   Drainage Description Serosanguineous 02/10/21 1435   Non-staged Wound Description Full thickness 02/10/21 1435   Treatments Cleansed;Irrigation with NSS 02/10/21 1435   Dressing Changed Changed 02/10/21 1435   Dressing Status Old drainage;Removed 02/10/21 1435       Wound 07/17/20 Pretibial Distal;Right (Active)   Date First Assessed/Time First Assessed: 07/17/20 0302   Pre-Existing Wound: Yes  Primary Wound Type: Arterial Ulcer  Location: Pretibial  Wound Location Orientation: Distal;Right       [REMOVED] Negative Pressure Wound Therapy (V A C ) Abdomen (Removed)   Removal Date: 08/26/20  Placement Date/Time: 08/03/20 1821   Inserted by: Dr Jimmie Turcios  Location: (c) Abdomen       [REMOVED] Wound 07/17/20 Arterial Ulcer Foot Anterior;Right (Removed)   Resolved Date: 09/09/20  Date First Assessed/Time First Assessed: 07/17/20 0304   Pre-Existing Wound: Yes  Primary Wound Type: Arterial Ulcer  Location: Foot  Wound Location Orientation: Anterior;Right  Wound Outcome: Healed       [REMOVED] Wound 07/17/20 Abdomen Left; Lower;Medial (Removed)   Resolved Date: 11/16/20  Date First Assessed/Time First Assessed: 07/17/20 0305   Pre-Existing Wound: Yes  Location: Abdomen  Wound Location Orientation: Left; Lower;Medial       [REMOVED] Wound 07/17/20 Buttocks Right (Removed)   Resolved Date: 11/16/20  Date First Assessed/Time First Assessed: 07/17/20 1428   Pre-Existing Wound: Yes  Location: Buttocks  Wound Location Orientation: Right  Wound Description (Comments): Scab       [REMOVED] Wound 07/20/20 Catheter entry/exit site Groin Left (Removed)   Resolved Date: 09/09/20  Date First Assessed/Time First Assessed: 07/20/20 1407   Traumatic Wound Type: Catheter entry/exit site  Location: Groin  Wound Location Orientation: Left       [REMOVED] Wound 07/21/20 Abdomen N/A (Removed)   Resolved Date: 09/09/20  Date First Assessed/Time First Assessed: 07/21/20 1444   Location: Abdomen  Wound Location Orientation: N/A  Wound Description (Comments): 1/4" x3" betadine soaked telfa marshal x2, gauze, tegaderm       [REMOVED] Wound 07/30/20 Catheter entry/exit site Back Left; Lower (Removed)   Resolved Date: 11/16/20  Date First Assessed/Time First Assessed: 07/30/20 1102   Traumatic Wound Type: Catheter entry/exit site  Location: Back  Wound Location Orientation: Left; Lower  Wound Description (Comments): Bone Marrow biopsy catheter entry       [REMOVED] Wound 08/03/20 Abdomen N/A (Removed)   Resolved Date: 09/09/20  Date First Assessed/Time First Assessed: 08/03/20 1739   Location: Abdomen  Wound Location Orientation: N/A  Wound Description (Comments): Closed suction drain right abdomen with sterile dressingNegative pressure wound vac mid           [REMOVED] Wound 08/12/20 Arterial Ulcer Tibial Posterior;Right (Removed)   Resolved Date: 12/02/20  Date First Assessed/Time First Assessed: 08/12/20 1614   Pre-Existing Wound: Yes  Primary Wound Type: Arterial Ulcer  Location: Tibial  Wound Location Orientation: Posterior;Right  Dressing Status: Intact  Wound Outcome: Healed       [REMOVED] Wound 09/09/20 Pressure Injury Heel Right (Removed)   Resolved Date/Resolved Time: 01/13/21 1501  Date First Assessed: 09/09/20   Primary Wound Type: Pressure Injury  Location: Heel  Wound Location Orientation: Right  Wound Outcome: Healed       [REMOVED] Wound 10/14/20 Arterial Ulcer Pretibial Left (Removed)   Resolved Date/Resolved Time: 10/21/20 1411  Date First Assessed/Time First Assessed: 10/14/20 1453   Primary Wound Type: Arterial Ulcer  Location: Pretibial  Wound Location Orientation: Left  Wound Outcome: Healed       [REMOVED] Wound 11/19/20 Leg Right (Removed)   Resolved Date: 12/02/20  Date First Assessed/Time First Assessed: 11/19/20 1453   Location: Leg  Wound Location Orientation: Right  Wound Description (Comments): Three Wounds Debrided And Dressed  Incision's 1st Dressing: DRESSING OIL EMULSION 3 X 8 I    [REMOVED] Wound 12/30/20 Arterial Ulcer Foot Right;Lateral (Removed)   Resolved Date/Resolved Time: 01/13/21 1456  Date First Assessed/Time First Assessed: 12/30/20 1453   Pre-Existing Wound: Yes  Primary Wound Type: Arterial Ulcer  Location: Foot  Wound Location Orientation: Right;Lateral  Dressing Status: Intact       Subjective:           68-year-old white male presents for follow-up care of chronic ulceration of his right ankle  Relates his pain at night in his legs   Has increased especially in the right     Requests refill of Percocet  Denies any fever or shortness of breath  Relates having new thigh wound and will be following with Dr Deep De Leon for that  The following portions of the patient's history were reviewed and updated as appropriate: allergies, current medications, past family history, past medical history, past social history, past surgical history and problem list     Review of Systems   Constitutional: Negative for activity change, appetite change, chills and fatigue  Pain in right leg at night     HENT: Negative for congestion, ear pain, hearing loss and nosebleeds  Eyes: Negative for pain, discharge, redness and visual disturbance  Respiratory: Negative for cough, chest tightness and shortness of breath  Cardiovascular: Negative for chest pain, palpitations and leg swelling  Gastrointestinal: Negative for abdominal pain, constipation, nausea and vomiting  Abdominal wound managed per general surgery  Endocrine: Negative for cold intolerance, heat intolerance and polydipsia  Genitourinary: Negative for difficulty urinating  Musculoskeletal: Positive for gait problem  Negative for arthralgias, back pain and joint swelling  Skin: Positive for wound (Wound right ankle, and right heel has closed)  Negative for color change, pallor and rash  Allergic/Immunologic: Negative for environmental allergies, food allergies and immunocompromised state  Neurological: Negative for dizziness, weakness, numbness and headaches  Hematological: Negative for adenopathy  Does not bruise/bleed easily  Psychiatric/Behavioral: Negative for agitation, behavioral problems, confusion, decreased concentration, hallucinations and suicidal ideas  Objective:       Wound 07/17/20 Pretibial Distal;Right (Active)   Wound Image Images linked 02/10/21 1435   Wound Description Granulation tissue; Epithelialization;Pink 02/10/21 1435   Donna-wound Assessment Dry 02/10/21 1435   Wound Length (cm) 2 4 cm 02/10/21 1435   Wound Width (cm) 1 7 cm 02/10/21 1435   Wound Depth (cm) 0 2 cm 02/10/21 1435   Wound Surface Area (cm^2) 4 08 cm^2 02/10/21 1435   Wound Volume (cm^3) 0 82 cm^3 02/10/21 1435   Calculated Wound Volume (cm^3) 0 82 cm^3 02/10/21 1435   Change in Wound Size % 98 64 02/10/21 1435   Drainage Amount Moderate 02/10/21 1435   Drainage Description Serosanguineous 02/10/21 1435   Non-staged Wound Description Full thickness 02/10/21 1435   Treatments Cleansed;Irrigation with NSS 02/10/21 1435   Dressing Changed Changed 02/10/21 1435   Dressing Status Old drainage;Removed 02/10/21 1435       Pulse 68   Temp 98 7 °F (37 1 °C)   Resp 20     Physical Exam  Constitutional:       Appearance: Normal appearance  HENT:      Head: Normocephalic  Right Ear: Tympanic membrane normal       Left Ear: Tympanic membrane normal       Nose: No congestion  Mouth/Throat:      Mouth: Mucous membranes are moist       Pharynx: No oropharyngeal exudate or posterior oropharyngeal erythema  Eyes:      Extraocular Movements: Extraocular movements intact  Conjunctiva/sclera: Conjunctivae normal       Pupils: Pupils are equal, round, and reactive to light  Cardiovascular:      Rate and Rhythm: Normal rate and regular rhythm  Pulses:           Dorsalis pedis pulses are 0 on the right side and 0 on the left side  Posterior tibial pulses are 0 on the right side and 0 on the left side  Pulmonary:      Effort: Pulmonary effort is normal    Abdominal:      Palpations: Abdomen is soft  Comments: Abdominal wound with surgical dressing intact  VAC dressing has been discontinued  Musculoskeletal:         General: Tenderness ( continues to have pain at night which requires medication to sleep  Patient relates pain present @night when he lays down and improves with walking ) present  Right foot: Decreased range of motion  No deformity, bunion, Charcot foot, foot drop or prominent metatarsal heads  Left foot: Decreased range of motion  No deformity, bunion, Charcot foot, foot drop or prominent metatarsal heads  Feet:    Feet:      Right foot:      Protective Sensation: 10 sites tested  10 sites sensed  Skin integrity: Ulcer (  Posterior right heel has closed ), skin breakdown ( superficial breakdown consistent with inflammatory tinea noted interdigitally right forefoot ) and dry skin present  No blister, erythema, warmth, callus or fissure  Toenail Condition: Right toenails are abnormally thick  Fungal disease present       Left foot: Protective Sensation: 10 sites tested  10 sites sensed  Skin integrity: Dry skin present  No ulcer, blister, skin breakdown, erythema, warmth, callus or fissure  Toenail Condition: Left toenails are abnormally thick  Fungal disease present  Comments: Moderate pain in his legs @ night which keeps him from sleeping  Relates some relief with leaving his legs hanging over the side of his bed  Pain is predominantly right leg  Skin:     General: Skin is warm and dry  Capillary Refill: Capillary refill takes 2 to 3 seconds  Coloration: Skin is not pale  Findings: Rash (Located right foot interdigital 1st through 4th interspaces, slight maceration noted , overall improved ) and wound (Right medial ankle:  Diminishing size, overall slightly improved, SQ depth, mild/moderate S/S drainage, no infection, 80% red  Superficial slough/biofilm persists) present  No bruising, erythema or lesion  Rash is crusting, macular and scaling  Neurological:      General: No focal deficit present  Mental Status: He is alert  Cranial Nerves: No cranial nerve deficit  Sensory: No sensory deficit  Motor: No weakness  Gait: Gait normal       Deep Tendon Reflexes: Reflexes normal    Psychiatric:         Mood and Affect: Mood normal          Behavior: Behavior normal          Judgment: Judgment normal            Wound Instructions:  Orders Placed This Encounter   Procedures    Wound cleansing and dressings     Wound cleansing and dressings   Right lower leg wound     Wash your hands with soap and water  Remove old dressing, discard into plastic bag and place in trash  Cleanse the wound with saline or soap and water prior to applying a clean dressing  Do not use tissue or cotton balls  Do not scrub the wound  Pat dry using gauze  Shower: no      Apply acticoat 3 to the leg wound and open areas on shin  Cover with abd pad     Secure with kerlix  Change dressing 3x a week (Mon-Wed-Fri)  Alginate between toes  Medicated cream as prescribed to right lower leg skin         Elastic Tubular Stocking or ace wraps as tolerated     Tubular elastic bandage: size F or ace wraps  Apply from base of toes to behind the knee  Apply in AM, may remove for sleep      Avoid prolonged standing in one place  Elevate leg above the level of the heart when sitting or as much as possible  Wear post op shoe when ambulating  Follow up in 2 weeks with Dr Espinoza Pabon  Standing Status:   Future     Standing Expiration Date:   2/10/2022    Wound home care     Continue home care nurses 3x a week  Standing Status:   Future     Standing Expiration Date:   2/10/2022        Diagnosis ICD-10-CM Associated Orders   1  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 ([facility])  I70 [id number] lidocaine (XYLOCAINE) 4 % topical solution 5 mL     Wound cleansing and dressings     Wound home care   2  Non-pressure chronic ulcer of right ankle with fat layer exposed ([facility])  L97 312 lidocaine (XYLOCAINE) 4 % topical solution 5 mL     Wound cleansing and dressings     Wound home care   3   Right foot ulcer, limited to breakdown of skin ([facility])  L97 511 lidocaine (XYLOCAINE) 4 % topical solution 5 mL     Wound cleansing and dressings     Wound home care

## 2021-02-10 NOTE — PATIENT INSTRUCTIONS
Orders Placed This Encounter   Procedures    Wound cleansing and dressings     Wound cleansing and dressings   Right lower leg wound     Wash your hands with soap and water  Remove old dressing, discard into plastic bag and place in trash  Cleanse the wound with saline or soap and water prior to applying a clean dressing  Do not use tissue or cotton balls  Do not scrub the wound  Pat dry using gauze  Shower: no      Apply acticoat 3 to the leg wound and open areas on shin  Cover with abd pad  Secure with kerlix  Change dressing 3x a week (Mon-Wed-Fri)  Alginate between toes  Medicated cream as prescribed to right lower leg skin         Elastic Tubular Stocking or ace wraps as tolerated     Tubular elastic bandage: size E or ace wraps  Apply from base of toes to behind the knee  Apply in AM, may remove for sleep      Avoid prolonged standing in one place  Elevate leg above the level of the heart when sitting or as much as possible  Wear post op shoe when ambulating  Follow up in 2 week       Standing Status:   Future     Standing Expiration Date:   2/10/2022

## 2021-02-15 NOTE — PATIENT INSTRUCTIONS
Orders Placed This Encounter   Procedures    Wound cleansing and dressings     Right posterior thigh    Wash your hands with soap and water  Remove old dressing, discard into plastic bag and place in trash  Cleanse the wound with prophase prior to applying a clean dressing  Do not use tissue or cotton balls  Do not scrub the wound  Pat dry using gauze    Shower no     Apply acticaot 3 to the right thigh wounds   Cover with allevyn life sacrum    Change dressing [id number]  Done today     Standing Status:   Future     Standing Expiration Date:   2/15/2022

## 2021-02-16 PROBLEM — S71.101A OPEN WOUND OF RIGHT THIGH: Status: ACTIVE | Noted: 2021-01-01

## 2021-02-16 NOTE — PROGRESS NOTES
Patient ID: Natalie Mccall is a 70 y o  male Date of Birth 1945     Chief Complaint  Chief Complaint   Patient presents with    Follow Up Wound Care Visit     Dressing intact on right posterior thigh, reports [facility] has been changing every other day  May have been caused by a heating pad  Allergies  Blood-group specific substance    Assessment:    Open wound of right thigh    Initial evaluation  Etiology appears unclear but likely was related to about of cellulitis which was diagnosed about 6 weeks ago  Wounds debrided as below, significant depth noted post debridement  Wound management with Acticoat, changing every other day   Adequate protein intake, 3-4 servings per day   Pressure relief to the area   Followup one-week or call sooner with questions or concerns       Diagnoses and all orders for this visit:    Open wound of right thigh, initial encounter  -     Wound cleansing and dressings; Future  -     lidocaine (XYLOCAINE) 4 % topical solution 5 mL    Other orders  -     Debridement              Debridement   Wound 02/15/21 Abscess Thigh Distal;Posterior;Right    Universal Protocol:  Consent: Verbal consent obtained  Consent given by: patient  Time out: Immediately prior to procedure a "time out" was called to verify the correct patient, procedure, equipment, support staff and site/side marked as required    Timeout called at: 2/15/2021 2:50 PM   Patient understanding: patient states understanding of the procedure being performed  Patient identity confirmed: verbally with patient      Performed by: physician  Debridement type: surgical  Level of debridement: subcutaneous tissue  Pain control: lidocaine 4%  Post-debridement measurements  Length (cm): 11  Width (cm): 7  Depth (cm): 1  Percent debrided: 100%  Surface Area (cm^2): 77  Area debrided (cm^2): 77  Volume (cm^3): 77  Tissue and other material debrided: subcutaneous tissue  Devitalized tissue debrided: exudate, necrotic debris, slough and eschar  Instrument(s) utilized: blade, forceps and curette  Bleeding: small  Hemostasis obtained with: pressure  Procedural pain (0-10): 6  Post-procedural pain: 4   Response to treatment: procedure was tolerated well          Plan:   see above     Wound 07/17/20 Pretibial Distal;Right (Active)   Wound Image Images linked 02/15/21 1516       Wound 02/15/21 Abscess Thigh Distal;Posterior;Right (Active)   Wound Image Images linked 02/15/21 1517   Wound Description Black;Slough; Epithelialization 02/15/21 1438   Donna-wound Assessment Intact 02/15/21 1438   Wound Length (cm) 11 cm 02/15/21 1438   Wound Width (cm) 7 cm 02/15/21 1438   Wound Depth (cm) 0 2 cm 02/15/21 1438   Wound Surface Area (cm^2) 77 cm^2 02/15/21 1438   Wound Volume (cm^3) 15 4 cm^3 02/15/21 1438   Calculated Wound Volume (cm^3) 15 4 cm^3 02/15/21 1438   Drainage Amount Moderate 02/15/21 1438   Drainage Description Serosanguineous 02/15/21 1438   Non-staged Wound Description Full thickness 02/15/21 1438   Dressing Status Intact 02/15/21 1438       Wound 07/17/20 Pretibial Distal;Right (Active)   Date First Assessed/Time First Assessed: 07/17/20 0302   Pre-Existing Wound: Yes  Primary Wound Type: Arterial Ulcer  Location: Pretibial  Wound Location Orientation: Distal;Right       Wound 02/15/21 Abscess Thigh Distal;Posterior;Right (Active)   Date First Assessed/Time First Assessed: 02/15/21 1435   Pre-Existing Wound: Yes  Primary Wound Type: Abscess  Location: Thigh  Wound Location Orientation: Distal;Posterior;Right  Dressing Status: Intact       [REMOVED] Negative Pressure Wound Therapy (V A C ) Abdomen (Removed)   Removal Date: 08/26/20  Placement Date/Time: 08/03/20 1821   Inserted by: Dr Wilhelm Half  Location: (c) Abdomen       [REMOVED] Wound 07/17/20 Arterial Ulcer Foot Anterior;Right (Removed)   Resolved Date: 09/09/20  Date First Assessed/Time First Assessed: 07/17/20 0304   Pre-Existing Wound: Yes  Primary Wound Type: Arterial Ulcer  Location: Foot  Wound Location Orientation: Anterior;Right  Wound Outcome: Healed       [REMOVED] Wound 07/17/20 Abdomen Left; Lower;Medial (Removed)   Resolved Date: 11/16/20  Date First Assessed/Time First Assessed: 07/17/20 0305   Pre-Existing Wound: Yes  Location: Abdomen  Wound Location Orientation: Left; Lower;Medial       [REMOVED] Wound 07/17/20 Buttocks Right (Removed)   Resolved Date: 11/16/20  Date First Assessed/Time First Assessed: 07/17/20 1428   Pre-Existing Wound: Yes  Location: Buttocks  Wound Location Orientation: Right  Wound Description (Comments): Scab       [REMOVED] Wound 07/20/20 Catheter entry/exit site Groin Left (Removed)   Resolved Date: 09/09/20  Date First Assessed/Time First Assessed: 07/20/20 1407   Traumatic Wound Type: Catheter entry/exit site  Location: Groin  Wound Location Orientation: Left       [REMOVED] Wound 07/21/20 Abdomen N/A (Removed)   Resolved Date: 09/09/20  Date First Assessed/Time First Assessed: 07/21/20 1444   Location: Abdomen  Wound Location Orientation: N/A  Wound Description (Comments): 1/4" x3" betadine soaked telfa marshal x2, gauze, tegaderm       [REMOVED] Wound 07/30/20 Catheter entry/exit site Back Left; Lower (Removed)   Resolved Date: 11/16/20  Date First Assessed/Time First Assessed: 07/30/20 1102   Traumatic Wound Type: Catheter entry/exit site  Location: Back  Wound Location Orientation: Left; Lower  Wound Description (Comments): Bone Marrow biopsy catheter entry       [REMOVED] Wound 08/03/20 Abdomen N/A (Removed)   Resolved Date: 09/09/20  Date First Assessed/Time First Assessed: 08/03/20 1739   Location: Abdomen  Wound Location Orientation: N/A  Wound Description (Comments): Closed suction drain right abdomen with sterile dressingNegative pressure wound vac mid           [REMOVED] Wound 08/12/20 Arterial Ulcer Tibial Posterior;Right (Removed)   Resolved Date: 12/02/20  Date First Assessed/Time First Assessed: 08/12/20 1614   Pre-Existing Wound: Yes  Primary Wound Type: Arterial Ulcer  Location: Tibial  Wound Location Orientation: Posterior;Right  Dressing Status: Intact  Wound Outcome: Healed       [REMOVED] Wound 09/09/20 Pressure Injury Heel Right (Removed)   Resolved Date/Resolved Time: 01/13/21 1501  Date First Assessed: 09/09/20   Primary Wound Type: Pressure Injury  Location: Heel  Wound Location Orientation: Right  Wound Outcome: Healed       [REMOVED] Wound 10/14/20 Arterial Ulcer Pretibial Left (Removed)   Resolved Date/Resolved Time: 10/21/20 1411  Date First Assessed/Time First Assessed: 10/14/20 1453   Primary Wound Type: Arterial Ulcer  Location: Pretibial  Wound Location Orientation: Left  Wound Outcome: Healed       [REMOVED] Wound 11/19/20 Leg Right (Removed)   Resolved Date: 12/02/20  Date First Assessed/Time First Assessed: 11/19/20 1453   Location: Leg  Wound Location Orientation: Right  Wound Description (Comments): Three Wounds Debrided And Dressed  Incision's 1st Dressing: DRESSING OIL EMULSION 3 X 8 I    [REMOVED] Wound 12/30/20 Arterial Ulcer Foot Right;Lateral (Removed)   Resolved Date/Resolved Time: 01/13/21 1456  Date First Assessed/Time First Assessed: 12/30/20 1453   Pre-Existing Wound: Yes  Primary Wound Type: Arterial Ulcer  Location: Foot  Wound Location Orientation: Right;Lateral  Dressing Status: Intact       Subjective:        Patient presents for initial evaluation right lower extremity wounds  He was treated on 12/30/2020 for cellulitis of the right posterior thigh and he states that at some point after that he developed wounds in that area  He noticed drainage a few weeks ago but is unsure exactly when they started  Visiting nurses have been using alginate on the areas  He is currently seeing Dr Marquis Starr for right lower extremity wounds        The following portions of the patient's history were reviewed and updated as appropriate: He  has a past medical history of Anemia, Atrial fibrillation ([address] ), Benign prostatic hyperplasia without urinary obstruction, Congestive heart failure with left ventricular diastolic dysfunction, chronic ([address] ), History of ITP, Hypertension, Lumbosacral disc herniation, Peripheral vascular disease of lower extremity ([address] ), Renal disorder, and Subdural hematoma ([address] )  He  reports that he has never smoked  He has never used smokeless tobacco  He reports previous alcohol use  He reports that he does not use drugs  He is allergic to blood-group specific substance       Review of Systems   Constitutional: Negative for chills and fever  HENT: Negative for congestion and sneezing  Respiratory: Negative for cough  Skin: Positive for wound  Psychiatric/Behavioral: Negative for agitation  Objective:       Wound 07/17/20 Pretibial Distal;Right (Active)   Wound Image Images linked 02/15/21 1516       Wound 02/15/21 Abscess Thigh Distal;Posterior;Right (Active)   Wound Image Images linked 02/15/21 1517   Wound Description Black;Slough; Epithelialization 02/15/21 1438   Donna-wound Assessment Intact 02/15/21 1438   Wound Length (cm) 11 cm 02/15/21 1438   Wound Width (cm) 7 cm 02/15/21 1438   Wound Depth (cm) 0 2 cm 02/15/21 1438   Wound Surface Area (cm^2) 77 cm^2 02/15/21 1438   Wound Volume (cm^3) 15 4 cm^3 02/15/21 1438   Calculated Wound Volume (cm^3) 15 4 cm^3 02/15/21 1438   Drainage Amount Moderate 02/15/21 1438   Drainage Description Serosanguineous 02/15/21 1438   Non-staged Wound Description Full thickness 02/15/21 1438   Dressing Status Intact 02/15/21 1438       /78   Pulse 80   Temp 99 2 °F (37 3 °C)   Resp 18     Physical Exam  Constitutional:       General: He is not in acute distress  Appearance: Normal appearance  He is not ill-appearing, toxic-appearing or diaphoretic  HENT:      Head: Normocephalic and atraumatic        Right Ear: External ear normal       Left Ear: External ear normal    Eyes:      Conjunctiva/sclera: Conjunctivae normal    Neck: Musculoskeletal: Neck supple  Pulmonary:      Effort: Pulmonary effort is normal  No respiratory distress  Skin:     Comments: See wound assessment   Neurological:      Mental Status: He is alert  Gait: Gait normal    Psychiatric:         Mood and Affect: Mood normal          Behavior: Behavior normal            Wound Instructions:  Orders Placed This Encounter   Procedures    Wound cleansing and dressings     Right posterior thigh    Wash your hands with soap and water  Remove old dressing, discard into plastic bag and place in trash  Cleanse the wound with prophase prior to applying a clean dressing  Do not use tissue or cotton balls  Do not scrub the wound  Pat dry using gauze  Shower no     Apply acticaot 3 to the right thigh wounds   Cover with allevyn life sacrum    Change dressing [id number]  Done today     Standing Status:   Future     Standing Expiration Date:   2/15/2022    Debridement     This order was created via procedure documentation        Diagnosis ICD-10-CM Associated Orders   1   Open wound of right thigh, initial encounter  S71 101A Wound cleansing and dressings     lidocaine (XYLOCAINE) 4 % topical solution 5 mL

## 2021-02-16 NOTE — ASSESSMENT & PLAN NOTE
Initial evaluation    Etiology appears unclear but likely was related to about of cellulitis which was diagnosed about 6 weeks ago  Wounds debrided as below, significant depth noted post debridement  Wound management with Acticoat, changing every other day   Adequate protein intake, 3-4 servings per day   Pressure relief to the area   Followup one-week or call sooner with questions or concerns

## 2021-02-24 PROBLEM — I70.233 ATHEROSCLEROSIS OF NATIVE ARTERY OF RIGHT LOWER EXTREMITY WITH ULCERATION OF ANKLE (HCC): Status: ACTIVE | Noted: 2021-01-01

## 2021-02-24 NOTE — PROGRESS NOTES
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 ([address] )  Debrided as below   continue wound management with Acticoat  Followup with Dr Levon Cooks in 2 weeks for continue monitoring of this wound, call sooner with questions or concerns       Diagnoses and all orders for this visit:    Open wound of right thigh, initial encounter  -     Wound cleansing and dressings; Future  -     lidocaine (XYLOCAINE) 4 % topical solution 5 mL  -     Debridement    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 (HCC)  -     Wound cleansing and dressings; Future  -     lidocaine (XYLOCAINE) 4 % topical solution 5 mL  -     Debridement    Non-pressure chronic ulcer of right ankle with fat layer exposed (HCC)  -     Wound cleansing and dressings; Future  -     lidocaine (XYLOCAINE) 4 % topical solution 5 mL    Other orders  -     Cancel: Debridement              Debridement   Wound 02/15/21 Abscess Thigh Distal;Posterior;Right    Universal Protocol:  Consent: Verbal consent obtained  Consent given by: patient  Time out: Immediately prior to procedure a "time out" was called to verify the correct patient, procedure, equipment, support staff and site/side marked as required    Timeout called at: 2/24/2021 11:45 AM   Patient understanding: patient states understanding of the procedure being performed  Patient identity confirmed: verbally with patient      Performed by: physician  Debridement type: surgical  Level of debridement: subcutaneous tissue  Pain control: lidocaine 4%  Post-debridement measurements  Length (cm): 13 2  Width (cm): 8 5  Depth (cm): 0 8  Percent debrided: 30%  Surface Area (cm^2): 112 2  Area debrided (cm^2): 33 66  Volume (cm^3): 89 76  Tissue and other material debrided: subcutaneous tissue  Devitalized tissue debrided: exudate and slough  Instrument(s) utilized: curette  Bleeding: small  Hemostasis obtained with: pressure  Procedural pain (0-10): 6  Post-procedural pain: 4   Response to treatment: procedure was tolerated well    Debridement   Wound 07/17/20 Pretibial Distal;Right    Universal Protocol:  Consent: Verbal consent obtained  Consent given by: patient  Time out: Immediately prior to procedure a "time out" was called to verify the correct patient, procedure, equipment, support staff and site/side marked as required  Timeout called at: 2/24/2021 11:45 AM   Patient understanding: patient states understanding of the procedure being performed  Patient identity confirmed: verbally with patient      Performed by: physician  Debridement type: surgical  Level of debridement: subcutaneous tissue  Pain control: lidocaine 4%  Post-debridement measurements  Length (cm): 12 5  Width (cm): 1 9  Depth (cm): 0 3  Percent debrided: 25%  Surface Area (cm^2): 23 75  Area debrided (cm^2): 5 94  Volume (cm^3): 7 13  Tissue and other material debrided: subcutaneous tissue  Devitalized tissue debrided: exudate and slough  Instrument(s) utilized: curette  Bleeding: small  Hemostasis obtained with: pressure  Procedural pain (0-10): 0  Post-procedural pain: 0   Response to treatment: procedure was tolerated well          Plan:     Wound 07/17/20 Pretibial Distal;Right (Active)   Wound Image Images linked 02/24/21 1143   Wound Description Slough;Granulation tissue; Epithelialization 02/24/21 1143   Donna-wound Assessment Dry;Hyperpigmented 02/24/21 1143   Wound Length (cm) 12 5 cm 02/24/21 1143   Wound Width (cm) 1 9 cm 02/24/21 1143   Wound Depth (cm) 0 2 cm 02/24/21 1143   Wound Surface Area (cm^2) 23 75 cm^2 02/24/21 1143   Wound Volume (cm^3) 4 75 cm^3 02/24/21 1143   Calculated Wound Volume (cm^3) 4 75 cm^3 02/24/21 1143   Change in Wound Size % 92 14 02/24/21 1143   Drainage Amount Moderate 02/24/21 1143   Drainage Description Serosanguineous 02/24/21 1143   Non-staged Wound Description Full thickness 02/24/21 1143       Wound 02/15/21 Abscess Thigh Distal;Posterior;Right (Active)   Wound Image Images linked 02/24/21 1150   Wound Description Black;Slough; Epithelialization;Granulation tissue 02/24/21 1145   Donna-wound Assessment Pink; Excoriated 02/24/21 1145   Wound Length (cm) 13 2 cm 02/24/21 1145   Wound Width (cm) 8 5 cm 02/24/21 1145   Wound Depth (cm) 0 7 cm 02/24/21 1145   Wound Surface Area (cm^2) 112 2 cm^2 02/24/21 1145   Wound Volume (cm^3) 78 54 cm^3 02/24/21 1145   Calculated Wound Volume (cm^3) 78 54 cm^3 02/24/21 1145   Change in Wound Size % -410 02/24/21 1145   Drainage Amount Moderate 02/24/21 1145   Drainage Description Serosanguineous 02/24/21 1145   Non-staged Wound Description Full thickness 02/24/21 1145       Wound 07/17/20 Pretibial Distal;Right (Active)   Date First Assessed/Time First Assessed: 07/17/20 0302   Pre-Existing Wound: Yes  Primary Wound Type: Arterial Ulcer  Location: Pretibial  Wound Location Orientation: Distal;Right       Wound 02/15/21 Abscess Thigh Distal;Posterior;Right (Active)   Date First Assessed/Time First Assessed: 02/15/21 1435   Pre-Existing Wound: Yes  Primary Wound Type: Abscess  Location: Thigh  Wound Location Orientation: Distal;Posterior;Right  Dressing Status: Intact       [REMOVED] Negative Pressure Wound Therapy (V A C ) Abdomen (Removed)   Removal Date: 08/26/20  Placement Date/Time: 08/03/20 1821   Inserted by: Dr Avelino Schaeffer  Location: (c) Abdomen [REMOVED] Wound 07/17/20 Arterial Ulcer Foot Anterior;Right (Removed)   Resolved Date: 09/09/20  Date First Assessed/Time First Assessed: 07/17/20 0304   Pre-Existing Wound: Yes  Primary Wound Type: Arterial Ulcer  Location: Foot  Wound Location Orientation: Anterior;Right  Wound Outcome: Healed       [REMOVED] Wound 07/17/20 Abdomen Left; Lower;Medial (Removed)   Resolved Date: 11/16/20  Date First Assessed/Time First Assessed: 07/17/20 0305   Pre-Existing Wound: Yes  Location: Abdomen  Wound Location Orientation: Left; Lower;Medial       [REMOVED] Wound 07/17/20 Buttocks Right (Removed)   Resolved Date: 11/16/20  Date First Assessed/Time First Assessed: 07/17/20 1428   Pre-Existing Wound: Yes  Location: Buttocks  Wound Location Orientation: Right  Wound Description (Comments): Scab       [REMOVED] Wound 07/20/20 Catheter entry/exit site Groin Left (Removed)   Resolved Date: 09/09/20  Date First Assessed/Time First Assessed: 07/20/20 1407   Traumatic Wound Type: Catheter entry/exit site  Location: Groin  Wound Location Orientation: Left       [REMOVED] Wound 07/21/20 Abdomen N/A (Removed)   Resolved Date: 09/09/20  Date First Assessed/Time First Assessed: 07/21/20 1444   Location: Abdomen  Wound Location Orientation: N/A  Wound Description (Comments): 1/4" x3" betadine soaked telfa marshal x2, gauze, tegaderm       [REMOVED] Wound 07/30/20 Catheter entry/exit site Back Left; Lower (Removed)   Resolved Date: 11/16/20  Date First Assessed/Time First Assessed: 07/30/20 1102   Traumatic Wound Type: Catheter entry/exit site  Location: Back  Wound Location Orientation: Left; Lower  Wound Description (Comments): Bone Marrow biopsy catheter entry       [REMOVED] Wound 08/03/20 Abdomen N/A (Removed)   Resolved Date: 09/09/20  Date First Assessed/Time First Assessed: 08/03/20 1739   Location: Abdomen  Wound Location Orientation: N/A  Wound Description (Comments): Closed suction drain right abdomen with sterile dressingNegative pressure wound vac mid    [REMOVED] Wound 08/12/20 Arterial Ulcer Tibial Posterior;Right (Removed)   Resolved Date: 12/02/20  Date First Assessed/Time First Assessed: 08/12/20 1614   Pre-Existing Wound: Yes  Primary Wound Type: Arterial Ulcer  Location: Tibial  Wound Location Orientation: Posterior;Right  Dressing Status: Intact  Wound Outcome: Healed       [REMOVED] Wound 09/09/20 Pressure Injury Heel Right (Removed)   Resolved Date/Resolved Time: 01/13/21 1501  Date First Assessed: 09/09/20   Primary Wound Type: Pressure Injury  Location: Heel  Wound Location Orientation: Right  Wound Outcome: Healed       [REMOVED] Wound 10/14/20 Arterial Ulcer Pretibial Left (Removed)   Resolved Date/Resolved Time: 10/21/20 1411  Date First Assessed/Time First Assessed: 10/14/20 1453   Primary Wound Type: Arterial Ulcer  Location: Pretibial  Wound Location Orientation: Left  Wound Outcome: Healed       [REMOVED] Wound 11/19/20 Leg Right (Removed)   Resolved Date: 12/02/20  Date First Assessed/Time First Assessed: 11/19/20 1453   Location: Leg  Wound Location Orientation: Right  Wound Description (Comments): Three Wounds Debrided And Dressed  Incision's 1st Dressing: DRESSING OIL EMULSION 3 X 8 I    [REMOVED] Wound 12/30/20 Arterial Ulcer Foot Right;Lateral (Removed)   Resolved Date/Resolved Time: 01/13/21 1456  Date First Assessed/Time First Assessed: 12/30/20 1453   Pre-Existing Wound: Yes  Primary Wound Type: Arterial Ulcer  Location: Foot  Wound Location Orientation: Right;Lateral  Dressing Status: Intact       Subjective:        Patient presents for followup of right lower extremity wounds  He has been using Acticoat on the wounds  No increased pain or drainage        The following portions of the patient's history were reviewed and updated as appropriate: He  has a past medical history of Anemia, Atrial fibrillation ([address] ), Benign prostatic hyperplasia without urinary obstruction, Congestive heart failure with left ventricular diastolic dysfunction, chronic (HCC), History of ITP, Hypertension, Lumbosacral disc herniation, Peripheral vascular disease of lower extremity ([address] ), Renal disorder, and Subdural hematoma ([facility] [address] )  He  reports that he has never smoked  He has never used smokeless tobacco  He reports previous alcohol use  He reports that he does not use drugs  He is allergic to blood-group specific substance       Review of Systems   Constitutional: Negative for chills and fever  HENT: Negative for congestion and sneezing  Respiratory: Negative for cough  Cardiovascular: Positive for leg swelling  Skin: Positive for wound  Psychiatric/Behavioral: Negative for agitation  Objective:       Wound 07/17/20 Pretibial Distal;Right (Active)   Wound Image Images linked 02/24/21 1143   Wound Description Slough;Granulation tissue; Epithelialization 02/24/21 1143   Donna-wound Assessment Dry;Hyperpigmented 02/24/21 1143   Wound Length (cm) 12 5 cm 02/24/21 1143   Wound Width (cm) 1 9 cm 02/24/21 1143   Wound Depth (cm) 0 2 cm 02/24/21 1143   Wound Surface Area (cm^2) 23 75 cm^2 02/24/21 1143   Wound Volume (cm^3) 4 75 cm^3 02/24/21 1143   Calculated Wound Volume (cm^3) 4 75 cm^3 02/24/21 1143   Change in Wound Size % 92 14 02/24/21 1143   Drainage Amount Moderate 02/24/21 1143   Drainage Description Serosanguineous 02/24/21 1143   Non-staged Wound Description Full thickness 02/24/21 1143       Wound 02/15/21 Abscess Thigh Distal;Posterior;Right (Active)   Wound Image Images linked 02/24/21 1150   Wound Description Black;Slough; Epithelialization;Granulation tissue 02/24/21 1145   Donna-wound Assessment Pink; Excoriated 02/24/21 1145   Wound Length (cm) 13 2 cm 02/24/21 1145   Wound Width (cm) 8 5 cm 02/24/21 1145   Wound Depth (cm) 0 7 cm 02/24/21 1145   Wound Surface Area (cm^2) 112 2 cm^2 02/24/21 1145   Wound Volume (cm^3) 78 54 cm^3 02/24/21 1145   Calculated Wound Volume (cm^3) 78 54 cm^3 02/24/21 2125 Change in Wound Size % -410 02/24/21 1145   Drainage Amount Moderate 02/24/21 1145   Drainage Description Serosanguineous 02/24/21 1145   Non-staged Wound Description Full thickness 02/24/21 1145       /70   Pulse 72   Temp 97 7 °F (36 5 °C)   Resp 18     Physical Exam      Wound Instructions:  Orders Placed This Encounter   Procedures    Wound cleansing and dressings     Wound cleansing and dressings       Right posterior thigh     Wash your hands with soap and water  Remove old dressing, discard into plastic bag and place in trash  Cleanse the wound with dakins prior to applying a clean dressing  Do not use tissue or cotton balls  Do not scrub the wound  Pat dry using gauze  Shower no   Apply acticoat 3 to the right thigh wounds   Cover with ABD and medfix or medipore tape      Change dressing [id number]  Done today       Right lower leg wound     Wash your hands with soap and water  Remove old dressing, discard into plastic bag and place in trash  Cleanse the wound with saline or soap and water prior to applying a clean dressing  Do not use tissue or cotton balls  Do not scrub the wound  Pat dry using gauze  Shower: no   Apply acticoat 3 to the leg wound and open areas on shin  Cover with abd pad  Secure with kerlix  Calcium alginate between toes  Change dressing 3x a week (Mon-Wed-Fri)    Medicated cream as prescribed to right lower leg skin         Elastic Tubular Stocking or ace wraps as tolerated     Tubular elastic bandage: size F or ace wraps  Apply from base of toes to behind the knee  Apply in AM, may remove for sleep  Avoid prolonged standing in one place  Elevate leg above the level of the heart when sitting or as much as possible  Wear post op shoe when ambulating      Wound home care      Continue home care nurses 3x a week       Standing Status:   Future     Standing Expiration Date:   2/24/2022    Debridement     This order was created via procedure documentation    Debridement     This order was created via procedure documentation        Diagnosis ICD-10-CM Associated Orders   1  Open wound of right thigh, initial encounter  S71 101A Wound cleansing and dressings     lidocaine (XYLOCAINE) 4 % topical solution 5 mL     Debridement   2  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

## 2021-02-24 NOTE — ASSESSMENT & PLAN NOTE
Debrided as below   continue wound management with Acticoat  Followup with Dr Barry Hernandez in 2 weeks for continue monitoring of this wound, call sooner with questions or concerns

## 2021-02-24 NOTE — PATIENT INSTRUCTIONS
Orders Placed This Encounter   Procedures    Wound cleansing and dressings     Wound cleansing and dressings       Right posterior thigh     Wash your hands with soap and water  Remove old dressing, discard into plastic bag and place in trash  Cleanse the wound with dakins prior to applying a clean dressing  Do not use tissue or cotton balls  Do not scrub the wound  Pat dry using gauze  Shower no   Apply acticoat 3 to the right thigh wounds   Cover with ABD and medfix or medipore tape      Change dressing [id number]  Done today       Right lower leg wound     Wash your hands with soap and water  Remove old dressing, discard into plastic bag and place in trash  Cleanse the wound with saline or soap and water prior to applying a clean dressing  Do not use tissue or cotton balls  Do not scrub the wound  Pat dry using gauze  Shower: no   Apply acticoat 3 to the leg wound and open areas on shin  Cover with abd pad  Secure with kerlix  Calcium alginate between toes  Change dressing 3x a week (Mon-Wed-Fri)    Medicated cream as prescribed to right lower leg skin         Elastic Tubular Stocking or ace wraps as tolerated     Tubular elastic bandage: size F or ace wraps  Apply from base of toes to behind the knee  Apply in AM, may remove for sleep  Avoid prolonged standing in one place  Elevate leg above the level of the heart when sitting or as much as possible  Wear post op shoe when ambulating      Wound home care      Continue home care nurses 3x a week       Standing Status:   Future     Standing Expiration Date:   2/24/2022

## 2021-02-24 NOTE — ASSESSMENT & PLAN NOTE
Wound is somewhat improved on that there was less slough but there was no significant change in measurements  Debrided as below   Continue wound management with Acticoat and Dakin's rinse with each dressing change  Followup one-week or call sooner with questions or concerns

## 2021-03-01 NOTE — PATIENT INSTRUCTIONS
Orders Placed This Encounter   Procedures    Wound cleansing and dressings     Right thigh wound  Iodosorb to right thigh wounds  ABD pad and medfix tape  To be changed at [facility] on Wednesday, [facility] to change on Friday    Done today     Standing Status:   Future     Standing Expiration Date:   3/1/2022

## 2021-03-02 NOTE — ASSESSMENT & PLAN NOTE
Minimal change to the wound  Debrided as below  Change wound management to Iodosorb  Periwound irritation noted, change outer dressing back to Allevyn   Avoid consistent pressure to this area  Followup one-week or call sooner with questions or concerns

## 2021-03-02 NOTE — PROGRESS NOTES
Patient ID: Adriana Steven is a 70 y o  male Date of Birth 1945     Chief Complaint  Chief Complaint   Patient presents with    Follow Up Wound Care Visit     Dressing intact on right thigh and lower leg  Reports [facility] has been changing twice weekly  Allergies  Blood-group specific substance    Assessment:    Open wound of right thigh    Minimal change to the wound  Debrided as below  Change wound management to Iodosorb  Periwound irritation noted, change outer dressing back to Allevyn   Avoid consistent pressure to this area  Followup one-week or call sooner with questions or concerns       Diagnoses and all orders for this visit:    Open wound of right thigh, initial encounter  -     Wound cleansing and dressings; Future  -     lidocaine (XYLOCAINE) 4 % topical solution 5 mL    Other orders  -     Debridement              Debridement   Wound 02/15/21 Abscess Thigh Distal;Posterior;Right    Universal Protocol:  Consent: Verbal consent obtained  Consent given by: patient  Time out: Immediately prior to procedure a "time out" was called to verify the correct patient, procedure, equipment, support staff and site/side marked as required    Timeout called at: 3/1/2021 3:00 PM   Patient understanding: patient states understanding of the procedure being performed  Patient identity confirmed: verbally with patient      Performed by: physician  Debridement type: surgical  Level of debridement: subcutaneous tissue  Pain control: lidocaine 4%  Post-debridement measurements  Length (cm): 10  Width (cm): 6  Depth (cm): 0 5  Percent debrided: 50%  Surface Area (cm^2): 60  Area debrided (cm^2): 30  Volume (cm^3): 30  Tissue and other material debrided: subcutaneous tissue  Devitalized tissue debrided: exudate and slough  Instrument(s) utilized: curette  Bleeding: small  Hemostasis obtained with: pressure  Procedural pain (0-10): 6  Post-procedural pain: 4   Response to treatment: procedure was tolerated well          Plan:     Wound 02/15/21 Abscess Thigh Distal;Posterior;Right (Active)   Wound Image Images linked 03/01/21 1518   Wound Description Black;Granulation tissue;Slough 03/01/21 1517   Donna-wound Assessment Edema; Erythema;Swelling 03/01/21 1517   Wound Length (cm) 10 cm 03/01/21 1517   Wound Width (cm) 6 cm 03/01/21 1517   Wound Depth (cm) 0 5 cm 03/01/21 1517   Wound Surface Area (cm^2) 60 cm^2 03/01/21 1517   Wound Volume (cm^3) 30 cm^3 03/01/21 1517   Calculated Wound Volume (cm^3) 30 cm^3 03/01/21 1517   Change in Wound Size % -94 81 03/01/21 1517   Drainage Amount Moderate 03/01/21 1517   Drainage Description Serosanguineous 03/01/21 1517   Non-staged Wound Description Full thickness 03/01/21 1517   Dressing Status Intact 03/01/21 1517       Wound 07/17/20 Pretibial Distal;Right (Active)   Date First Assessed/Time First Assessed: 07/17/20 0302   Pre-Existing Wound: Yes  Primary Wound Type: Arterial Ulcer  Location: Pretibial  Wound Location Orientation: Distal;Right       Wound 02/15/21 Abscess Thigh Distal;Posterior;Right (Active)   Date First Assessed/Time First Assessed: 02/15/21 1435   Pre-Existing Wound: Yes  Primary Wound Type: Abscess  Location: Thigh  Wound Location Orientation: Distal;Posterior;Right  Dressing Status: Intact       [REMOVED] Negative Pressure Wound Therapy (V A C ) Abdomen (Removed)   Removal Date: 08/26/20  Placement Date/Time: 08/03/20 1821   Inserted by: Dr Shan Saucedo  Location: (c) Abdomen       [REMOVED] Wound 07/17/20 Arterial Ulcer Foot Anterior;Right (Removed)   Resolved Date: 09/09/20  Date First Assessed/Time First Assessed: 07/17/20 0304   Pre-Existing Wound: Yes  Primary Wound Type: Arterial Ulcer  Location: Foot  Wound Location Orientation: Anterior;Right  Wound Outcome: Healed       [REMOVED] Wound 07/17/20 Abdomen Left; Lower;Medial (Removed)   Resolved Date: 11/16/20  Date First Assessed/Time First Assessed: 07/17/20 0305   Pre-Existing Wound: Yes  Location: Abdomen  Wound Location Orientation: Left; Lower;Medial       [REMOVED] Wound 07/17/20 Buttocks Right (Removed)   Resolved Date: 11/16/20  Date First Assessed/Time First Assessed: 07/17/20 1428   Pre-Existing Wound: Yes  Location: Buttocks  Wound Location Orientation: Right  Wound Description (Comments): Scab       [REMOVED] Wound 07/20/20 Catheter entry/exit site Groin Left (Removed)   Resolved Date: 09/09/20  Date First Assessed/Time First Assessed: 07/20/20 1407   Traumatic Wound Type: Catheter entry/exit site  Location: Groin  Wound Location Orientation: Left       [REMOVED] Wound 07/21/20 Abdomen N/A (Removed)   Resolved Date: 09/09/20  Date First Assessed/Time First Assessed: 07/21/20 1444   Location: Abdomen  Wound Location Orientation: N/A  Wound Description (Comments): 1/4" x3" betadine soaked telfa marshal x2, gauze, tegaderm       [REMOVED] Wound 07/30/20 Catheter entry/exit site Back Left; Lower (Removed)   Resolved Date: 11/16/20  Date First Assessed/Time First Assessed: 07/30/20 1102   Traumatic Wound Type: Catheter entry/exit site  Location: Back  Wound Location Orientation: Left; Lower  Wound Description (Comments): Bone Marrow biopsy catheter entry       [REMOVED] Wound 08/03/20 Abdomen N/A (Removed)   Resolved Date: 09/09/20  Date First Assessed/Time First Assessed: 08/03/20 1739   Location: Abdomen  Wound Location Orientation: N/A  Wound Description (Comments): Closed suction drain right abdomen with sterile dressingNegative pressure wound vac mid           [REMOVED] Wound 08/12/20 Arterial Ulcer Tibial Posterior;Right (Removed)   Resolved Date: 12/02/20  Date First Assessed/Time First Assessed: 08/12/20 1614   Pre-Existing Wound: Yes  Primary Wound Type: Arterial Ulcer  Location: Tibial  Wound Location Orientation: Posterior;Right  Dressing Status: Intact  Wound Outcome: Healed       [REMOVED] Wound 09/09/20 Pressure Injury Heel Right (Removed)   Resolved Date/Resolved Time: 01/13/21 1501  Date First Assessed: 09/09/20   Primary Wound Type: Pressure Injury  Location: Heel  Wound Location Orientation: Right  Wound Outcome: Healed       [REMOVED] Wound 10/14/20 Arterial Ulcer Pretibial Left (Removed)   Resolved Date/Resolved Time: 10/21/20 1411  Date First Assessed/Time First Assessed: 10/14/20 1453   Primary Wound Type: Arterial Ulcer  Location: Pretibial  Wound Location Orientation: Left  Wound Outcome: Healed       [REMOVED] Wound 11/19/20 Leg Right (Removed)   Resolved Date: 12/02/20  Date First Assessed/Time First Assessed: 11/19/20 1453   Location: Leg  Wound Location Orientation: Right  Wound Description (Comments): Three Wounds Debrided And Dressed  Incision's 1st Dressing: DRESSING OIL EMULSION 3 X 8 I    [REMOVED] Wound 12/30/20 Arterial Ulcer Foot Right;Lateral (Removed)   Resolved Date/Resolved Time: 01/13/21 1456  Date First Assessed/Time First Assessed: 12/30/20 1453   Pre-Existing Wound: Yes  Primary Wound Type: Arterial Ulcer  Location: Foot  Wound Location Orientation: Right;Lateral  Dressing Status: Intact       Subjective:        Patient presents for followup of right thigh wounds  No increased pain or drainage  Has been using Acticoat on the wounds and a Dakin's rinse at each dressing change  The following portions of the patient's history were reviewed and updated as appropriate: He  has a past medical history of Anemia, Atrial fibrillation ([address] ), Benign prostatic hyperplasia without urinary obstruction, Congestive heart failure with left ventricular diastolic dysfunction, chronic ([address] ), History of ITP, Hypertension, Lumbosacral disc herniation, Peripheral vascular disease of lower extremity ([address] ), Renal disorder, and Subdural hematoma ([address] )  He  reports that he has never smoked  He has never used smokeless tobacco  He reports previous alcohol use  He reports that he does not use drugs  He is allergic to blood-group specific substance       Review of Systems   Constitutional: Negative for chills and fever  HENT: Negative for congestion and sneezing  Respiratory: Negative for cough  Musculoskeletal: Positive for gait problem  Skin: Positive for wound  Psychiatric/Behavioral: Negative for agitation  Objective:       Wound 02/15/21 Abscess Thigh Distal;Posterior;Right (Active)   Wound Image Images linked 03/01/21 1518   Wound Description Black;Granulation tissue;Slough 03/01/21 1517   Donna-wound Assessment Edema; Erythema;Swelling 03/01/21 1517   Wound Length (cm) 10 cm 03/01/21 1517   Wound Width (cm) 6 cm 03/01/21 1517   Wound Depth (cm) 0 5 cm 03/01/21 1517   Wound Surface Area (cm^2) 60 cm^2 03/01/21 1517   Wound Volume (cm^3) 30 cm^3 03/01/21 1517   Calculated Wound Volume (cm^3) 30 cm^3 03/01/21 1517   Change in Wound Size % -94 81 03/01/21 1517   Drainage Amount Moderate 03/01/21 1517   Drainage Description Serosanguineous 03/01/21 1517   Non-staged Wound Description Full thickness 03/01/21 1517   Dressing Status Intact 03/01/21 1517       /72   Pulse 72   Temp 98 °F (36 7 °C)   Resp 18     Physical Exam      Wound Instructions:  Orders Placed This Encounter   Procedures    Wound cleansing and dressings     Right thigh wound  Iodosorb to right thigh wounds  ABD pad and medfix tape  [facility] at [facility] on Wednesday, [facility] to change on Friday    Done today     Standing Status:   Future     Standing Expiration Date:   3/1/2022    Debridement     This order was created via procedure documentation        Diagnosis ICD-10-CM Associated Orders   1   Open wound of right thigh, initial encounter  S71 101A Wound cleansing and dressings     lidocaine (XYLOCAINE) 4 % topical solution 5 mL

## 2021-03-03 NOTE — PROGRESS NOTES
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 (HCC)  -     lidocaine (XYLOCAINE) 4 % topical solution 5 mL  -     Wound cleansing and dressings; Acticoat with compression   -     Debridement    Lower limb ulcer, calf, right, with fat layer exposed ([facility] [address] )  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)  -     Debridement  -  dry sterile dressing with Acticoat and compression applied    Atherosclerosis of native artery of right lower extremity with rest pain (HCC)  -     Discontinue: oxyCODONE-acetaminophen (PERCOCET) 5-325 mg per tablet; Take 1 tablet by mouth every 12 (twelve) hours as needed for moderate painMax Daily Amount: 2 tablets    Tinea pedis of both feet   - continue with clotrimazole/betamethasone cream   - patient educated on proper hygiene instructed to wash his feet daily  Debridement   Wound 07/17/20 Pretibial Distal;Right    Universal Protocol:  Consent: Verbal consent obtained  Written consent obtained  Risks and benefits: risks, benefits and alternatives were discussed  Consent given by: patient  Time out: Immediately prior to procedure a "time out" was called to verify the correct patient, procedure, equipment, support staff and site/side marked as required    Patient identity confirmed: verbally with patient      Performed by: physician  Debridement type: surgical  Level of debridement: subcutaneous tissue  Pain control: lidocaine 4%  Post-debridement measurements  Length (cm): 13 5  Width (cm): 5  Depth (cm): 0 2  Percent debrided: 15%  Surface Area (cm^2): 67 5  Area debrided (cm^2): 10 13  Volume (cm^3): 13 5  Tissue and other material debrided: subcutaneous tissue  Devitalized tissue debrided: biofilm, necrotic debris and eschar  Instrument(s) utilized: nippers, curette and blade  Bleeding: medium  Hemostasis obtained with: pressure  Procedural pain (0-10): 3  Post-procedural pain: 3   Response to treatment: procedure was tolerated well    Debridement   Universal Protocol:  Consent: Verbal consent obtained  Written consent obtained  Risks and benefits: risks, benefits and alternatives were discussed  Consent given by: patient  Time out: Immediately prior to procedure a "time out" was called to verify the correct patient, procedure, equipment, support staff and site/side marked as required  Patient identity confirmed: verbally with patient      Performed by: physician  Debridement type: surgical  Level of debridement: subcutaneous tissue  Pain control: lidocaine 4%  Post-debridement measurements  Length (cm): 8  Width (cm): 8 5  Depth (cm): 0 2  Percent debrided: 10%  Surface Area (cm^2): 68  Area debrided (cm^2): 6 8  Volume (cm^3): 13 6  Tissue and other material debrided: subcutaneous tissue  Devitalized tissue debrided: biofilm, necrotic debris and eschar  Instrument(s) utilized: nippers, curette and blade  Bleeding: medium  Hemostasis obtained with: pressure  Procedural pain (0-10): 3  Post-procedural pain: 3   Response to treatment: procedure was tolerated well          Plan:     Wound 07/17/20 Pretibial Distal;Right (Active)   Wound Image Images linked 03/03/21 1343   Wound Description Slough;Granulation tissue; Epithelialization;Pink 03/03/21 1310   Donna-wound Assessment Dry;Hyperpigmented 03/03/21 1310   Wound Length (cm) 13 5 cm 03/03/21 1310   Wound Width (cm) 5 cm 03/03/21 1310   Wound Depth (cm) 0 2 cm 03/03/21 1310   Wound Surface Area (cm^2) 67 5 cm^2 03/03/21 1310   Wound Volume (cm^3) 13 5 cm^3 03/03/21 1310   Calculated Wound Volume (cm^3) 13 5 cm^3 03/03/21 1310   Change in Wound Size % 77 67 03/03/21 1310   Drainage Amount Moderate 03/03/21 1310   Drainage Description Serosanguineous 03/03/21 1310   Non-staged Wound Description Full thickness 03/03/21 1310   Dressing Changed Changed 03/03/21 1310   Patient Tolerance Tolerated well 03/03/21 1310   Dressing Status Old drainage;Removed 03/03/21 1310       Wound 03/03/21 Toe (Comment  which one) Anterior;Right (Active)   Wound Image Images linked 03/03/21 1317   Wound Description Edema; Beefy red;Pink; Other (Comment) (erythema) 03/03/21 1317   Donna-wound Assessment Maceration 03/03/21 1317   Wound Length (cm) 8 cm 03/03/21 1317   Wound Width (cm) 8 5 cm 03/03/21 1317   Wound Depth (cm) 0 1 cm 03/03/21 1317   Wound Surface Area (cm^2) 68 cm^2 03/03/21 1317   Wound Volume (cm^3) 6 8 cm^3 03/03/21 1317   Calculated Wound Volume (cm^3) 6 8 cm^3 03/03/21 1317   Drainage Amount Moderate 03/03/21 1317   Drainage Description Serous 03/03/21 1317   Non-staged Wound Description Full thickness 03/03/21 1317   Dressing Changed Changed 03/03/21 1317   Dressing Status Removed 03/03/21 1317       Wound 03/03/21 Arterial Ulcer Leg Right; Lower (Active)   Wound Image Images linked 03/03/21 1342       Wound 07/17/20 Pretibial Distal;Right (Active)   Date First Assessed/Time First Assessed: 07/17/20 0302   Pre-Existing Wound: Yes  Primary Wound Type: Arterial Ulcer  Location: Pretibial  Wound Location Orientation: Distal;Right       Wound 02/15/21 Abscess Thigh Distal;Posterior;Right (Active)   Date First Assessed/Time First Assessed: 02/15/21 1435   Pre-Existing Wound: Yes  Primary Wound Type: Abscess  Location: Thigh  Wound Location Orientation: Distal;Posterior;Right  Dressing Status: Intact       Wound 03/03/21 Toe (Comment  which one) Anterior;Right (Active)   Date First Assessed: 03/03/21   Location: (c) Toe (Comment  which one)  Wound Location Orientation: Anterior;Right       Wound 03/03/21 Arterial Ulcer Leg Right; Lower (Active)   Date First Assessed: 03/03/21   Primary Wound Type: Arterial Ulcer  Location: Leg  Wound Location Orientation: Right; Lower       [REMOVED] Negative Pressure Wound Therapy (V A C ) Abdomen (Removed)   Removal Date: 08/26/20  Placement Date/Time: 08/03/20 1821   Inserted by: Dr Meir Reeves  Location: (c) Abdomen       [REMOVED] Wound 07/17/20 Arterial Ulcer Foot Anterior;Right (Removed)   Resolved Date: 09/09/20  Date First Assessed/Time First Assessed: 07/17/20 0304   Pre-Existing Wound: Yes  Primary Wound Type: Arterial Ulcer  Location: Foot  Wound Location Orientation: Anterior;Right  Wound Outcome: Healed       [REMOVED] Wound 07/17/20 Abdomen Left; Lower;Medial (Removed)   Resolved Date: 11/16/20  Date First Assessed/Time First Assessed: 07/17/20 0305   Pre-Existing Wound: Yes  Location: Abdomen  Wound Location Orientation: Left; Lower;Medial       [REMOVED] Wound 07/17/20 Buttocks Right (Removed)   Resolved Date: 11/16/20  Date First Assessed/Time First Assessed: 07/17/20 1428   Pre-Existing Wound: Yes  Location: Buttocks  Wound Location Orientation: Right  Wound Description (Comments): Scab       [REMOVED] Wound 07/20/20 Catheter entry/exit site Groin Left (Removed)   Resolved Date: 09/09/20  Date First Assessed/Time First Assessed: 07/20/20 1407   Traumatic Wound Type: Catheter entry/exit site  Location: Groin  Wound Location Orientation: Left       [REMOVED] Wound 07/21/20 Abdomen N/A (Removed)   Resolved Date: 09/09/20  Date First Assessed/Time First Assessed: 07/21/20 1444   Location: Abdomen  Wound Location Orientation: N/A  Wound Description (Comments): 1/4" x3" betadine soaked telfa marshal x2, gauze, tegaderm       [REMOVED] Wound 07/30/20 Catheter entry/exit site Back Left; Lower (Removed)   Resolved Date: 11/16/20  Date First Assessed/Time First Assessed: 07/30/20 1102   Traumatic Wound Type: Catheter entry/exit site  Location: Back  Wound Location Orientation: Left; Lower  Wound Description (Comments): Bone Marrow biopsy catheter entry       [REMOVED] Wound 08/03/20 Abdomen N/A (Removed)   Resolved Date: 09/09/20  Date First Assessed/Time First Assessed: 08/03/20 1739   Location: Abdomen  Wound Location Orientation: N/A  Wound Description (Comments): Closed suction drain right abdomen with sterile dressingNegative pressure wound vac mid    [REMOVED] Wound 08/12/20 Arterial Ulcer Tibial Posterior;Right (Removed)   Resolved Date: 12/02/20  Date First Assessed/Time First Assessed: 08/12/20 1614   Pre-Existing Wound: Yes  Primary Wound Type: Arterial Ulcer  Location: Tibial  Wound Location Orientation: Posterior;Right  Dressing Status: Intact  Wound Outcome: Healed       [REMOVED] Wound 09/09/20 Pressure Injury Heel Right (Removed)   Resolved Date/Resolved Time: 01/13/21 1501  Date First Assessed: 09/09/20   Primary Wound Type: Pressure Injury  Location: Heel  Wound Location Orientation: Right  Wound Outcome: Healed       [REMOVED] Wound 10/14/20 Arterial Ulcer Pretibial Left (Removed)   Resolved Date/Resolved Time: 10/21/20 1411  Date First Assessed/Time First Assessed: 10/14/20 1453   Primary Wound Type: Arterial Ulcer  Location: Pretibial  Wound Location Orientation: Left  Wound Outcome: Healed       [REMOVED] Wound 11/19/20 Leg Right (Removed)   Resolved Date: 12/02/20  Date First Assessed/Time First Assessed: 11/19/20 1453   Location: Leg  Wound Location Orientation: Right  Wound Description (Comments): Three Wounds Debrided And Dressed  Incision's 1st Dressing: DRESSING OIL EMULSION 3 X 8 I           [REMOVED] Wound 12/30/20 Arterial Ulcer Foot Right;Lateral (Removed)   Resolved Date/Resolved Time: 01/13/21 1456  Date First Assessed/Time First Assessed: 12/30/20 1453   Pre-Existing Wound: Yes  Primary Wound Type: Arterial Ulcer  Location: Foot  Wound Location Orientation: Right;Lateral  Dressing Status: Intact       Subjective:           29-year-old white presents for follow-up evaluation chronic ulceration right ankle, relates new breakdown further up on his leg  Also relates that the rash involving his toes especially his right foot has not improved  Bandage on right ankle has not been changed since last Friday  Patient also relates continuing to have significant pain his feet at night especially his right side  Patient last spoke with vascular surgery on 1/28/2021 and is scheduled to follow-up with them sometime in April  Denies any fever or shortness of breath  The following portions of the patient's history were reviewed and updated as appropriate: allergies, current medications, past family history, past medical history, past social history, past surgical history and problem list     Review of Systems   Constitutional: Negative for activity change, appetite change, chills and fatigue  Pain in right leg at night  HENT: Negative for congestion, ear pain, hearing loss and nosebleeds  Eyes: Negative for pain, discharge, redness and visual disturbance  Respiratory: Negative for cough, chest tightness and shortness of breath  Cardiovascular: Negative for chest pain, palpitations and leg swelling  Gastrointestinal: Negative for abdominal pain, constipation, nausea and vomiting  Abdominal wound managed per general surgery  Endocrine: Negative for cold intolerance, heat intolerance and polydipsia  Genitourinary: Negative for difficulty urinating  Musculoskeletal: Positive for gait problem  Negative for arthralgias, back pain and joint swelling  Relates continued pain at night especially in right foot  Skin: Positive for wound (Wound right ankle, and new wound recurrence further up on right leg)  Negative for color change, pallor and rash (Interdigital rash bilateral forefoot right > left)  Allergic/Immunologic: Negative for environmental allergies, food allergies and immunocompromised state  Neurological: Negative for dizziness, weakness, numbness and headaches  Hematological: Negative for adenopathy  Does not bruise/bleed easily  Psychiatric/Behavioral: Negative for agitation, behavioral problems, confusion, decreased concentration, hallucinations and suicidal ideas  Objective:       Wound 07/17/20 Pretibial Distal;Right (Active)   Wound Image Images linked 03/03/21 1343   Wound Description Slough;Granulation tissue; Epithelialization;Pink 03/03/21 1310   Donna-wound Assessment Dry;Hyperpigmented 03/03/21 1310   Wound Length (cm) 13 5 cm 03/03/21 1310   Wound Width (cm) 5 cm 03/03/21 1310   Wound Depth (cm) 0 2 cm 03/03/21 1310   Wound Surface Area (cm^2) 67 5 cm^2 03/03/21 1310   Wound Volume (cm^3) 13 5 cm^3 03/03/21 1310   Calculated Wound Volume (cm^3) 13 5 cm^3 03/03/21 1310   Change in Wound Size % 77 67 03/03/21 1310   Drainage Amount Moderate 03/03/21 1310   Drainage Description Serosanguineous 03/03/21 1310   Non-staged Wound Description Full thickness 03/03/21 1310   Dressing Changed Changed 03/03/21 1310   Patient Tolerance Tolerated well 03/03/21 1310   Dressing Status Old drainage;Removed 03/03/21 1310       Wound 03/03/21 Toe (Comment  which one) Anterior;Right (Active)   Wound Image Images linked 03/03/21 1317   Wound Description Edema; Beefy red;Pink; Other (Comment) (erythema) 03/03/21 1317   Donna-wound Assessment Maceration 03/03/21 1317   Wound Length (cm) 8 cm 03/03/21 1317   Wound Width (cm) 8 5 cm 03/03/21 1317   Wound Depth (cm) 0 1 cm 03/03/21 1317   Wound Surface Area (cm^2) 68 cm^2 03/03/21 1317   Wound Volume (cm^3) 6 8 cm^3 03/03/21 1317   Calculated Wound Volume (cm^3) 6 8 cm^3 03/03/21 1317   Drainage Amount Moderate 03/03/21 1317   Drainage Description Serous 03/03/21 1317   Non-staged Wound Description Full thickness 03/03/21 1317   Dressing Changed Changed 03/03/21 1317   Dressing Status Removed 03/03/21 1317       Wound 03/03/21 Arterial Ulcer Leg Right; Lower (Active)   Wound Image Images linked 03/03/21 1342       /62 Pulse 80   Temp 98 5 °F (36 9 °C)   Resp 18     Physical Exam  Constitutional:       Appearance: Normal appearance  HENT:      Head: Normocephalic  Right Ear: Tympanic membrane normal       Left Ear: Tympanic membrane normal       Nose: No congestion  Mouth/Throat:      Mouth: Mucous membranes are moist       Pharynx: No oropharyngeal exudate or posterior oropharyngeal erythema  Eyes:      Extraocular Movements: Extraocular movements intact  Conjunctiva/sclera: Conjunctivae normal       Pupils: Pupils are equal, round, and reactive to light  Cardiovascular:      Rate and Rhythm: Normal rate and regular rhythm  Pulses:           Dorsalis pedis pulses are 0 on the right side and 0 on the left side  Posterior tibial pulses are 0 on the right side and 0 on the left side  Pulmonary:      Effort: Pulmonary effort is normal    Abdominal:      Palpations: Abdomen is soft  Comments: Abdominal wound with surgical dressing intact  VAC dressing has been discontinued  Musculoskeletal:         General: Tenderness ( continues to have pain at night which requires medication to sleep  Patient relates pain present @night when he lays down and improves with walking ) present  Right foot: Decreased range of motion  No deformity, bunion, Charcot foot, foot drop or prominent metatarsal heads  Left foot: Decreased range of motion  No deformity, bunion, Charcot foot, foot drop or prominent metatarsal heads  Feet:    Feet:      Right foot:      Protective Sensation: 10 sites tested  10 sites sensed  Skin integrity: Skin breakdown (Interdigital rash involving 1st 2nd 3rd and 4th interspaces right forefoot ) and dry skin present  No ulcer (  Posterior right heel has closed ), blister, erythema, warmth, callus or fissure  Toenail Condition: Right toenails are abnormally thick  Fungal disease present  Left foot:      Protective Sensation: 10 sites tested   10 sites sensed  Skin integrity: Dry skin present  No ulcer, blister, skin breakdown, erythema, warmth, callus or fissure  Toenail Condition: Left toenails are abnormally thick  Fungal disease present  Comments: Continues to have moderate predominantly in his right leg at night which keeps him from going to sleep  Relates some relief with leaving his legs hanging over the side of his bed  Unchanged overall   Skin:     General: Skin is warm and dry  Capillary Refill: Capillary refill takes 2 to 3 seconds  Coloration: Skin is not pale  Findings: Rash (Located right foot interdigital 1st through 4th interspaces, slight maceration noted , overall improved ) and wound (Right medial ankle:  Diminishing size, SQ depth, mild/moderate S/S drainage, no infx, 85-90% red  slough/biofilm noted  Right leg/calf:  Reopened since last visit, SQ depth, no Infx, mild necrotic slough/biofilm) present  No bruising, erythema or lesion  Rash is crusting, macular and scaling  Neurological:      General: No focal deficit present  Mental Status: He is alert  Cranial Nerves: No cranial nerve deficit  Sensory: No sensory deficit  Motor: No weakness  Gait: Gait normal       Deep Tendon Reflexes: Reflexes normal    Psychiatric:         Mood and Affect: Mood normal          Behavior: Behavior normal          Judgment: Judgment normal            Wound Instructions:  Orders Placed This Encounter   Procedures    Wound cleansing and dressings     Wound cleansing and dressings:   Right Toes (All 5 toes) and forefoot  WASH B/L LEGS WITH SOAP AND WATER, DRY THOROUGHLY  APPLY LOTRISONE CREAM  APPLY ALGINATE BETWEEN TOES DAILY  Right lower leg wounds:   Wash your hands with soap and water  Remove old dressing, discard into plastic bag and place in trash  Cleanse the wound with saline or soap and water prior to applying a clean dressing  Do not use tissue or cotton balls   Do not scrub the wound  Pat dry using gauze  Shower: no   Apply acticoat 3 to the leg wound and open areas on shin  Cover with abd pad  Secure with kerlix  DO NOT COVER TOES WITH KERLIX  Change 3x a week  (Mon-Wed-Fri)    Elastic Tubular Stocking or ace wraps as tolerated     Tubular elastic bandage: size F or ace wraps  Apply from base of toes to behind the knee  Apply in AM, may remove for sleep  Avoid prolonged standing in one place  Elevate leg above the level of the heart when sitting or as much as possible      Wear post op shoe when ambulating      Wound home care  Continue home care nurses 3x a week  Follow up in 2 weeks  Standing Status:   Future     Standing Expiration Date:   3/3/2022        Diagnosis ICD-10-CM Associated Orders   1  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

## 2021-03-03 NOTE — PATIENT INSTRUCTIONS
Orders Placed This Encounter   Procedures    Wound cleansing and dressings     Wound cleansing and dressings:   Right Toes (All 5 toes) and forefoot  WASH TOES and FOREFOOT WITH SOAP AND WATER, DRY THOROUGHLY  APPLY LOTRISONE CREAM  APPLY ALGINATE BETWEEN TOES DAILY  Right lower leg wounds:   Wash your hands with soap and water  Remove old dressing, discard into plastic bag and place in trash  Cleanse the wound with saline or soap and water prior to applying a clean dressing  Do not use tissue or cotton balls  Do not scrub the wound  Pat dry using gauze  Shower: no   Apply acticoat 3 to the leg wound and open areas on shin  Cover with abd pad  Secure with kerlix  DO NOT COVER TOES WITH KERLIX  Change 3x a week  (Mon-Wed-Fri)    Elastic Tubular Stocking or ace wraps as tolerated     Tubular elastic bandage: size F or ace wraps  Apply from base of toes to behind the knee  Apply in AM, may remove for sleep  Avoid prolonged standing in one place  Elevate leg above the level of the heart when sitting or as much as possible      Wear post op shoe when ambulating      Wound home care  Continue home care nurses 3x a week  Follow up in 2 weeks       Standing Status:   Future     Standing Expiration Date:   3/3/2022

## 2021-03-09 NOTE — PROGRESS NOTES
Assessment/Plan   Problem List Items Addressed This Visit     None      Visit Diagnoses     Facial edema    -  Primary    Relevant Medications    predniSONE 10 mg tablet    famotidine (PEPCID) 20 mg tablet                Chief Complaint   Patient presents with    Rash     Patient reports that he's had an red, itchy, and bumpy rash on his face for about 5 days  His face is swollen and puffy   Swollen gland     Patient reports that at about the same time, he noticed what feels like a swollen gland in his neck       Subjective   Patient ID: Meme Cee is a 70 y o  male  Vitals:    03/09/21 0834   BP: 120/74   Pulse: 73   Resp: 16   Temp: 98 7 °F (37 1 °C)   SpO2: 100%     Patient here today with complaints of facial swelling  Reports began 4 days ago, has had anaphylaxis in the past from skin allergies, reports his skin is extremely delicate  Has noticed when going out in the cold his face will flush but not swell, he did feel that he had some swollen glands when this began on day 1 but there is no swelling noted today  He has come in contact with no new lotions detergents or powders  He does report he was outside earlier on Saturday, and the swelling and "rash began after coming inside  He is unable to take Benadryl or other antihistamines due to urinary retention  He has taken no medications so far for this swelling  He denies difficulty swallowing or tightness in his throat  A is swelling around his eyes along with his eyelids and his cheeks  There is no swelling of his tongue and is airway is opened with a saturation of 100% oxygen and has nonlabored breathing at this time  The following portions of the patient's history were reviewed and updated as appropriate: allergies, current medications, past family history, past social history, past surgical history and problem list     Review of Systems   Constitutional: Negative  HENT: Positive for facial swelling   Negative for congestion, ear pain, hearing loss and sinus pain  Eyes: Negative  Respiratory: Negative  Cardiovascular: Negative  Gastrointestinal: Negative  Endocrine: Negative  Genitourinary: Negative  Musculoskeletal: Negative  Skin: Negative  Allergic/Immunologic: Negative  Neurological: Negative  Hematological: Negative  Psychiatric/Behavioral: Negative  Objective     Physical Exam  Vitals signs and nursing note reviewed  Constitutional:       Appearance: He is not ill-appearing  HENT:      Head: Atraumatic  Nose: Nose normal  No congestion  Mouth/Throat:      Mouth: Mucous membranes are moist       Pharynx: Oropharynx is clear  No oropharyngeal exudate or posterior oropharyngeal erythema  Comments:  Tonsils 1/4, with out post pharyngeal swelling  Eyes:      General:         Right eye: No discharge  Left eye: No discharge  Extraocular Movements: Extraocular movements intact  Conjunctiva/sclera: Conjunctivae normal    Neck:      Musculoskeletal: Normal range of motion and neck supple  No neck rigidity or muscular tenderness  Cardiovascular:      Rate and Rhythm: Normal rate  Rhythm irregular  Pulses: Normal pulses  Heart sounds: Normal heart sounds  No murmur  Comments:   History of atrial fibrillation chronic  Pulmonary:      Effort: Pulmonary effort is normal  No respiratory distress  Breath sounds: Normal breath sounds  No stridor  No wheezing, rhonchi or rales  Abdominal:      Palpations: Abdomen is soft  Musculoskeletal: Normal range of motion  Lymphadenopathy:      Cervical: No cervical adenopathy  Neurological:      Mental Status: He is alert and oriented to person, place, and time  Psychiatric:         Mood and Affect: Mood normal          Behavior: Behavior normal          Thought Content:  Thought content normal          Judgment: Judgment normal

## 2021-03-09 NOTE — PATIENT INSTRUCTIONS
Prednisone take as directed-6 tabs for 2 days, 5 tabs for 2 days, 4 tabs, 3 tabs, 2 tabs then 1 tab  -TAKE WITH FOOD   Pepcid twice daily   IF swelling continues-GO TO NEAREST ER  Sleep with head elevated   Cool compresses to your face

## 2021-03-10 NOTE — NURSING NOTE
ON TREATMENT NOTE  RADIATION ONCOLOGY DEPARTMENT    Patient name:  Deisy Galo    Primary Physician:  Pcp Pt States None MRN: [id number]  CSN: [id number]   Referring physician:  Aspen Woodruff M.D. : 1975, 45 y.o.     ENCOUNTER DATE:  03/10/21    DIAGNOSIS:    Malignant neoplasm of overlapping sites of right breast in female, estrogen receptor positive (HCC)  Staging form: Breast, AJCC 8th Edition  - Pathologic: Stage IB (pT2, pN1a, cM0, G3, ER+, NJ+, HER2+) - Signed by Juana Perrin M.D. on 2020  Stage prefix: Initial diagnosis  Method of lymph node assessment: Axillary lymph node dissection  Histologic grading system: 3 grade system      TREATMENT SUMMARY:  Radiation Treatments     Active   Plans   R_Breast   Most recent treatment: Dose planned: 180 cGy (fraction 28 of 28 on 3/4/2021)   Total: Dose planned: 5,040 cGy   Elapsed Days: 38 @ [id number]      R_Breast_Bst   Most recent treatment: Dose planned: 200 cGy (fraction 4 of 5 on 3/10/2021)   Total: Dose planned: 1,000 cGy   Elapsed Days: 44 @ [id number]      R_SCV   Most recent treatment: Dose planned: 180 cGy (fraction 28 of 28 on 3/4/2021)   Total: Dose planned: 5,040 cGy   Elapsed Days: 38 @ [id number]      Reference Points   R_Breast   Most recent treatment: Dose given: 180 cGy (on 3/4/2021)   Total: Dose given: 5,040 cGy   Elapsed Days: 38 @ [id number]      R_Breast CP2   Most recent treatment: Dose given: 180 cGy (on 3/4/2021)   Total: Dose given: 5,040 cGy   Elapsed Days: 38 @ [id number]      R_Breast_Bst   Most recent treatment: Dose given: 200 cGy (on 3/10/2021)   Total: Dose given: 800 cGy   Elapsed Days: 44 @ [id number]      R_Breast_Bst CP   Most recent treatment: Dose given: 209 cGy (on 3/10/2021)   Total: Dose given: 838 cGy   Elapsed Days: 44 @ [id number]      R_SCV   Most recent treatment: Dose given: 180 cGy (on 3/4/2021)   Total: Dose given: 5,040 cGy   Elapsed Days: 38 @ [id number]      SUKHDEV_YRN GABRIEL  Pt  Refusing blood work explained this will likely delay surgery if he refuses blood work   "Well I'm too tired"   Most recent treatment: Dose given: 180 cGy (on 3/4/2021)   Total: Dose given: 5,040 cGy   Elapsed Days: 38 @ [id number]                    SUBJECTIVE:   Tolerating therapy without event.  1 more treatment to go.  Just a little bit of soreness and redness in the treatment field      VITAL SIGNS:  BP (!) 93/70 (BP Location: Left arm, Patient Position: Sitting, BP Cuff Size: Adult)   Pulse 81   Temp 36.7 °C (98 °F) (Temporal)   Wt 70.6 kg (155 lb 10.3 oz)   SpO2 94%    Encounter Vitals  Temperature: 36.7 °C (98 °F)  Temp src: Temporal  Blood Pressure: (!) 93/70  Pulse: 81  Pulse Oximetry: 94 %  Weight: 70.6 kg (155 lb 10.3 oz)  Pain Score: No pain  Pain Assessment 3/10/2021 3/3/2021 2/24/2021   Pain Assessment Denies Pain Denies Pain Denies Pain   Pain Score 0 0 0   Some recent data might be hidden          PHYSICAL EXAM:    Mild erythema in the treatment field    Toxicity Assessment 3/10/2021 3/3/2021 2/24/2021 2/17/2021 2/10/2021 2/3/2021 1/27/2021   Toxicity Assessment Breast Breast Breast Breast Breast Breast Breast   Fatigue (lethargy, malaise, asthenia) None None Increased fatigue over baseline, but not altering normal activities Increased fatigue over baseline, but not altering normal activities Increased fatigue over baseline, but not altering normal activities Increased fatigue over baseline, but not altering normal activities None   Fever (in the absence of neutropenia) None None None None None None None   Radiation Dermatitis Faint erythema or dry desquamation Faint erythema or dry desquamation Faint erythema or dry desquamation Faint erythema or dry desquamation None None None   Lymphatics Normal Normal Normal Normal Normal Normal Normal   RT - Pain due to RT None None None None None None None   Dyspnea Normal Normal Normal Normal Normal Normal Normal         IMPRESSION:  Cancer Staging  Malignant neoplasm of overlapping sites of right breast in female, estrogen receptor positive (HCC)  Staging  form: Breast, AJCC 8th Edition  - Pathologic: Stage IB (pT2, pN1a, cM0, G3, ER+, RI+, HER2+) - Signed by Juana Perrin M.D. on 8/25/2020      PLAN:  No change in treatment plan    Disposition:  Treatment plan reviewed. Questions answered. Continue therapy outlined.     Juana Perrin M.D.    No orders of the defined types were placed in this encounter.

## 2021-03-11 NOTE — PROGRESS NOTES
Patient ID: Les Amaya is a 70 y o  male Date of Birth 1945     Chief Complaint  Chief Complaint   Patient presents with    Follow Up Wound Care Visit     right thigh wound       Allergies  Blood-group specific substance    Assessment:     Diagnoses and all orders for this visit:    Open wound of right thigh, initial encounter  -     Wound cleansing and dressings; Future    Other orders  -     Debridement              Debridement   Wound 02/15/21 Abscess Thigh Distal;Posterior;Right    Universal Protocol:  Consent: Written consent obtained  Consent given by: patient  Time out: Immediately prior to procedure a "time out" was called to verify the correct patient, procedure, equipment, support staff and site/side marked as required  Timeout called at: 3/11/2021 12:00 PM   Patient understanding: patient states understanding of the procedure being performed  Patient consent: the patient's understanding of the procedure matches consent given  Procedure consent matches procedure scheduled: N/A  Relevant documents present and verified: N/A  Test results available and properly labeled: N/A  Site marked: the operative site was marked  Imaging studies available: N/A  Required blood products, implants, devices, and special equipment available: N/A    Patient identity confirmed: verbally with patient      Performed by: NP  Debridement type: selective  Pain control: lidocaine 4%  Pre-debridement measurements  Length (cm): 11  Width (cm): 5 8  Depth (cm): 0 3  Surface Area (cm^2): 63 8  Volume (cm^3): 19 14    Post-debridement measurements  Length (cm): 11  Width (cm): 5 8  Depth (cm): 0 3  Percent debrided: 25%  Surface Area (cm^2): 63 8  Area debrided (cm^2): 15 95  Volume (cm^3): 19 14  Devitalized tissue debrided: biofilm, fibrin and slough  Instrument(s) utilized: blade, curette and forceps  Bleeding: small  Hemostasis obtained with: pressure  Procedural pain (0-10): 0  Post-procedural pain: 0   Response to treatment: procedure was tolerated well          Plan:  1  F/u visit  Wound debrided  Wound improving and measuring smaller with slough loosening up in wound bed  Patient still awaiting to receive Medihoney alginate from [facility]  May use medihoney gel with calcium alginate in meantime  Medihoney is to be covered with ABD and medifx tape secured with an ABD changed every other day  Patient will follow up with Dr Liu Alberts in 1 week and Dr Ian Lester in 2 weeks  Wound 02/15/21 Abscess Thigh Distal;Posterior;Right (Active)   Wound Image Images linked 03/11/21 1144   Wound Description Epithelialization;Black;Granulation tissue;Slough 03/11/21 1143   Donna-wound Assessment Edema; Erythema;Swelling;Excoriated 03/11/21 1143   Wound Length (cm) 11 cm 03/11/21 1143   Wound Width (cm) 5 8 cm 03/11/21 1143   Wound Depth (cm) 0 3 cm 03/11/21 1143   Wound Surface Area (cm^2) 63 8 cm^2 03/11/21 1143   Wound Volume (cm^3) 19 14 cm^3 03/11/21 1143   Calculated Wound Volume (cm^3) 19 14 cm^3 03/11/21 1143   Change in Wound Size % -24 29 03/11/21 1143   Drainage Amount Large 03/11/21 1143   Drainage Description Tan;Purulent; Foul smelling 03/11/21 1143   Non-staged Wound Description Full thickness 03/11/21 1143       Wound 07/17/20 Pretibial Distal;Right (Active)   Date First Assessed/Time First Assessed: 07/17/20 0302   Pre-Existing Wound: Yes  Primary Wound Type: Arterial Ulcer  Location: Pretibial  Wound Location Orientation: Distal;Right       Wound 02/15/21 Abscess Thigh Distal;Posterior;Right (Active)   Date First Assessed/Time First Assessed: 02/15/21 1435   Pre-Existing Wound: Yes  Primary Wound Type: Abscess  Location: Thigh  Wound Location Orientation: Distal;Posterior;Right  Dressing Status: Intact       Wound 03/03/21 Toe (Comment  which one) Anterior;Right (Active)   Date First Assessed: 03/03/21   Location: (c) Toe (Comment  which one)  Wound Location Orientation: Anterior;Right       Wound 03/03/21 Arterial Ulcer Leg Right; Lower (Active)   Date First Assessed: 03/03/21   Primary Wound Type: Arterial Ulcer  Location: Leg  Wound Location Orientation: Right; Lower       [REMOVED] Negative Pressure Wound Therapy (V A C ) Abdomen (Removed)   Removal Date: 08/26/20  Placement Date/Time: 08/03/20 1821   Inserted by: Dr Antonino Ponce  Location: (c) Abdomen       [REMOVED] Wound 07/17/20 Arterial Ulcer Foot Anterior;Right (Removed)   Resolved Date: 09/09/20  Date First Assessed/Time First Assessed: 07/17/20 0304   Pre-Existing Wound: Yes  Primary Wound Type: Arterial Ulcer  Location: Foot  Wound Location Orientation: Anterior;Right  Wound Outcome: Healed       [REMOVED] Wound 07/17/20 Abdomen Left; Lower;Medial (Removed)   Resolved Date: 11/16/20  Date First Assessed/Time First Assessed: 07/17/20 0305   Pre-Existing Wound: Yes  Location: Abdomen  Wound Location Orientation: Left; Lower;Medial       [REMOVED] Wound 07/17/20 Buttocks Right (Removed)   Resolved Date: 11/16/20  Date First Assessed/Time First Assessed: 07/17/20 1428   Pre-Existing Wound: Yes  Location: Buttocks  Wound Location Orientation: Right  Wound Description (Comments): Scab       [REMOVED] Wound 07/20/20 Catheter entry/exit site Groin Left (Removed)   Resolved Date: 09/09/20  Date First Assessed/Time First Assessed: 07/20/20 1407   Traumatic Wound Type: Catheter entry/exit site  Location: Groin  Wound Location Orientation: Left       [REMOVED] Wound 07/21/20 Abdomen N/A (Removed)   Resolved Date: 09/09/20  Date First Assessed/Time First Assessed: 07/21/20 1444   Location: Abdomen  Wound Location Orientation: N/A  Wound Description (Comments): 1/4" x3" betadine soaked telfa marshal x2, gauze, tegaderm       [REMOVED] Wound 07/30/20 Catheter entry/exit site Back Left; Lower (Removed)   Resolved Date: 11/16/20  Date First Assessed/Time First Assessed: 07/30/20 1102   Traumatic Wound Type: Catheter entry/exit site  Location: Back  Wound Location Orientation: Left; Lower  Wound Description (Comments): Bone Marrow biopsy catheter entry       [REMOVED] Wound 08/03/20 Abdomen N/A (Removed)   Resolved Date: 09/09/20  Date First Assessed/Time First Assessed: 08/03/20 1739   Location: Abdomen  Wound Location Orientation: N/A  Wound Description (Comments): Closed suction drain right abdomen with sterile dressingNegative pressure wound vac mid    [REMOVED] Wound 08/12/20 Arterial Ulcer Tibial Posterior;Right (Removed)   Resolved Date: 12/02/20  Date First Assessed/Time First Assessed: 08/12/20 1614   Pre-Existing Wound: Yes  Primary Wound Type: Arterial Ulcer  Location: Tibial  Wound Location Orientation: Posterior;Right  Dressing Status: Intact  Wound Outcome: Healed       [REMOVED] Wound 09/09/20 Pressure Injury Heel Right (Removed)   Resolved Date/Resolved Time: 01/13/21 1501  Date First Assessed: 09/09/20   Primary Wound Type: Pressure Injury  Location: Heel  Wound Location Orientation: Right  Wound Outcome: Healed       [REMOVED] Wound 10/14/20 Arterial Ulcer Pretibial Left (Removed)   Resolved Date/Resolved Time: 10/21/20 1411  Date First Assessed/Time First Assessed: 10/14/20 1453   Primary Wound Type: Arterial Ulcer  Location: Pretibial  Wound Location Orientation: Left  Wound Outcome: Healed       [REMOVED] Wound 11/19/20 Leg Right (Removed)   Resolved Date: 12/02/20  Date First Assessed/Time First Assessed: 11/19/20 1453   Location: Leg  Wound Location Orientation: Right  Wound Description (Comments): Three Wounds Debrided And Dressed  Incision's 1st Dressing: DRESSING OIL EMULSION 3 X 8 I    [REMOVED] Wound 12/30/20 Arterial Ulcer Foot Right;Lateral (Removed)   Resolved Date/Resolved Time: 01/13/21 1456  Date First Assessed/Time First Assessed: 12/30/20 1453   Pre-Existing Wound: Yes  Primary Wound Type: Arterial Ulcer  Location: Foot  Wound Location Orientation: Right;Lateral  Dressing Status: Intact       Subjective:     F/u visit open wound of right posterior thigh  Patient was seen by his PCP on 3/9 for facial edema  Patient was prescribed PO prednisone which he is taking as prescribed  No new complaints about his wounds  He is still awaiting to receive the Medihoney alginate from [facility]  He denies any pain, fevers, or chills  The following portions of the patient's history were reviewed and updated as appropriate:   He  has a past medical history of Anemia, Atrial fibrillation ([address] ), Benign prostatic hyperplasia without urinary obstruction, Congestive heart failure with left ventricular diastolic dysfunction, chronic ([address] ), History of ITP, Hypertension, Lumbosacral disc herniation, Peripheral vascular disease of lower extremity ([address] ), Renal disorder, and Subdural hematoma ([address] )    He   Patient Active Problem List    Diagnosis Date Noted    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 ([address] ) 02/24/2021    Open wound of right thigh 02/16/2021    Other myelodysplastic syndromes ([address] ) 01/14/2021    Osteomyelitis of right foot [facility] ) 01/14/2021    Venous ulcer of ankle, right ([address] ) 12/23/2020    MRSA cellulitis of right foot 11/22/2020    Multiple myeloma not having achieved remission ([address] ) 11/19/2020    Pleural effusion on right 11/17/2020    Acidosis 11/17/2020    Right foot ulcer ([address] ) 11/16/2020    Open wound of left lower leg 10/14/2020    Pressure injury of right heel, unstageable ([address] ) 09/09/2020    Atherosclerosis of native artery of right lower extremity with rest pain ([facility] ) 09/09/2020    Atherosclerosis of native artery of right lower extremity with ulceration of calf ([address] ) 08/12/2020    Leg ulcer, right, with fat layer exposed ([address] ) 08/03/2020    Open abdominal wall wound     Pancytopenia ([address] ) 07/28/2020    Open wound of umbilical region     Cellulitis 07/17/2020    CKD (chronic kidney disease) stage 3, GFR 30-59 ml/min 16/91/1055    Umbilicus discharge 30/63/0142    Benign prostatic hyperplasia with urinary retention 07/17/2020    Severe protein-calorie malnutrition ([facility] ) 07/17/2020    Status post biventricular pacemaker 06/21/2019    Anemia, unspecified 02/13/2019    S/P AV (atrioventricular) mary anne ablation 01/04/2019    Chronic combined systolic and diastolic heart failure ([facility] ) 11/22/2018    Dilated cardiomyopathy ([place] [address] ) 11/21/2018    Acute respiratory failure with hypoxia ([place] [address] ) 05/02/2017    Other persistent atrial fibrillation ([place] [address] ) 05/02/2017    Peripheral vascular disease of lower extremity ([place] [address] )     Essential hypertension      He  has a past surgical history that includes Back surgery; Prostate surgery; Hernia repair; [place] hole for subdural hematoma; Clavicle surgery (Left, 2011); Cardiac pacemaker placement; IR aortagram with run-off (7/20/2020); LAPAROTOMY (N/A, 7/21/2020); Ventral hernia repair (N/A, 7/21/2020); Wound debridement (Right, 7/24/2020); IR biopsy bone marrow (7/30/2020); pr exploratory of abdomen (N/A, 8/3/2020); and Wound debridement (Right, 11/19/2020)  His family history includes Cancer in his sister; Depression in his cousin and other; Diabetes in his other; Heart disease in his father and mother; Hypertension in his father  He  reports that he has never smoked  He has never used smokeless tobacco  He reports previous alcohol use  He reports that he does not use drugs    Current Outpatient Medications   Medication Sig Dispense Refill    acetaminophen (TYLENOL) 325 mg tablet Take 2 tablets (650 mg total) by mouth every 6 (six) hours as needed for mild pain 30 tablet 0    Ascorbic Acid (VITAMIN C PO) Take by mouth      atorvastatin (LIPITOR) 40 mg tablet Take 1 tablet (40 mg total) by mouth daily with dinner 90 tablet 1    B Complex Vitamins (VITAMIN B COMPLEX PO) Take by mouth as needed       clotrimazole-betamethasone (LOTRISONE) 1-0 05 % cream Apply topically 2 (two) times a day (Patient taking differently: Apply topically as needed ) 30 g 0    diazepam (VALIUM) 5 mg tablet Take 5 mg by mouth every 12 (twelve) hours as needed for anxiety      famotidine (PEPCID) 20 mg tablet Take 1 tablet (20 mg total) by mouth 2 (two) times a day 14 tablet 0    Ferrous Gluconate-C-Folic Acid (IRON-C PO) Take by mouth as needed       folic acid (FOLVITE) 1 mg tablet Take 1 mg by mouth daily      lisinopril (ZESTRIL) 5 mg tablet Take 1 tablet (5 mg total) by mouth daily (Patient taking differently: Take 2 5 mg by mouth daily ) 30 tablet 3    metoprolol succinate (TOPROL-XL) 25 mg 24 hr tablet Take 1 tablet (25 mg total) by mouth daily 90 tablet 1    Multiple Vitamin (MULTI-DAY VITAMINS) TABS Take by mouth      oxyCODONE-acetaminophen (PERCOCET) 5-325 mg per tablet Take 1 tablet by mouth every 12 (twelve) hours as needed for moderate painMax Daily Amount: 2 tablets 30 tablet 0    pantoprazole (PROTONIX) 40 mg tablet Take 1 tablet (40 mg total) by mouth daily in the early morning 90 tablet 1    predniSONE 10 mg tablet Take 1 tablet (10 mg total) by mouth daily 6 tabs 3/9,3/10, 5 tabs 3/11,3/12, 4 tabs 3/13, 3 tabs 3/14, 2 tabs 3/15, 1 tab 3/16 32 tablet 0    tamsulosin (FLOMAX) 0 4 mg Take 1 capsule (0 4 mg total) by mouth daily with dinner 90 capsule 1     No current facility-administered medications for this visit  He is allergic to blood-group specific substance       Review of Systems   Constitutional: Negative  HENT: Negative for ear pain and hearing loss  Eyes: Negative for pain  Respiratory: Negative for chest tightness and shortness of breath  Cardiovascular: Negative for chest pain, palpitations and leg swelling  Gastrointestinal: Negative for diarrhea, nausea and vomiting  Genitourinary: Negative for dysuria  Musculoskeletal: Negative for gait problem  Skin: Positive for wound  Neurological: Negative for tremors and weakness  Psychiatric/Behavioral: Negative for behavioral problems, confusion and suicidal ideas           Objective:       Wound 02/15/21 Abscess Thigh Distal;Posterior;Right (Active)   Wound Image Images linked 03/11/21 1144   Wound Description Epithelialization;Black;Granulation tissue;Slough 03/11/21 1143   Donna-wound Assessment Edema; Erythema;Swelling;Excoriated 03/11/21 1143   Wound Length (cm) 11 cm 03/11/21 1143   Wound Width (cm) 5 8 cm 03/11/21 1143   Wound Depth (cm) 0 3 cm 03/11/21 1143   Wound Surface Area (cm^2) 63 8 cm^2 03/11/21 1143   Wound Volume (cm^3) 19 14 cm^3 03/11/21 1143   Calculated Wound Volume (cm^3) 19 14 cm^3 03/11/21 1143   Change in Wound Size % -24 29 03/11/21 1143   Drainage Amount Large 03/11/21 1143   Drainage Description Tan;Purulent; Foul smelling 03/11/21 1143   Non-staged Wound Description Full thickness 03/11/21 1143       /78   Pulse 72   Temp 98 1 °F (36 7 °C)   Resp 18             Wound Instructions:  Orders Placed This Encounter   Procedures    Wound cleansing and dressings     Wound cleansing and dressings      Right thigh wound  medihoney alginate to right thigh wounds  May use medihoney with calcium alginate until medihoney alginate received  ABD pad and medfix tape  Secure with ace bandage if needed to keep bandage in place  Dressing to be changed Monday, Wednesday and Friday      Done today     Standing Status:   Future     Standing Expiration Date:   3/11/2022    Debridement     This order was created via procedure documentation        Diagnosis ICD-10-CM Associated Orders   1   Open wound of right thigh, initial encounter  S71 101A Wound cleansing and dressings

## 2021-03-11 NOTE — PATIENT INSTRUCTIONS
Orders Placed This Encounter   Procedures    Wound cleansing and dressings     Wound cleansing and dressings      Right thigh wound  medihoney alginate to right thigh wounds  May use medihoney with calcium alginate until medihoney alginate received  ABD pad and medfix tape  Secure with ace bandage if needed to keep bandage in place      Dressing to be changed Monday, Wednesday and Friday      Done today     Standing Status:   Future     Standing Expiration Date:   3/11/2022    Debridement     This order was created via procedure documentation

## 2021-03-15 NOTE — TELEPHONE ENCOUNTER
Breonna Flores wants to let you know he will be going to the er on Wed 3/17/21  If you have any ?s please call him

## 2021-03-15 NOTE — TELEPHONE ENCOUNTER
Incoming call from the patient's visiting nurse, Lázaro Mansfield; She reports that while with him today for would care, the patient complained of pain in the back of his right leg  She notes that his right foot and right leg are cool to the touch, whereas the left one is warm  His pain is is right behind his right knee, which she notes feels warm  She is worried about the possibility of a DVT and is requesting orders for testing  Will route to a provider to review and advise   Lázaro Mansfield can be reached at [phone]

## 2021-03-15 NOTE — TELEPHONE ENCOUNTER
Please call her and let her know I ordered a ultrasound of his leg  She will need to call Central scheduling

## 2021-03-15 NOTE — TELEPHONE ENCOUNTER
Spoke with Bernabe Daily and gave her the number for central scheduling for the [place]  Bernabe Daily would like to give an update on the patinet  Since her previous call, she reports that she's noted that the wounds on the back of the patient's legs look bad, they're a dark shade of purple  They cause enough pain that he's unable to stand or walk  She also reports that overall, he just looks sickly and is worried about infections  She's urged the patient to go to the hospital, but he refused  She'll send pictures of the wound to Dr Marlys Delatorre via  Amie Ramos   She will try to get him scheduled for the [place]

## 2021-03-16 PROBLEM — R65.21 SEPTIC SHOCK (HCC): Status: ACTIVE | Noted: 2017-05-02

## 2021-03-16 PROBLEM — G93.41 ACUTE METABOLIC ENCEPHALOPATHY: Status: ACTIVE | Noted: 2021-01-01

## 2021-03-16 PROBLEM — N17.9 ACUTE RENAL FAILURE SUPERIMPOSED ON STAGE 3 CHRONIC KIDNEY DISEASE (HCC): Status: ACTIVE | Noted: 2021-01-01

## 2021-03-16 PROBLEM — N18.30 ACUTE RENAL FAILURE SUPERIMPOSED ON STAGE 3 CHRONIC KIDNEY DISEASE (HCC): Status: ACTIVE | Noted: 2021-01-01

## 2021-03-16 NOTE — EMTALA/ACUTE CARE TRANSFER
[place] [facility] DEPARTMENT  [address]  [facility] [address]  Dept: [phone]      [id number] TRANSFER CONSENT    NAME Kymberly BENSON 1945                              MRN [id number]    I have been informed of my rights regarding examination, treatment, and transfer   by Dr Tanya Guerra MD    Benefits: Specialized equipment and/or services available at the receiving facility (Include comment)________________________(CVV)    Risks: Potential for delay in receiving treatment, Potential deterioration of medical condition, Loss of IV, Possible worsening of condition or death during transfer, Increased discomfort during transfer      Consent for Transfer:  I acknowledge that my medical condition has been evaluated and explained to me by the emergency department physician or other qualified medical person and/or my attending physician, who has recommended that I be transferred to the service of  Accepting Physician: Dr Waleska Del Rio at [address] Name, [facility] : [address] [place] [address]  The above potential benefits of such transfer, the potential risks associated with such transfer, and the probable risks of not being transferred have been explained to me, and I fully understand them  The doctor has explained that, in my case, the benefits of transfer outweigh the risks  I agree to be transferred  I authorize the performance of emergency medical procedures and treatments upon me in both transit and upon arrival at the receiving facility  Additionally, I authorize the release of any and all medical records to the receiving facility and request they be transported with me, if possible  I understand that the safest mode of transportation during a medical emergency is an ambulance and that the Hospital advocates the use of this mode of transport   Risks of traveling to the receiving facility by car, including absence of medical control, life sustaining equipment, such as oxygen, and medical personnel has been explained to me and I fully understand them  (EPIFANIO CORRECT BOX BELOW)  [  ]  I consent to the stated transfer and to be transported by ambulance/helicopter  [  ]  I consent to the stated transfer, but refuse transportation by ambulance and accept full responsibility for my transportation by car  I understand the risks of non-ambulance transfers and I exonerate the Hospital and its staff from any deterioration in my condition that results from this refusal     X___________________________________________    DATE  03/16/21  TIME________  Signature of patient or legally responsible individual signing on patient behalf           RELATIONSHIP TO PATIENT_________________________          Provider Certification    NAME Felipe Cox 1945                              MRN [id number]    A medical screening exam was performed on the above named patient  Based on the examination:    Condition Necessitating Transfer The primary encounter diagnosis was Acute encephalopathy  Diagnoses of Acute renal failure (ARF) ([facility]), Acute respiratory insufficiency, CHF (congestive heart failure) ([facility] [address] ), Hyperkalemia, Hypoglycemia, and Severe dehydration were also pertinent to this visit      Patient Condition: The patient has been stabilized such that within reasonable medical probability, no material deterioration of the patient condition or the condition of the unborn child(ayden) is likely to result from the transfer    Reason for Transfer:      Transfer Requirements: [address]  [facility]   · Space available and qualified personnel available for treatment as acknowledged by    · Agreed to accept transfer and to provide appropriate medical treatment as acknowledged by       Dr John Fernandez  · Appropriate medical records of the examination and treatment of the patient are provided at the time of transfer   STAFF INITIAL WHEN COMPLETED _______  · Transfer will be performed by qualified personnel from    and appropriate transfer equipment as required, including the use of necessary and appropriate life support measures  Provider Certification: I have examined the patient and explained the following risks and benefits of being transferred/refusing transfer to the patient/family:  General risk, such as traffic hazards, adverse weather conditions, rough terrain or turbulence, possible failure of equipment (including vehicle or aircraft), or consequences of actions of persons outside the control of the transport personnel, Unanticipated needs of medical equipment and personnel during transport, Risk of worsening condition, The possibility of a transport vehicle being unavailable      Based on these reasonable risks and benefits to the patient and/or the unborn child(ayden), and based upon the information available at the time of the patients examination, I certify that the medical benefits reasonably to be expected from the provision of appropriate medical treatments at another medical facility outweigh the increasing risks, if any, to the individuals medical condition, and in the case of labor to the unborn child, from effecting the transfer      X____________________________________________ DATE 03/16/21        TIME_______      ORIGINAL - SEND TO MEDICAL RECORDS   COPY - SEND WITH PATIENT DURING TRANSFER

## 2021-03-16 NOTE — ED PROVIDER NOTES
History  Chief Complaint   Patient presents with    Altered Mental Status     pt presents to er via ems from home with increased in confusion and lethargy  Patient is a 71 y/o M with h/o hyperlipidemia, pacer, afib, chronic right leg wounds currently seeing wound care, PVD that was BIBA from home for altered mental status  History obtained by EMS and significant other, Stacy Guerra  EMS states they were at patient's house 2 days ago after he fell to help him back up  He appeared ill, but refused to come to the ER  Stacy Guerra states patient has had chronic infection and wounds on right leg and has been following with wound care  Up until yesterday he was feeling fine  She states yesterday he slid off the couch onto the floor and she was unable to help him up due to weakness  She states EMS helped him up, but he didn't want to go to the hospital   She states he has not been complaining of anything  He has not had vomiting or diarrhea  No fevers, chills  No sick contacts  He does not have an advanced directive, but would like patient to be a full code  History provided by:  EMS personnel and spouse  Altered Mental Status  Presenting symptoms: confusion and partial responsiveness    Severity:  Moderate  Most recent episode:  Yesterday  Episode history:  Continuous  Timing:  Constant  Progression:  Worsening  Chronicity:  New  Context: recent infection    Associated symptoms: rash and weakness    Associated symptoms: no fever and no vomiting        Prior to Admission Medications   Prescriptions Last Dose Informant Patient Reported? Taking?    Ascorbic Acid (VITAMIN C PO)  Self Yes No   Sig: Take by mouth   B Complex Vitamins (VITAMIN B COMPLEX PO)  Self Yes No   Sig: Take by mouth as needed    Ferrous Gluconate-C-Folic Acid (IRON-C PO)  Self Yes No   Sig: Take by mouth as needed    Multiple Vitamin (MULTI-DAY VITAMINS) TABS  Self Yes No   Sig: Take by mouth   acetaminophen (TYLENOL) 325 mg tablet  Self No No Sig: Take 2 tablets (650 mg total) by mouth every 6 (six) hours as needed for mild pain   atorvastatin (LIPITOR) 40 mg tablet  Self No No   Sig: Take 1 tablet (40 mg total) by mouth daily with dinner   clotrimazole-betamethasone (LOTRISONE) 1-0 05 % cream  Self No No   Sig: Apply topically 2 (two) times a day   Patient taking differently: Apply topically as needed    diazepam (VALIUM) 5 mg tablet  Self Yes No   Sig: Take 5 mg by mouth every 12 (twelve) hours as needed for anxiety   famotidine (PEPCID) 20 mg tablet   No No   Sig: Take 1 tablet (20 mg total) by mouth 2 (two) times a day   folic acid (FOLVITE) 1 mg tablet  Self Yes No   Sig: Take 1 mg by mouth daily   lisinopril (ZESTRIL) 5 mg tablet  Self No No   Sig: Take 1 tablet (5 mg total) by mouth daily   Patient taking differently: Take 2 5 mg by mouth daily    metoprolol succinate (TOPROL-XL) 25 mg 24 hr tablet  Self No No   Sig: Take 1 tablet (25 mg total) by mouth daily   oxyCODONE-acetaminophen (PERCOCET) 5-325 mg per tablet  Self No No   Sig: Take 1 tablet by mouth every 12 (twelve) hours as needed for moderate painMax Daily Amount: 2 tablets   pantoprazole (PROTONIX) 40 mg tablet  Self No No   Sig: Take 1 tablet (40 mg total) by mouth daily in the early morning   predniSONE 10 mg tablet   No No   Sig: Take 1 tablet (10 mg total) by mouth daily 6 tabs 3/9,3/10, 5 tabs 3/11,3/12, 4 tabs 3/13, 3 tabs 3/14, 2 tabs 3/15, 1 tab 3/16   tamsulosin (FLOMAX) 0 4 mg  Self No No   Sig: Take 1 capsule (0 4 mg total) by mouth daily with dinner      Facility-Administered Medications: None       Past Medical History:   Diagnosis Date    Anemia     Atrial fibrillation (HCC)     Benign prostatic hyperplasia without urinary obstruction     Congestive heart failure with left ventricular diastolic dysfunction, chronic (HCC)     History of ITP     Hypertension     Lumbosacral disc herniation     Peripheral vascular disease of lower extremity (HCC)     Renal disorder  Subdural hematoma (HCC)        Past Surgical History:   Procedure Laterality Date    BACK SURGERY      AUSTYN HOLE FOR SUBDURAL HEMATOMA      CARDIAC PACEMAKER PLACEMENT      CLAVICLE SURGERY Left 2011    HERNIA REPAIR      IR AORTAGRAM WITH RUN-OFF  7/20/2020    IR BIOPSY BONE MARROW  7/30/2020    LAPAROTOMY N/A 7/21/2020    Procedure: LAPAROTOMY EXPLORATORY, excision of infected mesh and repair of ventral heria; Surgeon: Jeancarlos Bailon MD;  Location: [facility];  Service: General    NM EXPLORATORY OF ABDOMEN N/A 8/3/2020    Procedure: REPAIR OF ABDOMINAL WALL WOUND/DEHISSENCE with mesh, placement drain, placement vac;  Surgeon: Jeancarlos Bailon MD;  Location: [facility];  Service: General    PROSTATE SURGERY      VENTRAL HERNIA REPAIR N/A 7/21/2020    Procedure: REPAIR HERNIA VENTRAL - EXCSION OF INFECTED MESH;  Surgeon: Jeancarlos Bailon MD;  Location: [facility];  Service: General    WOUND DEBRIDEMENT Right 7/24/2020    Procedure: DEBRIDEMENT LOWER EXTREMITY ([address]); Surgeon: Karie Stoll DPM;  Location: [facility];  Service: Podiatry    WOUND DEBRIDEMENT Right 11/19/2020    Procedure: DEBRIDEMENT WOUND ([address]) right lower extremity;  Surgeon: Leticia Maldonado DPM;  Location: [facility];  Service: Podiatry       Family History   Problem Relation Age of Onset    Heart disease Mother     Heart disease Father     Hypertension Father     Diabetes Other     Depression Other     Cancer Sister     Depression Cousin      I have reviewed and agree with the history as documented      E-Cigarette/Vaping    E-Cigarette Use Never User      E-Cigarette/Vaping Substances    Nicotine No     THC No     CBD No     Flavoring No     Other No     Unknown No      Social History     Tobacco Use    Smoking status: Never Smoker    Smokeless tobacco: Never Used   Substance Use Topics    Alcohol use: Not Currently     Alcohol/week: 0 0 standard drinks     Frequency: Never     Binge frequency: Never     Comment: Denied use    Drug use: Never     Comment: Denied use       Review of Systems   Unable to perform ROS: Acuity of condition   Constitutional: Negative for fever  Gastrointestinal: Negative for diarrhea and vomiting  Skin: Positive for rash and wound  Neurological: Positive for weakness  Psychiatric/Behavioral: Positive for confusion  Physical Exam  Physical Exam  Vitals signs and nursing note reviewed  Constitutional:       General: He is in acute distress  Appearance: He is underweight  He is ill-appearing and toxic-appearing  He is not diaphoretic  HENT:      Head: Normocephalic  Comments: Patient has mild discoloration to his face, which he was seen for in the past       Right Ear: External ear normal       Left Ear: External ear normal       Nose: Nose normal       Mouth/Throat:      Mouth: Mucous membranes are dry  Eyes:      Conjunctiva/sclera: Conjunctivae normal       Pupils: Pupils are equal    Neck:      Musculoskeletal: Normal range of motion  Cardiovascular:      Rate and Rhythm: Tachycardia present  Rhythm irregular  Pulmonary:      Effort: Tachypnea, accessory muscle usage and respiratory distress present  Breath sounds: Decreased air movement present  Abdominal:      General: Bowel sounds are absent  There is distension  Tenderness: There is generalized abdominal tenderness  Musculoskeletal:      Comments: Right leg larger than left leg  There are petechiae to right leg with chronic wounds  Skin:     General: Skin is cool and dry  Coloration: Skin is ashen and pale  Findings: Petechiae (right leg  ) and rash present  Neurological:      Mental Status: He is lethargic  GCS: GCS eye subscore is 4  GCS verbal subscore is 4  GCS motor subscore is 6  Motor: No tremor           Vital Signs  ED Triage Vitals   Temperature Pulse Respirations Blood Pressure SpO2   03/16/21 1731 03/16/21 1715 03/16/21 1715 03/16/21 1715 03/16/21 1715   (!) 97 1 °F (36 2 °C) 80 20 136/60 94 %      Temp Source Heart Rate Source Patient Position - Orthostatic VS BP Location FiO2 (%)   03/16/21 1731 03/16/21 1756 03/16/21 1715 03/16/21 1715 03/16/21 1833   Temporal Monitor Lying Left arm 100      Pain Score       --                  Vitals:    03/16/21 1900 03/16/21 1930 03/16/21 1945 03/16/21 2000   BP: 115/59 (!) 112/49 (!) 105/45 (!) 94/43   Pulse: 66 57 80 80   Patient Position - Orthostatic VS:  Lying  Lying         Visual Acuity  Visual Acuity      Most Recent Value   L Pupil Size (mm)  4   R Pupil Size (mm)  4          ED Medications  Medications   sodium bicarbonate 150 mEq in dextrose 5 % 1,000 mL infusion (500 mL/hr Intravenous New Bag 3/16/21 1922)   vancomycin (VANCOCIN) IVPB (premix in dextrose) 1,000 mg 200 mL (1,000 mg Intravenous New Bag 3/16/21 1945)   iohexol (OMNIPAQUE) 350 MG/ML injection (MULTI-DOSE) 100 mL (100 mL Intravenous Given 3/16/21 1756)   multi-electrolyte (ISOLYTE-S PH 7 4) bolus 1,000 mL (0 mL Intravenous Stopped 3/16/21 1912)   piperacillin-tazobactam (ZOSYN) IVPB 3 375 g (0 g Intravenous Stopped 3/16/21 1905)   insulin regular (HumuLIN R,NovoLIN R) injection 10 Units (10 Units Intravenous Given 3/16/21 1845)   dextrose 50 % IV solution 50 mL (50 mL Intravenous Given 3/16/21 1829)   calcium gluconate 2 g in sodium chloride 0 9% 100 mL (premix) (0 g Intravenous Stopped 3/16/21 2000)   sodium bicarbonate 8 4 % injection 50 mEq (50 mEq Intravenous Given 3/16/21 1914)       Diagnostic Studies  Results Reviewed     Procedure Component Value Units Date/Time    Phosphorus [[id number]]  (Abnormal) Collected: 03/16/21 1741    Lab Status: Final result Specimen: Blood from Arm, Left Updated: 03/16/21 2027     Phosphorus 13 9 mg/dL     Lactic acid 2 Hours [[id number]]  (Abnormal) Collected: 03/1947    Lab Status: Final result Specimen: Blood from Arm, Left Updated: 03/16/21 2020     LACTIC ACID 10 8 mmol/L Narrative:      Result may be elevated if tourniquet was used during collection      CBC and differential [[id number]]     Lab Status: No result Specimen: Blood     Peripheral Smear [[id number]]     Lab Status: No result Specimen: Blood     Basic metabolic panel [[id number]]  (Abnormal) Collected: 03/1947    Lab Status: Final result Specimen: Blood from Arm, Left Updated: 03/16/21 2012     Sodium 137 mmol/L      Potassium 7 7 mmol/L      Chloride 101 mmol/L      CO2 8 mmol/L      ANION GAP 28 mmol/L       mg/dL      Creatinine 6 15 mg/dL      Glucose 110 mg/dL      Calcium 7 2 mg/dL      eGFR 8 ml/min/1 73sq m     Narrative:      [place] guidelines for Chronic Kidney Disease (CKD):     Stage 1 with normal or high GFR (GFR > 90 mL/min/1 73 square meters)    Stage 2 Mild CKD (GFR = 60-89 mL/min/1 73 square meters)    Stage 3A Moderate CKD (GFR = 45-59 mL/min/1 73 square meters)    Stage 3B Moderate CKD (GFR = 30-44 mL/min/1 73 square meters)    Stage 4 Severe CKD (GFR = 15-29 mL/min/1 73 square meters)    Stage 5 End Stage CKD (GFR <15 mL/min/1 73 square meters)  Note: GFR calculation is accurate only with a steady state creatinine    Basic metabolic panel [[id number]]     Lab Status: No result Specimen: Blood     Uric acid [[id number]]  (Abnormal) Collected: 03/16/21 1741    Lab Status: Final result Specimen: Blood from Arm, Left Updated: 03/16/21 2003     Uric Acid 16 4 mg/dL     LD,Blood [[id number]]  (Abnormal) Collected: 03/16/21 1741    Lab Status: Final result Specimen: Blood from Arm, Left Updated: 03/16/21 2003      U/L     Basic metabolic panel [[id number]]     Lab Status: No result Specimen: Blood     Blood gas, arterial [[id number]]     Lab Status: No result Specimen: Blood, Arterial     CKMB [[id number]]  (Abnormal) Collected: 03/16/21 1741    Lab Status: Final result Specimen: Blood from Arm, Left Updated: 03/16/21 1950     CK-MB Index 6 8 %      CK- 6 ng/mL     CK (with reflex to MB) [[id number]]  (Abnormal) Collected: 03/16/21 1741    Lab Status: Final result Specimen: Blood from Arm, Left Updated: 03/1947     Total CK 1,828 U/L     Basic metabolic panel [[id number]]     Lab Status: No result Specimen: Blood     Urine Microscopic [[id number]]  (Abnormal) Collected: 03/16/21 1839    Lab Status: Final result Specimen: Urine, Indwelling Abobtt Catheter Updated: 03/16/21 1906     RBC, UA 0-1 /hpf      WBC, UA 4-10 /hpf      Epithelial Cells Occasional /hpf      Bacteria, UA Moderate /hpf      Hyaline Casts, UA 2-4 /lpf      OTHER OBSERVATIONS Yeast Cells Present  WBCs Clumped     MUCUS THREADS Occasional    Fingerstick Glucose (POCT) [[id number]]  (Normal) Collected: 03/16/21 1855    Lab Status: Final result Updated: 03/16/21 1856     POC Glucose 130 mg/dl     POCT Blood Gas (CG8+) [[id number]]  (Abnormal) Collected: 03/16/21 1849    Lab Status: Final result Specimen: Arterial Updated: 03/16/21 1855     pH, Art i-STAT 6 999     pH, i-STAT Temp Corrected 7 023     pCO2, Art i-STAT 19 7 mm HG      pCO2, i-STAT TC 18 1 mm HG      pO2, ART i-STAT 358 0 mm HG      pO2, i-STAT  mm HG      BE, i-STAT -25 mmol/L      HCO3, Art i-STAT 4 9 mmol/L      CO2, i-STAT 5 mmol/L      O2 Sat, i-STAT 100 %      SODIUM, I-STAT 131 mmol/l      Potassium, i-STAT >8 0 mmol/L      Calcium, Ionized i-STAT 0 93 mmol/L      Hct, i-STAT 27 %      Hgb, i-STAT 9 2 g/dl      Glucose, i-STAT 182 mg/dl      POC FIO2 100 L      Specimen Type ARTERIAL     SITE Left Radial     WILSON TEST Postive Wilson Test    UA w Reflex to Microscopic w Reflex to Culture [[id number]]  (Abnormal) Collected: 03/16/21 1839    Lab Status: Final result Specimen: Urine, Indwelling Abbott Catheter Updated: 03/16/21 1847     Color, UA Orange     Clarity, UA Clear     Specific [place], UA 1 020     pH, UA 5 5     Leukocytes, UA Trace     Nitrite, UA Negative     Protein, UA Trace mg/dl      Glucose, UA Negative mg/dl Ketones, UA Negative mg/dl      Urobilinogen, UA 0 2 E U /dl      Bilirubin, UA Negative     Blood, UA Trace-Intact    CBC and differential [[id number]]  (Abnormal) Collected: 03/16/21 1741    Lab Status: Final result Specimen: Blood from Arm, Left Updated: 03/16/21 1846      97 Thousand/uL      RBC 3 62 Million/uL      Hemoglobin 11 3 g/dL      Hematocrit 37 4 %       fL      MCH 31 2 pg      MCHC 30 2 g/dL      RDW 19 4 %      MPV 12 2 fL      Platelets 939 Thousands/uL      nRBC 4 /100 WBCs     Narrative:       No Clots    Manual Differential(PHLEBS Do Not Order) [[id number]]  (Abnormal) Collected: 03/16/21 1741    Lab Status: Final result Specimen: Blood from Arm, Left Updated: 03/16/21 1846     Segmented % 91 %      Lymphocytes % 2 %      Monocytes % 6 %      Eosinophils, % 0 %      Basophils % 0 %      Metamyelocytes% 1 %      Absolute Neutrophils 93 70 Thousand/uL      Lymphocytes Absolute 2 06 Thousand/uL      Monocytes Absolute 6 18 Thousand/uL      Eosinophils Absolute 0 00 Thousand/uL      Basophils Absolute 0 00 Thousand/uL      Total Counted 100     nRBC 3 /100 WBC      Anisocytosis Present     Ovalocytes Present     Poikilocytes Present     Polychromasia Present     Tear Drop Cells Present     Platelet Estimate Adequate     Giant PLTs Present     Large Platelet Present    Narrative:       No Clots    Fingerstick Glucose (POCT) [[id number]]  (Normal) Collected: 03/16/21 1843    Lab Status: Final result Updated: 03/16/21 1844     POC Glucose 73 mg/dl     Fingerstick Glucose (POCT) [[id number]]  (Normal) Collected: 03/16/21 1706    Lab Status: Final result Updated: 03/16/21 1844     POC Glucose 75 mg/dl     Blood gas, arterial [[id number]]     Lab Status: No result Specimen: Blood, Arterial     COVID19, Influ[facility]/B, RSV [facility] [[id number]]  (Normal) Collected: 03/16/21 1741    Lab Status: Final result Specimen: Nares from Nasopharyngeal Swab Updated: 03/16/21 1836     SARS-CoV-2 Negative INFLUENZA A PCR Negative     INFLUENZA B PCR Negative     RSV PCR Negative    Narrative: This test has been authorized by FDA under an EUA (Emergency Use Assay) for use by authorized laboratories  Clinical caution and judgement should be used with the interpretation of these results with consideration of the clinical impression and other laboratory testing  Testing reported as "Positive" or "Negative" has been proven to be accurate according to standard laboratory validation requirements  All testing is performed with control materials showing appropriate reactivity at standard intervals      Comprehensive metabolic panel [[id number]]  (Abnormal) Collected: 03/16/21 1741    Lab Status: Final result Specimen: Blood from Arm, Left Updated: 03/16/21 1824     Sodium 136 mmol/L      Potassium 8 2 mmol/L      Chloride 101 mmol/L      CO2 6 mmol/L      ANION GAP 29 mmol/L       mg/dL      Creatinine 6 58 mg/dL      Glucose 54 mg/dL      Calcium 8 0 mg/dL      Corrected Calcium 9 2 mg/dL       U/L       U/L      Alkaline Phosphatase 232 U/L      Total Protein 6 8 g/dL      Albumin 2 5 g/dL      Total Bilirubin 1 70 mg/dL      eGFR 8 ml/min/1 73sq m     Narrative:      [employer] guidelines for Chronic Kidney Disease (CKD):     Stage 1 with normal or high GFR (GFR > 90 mL/min/1 73 square meters)    Stage 2 Mild CKD (GFR = 60-89 mL/min/1 73 square meters)    Stage 3A Moderate CKD (GFR = 45-59 mL/min/1 73 square meters)    Stage 3B Moderate CKD (GFR = 30-44 mL/min/1 73 square meters)    Stage 4 Severe CKD (GFR = 15-29 mL/min/1 73 square meters)    Stage 5 End Stage CKD (GFR <15 mL/min/1 73 square meters)  Note: GFR calculation is accurate only with a steady state creatinine    NT-BNP PRO [[id number]]  (Abnormal) Collected: 03/16/21 1741    Lab Status: Final result Specimen: Blood from Arm, Left Updated: 03/16/21 1819     NT-proBNP 11,850 pg/mL     Lipase [[id number]] (Normal) Collected: 03/16/21 1741    Lab Status: Final result Specimen: Blood from Arm, Left Updated: 03/16/21 1819     Lipase 156 u/L     Magnesium [[id number]]  (Abnormal) Collected: 03/16/21 1741    Lab Status: Final result Specimen: Blood from Arm, Left Updated: 03/16/21 1819     Magnesium 3 3 mg/dL     Lactic acid [[id number]]  (Abnormal) Collected: 03/16/21 1741    Lab Status: Final result Specimen: Blood from Arm, Left Updated: 03/16/21 1814     LACTIC ACID 9 6 mmol/L     Narrative:      Result may be elevated if tourniquet was used during collection  Protime-INR [[id number]]  (Abnormal) Collected: 03/16/21 1741    Lab Status: Final result Specimen: Blood from Arm, Left Updated: 03/16/21 1805     Protime 28 1 seconds      INR 2 65    APTT [[id number]]  (Abnormal) Collected: 03/16/21 1741    Lab Status: Final result Specimen: Blood from Arm, Left Updated: 03/16/21 1805     PTT 42 seconds     Blood culture #1 [[id number]] Collected: 03/16/21 1741    Lab Status: In process Specimen: Blood from Arm, Left Updated: 03/16/21 1748    Blood culture #2 [[id number]] Collected: 03/16/21 1741    Lab Status: In process Specimen: Blood from Arm, Right Updated: 03/16/21 1748    Procalcitonin with AM Reflex [[id number]] Collected: 03/16/21 1741    Lab Status: In process Specimen: Blood from Arm, Left Updated: 03/16/21 1748                 PE Study with CT Abdomen and Pelvis with contrast   Final Result by Tommie Cortez MD (03/16 1851)      No pulmonary embolism to the level of the segmental arteries  No acute traumatic injury in the chest, abdomen, or pelvis  Markedly distended gallbladder without pericholecystic inflammatory changes  Previously reported cholelithiasis is not definitely identified  If there is a high degree of clinical concern for acute cholecystitis, a right upper quadrant ultrasound    should be obtained  Stable splenomegaly  Improved bilateral hydroureteronephrosis        Workstation performed: [id number]         TRAUMA - CT head wo contrast   Final Result by Shiraz Acosta MD (03/16 1827)      No acute intracranial abnormality  Workstation performed: [id number]         XR chest portable   ED Interpretation by Marifer Saavedra MD (03/16 2058)   No acute pulmonary pathology                 Procedures  ECG 12 Lead Documentation Only    Date/Time: 3/16/2021 5:10 PM  Performed by: Leah Church PA-C  Authorized by: Leah Church PA-C     Indications / Diagnosis:  AMS  ECG reviewed by me, the ED Provider: yes (also by Dr Peralta Son)    Patient location:  ED  Previous ECG:     Previous ECG:  Compared to current    Comparison ECG info:  Patient with tachycardia and paced rhythm  Quality:     Tracing quality:  Limited by artifact  Rhythm:     Rhythm: paced               ED Course  ED Course as of Mar 16 2027   Juliann Lagunas Mar 16, 2021   1856 Dr Peralta Son spoke with nephrology, he would like bicarb and check labs every 2 hours  Janaela Elsie in room with patient  1922 Transfer center paged  Paty Half Dr Dario Ledezma from Heme/Onc was consulted concerning abnormal labs  She would like consult placed for heme/onc for tomorrow if patient is being transferred to [facility]  Initial Sepsis Screening     Row Name 03/16/21 1916 03/16/21 1820             Is the patient's history suggestive of a new or worsening infection?  --  (!) Yes (Proceed)  -EE       Suspected source of infection  suspect infection, source unknown  -CD  acute abdominal infection  -EE       Are two or more of the following signs & symptoms of infection both present and new to the patient? (!) Yes (Proceed)  -CD  (!) Yes (Proceed)  -EE       Indicate SIRS criteria  Tachycardia > 90 bpm;Altered mental status;Leukocytosis (WBC > 15032 IJL); Tachypnea > 20 resp per min  -CD  Altered mental status;Leukocytosis (WBC > 56776 IJL); Hypothermia < 36C (96 8F)  -EE       If the answer is yes to both questions, suspicion of sepsis is present  --  --       If severe sepsis is present AND tissue hypoperfusion perists in the hour after fluid resuscitation or lactate > 4, the patient meets criteria for SEPTIC SHOCK  --  --       Are any of the following organ dysfunction criteria present within 6 hours of suspected infection and SIRS criteria that are NOT considered to be chronic conditions? (!) Yes  -CD  (!) Yes  -EE       Organ dysfunction  Creatinine > 2 0 mg/dL; Lactate > 2 0 mmol/L;Lactate >/equal 4 0 mmol/L  -CD  Lactate >/equal 4 0 mmol/L  -EE       Date of presentation of severe sepsis  03/16/21  -CD  03/16/21  -EE       Time of presentation of severe sepsis  1821  -CD  1821  -EE       Tissue hypoperfusion persists in the hour after crystalloid fluid administration, evidenced, by either:  --  --       Was hypotension present within one hour of the conclusion of crystalloid fluid administration?   No  -CD  --       Date of presentation of septic shock  --  --       Time of presentation of septic shock  --  --         User Key  (r) = Recorded By, (t) = Taken By, (c) = Cosigned By    [address] Name Provider Sasha Osorio PA-C Physician Assistant    MALA Valderrama MD Physician           Default Flowsheet Data (last 720 hours)      Sepsis Reassess     Row Name 03/16/21 1919                   Repeat Volume Status and Tissue Perfusion Assessment Performed    Repeat Volume Status and Tissue Perfusion Assessment Performed  --           Volume Status and Tissue Perfusion Post Fluid Resuscitation * Must Document All *    Vital Signs Reviewed (HR, RR, BP, T)  Yes  -CD        Shock Index Reviewed  --        Arterial Oxygen Saturation Reviewed (POx, SaO2 or SpO2)  --        Cardio  Regular rate and rhythm  -CD        Pulmonary  (!) Tachypnea  -CD        Capillary Refill  Brisk  -CD        Peripheral Pulses  Radial  -CD        Peripheral Pulse  +1  -CD        Skin  Cool;Dry  -CD        Urine output assessed  Other (comment) slightly increased  -CD           *OR*   Intensive Monitoring- Must Document One of the Following Four *:    Vital Signs Reviewed  --        * Central Venous Pressure (CVP or RAP)  --        * Central Venous Oxygen (SVO2, ScvO2 or Oxygen saturation via central catheter)  --        * Bedside Cardiovascular US in IVC diameter and % collapse  --        * Passive Leg Raise OR Crystalloid Challenge  --          User Key  (r) = Recorded By, (t) = Taken By, (c) = Cosigned By    Initials Name Provider Type    CD Alex Fuentes PA-C Physician Assistant        SBIRT 20yo+      Most Recent Value   SBIRT (25 yo +)   In order to provide better care to our patients, we are screening all of our patients for alcohol and drug use  Would it be okay to ask you these screening questions?   Unable to answer at this time Filed at: 03/16/2021 [address] Criteria for PE      Most Recent Value   Wells' Criteria for PE   Clinical signs and symptoms of DVT  3 Filed at: 03/16/2021 1744   PE is primary diagnosis or equally likely  0 Filed at: 03/16/2021 1744   HR >100  1 5 Filed at: 03/16/2021 1744   Immobilization at least 3 days or Surgery in the previous 4 weeks  0 Filed at: 03/16/2021 1744   Previous, objectively diagnosed PE or DVT  0 Filed at: 03/16/2021 1744   Hemoptysis  0 Filed at: 03/16/2021 1744   Malignancy with treatment within 6 months or palliative  0 Filed at: 03/16/2021 1744   Wells' Criteria Total  4 5 Filed at: 03/16/2021 1744                MDM  Number of Diagnoses or Management Options  Acute encephalopathy: new and requires workup  [facility]) [facility] new and requires workup  Acute respiratory insufficiency: new and requires workup  CHF (congestive heart failure) ([facility] ): new and requires workup  Hyperkalemia: new and requires workup  Hypoglycemia: new and requires workup  Severe dehydration: new and requires workup  Diagnosis management comments: Patient with AMS, abdominal distention, possible fall, will order labs, CT, chest abd and pelvis to r/o trauma, PE, bowel obstruction  Patient found to have ARF with hyperkalemia, nephro consulted  Patient has h/o MM on chart, heme/onc consulted  Patient will require transfer to higher level of care for CVVH  Dr Naty Cuadra took over management of patient  Please refer to her note  Amount and/or Complexity of Data Reviewed  Clinical lab tests: ordered and reviewed  Tests in the radiology section of CPT®: ordered and reviewed  Discussion of test results with the performing providers: yes  Obtain history from someone other than the patient: yes  Discuss the patient with other providers: yes    Patient Progress  Patient progress: stable      Disposition  Final diagnoses:   Acute encephalopathy   Acute renal failure (ARF) ([facility] )   Acute respiratory insufficiency   [facility]congest[facility] failure) [facility]   Hyperkalemia   Hypoglycemia   Severe dehydration     Time reflects when diagnosis was documented in both MDM as applicable and the Disposition within this note     Time User Action Codes Description Comment    3/16/2021  6:28 PM Kandis Fallon Add [G93 40] Acute encephalopathy     3/16/2021  6:29 PM Kandis Fallon Add [N17 9] Acute renal failure (ARF) ([facility] )     3/16/2021  6:29 PM Kandis Fallon Add [R06 89] Acute respiratory insufficiency     3/16/2021  6:29 PM Kandis Fallon Add [I50 9] CHF (congestive heart failure) ([facility] )     3/16/2021  6:29 PM Kandis Fallon Add [E87 5] Hyperkalemia     3/16/2021  6:29 PM Kandis Fallon Add [E16 2] Hypoglycemia     3/16/2021  6:30 PM Orville Noriega Add [E86 0] Severe dehydration       ED Disposition     ED Disposition Condition Date/Time Comment    Transfer to Another Facility-In Network  Tue Mar 16, 2021  7:43 PM Prachi Thompson should be transferred out to [address] [place] [address] [address]          MD Documentation      Most Recent Value   Patient Condition  The patient has been stabilized such that within reasonable medical probability, no material deterioration of the patient condition or the condition of the unborn child(ayden) is likely to result from the transfer   Benefits of Transfer  Specialized equipment and/or services available at the receiving facility (Include comment)________________________ [CVVH]   Risks of Transfer  Potential for delay in receiving treatment, Potential deterioration of medical condition, Loss of IV, Possible worsening of condition or death during transfer, Increased discomfort during transfer   Accepting Physician  Dr Carol Ann Lowe Name, Laura Simms   Sending MD Dr Manish Sexton   Provider Certification  General risk, such as traffic hazards, adverse weather conditions, rough terrain or turbulence, possible failure of equipment (including vehicle or aircraft), or consequences of actions of persons outside the control of the transport personnel, Unanticipated needs of medical equipment and personnel during transport, Risk of worsening condition, The possibility of a transport vehicle being unavailable      RN Documentation      [facility] Name, Laura Simms      Follow-up Information    None         Patient's Medications   Discharge Prescriptions    No medications on file     No discharge procedures on file      PDMP Review       Value Time User    PDMP Reviewed  Yes 3/3/2021  1:51 PM Dana Goodpasture, DPM          ED Provider  Electronically Signed by           Beryl Davis PA-C  03/16/21 2001       Beryl Davis PA-C  03/16/21 2028

## 2021-03-16 NOTE — CONSULTS
[address] Thompson 70 y o  male MRN: [id number]  Unit/Bed#: ED 09 Encounter: [id number]    ASSESSMENT and PLAN:    70 y o  male with CKD, IgG Kappa, LE ulcers and wounds, PVD, HTN, history of urinary retention requiring straight cath at home prior, chronic ITP, a fib, CHF, who was admitted to [address] [place] [address] after presenting with change in mental state  A renal consultation is requested today for assistance in the management of PALOMA on CKD  1) PALOMA on CKD III    - prior baseline creat 1 3-1 7 mg/dL  - pt has not had outpatient renal f/u    - admission creat 6 58 mg/dL  - last creat on 11/2020 was 1 46 mg/dL  - etiology likely secondary to ATN in setting of sepsis vs consideration for TLS vs less likely urinary retention vs other  Also is hypovolemic  There is question if pt was on ACEi or not looking at prior notes  - UA is with 4-10 WBC, mod bacteria, trace leuk  - pt received CT scan with contrast with distended gallbladder  There is improved b/l hydroureteronephrosis    Overall, am concerned pt has severe renal failure secondary to ATN of unclear etiology  Could have sepsis due to wounds; also noted is WBC Of 100K and need to consider hematological issue such as blast crisis/TLS/also has history of MM  With regards to urinary retention this appears to be improving compared to prior imaging but last image of abd/pel was in 7/2020 and it is unclear pt f/u after this regarding retention  Now has garrison in place  - to note, as pt is receiving bolus of IVF bedside, UOP is improving slowly    Plan:    - consent obtained for dialysis from significant other over phone given family is not at bedside and pt cannot consent  Understands risks of bleeding, infection, arrhythmias, anaphylaxis, death     - will likely need CVVHD tonight but needs to be volume expanded and stabilized first   - would give amp bicarb now while awaiting bicarb gtt  - change fluids to bicarb drip with isotonic bicarb bolus and then standing IVF  - check LDH, uric acid  - check phos  - trend CMP, mag, phos, lactic acid q 2 hours for first two checks  - agree with insulin, D50, calcium gluconate  - cannot have kayex due to mental state  - maintain on tele  - agree with transfer to B  - consider treatment of hyperuricemia if elevated  - need Hematology input regarding WBC given history of MM that has not been treated  - continue garrison catheter  - antibiotics per primary team  - dose vancomycin by level for now  - patient is critically ill and I have reviewed this with significant other over phone  - reviewed case in person with ER Attending and ICU AP     2) History of urinary retention    - now with garrison    3) Chronic ITP    - per notes    4) sepsis ? 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  - wife does not recall pt receiving treatment for this  - consult Hematology    6) MDS history    - also with rare blasts on prior  - now with WBC 100K    7) Distended gallbladder - per Primary team    8) To note INR 2 65 - per Primary team    9) electrolytes    - hyperkalemia - in setting of PALOMA, acidosis  Less likely pseudohyperkalemia given istat K is also elevated  - hypermag - due to PALOMA  - check phos  - calcium being repleted   Monitor with bicarb infusion closely    10) acidosis - metabolic primarily    - start bicarb infusion as above  - lactic acid 9 6  - likely may require CRRT tonight    11) transaminitis - per Primary team      HISTORY OF PRESENT ILLNESS:  Requesting Physician: Jacey Valencia MD  Reason for Consult: PALOMA on CKD    Sandra Mayfield is a 70 y o  male with CKD, IgG Kappa, LE ulcers and wounds, PVD, HTN, history of urinary retention requiring straight cath at home prior, chronic ITP, a fib, CHF, who was admitted to [address] after presenting with change in mental state  A renal consultation is requested today for assistance in the management of PALOMA on CKD  Yesterday, per wife, pt was sitting on the floor and could not get off the floor  Yesterday pt declined coming to the hospital  Today, was worsening so EMS was called again  Pt wife states that pt did not have fevers, chills, nausea, vomiting, diarrhea and only started to worsen yesterday for the first time and today was worsening even further clinically  But is not able to elaborate in further detail  Pt was recently given prednisone due to facial swelling and rash  Admission 11/2020 for LE foot wounds  Did have PALOMA  ACEi held  Had debridement of wounds  Required antibiotics  Also had pancytopenia  BM biopsy completed which showed multiple myeloma     PAST MEDICAL HISTORY:  Past Medical History:   Diagnosis Date    Anemia     Atrial fibrillation (HCC)     Benign prostatic hyperplasia without urinary obstruction     Congestive heart failure with left ventricular diastolic dysfunction, chronic (HCC)     History of ITP     Hypertension     Lumbosacral disc herniation     Peripheral vascular disease of lower extremity (HCC)     Renal disorder     Subdural hematoma (HCC)        PAST SURGICAL HISTORY:  Past Surgical History:   Procedure Laterality Date    BACK SURGERY      AUSTYN HOLE FOR SUBDURAL HEMATOMA      CARDIAC PACEMAKER PLACEMENT      CLAVICLE SURGERY Left 2011    HERNIA REPAIR      IR AORTAGRAM WITH RUN-OFF  7/20/2020    IR BIOPSY BONE MARROW  7/30/2020    LAPAROTOMY N/A 7/21/2020    Procedure: LAPAROTOMY EXPLORATORY, excision of infected mesh and repair of ventral heria;   Surgeon: Beba Martinez MD;  Location:  MAIN OR;  Service: General    ID EXPLORATORY OF ABDOMEN N/A 8/3/2020 Procedure: REPAIR OF ABDOMINAL WALL WOUND/DEHISSENCE with mesh, placement drain, placement vac;  Surgeon: Josh Zepeda MD;  Location: [facility] MAIN OR;  Service: General    PROSTATE SURGERY      VENTRAL HERNIA REPAIR N/A 7/21/2020    Procedure: REPAIR HERNIA VENTRAL - EXCSION OF INFECTED MESH;  Surgeon: Josh Zepeda MD;  Location: [facility]IN [facility];  Service: General    WOUND DEBRIDEMENT Right 7/24/2020    Procedure: DEBRIDEMENT LOWER EXTREMITY ([address] OUT);   Surgeon: Boo Roche DPM;  Location: [facility];  Service: Podiatry    WOUND DEBRIDEMENT Right 11/19/2020    Procedure: DEBRIDEMENT WOUND [facility] OUT) right lower extremity;  Surgeon: Rebecca See DPM;  Location: [facility];  Service: Podiatry       ALLERGIES:  Allergies   Allergen Reactions    Blood-Group Specific Substance      Needed benadryl with blood transfusion because of reaction       SOCIAL HISTORY:  Social History     Substance and Sexual Activity   Alcohol Use Not Currently    Alcohol/week: 0 0 standard drinks    Frequency: Never    Binge frequency: Never    Comment: Denied use     Social History     Substance and Sexual Activity   Drug Use Never    Comment: Denied use     Social History     Tobacco Use   Smoking Status Never Smoker   Smokeless Tobacco Never Used       FAMILY HISTORY:  Family History   Problem Relation Age of Onset    Heart disease Mother     Heart disease Father     Hypertension Father     Diabetes Other     Depression Other     Cancer Sister     Depression Cousin        MEDICATIONS:    Current Facility-Administered Medications:     calcium gluconate 2 g in sodium chloride 0 9% 100 mL (premix), 2 g, Intravenous, Once, Latisha Valle MD, Last Rate: 100 mL/hr at 03/16/21 1854, 2 g at 03/16/21 1854    sodium bicarbonate 150 mEq in dextrose 5 % 1,000 mL infusion, 500 mL/hr, Intravenous, Continuous, Latisha Valle MD    sodium bicarbonate 8 4 % injection 50 mEq, 50 mEq, Intravenous, Once, Latisha Valle MD    Current Outpatient Medications:     acetaminophen (TYLENOL) 325 mg tablet, Take 2 tablets (650 mg total) by mouth every 6 (six) hours as needed for mild pain, Disp: 30 tablet, Rfl: 0    Ascorbic Acid (VITAMIN C PO), Take by mouth, Disp: , Rfl:     atorvastatin (LIPITOR) 40 mg tablet, Take 1 tablet (40 mg total) by mouth daily with dinner, Disp: 90 tablet, Rfl: 1    B Complex Vitamins (VITAMIN B COMPLEX PO), Take by mouth as needed , Disp: , Rfl:     clotrimazole-betamethasone (LOTRISONE) 1-0 05 % cream, Apply topically 2 (two) times a day (Patient taking differently: Apply topically as needed ), Disp: 30 g, Rfl: 0    diazepam (VALIUM) 5 mg tablet, Take 5 mg by mouth every 12 (twelve) hours as needed for anxiety, Disp: , Rfl:     famotidine (PEPCID) 20 mg tablet, Take 1 tablet (20 mg total) by mouth 2 (two) times a day, Disp: 14 tablet, Rfl: 0    Ferrous Gluconate-C-Folic Acid (IRON-C PO), Take by mouth as needed , Disp: , Rfl:     folic acid (FOLVITE) 1 mg tablet, Take 1 mg by mouth daily, Disp: , Rfl:     lisinopril (ZESTRIL) 5 mg tablet, Take 1 tablet (5 mg total) by mouth daily (Patient taking differently: Take 2 5 mg by mouth daily ), Disp: 30 tablet, Rfl: 3    metoprolol succinate (TOPROL-XL) 25 mg 24 hr tablet, Take 1 tablet (25 mg total) by mouth daily, Disp: 90 tablet, Rfl: 1    Multiple Vitamin (MULTI-DAY VITAMINS) TABS, Take by mouth, Disp: , Rfl:     oxyCODONE-acetaminophen (PERCOCET) 5-325 mg per tablet, Take 1 tablet by mouth every 12 (twelve) hours as needed for moderate painMax Daily Amount: 2 tablets, Disp: 30 tablet, Rfl: 0    pantoprazole (PROTONIX) 40 mg tablet, Take 1 tablet (40 mg total) by mouth daily in the early morning, Disp: 90 tablet, Rfl: 1    predniSONE 10 mg tablet, Take 1 tablet (10 mg total) by mouth daily 6 tabs 3/9,3/10, 5 tabs 3/11,3/12, 4 tabs 3/13, 3 tabs 3/14, 2 tabs 3/15, 1 tab 3/16, Disp: 32 tablet, Rfl: 0    tamsulosin (FLOMAX) 0 4 mg, Take 1 capsule (0 4 mg total) by mouth daily with dinner, Disp: 90 capsule, Rfl: 1    REVIEW OF SYSTEMS:    All the systems were reviewed and were negative except as documented on the HPI  PHYSICAL EXAM:  Current Weight: Weight - Scale: 69 9 kg (154 lb)  First Weight: Weight - Scale: 69 9 kg (154 lb)  Vitals:    03/16/21 1800 03/16/21 1810 03/16/21 1818 03/16/21 1830   BP: 121/81  104/61 137/73   BP Location:   Right arm    Pulse: (!) 38  68 74   Resp: (!) 35  (!) 35 (!) 58   Temp: (!) 95 2 °F (35 1 °C) (!) 95 2 °F (35 1 °C) (!) 95 2 °F (35 1 °C) (!) 95 °F (35 °C)   TempSrc:  Bladder  Bladder   SpO2: (!) 68%  94% 96%   Weight:           Intake/Output Summary (Last 24 hours) at 3/16/2021 1920  Last data filed at 3/16/2021 1912  Gross per 24 hour   Intake 1175 ml   Output --   Net 1175 ml     Physical Exam    General: ill appearing  Skin: no rash  Eyes: anicteric sclera  ENT: dry mucous membrane  Neck: supple  Chest: diminished air intake  No wheeze  No ronchii  CVS: s1s2, no murmur, no gallop, no rub  Abdomen: soft, slightly distended   Midline abd scar  Extremities: no edema LE b/l; LE wrapped; right knee with areas of petechial appearing rash  : +garrison  Neuro: AAOX1  Psych: cannot assess    Invasive Devices:      Lab Results:   Results from last 7 days   Lab Units 03/16/21  1849 03/16/21  1741   WBC Thousand/uL  --  102 97*   HEMOGLOBIN g/dL  --  11 3*   I STAT HEMOGLOBIN g/dl 9 2*  --    HEMATOCRIT %  --  37 4   HEMATOCRIT, ISTAT % 27*  --    PLATELETS Thousands/uL  --  180   POTASSIUM mmol/L  --  8 2*   CHLORIDE mmol/L  --  101   CO2 mmol/L  --  6*   CO2, I-STAT mmol/L 5*  --    BUN mg/dL  --  117*   CREATININE mg/dL  --  6 58*   CALCIUM mg/dL  --  8 0*   MAGNESIUM mg/dL  --  3 3*   ALK PHOS U/L  --  232*   ALT U/L  --  128*   AST U/L  --  208*   GLUCOSE, ISTAT mg/dl 182*  --

## 2021-03-17 PROBLEM — N40.1 BENIGN PROSTATIC HYPERPLASIA WITH URINARY RETENTION: Chronic | Status: ACTIVE | Noted: 2020-01-01

## 2021-03-17 PROBLEM — I48.19 OTHER PERSISTENT ATRIAL FIBRILLATION (HCC): Chronic | Status: ACTIVE | Noted: 2017-05-02

## 2021-03-17 PROBLEM — M62.82 NON-TRAUMATIC RHABDOMYOLYSIS: Status: ACTIVE | Noted: 2021-01-01

## 2021-03-17 PROBLEM — C90.00 MULTIPLE MYELOMA NOT HAVING ACHIEVED REMISSION (HCC): Chronic | Status: ACTIVE | Noted: 2020-01-01

## 2021-03-17 PROBLEM — R74.01 ELEVATED TRANSAMINASE LEVEL: Status: ACTIVE | Noted: 2021-01-01

## 2021-03-17 PROBLEM — I70.232 ATHEROSCLEROSIS OF NATIVE ARTERY OF RIGHT LOWER EXTREMITY WITH ULCERATION OF CALF (HCC): Chronic | Status: ACTIVE | Noted: 2020-01-01

## 2021-03-17 PROBLEM — R33.8 BENIGN PROSTATIC HYPERPLASIA WITH URINARY RETENTION: Chronic | Status: ACTIVE | Noted: 2020-01-01

## 2021-03-17 PROBLEM — E43 SEVERE PROTEIN-CALORIE MALNUTRITION (HCC): Chronic | Status: ACTIVE | Noted: 2020-01-01

## 2021-03-17 PROBLEM — I70.233 ATHEROSCLEROSIS OF NATIVE ARTERY OF RIGHT LOWER EXTREMITY WITH ULCERATION OF ANKLE (HCC): Chronic | Status: ACTIVE | Noted: 2021-01-01

## 2021-03-17 PROBLEM — E87.5 HYPERKALEMIA: Status: ACTIVE | Noted: 2021-01-01

## 2021-03-17 PROBLEM — G92.8 TOXIC METABOLIC ENCEPHALOPATHY: Status: ACTIVE | Noted: 2021-01-01

## 2021-03-17 NOTE — ASSESSMENT & PLAN NOTE
· BPs borderline SBP 90s  · Hold home lisinopril  · Received volume resuscitation to complete 30 mL/kg per sepsis measures; however, suspect he is more volume depleted and will require additional IVF  · May need to start vasopressors to assist CVVHD

## 2021-03-17 NOTE — ASSESSMENT & PLAN NOTE
· Likely due to critical illness and uremia  · Follow serial neuro checks  · CAM-ICU bid  · Delirium precautions, sleep-wake cycle hygiene  · Currently follows commands, DEY well, and is oriented to person and time

## 2021-03-17 NOTE — ASSESSMENT & PLAN NOTE
· Last echo 7/2020: EF 50%, no RWMA, RV/LA/RA mild dilation    No significant valve stenosis or incomptency  · BNP > 61109  · Hold home metoprolol and lisinopril for now   · Obtain new echo   · Dilated CMP and volume depletion likely cause of poor perfusion  · Obtain VBG for SvO2 to assess for component of cardiogenic shock

## 2021-03-17 NOTE — ASSESSMENT & PLAN NOTE
· Was started on High flow nasal cannula prior to transfer     · CXR without infilrate, consolidation or PTX  · CTA chest without evidence of PE  · BNP elevated 58640  · Continue HFNC for now, titrate FiO2 for SpO2 > 90%  · Obtain echo need to rule out left to right shunt

## 2021-03-17 NOTE — CONSULTS
Please see initial consult note    This is late note entry  Spoke with ICU team at [facility] [place]  Reviewed plan from renal standpoint  Refractory hyperkalemia, met acidosis  Will plan to initiate CRRT once line is placed    To note, has leukocytosis, but hyperkalemia appears to be true issue   Can check istat for potassium with BMP and/or non heparinized tubes for potassium draw

## 2021-03-17 NOTE — ASSESSMENT & PLAN NOTE
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

## 2021-03-17 NOTE — H&P
[address] [facility] 1945, 70 y o  male MRN: [id number]  Unit/Bed#: ICU 11 Encounter: [id number]  Primary Care Provider: Yocasta Fairchild DO   Date and time admitted to hospital: 3/16/2021  9:34 PM    * Septic shock [facility])  Assessment & Plan  · Patient presented to [address] [place] [address] with leukocytosis > 100K, tachypnea, and elevated lactic acid 10 8  · He received 3100 mL crystalloid IVF, vancomycin and Zosyn prior to transfer  · Received volume resuscitation to complete 30 mL/kg per sepsis measures; however, suspect he is more volume depleted and will require additional IVF  · May need to start vasopressors to assist CVVHD  · Source currently unclear  · UA at [facility] shows WBC and bacteria  · CMP shows elevated LFTs and TBili  · Obtain stat RUQ US to r/o acute cholecystitis  · He also has multiple lower extremity wounds of the RLE though drainage is serous so lower suspicion for soft tissue source  · Send repeat blood and urine cultures now  · Start broad spectrum antibiotics vancomycin, cefepime, flagyl with renal dosing  · Continue IVF resuscitation with NaHCO3 150 mEq/L D5W at 250 mL/hr for severe metabolic acidosis/PALOMA  · Repeat Lactic acid now  · Send MRSA culture  · If negative consider discontinuing vancomycin  · Send procalcitonin   · Repeat and trend lactic acid   · Cannot rule out component of cardiogenic shock will check VBG and echo      Multiple myeloma not having achieved remission [facility])  Assessment & Plan  · Initially diagnosed 7/2020 during admission for fall when he developed pancytopenia  Bone marrow biopsy during that admission revealed multiple myeloma with hypercellular bone marrow with atypia and rare blasts (3/4%) consistent with MDS  · In the ER this evening he was found to have WBC 103K      · Suspect Tumor lysis syndrome given elevated uric acid 16 4, and hyperphosphatemia 13 9 vs acute blast crisis vs acute leukemia  · Give rasburicase 6 mg now  · Consult hematology/oncology  · Send peripheral smear now  · Will need bone marrow biopsy, consult IR    Hyperkalemia with EKG changes  Assessment & Plan  · Likely due to PALOMA as a result of sepsis and likely tumor lysis syndrome  · Received temporizing measures prior to transfer from [facility]  · Repeat BMP now  · Increased UO noted in garrison  · Nephrology consult, IVF as above  · On arrival patient with very wide QRS  · Given Calcium chloride 1 gm on arrival  · Will likely require CRRT this evening    Acute renal failure superimposed on stage 3 chronic kidney disease ([facility] [address] )  Assessment & Plan  Lab Results   Component Value Date    EGFR 8 03/16/2021    EGFR 8 03/16/2021    EGFR 47 11/27/2020    CREATININE 6 15 (H) 03/16/2021    CREATININE 6 58 (H) 03/16/2021    CREATININE 1 46 (H) 11/27/2020   · Baseline creatinine 1 3-1 7 on chart review  · Likely ATN multifocal due to septic shock and suspected tumor lysis syndrome   · Presented with hyperkalemia 8 2, down to 7 2, and Creatinine 6 58 down to 6 15 following initial fluid resuscitation  · Repeat BMP now  · Consult nephrology, appreciate recommendations  · Will likely need CVVH this evening  · Will hold home lisinopril and other nephrotoxins, avoid hypotension  · Continue sodium bicarbonate infusion for metabolic acidosis/PALOMA    Non-traumatic rhabdomyolysis  Assessment & Plan  · Likely due to PALOMA and likely tumor lysis syndrome   · Continue volume resuscitation and trend CK    Acute respiratory failure with hypoxia ([facility] )  Assessment & Plan  · Was started on High flow nasal cannula prior to transfer  · CXR without infilrate, consolidation or PTX  · CTA chest without evidence of PE  · BNP elevated 49090  · Continue HFNC for now, titrate FiO2 for SpO2 > 90%  · Obtain echo need to rule out left to right shunt    Elevated transaminase level  Assessment & Plan  · Likely due to volume depletion and septic shock    · Volume resuscitation as above · Holding home statin  · Trend LFTs  · CMP in am     Acute metabolic encephalopathy  Assessment & Plan  · Likely due to critical illness and uremia  · Follow serial neuro checks  · CAM-ICU bid  · Delirium precautions, sleep-wake cycle hygiene  · Currently follows chantal, DEY well, and is oriented to person and time  Dilated cardiomyopathy ([facility] [address] )  Assessment & Plan  · Last echo 7/2020: EF 50%, no RWMA, RV/LA/RA mild dilation  No significant valve stenosis or incomptency  · BNP > 43993  · Hold home metoprolol and lisinopril for now   · Obtain new echo   · Dilated CMP and volume depletion likely cause of poor perfusion  · Obtain VBG for SvO2 to assess for component of cardiogenic shock    Other persistent atrial fibrillation ([facility] )  Assessment & Plan  · Rate currently controlled    · Holding home metoprolol in the setting of septic shock  · Not on anticoagulation at home due to history of pancytopenia  · Start DVT ppx with heparin SC and SCDs  · [id number] 4: Holding systemic AC at this time due to thrombocytopenia      Essential hypertension  Assessment & Plan  · BPs borderline SBP 90s  · Hold home lisinopril  · Received volume resuscitation to complete 30 mL/kg per sepsis measures; however, suspect he is more volume depleted and will require additional IVF  · May need to start vasopressors to assist CVVHD    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 ([facility] )  Assessment & Plan  · Follows with wound care as outpatient  · Leave open to air for now  · Consult wound care   · Continue neurovascular checks with doppler signals  · May need to consider vascular surgery consult     Atherosclerosis of native artery of right lower extremity with ulceration of calf ([facility] )  Assessment & Plan  · As above  · Continue clotrimazole-betamethasone    -------------------------------------------------------------------------------------------------------------  Chief Complaint:  Reported altered mental status, increased confusion and lethargy    History of Present Illness   HX and PE limited by:  Acute metabolic encephalopathy  Jimenez Queen is a 70 y o  male who presents with past medical history significant for hypertension, atrial fibrillation, dilated cardiomyopathy, multiple myeloma, atherosclerotic disease of lower extremities, and CKD 3  Today he presents to the emergency department at [facility] for evaluation of worsening mental status, lethargy  Reportedly patient's significant other, Ofelia Boogie, states that he has been not feeling well and believes he has been having how ill he feels  Significant other also noted that he slid from couch yesterday and needed assistance getting him back into a seated position  Initial labs revealed severe metabolic acidosis with arterial pH 6 9, serum bicarb 5, lactic acidosis 9 6, acute kidney injury with creatinine 6 58, hyperkalemia 8 2, with EKG changes  Chest x-ray and CTA of chest negative  UA with nitrites and leuks  He was identified as severe sepsis given volume resuscitation, blood cultures, urine culture and IV antibiotics were started  Given the need for likely CRRT therapy patient was transferred to [address] [place] [address] and admitted to the ICU under the critical care medicine service  History obtained from chart review and unobtainable from patient due to mental status  Update to patient's significant other was provided via telephone by Dr John Fernandez   -------------------------------------------------------------------------------------------------------------  Dispo: Admit to Critical Care     Code Status: Level 1 - Full Code  --------------------------------------------------------------------------------------------------------------  Review of Systems   Constitutional: Positive for fatigue  Negative for chills and fever  HENT: Negative  Eyes: Negative  Respiratory: Positive for shortness of breath   Negative for cough  Cardiovascular: Positive for leg swelling  Negative for chest pain and palpitations  Gastrointestinal: Positive for abdominal distention and abdominal pain  Negative for diarrhea, nausea and vomiting  Endocrine: Negative  Genitourinary: Positive for decreased urine volume  Negative for difficulty urinating, frequency, hematuria and urgency  Musculoskeletal: Negative  Skin: Positive for pallor, rash and wound  Allergic/Immunologic: Negative  Neurological: Negative for dizziness, syncope, facial asymmetry and light-headedness  Hematological: Bruises/bleeds easily  Psychiatric/Behavioral: Negative  A 12-point, complete review of systems was reviewed and negative except as stated above     Physical Exam  Constitutional:       General: He is in acute distress  Appearance: He is cachectic  He is ill-appearing and toxic-appearing  He is not diaphoretic  Interventions: Nasal cannula in place  HENT:      Head: Normocephalic and atraumatic  Right Ear: External ear normal       Left Ear: External ear normal       Nose: Nose normal  No congestion or rhinorrhea  Mouth/Throat:      Mouth: Mucous membranes are dry  Pharynx: Oropharynx is clear  No oropharyngeal exudate or posterior oropharyngeal erythema  Eyes:      General: No scleral icterus  Extraocular Movements: Extraocular movements intact  Conjunctiva/sclera: Conjunctivae normal       Pupils: Pupils are equal, round, and reactive to light  Neck:      Musculoskeletal: Normal range of motion and neck supple  Vascular: No carotid bruit  Cardiovascular:      Rate and Rhythm: Normal rate  Rhythm irregularly irregular  Pulses:           Radial pulses are 1+ on the right side and 1+ on the left side  Dorsalis pedis pulses are detected w/ Doppler on the right side and 1+ on the left side  Heart sounds: S2 normal  No murmur  No friction rub  No gallop      Pulmonary:      Effort: Tachypnea present  No respiratory distress  Breath sounds: Decreased breath sounds present  No wheezing, rhonchi or rales  Comments: Kussmaul respirations  Abdominal:      General: Bowel sounds are decreased  There is distension  Palpations: There is no fluid wave or mass  Tenderness: There is abdominal tenderness  There is no right CVA tenderness, left CVA tenderness or guarding  Genitourinary:     Comments: Indwelling urinary catheter present, dark yellow urine noted in drainage tubing  Musculoskeletal:      Right lower le+ Edema present  Left lower le+ Edema present  Lymphadenopathy:      Cervical: No cervical adenopathy  Skin:     General: Skin is warm and dry  Capillary Refill: Capillary refill takes 2 to 3 seconds  Coloration: Skin is cyanotic (Finger tips of left hand) and pale  Findings: Wound present  Neurological:      Mental Status: He is alert  He is disoriented  GCS: GCS eye subscore is 4  GCS verbal subscore is 4  GCS motor subscore is 6  Cranial Nerves: Cranial nerves are intact  Sensory: Sensory deficit (Right lower extremity, related to atherosclerotic disease) present  Psychiatric:         Attention and Perception: He is inattentive  Mood and Affect: Mood is not anxious  Affect is labile  Behavior: Behavior is not agitated  Behavior is cooperative  Cognition and Memory: Cognition is impaired           Judgment: Judgment is impulsive        --------------------------------------------------------------------------------------------------------------  Vitals:   Vitals:    21 2341 21 0000 21 0041 21 0051   BP: 90/51 (!) 88/50 (!) 84/47 104/54   BP Location:       Pulse: 80 81 80 80   Resp: (!) 29 19 (!) 26 21   Temp: 98 4 °F (36 9 °C) 98 2 °F (36 8 °C) 98 2 °F (36 8 °C) 98 1 °F (36 7 °C)   TempSrc:       SpO2: 100% 100% 100% 100%   Weight:       Height:         Temp  Min: 95 °F (35 °C)  Max: 99 3 °F (37 4 °C)  IBW: 73 kg  Height: 5' 10" (177 8 cm)  Body mass index is 20 81 kg/m²  Laboratory and Diagnostics:  Results from last 7 days   Lab Units 03/16/21 2201 03/16/21 1849 03/16/21  1741   WBC Thousand/uL 72 39*  --  102 97*   HEMOGLOBIN g/dL 8 5*  --  11 3*   I STAT HEMOGLOBIN g/dl  --  9 2*  --    HEMATOCRIT % 27 7*  --  37 4   HEMATOCRIT, ISTAT %  --  27*  --    PLATELETS Thousands/uL 116*  --  180   BANDS PCT % 4  --   --    MONO PCT % 1*  --  6     Results from last 7 days   Lab Units 03/16/21 2201 03/1945 03/16/21 1849 03/16/21  1741   SODIUM mmol/L 135* 137  --  136   POTASSIUM mmol/L 7 4* 7 7*  --  8 2*   CHLORIDE mmol/L 95* 101  --  101   CO2 mmol/L 10* 8*  --  6*   CO2, I-STAT mmol/L  --   --  5*  --    ANION GAP mmol/L 30* 28*  --  29*   BUN mg/dL 118* 116*  --  117*   CREATININE mg/dL 6 33* 6 15*  --  6 58*   CALCIUM mg/dL 7 1* 7 2*  --  8 0*   GLUCOSE RANDOM mg/dL 148* 110  --  54*   ALT U/L  --   --   --  128*   AST U/L  --   --   --  208*   ALK PHOS U/L  --   --   --  232*   ALBUMIN g/dL  --   --   --  2 5*   TOTAL BILIRUBIN mg/dL  --   --   --  1 70*     Results from last 7 days   Lab Units 03/16/21 2201 03/16/21  1741   MAGNESIUM mg/dL 2 8* 3 3*   PHOSPHORUS mg/dL  --  13 9*      Results from last 7 days   Lab Units 03/16/21  1741   INR  2 65*   PTT seconds 42*          Results from last 7 days   Lab Units 03/16/21 2201 03/1945 03/16/21  1741   LACTIC ACID mmol/L 10 8* 10 8* 9 6*     ABG:    VBG:  Results from last 7 days   Lab Units 03/16/21  2220   PH SIDDHARTH  7 190*   PCO2 SIDDHARTH mm Hg 23 1*   PO2 SIDDHARTH mm Hg 61 8*   HCO3 SIDDHARTH mmol/L 8 6*   BASE EXC SIDDHARTH mmol/L -17 9           Micro:        EKG:  AFib with prolonged QRS  Imaging: I have personally reviewed pertinent reports     and I have personally reviewed pertinent films in PACS      Historical Information   Past Medical History:   Diagnosis Date    Anemia     Atrial fibrillation (HCC)     Benign prostatic hyperplasia without urinary obstruction     Congestive heart failure with left ventricular diastolic dysfunction, chronic (HCC)     History of ITP     Hypertension     Lumbosacral disc herniation     Peripheral vascular disease of lower extremity (HCC)     Renal disorder     Subdural hematoma (HCC)      Past Surgical History:   Procedure Laterality Date    BACK SURGERY      AUSTYN HOLE FOR SUBDURAL HEMATOMA      CARDIAC PACEMAKER PLACEMENT      CLAVICLE SURGERY Left 2011    HERNIA REPAIR      IR AORTAGRAM WITH RUN-OFF  7/20/2020    IR BIOPSY BONE MARROW  7/30/2020    LAPAROTOMY N/A 7/21/2020    Procedure: LAPAROTOMY EXPLORATORY, excision of infected mesh and repair of ventral heria; Surgeon: Yessy Spencer MD;  Location: [facility];  Service: General    [place] EXPLORATORY OF ABDOMEN N/A 8/3/2020    Procedure: REPAIR OF ABDOMINAL WALL WOUND/DEHISSENCE with mesh, placement drain, placement vac;  Surgeon: Yessy Spencer MD;  Location: [facility];  Service: General    PROSTATE SURGERY      VENTRAL HERNIA REPAIR N/A 7/21/2020    Procedure: REPAIR HERNIA VENTRAL - EXCSION OF INFECTED MESH;  Surgeon: Yessy Spencer MD;  Location: [facility];  Service: General    WOUND DEBRIDEMENT Right 7/24/2020    Procedure: DEBRIDEMENT LOWER EXTREMITY ([address] OUT);   Surgeon: Nita Montalvo DPM;  Location: [facility];  Service: Podiatry    WOUND DEBRIDEMENT Right 11/19/2020    Procedure: DEBRIDEMENT WOUND ([address]) right lower extremity;  Surgeon: Dorothy Xie DPM;  Location: [facility];  Service: Podiatry     Social History   Social History     Substance and Sexual Activity   Alcohol Use Not Currently    Alcohol/week: 0 0 standard drinks    Frequency: Never    Binge frequency: Never    Comment: Denied use     Social History     Substance and Sexual Activity   Drug Use Never    Comment: Denied use     Social History     Tobacco Use   Smoking Status Never Smoker   Smokeless Tobacco Never Used Exercise History:  Needs assistance  Family History:   Family History   Problem Relation Age of Onset    Heart disease Mother     Heart disease Father     Hypertension Father     Diabetes Other     Depression Other     Cancer Sister     Depression Cousin      I have reviewed this patient's family history and commented on sigificant items within the HPI      Medications:  Current Facility-Administered Medications   Medication Dose Route Frequency    cefepime (MAXIPIME) 1,000 mg in dextrose 5 % 50 mL IVPB  1,000 mg Intravenous Q24H    clotrimazole-betamethasone (LOTRISONE) 1-0 05 % cream   Topical BID    folic acid (FOLVITE) tablet 1 mg  1 mg Oral Daily    heparin (porcine) subcutaneous injection 5,000 Units  5,000 Units Subcutaneous Q8H [facility]    metroNIDAZOLE (FLAGYL) IVPB (premix) 500 mg 100 mL  500 mg Intravenous Q8H    multi-electrolyte (ISOLYTE-S PH 7 4) bolus 1,000 mL  1,000 mL Intravenous Once    NOREPINEPHRINE 4 MG  ML NSS (CMPD ORDER) infusion  1-30 mcg/min Intravenous Titrated    NxStage K 2/Ca 3 dialysis solution (RFP-400) 20,000 mL  20,000 mL Dialysis Continuous    pantoprazole (PROTONIX) injection 40 mg  40 mg Intravenous Q24H ALANNAH    rasburicase (ELITEK) 1 5 mg in sodium chloride 0 9 % 51 mL IVPB  1 5 mg Intravenous Once    sodium bicarbonate 150 mEq in dextrose 5 % 1,000 mL infusion  250 mL/hr Intravenous Continuous    vancomycin (VANCOCIN) IVPB (premix in dextrose) 1,000 mg 200 mL  15 mg/kg Intravenous Daily PRN     Home medications:  Prior to Admission Medications   Prescriptions Last Dose Informant Patient Reported? Taking?    Ascorbic Acid (VITAMIN C PO)  Self Yes No   Sig: Take by mouth   B Complex Vitamins (VITAMIN B COMPLEX PO)  Self Yes No   Sig: Take by mouth as needed    Ferrous Gluconate-C-Folic Acid (IRON-C PO)  Self Yes No   Sig: Take by mouth as needed    Multiple Vitamin (MULTI-DAY VITAMINS) TABS  Self Yes No   Sig: Take by mouth   acetaminophen (TYLENOL) 325 mg tablet  Self No No   Sig: Take 2 tablets (650 mg total) by mouth every 6 (six) hours as needed for mild pain   atorvastatin (LIPITOR) 40 mg tablet  Self No No   Sig: Take 1 tablet (40 mg total) by mouth daily with dinner   clotrimazole-betamethasone (LOTRISONE) 1-0 05 % cream  Self No No   Sig: Apply topically 2 (two) times a day   Patient taking differently: Apply topically as needed    diazepam (VALIUM) 5 mg tablet  Self Yes No   Sig: Take 5 mg by mouth every 12 (twelve) hours as needed for anxiety   famotidine (PEPCID) 20 mg tablet   No No   Sig: Take 1 tablet (20 mg total) by mouth 2 (two) times a day   folic acid (FOLVITE) 1 mg tablet  Self Yes No   Sig: Take 1 mg by mouth daily   lisinopril (ZESTRIL) 5 mg tablet  Self No No   Sig: Take 1 tablet (5 mg total) by mouth daily   Patient taking differently: Take 2 5 mg by mouth daily    metoprolol succinate (TOPROL-XL) 25 mg 24 hr tablet  Self No No   Sig: Take 1 tablet (25 mg total) by mouth daily   oxyCODONE-acetaminophen (PERCOCET) 5-325 mg per tablet  Self No No   Sig: Take 1 tablet by mouth every 12 (twelve) hours as needed for moderate painMax Daily Amount: 2 tablets   pantoprazole (PROTONIX) 40 mg tablet  Self No No   Sig: Take 1 tablet (40 mg total) by mouth daily in the early morning   predniSONE 10 mg tablet   No No   Sig: Take 1 tablet (10 mg total) by mouth daily 6 tabs 3/9,3/10, 5 tabs 3/11,3/12, 4 tabs 3/13, 3 tabs 3/14, 2 tabs 3/15, 1 tab 3/16   tamsulosin (FLOMAX) 0 4 mg  Self No No   Sig: Take 1 capsule (0 4 mg total) by mouth daily with dinner      Facility-Administered Medications: None     Allergies:   Allergies   Allergen Reactions    Blood-Group Specific Substance      Needed benadryl with blood transfusion because of reaction       ------------------------------------------------------------------------------------------------------------  Advance Directive and Living Will:      Power of :    POLST: ------------------------------------------------------------------------------------------------------------  Anticipated Length of Stay is > 2 midnights    Care Time Delivered:   Upon my evaluation, this patient had a high probability of imminent or life-threatening deterioration due to Septic shock, multiple myeloma with suspected tumor lysis syndrome, PALOMA on CKD 3, hyperkalemia with EKG changes, severe metabolic acidosis, acute respiratory failure with hypoxia, which required my direct attention, intervention, and personal management  I have personally provided 70 minutes (2102 to 2202) of critical care time, exclusive of procedures, teaching, family meetings, and any prior time recorded by providers other than myself  BJ Ardon        Portions of the record may have been created with voice recognition software  Occasional wrong word or "sound a like" substitutions may have occurred due to the inherent limitations of voice recognition software    Read the chart carefully and recognize, using context, where substitutions have occurred

## 2021-03-17 NOTE — CONSULTS
Consultation -Vascular Surgery  Natalie Guallpa 70 y o  male MRN: [id number]  Unit/Bed#: ICU 11 Encounter: [id number]            ASSESSMENT:  Problem List     * (Principal) Septic shock ([address] )    Peripheral vascular disease of lower extremity (HCC) (Chronic)    Essential hypertension (Chronic)    Acute respiratory failure with hypoxia (HCC)    Other persistent atrial fibrillation (HCC) (Chronic)    Dilated cardiomyopathy (HCC) (Chronic)    Chronic combined systolic and diastolic heart failure (HCC) (Chronic)    Wt Readings from Last 3 Encounters:   03/16/21 65 8 kg (145 lb 1 oz)   03/16/21 69 9 kg (154 lb)   03/09/21 69 9 kg (154 lb 3 2 oz)                 S/P AV (atrioventricular) mary anne ablation (Chronic)    Anemia, unspecified (Chronic)    Status post biventricular pacemaker (Chronic)    Cellulitis    CKD (chronic kidney disease) stage 3, GFR 30-59 ml/min (Chronic)    Lab Results   Component Value Date    EGFR 10 03/17/2021    EGFR 11 03/17/2021    EGFR 10 03/17/2021    CREATININE 5 10 (H) 03/17/2021    CREATININE 5 00 (H) 03/17/2021    CREATININE 5 07 (H) 01/28/2524         Umbilicus discharge    Benign prostatic hyperplasia with urinary retention (Chronic)    Severe protein-calorie malnutrition (HCC) (Chronic)    Malnutrition Findings:           BMI Findings: Body mass index is 20 81 kg/m²           Open wound of umbilical region    Pancytopenia (HCC)    Leg ulcer, right, with fat layer exposed ([facility] [address] )    Open abdominal wall wound    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) (Chronic)    Pressure injury of right heel, unstageable (HCC)    Atherosclerosis of native artery of right lower extremity with rest pain (HCC)    Open wound of left lower leg    Right foot ulcer (HCC) (Chronic)    Pleural effusion on right    Acidosis    Multiple myeloma not having achieved remission (HCC) (Chronic)    Overview Signed 11/19/2020  7:58 AM by DO jeff Toscano MRSA cellulitis of right foot    Venous ulcer of ankle, right (HCC)    Other myelodysplastic syndromes (HCC)    Osteomyelitis of right foot (HCC)    Open wound of right thigh    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 (HCC) (Chronic)    Acute renal failure superimposed on stage 3 chronic kidney disease (HCC)    Lab Results   Component Value Date    EGFR 10 03/17/2021    EGFR 11 03/17/2021    EGFR 10 03/17/2021    CREATININE 5 10 (H) 03/17/2021    CREATININE 5 00 (H) 03/17/2021    CREATININE 5 07 (H) 03/17/2021         Toxic metabolic encephalopathy    Non-traumatic rhabdomyolysis    Hyperkalemia with EKG changes    Transaminitis          71 yo M PMH PVD with chronic wounds s/p RLE agram with SFA PTA (7/2020), MM, CKD3, afib, HTN  Lactic acidosis of 10 on admission, now 19  Leukocytosis of 102 POA  Intubated on increasing pressors, requiring levophed, vaso, and now epinephrine    Mottled skin, no signals present on R DP/PT/AT  Palpable pop and femoral  Palpable L DP    Plan:  - RLE likely vasoconstricted 2/2 pressors   - attempted to call significant other, Marco A, several times in addition to numerable attempts by ICU team  Discussion between several attendings including gen surg, vascular surgery, ICU, nephrology about amputation/further intervention on RLE and its futility  At this time a decision was made that any further intervention with this leg would be futile  Rest of care per ICU team      Reason for Consult / Principal Problem:    HPI: Meme Cee is a 70y o  year old male PMH PVD with chronic wounds managed by wound care, MM, CKD3, afib, HTN who presents with metabolic acidosis, Septic shock ([address] ) of unknown source  Transferred from [address] [place] [address] to [facility] last night  Pt is s/p R leg agram and right SFA PTA (7/2020)   Consulted for concern of no arterial flow below knee on LEVDs this am at bedside, according to ICU team  DP signals present this am, now no signals present  Most recent LEADs 1/21/21 shows <50% stenosis in SFA  NESTOR 1 2, , GTP 46    ROS: unobtainable 2/2 intubated    Historical Information   Past Medical History:   Diagnosis Date    Anemia     Atrial fibrillation (HCC)     Benign prostatic hyperplasia without urinary obstruction     Congestive heart failure with left ventricular diastolic dysfunction, chronic (HCC)     History of ITP     Hypertension     Lumbosacral disc herniation     Peripheral vascular disease of lower extremity (HCC)     Renal disorder     Subdural hematoma (HCC)      Past Surgical History:   Procedure Laterality Date    BACK SURGERY      AUSTYN HOLE FOR SUBDURAL HEMATOMA      CARDIAC PACEMAKER PLACEMENT      CLAVICLE SURGERY Left 2011    HERNIA REPAIR      IR AORTAGRAM WITH RUN-OFF  7/20/2020    IR BIOPSY BONE MARROW  7/30/2020    LAPAROTOMY N/A 7/21/2020    Procedure: LAPAROTOMY EXPLORATORY, excision of infected mesh and repair of ventral heria; Surgeon: Jordyn Delgado MD;  Location: [facility];  Service: General    W[place] EXPLORATORY OF ABDOMEN N/A 8/3/2020    Procedure: REPAIR OF ABDOMINAL WALL WOUND/DEHISSENCE with mesh, placement drain, placement vac;  Surgeon: Jordyn Delgado MD;  Location: [facility];  Service: General    PROSTATE SURGERY      VENTRAL HERNIA REPAIR N/A 7/21/2020    Procedure: REPAIR HERNIA VENTRAL - EXCSION OF INFECTED MESH;  Surgeon: Jordyn Delgado MD;  Location: [facility];  Service: General    WOUND DEBRIDEMENT Right 7/24/2020    Procedure: DEBRIDEMENT LOWER EXTREMITY ([address]);   Surgeon: Tawnya Queen DPM;  Location: [facility];  Service: Podiatry    WOUND DEBRIDEMENT Right 11/19/2020    Procedure: DEBRIDEMENT WOUND ([address]) right lower extremity;  Surgeon: Elenita Faye DPM;  Location: [facility];  Service: Podiatry     Social History   Social History     Substance and Sexual Activity   Alcohol Use Not Currently    Alcohol/week: 0 0 standard drinks    Frequency: Never    Binge frequency: Never    Comment: Denied use     Social History     Substance and Sexual Activity   Drug Use Never    Comment: Denied use     Social History     Tobacco Use   Smoking Status Never Smoker   Smokeless Tobacco Never Used     Family History   Problem Relation Age of Onset    Heart disease Mother     Heart disease Father     Hypertension Father     Diabetes Other     Depression Other     Cancer Sister     Depression Cousin        Meds/Allergies     Medications Prior to Admission   Medication    acetaminophen (TYLENOL) 325 mg tablet    Ascorbic Acid (VITAMIN C PO)    atorvastatin (LIPITOR) 40 mg tablet    B Complex Vitamins (VITAMIN B COMPLEX PO)    clotrimazole-betamethasone (LOTRISONE) 1-0 05 % cream    diazepam (VALIUM) 5 mg tablet    famotidine (PEPCID) 20 mg tablet    Ferrous Gluconate-C-Folic Acid (IRON-C PO)    folic acid (FOLVITE) 1 mg tablet    lisinopril (ZESTRIL) 5 mg tablet    metoprolol succinate (TOPROL-XL) 25 mg 24 hr tablet    Multiple Vitamin (MULTI-DAY VITAMINS) TABS    oxyCODONE-acetaminophen (PERCOCET) 5-325 mg per tablet    pantoprazole (PROTONIX) 40 mg tablet    predniSONE 10 mg tablet    tamsulosin (FLOMAX) 0 4 mg     Current Facility-Administered Medications   Medication Dose Route Frequency    calcium gluconate 3 g in sodium chloride 0 9 % 100 mL IVPB  3 g Intravenous Once    cefepime (MAXIPIME) 2,000 mg in dextrose 5 % 50 mL IVPB  2,000 mg Intravenous Q12H    chlorhexidine (PERIDEX) 0 12 % oral rinse 15 mL  15 mL Swish & Spit Q12H [address]    clindamycin (CLEOCIN) IVPB (premix in dextrose) 900 mg 50 mL  900 mg Intravenous Q8H    clotrimazole-betamethasone (LOTRISONE) 1-0 05 % cream   Topical BID    dexmedetomidine (PRECEDEX) 400 mcg in sodium chloride 0 9% 100 ml IVPB  0 1-1 5 mcg/kg/hr Intravenous Titrated    EPINEPHrine 3000 mcg (STANDARD CONCENTRATION) IV in sodium chloride 0 9% 250 mL  1-10 mcg/min Intravenous Titrated    fentanyl citrate (PF) 100 MCG/2ML 50 mcg  50 mcg Intravenous Q1Y PRN    folic acid (FOLVITE) tablet 1 mg  1 mg Oral Daily    heparin (porcine) subcutaneous injection 5,000 Units  5,000 Units Subcutaneous Q8H [address]    metroNIDAZOLE (FLAGYL) IVPB (premix) 500 mg 100 mL  500 mg Intravenous Q8H    NOREPINEPHRINE 4 MG  ML NSS (CMPD ORDER) infusion  1-30 mcg/min Intravenous Titrated    NxStage K 2/Ca 3 dialysis solution (RFP-400) 20,000 mL  20,000 mL Dialysis Continuous    pantoprazole (PROTONIX) injection 40 mg  40 mg Intravenous Q24H ALANNAH    propofol (DIPRIVAN) 1000 mg in 100 mL infusion (premix)  5-50 mcg/kg/min Intravenous Titrated    sodium bicarbonate 150 mEq in dextrose 5 % 1,000 mL infusion  250 mL/hr Intravenous Continuous    vancomycin (VANCOCIN) 1,500 mg in sodium chloride 0 9 % 250 mL IVPB  20 mg/kg Intravenous Q24H    vasopressin (PITRESSIN) 20 Units in sodium chloride 0 9 % 100 mL infusion  0 04 Units/min Intravenous Continuous       Allergies   Allergen Reactions    Blood-Group Specific Substance      Needed benadryl with blood transfusion because of reaction       Objective     Blood pressure 112/57, pulse 99, temperature 100 °F (37 8 °C), temperature source Bladder, resp  rate (!) 28, height 5' 10" (1 778 m), weight 65 8 kg (145 lb 1 oz), SpO2 100 %  Intake/Output Summary (Last 24 hours) at 3/17/2021 1403  Last data filed at 3/17/2021 1300  Gross per 24 hour   Intake 6665 82 ml   Output 1434 ml   Net 5231 82 ml       PHYSICAL EXAM  Physical Exam  Vitals signs and nursing note reviewed  Constitutional:       General: He is not in acute distress  Appearance: He is ill-appearing and toxic-appearing  Comments: Intubated, sedated   Abdominal:      General: There is distension  Palpations: Abdomen is soft  Skin:     Comments: RLE with several open, draining wounds  Multiple fluid filled blisters  Chronic venous stasis  Motor/sensory unobtainable due to intubated/sedated   DP/AT/PT unobtainable with doppler  Pop and fem palpable  Petechiae, erythema, ecchymotic skin changes present all the way up to upper thigh  Worsening from this am     LLE palpable DP  Evidence of chronic venous stasis             Lab Results:   I have personally reviewed pertinent lab results  , CBC:   Lab Results   Component Value Date    WBC 51 17 (HH) 03/17/2021    HGB 6 9 (LL) 03/17/2021    HCT 21 7 (L) 03/17/2021    MCV 93 03/17/2021    PLT 82 (L) 03/17/2021    MCH 30 9 03/17/2021    MCHC 33 2 03/17/2021    RDW 19 2 (H) 03/17/2021    MPV 12 5 03/16/2021    NRBC 4 03/17/2021    NRBC 6 (H) 03/17/2021   , CMP:   Lab Results   Component Value Date    SODIUM 138 03/17/2021    K 6 4 (HH) 03/17/2021    CL 96 (L) 03/17/2021    CO2 12 (L) 03/17/2021    CO2  03/16/2021      Comment:      Out of Reportable Range    BUN 99 (H) 03/17/2021    CREATININE 5 10 (H) 03/17/2021    GLUCOSE 122 03/16/2021    CALCIUM 7 6 (L) 03/17/2021     (H) 03/17/2021     (H) 03/17/2021    ALKPHOS 215 (H) 03/17/2021    EGFR 10 03/17/2021   , Coagulation:   Lab Results   Component Value Date    INR 2 65 (H) 03/16/2021   , Urinalysis:   Lab Results   Component Value Date    COLORU Yellow 03/16/2021    CLARITYU Slightly Cloudy 03/16/2021    SPECGRAV 1 020 03/16/2021    PHUR 5 0 03/16/2021    LEUKOCYTESUR Trace (A) 03/16/2021    NITRITE Negative 03/16/2021    GLUCOSEU Negative 03/16/2021    KETONESU Negative 03/16/2021    BILIRUBINUR Negative 03/16/2021    BLOODU Trace-Intact (A) 03/16/2021   , Amylase: No results found for: AMYLASE, Lipase:   Lab Results   Component Value Date    LIPASE 156 03/16/2021     Imaging: I have personally reviewed pertinent reports  EKG, Pathology, and Other Studies: I have personally reviewed pertinent reports  Counseling / Coordination of Care  Total time spent today  30 minutes  Greater than 50% of total time was spent with the patient and / or family counseling and / or coordination of care           Gloria Albarado Joe Ramos PA-C  3/17/2021 2:03 PM

## 2021-03-17 NOTE — WOUND OSTOMY CARE
Progress Note - Wound   Adriana [place] 70 y o  male MRN: [id number]  Unit/Bed#: ICU 6 Encounter: [id number]      Assessment:   Patient admitted to ICU with septic shock  History of a fib, PVD, CHF, and ITP  Patient currently intubated, on vasopressors, on a low air loss mattress, getting CVVH  Surgery and Vascular consulted to assess lower extremities  1  Right buttock,evolving DTPI -POA- Two areas measured and pictured together  Appears to be an evolving DTPI  Wound is 90% deep purple in color, dry, intact; and 10% moist, partial thickness, deep purple tissue  Scant serosanguinous drainage  This wound has the potential to evolve to a full thickness injury, stage 3 or 4  Donna-wound is dry, intact, and hyperpigmented  2  Right Medial upper thigh -POA- Unknown etiology, per nursing patient was very edematous and had a blister that has now unroofed  Wound is irregular in shape, moist, about 50% of wound bed is unroofed and 50% with approximated skin from blister, wound bed is 100% beefy red, moderate amount of serosanguinous drainage  Donna-wound is dry, intact, edematous, with blanchable purple discoloration  3  Right Posterior thigh-POA- Suspect to be due to arterial disease  Two wound measured and pictured together  Wounds are circular in shape, moist, partial thickness, approx  80% yellow/borwn adhered slough, 20% beefy red tissue, moderate serosanguinous drainage  Donna-wound, these two wound are surrounded by an evolving DTPI that is deep purple and beefy red  4  Right Distal thigh, evolving DTPI-POA- Wound is noted to be circumferential around the distal thigh  Per nursing the patient had an ace bandage on at home and the edema in his leg was so severe we suspect the ace wrap caused a DTPI  Wound is irregular in shape, approx  80% dry, intact, deep purple skin; and 20% moist, beefy red tissue  Moderate amount of serosanguinous drainage   This wound has the potential to evolve to a full thickness injury, stage 3 or 4  Donna-wound is dry, intact, no redness  5  Posterior scrotum DTPI-POA- Wound is dry, intact, 100% deep purple  This wound has the potential to evolve to a full thickness injury, stage 3 or 4  Donna-wound is dry, intact, no redness  6  Right lower leg -POA- Patient has history of arterial ulcers, suspect to be worsening  Multiple scattered wounds noted  Wounds are mixture of round and irregular in shape, moist, true depth of wounds unknown due to devitalized tissue, approx 10% black eschar, 20% yellow adhered slough, and 70% beefy red tissue, moderate amount of serosanguinous drainage  Donna-wound appears dusky, hemosiderin staining, dry, intact  7  Right lateral foot, DTPI-POA- Wound is oval in shape, dry, intact 100% deep purple  Donna-wound is dry, intact, calloused  8  Right 2nd toe- Wound on assessment is dry, intact, ecchymotic  Donna-wound is dry, intact, scaly skin  Left heel dry, intact, no redness  Left buttock and B/L sacrum dry, intact, no redness  No induration, fluctuance, odor, warmth, redness, or purulence noted to the above noted wound  New dressings to be applied after all Critical Care assessments are completed  Primary nurse aware of plan of care  See flow sheets for more detailed assessment findings  Will follow along  Skin care Plan:  1-Cleanse sacro-buttocks with soap and water  Apply Allevyn foam  Yobani with T for treatment  Change every three days and PRN  check skin q-shift  2-Turn/reposition q2h or when medically stable for pressure re-distribution on skin   3-Elevate heels to offload pressure  4-Moisturize skin daily with skin nourishing cream  5-Ehob cushion in chair when out of bed  6-Hydraguard to bilateral heels BID and PRN  7- Cleanse entire right leg with NSS, pat dry  -Right lower leg and distal thigh- To open wound: Apply adaptic, then maxord, cover with  ABD, wrap with kristin  Change daily or PRN for soilage or dislodgement     - Right medial upper thigh- To open wound: Apply adaptic, then maxorb, cover with large  sacral Alleyvn foam  Change every other day or PRN for soilage or dislodgement  8  Cleanse right foot with soap and water, pat dry  Apply 3M no sting to lateral foot DTPI and toe wound daily and PRN  9  Cleanse scrotum and jeff-area with soap and water, pat dry  Apply hydraguard to scrotum BID and PRN  Wound 02/15/21 Abscess Thigh Right;Upper;Medial (Active)   Wound Image   03/17/21 1054   Wound Description Beefy red;Drainage;Edema;Fragile;Pink 03/17/21 1104   Jeff-wound Assessment Dry; Intact; Purple 03/17/21 1104   Wound Length (cm) 14 cm 03/17/21 1054   Wound Width (cm) 8 cm 03/17/21 1054   Wound Depth (cm) 0 2 cm 03/17/21 1054   Wound Surface Area (cm^2) 112 cm^2 03/17/21 1054   Wound Volume (cm^3) 22 4 cm^3 03/17/21 1054   Calculated Wound Volume (cm^3) 22 4 cm^3 03/17/21 1054   Change in Wound Size % -45 45 03/17/21 1054   Tunneling 0 cm 03/17/21 1104   Undermining 0 03/17/21 1104   Drainage Amount Moderate 03/17/21 1104   Drainage Description Serosanguineous 03/17/21 1104   Non-staged Wound Description Partial thickness 03/17/21 1104   Treatments Site care;Cleansed 03/17/21 1104   Dressing Vaseline gauze;Calcium Alginate; Foam, Silicon (eg  Allevyn, etc) 03/17/21 1104       Wound 03/03/21 Toe (Comment  which one) Anterior;Right (Active)   Wound Image   03/17/21 1053   Wound Description Dry; Intact; Other (Comment) 03/17/21 1053   Jeff-wound Assessment Dry; Intact 03/17/21 1053   Drainage Amount Copious 03/17/21 0530   Drainage Description Serous 03/17/21 0530   Dressing Open to air 03/17/21 0530       Wound 03/03/21 Arterial Ulcer Leg Right; Lower (Active)   Wound Image   03/17/21 1052   Wound Description Beefy red;Brown;Drainage;Edema;Fragile; Non-blanchable erythema;Pink;Slough; Yellow 03/17/21 1052   Jeff-wound Assessment Dry; Intact;Fragile; Hyperpigmented;Scaly 03/17/21 1052   Wound Length (cm) 16 cm 03/17/21 1052   Wound Width (cm) 28 cm 03/17/21 1052   Wound Depth (cm) 0 1 cm 03/17/21 1052   Wound Surface Area (cm^2) 448 cm^2 03/17/21 1052   Wound Volume (cm^3) 44 8 cm^3 03/17/21 1052   Calculated Wound Volume (cm^3) 44 8 cm^3 03/17/21 1052   Change in Wound Size % -1282 72 03/17/21 1052   Tunneling 0 cm 03/17/21 1052   Undermining 0 03/17/21 1052   Drainage Amount Large 03/17/21 1052   Drainage Description Serosanguineous 03/17/21 1052   Non-staged Wound Description Partial thickness 03/17/21 1052   Treatments Cleansed;Site care 03/17/21 1052   Dressing Vaseline gauze;Calcium Alginate;ABD;Dry dressing 03/17/21 1052   Wound packed? No 03/17/21 1052       Wound 03/16/21 Pressure Injury Buttocks Right (Active)   Wound Image   03/17/21 1108   Wound Description Dry; Intact; Light purple 03/17/21 1108   Pressure Injury Stage DTPI 03/17/21 1108   Donna-wound Assessment Dry; Intact; Hyperpigmented 03/17/21 1108   Wound Length (cm) 5 cm 03/17/21 1108   Wound Width (cm) 1 cm 03/17/21 1108   Wound Depth (cm) 0 1 cm 03/17/21 1108   Wound Surface Area (cm^2) 5 cm^2 03/17/21 1108   Wound Volume (cm^3) 0 5 cm^3 03/17/21 1108   Calculated Wound Volume (cm^3) 0 5 cm^3 03/17/21 1108   Tunneling 0 cm 03/17/21 1108   Undermining 0 03/17/21 1108   Drainage Amount None 03/17/21 1108   Treatments Site care 03/17/21 1108   Dressing Foam, Silicon (eg  Allevyn, etc) 03/17/21 1108   Wound packed? No 03/17/21 1108   Dressing Changed Reinforced 03/17/21 1108   Patient Tolerance Tolerated well 03/17/21 1108   Dressing Status Clean;Dry; Intact 03/17/21 1108       Wound 03/17/21 Pressure Injury Thigh Anterior;Distal;Right (Active)   Wound Image   03/17/21 1059   Wound Description Beefy red;Fragile;Light purple 03/17/21 1059   Pressure Injury Stage DTPI 03/17/21 1059   Donna-wound Assessment Dry; Intact;Edema 03/17/21 1059   Wound Length (cm) 30 cm 03/17/21 1059   Wound Width (cm) 28 cm 03/17/21 1059   Wound Depth (cm) 0 2 cm 03/17/21 1059   Wound Surface Area (cm^2) 840 cm^2 03/17/21 1059   Wound Volume (cm^3) 168 cm^3 03/17/21 1059   Calculated Wound Volume (cm^3) 168 cm^3 03/17/21 1059       Wound 03/17/21 Pressure Injury Foot Right;Lateral (Active)   Wound Image   03/17/21 1101   Wound Description Light purple;Non-blanchable erythema;Dry; Intact 03/17/21 1101   Pressure Injury Stage DTPI 03/17/21 1101   Donna-wound Assessment Dry; Intact;Callus; Erythema 03/17/21 1101   Wound Length (cm) 1 5 cm 03/17/21 1101   Wound Width (cm) 3 cm 03/17/21 1101   Wound Surface Area (cm^2) 4 5 cm^2 03/17/21 1101   Tunneling 0 cm 03/17/21 1101   Undermining 0 03/17/21 1101   Drainage Amount None 03/17/21 1101   Treatments Site care 03/17/21 1101   Dressing Open to air 03/17/21 1101       Wound 03/17/21 Pressure Injury Scrotum Posterior (Active)   Wound Image   03/17/21 1115   Wound Description Other (Comment);Dry; Intact 03/17/21 1115   Pressure Injury Stage DTPI 03/17/21 1115   Donna-wound Assessment Intact;Dry 03/17/21 1115   Wound Length (cm) 4 cm 03/17/21 1115   Wound Width (cm) 5 cm 03/17/21 1115   Wound Surface Area (cm^2) 20 cm^2 03/17/21 1115   Tunneling 0 cm 03/17/21 1115   Undermining 0 03/17/21 1115   Drainage Amount None 03/17/21 1115   Treatments Site care 03/17/21 1115   Dressing Moisture barrier 03/17/21 1115       Wound 03/17/21 Arterial Ulcer Thigh Posterior;Right (Active)   Wound Image   03/17/21 1115   Wound Description Beefy red;Brown;Yellow;Slough 03/17/21 1115   Donna-wound Assessment Other (Comment) 03/17/21 1115   Wound Length (cm) 5 5 cm 03/17/21 1115   Wound Width (cm) 7 cm 03/17/21 1115   Wound Depth (cm) 0 2 cm 03/17/21 1115   Wound Surface Area (cm^2) 38 5 cm^2 03/17/21 1115   Wound Volume (cm^3) 7 7 cm^3 03/17/21 1115   Calculated Wound Volume (cm^3) 7 7 cm^3 03/17/21 1115   Tunneling 0 cm 03/17/21 1115   Undermining 0 03/17/21 1115   Drainage Amount Moderate 03/17/21 1115   Drainage Description Serosanguineous 03/17/21 1115   Non-staged Wound Description Partial thickness 03/17/21 1115   Treatments Cleansed;Site care 03/17/21 1115   Dressing Vaseline gauze;Calcium Alginate;ABD;Dry dressing 03/17/21 1115   Wound packed?  No 03/17/21 1115         Call or tigertext with any questions  Wound Care will continue to follow    Richa German RN BSN  Wound care

## 2021-03-17 NOTE — ASSESSMENT & PLAN NOTE
· Initially diagnosed 7/2020 during admission for fall when he developed pancytopenia  Bone marrow biopsy during that admission revealed multiple myeloma with hypercellular bone marrow with atypia and rare blasts (3/4%) consistent with MDS  · In the ER this evening he was found to have WBC 103K      · Suspect Tumor lysis syndrome given elevated uric acid 16 4, and hyperphosphatemia 13 9 vs acute blast crisis vs acute leukemia  · Give rasburicase 6 mg now  · Consult hematology/oncology  · Send peripheral smear now  · Will need bone marrow biopsy, consult IR

## 2021-03-17 NOTE — OCCUPATIONAL THERAPY NOTE
Occupational Therapy Cancellation     Patient Name: Constantin WINCHESTER Date: 3/17/2021  Problem List  Principal Problem:    Septic shock ([facility] [address] )  Active Problems:    Essential hypertension    Acute respiratory failure with hypoxia ([facility] [address] )    Other persistent atrial fibrillation (HCC)    Dilated cardiomyopathy (HCC)    Chronic combined systolic and diastolic heart failure ([facility] [address] )    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)    Multiple myeloma not having achieved remission (HCC)    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 (HCC)    Acute renal failure superimposed on stage 3 chronic kidney disease (HCC)    Toxic metabolic encephalopathy    Non-traumatic rhabdomyolysis    Hyperkalemia with EKG changes    Transaminitis    Past Medical History  Past Medical History:   Diagnosis Date    Anemia     Atrial fibrillation (HCC)     Benign prostatic hyperplasia without urinary obstruction     Congestive heart failure with left ventricular diastolic dysfunction, chronic (HCC)     History of ITP     Hypertension     Lumbosacral disc herniation     Peripheral vascular disease of lower extremity (HCC)     Renal disorder     Subdural hematoma (HCC)      Past Surgical History  Past Surgical History:   Procedure Laterality Date    BACK SURGERY      AUSTYN HOLE FOR SUBDURAL HEMATOMA      CARDIAC PACEMAKER PLACEMENT      CLAVICLE SURGERY Left 2011    HERNIA REPAIR      IR AORTAGRAM WITH RUN-OFF  7/20/2020    IR BIOPSY BONE MARROW  7/30/2020    LAPAROTOMY N/A 7/21/2020    Procedure: LAPAROTOMY EXPLORATORY, excision of infected mesh and repair of ventral heria;   Surgeon: Lyly Pereira MD;  Location:  MAIN OR;  Service: General    [place] EXPLORATORY OF ABDOMEN N/A 8/3/2020    Procedure: REPAIR OF ABDOMINAL WALL WOUND/DEHISSENCE with mesh, placement drain, placement vac;  Surgeon: Lyly Pereira MD;  Location:  MAIN OR; Service: General    PROSTATE SURGERY      VENTRAL HERNIA REPAIR N/A 7/21/2020    Procedure: REPAIR HERNIA VENTRAL - EXCSION OF INFECTED MESH;  Surgeon: Kaity Lowe MD;  Location:  MAIN OR;  Service: General    WOUND DEBRIDEMENT Right 7/24/2020    Procedure: DEBRIDEMENT LOWER EXTREMITY ([address] OUT); Surgeon: Tamar Norman DPM;  Location: [facility] MAIN OR;  Service: Podiatry    WOUND DEBRIDEMENT Right 11/19/2020    Procedure: [facility] ([address]) right lower extremity;  Surgeon: Freddy Medina DPM;  Location:  MAIN OR;  Service: Podiatry        03/17/21 1112   OT Last Visit   OT Visit Date 03/17/21  (Wednesday)   Note Type   Note type Evaluation   Cancel Reasons   (pt intubated earlier this AM)   Activity Tolerance   Medical Staff Made Aware spoke to MIRYAM ERIC   Assessment   Assessment OT orders received and chart review completed  Spoke w/ MIRYAM ERIC   Pt intubated earlier this AM and currently not appropriate to participate in OT eval  Will continue to follow as appropriate and schedule allows      Daphney Lozada OT

## 2021-03-17 NOTE — PROGRESS NOTES
Vancomycin IV Pharmacy-to-Dose Consultation    Laura Nguyễn is a 70 y o  male who is currently receiving Vancomycin IV with management by the Pharmacy Consult service  Assessment/Plan:  The patient was reviewed  Patient is on CVVHD and no signs or symptoms of infusion reactions were documented in the chart  Based on todays assessment, changed Vancomycin to 20 mg/kg/dose q24h   Will check the trough prior to 3rd dose on 03/18 @1830 to ensure that level is >=10 and <20  We will continue to follow the patients culture results and clinical progress daily      Marcellus Brown, Pharmacist

## 2021-03-17 NOTE — ED ATTENDING ATTESTATION
3/16/2021  I, Marifer Saavedra MD, saw and evaluated the patient  I have discussed the patient with the resident/non-physician practitioner and agree with the resident's/non-physician practitioner's findings, Plan of Care, and MDM as documented in the resident's/non-physician practitioner's note, except where noted  All available labs and Radiology studies were reviewed  I was present for key portions of any procedure(s) performed by the resident/non-physician practitioner and I was immediately available to provide assistance  At this point I agree with the current assessment done in the Emergency Department  I have conducted an independent evaluation of this patient a history and physical is as follows:    70year old male brought in by EMS for evaluation of altered mental status  Possible fall 2 days ago in which patient slid off sofa  General:  Lethargic, ill appearing, no acute distress  HEENT: PERRL, EOMI, trachea midline, dry mucous membranes  Heart: RRR, no murmurs, gallops or rubs  Lungs: CTAB, no wheezes, rales or rhonchi, tachypnea with Kussmaul breathing  Abd: soft, nontender, nondistended  Extremities: no peripheral edema, peripheral pulses palpable and symmetric, mottled, delayed capillary refill  Neuro: awake, but lethargic, able to answer some questions appropriately, following commands, moving all 4 extremities    A/P: Trauma alert initiated given possible fall  CTH unremarkable  IV contrast administered prior to labs given severity of patient's condition  Significant tachypnea on exam with concern for Kussmaul breathing  Patient maintaining airway at this point  Severe metabolic acidosis on labs with acute renal failure  Severe hyperkalemia for which he was given insulin, glucose and calcium gluconate  Case discussed with nephrology  Patient given bicarb bolus followed by infusion  High flow nasal canula for acute respiratory insufficiency and increased work of breathing   History of CHF with risk of worsening respiratory status with required aggressive IV hydration  High potential for jeff-intubation arrest if patient tires or mental status worsens  Patient required transfer as high probability that he will require CVVH for his renal failure  Mental status gradually improving with treatment  Much more alert at time of transfer  Repeat ABG at time of transfer showed significant improvement  ED Course  ED Course as of Mar 16 2202   Tue Mar 16, 2021   1757 Called to bedside due to concern for patient's worsening mental status  Questionable fall 2 days ago  Trauma alert initiated  Patient following commands  GCS 14  Emergent CTH  Close monitoring for signs of declining mental status  Patient is full code              Critical Care Time  CriticalCare Time  Performed by: Pato Hoffmann MD  Authorized by: Pato Hoffmann MD     Critical care provider statement:     Critical care time (minutes):  74    Critical care time was exclusive of:  Separately billable procedures and treating other patients and teaching time    Critical care was necessary to treat or prevent imminent or life-threatening deterioration of the following conditions:  Respiratory failure, metabolic crisis and renal failure    Critical care was time spent personally by me on the following activities:  Discussions with consultants, evaluation of patient's response to treatment, examination of patient, re-evaluation of patient's condition, ordering and review of radiographic studies, ordering and review of laboratory studies and ordering and performing treatments and interventions

## 2021-03-17 NOTE — CONSULTS
Consultation -General Surgery  Sebastian Castillo 70 y o  male MRN: [id number]  Unit/Bed#: ICU 11 Encounter: [id number]      Inpatient consult to Acute Care Surgery  Consult performed by: Kourtney Kennedy PA-C  Consult ordered by: Missy Bui MD          ASSESSMENT:  Problem List     * (Principal) Septic shock [facility])    Peripheral vascular disease of lower extremity ([facility] [address] ) (Chronic)    Essential hypertension (Chronic)    Acute respiratory failure with hypoxia ([facility] [address] )    Other persistent atrial fibrillation (HCC) (Chronic)    Dilated cardiomyopathy (HCC) (Chronic)    Chronic combined systolic and diastolic heart failure (HCC) (Chronic)    Wt Readings from Last 3 Encounters:   03/16/21 65 8 kg (145 lb 1 oz)   03/16/21 69 9 kg (154 lb)   03/09/21 69 9 kg (154 lb 3 2 oz)                 S/P AV (atrioventricular) mary anne ablation (Chronic)    Anemia, unspecified (Chronic)    Status post biventricular pacemaker (Chronic)    Cellulitis    CKD (chronic kidney disease) stage 3, GFR 30-59 ml/min (Chronic)    Lab Results   Component Value Date    EGFR 10 03/17/2021    EGFR 9 03/17/2021    EGFR 8 03/16/2021    CREATININE 5 13 (H) 03/17/2021    CREATININE 5 95 (H) 03/17/2021    CREATININE 6 33 (H) 70/72/6499         Umbilicus discharge    Benign prostatic hyperplasia with urinary retention (Chronic)    Severe protein-calorie malnutrition (HCC) (Chronic)    Malnutrition Findings:           BMI Findings: Body mass index is 20 81 kg/m²           Open wound of umbilical region    Pancytopenia (HCC)    Leg ulcer, right, with fat layer exposed ([address] )    Open abdominal wall wound    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) (Chronic)    Pressure injury of right heel, unstageable (HCC)    Atherosclerosis of native artery of right lower extremity with rest pain (HCC)    Open wound of left lower leg    Right foot ulcer (HCC) (Chronic)    Pleural effusion on right    Acidosis    Multiple myeloma not having achieved remission (HCC) (Chronic)    Overview Signed 11/19/2020  7:58 AM by DO jeff Boland         MRSA cellulitis of right foot    Venous ulcer of ankle, right (HCC)    Other myelodysplastic syndromes ([facility] [address] )    Osteomyelitis of right foot ([facility] [address] )    Open wound of right thigh    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 (HCC) (Chronic)    Acute renal failure superimposed on stage 3 chronic kidney disease ([facility] [address] )    Lab Results   Component Value Date    EGFR 10 03/17/2021    EGFR 9 03/17/2021    EGFR 8 03/16/2021    CREATININE 5 13 (H) 03/17/2021    CREATININE 5 95 (H) 03/17/2021    CREATININE 6 33 (H) 03/16/2021         Toxic metabolic encephalopathy    Non-traumatic rhabdomyolysis    Hyperkalemia with EKG changes    Transaminitis        71 yo M PMH PVD with chronic wounds, managed by wound care, MM, CKD3, afib, HTN now with metabolic acidosis, septic shock 2/2 unknown source, concern for RLE source  Cellulitis, r/o nec fasc, r/o phlegmasia  RLE with multiple open draining wounds, fluid filled blisters, skin changes concerning for phlegmasia  Unobtainable DP by doppler at bedside  Per nurses, was present this am  Intubated  Cr 5 (6 5), transaminitis  Current pressors: Vaso   04, Levo 17  Lactate on admission 10, now 11 (9 6, 7 9, 9)  WBC 50 (102 on admission)  Suspect tumor lysis syndrome      Plan:  - intubated  - garrison  - stat CT RLE  - stat venous duplex ivc/iliac, LEVDs  - currently on cefepime, flagyl, vanco  Consider clinda in setting of potential nec fasc  - f/u heme/onc c/s  - dvt ppx  - frequent neurovasc/RLE checks    Rest of care per primary      Reason for Consult / Principal Problem:    HPI: Brian Whitehead is a 70y o  year old male PMH significant for PVD with chronic LE wounds, Afib, HTN, MM, CKD3 [facility] with AMS, found to have Septic shock ([facility] [address] )   Pt sees wound care for chronic LE wounds s/p multiple wound debridements  Hx of MRSA in wound cx  Pt had ACE wrap on RLE up to mid/upper thigh until this am     Lactate peaked at 10 8 in ER    CTAP showed distended GB but otherwise grossly unremarkabole  RUQ u/s unremarkable  ROS: unobtainable 2/2 AMS, now intubated      Historical Information   Past Medical History:   Diagnosis Date    Anemia     Atrial fibrillation (HCC)     Benign prostatic hyperplasia without urinary obstruction     Congestive heart failure with left ventricular diastolic dysfunction, chronic (HCC)     History of ITP     Hypertension     Lumbosacral disc herniation     Peripheral vascular disease of lower extremity (HCC)     Renal disorder     Subdural hematoma (HCC)      Past Surgical History:   Procedure Laterality Date    BACK SURGERY      AUSTYN HOLE FOR SUBDURAL HEMATOMA      CARDIAC PACEMAKER PLACEMENT      CLAVICLE SURGERY Left 2011    HERNIA REPAIR      IR AORTAGRAM WITH RUN-OFF  7/20/2020    IR BIOPSY BONE MARROW  7/30/2020    LAPAROTOMY N/A 7/21/2020    Procedure: LAPAROTOMY EXPLORATORY, excision of infected mesh and repair of ventral heria; Surgeon: Catherine Hilton MD;  Location:  MAIN OR;  Service: General    [place] EXPLORATORY OF ABDOMEN N/A 8/3/2020    Procedure: REPAIR OF ABDOMINAL WALL WOUND/DEHISSENCE with mesh, placement drain, placement vac;  Surgeon: Catherine Hilton MD;  Location: [facility] MAIN OR;  Service: General    PROSTATE SURGERY      VENTRAL HERNIA REPAIR N/A 7/21/2020    Procedure: REPAIR HERNIA VENTRAL - EXCSION OF INFECTED MESH;  Surgeon: Catherine Hilton MD;  Location: [facility]IN [facility];  Service: General    WOUND DEBRIDEMENT Right 7/24/2020    Procedure: DEBRIDEMENT LOWER EXTREMITY ([facility] OUT);   Surgeon: Wade Fisher DPM;  Location: [facility] MAIN OR;  Service: Podiatry    WOUND DEBRIDEMENT Right 11/19/2020    Procedure: DEBRIDEMENT WOUND ([address]) right lower extremity;  Surgeon: Joe James DPM;  Location: [facility] MAIN OR;  Service: Podiatry Social History   Social History     Substance and Sexual Activity   Alcohol Use Not Currently    Alcohol/week: 0 0 standard drinks    Frequency: Never    Binge frequency: Never    Comment: Denied use     Social History     Substance and Sexual Activity   Drug Use Never    Comment: Denied use     Social History     Tobacco Use   Smoking Status Never Smoker   Smokeless Tobacco Never Used     Family History   Problem Relation Age of Onset    Heart disease Mother     Heart disease Father     Hypertension Father     Diabetes Other     Depression Other     Cancer Sister     Depression Cousin        Meds/Allergies     Medications Prior to Admission   Medication    acetaminophen (TYLENOL) 325 mg tablet    Ascorbic Acid (VITAMIN C PO)    atorvastatin (LIPITOR) 40 mg tablet    B Complex Vitamins (VITAMIN B COMPLEX PO)    clotrimazole-betamethasone (LOTRISONE) 1-0 05 % cream    diazepam (VALIUM) 5 mg tablet    famotidine (PEPCID) 20 mg tablet    Ferrous Gluconate-C-Folic Acid (IRON-C PO)    folic acid (FOLVITE) 1 mg tablet    lisinopril (ZESTRIL) 5 mg tablet    metoprolol succinate (TOPROL-XL) 25 mg 24 hr tablet    Multiple Vitamin (MULTI-DAY VITAMINS) TABS    oxyCODONE-acetaminophen (PERCOCET) 5-325 mg per tablet    pantoprazole (PROTONIX) 40 mg tablet    predniSONE 10 mg tablet    tamsulosin (FLOMAX) 0 4 mg     Current Facility-Administered Medications   Medication Dose Route Frequency    cefepime (MAXIPIME) 1,000 mg in dextrose 5 % 50 mL IVPB  1,000 mg Intravenous Q24H    chlorhexidine (PERIDEX) 0 12 % oral rinse 15 mL  15 mL Swish & Spit Q12H [address]    clotrimazole-betamethasone (LOTRISONE) 1-0 05 % cream   Topical BID    dexmedetomidine (PRECEDEX) 400 mcg in sodium chloride 0 9% 100 ml IVPB  0 1-1 5 mcg/kg/hr Intravenous Titrated    EPINEPHrine 3000 mcg (STANDARD CONCENTRATION) IV in sodium chloride 0 9% 250 mL  1-10 mcg/min Intravenous Titrated    fentanyl citrate (PF) 100 MCG/2ML 50 mcg 50 mcg Intravenous J4K PRN    folic acid (FOLVITE) tablet 1 mg  1 mg Oral Daily    heparin (porcine) subcutaneous injection 5,000 Units  5,000 Units Subcutaneous Q8H [address]    metroNIDAZOLE (FLAGYL) IVPB (premix) 500 mg 100 mL  500 mg Intravenous Q8H    NOREPINEPHRINE 4 MG  ML NSS (CMPD ORDER) infusion  1-30 mcg/min Intravenous Titrated    NxStage K 2/Ca 3 dialysis solution (RFP-400) 20,000 mL  20,000 mL Dialysis Continuous    pantoprazole (PROTONIX) injection 40 mg  40 mg Intravenous Q24H ALANNAH    propofol (DIPRIVAN) 1000 mg in 100 mL infusion (premix)  5-50 mcg/kg/min Intravenous Titrated    sodium bicarbonate 150 mEq in dextrose 5 % 1,000 mL infusion  250 mL/hr Intravenous Continuous    sodium bicarbonate 8 4 % injection **ADS Override Pull**        sodium bicarbonate 8 4 % injection 50 mEq  50 mEq Intravenous Once    vancomycin (VANCOCIN) IVPB (premix in dextrose) 1,000 mg 200 mL  15 mg/kg Intravenous Daily PRN    vasopressin (PITRESSIN) 20 Units in sodium chloride 0 9 % 100 mL infusion  0 04 Units/min Intravenous Continuous       Allergies   Allergen Reactions    Blood-Group Specific Substance      Needed benadryl with blood transfusion because of reaction       Objective     Blood pressure 102/51, pulse 80, temperature 99 °F (37 2 °C), resp  rate (!) 30, height 5' 10" (1 778 m), weight 65 8 kg (145 lb 1 oz), SpO2 100 %  Intake/Output Summary (Last 24 hours) at 3/17/2021 0808  Last data filed at 3/17/2021 0615  Gross per 24 hour   Intake 3934 74 ml   Output 984 ml   Net 2950 74 ml       PHYSICAL EXAM  Physical Exam  Vitals signs and nursing note reviewed  Constitutional:       Appearance: He is ill-appearing and toxic-appearing  He is not diaphoretic  Comments: Prior to intubation, was alert but agitated  Confused  Now intubated, sedated   Cardiovascular:      Rate and Rhythm: Normal rate  Abdominal:      General: There is distension  Tenderness: There is no guarding  Comments: Abdomen distended but soft  No grimacing with palpation  Well healed midline incision from abdominal procedure   Musculoskeletal:         General: Swelling present  Comments: RLE: Prior to intubation, palpation of RLE revealed appropriate tenderness  Chronic open draining wounds, appear to be worse when compared to previous images  Draining serous fluid  Multiple blisters  Darker color changes present  Pitting edema  Right medial thigh/groin edematous with pitting edema, fluid filled blisters  Demarcated skin changes  See pictures below  DP/AT/PT no signals present  Per nursing staff, DP was present earlier  Pop and fem are palpable    LLE: evidence of chronic venous stasis                             Lab Results:   I have personally reviewed pertinent lab results    , CBC:   Lab Results   Component Value Date    WBC 51 17 (HH) 03/17/2021    HGB 7 7 (L) 03/17/2021    HCT 23 2 (L) 03/17/2021    MCV 93 03/17/2021    PLT 82 (L) 03/17/2021    MCH 30 9 03/17/2021    MCHC 33 2 03/17/2021    RDW 19 2 (H) 03/17/2021    MPV 12 5 03/16/2021    NRBC 4 03/17/2021    NRBC 6 (H) 03/17/2021   , CMP:   Lab Results   Component Value Date    SODIUM 136 03/17/2021    K 5 6 (H) 03/17/2021    CL 97 (L) 03/17/2021    CO2 13 (L) 03/17/2021    CO2 5 (LL) 03/16/2021     (H) 03/17/2021    CREATININE 5 13 (H) 03/17/2021    GLUCOSE 182 (H) 03/16/2021    CALCIUM 6 9 (L) 03/17/2021     (H) 03/17/2021     (H) 03/17/2021    ALKPHOS 215 (H) 03/17/2021    EGFR 10 03/17/2021   , Coagulation:   Lab Results   Component Value Date    INR 2 65 (H) 03/16/2021   , Urinalysis:   Lab Results   Component Value Date    COLORU Yellow 03/16/2021    CLARITYU Slightly Cloudy 03/16/2021    SPECGRAV 1 020 03/16/2021    PHUR 5 0 03/16/2021    LEUKOCYTESUR Trace (A) 03/16/2021    NITRITE Negative 03/16/2021    GLUCOSEU Negative 03/16/2021    KETONESU Negative 03/16/2021    BILIRUBINUR Negative 03/16/2021    BLOODU Trace-Intact (A) 03/16/2021   , Amylase: No results found for: AMYLASE, Lipase:   Lab Results   Component Value Date    LIPASE 156 03/16/2021     Imaging: I have personally reviewed pertinent reports  EKG, Pathology, and Other Studies: I have personally reviewed pertinent reports  Counseling / Coordination of Care  Total time spent today  30 minutes  Greater than 50% of total time was spent with the patient and / or family counseling and / or coordination of care           Jj Stoll PA-C  3/17/2021 8:08 AM

## 2021-03-17 NOTE — CASE MANAGEMENT
CM notified by Critical Care team that they are trying to locate an emergency contact for patient to discuss plans of care  CM attempted to reach patient's SO by telephone but was only able to leave a message requesting a call back  CM contacted patient's PCP office who had the same numbers but also has a CM associated with [facility]  CM called Harrison Fowler [phone] who also tried to find some additional numbers but only had the same ones that had been already tried  CM utilized serveral online searches trying to locate additional family to contact that may know how to get in touch with SO  Unfortunately all these searches came up with nothing  CM then contacted the state police that cover where patient and his SO live and spoke with James Perez,  at the [place]  CM reviewed need to get in touch with SO of patient  She requested CM put this in an email to her at [email or web address] sent this email  Shortly after sending the email, CM received a call back from patient's SO Marco A VILLA was transferred her to Critical Care team so that they they could provide the medical update and discuss Kvng Gutiérrez

## 2021-03-17 NOTE — ASSESSMENT & PLAN NOTE
Lab Results   Component Value Date    EGFR 8 03/16/2021    EGFR 8 03/16/2021    EGFR 47 11/27/2020    CREATININE 6 15 (H) 03/16/2021    CREATININE 6 58 (H) 03/16/2021    CREATININE 1 46 (H) 11/27/2020   · Baseline creatinine 1 3-1 7 on chart review  · Likely ATN multifocal due to septic shock and suspected tumor lysis syndrome   · Presented with hyperkalemia 8 2, down to 7 2, and Creatinine 6 58 down to 6 15 following initial fluid resuscitation  · Repeat BMP now  · Consult nephrology, appreciate recommendations  · Will likely need CVVH this evening  · Will hold home lisinopril and other nephrotoxins, avoid hypotension  · Continue sodium bicarbonate infusion for metabolic acidosis/PALOMA

## 2021-03-17 NOTE — ASSESSMENT & PLAN NOTE
· Follows with wound care as outpatient  · Leave open to air for now  · Consult wound care   · Continue neurovascular checks with doppler signals  · May need to consider vascular surgery consult

## 2021-03-17 NOTE — PROGRESS NOTES
I spoke to his significant other, Abdirashid Huitron  I updated her of his clinical situation  I explained that we are at a level of futility both with the operation itself as well as advancing his care  She understands that CPR would not help him and is willing to accept that will not be performed  She also understands that there is no correction available for this lower extremity, sepsis associated, profound acidosis and shock  She has difficulty moving forward with comfort care only  She would like to think about that for now  She is ok with DNR level 2 but will call back to make a decision for comfort only  For now we will continue supportive care

## 2021-03-17 NOTE — ASSESSMENT & PLAN NOTE
· Rate currently controlled    · Holding home metoprolol in the setting of septic shock  · Not on anticoagulation at home due to history of pancytopenia  · Start DVT ppx with heparin SC and SCDs  · [id number]: Holding systemic AC at this time due to thrombocytopenia

## 2021-03-17 NOTE — PROCEDURES
Temporary HD Catheter    Date/Time: 3/16/2021 11:35 PM  Performed by: BJ Gonzales  Authorized by: BJ Gonzales     Patient location:  Bedside  Consent:     Consent obtained:  Written (by Dr Jese Kincaid over telephone with s/o Angelica Madrid )    Consent given by:  Healthcare agent    Risks discussed:  Arterial puncture, incorrect placement, nerve damage, pneumothorax, bleeding and infection    Alternatives discussed:  No treatment  Universal protocol:     Procedure explained and questions answered to patient or proxy's satisfaction: yes      Radiology Images displayed and confirmed  If images not available, report reviewed: yes      Required blood products, implants, devices, and special equipment available: yes      Site/side marked: yes      Immediately prior to procedure, a time out was called: yes      Patient identity confirmed:  Arm band, hospital-assigned identification number and verbally with patient  Pre-procedure details:     Hand hygiene: Hand hygiene performed prior to insertion      Sterile barrier technique: All elements of maximal sterile technique followed      Skin preparation:  ChloraPrep    Skin preparation agent: Skin preparation agent completely dried prior to procedure    Indications:     Central line indications: dialysis    Anesthesia (see MAR for exact dosages):      Anesthesia method:  Local infiltration    Local anesthetic:  Lidocaine 1% w/o epi  Procedure details:     Location:  Right internal jugular    Vessel type: vein      Laterality:  Right    Approach: percutaneous technique used      Patient position:  Flat    Catheter type:  Triple lumen    Catheter size:  12 5 Fr    Catheter length:  16 cm    Landmarks identified: yes      Ultrasound guidance: yes      Ultrasound image availability:  Images available in PACS    Sterile ultrasound techniques: Sterile gel and sterile probe covers were used      Number of attempts:  1    Successful placement: yes      Vessel of catheter tip end: CAJ  Post-procedure details:     Post-procedure:  Dressing applied and line sutured    Assessment:  Blood return through all ports, free fluid flow, no pneumothorax on x-ray and placement verified by x-ray    Post-procedure complications: none      Patient tolerance of procedure: Tolerated well, no immediate complications  Comments:      Large amount of respiratory variance noted in vessel during placement  Vessel confirmed compressible with wire in place prior to dilation of vessel and placement of line

## 2021-03-17 NOTE — NURSING NOTE
Mottling now extending to upper thigh on right leg, left leg mid leg, and hands  All extremities cold to touch  Only able to obtain bilateral popliteal pulses via doppler - bilateral pedal and posterior tibial pulses absent  Pt remains off sedation and still has no pupillary response  Libby Bush aware and at bedside  No new orders

## 2021-03-17 NOTE — PROGRESS NOTES
I was called to the room as Mr Tommie Larsen continues to decompensate  He has worsening acidosis with lactate of 19  Hypotension continues to worsen requiring escalation of Levophed, addition of Epinephrine and escalation of that drip as well  Several attempts have been made to contact Ty Berger, his significant other, including 2 of my own attempts  His lower extremity ultrasound demonstrated no flow below the knee, is cold to touch as well  We contacted surgery and Dr Bridger Garibay came to evaluate the patient  He and Dr Marisabel Rodney of nephrology were at bedside and it was discussed that amputation below the knee would be an act in futility as there is poor perfusion above that  There would be little likelihood that he would heal   There is also significant concern of whether he would be able to make it through the operation at all  As a results it was agreed that these acts would be futile in saving his life  As a result of no intervention that is possible on his lower extremity, the sepsis and acidosis and renal failure which will continue to worsen we will move to DNR level 2  CPR in this situation would also be a futile act as he does not have any possibility of recovering without intervention on the lower extremity  We will also move to not escalate care as well as additional resources will also not correct the underlying issue  We have been unable to contact his significant other and point of contact  We will continue to attempt to reach out  However, due to medical futility will not advance care

## 2021-03-17 NOTE — ASSESSMENT & PLAN NOTE
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

## 2021-03-17 NOTE — PROGRESS NOTES
Vancomycin Assessment    Prachi Mcpherson is a 70 y o  male who is currently receiving vancomycin vancomycin 750mg q48h for bacteremia     Relevant clinical data and objective history reviewed:  Creatinine   Date Value Ref Range Status   03/16/2021 6 33 (H) 0 60 - 1 30 mg/dL Final     Comment:     Standardized to IDMS reference method   03/16/2021 6 15 (H) 0 60 - 1 30 mg/dL Final     Comment:     Standardized to IDMS reference method   03/16/2021 6 58 (H) 0 60 - 1 30 mg/dL Final     Comment:     Standardized to IDMS reference method     BP 90/51   Pulse 80   Temp 98 4 °F (36 9 °C)   Resp (!) 29   Ht 5' 10" (1 778 m)   Wt 65 8 kg (145 lb 1 oz)   SpO2 100%   BMI 20 81 kg/m²   No intake/output data recorded  Lab Results   Component Value Date/Time     (H) 03/16/2021 10:01 PM    BUN 53 (H) 12/14/2018 12:00 AM    WBC 72 39 (HH) 03/16/2021 10:01 PM    HGB 8 5 (L) 03/16/2021 10:01 PM    HCT 27 7 (L) 03/16/2021 10:01 PM    MCV 99 (H) 03/16/2021 10:01 PM     (L) 03/16/2021 10:01 PM     Temp Readings from Last 3 Encounters:   03/16/21 98 4 °F (36 9 °C)   03/16/21 (!) 97 3 °F (36 3 °C) (Bladder)   03/11/21 98 1 °F (36 7 °C)     Vancomycin Days of Therapy: 1    Assessment/Plan  The patient is currently on vancomycin utilizing pulse dosing based on actual body weight  Baseline risks associated with therapy include: pre-existing renal impairment and advanced age  The patient is currently receiving vancomycin 750mg q48h and after clinical evaluation will be changed to vancomycin 1000mg daily prn for random trough level<20  Pharmacy will also follow closely for s/sx of nephrotoxicity, infusion reactions, and appropriateness of therapy  BMP and CBC will be ordered per protocol  Plan for random trough 3/17 2000  Due to infection severity, will target a trough of 15-20 (appropriate for most indications)   Pharmacy will continue to follow the patients culture results and clinical progress daily   Dosing will be adjusted should patient start CVVH or dialysis      Jossue Carrillo, Pharmacist

## 2021-03-17 NOTE — NURSING NOTE
[facility] unable to run d/t access issues from pt agitation  Unable to return blood  Leno LORENZO aware and at bedside  Precedex ordered and titrated as per CRNP verbal orders with no improvement in pt agitation

## 2021-03-17 NOTE — NURSING NOTE
D/t pt poor perfusion, have been unable to obtain accurate spo2 readings since this AM   [facility] aware

## 2021-03-17 NOTE — CONSULTS
Oncology Consult Note      Name: Chao Hedrick  : 1945  Age: 70 y o  Sex: male  MRN: [id number]  Unit/Bed#: ICU 11  Encounter: [id number]      Assessment:  1  Septic shock  The patient was transferred from [facility]  He had significant leukocytosis of greater than 100,000 per cubic mm, tachypnea, lactic acid of 10 8 and acute kidney injury  The patient has been started on vasopressors and broad-spectrum IV antibiotic therapy with vancomycin, cefepime and metronidazole  The patient has peripheral vascular disease of the right lower extremity with ulceration of the calf which is the likely source of his infection    2  Leukocytosis  The patient's initial WBC count was 102,970 per cubic mm  This is in part due to his acute infection and as a secondary leukomoid reaction  The WBC count has fallen to 51,170 per cubic mm  The peripheral blood smear is not consistent with acute leukemia  3  Anemia   The anemia is more multifactorial   It is due to the acute infection  The patient has an underlying myelodysplastic syndrome  4  Thrombocytopenia   As above  And biopsy done on  5  Acute respiratory failure with hypoxia   The patient is hypoxemic on room air  He now on ventilatory support  6  Acute renal failure superimposed on chronic chronic kidney disease (CKD) stage III  The patient has acute renal failure likely due to ATN associated with septic shock the patient has rhabdomyolysis with elevation of CK-MB, hyperuricemia  7  Myelodysplastic syndrome (MDS)  A bone marrow done on 2020 shows a hypercellular marrow with 80-90% cellularity  There was atypia and rare blasts 3-4% compatible with myelodysplastic syndrome (MDS)  There was scattered kappa light chain restricted plasma cell neoplastic cells most compatible with monoclonal gammopathy of uncertain significance (MGUS)        Patient Active Problem List   Diagnosis    Peripheral vascular disease of lower extremity ([address] )    Essential hypertension    Acute respiratory failure with hypoxia (HCC)    Septic shock (HCC)    Other persistent atrial fibrillation (HCC)    Dilated cardiomyopathy (HCC)    Chronic combined systolic and diastolic heart failure (HCC)    S/P AV (atrioventricular) mary anne ablation    Anemia, unspecified    Status post biventricular pacemaker    Cellulitis    CKD (chronic kidney disease) stage 3, GFR 09-09 ml/min    Umbilicus discharge    Benign prostatic hyperplasia with urinary retention    Severe protein-calorie malnutrition ([address] )    Open wound of umbilical region    Pancytopenia ([address] )    Leg ulcer, right, with fat layer exposed ([address] )    Open abdominal wall wound    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)    Pressure injury of right heel, unstageable ([address] )    Atherosclerosis of native artery of right lower extremity with rest pain ([address] )    Open wound of left lower leg    Right foot ulcer ([address] )    Pleural effusion on right    Acidosis    Multiple myeloma not having achieved remission ([address] )    MRSA cellulitis of right foot    Venous ulcer of ankle, right (HCC)    Other myelodysplastic syndromes ([address] )    Osteomyelitis of right foot ([address] )    Open wound of right thigh    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 ([address] )    Acute renal failure superimposed on stage 3 chronic kidney disease ([address] )    Toxic metabolic encephalopathy    Non-traumatic rhabdomyolysis    Hyperkalemia with EKG changes    Transaminitis       Plan:  The patient will be continued on broad-spectrum IV antibiotic therapy and vasopressors for septic shock  The WBC count should continue to fall as the infection is treated  The patient will be transfuse packed red blood cells to maintain his hemoglobin above 7 grams/deciliter      Reason for consultation:  Patient with a diagnosis of myelodysplastic syndrome presenting with leukocytosis in the setting of septic shock  History of Presenting Illness: The patient is a 37-year-old male with multiple medical problems including hypertension, atrial fibrillation (AFib), dilated cardiomyopathy, peripheral vascular disease with atherosclerotic disease of lower extremities, chronic kidney disease (CKD) stage III, myelodysplastic syndrome (MDS) presenting [facility] the ED at [facility] with altered mental status and lethargy  The patient was found to have normal severe leukocytosis, metabolic acidosis, lactic acidosis, acute renal failure, hyperuricemia and evidence of rhabdomyolysis  The patient had an elevated WBC count of more than 100,000 per cubic mm  The patient has an ulcerated perfusion deficiency right lower extremity with ulceration of the calf  He was initiated on broad-spectrum IV antibiotic therapy with vancomycin, cefepime and metronidazole  The patient is in septic shock and was placed on ventilatory support he on vasopressors  Past Medical History:   Diagnosis Date    Anemia     Atrial fibrillation (HCC)     Benign prostatic hyperplasia without urinary obstruction     Congestive heart failure with left ventricular diastolic dysfunction, chronic (HCC)     History of ITP     Hypertension     Lumbosacral disc herniation     Peripheral vascular disease of lower extremity (HCC)     Renal disorder     Subdural hematoma (HCC)        Past Surgical History:   Procedure Laterality Date    BACK SURGERY      AUSTYN HOLE FOR SUBDURAL HEMATOMA      CARDIAC PACEMAKER PLACEMENT      CLAVICLE SURGERY Left 2011    HERNIA REPAIR      IR AORTAGRAM WITH RUN-OFF  7/20/2020    IR BIOPSY BONE MARROW  7/30/2020    LAPAROTOMY N/A 7/21/2020    Procedure: LAPAROTOMY EXPLORATORY, excision of infected mesh and repair of ventral heria;   Surgeon: Sarah Carlos MD;  Location:  MAIN OR;  Service: General    MO EXPLORATORY OF ABDOMEN N/A 8/3/2020 Procedure: REPAIR OF ABDOMINAL WALL WOUND/DEHISSENCE with mesh, placement drain, placement vac;  Surgeon: Ashlyn Bernard MD;  Location: [facility] MAIN OR;  Service: General    PROSTATE SURGERY      VENTRAL HERNIA REPAIR N/A 7/21/2020    Procedure: REPAIR HERNIA VENTRAL - EXCSION OF INFECTED MESH;  Surgeon: Ashlyn Bernard MD;  Location: [facility] MAIN OR;  Service: General    WOUND DEBRIDEMENT Right 7/24/2020    Procedure: DEBRIDEMENT LOWER EXTREMITY ([address]); Surgeon: Blanche Dee DPM;  Location: [facility] MAIN OR;  Service: Podiatry    WOUND DEBRIDEMENT Right 11/19/2020    Procedure: DEBRIDEMENT WOUND ([address]) right lower extremity;  Surgeon: Haim Pop DPM;  Location: [facility] MAIN OR;  Service: Podiatry       Social History     Socioeconomic History    Marital status: Single     Spouse name: Not on file    Number of children: Not on file    Years of education: Not on file    Highest education level: Not on file   Occupational History    Not on file   Social Needs    Financial resource strain: Not on file    Food insecurity     Worry: Not on file     Inability: Not on file    Transportation needs     Medical: Not on file     Non-medical: Not on file   Tobacco Use    Smoking status: Never Smoker    Smokeless tobacco: Never Used   Substance and Sexual Activity    Alcohol use: Not Currently     Alcohol/week: 0 0 standard drinks     Frequency: Never     Binge frequency: Never     Comment: Denied use    Drug use: Never     Comment: Denied use    Sexual activity: Not on file   Lifestyle    Physical activity     Days per week: 0 days     Minutes per session: 0 min    Stress: Not at all   Relationships    Social connections     Talks on phone:  Three times a week     Gets together: Never     Attends [employer] service: Never     Active member of club or organization: No     Attends meetings of clubs or organizations: Never     Relationship status: Living with partner   [facility] Intimate partner violence     Fear of current or ex partner: No     Emotionally abused: No     Physically abused: No     Forced sexual activity: No   Other Topics Concern    Not on file   Social History Narrative    Not on file       Family History   Problem Relation Age of Onset    Heart disease Mother     Heart disease Father     Hypertension Father     Diabetes Other     Depression Other     Cancer Sister     Depression Cousin          Current Facility-Administered Medications:     cefepime (MAXIPIME) 2,000 mg in dextrose 5 % 50 mL IVPB, 2,000 mg, Intravenous, Q12H, Dori Valente MD, Stopped at 03/17/21 1531    chlorhexidine (PERIDEX) 0 12 % oral rinse 15 mL, 15 mL, [employer], Q12H [address], Karen Medrano MD, 15 mL at 03/17/21 1251    clindamycin (CLEOCIN) IVPB (premix in dextrose) 900 mg 50 mL, 900 mg, Intravenous, Q8H, Karen Medrano MD, Stopped at 03/17/21 1210    clotrimazole-betamethasone (LOTRISONE) 1-0 05 % cream, , Topical, BID, BJ Ardon, Stopped at 03/16/21 2313    EPINEPHrine 3000 mcg (STANDARD CONCENTRATION) IV in sodium chloride 0 9% 250 mL, 1-10 mcg/min, Intravenous, Titrated, Karen Medrano MD, Stopped at 03/17/21 1537    fentanyl citrate (PF) 100 MCG/2ML 50 mcg, 50 mcg, Intravenous, Q1H PRN, Tawnya Fink MD, 50 mcg at 96/09/68 1044    folic acid (FOLVITE) tablet 1 mg, 1 mg, Oral, Daily, BJ Ardon, Stopped at 03/17/21 1121    heparin (porcine) subcutaneous injection 5,000 Units, 5,000 Units, Subcutaneous, Q8H [address], 5,000 Units at 03/17/21 1554 **AND** [CANCELED] Platelet count, , , Once, BJ Ardon    metroNIDAZOLE (FLAGYL) IVPB (premix) 500 mg 100 mL, 500 mg, Intravenous, Q8H, BJ Ardon, Last Rate: 200 mL/hr at 03/17/21 1549, 500 mg at 03/17/21 1549    NOREPINEPHRINE 4 MG  ML NSS (CMPD ORDER) infusion, 1-30 mcg/min, Intravenous, Titrated, BJ Ardon, Last Rate: 101 3 mL/hr at 03/17/21 1556, 27 mcg/min at 03/17/21 1556    NxStage K 2/Ca 3 dialysis solution (RFP-400) 20,000 mL, 20,000 mL, Dialysis, Continuous, Darren Hernandes MD, 20,000 mL at 03/17/21 0053    pantoprazole (PROTONIX) injection 40 mg, 40 mg, Intravenous, Q24H [facility], BJ Aj, 40 mg at 03/17/21 0956    propofol (DIPRIVAN) 1000 mg in 100 mL infusion (premix), 5-50 mcg/kg/min, Intravenous, Titrated, Jerene Meigs, CRNP, Stopped at 03/17/21 1357    sodium bicarbonate 150 mEq in dextrose 5 % 1,000 mL infusion, 250 mL/hr, Intravenous, Continuous, BJ Aj, Last Rate: 250 mL/hr at 03/17/21 0838, 250 mL/hr at 03/17/21 0838    vancomycin (VANCOCIN) 1,500 mg in sodium chloride 0 9 % 250 mL IVPB, 20 mg/kg, Intravenous, Q24H, Carrington Quiros MD    vasopressin (PITRESSIN) 20 Units in sodium chloride 0 9 % 100 mL infusion, 0 04 Units/min, Intravenous, Continuous, Trudi Bennett MD, Last Rate: 12 mL/hr at 03/17/21 1431, 0 04 Units/min at 03/17/21 1431    Allergies   Allergen Reactions    Blood-Group Specific Substance      Needed benadryl with blood transfusion because of reaction         Review of Systems:     Constitutional:  No fever, chills or night sweats  No fatigue  No recent weight change  HEENT:  No headaches  No eye pain or discharge  No visual problems  No ear pain or discharge  No sore throat  Respiratory:  No shortness of breath, cough, wheezing or sputum production  Cardiovascular:  No chest pain  No palpitations  Gastrointestinal:  No abdominal pain  No nausea or vomiting  No change in bowel habits  No blood noted in the stool  Genitourinary:  Negative  Musculoskeletal: No joint pain or swelling  No limitation of range of motion  Skin:  The skin over the right lower extremity is mottled and discolored  There is ulceration over the calf muscle and under in a thigh area  Neurological:  The patient presented with change in mental status and increased lethargy  Psychiatric:  Negative        Physical Exam    General:  The patient is sedated on ventilatory support  Vitals: BP 94/60   Pulse 80   Temp (!) 96 1 °F (35 6 °C) (Bladder)   Resp (!) 28   Ht 5' 10" (1 778 m)   Wt 65 8 kg (145 lb 1 oz)   SpO2 100%   BMI 20 81 kg/m²     HEENT: Pupils are reactive to light and accommodation  Extraocular muscles are intact  No scleral icterus  Respiratory:  Lungs are clear bilaterally  No crackles  No wheezes  Cardiovascular: S1, S2 are normal  No murmurs  No gallops  Abdominal: Soft, non-tender  Normal bowel sounds  No organomegaly  Musculoskeletal: Negative for joint or muscle tenderness or swelling  Skin:  The skin over the right lower extremity is mottled and discolored  The ulceration over the calf  Extremities: The right lower extremity is discolored from hypoperfusion  Reduced pulses  1+ pitting edema  Neurologic: Alert, oriented to person, place and time  No focal deficits  LABS:    Results Reviewed     None              Xr Chest Portable    Result Date: 3/17/2021  Narrative: CHEST INDICATION:   weakness  COMPARISON:  11/17/2020 EXAM PERFORMED/VIEWS:  XR CHEST PORTABLE  5:29 PM FINDINGS:  Implantable left chest wall biventricular pacemaker is present with leads in stable position  Cardiomediastinal silhouette appears unremarkable  The lungs are slightly hyperinflated but appear clear  Major Carl No pneumothorax or pleural effusion  Patient again noted to be status post open reduction internal fixation remote left clavicular fracture  Impression: No acute cardiopulmonary disease  Workstation performed: [id number]     Trauma - Ct Head Wo Contrast    Result Date: 3/16/2021  Narrative: CT BRAIN - WITHOUT CONTRAST INDICATION:   TRAUMA  Patient has suspected COVID-19  COMPARISON:  2/13/2019 TECHNIQUE:  CT examination of the brain was performed  In addition to axial images, sagittal and coronal 2D reformatted images were created and submitted for interpretation   Radiation dose length product (DLP) for this visit:  135 mGy-cm   This examination, like all CT scans performed in the [facility], was performed utilizing techniques to minimize radiation dose exposure, including the use of iterative reconstruction and automated exposure control  IMAGE QUALITY:  Diagnostic  FINDINGS: PARENCHYMA:  No intracranial mass, mass effect or midline shift  No CT signs of acute infarction  No acute parenchymal hemorrhage  VENTRICLES AND EXTRA-AXIAL SPACES:  Normal for the patient's age  VISUALIZED ORBITS AND PARANASAL SINUSES:  Unremarkable  CALVARIUM AND EXTRACRANIAL SOFT TISSUES:  Left parietal delmy hole again noted  No acute fracture  Impression: No acute intracranial abnormality  Workstation performed: [id number]     Ct Pelvis Wo Contrast    Result Date: 3/17/2021  Narrative: CT PELVIS WITHOUT IV CONTRAST INDICATION:   Evaluate for necrotizing fasciitis  COMPARISON:  None  TECHNIQUE: CT examination of the pelvis was performed without intravenous contrastAxial, sagittal, and coronal 2D reformatted images were created from the source data and submitted for interpretation  Radiation dose length product (DLP) for this visit:  964 mGy-cm   This examination, like all CT scans performed in the [facility], was performed utilizing techniques to minimize radiation dose exposure, including the use of iterative reconstruction and automated exposure control  FINDINGS: VISUALIZED KIDNEYS/URETERS:  Right renal cysts noted Dilated left ureter seen Right renal cysts are noted REPRODUCTIVE ORGANS:  Enlarged prostate seen, measuring about 4 3 x 3 5 x 3 cm A Abbott catheter is in place Mildly dilated right ureter seen URINARY BLADDER:  A Abbott catheter is in place APPENDIX:  No findings to suggest appendicitis  VISUALIZED BOWEL:  Unremarkable  ABDOMINOPELVIC CAVITY:  No ascites or free intraperitoneal air  No lymphadenopathy  VISUALIZED VESSELS:  Unremarkable for patient's age    ABDOMINOPELVIC WALL/INGUINAL REGIONS: Soft tissue infiltration noted in the right pelvic area in the subcutaneous tissue  There is no intramuscular fluid collection  There is a vaginal circumscribed rounded density in the medial aspect of the proximal to mid thigh with the marginal calcification, measuring about 2 7 x 2 7 cm, located in relation to the right gracilis muscle Right inguinal region lymph nodes are noted with largest lymph node measuring about 2 cm x 2 cm, located near the deep inguinal ring  Additional right inguinal lymph node imaging in size from 1 5 x 1 8 cm OSSEOUS STRUCTURES:  Moderate right hip arthritis Moderate left hip arthritis     Impression: Superficial cellulitis in the right hip and pelvis area extending to the right thigh  There is no intramuscular fluid collection seen suggest any abscess  There are no foci of soft tissue air or gas around the hip and pelvis area A 2 7 x 2 7 cm lesion in the proximal medial aspect of the right thigh in the region of the right gracilis muscle, suggest nonemergent evaluation with MRI for further characterization  This may be a chronic hematoma or less likely neoplastic  Markedly dilated left ureter and mildly dilated right ureter Enlarged prostate gland Bilateral renal cysts  Abbott catheter in place Distended gallbladder Splenomegaly A follow-up notification has been created in [employer] performed: [id number]     Pe Study With Ct Abdomen And Pelvis With Contrast    Result Date: 3/16/2021  Narrative: CT PULMONARY ANGIOGRAM OF THE CHEST AND CT ABDOMEN AND PELVIS WITH INTRAVENOUS CONTRAST INDICATION:   Trauma  Patient has suspected COVID-19  COMPARISON:  CT abdomen/pelvis 7/17/2020 and CT chest/abdomen/pelvis 11/10/2011 TECHNIQUE:  CT examination of the chest, abdomen and pelvis was performed  Thin section CT angiographic technique was used in the chest in order to evaluate for pulmonary embolus and coronal 3D MIP postprocessing was performed on the acquisition scanner  Axial, sagittal, and coronal 2D reformatted images were created from the source data and submitted for interpretation  Radiation dose length product (DLP) for this visit:  690 mGy-cm   This examination, like all CT scans performed in the [facility], was performed utilizing techniques to minimize radiation dose exposure, including the use of iterative reconstruction and automated exposure control  IV Contrast:  100 mL of iohexol (OMNIPAQUE) Enteric Contrast:  Enteric contrast was not administered  FINDINGS: CHEST PULMONARY ARTERIAL TREE: Evaluation of subsegmental arteries in the lower lung zones is precluded by respiratory motion artifact  No pulmonary embolus is seen  Dilated main pulmonary artery (4 cm) consistent with pulmonary arterial hypertension  LUNGS:  Lungs are clear other than a punctate calcified granuloma in the right lower lobe and subsegmental atelectasis in the right middle lobe and left lower lobe  There is no tracheal or endobronchial lesion  PLEURA:  Unremarkable  HEART/AORTA:  Cardiomegaly  No pericardial effusion  Cardiac pacemaker lead tips in the right ventricle and coronary sinus  MEDIASTINUM AND DOMINICK:  Unchanged lipomatous lesion interposed between the right thyroid lobe, trachea, and esophagus  No lymphadenopathy  CHEST WALL AND LOWER NECK:   Unremarkable  ABDOMEN LIVER/BILIARY TREE:  Unremarkable  GALLBLADDER:  Markedly distended gallbladder without pericholecystic inflammatory changes  Previously reported gallstones are not definitely identified  SPLEEN:  Stable splenomegaly (16 cm)  PANCREAS:  Unremarkable  ADRENAL GLANDS:  Unremarkable  KIDNEYS/URETERS:  One or more simple renal cyst(s) is noted  Decreased extrarenal pelvis dilation  Otherwise unremarkable kidneys  No hydronephrosis  STOMACH AND BOWEL:  Unremarkable  APPENDIX:  No findings to suggest appendicitis  ABDOMINOPELVIC CAVITY:  No ascites  No pneumoperitoneum  No lymphadenopathy   VESSELS: Atherosclerotic changes are present  No evidence of aneurysm  PELVIS REPRODUCTIVE ORGANS:  Unremarkable for patient's age  URINARY BLADDER:  Abbott catheter and resultant gas  ABDOMINAL WALL/INGUINAL REGIONS:  Unremarkable  OSSEOUS STRUCTURES:  No acute fracture or destructive osseous lesion  Healed bilateral clavicular midshaft fractures  Healed sternal fracture  Impression: No pulmonary embolism to the level of the segmental arteries  No acute traumatic injury in the chest, abdomen, or pelvis  Markedly distended gallbladder without pericholecystic inflammatory changes  Previously reported cholelithiasis is not definitely identified  If there is a high degree of clinical concern for acute cholecystitis, a right upper quadrant ultrasound should be obtained  Stable splenomegaly  Improved bilateral hydroureteronephrosis  Workstation performed: [id number]     Xr Chest Portable Icu    Result Date: 3/17/2021  Narrative: CHEST INDICATION:   RIJ CVC placement  COMPARISON:  Chest radiograph and CT from 3/16/2021  EXAM PERFORMED/VIEWS:  XR CHEST PORTABLE ICU  FINDINGS:  Right jugular catheter in upper SVC  Mild cardiomegaly  Left subclavian pacemaker leads in right ventricle and cardiac vein  Lungs clear  No effusion or pneumothorax  Skinfold over the upper right lung  Osseous structures normal for age  Healed right clavicle fracture  ORIF of the left clavicle  Impression: No acute cardiopulmonary disease  Right jugular catheter in upper SVC with no pneumothorax  Workstation performed: [id number]     Xr Chest Portable Icu    Result Date: 3/17/2021  Narrative: CHEST INDICATION:   left IJ TLc placement  COMPARISON:  3/17/2021 EXAM PERFORMED/VIEWS:  XR CHEST PORTABLE ICU FINDINGS:  An endotracheal tube is present in good position    Left and right central venous catheters entering from the jugular veins appear satisfactory in position terminating in the vicinity of superior vena cava just above the caval atrial junction  Left-sided pacemaker noted  External cardiac defibrillator pads are present    Cardiomediastinal silhouette appears unremarkable  No infiltrate or pleural effusion appreciated  Portions of the apical lung fields are obscured by artifactual densities from extrinsic wires and implanted devices  Within the limits of the exam, there does not appear to be any evidence of pneumothorax  Osseous structures appear within normal limits for patient age  Impression: Satisfactory line and tube positioning  No visible pneumothorax  Workstation performed: [id number]     Xr Chest Portable Icu    Result Date: 3/17/2021  Narrative: CHEST INDICATION:   ETT  COMPARISON:  Chest radiograph from 3/17/2021 and chest CT from 3/16/2021  EXAM PERFORMED/VIEWS:  XR CHEST PORTABLE ICU  FINDINGS:  ET tube 9 cm above the eli  Right jugular catheter in upper SVC  Normal heart size  Pulmonary artery enlargement  Left subclavian pacemaker leads in right ventricle and cardiac vein  Lungs clear  No effusion or pneumothorax  Skinfold over left midlung  Osseous structures normal for age  ORIF of left clavicle  Impression: No acute cardiopulmonary disease  Slightly high endotracheal tube, 9 cm above the eli  Workstation performed: [id number]     Us Right Upper Quadrant    Result Date: 3/17/2021  Narrative: RIGHT UPPER QUADRANT ULTRASOUND INDICATION:     r/o acute cholecystitis  COMPARISON:  3/16/2020 TECHNIQUE:   Real-time ultrasound of the right upper quadrant was performed with a curvilinear transducer with both volumetric sweeps and still imaging techniques  FINDINGS: PANCREAS:  Visualized portions of the pancreas are within normal limits  AORTA AND IVC:  Visualized portions are normal for patient age  LIVER: Size:  Within normal range  The liver measures cm in the midclavicular line  Contour:  Surface contour is smooth  Parenchyma:  Echogenicity and echotexture are within normal limits   No evidence of suspicious mass  Limited imaging of the main portal vein shows it to be patent and hepatopetal   BILIARY: The gallbladder is abnormally distended  No wall thickening or pericholecystic fluid  There are multiple calculi and sludge present  No sonographic Lockwood sign  No intrahepatic biliary dilatation  CBD measures 6 mm  No choledocholithiasis  KIDNEY: Right kidney measures 10 1 x 5 5 x 4 8 cm  1 2 x 1 4 x 1 6 cm and 1 7 x 1 7 x 2 0 hypoechoic lesions at the superior pole and interpolar region of right kidney presumed to represent simple renal cysts    ASCITES:   None  Impression: Cholelithiasis  Prominently distended gallbladder without sonographic signs of cholecystitis    Workstation performed: [id number]     Vas Lower Limb Venous Duplex Study, Complete Bilateral    Result Date: 3/17/2021  Narrative:  [facility] REPORT [facility]INICAL: Indications: Physician wants to determine patency of the venous system secondary to right leg swelling and ulcers  Operative History: 2020-07-20 Aortagram with run-off Risk Factors The patient has history of HTN and CKD  CONCLUSION:  Impression: RIGHT LOWER LIMB: No evidence acute deep vein thrombosis  Evidence of chronic non-occlusive venous wall thickening vs edema noted throughout  No evidence of superficial thrombophlebitis noted  Doppler evaluation shows a normal response to augmentation maneuvers  Popliteal and posterior tibial arterial Doppler waveforms are biphasic 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  Tech Note: There are multiple hypoechoic non-vascular structures located in the inguinal region  LEFT LOWER LIMB: No evidence of acute deep vein thrombosis  Evidence of chronic non-occlusive venous wall thickening vs edema noted throughout  No evidence of superficial thrombophlebitis noted  Doppler evaluation shows a normal response to augmentation maneuvers   Popliteal and posterior tibial arterial Doppler waveforms are biphasic 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  Technical findings were given to Dr Nolan Massey via tiger text  SIGNATURE: Electronically Signed by: Lilian Cook on 2021-03-17 03:25:06 PM    Vas Ivc/iliac Veins Duplex; Complete Study    Result Date: 3/17/2021  Narrative:  [facility] REPORT [facility]INICAL: Indications:  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  Operative History: 2020-07-20 Aortagram with run-off Risk Factors The patient has history of HTN and CKD  FINDINGS:  Unilateral      Segment Impression  Suprarenal IVC  Normal (Patent)     Distal IVC      Normal (Patent)      Right        Segment Impression  Comm_Iliac   Normal (Patent)     Ext_Iliac    Normal (Patent)     Renal Caval  Normal (Patent)      Left         Segment Impression  Comm_Iliac   Normal (Patent)     Ext_Iliac    Normal (Patent)     Renal Caval  Normal (Patent)        CONCLUSION:  Impression The IVC, Renal and Iliac Veins are patent with no evidence of deep vein thrombosis  No evidence of extrinsic compression of the left or right Common Iliac Vein  Antegrade flow noted in the left Renal Vein with no evidence of reflux or Aorta/SMA compression  No Evidence of Venous reflux within the Common, Internal or External Iliac veins bilaterally  SIGNATURE: Electronically Signed by: Lilian Cook on 2021-03-17 03:24:29 PM    Us Bedside Procedure    Result Date: 3/16/2021  Narrative: 1 2 [id number] 2    Ct Lower Extremity Wo Contrast Right    Result Date: 3/17/2021  Narrative: CT right lower extremity without IV contrast INDICATION: R/o nec fasc  COMPARISON: None  TECHNIQUE: CT examination of the right thigh was performed  This examination, like all CT scans performed in the [facility], was performed utilizing techniques to minimize radiation dose exposure, including the use of iterative reconstruction and automated exposure control software    Sagittal and coronal two dimensional reconstructed images were also submitted for interpretation  Rad dose  0 mGy-cm FINDINGS: OSSEOUS STRUCTURES:  No fracture, dislocation or destructive osseous lesion  Moderate right-sided knee effusion [id number] VISUALIZED MUSCULATURE:  No acute collection seen Again noted is a well-circumscribed lesion with marginal calcification in relation of the gracilis measuring about 2 7 x 2 7 cm SOFT TISSUES:  Again noted is infiltration in the subcutaneous tissue of the thigh  There is a skin thickening with some blistering noted at the anterolateral aspect of the mid to distal thigh and posterior aspect of the distal thigh, seen in image 155 series 3 images which is included with the pelvic CT images  OTHER PERTINENT FINDINGS:  None  Impression: No fluid collection noted No foci of gas or air are noted within the thigh Skin thickening with blistering and at the anterolateral aspect of the distal thigh and posterior aspect of the distal thigh A 2 7 x 2 7 cm a lesion with marginal calcification in the medial aspect of the thigh in relation of the gracilis muscle, indeterminate May be chronic hematoma or may be a soft tissue mass lesion  Consider nonemergent evaluation with the MRI with contrast for further characterization A follow-up notification has been created in the Epic Workstation performed: [id number]     Ct Lower Extremity Wo Contrast Right    Result Date: 3/17/2021  Narrative: CT right lower extremity without IV contrast INDICATION: Infection, evaluate for necrotizing fasciitis COMPARISON: None  TECHNIQUE: CT examination of the right calf was performed  This examination, like all CT scans performed in the [facility], was performed utilizing techniques to minimize radiation dose exposure, including the use of iterative reconstruction and automated exposure control software  Sagittal and coronal two dimensional reconstructed images were also submitted for interpretation   Rad dose 507 mGy-cm FINDINGS: OSSEOUS STRUCTURES:  No fracture, dislocation or destructive osseous lesion  VISUALIZED MUSCULATURE:  No intramuscular hemorrhage No collection with air-fluid level in the past vasculature Mineralized vasculature seen SOFT TISSUES:    No fluid collection Edema noted with some skin thickening and infiltration in the subcutaneous fat in the calf  Compatible with cellulitis Small amount of fluid and edema is also noted at the superficial aspect of the deep peripheral fascia OTHER PERTINENT FINDINGS:  Moderate size knee effusion seen Chondrocalcinosis seen within the meniscus  Impression: Cellulitic changes in the leg   No foci of gas or air noted in the leg No intramuscular abscess Moderate-sized knee effusion Diffuse mineralization of the calf  vasculature related  to atherosclerotic disease Workstation performed: [id number]

## 2021-03-17 NOTE — QUICK NOTE
Attempted to call patients s/o Marco A to discuss patients continued declining clinical condition  Message left with instructions to call back and phone number to the unit

## 2021-03-17 NOTE — NURSING NOTE
Left pedal pulse no longer present  Posterior tibial pulses faint via doppler  Temp 97 0  Warm blankets applied  Breanne LORENZO aware  No new orders at this time

## 2021-03-17 NOTE — ASSESSMENT & PLAN NOTE
· Patient presented to [facility] with leukocytosis > 100K, tachypnea, and elevated lactic acid 10 8  · He received 3100 mL crystalloid IVF, vancomycin and Zosyn prior to transfer  · Received volume resuscitation to complete 30 mL/kg per sepsis measures; however, suspect he is more volume depleted and will require additional IVF  · May need to start vasopressors to assist CVVHD  · Source currently unclear  · UA at [facility] shows WBC and bacteria  · CMP shows elevated LFTs and TBili  · Obtain stat RUQ US to r/o acute cholecystitis  · He also has multiple lower extremity wounds of the RLE though drainage is serous so lower suspicion for soft tissue source    · Send repeat blood and urine cultures now  · Start broad spectrum antibiotics vancomycin, cefepime, flagyl with renal dosing  · Continue IVF resuscitation with NaHCO3 150 mEq/L D5W at 250 mL/hr for severe metabolic acidosis/PALOMA  · Repeat Lactic acid now  · Send MRSA culture  · If negative consider discontinuing vancomycin  · Send procalcitonin   · Repeat and trend lactic acid   · Cannot rule out component of cardiogenic shock will check VBG and echo

## 2021-03-17 NOTE — NURSING NOTE
CVVHD restarted  CVVHD on hold since 7 am d/t access issues, pt agitation, subsequent RSI and CT scans x2  MD aware

## 2021-03-17 NOTE — PROCEDURES
Intubation    Date/Time: 3/17/2021 8:05 AM  Performed by: Lg Hernandez MD  Authorized by: Lg Hernandez MD     Patient location:  Bedside  Other Assisting Provider: No    Consent:     Consent obtained:  Emergent situation (unable to protect airway, rising vasopressor requirements, and severe metabolic acidosis with impending decompensation )  Universal protocol:     Patient identity confirmed:  Arm band  Pre-procedure details:     Patient status:  Altered mental status    Mallampati score:  2    Pretreatment medications:  Vecuronium and etomidate    Paralytics:  Vecuronium  Indications:     Indications for intubation: respiratory distress, respiratory failure and airway protection    Procedure details:     Preoxygenation: HFNC     CPR in progress: no      Intubation method:  Oral    Oral intubation technique: Sarmiento  Laryngoscope blade: Mac 3    Tube size (mm):  8 0    Tube type:  Cuffed    Number of attempts:  1    Tube visualized through cords: yes    Placement assessment:     ETT to lip:  23cm    Tube secured with:  ETT toribio    Breath sounds:  Equal and absent over the epigastrium    Placement verification: chest rise, condensation, direct visualization, equal breath sounds, ETCO2 detector, fiberoptic scope and tube exhalation      Chest x-ray findings: 8cm above eli, will advance 3-4cm  Ventilator settings:  AC/VC 28/450/50/6  Post-procedure details:     Patient tolerance of procedure:   Tolerated well, no immediate complications

## 2021-03-17 NOTE — PLAN OF CARE
Problem: Prexisting or High Potential for Compromised Skin Integrity  Goal: Skin integrity is maintained or improved  Description: INTERVENTIONS:  - Identify patients at risk for skin breakdown  - Assess and monitor skin integrity  - Assess and monitor nutrition and hydration status  - Monitor labs   - Assess for incontinence   - Turn and reposition patient  - Assist with mobility/ambulation  - Relieve pressure over bony prominences  - Avoid friction and shearing  - Provide appropriate hygiene as needed including keeping skin clean and dry  - Evaluate need for skin moisturizer/barrier cream  - Collaborate with interdisciplinary team   - Patient/family teaching  - Consider wound care consult   Outcome: Progressing     Problem: Potential for Falls  Goal: Patient will remain free of falls  Description: INTERVENTIONS:  - Assess patient frequently for physical needs  -  Identify cognitive and physical deficits and behaviors that affect risk of falls  -  [place] fall precautions as indicated by assessment   - Educate patient/family on patient safety including physical limitations  - Instruct patient to call for assistance with activity based on assessment  - Modify environment to reduce risk of injury  - Consider OT/PT consult to assist with strengthening/mobility  Outcome: Progressing     Problem: Nutrition/Hydration-ADULT  Goal: Nutrient/Hydration intake appropriate for improving, restoring or maintaining nutritional needs  Description: Monitor and assess patient's nutrition/hydration status for malnutrition  Collaborate with interdisciplinary team and initiate plan and interventions as ordered  Monitor patient's weight and dietary intake as ordered or per policy  Utilize nutrition screening tool and intervene as necessary  Determine patient's food preferences and provide high-protein, high-caloric foods as appropriate       INTERVENTIONS:  - Monitor oral intake, urinary output, labs, and treatment plans  - Assess nutrition and hydration status and recommend course of action  - Evaluate amount of meals eaten  - Assist patient with eating if necessary   - Allow adequate time for meals  - Recommend/ encourage appropriate diets, oral nutritional supplements, and vitamin/mineral supplements  - Order, calculate, and assess calorie counts as needed  - Recommend, monitor, and adjust tube feedings and TPN/PPN based on assessed needs  - Assess need for intravenous fluids  - Provide specific nutrition/hydration education as appropriate  - Include patient/family/caregiver in decisions related to nutrition  Outcome: Progressing

## 2021-03-17 NOTE — PHYSICAL THERAPY NOTE
Physical Therapy Cancellation Note           03/17/21 0748   PT Last Visit   PT Visit Date 03/17/21   Note Type   Note type Evaluation   Recommendation   Additional Comments referral received for PT eval and tx  attempted to see pt for PT eval but Justin Siddiqi reports pt is in process of being intubated  PT eval is not indicated due to pt's medical status  will follow and initiate PT as appropriate       Leo Garcia, PT

## 2021-03-17 NOTE — PROCEDURES
Arterial Line Insertion    Date/Time: 3/17/2021 2:11 AM  Performed by: BJ Degroot  Authorized by: BJ Degroot     Patient location:  Bedside and ICU  Consent:     Consent obtained:  Emergent situation  Universal protocol:     Required blood products, implants, devices, and special equipment available: yes      Site/side marked: yes      Immediately prior to procedure a time out was called: yes      Patient identity confirmed:  Arm band and hospital-assigned identification number  Indications:     Indications: hemodynamic monitoring, continuous blood pressure monitoring and frequent labs / infusion    Pre-procedure details:     Skin preparation:  Chlorhexidine    Preparation: Patient was prepped and draped in sterile fashion    Anesthesia (see MAR for exact dosages): Anesthesia method:  Local infiltration    Local anesthetic:  Lidocaine 1% w/o epi  Procedure details:     Location / Tip of Catheter:  Radial    Laterality:  Right    Andrzej's test performed: yes      Andrzej's test abnormal: no      Needle gauge:  20 G    Placement technique:  Rheadinger    Number of attempts:  1    Successful placement: yes      Transducer: waveform confirmed    Post-procedure details:     Post-procedure:  Sterile dressing applied and sutured    CMS:  Normal    Patient tolerance of procedure:   Tolerated well, no immediate complications

## 2021-03-17 NOTE — ASSESSMENT & PLAN NOTE
· Likely due to volume depletion and septic shock    · Volume resuscitation as above   · Trend LFTs  · CMP in am

## 2021-03-17 NOTE — PROGRESS NOTES
Daily Progress Note - Critical Care   Natalia Benedict 70 y o  male MRN: [id number]  Unit/Bed#: ICU 6 Encounter: [id number]        ----------------------------------------------------------------------------------------  HPI/24hr events  70 y o male, with Hx of hypertension, atrial fibrillation with AV node ablation and biventricular pacemaker implant, CHF, multiple myeloma, atherosclerotic disease of lower extremities and chronic kidney disease stage 3  Was admitted with septic shock, severe metabolic acidosis, acute kidney injury, hyperkalemia as well acute respiratory failure with hypoxia  Overnight patient was on CRRT (four hours), nephrology on board  Was started on vancomycin, cefepime and Flagyl, on bicarb drip as well Levophed drip  Patient was hypothermic and a Brent Hugger was ordered  Since admission patient remained tachypneic on high-flow nasal cannula,  was very agitated on Precedex  Around 7: 50 a m  patient was intubated for airway protection  Patient developed ecchymosis and large blister on the right thigh above the dressing for the ulcer  Radial pulse on Doppler present  General surgery consulted  I/O's:  3 9 L/984 mL/+2  9L   Arterial line 3/17  Precedex 0 8 mcg/kg/h  Norepinephrine 20 mcg/min  Propofol 20 mcg/kg/min  Vasopressin 0 04   Bicarb 250 mL/hour  ETT:    History from chart review  ---------------------------------------------------------------------------------------  SUBJECTIVE  Patient agitated, saying "stop it, leave me alone"    Review of Systems   Unable to perform ROS: Mental status change     Review of systems was unable to be performed secondary to Mentation  ---------------------------------------------------------------------------------------  Assessment and Plan:    Neuro:    Diagnosis:  Acute metabolic encephalopathy  o Likely due to uremia /septic shock   o Treat the above etiology   Diagnosis:  Sedation  o Plan: On Precedex, propofol    Continue drips  o Daily RASS score      CV:    Diagnosis:  Atrial fibrillation with AV node ablation and biventricular pacemaker implant  o currently looks to be in paced rhythm,   o Hold metoprolol 25 due to hypotension  o Not on oral anticoagulation as home medication  Patient with thrombocytopenia  o Monitor    Diagnosis:  Chronic systolic and diastolic heart failure  o Left echo 2020 showed ejection fraction of 50%, right ventricle systolic function mildly reduced   o BNP elevated 12792  Patient not fluid overload  Monitor with IV resuscitation  · Diagnosis:  History of hypertension  ·           Hypotension POA  Hold home lisinopril and metoprolol  Monitor blood pressure   Diagnosis:  Peripheral artery disease of the right lower extremity with ulceration of ankle , venous insufficiency   o Follows up with Wound Care as outpatient  Patient with multiple venous ulcers , likely the source of infection   As well patient developed blisters above the thigh dressing  o  Doppler signals showed present pulse  o  11/2020 wound culture grow MRSA  o Plan:  General surgery consult  o           Wound care, wound culture   o            Continue Antibiotics    Pulm:   Diagnosis:  Acute respiratory failure with hypoxia   o On high-flow nasal cannula  Chest x-ray without infiltrate/consolidation, CTA chest without PE   o Patient was intubated this morning for airway protection     GI:    Diagnosis:  Elevated transaminase level  o Patient has abdominal tenderness at palpation   o CT abdomen and abdominal ultrasound showed cholelithiasis without cholecystitis, distended gallbladder  o Likely secondary to septic shock and ischemic hepatitis Plan:  Maintain SBP over 90 mmHg  o           Treat infection  o            Monitor daily CMP  o            Hold statin      :    Diagnosis:  Acute kidney injury on CKD 3   o Baseline creatinine 1 0-1 8, creatinine upon admission 6 58 mL/dL, improved to 5  o Unclear etiology  Prerenal secondary to hypovolemia vs  ATN secondary to sepsis as well prolonged hypoperfusion vs obstructive vs MM/leukocytosis   Most likely ATN  Nephrology on board  o  Plan: continue IV fluids,   o           Maintain Map > 65                  F/E/N:    Diagnosis: hyperkalemia   o Likely secondary to PALOMA + rhabdomyolysis   o 8 2 creatinine at admission, decreased to 5 4  o Received calcium gluconate, insulin and D5W    Plan:  Monitor bmp      Diagnosis: hyperphosphatemia  o 7 7 phosphate at admission   o Likely secondary to PALOMA/rhabdo   o  Plan: monitor    Diagnosis:  rhabdomyolysis    o Likely due to infection  Based on LE findings, muscle compression not excluded   o  Plan: R/o compartment syndrome  o           Correct electrolytes abnormalities     Diagnosis: metabolic acidosis with high anion gap/ respiratory alkalosis (appropriately compensated winter formula 97-54) / metabolic alkalosis   o  likely secondary to uremia and lactic acidosis   o  Plan: treat infection/ uremia  o           Continue bicarb drip   o           Monitor      Diagnosis: hyperuricemia   o Likely secondary to dehydration/ rhabdomyolysis   o 16 4 uric acid   o Received rasburicase since admission   o Plan: iv fluids, monitor    o            Heme/Onc:    Diagnosis: MM  o Recently diagnosed in 2020 sec to a fracture   o Untreated   o Ionized Ca 0 8 ( low)   o  Plan: hematology oncology    Diagnosis: chronic anemia  o Likely MM related   o monitor    Diagnosis: severe leukocytosis   o Infection/ malignancy/dehydration   o 102 POA decreased to 51 1   o Plan: continue abx and iv fluids, hematology oncology consult  monitor    Diagnosis: chronic thrombocytopenia   o 82 platelets this morning (moderate)   Unclear etiology    o DVT prophylaxis on heparin   o Plan: monitor     Endo:   · No active issues      ID:    Diagnosis: septic shock   o Tachypnea, hypothermia, leukocytosis, lactic acid 10 8 , procalcitonin 41 5, hypotension o Likely the source is wounds infection   o Volume resuscitation completed with 30 ml/kg per sepsis protocol  Was started on levophed, Vasopressin as well continued iv fluids  Was started on vancomycin, cefepime, flagyl    o Plan: continue vancomycin, flagyl, cefepime for now,  Deescalate based on MRSA/ blood culture   o         Continue iv  fluids   o         Cont  levophed and vasopressin to maintain MAP > 60   o          MRSA culture   o         Cortisol random to rule out adrenal insufficiency ( less likely, pt is on chronic prednisone)  o          Trend wbc, VS, lactic acid q2 h until normalized,   o           Follow up for blood culture     MSK/Skin:    Diagnosis: venous wounds with blisters   o Plan: as above  Disposition: Continue Critical Care   Code Status: Level 1 - Full Code  ---------------------------------------------------------------------------------------  ICU CORE MEASURES    Prophylaxis   VTE Pharmacologic Prophylaxis: Heparin  VTE Mechanical Prophylaxis: sequential compression device and reason for no mechanical VTE prophylaxis LE ulcers   Stress Ulcer Prophylaxis: Prophylaxis Not Indicated     ABCDE Protocol (if indicated)  Plan to perform spontaneous awakening trial today? No  Plan to perform spontaneous breathing trial today? No  Obvious barriers to extubation?  Yes  CAM-ICU: Positive    Invasive Devices Review  Invasive Devices     Peripheral Intravenous Line            Peripheral IV 03/16/21 Left Antecubital less than 1 day    Peripheral IV 03/16/21 Left Hand less than 1 day    Peripheral IV 03/16/21 Right Antecubital less than 1 day    Peripheral IV 03/16/21 Right Hand less than 1 day          Arterial Line            Arterial Line 03/17/21 Radial less than 1 day          Hemodialysis Catheter            HD Temporary Double Catheter less than 1 day          Drain            Urethral Catheter Temperature probe 16 Fr  less than 1 day              Can any invasive devices be discontinued today? No  ---------------------------------------------------------------------------------------  OBJECTIVE    Vitals   Vitals:    21 0630 21 0645 21 0700 21 0725   BP: 136/64 135/63 130/59    BP Location:       Pulse: 80 81 80    Resp: (!) 26 (!) 25 (!) 29    Temp: (!) 97 2 °F (36 2 °C) 97 5 °F (36 4 °C) 98 1 °F (36 7 °C) 99 °F (37 2 °C)   TempSrc:       SpO2: 100% 100% 100%    Weight:       Height:         Temp (24hrs), Av 1 °F (36 2 °C), Min:95 °F (35 °C), Max:99 3 °F (37 4 °C)  Current: Temperature: 99 °F (37 2 °C)  HR: 38-82 bpm  BP:80/47- 147/65  RR: 16-58  SpO2: % on HFNC    Respiratory:  SpO2 Device: O2 Device: Ventilator       Invasive/non-invasive ventilation settings   Respiratory    Lab Data (Last 4 hours)       0417            pH, Arterial       7 421           pCO2, Arterial       20 6           pO2, Arterial       142 6           HCO3, Arterial       13 1           Base Excess, Arterial       -9 9                O2/Vent Data        025   Most Recent        Non-Invasive Ventilation Mode HFNC (High flow)                    Physical Exam  Vitals signs reviewed  Constitutional:       General: He is in acute distress  Appearance: He is ill-appearing and toxic-appearing  He is not diaphoretic  HENT:      Head: Normocephalic  Eyes:      General: No scleral icterus  Right eye: No discharge  Left eye: No discharge  Comments: General erythematous, mild palpebral edema and rash( per notes, since 3/5)    Cardiovascular:      Rate and Rhythm: Normal rate and regular rhythm  Pulmonary:      Breath sounds: No wheezing or rales  Comments: Tachypnea   Abdominal:      General: There is distension  Palpations: Abdomen is soft  Tenderness: There is abdominal tenderness (generalized )  There is no guarding or rebound     Genitourinary:     Comments: Abbott catheter   Musculoskeletal:         General: Swelling (hands ) present  Right lower leg: Edema present  Left lower leg: No edema  Skin:     General: Skin is dry  Coloration: Skin is pale  Findings: Erythema and lesion present  Comments: Chronic venous stasis left  Open venous wounds that looks infected, oozing, edema  Posterior thigh wounds with the dressing on, blisters above the dressing and ecchymosis      Neurological:      Mental Status: He is alert  Comments: Can not assess    Psychiatric:         Behavior: Behavior is agitated           Laboratory and Diagnostics:  Results from last 7 days   Lab Units 03/17/21  0516 03/16/21 2201 03/16/21  1849 03/16/21  1741   WBC Thousand/uL 51 17* 72 39*  --  102 97*   HEMOGLOBIN g/dL 7 7* 8 5*  --  11 3*   I STAT HEMOGLOBIN g/dl  --   --  9 2*  --    HEMATOCRIT % 23 2* 27 7*  --  37 4   HEMATOCRIT, ISTAT %  --   --  27*  --    PLATELETS Thousands/uL 82* 116*  --  180   BANDS PCT % 13* 4  --   --    MONO PCT % 6 1*  --  6     Results from last 7 days   Lab Units 03/17/21  0420 03/17/21  0049 03/16/21 2201 03/16/21  1947/21  1849 03/16/21  1741   SODIUM mmol/L 136 135* 135* 137  --  136   POTASSIUM mmol/L 5 6* 6 7* 7 4* 7 7*  --  8 2*   CHLORIDE mmol/L 97* 96* 95* 101  --  101   CO2 mmol/L 13* 11* 10* 8*  --  6*   CO2, I-STAT mmol/L  --   --   --   --  5*  --    ANION GAP mmol/L 26* 28* 30* 28*  --  29*   BUN mg/dL 104* 115* 118* 116*  --  117*   CREATININE mg/dL 5 13* 5 95* 6 33* 6 15*  --  6 58*   CALCIUM mg/dL 6 9* 7 9* 7 1* 7 2*  --  8 0*   GLUCOSE RANDOM mg/dL 127 159* 148* 110  --  54*   ALT U/L 190*  --   --   --   --  128*   AST U/L 548*  --   --   --   --  208*   ALK PHOS U/L 215*  --   --   --   --  232*   ALBUMIN g/dL 1 7*  --   --   --   --  2 5*   TOTAL BILIRUBIN mg/dL 2 14*  --   --   --   --  1 70*     Results from last 7 days   Lab Units 03/17/21  0420 03/17/21  0049 03/16/21  2201 03/16/21  1741   MAGNESIUM mg/dL 2 4 2 7* 2 8* 3 3*   PHOSPHORUS mg/dL 7 7* 9 6*  --  13 9* Results from last 7 days   Lab Units 03/16/21  1741   INR  2 65*   PTT seconds 42*          Results from last 7 days   Lab Units 03/17/21  0629 03/17/21  0425 03/17/21  0049 03/16/21 2201 03/16/21  1947/21  1741   LACTIC ACID mmol/L 9 6* 7 9* 9 0* 10 8* 10 8* 9 6*     ABG:  Results from last 7 days   Lab Units 03/17/21  0417   PH ART  7 421   PCO2 ART mm Hg 20 6*   PO2 ART mm Hg 142 6*   HCO3 ART mmol/L 13 1*   BASE EXC ART mmol/L -9 9   ABG SOURCE  Line, Arterial     VBG:  Results from last 7 days   Lab Units 03/17/21  0420 03/17/21  0417   PH SIDDHARTH  7 367  --    PCO2 SIDDHARTH mm Hg 27 9*  --    PO2 SIDDHARTH mm Hg 43 5  --    HCO3 SIDDHARTH mmol/L 15 7*  --    BASE EXC SIDDHARTH mmol/L -8 6  --    ABG SOURCE   --  Line, Arterial     Results from last 7 days   Lab Units 03/16/21 2201 03/16/21  1741   PROCALCITONIN ng/ml 41 52* 30 37*       Micro          Imaging: I have personally reviewed pertinent reports  Intake and Output  I/O       03/15 0701 - 03/16 0700 03/16 0701 - 03/17 0700 03/17 0701 - 03/18 0700    I V  (mL/kg)  3320 7 (50 5)     IV Piggyback  614     Total Intake(mL/kg)  3934 7 (59 8)     Urine (mL/kg/hr)  490     Other  494     Total Output  984     Net  +2950 7                UOP: 984 ml SA    Height and Weights   Height: 5' 10" (177 8 cm)  IBW: 73 kg  Body mass index is 20 81 kg/m²  Weight (last 2 days)     Date/Time   Weight    03/16/21 2200   65 8 (145 06)                Nutrition       Diet Orders   (From admission, onward)             Start     Ordered    03/16/21 2214  Room Service  Once     Question:  Type of Service  Answer:  Room Service- Not Appropriate    03/16/21 2213 03/16/21 2146  Diet NPO  Diet effective now     Question Answer Comment   Diet Type NPO    RD to adjust diet per protocol?  Yes        03/16/21 2155                    Active Medications  Scheduled Meds:  Current Facility-Administered Medications   Medication Dose Route Frequency Provider Last Rate    cefepime  1,000 mg Intravenous Q24H Yareli Hotter, CRNP Stopped (03/17/21 0423)    clotrimazole-betamethasone   Topical BID Yareli Hotter, CRNP      dexmedetomidine  0 1-1 5 mcg/kg/hr Intravenous Titrated SRINIVASAN McguireNP 1 2 mcg/kg/hr (32/65/35 9110)    folic acid  1 mg Oral Daily Yareli Hotter, CRNP      heparin (porcine)  5,000 Units Subcutaneous [facility] Yareli Hotter, [address]      metroNIDAZOLE  500 mg Intravenous Q8H Yareli Hotter, CRNP Stopped (03/17/21 0600)    norepinephrine  1-30 mcg/min Intravenous Titrated Yareli Hotter, CRNP 10 mcg/min (03/17/21 0732)    NxStage K 2/Ca 3  20,000 mL Dialysis Continuous Jairo Leal MD      pantoprazole  40 mg Intravenous Q24H [facility] Yareli Hotter, CRNP      sodium bicarbonate infusion  250 mL/hr Intravenous Continuous Yareli Hotter, CRNP 250 mL/hr (03/17/21 0423)    vancomycin  15 mg/kg Intravenous Daily PRN Yareli Hotter, CRNP      vasopressin  0 04 Units/min Intravenous Continuous Chayo Lr MD       Continuous Infusions:  dexmedetomidine, 0 1-1 5 mcg/kg/hr, Last Rate: 1 2 mcg/kg/hr (03/17/21 0727)  norepinephrine, 1-30 mcg/min, Last Rate: 10 mcg/min (03/17/21 0732)  NxStage K 2/Ca 3, 20,000 mL  sodium bicarbonate infusion, 250 mL/hr, Last Rate: 250 mL/hr (03/17/21 0423)  vasopressin, 0 04 Units/min      PRN Meds:   vancomycin, 15 mg/kg, Daily PRN        Allergies   Allergies   Allergen Reactions    Blood-Group Specific Substance      Needed benadryl with blood transfusion because of reaction     ---------------------------------------------------------------------------------------  Advance Directive and Living Will:      Power of :    POLST:    ---------------------------------------------------------------------------------------  Care Time Delivered:   61 min     Rianna Mena MD      Portions of the record may have been created with voice recognition software    Occasional wrong word or "sound a like" substitutions may have occurred due to the inherent limitations of voice recognition software    Read the chart carefully and recognize, using context, where substitutions have occurred

## 2021-03-17 NOTE — PROGRESS NOTES
[address] [facility] NOTE   Madelaine Saavedra 70 y o  male MRN: [id number]  Unit/Bed#: ICU 6 Encounter: [id number]  Reason for Consult: PALOMA on CKD    ASSESSMENT and PLAN:  Madelaine Saavedra is a 70 y o  male with CKD, IgG Kappa, LE ulcers and wounds, PVD, HTN, history of urinary retention requiring straight cath at home prior, chronic ITP, a fib, CHF, who was transferred from [facility] to [facility] for escalation of care due to renal failure, hyperkalemia, lactic acidosis, shock and leukocytosis >100K  A renal consultation is requested today for assistance in the management of PALOMA on CKD  1  Acute kidney injury (POA):  · Presented on 03/16/2021 after a change in mental status; creatinine up to 6 58  Concern for vascular compromise/infection right leg/septic shock  Also concern for tumor lysis  · Started on CVVHD  Received 4 hours of treatment  Therapy interrupted due to clotting/difficulty performing therapy due to agitation  Patient intubated and sedated to help with performing adequate therapy  · Resuming CVVHD this morning after CT of the leg  · Workup:    · Imaging:  CT abdomen and pelvis with contrast 3/16, no hydronephrosis  · Urinalysis:  Trace blood, field obscured by RBCs, trace leukocytes, 2-4 WBCs, occasional bacteria  · CK increasing, currently up to 11,298  · High suspicion for TLS  · Etiology:  Likely ATN in the setting of septic shock, rhabdomyolysis, tumor lysis s/p rasburicase  Altered renal hemodynamics due to lisinopril  Received contrast with CT on admission  History of urinary retention, straight caths at home  No hydronephrosis on imaging  · Plan/recommendations:  · Continue CVVHD no change in current prescription  Run even  · Abbott catheter, strict I&O  Beginning to make more urine  · Try to avoid contrast  · Sodium bicarbonate infusion  · Surgery evaluating right leg  · Continue to hold lisinopril  2   Chronic kidney disease, stage III:  Baseline creatinine 1 3-1 7 as of November 2020  3  Hyperkalemia:  EKG changes present  · Potassium 8 2 on admission  Concern due to leukocytosis but also elevated on i-STAT (>8)  · Potassium down to 5 6 with the assistance of bicarbonate containing IV fluids/RRT  · Will continue 2 K bath since potassium generation will likely persist  4  Anion gap metabolic acidosis:  · Lactic acid :  Lactic acid 9 6 on admission peak peak at 10 8 with improvement to 7 9 and currently at 9 6  · Initial pH 7 0, repeat studies current VBG 7 36  · Continue CRRT  5  Septic shock:    · Status post resuscitation with fluids which included bicarbonate containing IV fluids due to severe acidosis  · CT of the abdomen and pelvis markedly distended gallbladder without pericholecystic inflammatory changes  · Chest x-ray no significant findings  · Antibiotics per primary team  · On Levophed, vasopressin  · Status post volume resuscitation  6  Hyperphosphatemia:    7  Abnormal liver function studies:  · Right upper quadrant ultrasound:  Liver size within normal range with normal echogenicity  Gallbladder abnormally distended multiple calculi and sludge noted  · Likely related to shock liver  8  Leukocytosis:  History of multiple myeloma diagnosed 07/2020  · WBC count >100K on admission, currently down to 51 17  · Hyperphosphatemia: Phosphorus level 13 9 on admission, currently down to 7 7  · Tumor lysis suspected status post rasburicase  · Oncology consult pending  9  Anemia/thrombocytopenia:  Hemoglobin 11 3 on admission with volume resuscitation declined and currently 7 7  Schistocytes noted  10  Cardiomyopathy:  · Prior echocardiogram July 2020:  Ejection fraction 50%  · Repeat echo pending  11  Atrial fibrillation:  Management per primary team  12  Change in mental status:  CT of the head negative for acute pathology  13  PVD/Right leg wounds concern for vascular compromise:  Possible source of infection  Surgery evaluating    CT pending    DISPOSITION:  Continue CVVHD  No change in current prescription    SUBJECTIVE / 24H INTERVAL HISTORY:  Intubated, sedated    OBJECTIVE:  Current Weight: Weight - Scale: 65 8 kg (145 lb 1 oz)  Vitals:    03/17/21 0645 03/17/21 0700 03/17/21 0725 03/17/21 0730   BP: 135/63 130/59  102/51   BP Location:       Pulse: 81 80  80   Resp: (!) 25 (!) 29  (!) 30   Temp: 97 5 °F (36 4 °C) 98 1 °F (36 7 °C) 99 °F (37 2 °C) 99 °F (37 2 °C)   TempSrc:       SpO2: 100% 100%  100%   Weight:       Height:           Intake/Output Summary (Last 24 hours) at 3/17/2021 0755  Last data filed at 3/17/2021 0615  Gross per 24 hour   Intake 3934 74 ml   Output 984 ml   Net 2950 74 ml     General:  Critically ill, chronically debilitated appearing  Skin: no rash, pale  Eyes: anicteric sclera  ENT:  Oral endotracheal to  Neck: supple, no JVD appreciated  Chest:  Coarse breath sounds anteriorly  Equal breath sounds  Tachypneic  CVS: s1s2, no murmur, no gallop, no rub  Irregular rhythm  Abdomen:  Slightly rounded, soft  Extremities:  Left lower extremity warm, no significant edema  Color changes as seen with venous insufficiency  Right lower extremity severe wounds and evidence of vascular compromise  Near groin area blistering and edema with color change    Malodorous  :  garrison  Neuro:  Sedated    Medications:    Current Facility-Administered Medications:     cefepime (MAXIPIME) 1,000 mg in dextrose 5 % 50 mL IVPB, 1,000 mg, Intravenous, Q24H, Merced Presser, CRNP, Stopped at 03/17/21 0423    clotrimazole-betamethasone (LOTRISONE) 1-0 05 % cream, , Topical, BID, Merced Presser, CRNP, Stopped at 03/16/21 2313    dexmedetomidine (PRECEDEX) 400 mcg in sodium chloride 0 9% 100 ml IVPB, 0 1-1 5 mcg/kg/hr, Intravenous, Titrated, Bebe Cornet, CRNP, Last Rate: 13 2 mL/hr at 03/17/21 0755, 0 8 mcg/kg/hr at 03/17/21 0755    etomidate (AMIDATE) 2 mg/mL injection 20 mg, 20 mg, Intravenous, Once, BJ Meyers    fentanyl citrate (PF) 100 MCG/2ML 50 mcg, 50 mcg, Intravenous, Q1H PRN, Tawnya Fink MD    folic acid (FOLVITE) tablet 1 mg, 1 mg, Oral, Daily, BJ Ardon    heparin (porcine) subcutaneous injection 5,000 Units, 5,000 Units, Subcutaneous, Q8H [facility], 5,000 Units at 03/17/21 0516 **AND** [CANCELED] Platelet count, , , Once, BJ Ardon    metroNIDAZOLE (FLAGYL) IVPB (premix) 500 mg 100 mL, 500 mg, Intravenous, Q8H, BJ Ardon, Stopped at 03/17/21 0600    NOREPINEPHRINE 4 MG  ML NSS (CMPD ORDER) infusion, 1-30 mcg/min, Intravenous, Titrated, BJ Ardon, Last Rate: 75 mL/hr at 03/17/21 0738, 20 mcg/min at 03/17/21 0738    NxStage K 2/Ca 3 dialysis solution (RFP-400) 20,000 mL, 20,000 mL, Dialysis, Continuous, Orquidea Ortiz MD, 20,000 mL at 03/17/21 0053    pantoprazole (PROTONIX) injection 40 mg, 40 mg, Intravenous, Q24H [facility], BJ Ardon    propofol (DIPRIVAN) 1000 mg in 100 mL infusion (premix), 5-50 mcg/kg/min, Intravenous, Titrated, BJ Walker, Last Rate: 7 9 mL/hr at 03/17/21 0752, 20 mcg/kg/min at 03/17/21 0752    sodium bicarbonate 150 mEq in dextrose 5 % 1,000 mL infusion, 250 mL/hr, Intravenous, Continuous, BJ Ardon, Last Rate: 250 mL/hr at 03/17/21 0423, 250 mL/hr at 03/17/21 0423    vancomycin (VANCOCIN) IVPB (premix in dextrose) 1,000 mg 200 mL, 15 mg/kg, Intravenous, Daily PRN, BJ Ardon    vasopressin (PITRESSIN) 20 Units in sodium chloride 0 9 % 100 mL infusion, 0 04 Units/min, Intravenous, Continuous, Dori Valente MD, Last Rate: 12 mL/hr at 03/17/21 0740, 0 04 Units/min at 03/17/21 0740    vecuronium (NORCURON) injection 10 mg, 10 mg, Intravenous, Once, Tawnya Fink MD    Laboratory Results:  Results from last 7 days   Lab Units 03/17/21  0516 03/17/21  0420 03/17/21  0049 03/16/21  2201 03/16/21  1947/21  1849 03/16/21  1741   WBC Thousand/uL 51 17*  --   --  72 39*  --   --  102 97*   HEMOGLOBIN g/dL 7 7*  -- --  8 5*  --   --  11 3*   I STAT HEMOGLOBIN g/dl  --   --   --   --   --  9 2*  --    HEMATOCRIT % 23 2*  --   --  27 7*  --   --  37 4   HEMATOCRIT, ISTAT %  --   --   --   --   --  27*  --    PLATELETS Thousands/uL 82*  --   --  116*  --   --  180   POTASSIUM mmol/L  --  5 6* 6 7* 7 4* 7 7*  --  8 2*   CHLORIDE mmol/L  --  97* 96* 95* 101  --  101   CO2 mmol/L  --  13* 11* 10* 8*  --  6*   CO2, I-STAT mmol/L  --   --   --   --   --  5*  --    BUN mg/dL  --  104* 115* 118* 116*  --  117*   CREATININE mg/dL  --  5 13* 5 95* 6 33* 6 15*  --  6 58*   CALCIUM mg/dL  --  6 9* 7 9* 7 1* 7 2*  --  8 0*   MAGNESIUM mg/dL  --  2 4 2 7* 2 8*  --   --  3 3*   PHOSPHORUS mg/dL  --  7 7* 9 6*  --   --   --  13 9*   GLUCOSE, ISTAT mg/dl  --   --   --   --   --  182*  --

## 2021-03-18 NOTE — DISCHARGE SUMMARY
Discharge Summary - Sandra Mayfield 70 y o  male MRN: [id number]    Unit/Bed#: ICU 11 Encounter: [id number] PCP: Vaishali Wong DO    Admission Date:   Admission Orders (From admission, onward)     Ordered        03/16/21 2143  Inpatient Admission  Once                     Admitting Diagnosis: Sepsis ([facility] [address] ) [A41 9]  Renal failure [N19]  Acute encephalopathy [G93 40]    HPI: Sandra Mayfield is a 70 y o  male who presents with past medical history significant for hypertension, atrial fibrillation, dilated cardiomyopathy, multiple myeloma, atherosclerotic disease of lower extremities, and CKD 3  Today he presents to the emergency department at [facility] for evaluation of worsening mental status, lethargy  Reportedly patient's significant other, Marcus Javieron, states that he has been not feeling well and believes he has been having how ill he feels  Significant other also noted that he slid from couch yesterday and needed assistance getting him back into a seated position  Initial labs revealed severe metabolic acidosis with arterial pH 6 9, serum bicarb 5, lactic acidosis 9 6, acute kidney injury with creatinine 6 58, hyperkalemia 8 2, with EKG changes  Chest x-ray and CTA of chest negative  UA with nitrites and leuks  He was identified as severe sepsis given volume resuscitation, blood cultures, urine culture and IV antibiotics were started  Given the need for likely CRRT therapy patient was transferred to [address] [place] [address] and admitted to the ICU under the critical care medicine service  Procedures Performed:   Orders Placed This Encounter   Procedures    Central Line    Intubation    Temporary HD Catheter       Summary of Hospital Course: The patient underwent HD catheter placement and was started on CVVH  This AM he continued to have worsening acidosis and agitation  He required intubation for airway protection  He was started on multiple vasopressors for refractory septic shock  RLE concerning for necrotizing fascitis  LE duplex demonstrated decreased arterial flow to RLE  Emergent consultations to vascular surgery and general surgery were made, and decision was made not to pursue amputation given futility and likely non-survivability  Critical care attempted to reach patients significant other, however this was unsuccessful  Discussion was held with critical care, surgery, and nephrology  Since patient was not a candidate for surgical amputation, the decision was made to transition to level 2 DNR given futility of further care  Dr Leanne Perez discussed this further with the patients s/o Елена Rodriges when he was able to reach her yesterday afternoon  The patient was continued on supportive measure including vent, CVVH, and vasopressors without further escalation of care  The patient passed away at [address]  Significant Findings, Care, Treatment and Services Provided:   3/16 RIJ HD catheter  3/17 arterial line  3/17 intubation  3/17 LIJ TLC    3/16 BC + GPC in pairs and chains    VAS lower limb venous duplex study, complete bilateral   Final Result      VAS ivc/iliac veins duplex; complete study   Final Result      CT lower extremity wo contrast right   Final Result      No fluid collection noted      No foci of gas or air are noted within the thigh      Skin thickening with blistering and at the anterolateral aspect of the distal thigh and posterior aspect of the distal thigh      A 2 7 x 2 7 cm a lesion with marginal calcification in the medial aspect of the thigh in relation of the gracilis muscle, indeterminate May be chronic hematoma or may be a soft tissue mass lesion    Consider nonemergent evaluation with the MRI with    contrast for further characterization      A follow-up notification has been created in the Epic      Workstation performed: [id number]         CT pelvis wo contrast   Final Result      Superficial cellulitis in the right hip and pelvis area extending to the right thigh   There is no intramuscular fluid collection seen suggest any abscess  There are no foci of soft tissue air or gas around the hip and pelvis area      A 2 7 x 2 7 cm lesion in the proximal medial aspect of the right thigh in the region of the right gracilis muscle, suggest nonemergent evaluation with MRI for further characterization  This may be a chronic hematoma or less likely neoplastic  Markedly dilated left ureter and mildly dilated right ureter   Enlarged prostate gland   Bilateral renal cysts  Abbott catheter in place   Distended gallbladder   Splenomegaly   A follow-up notification has been created in [employer] performed: [id number]         XR chest portable ICU   Final Result      Satisfactory line and tube positioning  No visible pneumothorax  Workstation performed: [id number]         CT lower extremity wo contrast right   Final Result      Cellulitic changes in the leg  No foci of gas or air noted in the leg   No intramuscular abscess   Moderate-sized knee effusion   Diffuse mineralization of the calf  vasculature related  to atherosclerotic disease         Workstation performed: [id number]         XR chest portable ICU   Final Result      No acute cardiopulmonary disease  Slightly high endotracheal tube, 9 cm above the eli  Workstation performed: [id number]         XR chest portable ICU   Final Result      No acute cardiopulmonary disease  Right jugular catheter in upper SVC with no pneumothorax  Workstation performed: [id number]         US right upper quadrant   Final Result      Cholelithiasis  Prominently distended gallbladder without sonographic signs of cholecystitis         Workstation performed: [id number]                 Disposition:      Final Diagnosis: Septic shock     Resolved Problems  Date Reviewed: 3/17/2021    None              Date, Time and Cause of Death    Date of Death: 3/18/21  Time of Death:  1:57 AM  Preliminary Cause of Death: Septic shock ([facility] [address] )  Entered by: [employer], CRNP [EW1 1]     Attribution     EW1 1 [employer], CRNP 21 02:07          Death Note:    INPATIENT DEATH NOTE  Ry Weiss 70 y o  male MRN: [id number]  Unit/Bed#: ICU 6 Encounter: [id number]    Date, Time and Cause of Death    Date of Death: 3/18/21  Time of Death:  1:57 AM  Preliminary Cause of Death: Septic shock ([facility] [address] )  Entered by: [employer], CRNP [EW 1]     Attribution     EW1 1 [employer], CRNP 21 02:07           Patient's Information  Pronounced by: ASHLEIGH Orourke  Did the patient's death occur in the ED?: No  Did the patient's death occur in the OR?: No  Did the patient's death occur less than 10 days post-op?: No  Did the patient's death occur within 24 hours of admission?: No  Was code status DNR at the time of death?: Yes    PHYSICAL EXAM:  Unresponsive to noxious stimuli, Spontaneous respirations absent, Breath sounds absent, Carotid pulse absent, Heart sounds absent, Pupillary light reflex absent and Corneal blink reflex absent    Medical Examiner notification criteria:  NONE APPLICABLE   Medical Examiner's office notified?:  Yes - pending nursing notification  Medical Examiner accepted case?: pending nursing notification      Family Notification  Was the family notified?: Yes(attempted to call s/o Duc Escobar, left message, will call again)   Will continue to make attempts to call s/o Duc Escobar  Left voicemail with instructions for call back  Autopsy Options:  Decision for post-mortem examination not yet made by next of kin      Primary Service Attending Physician notified?:  yes - Attending:  Mars Mccann MD    Physician/Resident responsible for completing Discharge Summary:  [employer], CNRP

## 2021-03-18 NOTE — DEATH NOTE
INPATIENT DEATH NOTE  Natalia Benedict 70 y o  male MRN: [id number]  Unit/Bed#: ICU 6 Encounter: [id number]    Date, Time and Cause of Death    Date of Death: 3/18/21  Time of Death:  1:57 AM  Preliminary Cause of Death: Septic shock ([facility] [address] )  Entered by: BJ Dyer [EW1 1]     Attribution     EW1 1 BJ Dyer 21 02:07           Patient's Information  Pronounced by: ASHLEIGH Donohue  Did the patient's death occur in the ED?: No  Did the patient's death occur in the OR?: No  Did the patient's death occur less than 10 days post-op?: No  Did the patient's death occur within 24 hours of admission?: No  Was code status DNR at the time of death?: Yes    PHYSICAL EXAM:  Unresponsive to noxious stimuli, Spontaneous respirations absent, Breath sounds absent, Carotid pulse absent, Heart sounds absent, Pupillary light reflex absent and Corneal blink reflex absent    Medical Examiner notification criteria:  NONE APPLICABLE   Medical Examiner's office notified?:  Yes - pending nursing notification  Medical Examiner accepted case?: pending nursing notification      Family Notification  Was the family notified?: Yes(attempted to call s/o Елена Rodriges, left message, will call again)   Will continue to make attempts to call s/o Елена Rodriges  Left voicemail with instructions for call back  Autopsy Options:  Decision for post-mortem examination not yet made by next of kin      Primary Service Attending Physician notified?:  yes - Attending:  Clarice Rivas MD    Physician/Resident responsible for completing Discharge Summary:  ROSLYN Dyer

## 2021-03-19 LAB
BACTERIA BLD CULT: ABNORMAL
BACTERIA BLD CULT: ABNORMAL
GRAM STN SPEC: ABNORMAL
GRAM STN SPEC: ABNORMAL
SCAN RESULT: NORMAL

## 2021-03-20 LAB
BACTERIA BLD CULT: ABNORMAL
BACTERIA BLD CULT: ABNORMAL
GRAM STN SPEC: ABNORMAL
GRAM STN SPEC: ABNORMAL

## 2021-05-05 DIAGNOSIS — I48.91 ATRIAL FIBRILLATION WITH TACHYCARDIC VENTRICULAR RATE (HCC): ICD-10-CM

## 2021-05-05 RX ORDER — TAMSULOSIN HYDROCHLORIDE 0.4 MG/1
CAPSULE ORAL
Qty: 90 CAPSULE | Refills: 1 | OUTPATIENT
Start: 2021-05-05

## 2022-03-27 NOTE — ASSESSMENT & PLAN NOTE
[facility] Hospitalist Progress Note      Subjective  Feeling much better. Rash and pruritis much improved. In NAD  No CP/ SOB/ Fever/ Chills/ Cough/ Abdominal Pain/ Nausea/ Vomiting/ Diarrhea/ Melena/ BRBPR/ Hematuria/ Dysuria  No Hemoptysis/ Dizziness/ Headache/ Palpitations/ Seizures/ Focal weakness/ LOC/ Confusion/ Disorientation      Last Recorded Vitals  Vitals with min/max:    Vital Last Value 24 Hour Range   Temperature 97.9 °F (36.6 °C) (03/27/22 1133) Temp  Min: 97.5 °F (36.4 °C)  Max: 98.1 °F (36.7 °C)   Pulse 78 (03/27/22 1133) Pulse  Min: 65  Max: 89   Respiratory 18 (03/27/22 1133) Resp  Min: 16  Max: 18   Non-Invasive  Blood Pressure (!) 152/84 (03/27/22 1133) BP  Min: 120/73  Max: 164/82   Pulse Oximetry 98 % (03/27/22 1133) SpO2  Min: 96 %  Max: 99 %   Arterial   Blood Pressure   No data recorded        Body mass index is 40.51 kg/m².    PHYSICAL EXAMINATION  General: Alert and oriented x3, No acute distress.   HEENT: Normocephalic, Oral mucosa is moist.   Respiratory: Lungs are clear to auscultation, Respirations are non-labored  Cardiovascular: Normal rate, Regular rhythm  Gastrointestinal: Soft, Non-tender, Non-distended, Normal bowel sounds.   Musculoskeletal: No tenderness, No edema.   Integumentary: No pallor. Minimal rash/hives lower back improved from yesterday  Neurologic: Alert, Oriented, No focal deficits, Cranial Nerves II-XII are grossly intact.   Cognition and Speech: Oriented, Speech clear and coherent, Functional cognition intact.   Psychiatric: Calm, Cooperative, Appropriate mood & affect, Normal judgment, Non-suicidal.     Current Meds  Current Facility-Administered Medications   Medication Dose Route Frequency Provider Last Rate Last Admin   • dilTIAZem (TIAZAC,CARDIZEM CD) 24 hr capsule 180 mg  180 mg Oral Daily Kita Mccullough MD       • loratadine (CLARITIN) tablet 10 mg  10 mg Oral Daily Kita Mccullough MD       • enoxaparin (LOVENOX) injection 40 mg  40 mg Subcutaneous Daily Chiqui HANSON  Creatinine at baseline, trend daily postop  Close monitoring of I/O status MD Graham   40 mg at 03/27/22 0839   • sodium chloride 0.9 % flush bag 25 mL  25 mL Intravenous PRN Chiqui Stevenson MD       • sodium chloride (PF) 0.9 % injection 2 mL  2 mL Intracatheter 2 times per day Chiqui Stevenson MD   2 mL at 03/27/22 0838   • sodium chloride 0.9% infusion   Intravenous Continuous Kita Mccullough MD 75 mL/hr at 03/27/22 0837 New Bag at 03/27/22 0837   • Potassium Standard Replacement Protocol   Does not apply See Admin Instructions Chiqui Stevenson MD       • Magnesium Standard Replacement Protocol   Does not apply See Admin Instructions Chiqui Stevenson MD       • ondansetron (ZOFRAN) injection 4 mg  4 mg Intravenous BID PRN Chiqui Stevenson MD       • acetaminophen (TYLENOL) tablet 650 mg  650 mg Oral Q4H PRN Chiqiu Stevenson MD       • HYDROcodone-acetaminophen (NORCO) 5-325 MG per tablet 1 tablet  1 tablet Oral Q4H PRN Chiqui Stevenson MD       • docusate sodium-sennosides (SENOKOT S) 50-8.6 MG 2 tablet  2 tablet Oral Daily PRN Chiqui Stevenson MD       • sodium chloride 0.9 % flush bag 25 mL  25 mL Intravenous PRN Chiqui Stevenson MD       • benzonatate (TESSALON PERLES) capsule 100 mg  100 mg Oral TID PRN Chiqui Stevenson MD       • albuterol inhaler 2 puff  2 puff Inhalation Q4H Resp PRN Chiqui Stevenson MD       • docusate sodium (COLACE) capsule 100 mg  100 mg Oral BID PRN Chiqui Stevenson MD       • guaiFENesin-DM) (ROBITUSSIN DM) 100-10 MG/5ML syrup 5 mL  5 mL Oral Q4H PRN Chiqui Stevenson MD       • loperamide (IMODIUM) capsule 2 mg  2 mg Oral PRN Chiqui Stevenson MD       • melatonin tablet 3 mg  3 mg Oral QHS PRN Chiqui Stevenson MD       • simethicone (MYLICON) tablet 125 mg  125 mg Oral 4x Daily PRN Chiqui Stevenson MD       • predniSONE (DELTASONE) tablet 20 mg  20 mg Oral Daily Kita Mccullough MD   20 mg at 03/27/22 0838   • albuterol inhaler 2 puff  2 puff Inhalation Q6H Resp Kita Mccullough MD       • fluticasone-vilanterol (BREO ELLIPTA) 100-25 MCG/INH inhaler 1 puff  1 puff Inhalation  Daily Resp Kita Mccullough MD   1 puff at 03/27/22 0828   • montelukast (SINGULAIR) tablet 10 mg  10 mg Oral Daily Kita Mccullough MD   10 mg at 03/27/22 0838   • famotidine (PEPCID) tablet 20 mg  20 mg Oral 2 times per day Kita Mccullough MD   20 mg at 03/27/22 0838   • hydrOXYzine (ATARAX) tablet 10 mg  10 mg Oral TID Kita Mccullough MD   10 mg at 03/27/22 0838        Labs   Recent Results (from the past 24 hour(s))   Thyroid Stimulating Hormone Reflex    Collection Time: 03/27/22  3:39 AM   Result Value Ref Range    TSH 0.232 (L) 0.350 - 5.000 mcUnits/mL   Free T4    Collection Time: 03/27/22  3:39 AM   Result Value Ref Range    T4, Free 1.1 0.8 - 1.5 ng/dL   Free T4    Collection Time: 03/27/22  3:39 AM   Result Value Ref Range    T4, Free 1.1 0.8 - 1.5 ng/dL   Free T3    Collection Time: 03/27/22  3:39 AM   Result Value Ref Range    T3, Free 2.2 2.2 - 4.0 pg/mL       Imaging  CT HEAD WO CONTRAST   Final Result      1.  Finding compatible with scalp contusion overlying the right frontal   calvarium.     2.  Chronic periventricular and deep white matter ischemic change.   3.  Mild volume loss.      No evidence of intracranial bleed or mass effect.  Preliminary report was   provided to the emergency department following the examination by the   on-call tele-radiologist.      Electronically Signed by: VILMA BILLS M.D.    Signed on: 3/26/2022 6:46 AM          CT CERVICAL SPINE WO CONTRAST   Final Result      1. Subtle small lucencies within the vertebra most likely related to   osteopenia/osteoporosis; I feel the possibility of metastatic/neoplastic   disease is less likely though not entirely excluded.  If warranted, nuclear   bone scan could be considered to characterize this finding further.   2.  At level C3-C4, there is a small central disc protrusion which results   in mild stenosis AP dimension of the spinal canal.   3.  Multilevel degenerative cervical spondylosis as described above.      No evidence of acute  fracture or dislocation.  Preliminary report was   provided to the emergency department following the examination by the   on-call tele-radiologist.      Electronically Signed by: VILMA BILLS M.D.    Signed on: 3/26/2022 7:05 AM          XR CHEST PA AND LATERAL 2 VIEWS   Final Result    FINDINGS with IMPRESSION:    Stable right hilar prominence due to enlarged right main pulmonary artery.           Normal heart size and pulmonary vessel caliber.  Aortic atherosclerotic   calcification.       Slight scarring in lingula.  No consolidation or pleural effusion.       Degenerative changes of spine.  Obesity.                  Electronically Signed by: ANGELINA COOPER M.D.    Signed on: 3/26/2022 7:34 AM               Assessment/Plan    Presyncope  Hypertensive heart disease  Stable on BP meds  2D Echo with ejection fraction 65%, diastolic dysfunction  Troponin negative. EKG Sinus rhythm  CT scan of the brain with scalp contusion, chronic ischemic changes  Chest x-ray showed DJD     Allergic reaction with rash  Per patient, she has not taken any of her medications that she was allergic to  Much better on prednisone, hydroxyzine  Patient has an Allergy to Sulfa, so will discontinue home Lasix  Follow-up with allergist upon discharge     Morbid obesity  Degenerative joint disease  Cervical disc disease  Obstructive sleep apnea  Weight loss urged     History of colon cancer in the past  History of abnormal mammogram  Follow-up with primary care physician as discussed with patient  PCP-Dr Alcaraz had already ordered an Diagnostic Breast Ultrasound as outpt as I discussed with patient and she will follow up with it and her PCP        DVT Prophylaxis   Lovenox    Code Status    Code Status: Full Resuscitation      Stable. DC Home. Follow up with PCP- Mandakini Pokharna, MD and your Breast Ultrasound, and Allergist    Findings and plans discussed at length with patient and verbalized understanding and agreement.       Summary of  your Discharge Medications      Take these Medications      Details   albuterol 108 (90 Base) MCG/ACT inhaler   Inhale 2 puffs into the lungs every 4 hours as needed for Shortness of Breath or Wheezing.     alendronate 35 MG tablet  Commonly known as: FOSAMAX   Take 1 tablet by mouth every 7 days. Take with 10 oz glass of water and stay upright for 2 hours after taking medication Can eat 30 min after taking medicine     dilTIAZem 180 MG 24 hr capsule  Commonly known as: CARDIZEM CD   Take 1 capsule by mouth daily.     EPINEPHrine 0.3 MG/0.3ML auto-injector   Inject 0.3 mLs into the muscle 1 time as needed for Anaphylaxis (for life-threatening allergic reactions only).     famotidine 20 MG tablet  Commonly known as: Pepcid   Take 1 tablet by mouth 2 times daily as needed (pruritis).     fexofenadine 180 MG tablet  Commonly known as: ALLEGRA   Take one tablet every NIGHT for 2 months, then taper as directed as tolerated.     fluticasone-salmeterol 250-50 MCG/DOSE inhaler   Inhale 1 puff into the lungs two times daily.     hydrOXYzine 10 MG tablet  Commonly known as: ATARAX   Take 1 tablet by mouth every 6 hours as needed for Itching (Rash).     montelukast 10 MG tablet  Commonly known as: SINGULAIR   Take 1 tablet by mouth daily.     predniSONE 20 MG tablet  Commonly known as: DELTASONE  Start taking on: March 28, 2022   Take 1 tablet by mouth daily for 7 days. Do not start before March 28, 2022.     Vitamin D (Ergocalciferol) 50 mcg (2,000 units) capsule   Take 1 capsule by mouth daily.  Comment: 50 mcg = 2,000 units          I certify Observation Status for overnight neurological monitoring    Kita Mccullough MD  3/27/2022 12:29 PM

## 2023-05-28 NOTE — ASSESSMENT & PLAN NOTE
Continue with metoprolol, dose changed to 100 t i d  Per Cardiology recommendations  Continue with Lasix 40 mg, decrease to daily as per Cardiology    BP controlled 101/65 (2) more than 100 beats/min

## 2024-12-10 NOTE — DISCHARGE SUMMARY
Discharge Summary - Michael Calvo 69 y o  male MRN: [id number]    Unit/Bed#: -01 Encounter: [id number]    Admission Date:  8/3/2020      Admitting Diagnosis: Open abdominal wall wound [S31 109A]    HPI:  Mr Prince is a 79-year-old male with known history of PAD, PVD, lower extremity wounds, CHF, BPH, AFib, anemia, permanent pacemaker, was admitted in the hospital for elective surgery to repair his abdominal wall dehiscence  Patient had a history of ventral hernia status post repair in July and wound dehiscence  History of previously excision of infected mesh  Patient underwent repair of abdominal wall wound dehiscence with mesh, drain placement, VAC placement on 8/3/2020  Patient did well postop, no major issues  His pain was controlled with pain medications, ambulating well, tolerated p o  well  Patient is being discharged home with VAC dressing  Procedures Performed:  Repair abdominal wall wound dehiscence with mesh, drain placement and VAC placement        Complications:  None    Discharge Diagnosis:  Open abdominal wound,  VAC placement, mal nutrition        Condition at Discharge: good         Discharge instructions/Information to patient and family:     See after visit summary for information provided to patient and family  Provisions for Follow-Up Care:  Patient will follow up with Dr Nolen Duverney in 2 weeks  See after visit summary for information related to follow-up care and any pertinent home health orders  PCP: Neeta Perez DO    Disposition: Home    Planned Readmission: No      Discharge Statement   I spent  minutes discharging the patient  This time was spent on the day of discharge  I had direct contact with the patient on the day of discharge  Additional documentation is required if more than 30 minutes were spent on discharge  Discharge Medications:  See after visit summary for reconciled discharge medications provided to patient and family 
Previously Declined (within the last year)

## 2025-05-01 NOTE — TELEPHONE ENCOUNTER
Cancelling for Wednesday, please call back to reschedule his appointment at another time    [phone] Patient/Caregiver provided printed discharge information.

## (undated) DEVICE — VERSAJET II HYDROSURGERY SYSTEM HANDSET, 45DEG 8MM EXACT: Brand: VERSAJET

## (undated) DEVICE — BETHLEHEM UNIVERSAL MINOR GEN: Brand: CARDINAL HEALTH

## (undated) DEVICE — GLOVE SRG BIOGEL 6.5

## (undated) DEVICE — TUBING SUCTION 5MM X 12 FT

## (undated) DEVICE — U-DRAPE: Brand: CONVERTORS

## (undated) DEVICE — KERLIX BANDAGE ROLL: Brand: KERLIX

## (undated) DEVICE — GLOVE SRG BIOGEL 8

## (undated) DEVICE — SUT PDS II 2-0 SH 27 IN Z317H

## (undated) DEVICE — 1820 FOAM BLOCK NEEDLE COUNTER: Brand: DEVON

## (undated) DEVICE — TELFA NON-ADHERENT ABSORBENT DRESSING: Brand: TELFA

## (undated) DEVICE — JACKSON-PRATT 100CC BULB RESERVOIR: Brand: CARDINAL HEALTH

## (undated) DEVICE — ELECTRODE BLADE MOD E-Z CLEAN 2.5IN 6.4CM -0012M

## (undated) DEVICE — PROXIMATE SKIN STAPLERS (35 WIDE) CONTAINS 35 STAINLESS STEEL STAPLES (FIXED HEAD): Brand: PROXIMATE

## (undated) DEVICE — MAYO STAND COVER: Brand: CONVERTORS

## (undated) DEVICE — GLOVE SRG BIOGEL ECLIPSE 8

## (undated) DEVICE — 2000CC GUARDIAN II: Brand: GUARDIAN

## (undated) DEVICE — DRESSING, ALGINATE, MAXORB II, 4"X4": Brand: MEDLINE INDUSTRIES, INC.

## (undated) DEVICE — PENCIL ELECTROSURG E-Z CLEAN -0035H

## (undated) DEVICE — INTENDED FOR TISSUE SEPARATION, AND OTHER PROCEDURES THAT REQUIRE A SHARP SURGICAL BLADE TO PUNCTURE OR CUT.: Brand: BARD-PARKER SAFETY BLADES SIZE 15, STERILE

## (undated) DEVICE — CULTURE TUBE AEROBIC

## (undated) DEVICE — GLOVE INDICATOR PI UNDERGLOVE SZ 8 BLUE

## (undated) DEVICE — SUT ETHILON 4-0 PS-2 18 IN 1667H

## (undated) DEVICE — MEDI-VAC YANKAUER SUCTION HANDLE W/BULBOUS AND CONTROL VENT: Brand: CARDINAL HEALTH

## (undated) DEVICE — PAD GROUNDING ADULT

## (undated) DEVICE — POOLE SUCTION HANDLE: Brand: CARDINAL HEALTH

## (undated) DEVICE — VIAL DECANTER

## (undated) DEVICE — WET SKIN PREP TRAY: Brand: MEDLINE INDUSTRIES, INC.

## (undated) DEVICE — CURITY STRETCH BANDAGE: Brand: CURITY

## (undated) DEVICE — 5065 IMPAD REGULAR PAIR: Brand: A-V IMPULSE

## (undated) DEVICE — SCD SEQUENTIAL COMPRESSION COMFORT SLEEVE MEDIUM KNEE LENGTH: Brand: KENDALL SCD

## (undated) DEVICE — BETHLEHEM UNIVERSAL  MIONR EXT: Brand: CARDINAL HEALTH

## (undated) DEVICE — SPONGE LAP 18 X 18 IN

## (undated) DEVICE — SUT PROLENE 1 CT-1 30 IN 8425H

## (undated) DEVICE — SUT PROLENE 2-0 SH 30 IN 8833H

## (undated) DEVICE — INTENDED FOR TISSUE SEPARATION, AND OTHER PROCEDURES THAT REQUIRE A SHARP SURGICAL BLADE TO PUNCTURE OR CUT.: Brand: BARD-PARKER ® CARBON RIB-BACK BLADES

## (undated) DEVICE — CURITY NON-ADHERENT STRIPS: Brand: CURITY

## (undated) DEVICE — ASTOUND STANDARD SURGICAL GOWN, XXL: Brand: CONVERTORS

## (undated) DEVICE — PREP PAD BNS: Brand: CONVERTORS

## (undated) DEVICE — ACE WRAP 4 IN UNSTERILE

## (undated) DEVICE — GAUZE SPONGES,16 PLY: Brand: CURITY

## (undated) DEVICE — DRAPE EQUIPMENT RF WAND

## (undated) DEVICE — GLOVE SRG BIOGEL 7.5

## (undated) DEVICE — ABDOMINAL PAD: Brand: DERMACEA

## (undated) DEVICE — SPONGE LAP 18 X 18 IN STRL RFD

## (undated) DEVICE — SUT PDS II 2-0 CT-1 27 IN Z339H

## (undated) DEVICE — TOWEL SURG XR DETECT GREEN STRL RFD

## (undated) DEVICE — GLOVE INDICATOR PI UNDERGLOVE SZ 7.5 BLUE

## (undated) DEVICE — GLOVE INDICATOR PI UNDERGLOVE SZ 6.5 BLUE

## (undated) DEVICE — THE SIMPULSE SOLO SYSTEM WITH ULTREX RETRACTABLE SPLASH SHIELD TIP: Brand: SIMPULSE SOLO

## (undated) DEVICE — 10FR FRAZIER SUCTION HANDLE: Brand: CARDINAL HEALTH

## (undated) DEVICE — GLOVE INDICATOR PI UNDERGLOVE SZ 7 BLUE

## (undated) DEVICE — SUT VICRYL 0 CT-1 27 IN J260H

## (undated) DEVICE — CULTURE TUBE ANAEROBIC

## (undated) DEVICE — DRAPE LAPAROTOMY W/POUCHES

## (undated) DEVICE — SUT ETHILON 3-0 PS-1 18 IN 1663H

## (undated) DEVICE — MEDI-VAC YANKAUER SUCTION HANDLE W/STRAIGHT TIP & CONTROL VENT: Brand: CARDINAL HEALTH

## (undated) DEVICE — CAST PADDING 3 IN UNSTERILE

## (undated) DEVICE — 3M™ TEGADERM™ TRANSPARENT FILM DRESSING FRAME STYLE, 1626W, 4 IN X 4-3/4 IN (10 CM X 12 CM), 50/CT 4CT/CASE: Brand: 3M™ TEGADERM™

## (undated) DEVICE — GLOVE SRG BIOGEL 9

## (undated) DEVICE — SUT PROLENE 2-0 CT-2 30 IN 8411H

## (undated) DEVICE — JP CHAN DRN SIL RND 10FR FULL W/TROCAR: Brand: JACKSON-PRATT

## (undated) DEVICE — ADHESIVE SKIN HIGH VISCOSITY EXOFIN 1ML

## (undated) DEVICE — STANDARD SURGICAL GOWN, L: Brand: CONVERTORS

## (undated) DEVICE — PBT NON ABSORBABLE WOUND CLOSURE DEVICE: Brand: V-LOC

## (undated) DEVICE — VAC DRESSING SENSATRAC SMALL